# Patient Record
Sex: FEMALE | Race: WHITE | Employment: OTHER | URBAN - METROPOLITAN AREA
[De-identification: names, ages, dates, MRNs, and addresses within clinical notes are randomized per-mention and may not be internally consistent; named-entity substitution may affect disease eponyms.]

---

## 2017-12-06 PROBLEM — G47.00 INSOMNIA: Status: ACTIVE | Noted: 2017-12-06

## 2017-12-06 PROBLEM — I10 HTN (HYPERTENSION): Status: ACTIVE | Noted: 2017-12-06

## 2017-12-06 PROBLEM — I73.9 PAD (PERIPHERAL ARTERY DISEASE) (HCC): Status: ACTIVE | Noted: 2017-12-06

## 2017-12-06 PROBLEM — F41.8 DEPRESSION WITH ANXIETY: Status: ACTIVE | Noted: 2017-12-06

## 2017-12-06 PROBLEM — E03.9 HYPOTHYROIDISM (ACQUIRED): Status: ACTIVE | Noted: 2017-12-06

## 2017-12-06 PROBLEM — M19.90 OA (OSTEOARTHRITIS): Status: ACTIVE | Noted: 2017-12-06

## 2017-12-06 PROBLEM — D50.9 ANEMIA, IRON DEFICIENCY: Status: ACTIVE | Noted: 2017-12-06

## 2017-12-06 PROBLEM — E78.5 HLD (HYPERLIPIDEMIA): Status: ACTIVE | Noted: 2017-12-06

## 2017-12-06 PROBLEM — K58.9 IBS (IRRITABLE BOWEL SYNDROME): Status: ACTIVE | Noted: 2017-12-06

## 2017-12-06 PROBLEM — G25.81 RLS (RESTLESS LEGS SYNDROME): Status: ACTIVE | Noted: 2017-12-06

## 2017-12-20 PROBLEM — Z71.89 ENCOUNTER FOR COORDINATION OF COMPLEX CARE: Status: ACTIVE | Noted: 2017-12-20

## 2017-12-21 PROBLEM — B36.9 FUNGAL DERMATITIS: Status: ACTIVE | Noted: 2017-12-21

## 2017-12-21 PROBLEM — Z83.2 FAMILY HISTORY OF AUTOIMMUNE DISORDER: Status: ACTIVE | Noted: 2017-12-21

## 2018-01-15 ENCOUNTER — HOSPITAL ENCOUNTER (OUTPATIENT)
Dept: CT IMAGING | Age: 73
Discharge: HOME | End: 2018-01-15
Attending: SURGERY
Payer: MEDICARE

## 2018-01-15 VITALS — HEIGHT: 58 IN | BODY MASS INDEX: 35.26 KG/M2 | WEIGHT: 168 LBS

## 2018-01-15 DIAGNOSIS — I73.9 PAD (PERIPHERAL ARTERY DISEASE) (HCC): ICD-10-CM

## 2018-01-15 LAB — CREAT BLD-MCNC: 1.3 MG/DL (ref 0.8–1.5)

## 2018-01-15 PROCEDURE — 82565 ASSAY OF CREATININE: CPT

## 2018-01-15 RX ORDER — SODIUM CHLORIDE 0.9 % (FLUSH) 0.9 %
10 SYRINGE (ML) INJECTION
Status: ACTIVE | OUTPATIENT
Start: 2018-01-15 | End: 2018-01-15

## 2018-01-16 ENCOUNTER — HOSPITAL ENCOUNTER (OUTPATIENT)
Dept: CT IMAGING | Age: 73
Discharge: HOME | End: 2018-01-16
Attending: SURGERY
Payer: MEDICARE

## 2018-01-16 VITALS — HEIGHT: 58 IN | WEIGHT: 168 LBS | BODY MASS INDEX: 35.26 KG/M2

## 2018-01-16 LAB — CREAT BLD-MCNC: 1.2 MG/DL (ref 0.8–1.5)

## 2018-01-16 PROCEDURE — 74011000258 HC RX REV CODE- 258: Performed by: SURGERY

## 2018-01-16 PROCEDURE — 82565 ASSAY OF CREATININE: CPT

## 2018-01-16 PROCEDURE — 75635 CT ANGIO ABDOMINAL ARTERIES: CPT

## 2018-01-16 PROCEDURE — 74011636320 HC RX REV CODE- 636/320: Performed by: SURGERY

## 2018-01-16 RX ORDER — SODIUM CHLORIDE 0.9 % (FLUSH) 0.9 %
10 SYRINGE (ML) INJECTION
Status: COMPLETED | OUTPATIENT
Start: 2018-01-16 | End: 2018-01-16

## 2018-01-16 RX ADMIN — SODIUM CHLORIDE 100 ML: 900 INJECTION, SOLUTION INTRAVENOUS at 10:03

## 2018-01-16 RX ADMIN — Medication 10 ML: at 10:03

## 2018-01-16 RX ADMIN — IOPAMIDOL 125 ML: 755 INJECTION, SOLUTION INTRAVENOUS at 10:03

## 2018-02-06 PROBLEM — K90.41 GLUTEN INTOLERANCE: Status: ACTIVE | Noted: 2018-02-06

## 2018-02-06 PROBLEM — Z00.00 ANNUAL PHYSICAL EXAM: Status: ACTIVE | Noted: 2018-02-06

## 2018-07-31 PROBLEM — R23.2 ABNORMAL FLUSHING AND SWEATING: Status: ACTIVE | Noted: 2018-07-31

## 2018-07-31 PROBLEM — R61 ABNORMAL FLUSHING AND SWEATING: Status: ACTIVE | Noted: 2018-07-31

## 2018-09-27 PROBLEM — E66.01 SEVERE OBESITY (BMI 35.0-39.9): Status: ACTIVE | Noted: 2017-12-06

## 2018-10-09 PROBLEM — F33.9 DEPRESSION, RECURRENT (HCC): Status: ACTIVE | Noted: 2017-12-06

## 2018-10-09 PROBLEM — F41.0 PANIC ANXIETY SYNDROME: Status: ACTIVE | Noted: 2018-10-09

## 2018-10-09 PROBLEM — E66.9 OBESITY, UNSPECIFIED: Status: ACTIVE | Noted: 2017-12-06

## 2018-10-09 PROBLEM — Z79.890 POSTMENOPAUSAL HRT (HORMONE REPLACEMENT THERAPY): Status: ACTIVE | Noted: 2018-10-09

## 2018-10-09 PROBLEM — E66.01 SEVERE OBESITY (BMI 35.0-35.9 WITH COMORBIDITY) (HCC): Status: ACTIVE | Noted: 2018-10-09

## 2018-10-09 PROBLEM — E66.9 OBESITY, UNSPECIFIED: Status: RESOLVED | Noted: 2017-12-06 | Resolved: 2018-10-09

## 2018-10-09 PROBLEM — K21.9 GERD (GASTROESOPHAGEAL REFLUX DISEASE): Status: ACTIVE | Noted: 2018-10-09

## 2018-10-23 PROBLEM — J30.9 AR (ALLERGIC RHINITIS): Status: ACTIVE | Noted: 2018-10-23

## 2018-10-23 PROBLEM — M79.7 FIBROMYALGIA: Status: ACTIVE | Noted: 2018-10-23

## 2018-10-23 PROBLEM — Z91.038 HYMENOPTERA ALLERGY: Status: ACTIVE | Noted: 2018-10-23

## 2018-11-20 PROBLEM — N32.81 OAB (OVERACTIVE BLADDER): Status: ACTIVE | Noted: 2018-11-20

## 2018-11-20 PROBLEM — K64.9 HEMORRHOIDS: Status: ACTIVE | Noted: 2018-11-20

## 2018-11-27 ENCOUNTER — HOSPITAL ENCOUNTER (OUTPATIENT)
Dept: LAB | Age: 73
Discharge: HOME | End: 2018-11-27
Attending: FAMILY MEDICINE
Payer: MEDICARE

## 2018-11-27 LAB
T4 FREE SERPL-MCNC: 1.4 NG/DL (ref 0.78–1.46)
TSH SERPL DL<=0.005 MIU/L-ACNC: 0.15 UIU/ML

## 2018-11-27 PROCEDURE — 84439 ASSAY OF FREE THYROXINE: CPT

## 2018-11-27 PROCEDURE — 36415 COLL VENOUS BLD VENIPUNCTURE: CPT

## 2018-11-27 PROCEDURE — 84443 ASSAY THYROID STIM HORMONE: CPT

## 2019-01-29 ENCOUNTER — HOSPITAL ENCOUNTER (OUTPATIENT)
Dept: SLEEP MEDICINE | Age: 74
Discharge: HOME | End: 2019-01-29
Attending: FAMILY MEDICINE
Payer: MEDICARE

## 2019-01-29 PROCEDURE — 95810 POLYSOM 6/> YRS 4/> PARAM: CPT

## 2019-03-04 ENCOUNTER — HOSPITAL ENCOUNTER (OUTPATIENT)
Dept: LAB | Age: 74
Discharge: HOME | End: 2019-03-04
Payer: MEDICARE

## 2019-03-04 DIAGNOSIS — E03.9 PRIMARY HYPOTHYROIDISM: ICD-10-CM

## 2019-03-04 LAB — TSH W FREE THYROID I,TSHELE: 1.02 UIU/ML (ref 0.36–3.74)

## 2019-03-04 PROCEDURE — 84443 ASSAY THYROID STIM HORMONE: CPT

## 2019-03-04 PROCEDURE — 36415 COLL VENOUS BLD VENIPUNCTURE: CPT

## 2019-03-04 PROCEDURE — 86376 MICROSOMAL ANTIBODY EACH: CPT

## 2019-03-05 PROBLEM — E06.3 HASHIMOTO'S THYROIDITIS: Status: ACTIVE | Noted: 2019-03-05

## 2019-03-05 LAB — THYROPEROXIDASE AB SERPL-ACNC: 120 IU/ML (ref 0–34)

## 2019-03-14 PROBLEM — E66.2 OBESITY HYPOVENTILATION SYNDROME (HCC): Status: ACTIVE | Noted: 2019-03-14

## 2019-03-19 ENCOUNTER — HOSPITAL ENCOUNTER (OUTPATIENT)
Dept: LAB | Age: 74
Discharge: HOME | End: 2019-03-19
Payer: MEDICARE

## 2019-03-19 DIAGNOSIS — D50.8 OTHER IRON DEFICIENCY ANEMIA: ICD-10-CM

## 2019-03-19 DIAGNOSIS — I10 ESSENTIAL HYPERTENSION: ICD-10-CM

## 2019-03-19 LAB
ALBUMIN SERPL-MCNC: 3.7 G/DL (ref 3.2–4.6)
ALBUMIN/GLOB SERPL: 0.9 {RATIO} (ref 1.2–3.5)
ALP SERPL-CCNC: 86 U/L (ref 50–136)
ALT SERPL-CCNC: 17 U/L (ref 12–65)
ANION GAP SERPL CALC-SCNC: 7 MMOL/L (ref 7–16)
AST SERPL-CCNC: 18 U/L (ref 15–37)
BASOPHILS # BLD: 0.1 K/UL (ref 0–0.2)
BASOPHILS NFR BLD: 1 % (ref 0–2)
BILIRUB SERPL-MCNC: 0.3 MG/DL (ref 0.2–1.1)
BUN SERPL-MCNC: 18 MG/DL (ref 8–23)
CALCIUM SERPL-MCNC: 8.8 MG/DL (ref 8.3–10.4)
CHLORIDE SERPL-SCNC: 100 MMOL/L (ref 98–107)
CO2 SERPL-SCNC: 29 MMOL/L (ref 21–32)
CREAT SERPL-MCNC: 1.14 MG/DL (ref 0.6–1)
DIFFERENTIAL METHOD BLD: NORMAL
EOSINOPHIL # BLD: 0.3 K/UL (ref 0–0.8)
EOSINOPHIL NFR BLD: 3 % (ref 0.5–7.8)
ERYTHROCYTE [DISTWIDTH] IN BLOOD BY AUTOMATED COUNT: 13.3 % (ref 11.9–14.6)
FERRITIN SERPL-MCNC: 73 NG/ML (ref 8–388)
GLOBULIN SER CALC-MCNC: 4.1 G/DL (ref 2.3–3.5)
GLUCOSE SERPL-MCNC: 76 MG/DL (ref 65–100)
HCT VFR BLD AUTO: 41.1 % (ref 35.8–46.3)
HGB BLD-MCNC: 13.2 G/DL (ref 11.7–15.4)
IMM GRANULOCYTES # BLD AUTO: 0 K/UL (ref 0–0.5)
IMM GRANULOCYTES NFR BLD AUTO: 0 % (ref 0–5)
IRON SERPL-MCNC: 82 UG/DL (ref 35–150)
LYMPHOCYTES # BLD: 2.8 K/UL (ref 0.5–4.6)
LYMPHOCYTES NFR BLD: 30 % (ref 13–44)
MCH RBC QN AUTO: 29.3 PG (ref 26.1–32.9)
MCHC RBC AUTO-ENTMCNC: 32.1 G/DL (ref 31.4–35)
MCV RBC AUTO: 91.3 FL (ref 79.6–97.8)
MONOCYTES # BLD: 0.5 K/UL (ref 0.1–1.3)
MONOCYTES NFR BLD: 6 % (ref 4–12)
NEUTS SEG # BLD: 5.5 K/UL (ref 1.7–8.2)
NEUTS SEG NFR BLD: 60 % (ref 43–78)
NRBC # BLD: 0 K/UL (ref 0–0.2)
PLATELET # BLD AUTO: 254 K/UL (ref 150–450)
PMV BLD AUTO: 11.3 FL (ref 9.4–12.3)
POTASSIUM SERPL-SCNC: 4.1 MMOL/L (ref 3.5–5.1)
PROT SERPL-MCNC: 7.8 G/DL (ref 6.3–8.2)
RBC # BLD AUTO: 4.5 M/UL (ref 4.05–5.2)
SODIUM SERPL-SCNC: 136 MMOL/L (ref 136–145)
WBC # BLD AUTO: 9.2 K/UL (ref 4.3–11.1)

## 2019-03-19 PROCEDURE — 85025 COMPLETE CBC W/AUTO DIFF WBC: CPT

## 2019-03-19 PROCEDURE — 36415 COLL VENOUS BLD VENIPUNCTURE: CPT

## 2019-03-19 PROCEDURE — 80053 COMPREHEN METABOLIC PANEL: CPT

## 2019-03-19 PROCEDURE — 82728 ASSAY OF FERRITIN: CPT

## 2019-03-19 PROCEDURE — 83540 ASSAY OF IRON: CPT

## 2019-04-15 PROBLEM — R06.09 DOE (DYSPNEA ON EXERTION): Status: ACTIVE | Noted: 2019-04-15

## 2019-05-03 DIAGNOSIS — Z12.31 VISIT FOR SCREENING MAMMOGRAM: ICD-10-CM

## 2019-06-26 PROBLEM — R11.0 CHRONIC NAUSEA: Status: ACTIVE | Noted: 2019-06-26

## 2019-07-09 PROBLEM — R76.8 ANA POSITIVE: Status: ACTIVE | Noted: 2018-10-26

## 2019-07-23 PROBLEM — L23.9 ALLERGIC DERMATITIS: Status: ACTIVE | Noted: 2019-07-23

## 2019-09-09 ENCOUNTER — HOSPITAL ENCOUNTER (OUTPATIENT)
Dept: LAB | Age: 74
Discharge: HOME | End: 2019-09-09
Attending: PHYSICIAN ASSISTANT
Payer: MEDICARE

## 2019-09-09 DIAGNOSIS — E03.9 PRIMARY HYPOTHYROIDISM: ICD-10-CM

## 2019-09-09 LAB
T4 FREE SERPL-MCNC: 1.3 NG/DL (ref 0.78–1.46)
TSH W FREE THYROID I,TSHELE: 0.32 UIU/ML (ref 0.36–3.74)

## 2019-09-09 PROCEDURE — 84443 ASSAY THYROID STIM HORMONE: CPT

## 2019-09-09 PROCEDURE — 36415 COLL VENOUS BLD VENIPUNCTURE: CPT

## 2019-09-09 PROCEDURE — 84439 ASSAY OF FREE THYROXINE: CPT

## 2019-10-31 PROBLEM — F41.1 GAD (GENERALIZED ANXIETY DISORDER): Status: ACTIVE | Noted: 2018-10-09

## 2019-11-07 PROBLEM — N39.3 SUI (STRESS URINARY INCONTINENCE, FEMALE): Status: ACTIVE | Noted: 2019-11-07

## 2019-11-27 ENCOUNTER — HOSPITAL ENCOUNTER (OUTPATIENT)
Dept: SURGERY | Age: 74
Discharge: HOME | End: 2019-11-27
Attending: OBSTETRICS & GYNECOLOGY
Payer: MEDICARE

## 2019-11-27 VITALS
HEIGHT: 57 IN | BODY MASS INDEX: 42.07 KG/M2 | TEMPERATURE: 97.5 F | HEART RATE: 60 BPM | SYSTOLIC BLOOD PRESSURE: 145 MMHG | OXYGEN SATURATION: 99 % | DIASTOLIC BLOOD PRESSURE: 67 MMHG | RESPIRATION RATE: 18 BRPM | WEIGHT: 195 LBS

## 2019-11-27 LAB
HGB BLD-MCNC: 12.1 G/DL (ref 11.7–15.4)
POTASSIUM SERPL-SCNC: 4.3 MMOL/L (ref 3.5–5.1)

## 2019-11-27 PROCEDURE — 85018 HEMOGLOBIN: CPT

## 2019-11-27 PROCEDURE — 84132 ASSAY OF SERUM POTASSIUM: CPT

## 2019-11-27 RX ORDER — FLUTICASONE PROPIONATE 50 MCG
2 SPRAY, SUSPENSION (ML) NASAL DAILY
COMMUNITY
End: 2021-03-12 | Stop reason: SDUPTHER

## 2019-11-27 RX ORDER — IBUPROFEN 200 MG
TABLET ORAL
COMMUNITY
End: 2020-03-24

## 2019-11-27 RX ORDER — ACETAMINOPHEN 325 MG/1
TABLET ORAL
COMMUNITY
End: 2020-01-14

## 2019-11-27 RX ORDER — PANTOPRAZOLE SODIUM 40 MG/1
40 TABLET, DELAYED RELEASE ORAL DAILY
COMMUNITY
End: 2020-01-14 | Stop reason: SDUPTHER

## 2019-11-27 NOTE — PERIOP NOTES
PLEASE CONTINUE TAKING ALL PRESCRIPTION MEDICATIONS UP TO THE DAY OF SURGERY UNLESS OTHERWISE DIRECTED BELOW. DISCONTINUE all vitamin/supplements/herbals 7 days prior to surgery and NSAIDs 5 days prior to procedure. Home Medications to hold     Vitamins, Supplements, and Herbals 7 days prior to surgery. Non-Steriodal Anti-Inflammatory (NSAIDS) such as Advil, Aleve, etc. 5 days prior to surgery          Home Medications to take the day  Of surgery   Tylenol if needed, Singulair, Zofran if needed, Oxybutynin, Klonopin                   Lexapro, ProAir inhaler--bring to the hospital, Protonix, Zantac, Flonase    Zyrtec, Norco if needed     Comments                Please do not bring home medications with you on the day of surgery unless otherwise directed by your nurse. If you are instructed to bring home medications, please give them to your nurse as they will be administered by the nursing staff. If you have any questions, please call [facility] [phone] or [facility] [phone].

## 2019-11-27 NOTE — PERIOP NOTES
Patient verified name and     Order for consent NOT found in EHR, unable to confirm case posting; patient verified. Type 1B surgery, PAT walk-in assessment complete. Labs per surgeon: No orders received. Labs per anesthesia protocol: Hgb and K+; results within anesthesia limits. EKG: None per anesthesia protocol. Patient has systolic murmur grade 2/6 (per PCP note 19). Murmur auscultated during PAT appointment. Patient has SOBOE and bilateral lower extremity swelling. Patient reports she is using her PRN ProAir inhaler QHS. Patient has never had an Echo. Patient denies CP. Patient also reports that in  in [place] heart stopped at the end of my gallbladder surgery. \" Patient reports no CPR was performed and she stayed one night in the hospital. No problems with any other surgeries. Dr. Ac Tarango (anesthesia) made aware of the above; no in-person assessment needed. Per Dr. Ac Tarango Echo to be completed prior to surgery. No additional orders received. Patient is scheduled for an Echo on 19 at 1130. Charge nurse will obtain report when Echo is completed. PCP note with surgical clearance (19) and Nuclear Perfusion Study (19) placed on chart if needed for anesthesia reference. Patient instructed to go to the main entrance of the [facility] on 19 at 1030 am and signed in at the registration office. Patient and patient's  verbalize understanding. Hospital approved surgical skin cleanser and instructions given per hospital policy. Patient provided with and instructed on educational handouts including Guide to Surgery, Pain Management, Hand Hygiene, Blood Transfusion Education, and [facility] Brochure. Patient answered medical/surgical history questions at their best of ability. All prior to admission medications documented in [facility]. Original medication prescription bottle NOT visualized during patient appointment. Patient instructed to hold all vitamins/supplements/herbals 7 days prior to surgery and NSAIDS 5 days prior to surgery, patient verbalized understanding. Patient given verbal and written instructions on medications to take the DOS per anesthesia guidelines. Patient instructed to bring ProAir inhaler to the hospital on the DOS per anesthesia protocol. Patient teach back successful and patient demonstrates knowledge of instructions.

## 2019-12-02 ENCOUNTER — HOSPITAL ENCOUNTER (OUTPATIENT)
Dept: NON INVASIVE DIAGNOSTICS | Age: 74
Discharge: HOME | End: 2019-12-02
Attending: ANESTHESIOLOGY
Payer: MEDICARE

## 2019-12-02 ENCOUNTER — ANESTHESIA EVENT (OUTPATIENT)
Dept: SURGERY | Age: 74
End: 2019-12-02
Payer: MEDICARE

## 2019-12-02 DIAGNOSIS — R01.1 CARDIAC MURMUR: ICD-10-CM

## 2019-12-02 PROCEDURE — 93306 TTE W/DOPPLER COMPLETE: CPT

## 2019-12-02 RX ORDER — CLINDAMYCIN PHOSPHATE 600 MG/50ML
600 INJECTION INTRAVENOUS ONCE
Status: CANCELLED | OUTPATIENT
Start: 2019-12-02 | End: 2019-12-02

## 2019-12-02 RX ORDER — SODIUM CHLORIDE 0.9 % (FLUSH) 0.9 %
5-40 SYRINGE (ML) INJECTION AS NEEDED
Status: CANCELLED | OUTPATIENT
Start: 2019-12-02

## 2019-12-02 RX ORDER — SODIUM CHLORIDE 0.9 % (FLUSH) 0.9 %
5-40 SYRINGE (ML) INJECTION EVERY 8 HOURS
Status: CANCELLED | OUTPATIENT
Start: 2019-12-02

## 2019-12-02 NOTE — H&P
Chief Complaint:  Stress Incontinence    History of Present Illness:  Ms. Maxime Sumner is a 68 yo with stress incontinence. She has been leaking urine for 6-7 months. She leaks urine both day and night. She leaks with cough, laugh, sneeze and position changes. She was also leaking with urge but was placed on oxybutynin and this resolved that problem. She sometimes wears pads and uses a towel at night time. She leaks urine about 10 times per day. She Leaks variable amounts but not full bladder. She has significant allergies and coughs all night. This is when she leaks the most. She is on multiple medications for her allergies. Ventolin helps she uses it once per day.      She is s/pTVH due to heavy menses. She was on hormones and she was started on premarin by Dr. Zoila Soto.      She voids 6 times per day  She wakes up 2-3 times at night to void  She has IBS with diarrhea, she does not strain.      She drinks 1 cup of coffee per day and an occasional soda  She does not use sweeteners  She does not drink alcohol     She is not sexually active for other reasons.      Her last colonoscopy was 3 years ago- she had polyps and advised 10 years.      Quit smoking 12 years ago     Denies DM     She is on nystatin powder for yeast.     Allergies:  Fire Ant  Amoxicillin  Betadine [Povidone-Iodine]  Cephalexin  Demerol [Meperidine]  Morphine  Percocet [Oxycodone-Acetaminophen]  Percodan [Oxycodone Hcl-Oxycodone-Asa]      Medications:  No current facility-administered medications for this encounter. Current Outpatient Medications:     fluticasone propionate (FLONASE) 50 mcg/actuation nasal spray, 2 Sprays by Both Nostrils route daily. , Disp: , Rfl:     pantoprazole (PROTONIX) 40 mg tablet, Take 40 mg by mouth daily. , Disp: , Rfl:     acetaminophen (TYLENOL) 325 mg tablet, Take  by mouth every four (4) hours as needed for Pain., Disp: , Rfl:     ibuprofen (ADVIL) 200 mg tablet, Take  by mouth., Disp: , Rfl:     cetirizine (ZYRTEC) 10 mg tablet, Take 1 Tab by mouth daily. , Disp: 90 Tab, Rfl: 1    conjugated estrogens (PREMARIN) 0.625 mg tablet, Take 1 Tab by mouth daily. , Disp: 90 Tab, Rfl: 1    escitalopram oxalate (LEXAPRO) 20 mg tablet, Take 1 Tab by mouth daily. , Disp: 90 Tab, Rfl: 1    lisinopril-hydroCHLOROthiazide (PRINZIDE, ZESTORETIC) 10-12.5 mg per tablet, Take 1 Tab by mouth daily. , Disp: 90 Tab, Rfl: 1    montelukast (SINGULAIR) 10 mg tablet, Take 1 Tab by mouth daily. , Disp: 90 Tab, Rfl: 1    oxybutynin chloride XL (DITROPAN XL) 10 mg CR tablet, Take 1 Tab by mouth daily. , Disp: 90 Tab, Rfl: 1    pramipexole (MIRAPEX) 0.5 mg tablet, Take 1 Tab by mouth daily. (Patient taking differently: Take 0.5 mg by mouth nightly.), Disp: 90 Tab, Rfl: 1    raNITIdine (ZANTAC) 150 mg tablet, Take 1 Tab by mouth two (2) times a day., Disp: 180 Tab, Rfl: 1    diphenoxylate-atropine (LOMOTIL) 2.5-0.025 mg per tablet, Take 1 Tab by mouth four (4) times daily as needed for Diarrhea. Max Daily Amount: 4 Tabs., Disp: 120 Tab, Rfl: 5    clonazePAM (KLONOPIN) 1 mg tablet, Take 1 Tab by mouth two (2) times a day. Max Daily Amount: 2 mg., Disp: 60 Tab, Rfl: 5    levothyroxine (SYNTHROID) 112 mcg tablet, Take 1 Tab by mouth Daily (before breakfast). (Patient taking differently: Take 112 mcg by mouth nightly.), Disp: 30 Tab, Rfl: 11    HYDROcodone-acetaminophen (NORCO) 5-325 mg per tablet, TAKE 1 TABLET BY MOUTH ONCE DAILY AS NEEDED FOR PAIN, Disp: , Rfl: 0    betamethasone valerate (VALISONE) 0.1 % ointment, Apply  to affected area daily. , Disp: 15 g, Rfl: 0    ondansetron hcl (ZOFRAN) 4 mg tablet, Take 1 Tab by mouth DIALYSIS PRN for Nausea., Disp: 90 Tab, Rfl: 2    clotrimazole-betamethasone (LOTRISONE) topical cream, Apply  to affected area two (2) times a day.  Until rash resolves, Disp: 45 g, Rfl: 1    albuterol (PROVENTIL HFA, VENTOLIN HFA, PROAIR HFA) 90 mcg/actuation inhaler, Take 1-2 Puffs by inhalation every six (6) hours as needed for Wheezing., Disp: 1 Inhaler, Rfl: 1    cholecalciferol, VITAMIN D3, (VITAMIN D3) 5,000 unit tab tablet, Take  by mouth daily. , Disp: , Rfl:     VITAMIN E ACETATE PO, Take 1 Tab by mouth daily. , Disp: , Rfl:     nystatin (MYCOSTATIN) powder, Apply  to affected area three (3) times daily as needed. , Disp: 60 g, Rfl: 2      Past Medical History:  Anemia  HTN  Hyperlipidemia  Hypothyroid  IBS  Insomnia  PAD  RLS    Past Surgical History:  Appendectomy  Cholecystectomy  R and L Knee surgery  Urethroplasty- 1986  Right shoulder  B/L ulna  Right hand  T and A  RAIMUNDO  Gastric Band      Family History:  Brain Aneurysm in brother and father  Heart Disease  Skin Cancer  Hepatitis C- son  MS- daughter    Social History:    Former Smoker  Quit 2005  No alcohol  No drugs    Review of Systems:  Constitutional: Positive for fatigue. Negative for fever. Skin: Negative for rash and itching. HENT: Negative for hearing loss and vertigo. Respiratory: Negative for cough and shortness of breath. Cardiovascular:  Positive for leg swelling. Negative for chest pain and palpitations. Gastrointestinal: Negative for abdominal pain, anal bleeding, constipation, diarrhea and hemorrhoids. Genitourinary: Positive for enuresis, frequency, urgency and decreased urine volume. Negative for difficulty urinating, dyspareunia, dysuria, genital sores, hematuria, menstrual problem, pelvic pain, pelvic pressure, vaginal bleeding, vaginal discharge, vaginal pain, vaginal burning, vaginal itching, vaginal odor and vaginal lesion. Neurological: Negative for dizziness, blackouts and headaches. Psychiatric/Behavioral: Positive for nervous/anxious. Negative for behavioral problem and insomnia. Endocrine: Positive for polydipsia. Negative for polyuria. Female Endocrine: Positive for vaginal dryness and decreased libido. Negative for pain with intercourse.     Physical Exam:     Physical Exam   Constitutional: She is oriented to person, place, and time. Vital signs are normal. She appears well-developed and well-nourished. She is cooperative. No distress. HENT:   Head: Normocephalic and atraumatic. Neck: Trachea normal. No thyroid mass present. Cardiovascular: Normal rate and normal heart sounds. Pulmonary/Chest: Effort normal. No accessory muscle usage. No respiratory distress. Abdominal: Soft. Normal appearance. She exhibits no distension and no mass. There is no hepatosplenomegaly. There is no tenderness. There is no rebound and no guarding. No hernia. Genitourinary: Pelvic exam was performed with patient supine. Musculoskeletal: She exhibits no edema or tenderness. Lymphadenopathy:     She has no cervical adenopathy. She has no axillary adenopathy. Neurological: She is alert and oriented to person, place, and time. Skin: Skin is warm and dry. No lesion and no rash noted. No erythema. Psychiatric: She has a normal mood and affect.  Her speech is normal and behavior is normal. Judgment and thought content normal. Cognition and memory are normal. She does not express impulsivity or inappropriate judgment.         Female Genitourinary   Vulva:                          positive atrophy No lesions  Bartholin's Gland:        Bilateral , Normal, nontender  Skenes Gland:            Bilateral, Normal, nontender   Clitoris:                        Normal.   Introitus:                      Normal.   Urethral Meatus:         Normal appearing, normal size, no lesions, no prolapse  Urethra:           No masses, no tenderness  Vagina:            Positive atrophy, no discharge, no lesions  Cervix:             Absent  Uterus:            Absent   Adnexa:           No masses palpated, no tenderness  Bladder:           No tenderness, no masses palpated  Perineum:        Normal, no lesions    Rectal   Anorectal Exam: No hemorrhoids and no masses or lesions of the perineum           POP-Q: (Pelvic Organ Prolapse - Quantification Exam):  No POP     ICS Stage                     Anterior:  Stage 0   Posterior:  Stage 0   Apical:  Stage 0            Pelvic floor muscles: Tender Spasm     Contraction     R. Puborectalis: NO 0 /5   4 /5   L. Puborectalis: NO 0 /5   4 /5   R. Pubococcyg NO 0 /5   4 /5   L. Pubococcyg NO 0 /5   4 /5   R. Ileococcyg: NO 0 /5   4 /5   L. Ileococcyg: NO 0 /5   4 /5   R. Obturator Int: NO 0 /5   4 /5   L. Obturator Int: NO 0 /5   4 /5   R. Coccygeus: NO 0 /5   4 /5   L. Coccygeus: NO 0 /5   4 /5            Supine Stress Test of MALOU:  Positive     Neurological Exam:   Sensorineural Exam:               Bulvocavernosus reflex:  Normal               Anal [place]:  Normal          Assessment and Plan:  MALOU (stress urinary incontinence, female)  Assessment & Plan:  We discussed the differential diagnosis of urinary incontinence. We also discussed the pathogenesis and etiology of stress urinary incontinence. I explained the epidemiology of incontinence. I offered her options which include nothing, physical therapy, barrier treatment, and surgery. We discussed pessary and impressa.      She is interested in possible surgical repair.      I described the surgical technique to be employed for her upcoming surgery. We discussed the anticipated postoperative course.     TVT cure rates at 6 years is up to 85%.      We discussed the properties of polypropylene mesh. We discussed the inert nature and the potential for complication, including infections, rejection, and erosion. The risk of erosion is <10%.    We discussed the FDA warning on mesh use.      Risks, benefits, indications, and alternatives of the surgery were discussed. Risks reviewed include bleeding, infections, injury to pelvic organs (bladder, nerves, vessels, urethra, and ureters), recurrence, urinary retention, dyspareunia, anesthetic complications, and death.  We discussed that other complications could arise and that the list is too long to discuss comprehensively.     She is consented for midurethral sling and cystoscopy

## 2019-12-02 NOTE — PERIOP NOTES
Results of Echo performed earlier today within Anesthesia protocols:    SUMMARY:   -  Left ventricle: Systolic function was normal. Ejection fraction was  estimated in the range of 60 % to 65 %. There were no regional wall motion  abnormalities. Wall thickness was at the upper limits of normal.   -  Pericardium: There was no pericardial effusion. Complete report in EMR.

## 2019-12-03 ENCOUNTER — ANESTHESIA (OUTPATIENT)
Dept: SURGERY | Age: 74
End: 2019-12-03
Payer: MEDICARE

## 2019-12-03 ENCOUNTER — HOSPITAL ENCOUNTER (OUTPATIENT)
Age: 74
Setting detail: OUTPATIENT SURGERY
Discharge: HOME | End: 2019-12-03
Attending: OBSTETRICS & GYNECOLOGY | Admitting: OBSTETRICS & GYNECOLOGY
Payer: MEDICARE

## 2019-12-03 VITALS
HEIGHT: 57 IN | RESPIRATION RATE: 18 BRPM | DIASTOLIC BLOOD PRESSURE: 58 MMHG | HEART RATE: 62 BPM | OXYGEN SATURATION: 97 % | SYSTOLIC BLOOD PRESSURE: 116 MMHG | WEIGHT: 195 LBS | BODY MASS INDEX: 42.07 KG/M2 | TEMPERATURE: 99 F

## 2019-12-03 DIAGNOSIS — G89.18 POSTOPERATIVE PAIN: Primary | ICD-10-CM

## 2019-12-03 PROCEDURE — 74011000250 HC RX REV CODE- 250: Performed by: NURSE ANESTHETIST, CERTIFIED REGISTERED

## 2019-12-03 PROCEDURE — 77030039266 HC ADH SKN EXOFIN S2SG -A: Performed by: OBSTETRICS & GYNECOLOGY

## 2019-12-03 PROCEDURE — 74011250636 HC RX REV CODE- 250/636: Performed by: NURSE ANESTHETIST, CERTIFIED REGISTERED

## 2019-12-03 PROCEDURE — 76210000020 HC REC RM PH II FIRST 0.5 HR: Performed by: OBSTETRICS & GYNECOLOGY

## 2019-12-03 PROCEDURE — 77030034696 HC CATH URETH FOL 2W BARD -A: Performed by: OBSTETRICS & GYNECOLOGY

## 2019-12-03 PROCEDURE — C1771 REP DEV, URINARY, W/SLING: HCPCS | Performed by: OBSTETRICS & GYNECOLOGY

## 2019-12-03 PROCEDURE — 74011250637 HC RX REV CODE- 250/637: Performed by: ANESTHESIOLOGY

## 2019-12-03 PROCEDURE — 74011000250 HC RX REV CODE- 250: Performed by: OBSTETRICS & GYNECOLOGY

## 2019-12-03 PROCEDURE — 74011250636 HC RX REV CODE- 250/636: Performed by: ANESTHESIOLOGY

## 2019-12-03 PROCEDURE — 74011250636 HC RX REV CODE- 250/636: Performed by: OBSTETRICS & GYNECOLOGY

## 2019-12-03 PROCEDURE — 77030037088 HC TUBE ENDOTRACH ORAL NSL COVD-A: Performed by: ANESTHESIOLOGY

## 2019-12-03 PROCEDURE — 76060000033 HC ANESTHESIA 1 TO 1.5 HR: Performed by: OBSTETRICS & GYNECOLOGY

## 2019-12-03 PROCEDURE — 77030040361 HC SLV COMPR DVT MDII -B: Performed by: OBSTETRICS & GYNECOLOGY

## 2019-12-03 PROCEDURE — 77030008462 HC STPLR SKN PROX J&J -A: Performed by: OBSTETRICS & GYNECOLOGY

## 2019-12-03 PROCEDURE — 74011000258 HC RX REV CODE- 258: Performed by: OBSTETRICS & GYNECOLOGY

## 2019-12-03 PROCEDURE — 76210000006 HC OR PH I REC 0.5 TO 1 HR: Performed by: OBSTETRICS & GYNECOLOGY

## 2019-12-03 PROCEDURE — 74011000250 HC RX REV CODE- 250: Performed by: ANESTHESIOLOGY

## 2019-12-03 PROCEDURE — 77030039425 HC BLD LARYNG TRULITE DISP TELE -A: Performed by: ANESTHESIOLOGY

## 2019-12-03 PROCEDURE — 77030019927 HC TBNG IRR CYSTO BAXT -A: Performed by: OBSTETRICS & GYNECOLOGY

## 2019-12-03 PROCEDURE — 77030040922 HC BLNKT HYPOTHRM STRY -A: Performed by: ANESTHESIOLOGY

## 2019-12-03 PROCEDURE — 77030031139 HC SUT VCRL2 J&J -A: Performed by: OBSTETRICS & GYNECOLOGY

## 2019-12-03 PROCEDURE — 77030018836 HC SOL IRR NACL ICUM -A: Performed by: OBSTETRICS & GYNECOLOGY

## 2019-12-03 PROCEDURE — 76010000138 HC OR TIME 0.5 TO 1 HR: Performed by: OBSTETRICS & GYNECOLOGY

## 2019-12-03 PROCEDURE — 77030018832 HC SOL IRR H20 ICUM -A: Performed by: OBSTETRICS & GYNECOLOGY

## 2019-12-03 DEVICE — TRANSVAGINAL MID-URETHRAL SLING
Type: IMPLANTABLE DEVICE | Site: PELVIS | Status: FUNCTIONAL
Brand: ADVANTAGE™ SYSTEM

## 2019-12-03 RX ORDER — EPHEDRINE SULFATE/0.9% NACL/PF 50 MG/5 ML
SYRINGE (ML) INTRAVENOUS AS NEEDED
Status: DISCONTINUED | OUTPATIENT
Start: 2019-12-03 | End: 2019-12-03 | Stop reason: HOSPADM

## 2019-12-03 RX ORDER — PROPOFOL 10 MG/ML
INJECTION, EMULSION INTRAVENOUS AS NEEDED
Status: DISCONTINUED | OUTPATIENT
Start: 2019-12-03 | End: 2019-12-03 | Stop reason: HOSPADM

## 2019-12-03 RX ORDER — ROCURONIUM BROMIDE 10 MG/ML
INJECTION, SOLUTION INTRAVENOUS AS NEEDED
Status: DISCONTINUED | OUTPATIENT
Start: 2019-12-03 | End: 2019-12-03 | Stop reason: HOSPADM

## 2019-12-03 RX ORDER — DEXAMETHASONE SODIUM PHOSPHATE 100 MG/10ML
INJECTION INTRAMUSCULAR; INTRAVENOUS AS NEEDED
Status: DISCONTINUED | OUTPATIENT
Start: 2019-12-03 | End: 2019-12-03 | Stop reason: HOSPADM

## 2019-12-03 RX ORDER — LIDOCAINE HYDROCHLORIDE 20 MG/ML
INJECTION, SOLUTION EPIDURAL; INFILTRATION; INTRACAUDAL; PERINEURAL AS NEEDED
Status: DISCONTINUED | OUTPATIENT
Start: 2019-12-03 | End: 2019-12-03 | Stop reason: HOSPADM

## 2019-12-03 RX ORDER — BACITRACIN 50000 [IU]/1
INJECTION, POWDER, FOR SOLUTION INTRAMUSCULAR AS NEEDED
Status: DISCONTINUED | OUTPATIENT
Start: 2019-12-03 | End: 2019-12-03 | Stop reason: HOSPADM

## 2019-12-03 RX ORDER — LIDOCAINE HYDROCHLORIDE 10 MG/ML
0.1 INJECTION INFILTRATION; PERINEURAL AS NEEDED
Status: DISCONTINUED | OUTPATIENT
Start: 2019-12-03 | End: 2019-12-03 | Stop reason: HOSPADM

## 2019-12-03 RX ORDER — SODIUM CHLORIDE, SODIUM LACTATE, POTASSIUM CHLORIDE, CALCIUM CHLORIDE 600; 310; 30; 20 MG/100ML; MG/100ML; MG/100ML; MG/100ML
1000 INJECTION, SOLUTION INTRAVENOUS CONTINUOUS
Status: DISCONTINUED | OUTPATIENT
Start: 2019-12-03 | End: 2019-12-03 | Stop reason: HOSPADM

## 2019-12-03 RX ORDER — MIDAZOLAM HYDROCHLORIDE 1 MG/ML
2 INJECTION, SOLUTION INTRAMUSCULAR; INTRAVENOUS
Status: COMPLETED | OUTPATIENT
Start: 2019-12-03 | End: 2019-12-03

## 2019-12-03 RX ORDER — HYDROMORPHONE HYDROCHLORIDE 2 MG/1
2 TABLET ORAL
Qty: 10 TAB | Refills: 0 | Status: SHIPPED | OUTPATIENT
Start: 2019-12-03 | End: 2019-12-06

## 2019-12-03 RX ORDER — CLINDAMYCIN PHOSPHATE 900 MG/50ML
900 INJECTION INTRAVENOUS ONCE
Status: COMPLETED | OUTPATIENT
Start: 2019-12-03 | End: 2019-12-03

## 2019-12-03 RX ORDER — FENTANYL CITRATE 50 UG/ML
INJECTION, SOLUTION INTRAMUSCULAR; INTRAVENOUS AS NEEDED
Status: DISCONTINUED | OUTPATIENT
Start: 2019-12-03 | End: 2019-12-03 | Stop reason: HOSPADM

## 2019-12-03 RX ORDER — HYDROMORPHONE HYDROCHLORIDE 2 MG/ML
0.5 INJECTION, SOLUTION INTRAMUSCULAR; INTRAVENOUS; SUBCUTANEOUS
Status: DISCONTINUED | OUTPATIENT
Start: 2019-12-03 | End: 2019-12-04 | Stop reason: HOSPADM

## 2019-12-03 RX ORDER — SUCCINYLCHOLINE CHLORIDE 20 MG/ML
INJECTION INTRAMUSCULAR; INTRAVENOUS AS NEEDED
Status: DISCONTINUED | OUTPATIENT
Start: 2019-12-03 | End: 2019-12-03 | Stop reason: HOSPADM

## 2019-12-03 RX ORDER — ONDANSETRON 2 MG/ML
4 INJECTION INTRAMUSCULAR; INTRAVENOUS
Status: DISCONTINUED | OUTPATIENT
Start: 2019-12-03 | End: 2019-12-04 | Stop reason: HOSPADM

## 2019-12-03 RX ORDER — ACETAMINOPHEN 500 MG
1000 TABLET ORAL ONCE
Status: COMPLETED | OUTPATIENT
Start: 2019-12-03 | End: 2019-12-03

## 2019-12-03 RX ORDER — ONDANSETRON 2 MG/ML
INJECTION INTRAMUSCULAR; INTRAVENOUS AS NEEDED
Status: DISCONTINUED | OUTPATIENT
Start: 2019-12-03 | End: 2019-12-03 | Stop reason: HOSPADM

## 2019-12-03 RX ORDER — ALBUTEROL SULFATE 0.83 MG/ML
2.5 SOLUTION RESPIRATORY (INHALATION) AS NEEDED
Status: DISCONTINUED | OUTPATIENT
Start: 2019-12-03 | End: 2019-12-04 | Stop reason: HOSPADM

## 2019-12-03 RX ORDER — LIDOCAINE HYDROCHLORIDE AND EPINEPHRINE 10; 10 MG/ML; UG/ML
INJECTION, SOLUTION INFILTRATION; PERINEURAL AS NEEDED
Status: DISCONTINUED | OUTPATIENT
Start: 2019-12-03 | End: 2019-12-03 | Stop reason: HOSPADM

## 2019-12-03 RX ADMIN — ONDANSETRON 4 MG: 2 INJECTION INTRAMUSCULAR; INTRAVENOUS at 14:09

## 2019-12-03 RX ADMIN — GENTAMICIN SULFATE 320 MG: 40 INJECTION, SOLUTION INTRAMUSCULAR; INTRAVENOUS at 12:42

## 2019-12-03 RX ADMIN — ACETAMINOPHEN 1000 MG: 500 TABLET, FILM COATED ORAL at 12:38

## 2019-12-03 RX ADMIN — Medication 15 MG: at 13:56

## 2019-12-03 RX ADMIN — HYDROMORPHONE HYDROCHLORIDE 0.5 MG: 2 INJECTION INTRAMUSCULAR; INTRAVENOUS; SUBCUTANEOUS at 14:50

## 2019-12-03 RX ADMIN — DEXAMETHASONE SODIUM PHOSPHATE 5 MG: 10 INJECTION INTRAMUSCULAR; INTRAVENOUS at 13:43

## 2019-12-03 RX ADMIN — LIDOCAINE HYDROCHLORIDE 0.1 ML: 10 INJECTION, SOLUTION INFILTRATION; PERINEURAL at 12:43

## 2019-12-03 RX ADMIN — HYDROMORPHONE HYDROCHLORIDE 0.5 MG: 2 INJECTION INTRAMUSCULAR; INTRAVENOUS; SUBCUTANEOUS at 14:57

## 2019-12-03 RX ADMIN — SODIUM CHLORIDE, SODIUM LACTATE, POTASSIUM CHLORIDE, AND CALCIUM CHLORIDE 1000 ML: 600; 310; 30; 20 INJECTION, SOLUTION INTRAVENOUS at 12:44

## 2019-12-03 RX ADMIN — MIDAZOLAM 2 MG: 1 INJECTION INTRAMUSCULAR; INTRAVENOUS at 13:01

## 2019-12-03 RX ADMIN — SUCCINYLCHOLINE CHLORIDE 160 MG: 20 INJECTION, SOLUTION INTRAMUSCULAR; INTRAVENOUS at 13:28

## 2019-12-03 RX ADMIN — FENTANYL CITRATE 50 MCG: 50 INJECTION INTRAMUSCULAR; INTRAVENOUS at 13:28

## 2019-12-03 RX ADMIN — FENTANYL CITRATE 50 MCG: 50 INJECTION INTRAMUSCULAR; INTRAVENOUS at 13:25

## 2019-12-03 RX ADMIN — PROPOFOL 200 MG: 10 INJECTION, EMULSION INTRAVENOUS at 13:28

## 2019-12-03 RX ADMIN — PROPOFOL 50 MG: 10 INJECTION, EMULSION INTRAVENOUS at 13:50

## 2019-12-03 RX ADMIN — ROCURONIUM BROMIDE 5 MG: 10 INJECTION, SOLUTION INTRAVENOUS at 13:28

## 2019-12-03 RX ADMIN — LIDOCAINE HYDROCHLORIDE 60 MG: 20 INJECTION, SOLUTION EPIDURAL; INFILTRATION; INTRACAUDAL; PERINEURAL at 13:28

## 2019-12-03 RX ADMIN — CLINDAMYCIN PHOSPHATE 900 MG: 900 INJECTION, SOLUTION INTRAVENOUS at 13:23

## 2019-12-03 RX ADMIN — HYDROMORPHONE HYDROCHLORIDE 0.5 MG: 2 INJECTION INTRAMUSCULAR; INTRAVENOUS; SUBCUTANEOUS at 15:10

## 2019-12-03 NOTE — OP NOTES
PROCEDURES PERFORMED:  1. Mid urethral sling ([employer] Advantage Sling). 2. Cystourethroscopy. PREOPERATIVE DIAGNOSES:  1. Stress urinary incontinence. POSTOPERATIVE DIAGNOSES:  1. Stress urinary incontinence. EBL: 20cc    IVF: 500cc    Urine Output: 50cc    INTRAOPERATIVE FINDINGS: Intraoperative cystourethroscopy revealed normal bladder and urethral mucosa without evidence of laceration, foreign body, neoplasm, or stone. Both ureters were effluxing urine at the conclusion of the case. INDICATIONS FOR PROCEDURE: This is a 18-year-old female with a history of worsening symptomatic  stress urinary incontinence who desired definitive surgical treatment after being extensively counseled on the risks, benefits, indications and alternatives of the procedure. Risks reviewed include bleeding, infection, damage to the bladder, ureters, urethra, bowel, blood vessels, nerves, postoperative urinary retention, postoperative incontinence, pelvic pain, dyspareunia, recurrence, mesh erosion, anesthetic complications and death. The patient expressed understanding and informed consent was obtained. DESCRIPTION OF PROCEDURE: On the day of surgery, the patient was identified in the preop waiting area. Consents were again reviewed. The patient was then taken to the operating room where she was placed in dorsal lithotomy position using the Yellofin stirrups in neurologically safe position. Antithrombotic boots were activated. General anesthesia was induced without difficulty. She was prepped and draped in usual sterile fashion. Timeout was taken to review the patient's procedure and confirm she received appropriate antibiotics. Oropeza catheter was placed in the bladder to drain it. At this point, we performed the mid urethral sling as follows: The anterior vaginal mucosa overlying the mid urethral region was superficially injected with 1% lidocaine, 1:100,000 epinephrine.  A vertical midline incision was made approximately 2 cm in length with a scalpel. Metzenbaum scissors were then used to dissect the vaginal mucosa off the underlying fascia and bladder out underneath the pubic symphysis bilaterally. TVT trocar was then placed through the vaginal tunnel underneath the pubic symphysis through the retropubic space and out through the anterior abdominal wall just above the pubic symphysis. This process was then repeated on the contralateral side following the same anatomic principles. Oropeza catheter was removed. Cystourethroscopy was then performed with the above noted findings. The scope was then removed. The catheter was replaced. Trocars and TVT polypropylene tape were advanced out through the retropubic space and through the abdominal skin incisions. Placement was confirmed to be in the midurethral region and not under tension using a spacer. Plastic sheaths were then removed and the ends of tape were cut just below the abdominal incision. Placement was again confirmed to be not under tension using a spacer. Following this, the incision was then irrigated and appeared hemostatic. The vaginal mucosa was then closed with running 2-0 Vicryl suture. Excellent hemostasis was noted. Suprapubic skin incisions were closed with Dermabond. Rectal exam was performed and was normal. Sponge, lap, needle and instrument counts were correct. The patient was repositioned in supine position and anesthesia was reversed without difficulty. The patient tolerated procedure well and was taken to recovery in stable condition.

## 2019-12-03 NOTE — ANESTHESIA POSTPROCEDURE EVALUATION
Procedure(s):  SUBURETHRAL SLING/ [employer]  CYSTOSCOPY.     general    Anesthesia Post Evaluation      Multimodal analgesia: multimodal analgesia used between 6 hours prior to anesthesia start to PACU discharge  Patient location during evaluation: bedside  Patient participation: complete - patient participated  Level of consciousness: awake and responsive to light touch  Pain management: adequate  Airway patency: patent  Anesthetic complications: no  Cardiovascular status: acceptable, hemodynamically stable, blood pressure returned to baseline and stable  Respiratory status: acceptable, unassisted, spontaneous ventilation and nonlabored ventilation  Hydration status: acceptable  Post anesthesia nausea and vomiting:  controlled      Vitals Value Taken Time   /60 12/3/2019  2:22 PM   Temp 37.2 °C (99 °F) 12/3/2019  2:22 PM   Pulse 79 12/3/2019  2:22 PM   Resp 14 12/3/2019  2:22 PM   SpO2 92 % 12/3/2019  2:22 PM

## 2019-12-03 NOTE — DISCHARGE INSTRUCTIONS
Caring for yourself after Vaginal Prolapse Repair or Sling      General care: You will be sore and it will take time to heal after surgery; as a rule, you will gradually get better and need less pain medication on a daily basis. Diet: Start with easy, bland foods and resume your regular diet as tolerated. Avoid foods that are greasy or give you gas. Keep well hydrated, drinking at least 8 glasses of fluids a day. Do not drink any alcohol while taking narcotic pain medications. Activity: You should take short walks frequently after surgery, but no strenuous activity. You may climb stairs, slowly and carefully. In addition to the surgical reconstruction you just underwent, what you do or should we say dont do is as important in the success of your repair. We recommend no heavy housework for the first 6-8 weeks. No lifting greater than 5-10 pounds for at least 6 weeks, although the more heavy lifting you do in general can compromise our repair. You may drive in general in about 1-2 weeks. DO NOT DRIVE WHILE TAKING NARCOTIC MEDICAITION. Also, do not drive until you are moving (standing, sitting, walking) easily without pain or hesitation. You may use your stairs at home after discharge, increasing as the days go on. This is all designed with the theory of reducing the amount of abdominal straining which can lead to a longer lasting surgical correction. No bathing or swimming but you may shower. Listen to your body. Meron Ask if you are receiving any signals that you are doing too much (pain, pulling, bleeding), then stop doing it!! Wound care: You may shower after your surgery. Your incisions are in the vagina. You may have small incisions in the fold of your thigh or just above your pubic bone. These are closed with sterile skin glue and will feel like a scab. They will fall off in time. Do not pick at them. You may also have sutures between your rectum and vagina. These sutures will dissolve on their own.  If there is any irritation, swelling, or worsening redness of your surgical wounds, please call the office. You may use ice packs on and off (no more than 20 minutes on at a time) for the first 72 hours. You will have some vaginal bleeding tapering off to spotting for up to several weeks. If it becomes heavier than you would have expected please give us a call. Other limitations: Do not put anything in the vagina (do not have sex, douche, or use tampons) until cleared by your doctor. Do not soak in a bath, pool, hot tub, or the ocean for at least 2 weeks or until your doctor says it is okay. When to call your doctor: If you develop a fever >101, chills, nausea, vomiting, problems urinating, increasing abdominal pain, or concerns with your incisions, call your doctor. If you experience heavy vaginal bleeding or large blood clots, call your doctor; light spotting after surgery is normal and is a sign of healing, even 3-4 weeks after surgery. If you have swelling, redness or pain in your legs, call your doctor. If you have trouble breathing, chest pain, or any other emergency, you should come in to our Emergency triage area and/or call 911. If, at any time, you have questions or concerns, worsening pain or belly pain, you should feel free to call your doctor. Pain control: Continue narcotic pain medications as prescribed by your doctor as needed. Ibuprofen (Advil, Motrin) should be the main medication you use for pain control unless otherwise instructed by your doctor. Take ibuprofen with food, and do not take more than is recommended or prescribed. Please use caution if taking extra Acetaminophen (Tylenol) as most narcotic medications already contain this, and taking too much is dangerous. Please call our office or talk to your pharmacist if you have any questions. Bowel Regimen: You may not have a bowel movement for several days post operatively.  Please keep bowl movements soft by adding extra fiber to your diet and drinking plenty of fluids. Please supplement that with:    o Colace 100MG- one tablet 2 times per day (other stool softener is acceptable)  o Miralax powder (laxative) - one tablespoon dissolved in 8 ounce of water of juice every day    If you are unable to have a bowel movement by the 4th or 5th day after your operation, please try some Milk of magnesium or alternatively you may call the office. Once bowel movements are regular and easy, you may gradually wean yourself off of the laxatives and stool softeners. Follow-up appointment: Please call the office for an appointment to see your doctors approximately four weeks after your surgery. Please call the office sooner if you have any questions or concerns. Catheter care (if applicable): if you have had difficulty voiding after the surgery or if your doctor feels it is best you may be sent home with a catheter. If so, rest assured that this is a short term problem. You will be given a leg bad and a large overnight bag on discharge and the appropriate education to care for both catheters. You may shower with the catheter in place. Please call the office to schedule a time to come in within the next few days to have the catheter removed.

## 2019-12-03 NOTE — ANESTHESIA PREPROCEDURE EVALUATION
Relevant Problems   No relevant active problems       Anesthetic History   No history of anesthetic complications            Review of Systems / Medical History  Patient summary reviewed and pertinent labs reviewed    Pulmonary                   Neuro/Psych         Psychiatric history     Cardiovascular    Hypertension          Hyperlipidemia    Exercise tolerance: <4 METS     GI/Hepatic/Renal     GERD           Endo/Other      Hypothyroidism  Morbid obesity and arthritis     Other Findings              Physical Exam    Airway  Mallampati: III  TM Distance: 4 - 6 cm  Neck ROM: normal range of motion   Mouth opening: Normal     Cardiovascular    Rhythm: regular  Rate: normal      Pertinent negatives: No murmur and peripheral edema   Dental    Dentition: Full upper dentures     Pulmonary  Breath sounds clear to auscultation               Abdominal         Other Findings            Anesthetic Plan    ASA: 3  Anesthesia type: general          Induction: Intravenous  Anesthetic plan and risks discussed with: Patient      CHARLENE

## 2020-01-30 PROBLEM — N30.10 INTERSTITIAL CYSTITIS: Status: ACTIVE | Noted: 2020-01-30

## 2020-01-30 PROBLEM — N32.81 OAB (OVERACTIVE BLADDER): Status: RESOLVED | Noted: 2018-11-20 | Resolved: 2020-01-30

## 2020-01-30 PROBLEM — N95.2 VAGINAL ATROPHY: Status: ACTIVE | Noted: 2020-01-30

## 2020-02-04 RX ORDER — SODIUM CHLORIDE 0.9 % (FLUSH) 0.9 %
5-40 SYRINGE (ML) INJECTION AS NEEDED
Status: CANCELLED | OUTPATIENT
Start: 2020-02-04

## 2020-02-04 RX ORDER — SODIUM CHLORIDE 0.9 % (FLUSH) 0.9 %
5-40 SYRINGE (ML) INJECTION EVERY 8 HOURS
Status: CANCELLED | OUTPATIENT
Start: 2020-02-04

## 2020-02-07 ENCOUNTER — HOSPITAL ENCOUNTER (OUTPATIENT)
Dept: LAB | Age: 75
Discharge: HOME | End: 2020-02-07
Payer: MEDICARE

## 2020-02-07 LAB
HCT VFR BLD AUTO: 35 % (ref 35.8–46.3)
HGB BLD-MCNC: 11.4 G/DL (ref 11.7–15.4)
POTASSIUM SERPL-SCNC: 4.5 MMOL/L (ref 3.5–5.1)

## 2020-02-07 PROCEDURE — 36415 COLL VENOUS BLD VENIPUNCTURE: CPT

## 2020-02-07 PROCEDURE — 85018 HEMOGLOBIN: CPT

## 2020-02-07 PROCEDURE — 84132 ASSAY OF SERUM POTASSIUM: CPT

## 2020-02-07 NOTE — H&P
Date of Service: 2020-01-27  Work Status:  ????? Allergies:Amoxicillin(c diff); Betadine(Burn); Cephalexin(c diff); Demerol(rash); Morphine Sulfate(Morphine - unable to sleep); Percocet(Nightmares)  Medications:Cetirizine HCl;clonazePAM (1 MG);Estradiol (2 MG);HYDROcodone-Acetaminophen (5-325 MG); Levothyroxine Sodium (112 MCG); Lisinopril-hydroCHLOROthiazide (10-12.5 MG); Montelukast Sodium (10 MG)      CC: Pain of the left wrist and thumb    HPI:  The patient is a 71-year-old, right-hand-dominant, white female who works part time at [employer]. She complains of pain around the base of her left thumb and wrist region that have bothered her for the last 3 months or so. The thumb hurts with grasping, gripping, twisting and general use of the thumb. She denies numbness or tingling in the hand or fingers. She has been using a thumb splint some. She has had a right trapeziometacarpal arthro[place] done in [place] in 2015 and says that it is doing well now though it took about a year for her to recover apparently. PMH, SH & ROS:  This form has been filled out, reviewed, signed and placed in the patient's chart. I reviewed pertinent positive and negative responses and tried to associate them with the musculoskeletal problem of the patient. Other positive responses were deferred to the patient's primary care physician. PE:  The patient is an alert, oriented, generally healthy-appearing, mature, white female. She is 4 feet  9 inches tall and weighs 197 pounds. Exam of the left wrist and hand shows the patient to have dorsal subluxation of the thumb metacarpal on the trapezium with tenderness around the TM joint and a positive grind test.  She has good stability of the MP joint. She has good flexion and extension of  her IP joint without triggering. Franklin Sow test is negative. Her wrist has good flexion and extension. Her fingers all extend well and close into a clenched fist with no triggering or locking. Circulation is good with brisk capillary refill in all the fingertips. Neurologic exam is intact to light touch in the radial, ulnar and median nerve distribution. Skin and nails are normal.           RADIOGRAPHS:  An AP and lateral and Wilhelminia Raz view of the left wrist today, 01/27/2020, shows the patient to have degenerative changes of the TM joint with sclerosis and joint narrowing to bone on bone contact. There is dorsal subluxation and spurring present as well. The radiocarpal and mid carpal joints are in good condition. 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 DIAGNOSIS:  Left trapeziometacarpal arthritis. DISPOSITION:  The problem was discussed with the patient. She has had a previous trapeziometacarpal arthroplasty on the right that was done through a dorsal approach. I do not see signs of her having had an FCR tendon transfer. I have discussed treatment options for the left wrist which would include steroid injections, splinting and possibly surgery. The patient wants to go ahead with surgical treatment rather than doing injections. She has already tried the splinting. I have told her that I generally do a different procedure than what she has had elsewhere. I use a more palmar type incision, and I use an FCR tendon transfer. She is agreeable with that. I have told her that generally it takes 3 to 4 months to recover fairly well but that it will get better for several more months. She wishes to proceed with the surgery. It can be done as an outpatient under an axillary block anesthetic. The patient does go to pain management and is on hydrocodone 5 mg. She is intolerant of Demerol which caused a rash, morphine which caused her to be unable to sleep and Percocet which caused nightmares. She is going to be scheduled for a left TM arthroplasty and transfer of the FCR tendon to be done as an outpatient under an axillary block anesthetic.

## 2020-02-10 ENCOUNTER — ANESTHESIA EVENT (OUTPATIENT)
Dept: SURGERY | Age: 75
End: 2020-02-10
Payer: MEDICARE

## 2020-02-11 ENCOUNTER — HOSPITAL ENCOUNTER (OUTPATIENT)
Age: 75
Setting detail: OUTPATIENT SURGERY
Discharge: HOME | End: 2020-02-11
Attending: ORTHOPAEDIC SURGERY | Admitting: ORTHOPAEDIC SURGERY
Payer: MEDICARE

## 2020-02-11 ENCOUNTER — ANESTHESIA (OUTPATIENT)
Dept: SURGERY | Age: 75
End: 2020-02-11
Payer: MEDICARE

## 2020-02-11 VITALS
RESPIRATION RATE: 18 BRPM | SYSTOLIC BLOOD PRESSURE: 123 MMHG | OXYGEN SATURATION: 92 % | TEMPERATURE: 97.7 F | HEART RATE: 92 BPM | DIASTOLIC BLOOD PRESSURE: 58 MMHG

## 2020-02-11 DIAGNOSIS — G89.18 POST-OP PAIN: Primary | ICD-10-CM

## 2020-02-11 LAB — POTASSIUM BLD-SCNC: 4.5 MMOL/L (ref 3.5–5.1)

## 2020-02-11 PROCEDURE — 77030002966 HC SUT PDS J&J -A: Performed by: ORTHOPAEDIC SURGERY

## 2020-02-11 PROCEDURE — 84132 ASSAY OF SERUM POTASSIUM: CPT

## 2020-02-11 PROCEDURE — 74011250636 HC RX REV CODE- 250/636: Performed by: ANESTHESIOLOGY

## 2020-02-11 PROCEDURE — 76060000034 HC ANESTHESIA 1.5 TO 2 HR: Performed by: ORTHOPAEDIC SURGERY

## 2020-02-11 PROCEDURE — 76210000020 HC REC RM PH II FIRST 0.5 HR: Performed by: ORTHOPAEDIC SURGERY

## 2020-02-11 PROCEDURE — 76010000162 HC OR TIME 1.5 TO 2 HR INTENSV-TIER 1: Performed by: ORTHOPAEDIC SURGERY

## 2020-02-11 PROCEDURE — 74011000250 HC RX REV CODE- 250: Performed by: ORTHOPAEDIC SURGERY

## 2020-02-11 PROCEDURE — 77030040922 HC BLNKT HYPOTHRM STRY -A: Performed by: NURSE ANESTHETIST, CERTIFIED REGISTERED

## 2020-02-11 PROCEDURE — 77030018836 HC SOL IRR NACL ICUM -A: Performed by: ORTHOPAEDIC SURGERY

## 2020-02-11 PROCEDURE — 77030002912 HC SUT ETHBND J&J -A: Performed by: ORTHOPAEDIC SURGERY

## 2020-02-11 PROCEDURE — 77030003602 HC NDL NRV BLK BBMI -B: Performed by: NURSE ANESTHETIST, CERTIFIED REGISTERED

## 2020-02-11 PROCEDURE — 76942 ECHO GUIDE FOR BIOPSY: CPT | Performed by: ORTHOPAEDIC SURGERY

## 2020-02-11 PROCEDURE — 76210000006 HC OR PH I REC 0.5 TO 1 HR: Performed by: ORTHOPAEDIC SURGERY

## 2020-02-11 PROCEDURE — A4565 SLINGS: HCPCS | Performed by: ORTHOPAEDIC SURGERY

## 2020-02-11 PROCEDURE — 74011250636 HC RX REV CODE- 250/636: Performed by: NURSE ANESTHETIST, CERTIFIED REGISTERED

## 2020-02-11 PROCEDURE — 77030006788 HC BLD SAW OSC STRY -B: Performed by: ORTHOPAEDIC SURGERY

## 2020-02-11 PROCEDURE — 77030004434 HC BUR RND STRY -B: Performed by: ORTHOPAEDIC SURGERY

## 2020-02-11 PROCEDURE — 74011250636 HC RX REV CODE- 250/636: Performed by: ORTHOPAEDIC SURGERY

## 2020-02-11 PROCEDURE — 74011000250 HC RX REV CODE- 250: Performed by: NURSE ANESTHETIST, CERTIFIED REGISTERED

## 2020-02-11 PROCEDURE — 77030000032 HC CUF TRNQT ZIMM -B: Performed by: ORTHOPAEDIC SURGERY

## 2020-02-11 PROCEDURE — 76010010054 HC POST OP PAIN BLOCK: Performed by: ORTHOPAEDIC SURGERY

## 2020-02-11 PROCEDURE — 77030002916 HC SUT ETHLN J&J -A: Performed by: ORTHOPAEDIC SURGERY

## 2020-02-11 RX ORDER — SODIUM CHLORIDE 0.9 % (FLUSH) 0.9 %
5-40 SYRINGE (ML) INJECTION EVERY 8 HOURS
Status: DISCONTINUED | OUTPATIENT
Start: 2020-02-11 | End: 2020-02-11 | Stop reason: HOSPADM

## 2020-02-11 RX ORDER — HYDROMORPHONE HYDROCHLORIDE 2 MG/ML
0.5 INJECTION, SOLUTION INTRAMUSCULAR; INTRAVENOUS; SUBCUTANEOUS
Status: DISCONTINUED | OUTPATIENT
Start: 2020-02-11 | End: 2020-02-11 | Stop reason: HOSPADM

## 2020-02-11 RX ORDER — SODIUM CHLORIDE 0.9 % (FLUSH) 0.9 %
5-40 SYRINGE (ML) INJECTION AS NEEDED
Status: DISCONTINUED | OUTPATIENT
Start: 2020-02-11 | End: 2020-02-11 | Stop reason: HOSPADM

## 2020-02-11 RX ORDER — DEXAMETHASONE SODIUM PHOSPHATE 4 MG/ML
INJECTION, SOLUTION INTRA-ARTICULAR; INTRALESIONAL; INTRAMUSCULAR; INTRAVENOUS; SOFT TISSUE
Status: COMPLETED | OUTPATIENT
Start: 2020-02-11 | End: 2020-02-11

## 2020-02-11 RX ORDER — ALBUTEROL SULFATE 0.83 MG/ML
2.5 SOLUTION RESPIRATORY (INHALATION) AS NEEDED
Status: DISCONTINUED | OUTPATIENT
Start: 2020-02-11 | End: 2020-02-11 | Stop reason: HOSPADM

## 2020-02-11 RX ORDER — SODIUM CHLORIDE, SODIUM LACTATE, POTASSIUM CHLORIDE, CALCIUM CHLORIDE 600; 310; 30; 20 MG/100ML; MG/100ML; MG/100ML; MG/100ML
75 INJECTION, SOLUTION INTRAVENOUS CONTINUOUS
Status: DISCONTINUED | OUTPATIENT
Start: 2020-02-11 | End: 2020-02-11 | Stop reason: HOSPADM

## 2020-02-11 RX ORDER — LIDOCAINE HYDROCHLORIDE 10 MG/ML
0.1 INJECTION INFILTRATION; PERINEURAL AS NEEDED
Status: DISCONTINUED | OUTPATIENT
Start: 2020-02-11 | End: 2020-02-11 | Stop reason: HOSPADM

## 2020-02-11 RX ORDER — CLINDAMYCIN PHOSPHATE 900 MG/50ML
900 INJECTION INTRAVENOUS ONCE
Status: COMPLETED | OUTPATIENT
Start: 2020-02-11 | End: 2020-02-11

## 2020-02-11 RX ORDER — PROPOFOL 10 MG/ML
INJECTION, EMULSION INTRAVENOUS
Status: DISCONTINUED | OUTPATIENT
Start: 2020-02-11 | End: 2020-02-11 | Stop reason: HOSPADM

## 2020-02-11 RX ORDER — LIDOCAINE HYDROCHLORIDE 20 MG/ML
INJECTION, SOLUTION EPIDURAL; INFILTRATION; INTRACAUDAL; PERINEURAL AS NEEDED
Status: DISCONTINUED | OUTPATIENT
Start: 2020-02-11 | End: 2020-02-11 | Stop reason: HOSPADM

## 2020-02-11 RX ORDER — MIDAZOLAM HYDROCHLORIDE 1 MG/ML
2 INJECTION, SOLUTION INTRAMUSCULAR; INTRAVENOUS ONCE
Status: DISCONTINUED | OUTPATIENT
Start: 2020-02-11 | End: 2020-02-11 | Stop reason: HOSPADM

## 2020-02-11 RX ORDER — OXYCODONE HYDROCHLORIDE 5 MG/1
5 TABLET ORAL
Qty: 30 TAB | Refills: 0 | Status: SHIPPED | OUTPATIENT
Start: 2020-02-11 | End: 2020-02-16

## 2020-02-11 RX ORDER — CELECOXIB 200 MG/1
200 CAPSULE ORAL
Status: DISCONTINUED | OUTPATIENT
Start: 2020-02-11 | End: 2020-02-11 | Stop reason: HOSPADM

## 2020-02-11 RX ORDER — MIDAZOLAM HYDROCHLORIDE 1 MG/ML
2 INJECTION, SOLUTION INTRAMUSCULAR; INTRAVENOUS
Status: DISCONTINUED | OUTPATIENT
Start: 2020-02-11 | End: 2020-02-11 | Stop reason: HOSPADM

## 2020-02-11 RX ORDER — LIDOCAINE HYDROCHLORIDE AND EPINEPHRINE 10; 10 MG/ML; UG/ML
INJECTION, SOLUTION INFILTRATION; PERINEURAL AS NEEDED
Status: DISCONTINUED | OUTPATIENT
Start: 2020-02-11 | End: 2020-02-11 | Stop reason: HOSPADM

## 2020-02-11 RX ORDER — SODIUM CHLORIDE, SODIUM LACTATE, POTASSIUM CHLORIDE, CALCIUM CHLORIDE 600; 310; 30; 20 MG/100ML; MG/100ML; MG/100ML; MG/100ML
50 INJECTION, SOLUTION INTRAVENOUS CONTINUOUS
Status: DISCONTINUED | OUTPATIENT
Start: 2020-02-11 | End: 2020-02-11 | Stop reason: HOSPADM

## 2020-02-11 RX ORDER — FENTANYL CITRATE 50 UG/ML
100 INJECTION, SOLUTION INTRAMUSCULAR; INTRAVENOUS ONCE
Status: DISCONTINUED | OUTPATIENT
Start: 2020-02-11 | End: 2020-02-11 | Stop reason: HOSPADM

## 2020-02-11 RX ADMIN — PHENYLEPHRINE HYDROCHLORIDE 100 MCG: 10 INJECTION INTRAVENOUS at 08:20

## 2020-02-11 RX ADMIN — LIDOCAINE HYDROCHLORIDE 20 MG: 20 INJECTION, SOLUTION EPIDURAL; INFILTRATION; INTRACAUDAL; PERINEURAL at 07:48

## 2020-02-11 RX ADMIN — SODIUM CHLORIDE, SODIUM LACTATE, POTASSIUM CHLORIDE, AND CALCIUM CHLORIDE 75 ML/HR: 600; 310; 30; 20 INJECTION, SOLUTION INTRAVENOUS at 06:45

## 2020-02-11 RX ADMIN — ROPIVACAINE HYDROCHLORIDE 25 ML: 5 INJECTION, SOLUTION EPIDURAL; INFILTRATION; PERINEURAL at 07:00

## 2020-02-11 RX ADMIN — PROPOFOL 100 MCG/KG/MIN: 10 INJECTION, EMULSION INTRAVENOUS at 07:48

## 2020-02-11 RX ADMIN — MEPIVACAINE HYDROCHLORIDE 5 ML: 20 INJECTION, SOLUTION EPIDURAL; INFILTRATION at 07:00

## 2020-02-11 RX ADMIN — DEXAMETHASONE SODIUM PHOSPHATE 4 MG: 4 INJECTION, SOLUTION INTRAMUSCULAR; INTRAVENOUS at 07:00

## 2020-02-11 RX ADMIN — CLINDAMYCIN PHOSPHATE 900 MG: 900 INJECTION, SOLUTION INTRAVENOUS at 07:48

## 2020-02-11 NOTE — PERIOP NOTES
PACU DISCHARGE NOTE  Vital signs stable, pain well controlled, alert and oriented times three or at baseline, no anesthetic complications. IV removed with catheter tip intact. Written and verbal discharge instructions given, including pain control, dressing care and follow up appointment. Pateint dressed with sling over clothing before discharge. Spouse, Guevara, verbalized understanding and signed discharge instructions electronically. All questions answered prior to discharge. Dr Doherty Sales okay to discharge at this time. Pt and all belongings taken via wheelchair and safely put in vehicle.

## 2020-02-11 NOTE — DISCHARGE INSTRUCTIONS
POST OP INSTRUCTIONS      1. Patient has appointment for 2/20/20 at 2:00 PM at the [facility]. 2.  For problems call Dr Judge Light, [address] [address],  [phone]          Appointments only,  [phone]    3. Ice and elevate the surgical site. 4.  Keep splints and dressing dry and intact. 5.  If able to tolerate, take   Acetaminophen 325 mg  2 every 4 hrs   And                                               Ibuprofen 200 mg   3 every 6 hrs   OR   Aleve 220 mg 2 every 12 hrs to help with pain                                                   ( Do not take Ibuprofen or Aleve if taking any other anti inflamatory  drug, NSAID, or anti arthritis drug or if taking blood thinners.)                                                 Vitamin C   500 mg  Once a day for 2 months. TYPICAL SIDE EFFECTS OF PAIN MEDICATION:  *    Constipation: Drink lots of fluids. Over the counter stool softener if needed. *    Nausea: Take pain medication with food. Call your doctor with persistent nausea. ACTIVITY  · As tolerated and as directed by your doctor. · Bathe or shower as directed by your doctor. DIET  · Day of surgery: Clear liquids until no nausea or vomiting; small portion, light diet [place] foods (ex: baked chicken, plain rice, grits, scrambled eggs, toast). Nothing greasy, fried or spicy today. · Advance to regular diet on second day, unless your doctor orders otherwise. · If nausea and vomiting continues, call your doctor. PAIN  · Take pain medication as directed by your doctor. · DO NOT take aspirin or blood thinners unless directed by your doctor.        CALL YOUR DOCTOR IF    s Call your doctor if pain is NOT relieved by medication.   s Excessive bleeding that does not stop after holding pressure over the area  · Temperature of 101 degrees F or above  · Excessive redness, swelling or bruising, and/ or green or yellow, smelly discharge from incision    AFTER ANESTHESIA   · For the first 24 hours: DO NOT Drive, Drink alcoholic beverages, or Make important decisions. · Be aware of dizziness following anesthesia and while taking pain medication. DISCHARGE SUMMARY from Nurse    PATIENT INSTRUCTIONS:    After general anesthesia or intravenous sedation, for 24 hours or while taking prescription Narcotics:  · Limit your activities  · Do not drive and operate hazardous machinery  · Do not make important personal or business decisions  · Do  not drink alcoholic beverages  · If you have not urinated within 8 hours after discharge, please contact your surgeon on call. *  Please give a list of your current medications to your Primary Care Provider. *  Please update this list whenever your medications are discontinued, doses are      changed, or new medications (including over-the-counter products) are added. *  Please carry medication information at all times in case of emergency situations. Preventing Infection at Home  We care about preventing infection and avoiding the spread of germs - not only when you are in the hospital but also when you return home. When you return home from the hospital, its important to take the following steps to help prevent infection and avoid spreading germs that could infect you and others. Ask everyone in your home to follow these guidelines, too. Clean Your Hands  · Clean your hands whenever your hands are visibly dirty, before you eat, before or after touching your mouth, nose or eyes, and before preparing food. Clean them after contact with body fluids, using the restroom, touching animals or changing diapers. · When washing hands, wet them with warm water and work up a lather. Rub hands for at least 15 seconds, then rinse them and pat them dry with a clean towel or paper towel. · When using hand sanitizers, it should take about 15 seconds to rub your hands dry.  If not, you probably didnt apply enough . Cover Your Sneeze or Cough  Germs are released into the air whenever you sneeze or cough. To prevent the spread of infection:  · Turn away from other people before coughing or sneezing. · Cover your mouth or nose with a tissue when you cough or sneeze. Put the tissue in the trash. · If you dont have a tissue, cough or sneeze into your upper sleeve, not your hands. · Always clean your hands after coughing or sneezing. Care for Wounds  Your skin is your bodys first line of defense against germs, but an open wound leaves an easy way for germs to enter your body. To prevent infection:  · Clean your hands before and after changing wound dressings, and wear gloves to change dressings if recommended by your doctor. · Take special care with IV lines or other devices inserted into the body. If you must touch them, clean your hands first.  · Follow any specific instructions from your doctor to care for your wounds. Contact your doctor if you experience any signs of infection, such as fever or increased redness at the surgical or wound site. Keep a Clean Home  · Clean or wipe commonly touched hard surfaces like door handles, sinks, tabletops, phones and TV remotes. · Use products labeled disinfectant to kill harmful bacteria and viruses. · Use a clean cloth or paper towel to clean and dry surfaces. Wiping surfaces with a dirty dishcloth, sponge or towel will only spread germs. · Never share toothbrushes, martinez, drinking glasses, utensils, razor blades, face cloths or bath towels to avoid spreading germs. · Be sure that the linens that you sleep on are clean. · Keep pets away from wounds and wash your hands after touching pets, their toys or bedding. We care about you and your health. Remember, preventing infections is a team effort between you, your family, friends and health care providers.        These are general instructions for a healthy lifestyle:    No smoking/ No tobacco products/ Avoid exposure to second hand smoke    Surgeon General's Warning:  Quitting smoking now greatly reduces serious risk to your health. Obesity, smoking, and sedentary lifestyle greatly increases your risk for illness    A healthy diet, regular physical exercise & weight monitoring are important for maintaining a healthy lifestyle    You may be retaining fluid if you have a history of heart failure or if you experience any of the following symptoms:  Weight gain of 3 pounds or more overnight or 5 pounds in a week, increased swelling in our hands or feet or shortness of breath while lying flat in bed. Please call your doctor as soon as you notice any of these symptoms; do not wait until your next office visit. Recognize signs and symptoms of STROKE:    F-face looks uneven    A-arms unable to move or move unevenly    S-speech slurred or non-existent    T-time-call 911 as soon as signs and symptoms begin-DO NOT go       Back to bed or wait to see if you get better-TIME IS BRAIN.

## 2020-02-11 NOTE — ANESTHESIA PREPROCEDURE EVALUATION
Relevant Problems   No relevant active problems       Anesthetic History   No history of anesthetic complications            Review of Systems / Medical History  Patient summary reviewed and pertinent labs reviewed    Pulmonary  Within defined limits                 Neuro/Psych         Psychiatric history     Cardiovascular    Hypertension: well controlled          Hyperlipidemia    Exercise tolerance: <4 METS     GI/Hepatic/Renal     GERD: well controlled           Endo/Other      Hypothyroidism  Morbid obesity and arthritis     Other Findings              Physical Exam    Airway  Mallampati: III  TM Distance: 4 - 6 cm  Neck ROM: normal range of motion   Mouth opening: Normal     Cardiovascular    Rhythm: regular  Rate: normal      Pertinent negatives: No murmur and peripheral edema   Dental    Dentition: Full upper dentures     Pulmonary  Breath sounds clear to auscultation               Abdominal         Other Findings            Anesthetic Plan    ASA: 3  Anesthesia type: total IV anesthesia - supraclavicular block      Post-op pain plan if not by surgeon: peripheral nerve block single    Induction: Intravenous  Anesthetic plan and risks discussed with: Patient

## 2020-02-11 NOTE — INTERVAL H&P NOTE
H&P Update: 
Darren Garcia was seen and examined. Pt is alert and oriented. Chest: Clear w/o SOB. C/V:  RR&R History and physical has been reviewed. The patient has been examined. There have been no significant clinical changes since the completion of the originally dated History and Physical. 
Patient identified by surgeon; surgical site was confirmed by patient and surgeon. The patient is here for a left TMA with FCR tendon transfer as an out patient.

## 2020-02-11 NOTE — OP NOTES
[facility]        2/11/2020    Indications: This is a 69 yrs female with degenerative arthritis of the  left trapezio-metacarpal joint. The patient is admitted for surgery as conservative measures have failed. Pre-operative Diagnosis:  Unilateral primary osteoarthritis of first carpometacarpal joint, left hand [M18.12]    Post-operative Diagnosis: Unilateral primary osteoarthritis of first carpometacarpal joint, left hand [M18.12]    Procedure: 1. LEFT  TRAPEZIO-METACARPAL ARTHROPLASTY                       2.  TRANSFER OF THE LEFT  FLEXOR CARPI RADIALIS  TENDON    Surgeon: Lyn Garcia MD    Anesthesia: AXILLARY BLOCK      Procedure Details: The patient was given a brachial plexus block in the pre-op area and then  taken to the operating suite. The patient was given pre-op intravenous antibiotics. The left upper extremity was then prepped with Chloro-prep and draped in the usual sterile fashion. A time out was performed by the surgical team identifying the patient, surgeon, procedure and site. The extremity was exsanguinated and a tourniquet inflated to 250 mm Hg around the upper arm. A curved incision was made along the radial aspect of the thumb metacarpal and then turned ulnarward at the wrist flexion crease to the FCR and then proximal for 1 cm. Small bleeders were electrocoagulated. The thenar muscles were retracted volarly. The periosteum and TM capsule was open along the radial aspect and retracted dorsally and volarly. The capsule was released off the trapezium both dorsally and volarly as far as possible. The FCR tendon was released from the bony overhang and protected. The trapezium was cut into 4 quadrants with a small oscillating saw and then removed piece meal by releasing the deep ligament attachments as the bone was removed. The trapezium was noted to have arthritic spurring and loss of articular cartilage from the distal surface. The base of the thumb metacarpal showed loss of jt cartilage. The soft tissues were further stripped off the doral aspect of the base of the metacarpal and then a small briana used to make an oval window in the dorsal cortex near the proximal edge and out thru the intramedullary canal.  Osteophytes were removed from the base of the metacarpal. The wound was irrigated to remove bone debris. The FCR was harvested. A 2.5 cm incision was made over the musculotendonous junction and the FCR identified. A 0 PDS suture was passed through the tendon and then the two free ends of the suture passed beneath the sheath into the distal incision with a [employer] tendon passer. The tendon was split longitudinally by pulling on the ends of the PDS suture. The Ulnar half of the tendon was transected proximally and the free end of the tendon pulled into the distal incision. The tendon was split to its insertion on the base of the index metacarpal.  Using a tag stitch through the end of the tendon it was passed through the base of the metacarpal and out through the dorsal window. The assistant held the thumb abducted and distracted. The free end of the tendon was pulled proximally and sutured to itself with 3 horizontal mattress stitches of 3- 0 Ethibond. The remaining portion of the tendon was sutured into a bolus with 2-0 PDS and placed into the space where the trapezium had been. The wound was again irrigated. The joint capsule and ligaments were then closed snuggly around the base of the thumb metacarpal with inverted 3-0 Ethibond and  2-0 PDS sutures. The thenar muscles were repaired along with the FCR sheath. The skin was closed with 4-0 nylon. The areas around the surgical sites were infiltrated with 1% Lidocaine with epinephrine. . A sterile bulky dressing was applied. The tourniquet was released. The patient was then transferred to the Recovery Room in stable condition.       Findings: Marked degenerative changes of the TM joint with dorsal subluxation, spurring and loss of articular cartilage.     Tourniquet Time:   Total Tourniquet Time Documented:  Upper Arm (Left) - 42 minutes  Total: Upper Arm (Left) - 42 minutes      Signed By: Ty Jones MD

## 2020-02-11 NOTE — ANESTHESIA PROCEDURE NOTES
Peripheral Block    Start time: 2/11/2020 6:56 AM  End time: 2/11/2020 7:00 AM  Performed by: Stephanie Osorio MD  Authorized by: Stephanie Osorio MD       Pre-procedure:    Indications: at surgeon's request and post-op pain management    Preanesthetic Checklist: patient identified, risks and benefits discussed, site marked, timeout performed, anesthesia consent given and patient being monitored    Timeout Time: 06:56          Block Type:   Block Type:  Supraclavicular  Laterality:  Left  Monitoring:  Frequent vital sign checks, heart rate, oxygen, continuous pulse ox and responsive to questions  Injection Technique:  Single shot  Procedures: ultrasound guided and nerve stimulator    Patient Position: supine  Prep: chlorhexidine    Location:  Supraclavicular  Needle Type:  Stimuplex  Needle Gauge:  22 G  Needle Localization:  Nerve stimulator and ultrasound guidance  Motor Response: minimal motor response >0.4 mA      Assessment:  Number of attempts:  1  Injection Assessment:  Incremental injection every 5 mL, negative aspiration for CSF, no paresthesia, ultrasound image on chart, no intravascular symptoms, negative aspiration for blood and local visualized surrounding nerve on ultrasound  Patient tolerance:  Patient tolerated the procedure well with no immediate complications

## 2020-02-11 NOTE — ANESTHESIA POSTPROCEDURE EVALUATION
Procedure(s):  LEFT WRIST TRAPEZIOMETACARPAL ARTHROPLASTY WITH FCR TENDON TRANSFER.    total IV anesthesia    Anesthesia Post Evaluation      Multimodal analgesia: multimodal analgesia used between 6 hours prior to anesthesia start to PACU discharge  Patient location during evaluation: PACU  Patient participation: complete - patient participated  Level of consciousness: awake and alert  Pain score: 0  Pain management: adequate  Airway patency: patent  Anesthetic complications: no  Cardiovascular status: acceptable  Respiratory status: acceptable, spontaneous ventilation and nonlabored ventilation  Hydration status: acceptable  Post anesthesia nausea and vomiting:  none      Vitals Value Taken Time   /61 2/11/2020  9:43 AM   Temp 36.5 °C (97.7 °F) 2/11/2020  9:35 AM   Pulse 91 2/11/2020  9:45 AM   Resp 30 2/11/2020  9:45 AM   SpO2 93 % 2/11/2020  9:45 AM   Vitals shown include unvalidated device data.

## 2020-02-25 ENCOUNTER — HOSPITAL ENCOUNTER (OUTPATIENT)
Dept: LAB | Age: 75
Discharge: HOME | End: 2020-02-25
Payer: MEDICARE

## 2020-02-25 DIAGNOSIS — E06.3 HASHIMOTO'S THYROIDITIS: ICD-10-CM

## 2020-02-25 LAB
T4 FREE SERPL-MCNC: 1 NG/DL (ref 0.78–1.46)
TSH W FREE THYROID I,TSHELE: 7.97 UIU/ML (ref 0.36–3.74)

## 2020-02-25 PROCEDURE — 36415 COLL VENOUS BLD VENIPUNCTURE: CPT

## 2020-02-25 PROCEDURE — 84443 ASSAY THYROID STIM HORMONE: CPT

## 2020-02-25 PROCEDURE — 84439 ASSAY OF FREE THYROXINE: CPT

## 2020-02-26 PROBLEM — E66.01 OBESITY, MORBID, BMI 40.0-49.9 (HCC): Status: ACTIVE | Noted: 2020-02-26

## 2020-02-26 PROBLEM — R26.9 GAIT ABNORMALITY: Status: ACTIVE | Noted: 2020-02-26

## 2020-03-05 PROBLEM — R10.9 LEFT FLANK PAIN: Status: ACTIVE | Noted: 2020-03-05

## 2020-03-12 ENCOUNTER — HOSPITAL ENCOUNTER (OUTPATIENT)
Dept: ULTRASOUND IMAGING | Age: 75
Discharge: HOME | End: 2020-03-12
Attending: OBSTETRICS & GYNECOLOGY
Payer: MEDICARE

## 2020-03-12 DIAGNOSIS — R10.9 LEFT FLANK PAIN: ICD-10-CM

## 2020-03-12 PROCEDURE — 76770 US EXAM ABDO BACK WALL COMP: CPT

## 2020-03-12 NOTE — PROGRESS NOTES
Please let Ms. Jasmin Mendiola know that she has a right renal cyst. If she desires further workup of the cyst, we can send a referral to urology.

## 2020-03-13 PROBLEM — R41.82 ALTERED MENTAL STATUS: Status: ACTIVE | Noted: 2020-03-13

## 2020-03-16 ENCOUNTER — HOSPITAL ENCOUNTER (OUTPATIENT)
Dept: PHYSICAL THERAPY | Age: 75
Discharge: HOME | End: 2020-03-16
Payer: MEDICARE

## 2020-03-16 PROCEDURE — 97165 OT EVAL LOW COMPLEX 30 MIN: CPT

## 2020-03-16 NOTE — THERAPY EVALUATION
Janey Mauricio  : 1945  Primary: [employer] [place] Hm*  Secondary:  [address]  at [address], [address] [place] [address],[address], [address] [facility]  [phone]   [phone]          OUTPATIENT OCCUPATIONAL [place] Assessment 3/16/2020   ICD-10: Treatment Diagnosis: Stiffness of left hand, not elsewhere classified (M25.642)Stiffness of left wrist, not elsewhere classified (M25.632)Pain in left hand (M79.642)Pain in left wrist (M25.532)  Precautions/Allergies:   Fire ant; Amoxicillin; Betadine [povidone-iodine]; Cephalexin; Demerol [meperidine]; Hydromorphone; Morphine; Percocet [oxycodone-acetaminophen]; and Percodan [oxycodone hcl-oxycodone-asa]   TREATMENT PLAN:  Effective Dates: 3/16/2020 TO 2020 (90 days). 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 Parminder Cervantes presents with decreased functional use, strength and range of motion of her left hand, wrist and upper extremity that is affecting her independence with activities of daily living and ability to perform job tasks. I feel that Ms. Parminder Cervantes will benefit from skilled occupational therapy to maximize the functional use of her hand, wrist and upper extremity in daily activities and work tasks. PROBLEM LIST (Impacting functional limitations):  1. Pain in left hand and wrist.  2. Decreased motion in left hand and wrist.  3. Decreased strength in left hand. INTERVENTIONS PLANNED: (Treatment may consist of any combination of the following)  1. Modalities that may include fluidotherapy, paraffin, ultrasound, and light therapy. 2. Therapeutic exercise including a home exercise program.  3. Manual therapy. 4. Therapeutic activities. GOALS: (Goals have been discussed and agreed upon with patient.)  Short-Term Functional Goals: Time Frame: 4 weeks  1. Decrease pain to 3 to allow patient to perform self care tasks. 2. Increase motion in left hand and wrist by 10 degrees to improve functional use of upper extremity in ADL activities. 3. Increase strength in left hand by 10 pounds to allow patient to  and lift objects during self care activities. Discharge Goals: Time Frame: 12 weeks  1. Decrease pain to 2 to allow patient to perform all household and work tasks. 2. Increase motion in left hand and wrist by 20 degreees to allow patient to perform all ADL activities. 3. Increase strength in left hand by 15 pounds to allow patient to , lift, hold, and carry heavy objects. OUTCOME MEASURE:   Tool Used: Disabilities of the Arm, Shoulder and Hand (DASH) Questionnaire - Quick Version  Score:  Initial: 46/55  Most Recent: X/55 (Date: -- )   Interpretation of Score: The DASH is designed to measure the activities of daily living in person's with upper extremity dysfunction or pain. Each section is scored on a 1-5 scale, 5 representing the greatest disability. The scores of each section are added together for a total score of 55. MEDICAL NECESSITY:   · Patient is expected to demonstrate progress in strength and range of motion to increase independence with ADL and household activities. .  REASON FOR SERVICES/OTHER COMMENTS:  · The patient has limited motion, strength and function in her left U.E..  Total Duration:  OT Patient Time In/Time Out  Time In: 0345  Time Out: 0400    Rehabilitation Potential For Stated Goals: Good  Regarding Jurgen Do's therapy, I certify that the treatment plan above will be carried out by a therapist or under their direction.   Thank you for this referral,  Dotty Ortega, PHILLIP     Referring Physician Signature: Adonis Rebollar MD _______________________________ Date _____________     PAIN/SUBJECTIVE:   Initial: Pain Intensity 1: 5  Pain Location 1: Wrist, Hand  Pain Orientation 1: Left   Post Session:  4/10   OCCUPATIONAL PROFILE & HISTORY:   History of Injury/Illness (Reason for Referral): The patient has had increasing arthritic pain in her left thumb for several years. Past Medical History/Comorbidities:   Ms. Darshana Forrester  has a past medical history of Acute kidney insufficiency, Adverse effect of anesthesia, Allergic rhinitis, Anemia, Anxiety and depression, Edema, GERD (gastroesophageal reflux disease), History of chicken pox, History of Clostridioides difficile infection, History of DVT (deep vein thrombosis), HTN (hypertension), Hyperlipidemia, Hyperthyroidism, Hypothyroidism, IBS (irritable bowel syndrome), Insomnia, Morbid obesity ([address] Murmur, Overactive bladder, PAD (peripheral artery disease) ([address] RLS (restless legs syndrome), SOBOE (shortness of breath on exertion), Special examinations, MALOU (stress urinary incontinence, female), and Thromboembolus ([address] (2015). Ms. Darshana Forrester  has a past surgical history that includes hx meniscus repair (Left); hx tonsil and adenoidectomy (~1950); hx shoulder arthroscopy (Right, ~1966, ~2013); hx knee replacement (Right, 2014); hx cholecystectomy (12/2016); hx total abdominal hysterectomy (~1970); hx appendectomy (1955); ir gastric band adj w/ fluoro (~2014); hx gyn (12/03/2019); hx other surgical (Right, 2016); hx other surgical (Left, ~2014); hx other surgical (~1986); hx meniscus repair (Left, 2014); hx colonoscopy; hx orthopaedic (Left); hx other surgical (12/03/2019); and hx orthopaedic (Left, 01/2020).   Social History/Living Environment:   Home Environment: Private residence  Prior Level of Function/Work/Activity:  independent  Dominant Side:         RIGHT   Ambulatory/Rehab Services H2 Model Falls Risk Assessment   Risk Factors:       No Risk Factors Identified Ability to Rise from Chair:       (0)  Ability to rise in a single movement   Falls Prevention Plan:       No modifications necessary   Total: (5 or greater = High Risk): 0   ©2010 [facility] [employer]. All Rights Reserved. [facility] States Patent #[id number]. Federal Law prohibits the replication, distribution or use without written permission from [facility] [employer]   Current Medications:       Current Outpatient Medications:     dexAMETHasone (DECADRON) 1 mg tablet, Take one tablet at 11pm the night prior to your fasting 8 am blood draw, Disp: 1 Tab, Rfl: 0    levothyroxine (SYNTHROID) 112 mcg tablet, Take one tablet on a empty stomach daily and 1.5 tabs on sunday, Disp: 3 Tab, Rfl: 11    oxybutynin chloride XL (DITROPAN XL) 10 mg CR tablet, Take 1 Tab by mouth daily. , Disp: 30 Tab, Rfl: 12    HYDROcodone-acetaminophen (NORCO) 5-325 mg per tablet, TAKE 1 TABLET BY MOUTH ONCE DAILY AS NEEDED FOR PAIN, Disp: , Rfl:     Lactobacillus acidophilus (ACIDOPHILUS) cap, 1 capsule daily, Disp: , Rfl:     escitalopram oxalate (LEXAPRO) 20 mg tablet, Take 1.5 Tabs by mouth daily. , Disp: 135 Tab, Rfl: 0    pantoprazole (PROTONIX) 40 mg tablet, Take 1 Tab by mouth daily. , Disp: 90 Tab, Rfl: 0    pramipexole (MIRAPEX) 0.5 mg tablet, Take 1 Tab by mouth daily. (Patient taking differently: Take 0.5 mg by mouth nightly.), Disp: 90 Tab, Rfl: 0    estradiol (ESTRACE) 2 mg tablet, Take 1 Tab by mouth daily. , Disp: 90 Tab, Rfl: 1    albuterol (PROVENTIL HFA, VENTOLIN HFA, PROAIR HFA) 90 mcg/actuation inhaler, Take 1-2 Puffs by inhalation every six (6) hours as needed for Wheezing., Disp: 1 Inhaler, Rfl: 2    clotrimazole-betamethasone (LOTRISONE) topical cream, Apply  to affected area two (2) times a day. Until rash resolves, Disp: 45 g, Rfl: 2    mth-me blue-sod phos-phsal-hyo (URIBEL) 118-10-40.8-36 mg cap capsule, Take 1 Cap by mouth daily. , Disp: 30 Cap, Rfl: 12    fluticasone propionate (FLONASE) 50 mcg/actuation nasal spray, 2 Sprays by Both Nostrils route daily. , Disp: , Rfl:   ibuprofen (ADVIL) 200 mg tablet, Take  by mouth., Disp: , Rfl:     cetirizine (ZYRTEC) 10 mg tablet, Take 1 Tab by mouth daily. , Disp: 90 Tab, Rfl: 1    lisinopril-hydroCHLOROthiazide (PRINZIDE, ZESTORETIC) 10-12.5 mg per tablet, Take 1 Tab by mouth daily. , Disp: 90 Tab, Rfl: 1    montelukast (SINGULAIR) 10 mg tablet, Take 1 Tab by mouth daily. , Disp: 90 Tab, Rfl: 1    diphenoxylate-atropine (LOMOTIL) 2.5-0.025 mg per tablet, Take 1 Tab by mouth four (4) times daily as needed for Diarrhea. Max Daily Amount: 4 Tabs., Disp: 120 Tab, Rfl: 5    clonazePAM (KLONOPIN) 1 mg tablet, Take 1 Tab by mouth two (2) times a day. Max Daily Amount: 2 mg., Disp: 60 Tab, Rfl: 5    ondansetron hcl (ZOFRAN) 4 mg tablet, Take 1 Tab by mouth DIALYSIS PRN for Nausea., Disp: 90 Tab, Rfl: 2    cholecalciferol, VITAMIN D3, (VITAMIN D3) 5,000 unit tab tablet, Take  by mouth daily. , Disp: , Rfl:     VITAMIN E ACETATE PO, Take 1 Tab by mouth daily. , Disp: , Rfl:     nystatin (MYCOSTATIN) powder, Apply  to affected area three (3) times daily as needed. , Disp: 60 g, Rfl: 2   Date Last Reviewed:  3/16/2020    Complexity Level: Brief history (0):  LOW COMPLEXITY   ASSESSMENT OF OCCUPATIONAL PERFORMANCE:   RANGE OF MOTION:     · AROM: The patient has limited motion in her left hand and wrist. Measurements were not taken due to recent surgery. STRENGTH:  Not tested yet due to recent surgery. SENSATION:  Intact           Physical Skills Involved:  1. Range of Motion  2. Strength  3. Pain (acute)  4. Edema Cognitive Skills Affected (resulting in the inability to perform in a timely and safe manner):   1. none Psychosocial Skills Affected:  1. none   Number of elements that affect the Plan of Care[de-identified] 3-5:  MODERATE COMPLEXITY   CLINICAL DECISION MAKING:   Clinical Decision-Making Assessment:     Assessment process, impact of co-morbidities, assessment modification\need for assistance, and selection of interventions: Analytical Complexity:LOW COMPLEXITY

## 2020-03-21 ENCOUNTER — HOSPITAL ENCOUNTER (OUTPATIENT)
Dept: MRI IMAGING | Age: 75
Discharge: HOME | End: 2020-03-21
Attending: PSYCHIATRY & NEUROLOGY
Payer: MEDICARE

## 2020-03-21 DIAGNOSIS — R41.82 ALTERED MENTAL STATUS, UNSPECIFIED ALTERED MENTAL STATUS TYPE: ICD-10-CM

## 2020-03-21 PROCEDURE — 70551 MRI BRAIN STEM W/O DYE: CPT

## 2020-03-22 NOTE — PROGRESS NOTES
MRI brain results as expected for age + migraine etc.      Nonspecific. No treatment change to recommend.          Summer Moreau MD  Consultative Neurology, Neurodiagnostics and Neurotherapeutics  Neuroelectrophysiology, EEG, EMG  [facility] Neurology  [facility], [address] [facility]  Phone:  [phone]  Fax:   [phone]

## 2020-03-24 PROBLEM — E66.01 SEVERE OBESITY (BMI 35.0-35.9 WITH COMORBIDITY) (HCC): Status: RESOLVED | Noted: 2018-10-09 | Resolved: 2020-03-24

## 2020-03-24 PROBLEM — F39 MOOD DISORDER (HCC): Status: ACTIVE | Noted: 2017-12-06

## 2020-04-03 PROBLEM — I95.0 IDIOPATHIC HYPOTENSION: Status: ACTIVE | Noted: 2020-04-03

## 2020-04-03 PROBLEM — H53.2 DIPLOPIA: Status: ACTIVE | Noted: 2020-04-03

## 2020-05-21 ENCOUNTER — HOSPITAL ENCOUNTER (OUTPATIENT)
Dept: PHYSICAL THERAPY | Age: 75
Discharge: HOME | End: 2020-05-21
Attending: PSYCHIATRY & NEUROLOGY
Payer: MEDICARE

## 2020-05-21 DIAGNOSIS — R26.9 GAIT DISORDER: ICD-10-CM

## 2020-05-21 PROCEDURE — 97162 PT EVAL MOD COMPLEX 30 MIN: CPT

## 2020-05-22 NOTE — THERAPY EVALUATION
Flavia Fournier : 1945 Payor: [employer] [place] / Plan: [place]O / Product Type: Managed Care Medicare /  [address] [place] [address]  at [facility] Therapy 
[address], [address] [place], [address] [phone]   [phone] OUTPATIENT PHYSICAL THERAPY:Initial Assessment 2020 ICD-10: Treatment Diagnosis: difficulty walking R 26.2, pain in right limb M79.604, pain in left limb M79.605 Precautions/Allergies:  
Fire ant; Amoxicillin; Betadine [povidone-iodine]; Cephalexin; Demerol [meperidine]; Hydromorphone; Morphine; Percocet [oxycodone-acetaminophen]; and Percodan [oxycodone hcl-oxycodone-asa] TREATMENT PLAN: 
Effective Dates: 2020 TO 2020 (90 days). Frequency/Duration: 1 time a week for 90 Day(s) and upon reassessment, will adjust frequency and duration as progress indicates. MEDICAL/REFERRING DIAGNOSIS: 
Gait disorder [R26.9] DATE OF ONSET: 2 years REFERRING PHYSICIAN: Marilee Daniels MD MD Orders: Anita Hammer and treat Return MD Appointment: as needed INITIAL ASSESSMENT:  Ms. Janina Lancaster presents with increased bilateral leg and foot pain. She reports a 2 year history with this. She has increased difficulty with prolonged standing and walking. She reports she woks at [employer] and is unable to walk to back of store without having to rest due to pain and fatigue. She has several other comorbidities that also may be playing a part in her overall health and wellness. She would like to decrease her pain felt with standing and walking and be able to walk for longer periods of time. She also reports some mild numbness in the right thigh at times and has a history of lower back issues. She is seeing pain management and reported having several injections by them. She will benefit from therapy to work on addressing weakness and overall lack of endurance.    
PROBLEM LIST (Impacting functional limitations): 
 1. Decreased Strength 2. Decreased ADL/Functional Activities 3. Decreased Ambulation Ability/Technique 4. Increased Pain 5. Decreased Activity Tolerance 6. Decreased Flexibility/Joint Mobility INTERVENTIONS PLANNED: (Treatment may consist of any combination of the following) 1. Gait Training 2. Home Exercise Program (HEP) 3. Manual Therapy 4. Therapeutic Exercise/Strengthening GOALS: (Goals have been discussed and agreed upon with patient.) Short-Term Functional Goals: Time Frame: 6 weeks 1. Establish independent HEP with no cuing. 2. Pt will be able to report ability to walk to back of [employer] without resting. 3. Pt will be able to increase strength by 1/2 grade to negotiate steps and curbs with little to no difficulty. Discharge Goals: Time Frame: 12 weeks (90 days) 1. Pt will be able to improve score on LEFS by at least 10 points for advanced ADL's. 
2. Pt will be able to report ability to complete one hour of work without increased pain. 3. Pt will be able to run community errands totaling 45 minutes with little difficulty. OUTCOME MEASURE:  
Tool Used: Lower Extremity Functional Scale (LEFS) Score:  Initial: 19/80 Most Recent: X/80 (Date: -- ) Interpretation of Score: 20 questions each scored on a 5 point scale with 0 representing \"extreme difficulty or unable to perform\" and 4 representing \"no difficulty\". The lower the score, the greater the functional disability. 80/80 represents no disability. Minimal detectable change is 9 points. MEDICAL NECESSITY:  
· Patient is expected to demonstrate progress in strength to improve her overall endurance and ability to walk and stand for longer periods of time. REASON FOR SERVICES/OTHER COMMENTS: 
· Patient continues to require skilled intervention due to bilateral LE weakness, general deconditioning . Total Duration: PT Patient Time In/Time Out Time In: 46 Time Out: 0330 Rehabilitation Potential For Stated Goals: Fair Regarding Amador Do's therapy, I certify that the treatment plan above will be carried out by a therapist or under their direction. Thank you for this referral, 
Darryl Bravo, PT Referring Physician Signature: Allison Romero MD _______________________________ Date _____________ PAIN/SUBJECTIVE:  
Initial: Pain Intensity 1: 7  Post Session:  7/10 HISTORY:  
History of Injury/Illness (Reason for Referral): Ms. Toshia Kaur presents with increased bilateral leg and foot pain. She reports a 2 year history with this. She has increased difficulty with prolonged standing and walking. She reports she woks at [employer] and is unable to walk to back of store without having to rest due to pain and fatigue. She has several other comorbidities that also may be playing a part in her overall health and wellness. She would like to decrease her pain felt with standing and walking and be able to walk for longer periods of time. She also reports some mild numbness in the right thigh at times and has a history of lower back issues. She is seeing pain management and reported having several injections by them. She will benefit from therapy to work on addressing weakness and overall lack of endurance. Past Medical History/Comorbidities: Ms. Toshia Kaur  has a past medical history of Acute kidney insufficiency, Adverse effect of anesthesia, Allergic rhinitis, Anemia, Anxiety and depression, Edema, GERD (gastroesophageal reflux disease), History of chicken pox, History of Clostridioides difficile infection, History of DVT (deep vein thrombosis), HTN (hypertension), Hyperlipidemia, Hyperthyroidism, Hypothyroidism, IBS (irritable bowel syndrome), Insomnia, Morbid obesity ([address] Murmur, Overactive bladder, PAD (peripheral artery disease) ([address] RLS (restless legs syndrome), SOBOE (shortness of breath on exertion), Special examinations, MALOU (stress urinary incontinence, female), and Thromboembolus ([facility] (2015). Ms. Rivas Torres  has a past surgical history that includes hx meniscus repair (Left); hx tonsil and adenoidectomy (~1950); hx shoulder arthroscopy (Right, ~1966, ~2013); hx knee replacement (Right, 2014); hx cholecystectomy (12/2016); hx total abdominal hysterectomy (~1970); hx appendectomy (1955); ir gastric band adj w/ fluoro (~2014); hx gyn (12/03/2019); hx other surgical (Right, 2016); hx other surgical (Left, ~2014); hx other surgical (~1986); hx meniscus repair (Left, 2014); hx colonoscopy; hx orthopaedic (Left); hx other surgical (12/03/2019); and hx orthopaedic (Left, 01/2020). Hashimoto's Disease Social History/Living Environment:  
 pt lives with spouse, but he is not in good health Prior Level of Function/Work/Activity: Pt works at [employer] Ambulatory/Rehab Services H2 Model Falls Risk Assessment Risk Factors: 
     (2)  Any administered antiepileptics/anticonvulsants Ability to Rise from Chair: 
     (1)  Pushes up, successful in one attempt Falls Prevention Plan: No modifications necessary Total: (5 or greater = High Risk): 3  
©2010 [facility] [place] [address] [place] [address] Patent #[id number]. Federal Law prohibits the replication, distribution or use without written permission from [facility] [employer] Current Medications:   
  
Current Outpatient Medications:  
  pramipexole (Mirapex) 0.5 mg tablet, Take 1 Tab by mouth daily. , Disp: 90 Tab, Rfl: 0 
  ipratropium-albuteroL (Combivent Respimat)  mcg/actuation inhaler, Take 1-2 Puffs by inhalation every four (4) hours as needed for Wheezing., Disp: 1 Inhaler, Rfl: 1 
  pantoprazole (PROTONIX) 40 mg tablet, Take 1 Tab by mouth daily. , Disp: 90 Tab, Rfl: 0 
  ondansetron hcl (ZOFRAN) 4 mg tablet, Take 1 Tab by mouth DIALYSIS PRN for Nausea., Disp: 90 Tab, Rfl: 2 
  nystatin (MYCOSTATIN) powder, Apply  to affected area three (3) times daily as needed (rash). , Disp: 60 g, Rfl: 2 
  montelukast (SINGULAIR) 10 mg tablet, Take 1 Tab by mouth daily. , Disp: 90 Tab, Rfl: 0 
  lisinopril-hydroCHLOROthiazide (PRINZIDE, ZESTORETIC) 10-12.5 mg per tablet, Take 1 Tab by mouth daily. , Disp: 90 Tab, Rfl: 0 
  estradioL (ESTRACE) 2 mg tablet, Take 1 Tab by mouth daily. , Disp: 90 Tab, Rfl: 0 
  escitalopram oxalate (LEXAPRO) 20 mg tablet, Take 1.5 Tabs by mouth daily. , Disp: 135 Tab, Rfl: 0 
  diphenoxylate-atropine (LomotiL) 2.5-0.025 mg per tablet, Take 1 Tab by mouth four (4) times daily as needed for Diarrhea. Max Daily Amount: 4 Tabs., Disp: 120 Tab, Rfl: 5 
  clonazePAM (KlonoPIN) 1 mg tablet, Take 1 Tab by mouth two (2) times a day. Max Daily Amount: 2 mg., Disp: 60 Tab, Rfl: 2 
  cetirizine (ZYRTEC) 10 mg tablet, Take 1 Tab by mouth daily. , Disp: 90 Tab, Rfl: 1   cyclobenzaprine (FLEXERIL) 10 mg tablet, Take 1 Tab by mouth three (3) times daily as needed for Muscle Spasm(s) for up to 90 days. , Disp: 90 Tab, Rfl: 2 
  dexAMETHasone (DECADRON) 1 mg tablet, Take one tablet at 11pm the night prior to your fasting 8 am blood draw, Disp: 1 Tab, Rfl: 0 
  levothyroxine (SYNTHROID) 112 mcg tablet, Take one tablet on a empty stomach daily and 1.5 tabs on sunday, Disp: 3 Tab, Rfl: 11 
  oxybutynin chloride XL (DITROPAN XL) 10 mg CR tablet, Take 1 Tab by mouth daily. , Disp: 30 Tab, Rfl: 12 
  HYDROcodone-acetaminophen (NORCO) 5-325 mg per tablet, TAKE 1 TABLET BY MOUTH ONCE DAILY AS NEEDED FOR PAIN, Disp: , Rfl:  
  Lactobacillus acidophilus (ACIDOPHILUS) cap, 1 capsule daily, Disp: , Rfl:  
  albuterol (PROVENTIL HFA, VENTOLIN HFA, PROAIR HFA) 90 mcg/actuation inhaler, Take 1-2 Puffs by inhalation every six (6) hours as needed for Wheezing., Disp: 1 Inhaler, Rfl: 2   clotrimazole-betamethasone (LOTRISONE) topical cream, Apply  to affected area two (2) times a day. Until rash resolves, Disp: 45 g, Rfl: 2   [facility]-me blue-sod phos-phsal-hyo (URIBEL) 118-10-40.8-36 mg cap capsule, Take 1 Cap by mouth daily. , Disp: 30 Cap, Rfl: 12 
  fluticasone propionate (FLONASE) 50 mcg/actuation nasal spray, 2 Sprays by Both Nostrils route daily. , Disp: , Rfl:  
  cholecalciferol, VITAMIN D3, (VITAMIN D3) 5,000 unit tab tablet, Take  by mouth daily. , Disp: , Rfl:  
  VITAMIN E ACETATE PO, Take 1 Tab by mouth daily. , Disp: , Rfl:   
Date Last Reviewed:  .5/21/2020 Number of Personal Factors/Comorbidities that affect the Plan of Care: 1-2: MODERATE COMPLEXITY EXAMINATION:  
Palpation: No increased edema noted in bilateral legs- normal sensation noted bilaterally ROM:  
   
 bilateral hip and LE ROM [facility] pt does have full left knee extension at rest, but does not transfer this into her gait Strength:  
  R hip flex 4/5, quad 4/5, HS 4/5, glut med 4-/5, ankles 4+/5 L hip flex 4-/5, quad 4-/5, HS 4/5, glut med 4-/5, ankles 4+/5 Special Tests: negative SLR, positive for LLD with left leg shorter than right leg. Pt reports she is unsure how long she has presented this way. She was seeing a massage therapist at times for help with this. 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 Nerves 2. Bones 3. Joints 4. Muscles 5. Ligaments Body Functions Affected: 1. Sensory/Pain 2. Neuromusculoskeletal 
3. Movement Related Activities and Participation Affected: 1. General Tasks and Demands 2. Mobility 3. Domestic Life 4. Interpersonal Interactions and Relationships 5. Community, Social and [place] Number of elements (examined above) that affect the Plan of Care: 4+: HIGH COMPLEXITY CLINICAL PRESENTATION:  
 Presentation: Evolving clinical presentation with changing clinical characteristics: MODERATE COMPLEXITY CLINICAL DECISION MAKING:  
Use of outcome tool(s) and clinical judgement create a POC that gives a: Questionable prediction of patient's progress: MODERATE COMPLEXITY

## 2020-05-22 NOTE — PROGRESS NOTES
Flavia Fournier  : 1945  Payor: [employer] [place] / Plan: [place] / Product Type: Managed Care Medicare /  [address] [place] [id number]  at [place] [address] Therapy  [address], [address]  [phone]   [phone]      OUTPATIENT PHYSICAL THERAPY: Daily Treatment Note 2020  Visit Count:  1    ICD-10: Treatment Diagnosis: difficulty walking R 26.2, pain in right limb M79.604, pain in left limb M79.605    Pre-treatment Symptoms/Complaints:  Pt reporting bilateral leg pain with difficulty with walking. Pain: Initial: Pain Intensity 1: 7  Post Session:  7/10   Medications Last Reviewed:  2020  Updated Objective Findings:  See evaluation note from today   TREATMENT:   THERAPEUTIC EXERCISE: (5 minutes):  Exercises per grid below to improve mobility and strength. Required minimal verbal cues to promote proper body mechanics. Progressed resistance, range and repetitions as indicated. Instruction in HEP to include LTR, standing hip abduction, bridging, hip flexor stretching off bed, SLR, standing marching, standing HS curls  Manual Therapy (   na   ): na    Therapeutic Modalities (  na   ):na    Evaluation (x)    HEP: As above; handouts given to patient for all exercises. Treatment/Session Summary:    · Response to Treatment:  Pt seen for eval today. She presents with general deconditioning and inability to walk for short or long distances. She also  has increased pain with standing. She reports she can only attend therapy 2 times per month due to higher copay. She was instructed in HEP to follow and was told that is is necessary to perform these exercises since she will not be regularly attending therapy. · Communication/Consultation:  None today  · Equipment provided today:  None today  · Recommendations/Intent for next treatment session: Next visit will focus on strengthening.   Treatment Plan of Care Effective Dates:  2020 to 8/19/2020  Total Treatment Billable Duration:  eval  PT Patient Time In/Time Out  Time In: 0773  Time Out: FIDENCIO Stubbs    Future Appointments   Date Time Provider Kelsey Sonia   6/1/2020  2:20 PM Aguilar Mejia MD [facility] CAFM CAFM   6/4/2020 12:00 PM Funmilayo Underwood, FIDENCIO [facility]   6/9/2020 11:00 AM Brett Lyn MD [facility] BSNE   6/22/2020 11:30 AM Stephanie Marshall PA-C END BS ENDO   9/10/2020  2:00 PM Rajiv Ortiz DO BSUG BSUG

## 2020-06-04 ENCOUNTER — HOSPITAL ENCOUNTER (OUTPATIENT)
Dept: PHYSICAL THERAPY | Age: 75
Discharge: HOME | End: 2020-06-04
Attending: PSYCHIATRY & NEUROLOGY

## 2020-06-04 NOTE — PROGRESS NOTES
Raghav Casanova  : 1945  [place]: [employer] [place]  Secondary:  Therapy Center at [address], [address] [place] [address]  [phone]   [phone]      OUTPATIENT DAILY NOTE    NAME/AGE/GENDER: Raghav Casanova is a 69 y.o. female. DATE: 2020    Ms. Marshall  cancel due to a conflict.     Adeline Montes., PT

## 2020-06-15 ENCOUNTER — HOSPITAL ENCOUNTER (OUTPATIENT)
Dept: PHYSICAL THERAPY | Age: 75
Discharge: HOME | End: 2020-06-15
Attending: OBSTETRICS & GYNECOLOGY
Payer: MEDICARE

## 2020-06-15 NOTE — PROGRESS NOTES
Therapy Center at [place] [address]  [address] [facility], [address] [place] [address],[address] [place], [address]    Phone: [phone]   Fax: [phone]    Pt cancelled today's physical therapy appointment and rescheduled for next week.     Ysabel Thibodeaux, MPT

## 2020-06-16 PROBLEM — R53.1 WEAKNESS GENERALIZED: Status: ACTIVE | Noted: 2020-06-16
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
## 2020-06-16 PROBLEM — R29.3 POSTURAL IMBALANCE: Status: ACTIVE | Noted: 2020-06-16

## 2020-06-16 PROBLEM — R20.0 NUMBNESS: Status: ACTIVE | Noted: 2020-06-16

## 2020-06-16 PROBLEM — Z79.899 ENCOUNTER FOR MEDICATION MANAGEMENT: Status: ACTIVE | Noted: 2018-02-06

## 2020-06-24 ENCOUNTER — HOSPITAL ENCOUNTER (OUTPATIENT)
Dept: PHYSICAL THERAPY | Age: 75
Discharge: HOME | End: 2020-06-24
Attending: OBSTETRICS & GYNECOLOGY
Payer: MEDICARE

## 2020-06-24 PROCEDURE — 97161 PT EVAL LOW COMPLEX 20 MIN: CPT

## 2020-06-24 PROCEDURE — 97535 SELF CARE MNGMENT TRAINING: CPT

## 2020-06-24 PROCEDURE — 97110 THERAPEUTIC EXERCISES: CPT

## 2020-06-24 PROCEDURE — 97162 PT EVAL MOD COMPLEX 30 MIN: CPT

## 2020-06-24 NOTE — PROGRESS NOTES
Keith Sargent  : 1945  [place] [employer] [place]  Secondary:  Therapy Center at [place] [address] [facility], [address] [place] [address],[address].  [phone]   [phone]        OUTPATIENT PHYSICAL THERAPY: Daily Treatment Note  Visit Count:  1    Myalgia (M79.1), Other specified soft tissue disorders (M79.89), Other lack of coordination (R27.8), Mixed incontinence (N39.46)    MEDICAL/REFERRING DIAGNOSIS:  pelvic   REFERRING PHYSICIAN: Laura Saenz DO  Pre-treatment Symptoms/Complaints:  See initial evaluation    Pain: Initial: 0/10 Post Session:  0/10   Medications Last Reviewed:  20   Initial Subjective:  Pt reports leakage when coughing during the day, sometimes when getting up at night. Feels at night a few times she has leaked getting out of bad with no significant urge noted. Her mom and dtr have IC and said Angel feels she may have it. Reports burning when she first starts to urinate. Has stopped taking her anti-cholinergics. She states she wasn't sure she was emptying her bladder. After stopping the meds it feels like she can empty completely. After sling placed would need to apply pressure to 'open urethra' to finish emptying completely. Does not need to do this anymore. Would only need to do this at night. Feels urges are normal.    Mid urethral sling placed 19. Prior to surgery with long history of leaking prior to it. Wore a pad during the day all the time. feela a spot on left vulva uncomfortable when wipe. Has rashes sometimes. Feels a lot of it is due to chafing. Urinary: Frequency every 2-3x during the day, 1 x/night. Positive for stress urinary incontinence, urge urinary incontinence, dysuria and nocturia. Negative for urinary urgency, urinary frequency, urinary hesitancy/intermittency and hematuria. Pt does use pads for protection at night; 0 pads per day (PPD). 1pad at night.   Bowel: Frequency daily at least.  Can be up to 4x. Pt has diarrhea dominant IBS. [place] Positive for none. Negative for pain with bowel movement, pushing/straining with bowel movement, fecal incontinence, constipation and incomplete emptying  Use of stool softeners or laxatives? NO but does take a med to bulk stool and decrease diarrhea. Fluid intake: 3-4 glasses water/day; bladder irritants include: juices, gatorade, iced tea, 1 cup coffee. . Pt does not limit fluid intake due to bladder control. Sexual: Pt is not sexually active. Has had trouble with orgasms for years. Feels one side may be tighter and more tender than the other. Pelvic Organ Prolapse/Pelvic Pain: Location: denies pain  OB History: Pregnancies:  2,  Deliveries: 2 vaginal with episiotomies  Objective Findings:  2+/4/4  ttp throughout PFM and vulva, ttp adductors, psoas, abdominals  Poor coordination with paradoxical contraction;  4/5 hips   TREATMENT:   THERAPEUTIC EXERCISE: (  minutes):  Exercises per grid below to improve strength and coordination. Required minimal verbal and tactile cues to promote proper body mechanics and promote proper body breathing techniques. Progressed resistance and repetitions as indicated. Date:  06/24/20    Activity/Exercise Parameters   Diaphragmatic breathing With tactile feedback. PFM drops attampted but unable                   Pt ed       Self Care (  minutes):   The following educational items were discussed with pt  Pelvic anatomy, pelvic health,  importance of hydration, bladder irritants, muscles comprising 'core' and how strengthening can benefit her symptoms,  Pressure systems within our body, effect of diet with regards to pt's condition      MANUAL THERAPY: (  minutes):  to reduce tone and improve tissue elasticity   (Used abbreviations: MET - muscle energy technique; SCS- Strain counter strain; CTM- Connective tissue mobilizations; CR- Contract/relax; SP- Sustained pressure, TrP- Trigger point release, IASTM- Instrument assisted soft tissue mobilizations, TDN- Trigger point dry needling, DB - diaphragmatic breathing, PFM - pelvic floor muscles, OI - obturator internus, IC - iliococcygeus, PC - pubococcygeus, DTP - deep transverse perineum. Treatment/Session Summary:    · Response to Treatment:  Pt reported good understanding of plan of care as well as exercises. All questions were answered and pt was invited to call with any further questions or issues. · Communication/Consultation:  IE faxed to referring provider   · Equipment provided today:  None today  · Recommendations/Intent for next treatment session: Next visit will focus on downtraining, MT, eventually start hip strengthening as able.   Treatment Plan of Care Effective Dates:  3/24/20 to 9/22/2020   Total Treatment Billable Duration:  minutes  PT Patient Time In/Time Out  Time In: 1000  Time Out: 1100  Heber Abebe PT    Future Appointments   Date Time Provider Kelsey Scott   7/7/2020 10:00 AM [place] Backer, PT [facility] SFE   7/21/2020 10:00 AM [place] [facility]   9/10/2020  2:00 PM Kelly Ortiz, DO [facility] BSUG

## 2020-06-24 NOTE — THERAPY EVALUATION
Maria Luisa Loomis : 1945 [place] [employer] [place] Secondary:  Therapy Center at [place] [id number] [facility], [address], [place]. [phone]   [phone] OUTPATIENT PHYSICAL THERAPY:Initial Assessment 2020 ICD-10: Treatment Diagnosis: Myalgia (M79.1), Other specified soft tissue disorders (M79.89), Other lack of coordination (R27.8), Mixed incontinence (N39.46) Precautions/Allergies:  
Fire ant; Amoxicillin; Betadine [povidone-iodine]; Cephalexin; Demerol [meperidine]; Hydromorphone; Morphine; Percocet [oxycodone-acetaminophen]; and Percodan [oxycodone hcl-oxycodone-asa] TREATMENT PLAN: 
Effective Dates: 6/15/2020 TO 2020 (90 days). Frequency/Duration: 1 time a week for 90 Day(s) MEDICAL/REFERRING DIAGNOSIS: 
pelvic DATE OF ONSET: chronic REFERRING PHYSICIAN: London De Los Santos DO MD Orders: Evaluate and treat Return MD Appointment: TBD INITIAL ASSESSMENT:  Ms. Dorina Muñoz presents with increased tightness and tenderness, impaired coordination, and weakness throughout PFM. She also demonstrates allodynia throughout vulva as well as significant tenderness in bilateral adductors and throughout abdomen. Pt will benefit from physical therapy to address stated problems. Plan of care was discussed and agreed upon with patient and HEP was initiated. Thank you for the opportunity to work with this patient. PROBLEM LIST (Impacting functional limitations): 1. stress incontinence 2. urge incontinence 3. poor coordination 4. ttp and increased muscle tightness 5. poor water intake/ bladder habits 6. nocturia 7. poor posture/use of diaphragm 8. allodynia 9. overactive PFM 10. dysuria 11. decreased PFM strength and endurance 12. decreased hip/glut strength 13. decreased abdominal strength INTERVENTIONS PLANNED: (Treatment may consist of any combination of the following) 1. Neuromuscular re-education 2. Biofeedback as needed 3. HEP 4. Bladder retraining 5. Bladder education 6. Electrical Stimulation 7. Home Exercise Program (HEP) 8. Manual Therapy 9. Therapeutic Activites 10. Therapeutic Exercise/Strengthening 11. Self Care education as needed GOALS: (Goals have been discussed and agreed upon with patient.) Short-Term Functional Goals: Time Frame: 2 weeks 1. Pt will be independent with HEP to assist in achieving below goals. 2.   Pt will demonstrate understanding of and ability to teach back appropriate water intake, bladder irritants, toileting frequency, and positioning for improved self-management of symptoms. Discharge Goals: Time Frame: 9/10/2020 1. Pt will be independent in performing pelvic floor check-ins and drops 3 or more times per day for reduced tension in the pelvic floor. 2.   Pt will report improvement in leaking during the day and at night to be able to stop wearing a pdd. 3.   Pt will report improvement in pain symptoms to report resolution of dysuria symptoms 90% of the time. 4.   Pt will demonstrate improvement in pelvic floor muscle length and tenderness for improved PFM function and demonstrate good coordination for contract, relax, bulge on command MEDICAL NECESSITY:  
· Patient demonstrates good rehab potential due to higher previous functional level. REASON FOR SERVICES/OTHER COMMENTS: 
· Patient continues to require skilled intervention due to ongoing goals noted above Total Duration: PT Patient Time In/Time Out Time In: 1000 Time Out: 1100 Rehabilitation Potential For Stated Goals: Good Regarding Mari Do's therapy, I certify that the treatment plan above will be carried out by a therapist or under their direction. Thank you for this referral, 
Brijesh Ashraf, PT Referring Physician Signature: George Mejia, DO _______________________________ Date _____________ PAIN/SUBJECTIVE:  
 Initial:   0 current Post Session:  0/10 HISTORY:  
History of Injury/Illness (Reason for Referral): 
Pt reports leakage when coughing during the day, sometimes when getting up at night. Feels at night a few times she has leaked getting out of bad with no significant urge noted. Her mom and dtr have IC and said Angel feels she may have it. Reports burning when she first starts to urinate. Has stopped taking her anti-cholinergics. She states she wasn't sure she was emptying her bladder. After stopping the meds it feels like she can empty completely. After sling placed would need to apply pressure to 'open urethra' to finish emptying completely. Does not need to do this anymore. Would only need to do this at night. Feels urges are normal.   
Mid urethral sling placed 12/13/19. Prior to surgery with long history of leaking prior to it. Wore a pad during the day all the time. feela a spot on left vulva uncomfortable when wipe. Has rashes sometimes. Feels a lot of it is due to chafing. Urinary: Frequency every 2-3x during the day, 1 x/night. Positive for stress urinary incontinence, urge urinary incontinence, dysuria and nocturia. Negative for urinary urgency, urinary frequency, urinary hesitancy/intermittency and hematuria. Pt does use pads for protection at night; 0 pads per day (PPD). 1pad at night. Bowel: Frequency daily at least.  Can be up to 4x. Pt has diarrhea dominant IBS. [place] Positive for none. Negative for pain with bowel movement, pushing/straining with bowel movement, fecal incontinence, constipation and incomplete emptying  Use of stool softeners or laxatives? NO but does take a med to bulk stool and decrease diarrhea. Fluid intake: 3-4 glasses water/day; bladder irritants include: juices, gatorade, iced tea, 1 cup coffee. . Pt does not limit fluid intake due to bladder control. Sexual: Pt is not sexually active.   Has had trouble with orgasms for years. Feels one side may be tighter and more tender than the other. Pelvic Organ Prolapse/Pelvic Pain: Location: denies pain OB History: Pregnancies:  2,  Deliveries: 2 vaginal with episiotomies Past Medical History/Comorbidities: Ms. Juan Antonio Lewis  has a past medical history of Acute kidney insufficiency, Adverse effect of anesthesia, Allergic rhinitis, Anemia, Anxiety and depression, Edema, GERD (gastroesophageal reflux disease), History of chicken pox, History of Clostridioides difficile infection, History of DVT (deep vein thrombosis), HTN (hypertension), Hyperlipidemia, Hyperthyroidism, Hypothyroidism, IBS (irritable bowel syndrome), Insomnia, Morbid obesity ([address] Murmur, Overactive bladder, PAD (peripheral artery disease) ([address] RLS (restless legs syndrome), SOBOE (shortness of breath on exertion), Special examinations, MALOU (stress urinary incontinence, female), and Thromboembolus ([address] (2015). Ms. Juan Antonio Lewis  has a past surgical history that includes hx meniscus repair (Left); hx tonsil and adenoidectomy (~1950); hx shoulder arthroscopy (Right, ~1966, ~2013); hx knee replacement (Right, 2014); hx cholecystectomy (12/2016); hx total abdominal hysterectomy (~1970); hx appendectomy (1955); ir gastric band adj w/ fluoro (~2014); hx gyn (12/03/2019); hx other surgical (Right, 2016); hx other surgical (Left, ~2014); hx other surgical (~1986); hx meniscus repair (Left, 2014); hx colonoscopy; hx orthopaedic (Left); hx other surgical (12/03/2019); and hx orthopaedic (Left, 01/2020). Social History/Living Environment:  
  independent Prior Level of Function/Work/Activity: 
Works at [employer] part time. Self check out Previous Treatment Approaches:   
      Anti-cholinergics. Personal Factors:   
      Sex:  female Age:  69 y.o. Prior surgeries:  Mid urethral sling 2019. Long history of UI Outcome measure: 
PFIQ-7   Current:  24/24/24 Ambulatory/Rehab Services H2 Model Falls Risk Assessment Risk Factors: 
     No Risk Factors Identified Ability to Rise from Chair: 
     (0)  Ability to rise in a single movement Falls Prevention Plan: No modifications necessary Total: (5 or greater = High Risk): 0  
©2010 [facility] Patent #[id number]. Federal Law prohibits the replication, distribution or use without written permission from [facility] Current Medications:   
  
Current Outpatient Medications:  
  levothyroxine (SYNTHROID) 125 mcg tablet, Take one tab daily and 1.5 tabs on Sunday, TDD 134mcg, Disp: 30 Tab, Rfl: 11 
  pantoprazole (PROTONIX) 40 mg tablet, Take 1 Tab by mouth daily. , Disp: 90 Tab, Rfl: 0 
  ondansetron hcl (ZOFRAN) 4 mg tablet, Take 1 Tab by mouth every eight (8) hours as needed for Nausea., Disp: 90 Tab, Rfl: 2 
  promethazine (PHENERGAN) 25 mg tablet, Take 1 Tab by mouth every eight (8) hours as needed for Nausea for up to 90 days. , Disp: 90 Tab, Rfl: 0 
  pramipexole (Mirapex) 0.5 mg tablet, Take 1 Tab by mouth daily. , Disp: 90 Tab, Rfl: 0 
  ipratropium-albuteroL (Combivent Respimat)  mcg/actuation inhaler, Take 1-2 Puffs by inhalation every four (4) hours as needed for Wheezing., Disp: 1 Inhaler, Rfl: 1 
  nystatin (MYCOSTATIN) powder, Apply  to affected area three (3) times daily as needed (rash). , Disp: 60 g, Rfl: 2 
  montelukast (SINGULAIR) 10 mg tablet, Take 1 Tab by mouth daily. , Disp: 90 Tab, Rfl: 0 
  lisinopril-hydroCHLOROthiazide (PRINZIDE, ZESTORETIC) 10-12.5 mg per tablet, Take 1 Tab by mouth daily. , Disp: 90 Tab, Rfl: 0 
  estradioL (ESTRACE) 2 mg tablet, Take 1 Tab by mouth daily. , Disp: 90 Tab, Rfl: 0 
  escitalopram oxalate (LEXAPRO) 20 mg tablet, Take 1.5 Tabs by mouth daily. , Disp: 135 Tab, Rfl: 0 
  diphenoxylate-atropine (LomotiL) 2.5-0.025 mg per tablet, Take 1 Tab by mouth four (4) times daily as needed for Diarrhea. Max Daily Amount: 4 Tabs., Disp: 120 Tab, Rfl: 5 
  clonazePAM (KlonoPIN) 1 mg tablet, Take 1 Tab by mouth two (2) times a day. Max Daily Amount: 2 mg., Disp: 60 Tab, Rfl: 2 
  cetirizine (ZYRTEC) 10 mg tablet, Take 1 Tab by mouth daily. , Disp: 90 Tab, Rfl: 1 
  oxybutynin chloride XL (DITROPAN XL) 10 mg CR tablet, Take 1 Tab by mouth daily. , Disp: 30 Tab, Rfl: 12 
  HYDROcodone-acetaminophen (NORCO) 5-325 mg per tablet, TAKE 1 TABLET BY MOUTH ONCE DAILY AS NEEDED FOR PAIN, Disp: , Rfl:  
  Lactobacillus acidophilus (ACIDOPHILUS) cap, 1 capsule daily, Disp: , Rfl:  
  albuterol (PROVENTIL HFA, VENTOLIN HFA, PROAIR HFA) 90 mcg/actuation inhaler, Take 1-2 Puffs by inhalation every six (6) hours as needed for Wheezing., Disp: 1 Inhaler, Rfl: 2   clotrimazole-betamethasone (LOTRISONE) topical cream, Apply  to affected area two (2) times a day. Until rash resolves, Disp: 45 g, Rfl: 2 
  mth-me blue-sod phos-phsal-hyo (URIBEL) 118-10-40.8-36 mg cap capsule, Take 1 Cap by mouth daily. , Disp: 30 Cap, Rfl: 12 
  fluticasone propionate (FLONASE) 50 mcg/actuation nasal spray, 2 Sprays by Both Nostrils route daily. , Disp: , Rfl:  
  cholecalciferol, VITAMIN D3, (VITAMIN D3) 5,000 unit tab tablet, Take  by mouth daily. , Disp: , Rfl:  
  VITAMIN E ACETATE PO, Take 1 Tab by mouth daily. , Disp: , Rfl:   
Date Last Reviewed: 06/24/20 Number of Personal Factors/Comorbidities that affect the Plan of Care: 1-2: MODERATE COMPLEXITY EXAMINATION:  
RANGE OF MOTION / MOBILITY: 
Decreased 50% throughout spine Decreased 50% bilateral knee flexion Hip strength grossly 4/5 Abdominal strength 2/5 External Observations: ? Voluntary contraction: [] absent     [x] present ? Involuntary contraction: [] absent     [] present ? Voluntary relaxation: [] absent     [] present   [x] delayed ? Perineal descent with bearing down: [] absent     [] Present  [x] Unable to bear down ? Skin integrity: some breakdown noted at skin folds of abdomen and torso d/t increased tissue. Vulvar region normal 
? Postural Assessment: Forward head and shoulders. Posteriorly tucked pelvis Pelvic Floor Muscle (PFM) Assessment: ? Vaginal vault size: [] decreased     [] increased     [x] WNL ? PFM tone: [] decreased     [x] increased     [] WNL ? Voluntary contraction: [] absent     [x] weak     [] moderate      [] Strong ? Voluntary relaxation: [] absent     [x] partial     [] complete   [x] delayed ? Symmetry of contraction:  normal 
 
[place] PERF Score:  Via: vaginal canal 
      Strength: P: Power, E: Endurance, R: Repetitions, QF: Quick Flicks, TrA: Transverse Abdominus, T: Timing, DB: Diaphragmatic Breathing P  2+  
E 4 partially due to poor coordination with breath R 4  
QF Coordination: 
? Diaphragmatic Breathing:  Able to breathe some through her diaphragm but increased chest compensation. ? Contract-relax-bulge:   Poor -  Delayed relaxation and incomplete. Able to feel bearing down in abdomen but paradoxical contraction felt in PFM ? Use of TA:  nt 
 
Palpation: via vaginal canal 
Superficial Pelvic Floor Musculature (PFM): Tender? Intermediate PFM Tender? Deep PFM Tender? Superficial Transverse Perineal [x] Right  [x] Left  []N/A Deep Transverse Perineal [x] Right  [x] Left  []N/A Puborectalis [x] Right  [x] Left  []N/A Ischiocavernosus [x] Right  [x] Left  []N/A Compressor Urethra [] Right  [] Left  []N/A Pubococcygeus [x] Right  [x] Left  []N/A Bulbocavernosus [] Right  [] Left  []N/A   Ileococcygeus [x] Right  [x] Left  []N/A Obturator Internus [x] Right  [x] Left  []N/A Coccygeus [x] Right  [x] Left  []N/A  
                                                                                                                                        ** tenderness throughout L>>R External palpation: Muscle/muscle group Tender? Adductors [x] Right  [x] Left  []N/A Gluteals [x] Right  [x] Left  []N/A Piriformis [] Right  [] Left  []N/A Iliopsoas [x] Right  [x] Left  []N/A Abdominal wall [x] Right  [x] Left  []N/A Other:    Very sensitive adductors and abdominals Special Tests: ? Q-tip test: 6/10 pain 2:00, 3:00, 4:00, 4/10 pain 8:00, 9:00, 10:00 
? Supine cough test: nt 
? ASLR:  Decreased stability noted bilat ? Tissue support test with bearing down (Grade 0: absent; Grade 1: >1cm above hymen; Grade 2: 1cm above or below hymen; Grade 3: >1cm below hymen): · Anterior Wall = nt  
· Posterior Wall = nt · Apical = nt Body Structures Involved: 1. Nerves 2. Muscles 3. Ligaments Body Functions Affected: 1. Mental 
2. Sensory/Pain 3. Genitourinary 4. Neuromusculoskeletal 
5. Movement Related Activities and Participation Affected: 1. Learning and Applying Knowledge 2. General Tasks and Demands 3. Mobility 4. Self Care 5. Interpersonal Interactions and Relationships Number of elements (examined above) that affect the Plan of Care: 3: MODERATE COMPLEXITY CLINICAL PRESENTATION:  
Presentation: Stable and uncomplicated: LOW COMPLEXITY CLINICAL DECISION MAKING:  
Use of outcome tool(s) and clinical judgement create a POC that gives a: Questionable prediction of patient's progress: MODERATE COMPLEXITY

## 2020-06-29 PROBLEM — R35.0 URINARY FREQUENCY: Status: ACTIVE | Noted: 2020-06-29

## 2020-06-30 ENCOUNTER — HOSPITAL ENCOUNTER (OUTPATIENT)
Dept: LAB | Age: 75
Discharge: HOME | End: 2020-06-30
Payer: MEDICARE

## 2020-06-30 PROCEDURE — 87086 URINE CULTURE/COLONY COUNT: CPT

## 2020-07-01 NOTE — PROGRESS NOTES
Please let Ms. Mateus Gipson know that her urine cx came back negative. She was supposed to restart her IC medications. Please ask her if she is feeling better.

## 2020-07-02 LAB
BACTERIA SPEC CULT: NORMAL
BACTERIA SPEC CULT: NORMAL
SERVICE CMNT-IMP: NORMAL

## 2020-07-07 ENCOUNTER — HOSPITAL ENCOUNTER (OUTPATIENT)
Dept: PHYSICAL THERAPY | Age: 75
Discharge: HOME | End: 2020-07-07
Attending: OBSTETRICS & GYNECOLOGY

## 2020-07-07 NOTE — PROGRESS NOTES
Therapy Center at [address], [address] [place] [address],[address] [place], [address]    Phone: [phone]   Fax: [phone]    Pt cancelled today's physical therapy appointment because she forgot. Plan to follow up at next scheduled visit.     Carter Falcon, MPT

## 2020-07-21 ENCOUNTER — HOSPITAL ENCOUNTER (OUTPATIENT)
Dept: PHYSICAL THERAPY | Age: 75
Discharge: HOME | End: 2020-07-21
Attending: OBSTETRICS & GYNECOLOGY

## 2020-08-13 PROBLEM — M51.36 DDD (DEGENERATIVE DISC DISEASE), LUMBAR: Status: ACTIVE | Noted: 2020-08-13

## 2020-08-13 PROBLEM — N28.1 RENAL CYST: Status: ACTIVE | Noted: 2020-08-13

## 2020-09-08 ENCOUNTER — HOSPITAL ENCOUNTER (OUTPATIENT)
Dept: GENERAL RADIOLOGY | Age: 75
Discharge: HOME | End: 2020-09-08

## 2020-09-08 DIAGNOSIS — I10 ESSENTIAL HYPERTENSION: ICD-10-CM

## 2020-09-14 PROBLEM — G89.4 CHRONIC PAIN DISORDER: Status: ACTIVE | Noted: 2019-10-18

## 2020-09-14 PROBLEM — M54.16 LUMBAR RADICULOPATHY: Status: ACTIVE | Noted: 2019-08-15

## 2020-09-21 PROBLEM — N30.10 INTERSTITIAL CYSTITIS: Status: RESOLVED | Noted: 2020-01-30 | Resolved: 2020-09-21

## 2020-10-09 ENCOUNTER — HOSPITAL ENCOUNTER (OUTPATIENT)
Dept: PHYSICAL THERAPY | Age: 75
Discharge: HOME | End: 2020-10-09
Attending: OBSTETRICS & GYNECOLOGY
Payer: MEDICARE

## 2020-10-09 DIAGNOSIS — N32.81 OAB (OVERACTIVE BLADDER): ICD-10-CM

## 2020-10-09 PROCEDURE — 97140 MANUAL THERAPY 1/> REGIONS: CPT

## 2020-10-09 PROCEDURE — 97110 THERAPEUTIC EXERCISES: CPT

## 2020-10-09 PROCEDURE — 97530 THERAPEUTIC ACTIVITIES: CPT

## 2020-10-09 PROCEDURE — 97164 PT RE-EVAL EST PLAN CARE: CPT

## 2020-10-09 NOTE — PROGRESS NOTES
Radha Rodriguez  : 1945  [place]: [place]  Secondary:  [facility]rapy Center at [address] [place] [address],[address], [address]  [phone]   [phone]        OUTPATIENT PHYSICAL THERAPY: Daily Treatment Note  Visit Count:  2    Myalgia (M79.1), Other specified soft tissue disorders (M79.89), Other lack of coordination (R27.8), Mixed incontinence (N39.46)    MEDICAL/REFERRING DIAGNOSIS:  OAB (overactive bladder) [N32.81]   REFERRING PHYSICIAN: Cj Clark DO  Pre-treatment Symptoms/Complaints:  See initial evaluation    Pain: Initial: 0/10 Post Session:  0/10   Medications Last Reviewed:  10/09/20   Pertinent Evaluation information:    MALOU with coughing;  UI at night with no knowledge of feeling this  ULQ pain - abdominal restrictions  IBS - diarrhea dominant     TREATMENT:   THERAPEUTIC EXERCISE: ( 25 minutes):  Exercises per grid below to improve strength and coordination. Required minimal verbal and tactile cues to promote proper body mechanics and promote proper body breathing techniques. Progressed resistance and repetitions as indicated. Date:  10/09/20    Activity/Exercise Parameters   Diaphragmatic breathing With tactile feedback. Paradoxical contraction noted with end range inhale   PFM drops attampted but unable                   Pt ed Increasing water intake, elimination diet for IBS to find inflammatory foods,  Potential relationship between abdominal restrictions, adductor tightness and PFM dysfunction     THERAPEUTIC ACTIVITY (15   Minutes):     Re-assessment performed- see associated re-evaluation form    MANUAL THERAPY: ( 15 minutes):  to reduce tone and improve tissue elasticity   MFR mid abdominal restrictions  Introital lengthening at inhale  (Used abbreviations: MET - muscle energy technique; SCS- Strain counter strain; CTM- Connective tissue mobilizations; CR- Contract/relax; SP- Sustained pressure, TrP- Trigger point release, IASTM- Instrument assisted soft tissue mobilizations, TDN- Trigger point dry needling, DB - diaphragmatic breathing, PFM - pelvic floor muscles, OI - obturator internus, IC - iliococcygeus, PC - pubococcygeus, DTP - deep transverse perineum. Treatment/Session Summary:    · Response to Treatment:  Pt reported good understanding of plan of care as well as exercises. All questions were answered and pt was invited to call with any further questions or issues. · Communication/Consultation:  IE faxed to referring provider   · Equipment provided today:  None today  · Recommendations/Intent for next treatment session: Next visit will focus on downtraining, MT, eventually start hip strengthening as able.   Treatment Plan of Care Effective Dates:  3/24/20 to 1/7/2021 (pending signature)   Total Treatment Billable Duration: 55 minutes  PT Patient Time In/Time Out  Time In: 1100  Time Out: [address], PT    Future Appointments   Date Time Provider Kelsey Scott   10/12/2020 11:30 AM Ilda Bassett PA-C END BS ENDO   10/12/2020  2:30 PM Danny Angulo NP SSA IMD IMD   11/4/2020  9:15 AM Li Bello DO BSUG BSUG   2/12/2021 10:30 AM SFE US LOGIC 7 SFERUS SFE   3/12/2021 11:00 AM Fausto Rutledge NP PGUMAU PGU

## 2020-10-09 NOTE — THERAPY EVALUATION
Rufino Mateo  : 1945  [place]  Secondary:  Therapy Center at [address] [place] [address],[address], [address] [facility]  [phone]   [phone]        OUTPATIENT PHYSICAL THERAPY: Re-Assessment 10/9/2020   ICD-10: Treatment Diagnosis: Myalgia (M79.1), Other specified soft tissue disorders (M79.89), Other lack of coordination (R27.8), Mixed incontinence (N39.46)  Precautions/Allergies:   Fire ant; Amoxicillin; Betadine [povidone-iodine]; Cephalexin; Demerol [meperidine]; Hydromorphone; Morphine; Percocet [oxycodone-acetaminophen]; and Percodan [oxycodone hcl-oxycodone-asa]   TREATMENT PLAN:  Effective Dates: 6/15/2020 TO 2021  (90 additional days from re-assessment on 10/9). Frequency/Duration: 1 time a week for 90 Day(s) MEDICAL/REFERRING DIAGNOSIS:  OAB (overactive bladder) [N32.81]   DATE OF ONSET: chronic   REFERRING PHYSICIAN: Hali Orosco DO  MD Orders: Evaluate and treat  Return MD Appointment: TBD   Re-Assessment:   Pt attended initial visit 6/15 and has not returned until today. She states symptoms are very much the same as before. She continues with significant tightness and tenderness in deep PFM with inability to lengthen PFM showing paradoxical contraction with diaphragmatic breathing. She also presents with signirficant abdominal restrictions and tightness in adductors/hip flexors which may also contribute to her symptoms. Pt will benefit from physical therapy to address stated problems as long as she is consistent with home program as well as coming to PT . Plan to resume POC to work towards ongoing goals. INITIAL ASSESSMENT:  Ms. Jesus Redding presents with increased tightness and tenderness, impaired coordination, and weakness throughout PFM. She also demonstrates allodynia throughout vulva as well as significant tenderness in bilateral adductors and throughout abdomen.   Pt will benefit from physical therapy to address stated problems. Plan of care was discussed and agreed upon with patient and HEP was initiated. Thank you for the opportunity to work with this patient. PROBLEM LIST (Impacting functional limitations):  1. stress incontinence  2. urge incontinence  3. poor coordination  4. ttp and increased muscle tightness  5. poor water intake/ bladder habits  6. nocturia  7. poor posture/use of diaphragm  8. allodynia  9. overactive PFM  10. dysuria  11. decreased PFM strength and endurance  12. decreased hip/glut strength  13. decreased abdominal strength INTERVENTIONS PLANNED: (Treatment may consist of any combination of the following)  1. Neuromuscular re-education  2. Biofeedback as needed  3. HEP  4. Bladder retraining  5. Bladder education  6. Electrical Stimulation  7. Home Exercise Program (HEP)  8. Manual Therapy  9. Therapeutic Activites  10. Therapeutic Exercise/Strengthening  11. Self Care education as needed     GOALS: (Goals have been discussed and agreed upon with patient.)  Short-Term Functional Goals: Time Frame: 2 weeks  1. Pt will be independent with HEP to assist in achieving below goals. 2.   Pt will demonstrate understanding of and ability to teach back appropriate water intake, bladder irritants, toileting frequency, and positioning for improved self-management of symptoms. Discharge Goals: Time Frame: 9/10/2020         1. Pt will be independent in performing pelvic floor check-ins and drops 3 or more times per day for reduced tension in the pelvic floor. 2.   Pt will report ability to work a full day and not have to change clothes when she gets home d/t urinary leakage 75% of the time.         3.   Pt will demonstrate improvement in pelvic floor muscle length and tenderness for improved PFM function and demonstrate good coordination for contract, relax, bulge on command   MEDICAL NECESSITY:   · Patient demonstrates good rehab potential due to higher previous functional level.  REASON FOR SERVICES/OTHER COMMENTS:  · Patient continues to require skilled intervention due to ongoing goals noted above  Total Duration:  PT Patient Time In/Time Out  Time In: 1100  Time Out: 1200    Rehabilitation Potential For Stated Goals: Good  Regarding Sandy Do's therapy, I certify that the treatment plan above will be carried out by a therapist or under their direction. Thank you for this referral,  Gabriela Betancourt, PT     Referring Physician Signature: Ritchie Aparicio, DO _______________________________ Date _____________     PAIN/SUBJECTIVE:   Initial:   0 current Post Session:  0/10   HISTORY:   History of Injury/Illness (Reason for Referral):  10/9/20 - states she doesn't feel much different from when she was here in June. Getting up 2-3x at night. Pretty good in the daytime except when she coughs. Leaking:  Primarily stress only with coughing. Urge wakes her up at night  2am and then again 430 or 5am.  Reports leaking when she is sleeping  Urinary:  Frequency every 3-4 hrs during the day. 2-3x/night. Reports nighttime leaking when she is asleep. Denies urgency. Denies dysuria. Positive for incomplete emptying (she feels), weak stream  Bowel:  1-4x/day. Has IBS and LUQ pain. Feels she has adhesions from prior surgeries contributing to her pain. Has had this since she was 20ish  Fluid intake:  'not enough'. 2 12oz bottles in a day. Other drinks: Ice tea, gatorade (sugar free), coffee (1/2 cup)  Pain:  LUQ:  achey not quite every day 4-5/10;  L low back/hip pain daily. Hurts her working all day. Legs are bad. Maybe triggers back. 7/10    6/15/20:  Pt reports leakage when coughing during the day, sometimes when getting up at night. Feels at night a few times she has leaked getting out of bad with no significant urge noted. Her mom and dtr have IC and said Angel feels she may have it. Reports burning when she first starts to urinate.   Has stopped taking her anti-cholinergics. She states she wasn't sure she was emptying her bladder. After stopping the meds it feels like she can empty completely. After sling placed would need to apply pressure to 'open urethra' to finish emptying completely. Does not need to do this anymore. Would only need to do this at night. Feels urges are normal.    Mid urethral sling placed 12/13/19. Prior to surgery with long history of leaking prior to it. Wore a pad during the day all the time. feela a spot on left vulva uncomfortable when wipe. Has rashes sometimes. Feels a lot of it is due to chafing. Urinary: Frequency every 2-3x during the day, 1 x/night. Positive for stress urinary incontinence, urge urinary incontinence, dysuria and nocturia. Negative for urinary urgency, urinary frequency, urinary hesitancy/intermittency and hematuria. Pt does use pads for protection at night; 0 pads per day (PPD). 1pad at night. Bowel: Frequency daily at least.  Can be up to 4x. Pt has diarrhea dominant IBS. Dylan Jalloh Positive for none. Negative for pain with bowel movement, pushing/straining with bowel movement, fecal incontinence, constipation and incomplete emptying  Use of stool softeners or laxatives? NO but does take a med to bulk stool and decrease diarrhea. Fluid intake: 3-4 glasses water/day; bladder irritants include: juices, gatorade, iced tea, 1 cup coffee. . Pt does not limit fluid intake due to bladder control. Sexual: Pt is not sexually active. Has had trouble with orgasms for years. Feels one side may be tighter and more tender than the other.      Pelvic Organ Prolapse/Pelvic Pain: Location: denies pain   OB History: Pregnancies:  2,  Deliveries: 2 vaginal with episiotomies  Past Medical History/Comorbidities:   Ms. Bulmaro Garces  has a past medical history of Acute kidney insufficiency, Adverse effect of anesthesia, Allergic rhinitis, Anemia, Anxiety and depression, Edema, GERD (gastroesophageal reflux disease), History of chicken pox, History of Clostridioides difficile infection, History of DVT (deep vein thrombosis), HTN (hypertension), Hyperlipidemia, Hyperthyroidism, Hypothyroidism, IBS (irritable bowel syndrome), Insomnia, Morbid obesity ([address] Murmur, Overactive bladder, PAD (peripheral artery disease) ([address] RLS (restless legs syndrome), SOBOE (shortness of breath on exertion), Special examinations, MALOU (stress urinary incontinence, female), and Thromboembolus ([address] (2015). Ms. Niecy Buitrago  has a past surgical history that includes hx meniscus repair (Left); hx tonsil and adenoidectomy (~1950); hx shoulder arthroscopy (Right, ~1966, ~2013); hx knee replacement (Right, 2014); hx cholecystectomy (12/2016); hx total abdominal hysterectomy (~1970); hx appendectomy (1955); ir gastric band adj w/ fluoro (~2014); hx gyn (12/03/2019); hx other surgical (Right, 2016); hx other surgical (Left, ~2014); hx other surgical (~1986); hx meniscus repair (Left, 2014); hx colonoscopy; hx orthopaedic (Left); hx other surgical (12/03/2019); and hx orthopaedic (Left, 01/2020). Social History/Living Environment:     independent  Prior Level of Function/Work/Activity:  Works at [employer] part time. Self check out  Previous Treatment Approaches:          Anti-cholinergics. Personal Factors:          Sex:  female        Age:  77 y.o. Prior surgeries:  Mid urethral sling 2019. Long history of UI  Outcome measure:  PFIQ-7   Current:  24/24/24   Ambulatory/Rehab Services H2 Model Falls Risk Assessment   Risk Factors:       No Risk Factors Identified Ability to Rise from Chair:       (0)  Ability to rise in a single movement   Falls Prevention Plan:       No modifications necessary   Total: (5 or greater = High Risk): 0   ©2010 [facility] of [employer]. All Rights Reserved. [facility] States Patent #[id number].  Federal Law prohibits the replication, distribution or use without written permission from [facility] [employer]   Current Medications:       Current Outpatient Medications:     clonazePAM (KlonoPIN) 1 mg tablet, Take 1 Tab by mouth two (2) times a day. Max Daily Amount: 2 mg., Disp: 60 Tab, Rfl: 0    hyoscyamine sulfate (CYSTOSPAZ) 0.125 mg tablet, Take 1 Tab by mouth every four (4) hours as needed for Cramping or Nausea. Indications: irritable colon, Disp: 60 Tab, Rfl: 1    tolterodine ER (DETROL LA) 4 mg ER capsule, Take 1 Cap by mouth daily. , Disp: 30 Cap, Rfl: 5    nystatin (MYCOSTATIN) powder, Apply  to affected area three (3) times daily as needed (rash). , Disp: 60 g, Rfl: 2    montelukast (SINGULAIR) 10 mg tablet, Take 1 Tab by mouth daily. , Disp: 90 Tab, Rfl: 3    lisinopril-hydroCHLOROthiazide (PRINZIDE, ZESTORETIC) 10-12.5 mg per tablet, Take 1 Tab by mouth daily. , Disp: 90 Tab, Rfl: 0    hydrOXYzine HCL (ATARAX) 25 mg tablet, Take  by mouth three (3) times daily as needed. , Disp: , Rfl:     colestipoL (COLESTID) 1 gram tablet, Take 2 Tabs by mouth two (2) times a day., Disp: 120 Tab, Rfl: 3    clotrimazole-betamethasone (LOTRISONE) topical cream, Apply  to affected area two (2) times a day. Until rash resolves, Disp: 45 g, Rfl: 2    levothyroxine (SYNTHROID) 125 mcg tablet, Take one tab daily and 1.5 tabs on Sunday, TDD 134mcg, Disp: 30 Tab, Rfl: 11    ondansetron hcl (ZOFRAN) 4 mg tablet, Take 1 Tab by mouth every eight (8) hours as needed for Nausea., Disp: 90 Tab, Rfl: 2    pramipexole (Mirapex) 0.5 mg tablet, Take 1 Tab by mouth daily. , Disp: 90 Tab, Rfl: 0    escitalopram oxalate (LEXAPRO) 20 mg tablet, Take 1.5 Tabs by mouth daily. , Disp: 135 Tab, Rfl: 0    cetirizine (ZYRTEC) 10 mg tablet, Take 1 Tab by mouth daily. , Disp: 90 Tab, Rfl: 1    HYDROcodone-acetaminophen (NORCO) 5-325 mg per tablet, TAKE 1 TABLET BY MOUTH ONCE DAILY AS NEEDED FOR PAIN, Disp: , Rfl:     albuterol (PROVENTIL HFA, VENTOLIN HFA, PROAIR HFA) 90 mcg/actuation inhaler, Take 1-2 Puffs by inhalation every six (6) hours as needed for Wheezing., Disp: 1 Inhaler, Rfl: 2    fluticasone propionate (FLONASE) 50 mcg/actuation nasal spray, 2 Sprays by Both Nostrils route daily. , Disp: , Rfl:    Date Last Reviewed: 10/09/20    Number of Personal Factors/Comorbidities that affect the Plan of Care: 1-2: MODERATE COMPLEXITY   EXAMINATION:  Updated 10/9/20   RANGE OF MOTION / MOBILITY:  Decreased 50% throughout spine  Decreased 50% bilateral knee flexion  Hip strength grossly 4/5  Abdominal strength 2/5  External Observations:   Voluntary contraction: [] absent     [x] present   Involuntary contraction: [] absent     [] present   Voluntary relaxation: [] absent     [] present   [x] delayed   Perineal descent with bearing down: [] absent     [] Present  [x] Unable to bear down   Skin integrity: some breakdown noted at skin folds of abdomen and torso d/t increased tissue. Vulvar region normal   Postural Assessment: Forward head and shoulders. Posteriorly tucked pelvis    Pelvic Floor Muscle (PFM) Assessment:   Vaginal vault size: [] decreased     [] increased     [x] WNL   PFM tone: [] decreased     [x] increased     [] WNL   Voluntary contraction: [] absent     [x] weak     [] moderate      [] Strong   Voluntary relaxation: [] absent     [x] partial     [] complete   [x] delayed   Symmetry of contraction:  normal    [place] PERF Score:  Via: vaginal canal        Strength: P: Power, E: Endurance, R: Repetitions, QF: Quick Flicks, TrA: Transverse Abdominus, T: Timing, DB: Diaphragmatic Breathing  P  2+   E 3-4  Unable to coordinate with breath or maintain breath   R 4   QF            Coordination:   Diaphragmatic Breathing:  Able to breathe some through her diaphragm but increased chest compensation.  Contract-relax-bulge:   Poor -  Delayed relaxation and incomplete. Able to feel bearing down in abdomen but paradoxical contraction felt in PFM   Use of TA:  nt    Palpation: via vaginal canal  Superficial Pelvic Floor Musculature (PFM): Tender?  Intermediate PFM Tender? Deep PFM Tender? Superficial Transverse Perineal [x] Right  [x] Left  []N/A Deep Transverse Perineal [x] Right  [x] Left  []N/A Puborectalis [x] Right  [x] Left  []N/A   Ischiocavernosus [x] Right  [x] Left  []N/A Compressor Urethra [] Right  [] Left  []N/A Pubococcygeus [x] Right  [x] Left  []N/A   Bulbocavernosus [] Right  [] Left  []N/A   Ileococcygeus [x] Right  [x] Left  []N/A       Obturator Internus [x] Right  [x] Left  []N/A   Internal endopelvic fascia Tender R>L   Coccygeus [x] Right  [x] Left  []N/A                                                                                                                                           **6/15/20 tenderness throughout L>>R                                                                                                                                          ** 10/9/20 tenderness throughout R>L    External palpation:  Muscle/muscle group Tender? Adductors [x] Right  [x] Left  []N/A   Gluteals [x] Right  [x] Left  []N/A   Piriformis [] Right  [] Left  []N/A   Iliopsoas [x] Right  [x] Left  []N/A   Abdominal wall [x] Right  [x] Left  []N/A   Other:    Very sensitive adductors and abdominals. Significant abdominal restrictions noted throughout especially mid-upper. Special Tests:   Q-tip test: 6/15: 6/10 pain 2:00, 3:00, 4:00, 4/10 pain 8:00, 9:00, 10:00  Negative q-tip test 10/9   Supine cough test: nt   ASLR:  Decreased stability noted bilat   Tissue support test with bearing down (Grade 0: absent; Grade 1: >1cm above hymen; Grade 2: 1cm above or below hymen; Grade 3: >1cm below hymen):  · Anterior Wall = nt   · Posterior Wall = nt   · Apical = nt      Body Structures Involved:  1. Nerves  2. Muscles  3. Ligaments Body Functions Affected:  1. Mental  2. Sensory/Pain  3. Genitourinary  4. Neuromusculoskeletal  5. Movement Related Activities and Participation Affected:  1. Learning and Applying Knowledge  2.  General Tasks and Demands  3. Mobility  4. Self Care  5.  Interpersonal Interactions and Relationships   Number of elements (examined above) that affect the Plan of Care: 3: MODERATE COMPLEXITY   CLINICAL PRESENTATION:   Presentation: Stable and uncomplicated: LOW COMPLEXITY   CLINICAL DECISION MAKING:   Use of outcome tool(s) and clinical judgement create a POC that gives a: Questionable prediction of patient's progress: MODERATE COMPLEXITY

## 2020-10-19 PROBLEM — E53.8 VITAMIN B12 DEFICIENCY: Chronic | Status: ACTIVE | Noted: 2020-09-14

## 2020-10-30 ENCOUNTER — HOSPITAL ENCOUNTER (OUTPATIENT)
Dept: PHYSICAL THERAPY | Age: 75
Discharge: HOME | End: 2020-10-30
Attending: OBSTETRICS & GYNECOLOGY

## 2020-10-30 NOTE — PROGRESS NOTES
Mohamud Ivory  : 1945  [place]  Secondary:  [facility]rapy Center at [place] [address] [place] [address], [address] [facility]  [phone]   [phone]          Phone: [phone]   Fax: [phone]    Pt cancelled today's physical therapy appointment stating she would be late anyway. Plan to follow up at next scheduled visit.     Nichelle Riley, MPT

## 2020-11-13 NOTE — THERAPY DISCHARGE
Melvin Collazo : 1945 Payor: [place] Marcel[place] / Plan: [place]O / Product Type: Managed Care Medicare /  [address] [place] [address]  at [facility] 
[address], [address] [place], [address] [phone]   [phone] OUTPATIENT PHYSICAL THERAPY:Discontinuation Summary 2020 ICD-10: Treatment Diagnosis: difficulty walking R 26.2, pain in right limb M79.604, pain in left limb M79.605 Precautions/Allergies:  
Fire ant; Amoxicillin; Betadine [povidone-iodine]; Cephalexin; Demerol [meperidine]; Hydromorphone; Morphine; Percocet [oxycodone-acetaminophen]; and Percodan [oxycodone hcl-oxycodone-asa] TREATMENT PLAN: 
Effective Dates: 2020 TO 2020 (90 days). Frequency/Duration: 1 time a week for 90 Day(s) and upon reassessment, will adjust frequency and duration as progress indicates. MEDICAL/REFERRING DIAGNOSIS: 
Gait disorder [R26.9] DATE OF ONSET: 2 years REFERRING PHYSICIAN: Belen Gaytan MD MD Orders: David Owens and treat Return MD Appointment: as needed INITIAL ASSESSMENT:  Ms. Yovana Garvey presents with increased bilateral leg and foot pain. She reports a 2 year history with this. She has increased difficulty with prolonged standing and walking. 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 She has several other comorbidities that also may be playing a part in her overall health and wellness. She would like to decrease her pain felt with standing and walking and be able to walk for longer periods of time. She also reports some mild numbness in the right thigh at times and has a history of lower back issues. She is seeing pain management and reported having several injections by them. She will benefit from therapy to work on addressing weakness and overall lack of endurance. Discontinuation Assessment 11/13/2020:  Zayra Levine has been seen in physical therapy from 5/21/2020 to 5/21/2020 for 1 visits. Pt was scheduled to return, but had to cancel and did not reschedule. The below goals were met prior to discontinuation. Some goals were not met due to pt's lack of return. Thank you for this referral.    
  
PROBLEM LIST (Impacting functional limitations): 1. Decreased Strength 2. Decreased ADL/Functional Activities 3. Decreased Ambulation Ability/Technique 4. Increased Pain 5. Decreased Activity Tolerance 6. Decreased Flexibility/Joint Mobility INTERVENTIONS PLANNED: (Treatment may consist of any combination of the following) 1. Gait Training 2. Home Exercise Program (HEP) 3. Manual Therapy 4. Therapeutic Exercise/Strengthening GOALS: (Goals have been discussed and agreed upon with patient.) Short-Term Functional Goals: Time Frame: 6 weeks 1. Establish independent HEP with no cuing.-met 2. Pt will be able to report ability to walk to back of [employer] without resting.-not met 3. Pt will be able to increase strength by 1/2 grade to negotiate steps and curbs with little to no difficulty. -not met Discharge Goals: Time Frame: 12 weeks (90 days) 1. Pt will be able to improve score on LEFS by at least 10 points for advanced ADL's.-not met 2. Pt will be able to report ability to complete one hour of work without increased pain. -not met 3. Pt will be able to run community errands totaling 45 minutes with little difficulty. -not met OUTCOME MEASURE:  
Tool Used: Lower Extremity Functional Scale (LEFS) Score:  Initial: 19/80 Most Recent: X/80 (Date: -- ) Interpretation of Score: 20 questions each scored on a 5 point scale with 0 representing \"extreme difficulty or unable to perform\" and 4 representing \"no difficulty\". The lower the score, the greater the functional disability. 80/80 represents no disability. Minimal detectable change is 9 points. Regarding Laurence Do's therapy, I certify that the treatment plan above will be carried out by a therapist or under their direction.  
Thank you for this referral, 
Sandra Villar, PT

## 2020-11-20 ENCOUNTER — HOSPITAL ENCOUNTER (OUTPATIENT)
Dept: PHYSICAL THERAPY | Age: 75
Discharge: HOME | End: 2020-11-20
Attending: OBSTETRICS & GYNECOLOGY
Payer: MEDICARE

## 2020-11-20 PROCEDURE — 97110 THERAPEUTIC EXERCISES: CPT

## 2020-11-20 PROCEDURE — 97140 MANUAL THERAPY 1/> REGIONS: CPT

## 2020-11-20 NOTE — PROGRESS NOTES
Charles Frank  : 1945  Primary: [place]  Secondary:  [facility] at [address] [place] [address], [address] [place] [address],[address], [place] [address].  [phone]   [phone]        OUTPATIENT PHYSICAL THERAPY: Daily Treatment Note  Visit Count:  3    Myalgia (M79.1), Other specified soft tissue disorders (M79.89), Other lack of coordination (R27.8), Mixed incontinence (N39.46)    MEDICAL/REFERRING DIAGNOSIS:  pelvic   REFERRING PHYSICIAN: Fátima Walsh DO  Pre-treatment Symptoms/Complaints:    Started new meds with Rinko - Detrol. Was leaking a lot at night in the bed and not experiencing as much of that. Still getting up 2x/night and doesn't have much time to make it to the bathroom  Still MALOU with coughing  Continue with LUQ pain - ttp. Having EGD 10/8    Pain: Initial: 0/10 Post Session:  0/10   Medications Last Reviewed:  20   Pertinent Evaluation information:    MALOU with coughing;  UI at night with no knowledge of feeling this  ULQ pain - abdominal restrictions  IBS - diarrhea dominant    Updated objective findings:  4-/5 bilat hip abd/ ER  2/5 abdominals     TREATMENT:   THERAPEUTIC EXERCISE: ( 25 minutes):  Exercises per grid below to improve strength and coordination. Required minimal verbal and tactile cues to promote proper body mechanics and promote proper body breathing techniques. Progressed resistance and repetitions as indicated.     Date:  20    Activity/Exercise Parameters   Diaphragmatic breathing With tactile feedback   PFM drops With tactile feedback   stretches Supine butterfly, happy baby 1 min each               Pt ed HEP 5 min of breathing and drops BID, stretches BID 1 min 2 reps, 2-3 breaths often     MANUAL THERAPY: ( 30 minutes):  to reduce tone and improve tissue elasticity   MFR mid abdominal restrictions, light cupping with silicone dups  Introital lengthening at inhale- held  STM to bilateral adductors, ischiopubic rami, pudendal path, external OI  (Used abbreviations: MET - muscle energy technique; SCS- Strain counter strain; CTM- Connective tissue mobilizations; CR- Contract/relax; SP- Sustained pressure, TrP- Trigger point release, IASTM- Instrument assisted soft tissue mobilizations, TDN- Trigger point dry needling, DB - diaphragmatic breathing, PFM - pelvic floor muscles, OI - obturator internus, IC - iliococcygeus, PC - pubococcygeus, DTP - deep transverse perineum. Treatment/Session Summary:    · Response to Treatment:  able to obtain small drop with practice by end of session today. significant weakness in hips noted and abdominals today.     · Communication/Consultation:  None today  · Equipment provided today:  None today  · Recommendations/Intent for next treatment session: Next visit will focus on MT, hip strengthening  Treatment Plan of Care Effective Dates:  3/24/20 to 1/7/2021 (pending signature)   Total Treatment Billable Duration: 55 minutes  PT Patient Time In/Time Out  Time In: 1500  Time Out: Claire 99, PT    Future Appointments   Date Time Provider Kelsey Scott   12/14/2020 10:30 AM IMD NURSE ONLY SSA IMD IMD   12/16/2020  8:15 AM Yenifer Ortiz DO BSUG BSUG   1/15/2021  8:40 AM IMD LAB SSA IMD IMD   1/22/2021 11:30 AM Kade Adames NP SSA IMD IMD   2/12/2021 10:30 AM SFE US LOGIC 7 SFERUS SFE   3/12/2021 11:00 AM Rachel Gordillo NP PGUMAU PGU

## 2020-12-08 ENCOUNTER — HOSPITAL ENCOUNTER (OUTPATIENT)
Dept: LAB | Age: 75
Discharge: HOME | End: 2020-12-08

## 2020-12-08 PROCEDURE — 88312 SPECIAL STAINS GROUP 1: CPT

## 2020-12-08 PROCEDURE — 88305 TISSUE EXAM BY PATHOLOGIST: CPT

## 2020-12-11 ENCOUNTER — APPOINTMENT (OUTPATIENT)
Dept: PHYSICAL THERAPY | Age: 75
End: 2020-12-11
Attending: OBSTETRICS & GYNECOLOGY

## 2020-12-21 ENCOUNTER — HOSPITAL ENCOUNTER (OUTPATIENT)
Dept: PHYSICAL THERAPY | Age: 75
Discharge: HOME | End: 2020-12-21
Attending: OBSTETRICS & GYNECOLOGY

## 2020-12-21 NOTE — PROGRESS NOTES
Liset Milan  : 1945  [place]  Secondary:  [facility] at [place] [address] [place] [address],[address], [place] [address].  [phone]   [phone]          Phone: [phone]   Fax: [phone]    Pt cancelled today's physical therapy appointment reason unknown. Plan to follow up at next scheduled visit.     Alfonso Garrett, MPT

## 2021-01-04 ENCOUNTER — TRANSCRIBE ORDER (OUTPATIENT)
Dept: SCHEDULING | Age: 76
End: 2021-01-04

## 2021-01-04 DIAGNOSIS — R10.12 LUQ PAIN: Primary | ICD-10-CM

## 2021-01-08 ENCOUNTER — HOSPITAL ENCOUNTER (OUTPATIENT)
Dept: CT IMAGING | Age: 76
Discharge: HOME | End: 2021-01-08
Attending: INTERNAL MEDICINE
Payer: MEDICARE

## 2021-01-08 DIAGNOSIS — R10.12 LUQ PAIN: ICD-10-CM

## 2021-01-08 LAB — CREAT BLD-MCNC: 1.4 MG/DL (ref 0.8–1.5)

## 2021-01-08 PROCEDURE — 74160 CT ABDOMEN W/CONTRAST: CPT

## 2021-01-08 PROCEDURE — 82565 ASSAY OF CREATININE: CPT

## 2021-01-08 PROCEDURE — 74011000258 HC RX REV CODE- 258: Performed by: INTERNAL MEDICINE

## 2021-01-08 PROCEDURE — 74011000636 HC RX REV CODE- 636: Performed by: INTERNAL MEDICINE

## 2021-01-08 RX ORDER — SODIUM CHLORIDE 0.9 % (FLUSH) 0.9 %
10 SYRINGE (ML) INJECTION
Status: COMPLETED | OUTPATIENT
Start: 2021-01-08 | End: 2021-01-08

## 2021-01-08 RX ADMIN — DIATRIZOATE MEGLUMINE AND DIATRIZOATE SODIUM 15 ML: 660; 100 LIQUID ORAL; RECTAL at 10:50

## 2021-01-08 RX ADMIN — Medication 10 ML: at 10:50

## 2021-01-08 RX ADMIN — SODIUM CHLORIDE 100 ML: 900 INJECTION, SOLUTION INTRAVENOUS at 10:50

## 2021-01-08 RX ADMIN — IOPAMIDOL 100 ML: 755 INJECTION, SOLUTION INTRAVENOUS at 10:50

## 2021-02-12 ENCOUNTER — HOSPITAL ENCOUNTER (OUTPATIENT)
Dept: ULTRASOUND IMAGING | Age: 76
Discharge: HOME | End: 2021-02-12
Attending: UROLOGY
Payer: MEDICARE

## 2021-02-12 DIAGNOSIS — N28.1 RENAL CYST: ICD-10-CM

## 2021-02-12 PROCEDURE — 76770 US EXAM ABDO BACK WALL COMP: CPT

## 2021-03-05 ENCOUNTER — HOSPITAL ENCOUNTER (OUTPATIENT)
Dept: CT IMAGING | Age: 76
Discharge: HOME | End: 2021-03-05
Attending: NURSE PRACTITIONER
Payer: MEDICARE

## 2021-03-05 DIAGNOSIS — R09.82 POST-NASAL DRAINAGE: ICD-10-CM

## 2021-03-05 DIAGNOSIS — J32.9 CHRONIC SINUSITIS, UNSPECIFIED LOCATION: ICD-10-CM

## 2021-03-05 PROCEDURE — 70486 CT MAXILLOFACIAL W/O DYE: CPT

## 2021-03-09 PROBLEM — M17.12 ARTHRITIS OF LEFT KNEE: Status: ACTIVE | Noted: 2021-03-09

## 2021-03-09 PROBLEM — Z96.651 HX OF TOTAL KNEE REPLACEMENT, RIGHT: Status: ACTIVE | Noted: 2021-03-09

## 2021-03-09 PROBLEM — M70.50 ANSERINE BURSITIS: Status: ACTIVE | Noted: 2021-03-09

## 2021-03-12 ENCOUNTER — HOSPITAL ENCOUNTER (OUTPATIENT)
Dept: ULTRASOUND IMAGING | Age: 76
Discharge: HOME | End: 2021-03-12
Attending: PHYSICIAN ASSISTANT
Payer: MEDICARE

## 2021-03-12 DIAGNOSIS — E03.9 PRIMARY HYPOTHYROIDISM: ICD-10-CM

## 2021-03-12 PROCEDURE — 76536 US EXAM OF HEAD AND NECK: CPT

## 2021-03-15 NOTE — PROGRESS NOTES
No distinct nodules or indication that thyroid is contributory to patient's swallowing issues.  Please follow up with ENT as planned

## 2021-03-27 ENCOUNTER — APPOINTMENT (OUTPATIENT)
Dept: GENERAL RADIOLOGY | Age: 76
DRG: 392 | End: 2021-03-27
Attending: STUDENT IN AN ORGANIZED HEALTH CARE EDUCATION/TRAINING PROGRAM
Payer: MEDICARE

## 2021-03-27 ENCOUNTER — APPOINTMENT (OUTPATIENT)
Dept: CT IMAGING | Age: 76
DRG: 392 | End: 2021-03-27
Attending: STUDENT IN AN ORGANIZED HEALTH CARE EDUCATION/TRAINING PROGRAM
Payer: MEDICARE

## 2021-03-27 ENCOUNTER — HOSPITAL ENCOUNTER (INPATIENT)
Age: 76
LOS: 2 days | Discharge: HOME | DRG: 392 | End: 2021-03-29
Attending: STUDENT IN AN ORGANIZED HEALTH CARE EDUCATION/TRAINING PROGRAM | Admitting: INTERNAL MEDICINE
Payer: MEDICARE

## 2021-03-27 DIAGNOSIS — K52.9 COLITIS, ACUTE: Primary | ICD-10-CM

## 2021-03-27 PROBLEM — K92.1 HEMATOCHEZIA: Status: ACTIVE | Noted: 2021-03-27

## 2021-03-27 PROBLEM — D72.829 LEUKOCYTOSIS: Status: ACTIVE | Noted: 2021-03-27

## 2021-03-27 PROBLEM — Z86.19 HISTORY OF CLOSTRIDIOIDES DIFFICILE COLITIS: Status: ACTIVE | Noted: 2021-03-27

## 2021-03-27 LAB
ABO + RH BLD: NORMAL
ALBUMIN SERPL-MCNC: 3.3 G/DL (ref 3.2–4.6)
ALBUMIN/GLOB SERPL: 0.8 {RATIO} (ref 1.2–3.5)
ALP SERPL-CCNC: 92 U/L (ref 50–130)
ALT SERPL-CCNC: 37 U/L (ref 12–65)
ANION GAP SERPL CALC-SCNC: 2 MMOL/L (ref 7–16)
ANION GAP SERPL CALC-SCNC: 4 MMOL/L (ref 7–16)
AST SERPL-CCNC: 26 U/L (ref 15–37)
BASOPHILS # BLD: 0.1 K/UL (ref 0–0.2)
BASOPHILS NFR BLD: 0 % (ref 0–2)
BILIRUB SERPL-MCNC: 0.4 MG/DL (ref 0.2–1.1)
BLOOD GROUP ANTIBODIES SERPL: NORMAL
BUN SERPL-MCNC: 33 MG/DL (ref 8–23)
BUN SERPL-MCNC: 36 MG/DL (ref 8–23)
CALCIUM SERPL-MCNC: 8.3 MG/DL (ref 8.3–10.4)
CALCIUM SERPL-MCNC: 8.4 MG/DL (ref 8.3–10.4)
CHLORIDE SERPL-SCNC: 104 MMOL/L (ref 98–107)
CHLORIDE SERPL-SCNC: 107 MMOL/L (ref 98–107)
CO2 SERPL-SCNC: 30 MMOL/L (ref 21–32)
CO2 SERPL-SCNC: 30 MMOL/L (ref 21–32)
CREAT SERPL-MCNC: 1.49 MG/DL (ref 0.6–1)
CREAT SERPL-MCNC: 1.64 MG/DL (ref 0.6–1)
DIFFERENTIAL METHOD BLD: ABNORMAL
EOSINOPHIL # BLD: 0.3 K/UL (ref 0–0.8)
EOSINOPHIL NFR BLD: 1 % (ref 0.5–7.8)
ERYTHROCYTE [DISTWIDTH] IN BLOOD BY AUTOMATED COUNT: 18.4 % (ref 11.9–14.6)
GLOBULIN SER CALC-MCNC: 3.9 G/DL (ref 2.3–3.5)
GLUCOSE SERPL-MCNC: 114 MG/DL (ref 65–100)
GLUCOSE SERPL-MCNC: 86 MG/DL (ref 65–100)
HCT VFR BLD AUTO: 35.5 % (ref 35.8–46.3)
HGB BLD-MCNC: 11.4 G/DL (ref 11.7–15.4)
IMM GRANULOCYTES # BLD AUTO: 0.2 K/UL (ref 0–0.5)
IMM GRANULOCYTES NFR BLD AUTO: 1 % (ref 0–5)
INR PPP: 1
LACTATE SERPL-SCNC: 0.5 MMOL/L (ref 0.4–2)
LIPASE SERPL-CCNC: 84 U/L (ref 73–393)
LYMPHOCYTES # BLD: 2.8 K/UL (ref 0.5–4.6)
LYMPHOCYTES NFR BLD: 13 % (ref 13–44)
MCH RBC QN AUTO: 28.6 PG (ref 26.1–32.9)
MCHC RBC AUTO-ENTMCNC: 32.1 G/DL (ref 31.4–35)
MCV RBC AUTO: 89 FL (ref 79.6–97.8)
MONOCYTES # BLD: 0.9 K/UL (ref 0.1–1.3)
MONOCYTES NFR BLD: 4 % (ref 4–12)
NEUTS SEG # BLD: 17.6 K/UL (ref 1.7–8.2)
NEUTS SEG NFR BLD: 80 % (ref 43–78)
NRBC # BLD: 0 K/UL (ref 0–0.2)
PLATELET # BLD AUTO: 223 K/UL (ref 150–450)
PMV BLD AUTO: 11.1 FL (ref 9.4–12.3)
POTASSIUM SERPL-SCNC: 4.4 MMOL/L (ref 3.5–5.1)
POTASSIUM SERPL-SCNC: 4.6 MMOL/L (ref 3.5–5.1)
PROT SERPL-MCNC: 7.2 G/DL (ref 6.3–8.2)
PROTHROMBIN TIME: 13.8 SEC (ref 12.5–14.7)
RBC # BLD AUTO: 3.99 M/UL (ref 4.05–5.2)
SODIUM SERPL-SCNC: 138 MMOL/L (ref 136–145)
SODIUM SERPL-SCNC: 139 MMOL/L (ref 136–145)
SPECIMEN EXP DATE BLD: NORMAL
WBC # BLD AUTO: 21.8 K/UL (ref 4.3–11.1)

## 2021-03-27 PROCEDURE — 96375 TX/PRO/DX INJ NEW DRUG ADDON: CPT

## 2021-03-27 PROCEDURE — 36415 COLL VENOUS BLD VENIPUNCTURE: CPT

## 2021-03-27 PROCEDURE — 87040 BLOOD CULTURE FOR BACTERIA: CPT

## 2021-03-27 PROCEDURE — 74011250636 HC RX REV CODE- 250/636: Performed by: INTERNAL MEDICINE

## 2021-03-27 PROCEDURE — 83690 ASSAY OF LIPASE: CPT

## 2021-03-27 PROCEDURE — 74011000258 HC RX REV CODE- 258: Performed by: INTERNAL MEDICINE

## 2021-03-27 PROCEDURE — 96366 THER/PROPH/DIAG IV INF ADDON: CPT

## 2021-03-27 PROCEDURE — 74011000636 HC RX REV CODE- 636: Performed by: STUDENT IN AN ORGANIZED HEALTH CARE EDUCATION/TRAINING PROGRAM

## 2021-03-27 PROCEDURE — 80053 COMPREHEN METABOLIC PANEL: CPT

## 2021-03-27 PROCEDURE — 74177 CT ABD & PELVIS W/CONTRAST: CPT

## 2021-03-27 PROCEDURE — 83605 ASSAY OF LACTIC ACID: CPT

## 2021-03-27 PROCEDURE — 96365 THER/PROPH/DIAG IV INF INIT: CPT

## 2021-03-27 PROCEDURE — 99284 EMERGENCY DEPT VISIT MOD MDM: CPT

## 2021-03-27 PROCEDURE — 85025 COMPLETE CBC W/AUTO DIFF WBC: CPT

## 2021-03-27 PROCEDURE — 74011250637 HC RX REV CODE- 250/637: Performed by: INTERNAL MEDICINE

## 2021-03-27 PROCEDURE — 85610 PROTHROMBIN TIME: CPT

## 2021-03-27 PROCEDURE — 65270000029 HC RM PRIVATE

## 2021-03-27 PROCEDURE — 86901 BLOOD TYPING SEROLOGIC RH(D): CPT

## 2021-03-27 PROCEDURE — 74011250636 HC RX REV CODE- 250/636: Performed by: STUDENT IN AN ORGANIZED HEALTH CARE EDUCATION/TRAINING PROGRAM

## 2021-03-27 PROCEDURE — 74018 RADEX ABDOMEN 1 VIEW: CPT

## 2021-03-27 PROCEDURE — 74011000258 HC RX REV CODE- 258: Performed by: STUDENT IN AN ORGANIZED HEALTH CARE EDUCATION/TRAINING PROGRAM

## 2021-03-27 PROCEDURE — 2709999900 HC NON-CHARGEABLE SUPPLY

## 2021-03-27 RX ORDER — ACETAMINOPHEN 650 MG/1
650 SUPPOSITORY RECTAL
Status: DISCONTINUED | OUTPATIENT
Start: 2021-03-27 | End: 2021-03-29 | Stop reason: HOSPADM

## 2021-03-27 RX ORDER — ONDANSETRON 2 MG/ML
4 INJECTION INTRAMUSCULAR; INTRAVENOUS
Status: COMPLETED | OUTPATIENT
Start: 2021-03-27 | End: 2021-03-27

## 2021-03-27 RX ORDER — SODIUM CHLORIDE 9 MG/ML
100 INJECTION, SOLUTION INTRAVENOUS CONTINUOUS
Status: DISCONTINUED | OUTPATIENT
Start: 2021-03-27 | End: 2021-03-29

## 2021-03-27 RX ORDER — FLUTICASONE PROPIONATE 50 MCG
2 SPRAY, SUSPENSION (ML) NASAL
Status: DISCONTINUED | OUTPATIENT
Start: 2021-03-27 | End: 2021-03-29 | Stop reason: HOSPADM

## 2021-03-27 RX ORDER — POLYETHYLENE GLYCOL 3350 17 G/17G
17 POWDER, FOR SOLUTION ORAL DAILY PRN
Status: DISCONTINUED | OUTPATIENT
Start: 2021-03-27 | End: 2021-03-29 | Stop reason: HOSPADM

## 2021-03-27 RX ORDER — SODIUM CHLORIDE 0.9 % (FLUSH) 0.9 %
10 SYRINGE (ML) INJECTION
Status: COMPLETED | OUTPATIENT
Start: 2021-03-27 | End: 2021-03-27

## 2021-03-27 RX ORDER — MONTELUKAST SODIUM 10 MG/1
10 TABLET ORAL DAILY
Status: DISCONTINUED | OUTPATIENT
Start: 2021-03-28 | End: 2021-03-29 | Stop reason: HOSPADM

## 2021-03-27 RX ORDER — ONDANSETRON 2 MG/ML
4 INJECTION INTRAMUSCULAR; INTRAVENOUS
Status: DISCONTINUED | OUTPATIENT
Start: 2021-03-27 | End: 2021-03-29 | Stop reason: HOSPADM

## 2021-03-27 RX ORDER — METRONIDAZOLE 500 MG/100ML
500 INJECTION, SOLUTION INTRAVENOUS EVERY 8 HOURS
Status: DISCONTINUED | OUTPATIENT
Start: 2021-03-27 | End: 2021-03-29

## 2021-03-27 RX ORDER — PANTOPRAZOLE SODIUM 40 MG/1
40 TABLET, DELAYED RELEASE ORAL DAILY
Status: DISCONTINUED | OUTPATIENT
Start: 2021-03-28 | End: 2021-03-29 | Stop reason: HOSPADM

## 2021-03-27 RX ORDER — MONTELUKAST SODIUM 4 MG/1
1 TABLET, CHEWABLE ORAL 2 TIMES DAILY
Status: DISCONTINUED | OUTPATIENT
Start: 2021-03-27 | End: 2021-03-29 | Stop reason: HOSPADM

## 2021-03-27 RX ORDER — ACETAMINOPHEN 325 MG/1
650 TABLET ORAL
Status: DISCONTINUED | OUTPATIENT
Start: 2021-03-27 | End: 2021-03-29 | Stop reason: HOSPADM

## 2021-03-27 RX ORDER — FENTANYL CITRATE 50 UG/ML
50 INJECTION, SOLUTION INTRAMUSCULAR; INTRAVENOUS
Status: COMPLETED | OUTPATIENT
Start: 2021-03-27 | End: 2021-03-27

## 2021-03-27 RX ORDER — PROMETHAZINE HYDROCHLORIDE 25 MG/1
12.5 TABLET ORAL
Status: DISCONTINUED | OUTPATIENT
Start: 2021-03-27 | End: 2021-03-29 | Stop reason: HOSPADM

## 2021-03-27 RX ORDER — ESCITALOPRAM OXALATE 10 MG/1
20 TABLET ORAL DAILY
Status: DISCONTINUED | OUTPATIENT
Start: 2021-03-28 | End: 2021-03-29 | Stop reason: HOSPADM

## 2021-03-27 RX ORDER — NYSTATIN 100000 [USP'U]/G
POWDER TOPICAL
Status: DISCONTINUED | OUTPATIENT
Start: 2021-03-27 | End: 2021-03-29 | Stop reason: HOSPADM

## 2021-03-27 RX ORDER — PRAMIPEXOLE DIHYDROCHLORIDE 1 MG/1
0.5 TABLET ORAL DAILY
Status: DISCONTINUED | OUTPATIENT
Start: 2021-03-28 | End: 2021-03-28 | Stop reason: SDUPTHER

## 2021-03-27 RX ORDER — DICYCLOMINE HYDROCHLORIDE 10 MG/1
10 CAPSULE ORAL
Status: DISCONTINUED | OUTPATIENT
Start: 2021-03-27 | End: 2021-03-29 | Stop reason: HOSPADM

## 2021-03-27 RX ORDER — SODIUM CHLORIDE 0.9 % (FLUSH) 0.9 %
5-40 SYRINGE (ML) INJECTION EVERY 8 HOURS
Status: DISCONTINUED | OUTPATIENT
Start: 2021-03-27 | End: 2021-03-29 | Stop reason: HOSPADM

## 2021-03-27 RX ORDER — CLONAZEPAM 0.5 MG/1
1 TABLET ORAL 2 TIMES DAILY
Status: DISCONTINUED | OUTPATIENT
Start: 2021-03-27 | End: 2021-03-29 | Stop reason: HOSPADM

## 2021-03-27 RX ORDER — LISINOPRIL AND HYDROCHLOROTHIAZIDE 10; 12.5 MG/1; MG/1
1 TABLET ORAL DAILY
Status: DISCONTINUED | OUTPATIENT
Start: 2021-03-28 | End: 2021-03-29 | Stop reason: HOSPADM

## 2021-03-27 RX ORDER — SODIUM CHLORIDE 0.9 % (FLUSH) 0.9 %
5-40 SYRINGE (ML) INJECTION AS NEEDED
Status: DISCONTINUED | OUTPATIENT
Start: 2021-03-27 | End: 2021-03-29 | Stop reason: HOSPADM

## 2021-03-27 RX ORDER — LORATADINE 10 MG/1
10 TABLET ORAL DAILY
Status: DISCONTINUED | OUTPATIENT
Start: 2021-03-28 | End: 2021-03-29 | Stop reason: HOSPADM

## 2021-03-27 RX ADMIN — ONDANSETRON 4 MG: 2 INJECTION INTRAMUSCULAR; INTRAVENOUS at 20:26

## 2021-03-27 RX ADMIN — Medication 1 EACH: at 18:15

## 2021-03-27 RX ADMIN — CLONAZEPAM 1 MG: 0.5 TABLET ORAL at 17:53

## 2021-03-27 RX ADMIN — PIPERACILLIN SODIUM AND TAZOBACTAM SODIUM 4.5 G: 4; .5 INJECTION, POWDER, LYOPHILIZED, FOR SOLUTION INTRAVENOUS at 10:38

## 2021-03-27 RX ADMIN — SODIUM CHLORIDE 100 ML/HR: 900 INJECTION, SOLUTION INTRAVENOUS at 17:00

## 2021-03-27 RX ADMIN — CEFTRIAXONE 1 G: 1 INJECTION, POWDER, FOR SOLUTION INTRAMUSCULAR; INTRAVENOUS at 17:53

## 2021-03-27 RX ADMIN — IOPAMIDOL 100 ML: 755 INJECTION, SOLUTION INTRAVENOUS at 12:54

## 2021-03-27 RX ADMIN — Medication 10 ML: at 12:54

## 2021-03-27 RX ADMIN — Medication 10 ML: at 17:00

## 2021-03-27 RX ADMIN — SODIUM CHLORIDE 100 ML: 900 INJECTION, SOLUTION INTRAVENOUS at 12:54

## 2021-03-27 RX ADMIN — SODIUM CHLORIDE 1000 ML: 900 INJECTION, SOLUTION INTRAVENOUS at 12:10

## 2021-03-27 RX ADMIN — METRONIDAZOLE 500 MG: 500 INJECTION, SOLUTION INTRAVENOUS at 17:54

## 2021-03-27 RX ADMIN — FLUTICASONE PROPIONATE 2 SPRAY: 50 SPRAY, METERED NASAL at 20:32

## 2021-03-27 RX ADMIN — Medication 10 ML: at 20:32

## 2021-03-27 RX ADMIN — DICYCLOMINE HYDROCHLORIDE 10 MG: 10 CAPSULE ORAL at 18:15

## 2021-03-27 RX ADMIN — ONDANSETRON 4 MG: 2 INJECTION INTRAMUSCULAR; INTRAVENOUS at 14:32

## 2021-03-27 RX ADMIN — FENTANYL CITRATE 50 MCG: 50 INJECTION, SOLUTION INTRAMUSCULAR; INTRAVENOUS at 14:33

## 2021-03-27 NOTE — ED NOTES
TRANSFER - OUT REPORT:    Verbal report given to DAPHNE Rooney on Milka Turcios  being transferred to [address] [place] [address] for routine progression of care       Report consisted of patients Situation, Background, Assessment and   Recommendations(SBAR). Information from the following report(s) SBAR, ED Summary, Intake/Output, MAR and Cardiac Rhythm NSR was reviewed with the receiving nurse. Lines:   Peripheral IV 03/27/21 Left;Mid Forearm (Active)   Site Assessment Clean, dry, & intact 03/27/21 0849   Phlebitis Assessment 0 03/27/21 0849   Infiltration Assessment 0 03/27/21 0849   Dressing Status Clean, dry, & intact 03/27/21 0849   Hub Color/Line Status Yellow 03/27/21 0849   Alcohol Cap Used No 03/27/21 0849       Peripheral IV 03/27/21 Right Antecubital (Active)   Site Assessment Clean, dry, & intact 03/27/21 1031   Phlebitis Assessment 0 03/27/21 1031   Infiltration Assessment 0 03/27/21 1031   Dressing Status Clean, dry, & intact 03/27/21 1031   Hub Color/Line Status Pink 03/27/21 1031   Alcohol Cap Used No 03/27/21 1031        Opportunity for questions and clarification was provided.       Patient transported with:   Patient's medications from home  Tech

## 2021-03-27 NOTE — ED TRIAGE NOTES
Patient complains of rectal bleeding that started this past Thursday night. Pt with history of IBS. She reports feeling like she needed to have a bowel movement. She states that nothing would come out. She was eventually able to have a large bowel movement, and noticed that there was a small amount of blood in it. Yesterday morning, she had small \"rabit pellets\" come out. She states that they dissolved into blood. She has pictures to show.

## 2021-03-27 NOTE — ED PROVIDER NOTES
80-year-old female presents to the ER for rectal bleeding. Reports has been ongoing for the last few days. Patient does have history of IBS but denies history of rectal bleeding. She is not on blood thinners. Reports intermittent episodes abdominal cramping abdominal pain. States 2 days ago she had epigastric abdominal pain that resolved, currently complains of mild lower abdominal cramping bilaterally. Denies dysuria. Denies recent fever, chills, chest pain or shortness of breath. Denies vomiting, hematemesis. Reports earlier today with sensation of having a bowel movement, she went to the restroom and only produced blood as well as some clots. Family physician advised her to come to the ER to get checked out. Past Medical History:   Diagnosis Date    Acute kidney insufficiency     Followed by PCP     Adverse effect of anesthesia     2016 in [place] heart stopped at the end of my gallbladder surgery. \" Patient reports no CPR performed, stayed one night in the hospital. No problems with any other surgeries.     Allergic rhinitis     Anemia     history     Anxiety and depression     Controlled with medication     Edema     bilateral lower legs     GERD (gastroesophageal reflux disease)     Controlled with medication     History of chicken pox     History of Clostridioides difficile infection     History of DVT (deep vein thrombosis)     s/p knee replacement and developed right leg DVT     HTN (hypertension)     controlled with medication     Hyperlipidemia     controlled with medication     Hyperthyroidism     Hypothyroidism     controlled with medication     IBS (irritable bowel syndrome)     Insomnia     Morbid obesity (HCC)     BMI 42.85    Murmur     per PCP note (30/9/71) systolic murmur grade of 2/6    Overactive bladder     PAD (peripheral artery disease) ([facility] [address]     patient denies on 11/27/19    RLS (restless legs syndrome)     controlled with medication     SOBOE (shortness of breath on exertion)     PRN inhaler-patient reports she uses inhaler QHS    Special examinations     History-Neurologist - Dr. Rosi WESTFALL (stress urinary incontinence, female)     Thromboembolus [facility]) 2015    behind right knee after total knee replacement       Past Surgical History:   Procedure Laterality Date    HX APPENDECTOMY  1955    HX CHOLECYSTECTOMY  12/2016    HX COLONOSCOPY      HX GYN  12/03/2019    suburethral sling and cystoscopy    HX KNEE REPLACEMENT Right 2014    HX MENISCUS REPAIR Left     4/2016    HX MENISCUS REPAIR Left 2014    HX ORTHOPAEDIC Left     LEFT WRIST TRAPEZIOMETACARPAL ARTHROPLASTY WITH FCR TENDON TRANSFER- 2020    HX ORTHOPAEDIC Left 01/2020    left hand tendon surgery    HX OTHER SURGICAL Right 2016    Hand    HX OTHER SURGICAL Left ~2014    B/L Ulnar    HX OTHER SURGICAL  ~1986    urethraplasty    HX OTHER SURGICAL  12/03/2019    bladder surgery-put mesh in    HX SHOULDER ARTHROSCOPY Right ~1966, ~2013    x2    HX TONSIL AND ADENOIDECTOMY  ~1950    HX TOTAL ABDOMINAL HYSTERECTOMY  ~1970    IR GASTRIC BAND ADJ W/ FLUORO  ~2014    lab band removed ~ 2016         Family History:   Problem Relation Age of Onset    No Known Problems Mother     Other Brother         brain aneurysm, AVM    Heart Surgery Brother         CABGx4    Heart Disease Brother     Hypertension Brother     Other Father 54        brain aneurysm    Other Sister         fibromyalgia    Pneumonia Sister     Heart Attack Maternal Grandmother     Cancer Maternal Grandfather         skin    Heart Attack Paternal Grandfather     Congenital anomalies Sister         right arm deformity    Other Son         Hepatitis C    Drug Abuse Son     MS Daughter        Social History     Socioeconomic History    Marital status:      Spouse name: Not on file    Number of children: Not on file    Years of education: Not on file    Highest education level: Not on file Occupational History    Not on file   Social Needs    Financial resource strain: Not on file    Food insecurity     Worry: Not on file     Inability: Not on file    Transportation needs     Medical: Not on file     Non-medical: Not on file   Tobacco Use    Smoking status: Former Smoker     Types: Cigarettes     Quit date: 12/6/2005     Years since quitting: 15.3    Smokeless tobacco: Never Used   Substance and Sexual Activity    Alcohol use: Not Currently    Drug use: No    Sexual activity: Yes     Partners: Male   Lifestyle    Physical activity     Days per week: Not on file     Minutes per session: Not on file    Stress: Not on file   Relationships    Social connections     Talks on phone: Not on file     Gets together: Not on file     Attends Jewish service: Not on file     Active member of club or organization: Not on file     Attends meetings of clubs or organizations: Not on file     Relationship status: Not on file    Intimate partner violence     Fear of current or ex partner: Not on file     Emotionally abused: Not on file     Physically abused: Not on file     Forced sexual activity: Not on file   Other Topics Concern    Not on file   Social History Narrative    Not on file         ALLERGIES: Fire ant, Amoxicillin, Betadine [povidone-iodine], Cephalexin, Demerol [meperidine], Hydromorphone, Morphine, Percocet [oxycodone-acetaminophen], and Percodan [oxycodone hcl-oxycodone-asa]    Review of Systems   Constitutional: Negative for chills, fatigue and fever. HENT: Negative for facial swelling and sore throat. Eyes: Negative for visual disturbance. Respiratory: Negative for cough, chest tightness and shortness of breath. Cardiovascular: Negative for chest pain and palpitations. Gastrointestinal: Positive for abdominal pain. Negative for diarrhea, nausea and vomiting. Genitourinary: Negative for difficulty urinating, dysuria and flank pain.    Musculoskeletal: Negative for myalgias, neck pain and neck stiffness. Skin: Negative for color change. Neurological: Negative for dizziness, speech difficulty, weakness, numbness and headaches. Psychiatric/Behavioral: Negative for confusion. There were no vitals filed for this visit. Physical Exam  Vitals signs and nursing note reviewed. Exam conducted with a chaperone present. Constitutional:       Appearance: Normal appearance. She is not ill-appearing or toxic-appearing. HENT:      Head: Normocephalic and atraumatic. Nose: Nose normal.      Mouth/Throat:      Mouth: Mucous membranes are moist.   Eyes:      Extraocular Movements: Extraocular movements intact. Neck:      Musculoskeletal: Normal range of motion. No neck rigidity. Cardiovascular:      Rate and Rhythm: Normal rate and regular rhythm. Pulses: Normal pulses. Heart sounds: Normal heart sounds. Pulmonary:      Effort: Pulmonary effort is normal. No respiratory distress. Breath sounds: Normal breath sounds. Abdominal:      General: Abdomen is flat. There is no distension. Palpations: Abdomen is soft. Tenderness: There is abdominal tenderness. There is no guarding or rebound. Comments: Patient with diffuse lower abdominal tenderness to palpation, no rebound or guarding. Genitourinary:     Rectum: Guaiac result positive. Comments: Chaperoned by patient's nurse, rectal exam without significant abnormalities, patient did have blood as well as small blood clot within the rectal vault heme positive. Musculoskeletal: Normal range of motion. Skin:     General: Skin is warm and dry. Neurological:      General: No focal deficit present. Mental Status: She is alert and oriented to person, place, and time.    Psychiatric:         Mood and Affect: Mood normal.          MDM  Number of Diagnoses or Management Options  Colitis, acute  Diagnosis management comments: 67-year-old female presents the ER for rectal bleeding and lower abdominal pain. Vital signs stable. Will obtain labs and imaging. Lab results show white count 21.8, stable H&H, Normal platelets, normal electrolytes, creatinine 1.64, GFR 33 consistent with patient's history of chronic kidney disease normal liver enzymes, INR 1.0, lipase 84 lactic acid 0.5. Blood cultures were obtained. Patient given empiric Zosyn as well as limited bolus IV fluid. CT scan with IV contrast obtained after shared decision-making with patient on the potential risk of contrast-induced nephropathy. I did educate patient on the policy statement by the [employer] of radiology with [employer] and their recommendations involving patients GFR. Patient did receive IV fluid prior to receiving CT scan with IV contrast.  CT scan showed colitis involving an area from the splenic flexure as well as majority of the proximal sigmoid colon per radiology. I then spoke with the hospitalist who agreed to this patient for continued evaluation and treatment. Patient voiced understanding and agreement with this plan.          Amount and/or Complexity of Data Reviewed  Clinical lab tests: ordered and reviewed  Tests in the radiology section of CPT®: ordered and reviewed  Discussion of test results with the performing providers: yes  Discuss the patient with other providers: yes    Risk of Complications, Morbidity, and/or Mortality  Presenting problems: moderate  Diagnostic procedures: moderate  Management options: moderate           Procedures

## 2021-03-27 NOTE — H&P
[facility] H&P      Patient ID:  NAME:  Kaylynn Ortiz   Age/Sex: 77 y.o. female  :   1945   MRN:   [id number]    Admission Date: 3/27/2021  Chief Complaint: Bright red blood per rectum and cramping abdominal pain  Reason for Admission: Colitis    Assessment/Plan (MDM):     Principal Problem:    Colitis (3/27/2021)  - Cramping abdomina pain + hematochezia + WBC 21  - CT A/P with colitis from splenic flexure to distal sigmoid  - Given Zosyn in ER --> will switch to Ceftriaxone + Flagyl  - Start IVFs  - Check C. Diff  - Check stool WBC + culture  - Pain control    Active Problems:    Hematochezia (3/27/2021)  - 2 days of hematochezia  - Hgb stable  - Due to #1      Leukocytosis (3/27/2021)  - Due to #1  - Start Ceftriaxone + Flagyl  - Trend CBC      Anemia, iron deficiency (2017)  - Hgb appears stable  - May drop on IVFs  - Monitor stool output  - Trend Hgb      HTN (hypertension) (2017)   - Stable      GERD (gastroesophageal reflux disease) (10/9/2018)  - Continue PPI      Hypothyroidism (acquired) (2017)  - Continue Levothyroxine      IBS (irritable bowel syndrome) (2017)  - Never had colitis per patient      Gluten intolerance (2018)  - Gluten free diet      History of Clostridioides difficile colitis (3/27/2021)  - Check C. Diff    Disposition: Home in 2-3 days  Diet: Gluten Free  VTE ppx: SCDs  GI ppx: PPI  CODE STATUS: FULL CODE    PCP: Charles Garcia NP    Attending MD: Omar Peterson DO    Treatment Team: Attending Provider: Ele Salazar DO; Primary Nurse: Camden Miller RN    HPI:     Kaylynn Ortiz is a 77year old CF with a PMH of GERD, IBS, MARY, HTN, PAD, and fibromyalgia who presented to the ER on 3/27 with 2 days of cramping abdominal pain and bright red blood per rectum. She states that she was doing well until 3/25 when she started having left sided abdominal cramping. Shortly afterwards she had a bright red bloody BM. She then proceeded to have 4-5 bloody BMs a day for the next 2 days so she came to the ER to be evaluated. She denies abdominal pain currently. Denies F/C. Denies N/V. Denies CP/SOB. Complete ROS done and is as stated in HPI or otherwise negative    Past Medical History:   Diagnosis Date    Acute kidney insufficiency     Followed by PCP     Adverse effect of anesthesia     2016 in [place] heart stopped at the end of my gallbladder surgery. \" Patient reports no CPR performed, stayed one night in the hospital. No problems with any other surgeries.     Allergic rhinitis     Anemia     history     Anxiety and depression     Controlled with medication     Edema     bilateral lower legs     GERD (gastroesophageal reflux disease)     Controlled with medication     History of chicken pox     History of Clostridioides difficile infection     History of DVT (deep vein thrombosis)     s/p knee replacement and developed right leg DVT     HTN (hypertension)     controlled with medication     Hyperlipidemia     controlled with medication     Hyperthyroidism     Hypothyroidism     controlled with medication     IBS (irritable bowel syndrome)     Insomnia     Morbid obesity (HCC)     BMI 42.85    Murmur     per PCP note (18/1/99) systolic murmur grade of 2/6    Overactive bladder     PAD (peripheral artery disease) ([facility] [address]     patient denies on 11/27/19    RLS (restless legs syndrome)     controlled with medication     SOBOE (shortness of breath on exertion)     PRN inhaler-patient reports she uses inhaler QHS    Special examinations     History-Neurologist - Dr. Deepa WESTFALL (stress urinary incontinence, female)     Thromboembolus [facility]) 2015    behind right knee after total knee replacement        Past Surgical History:   Procedure Laterality Date    HX APPENDECTOMY  1955    HX CHOLECYSTECTOMY  12/2016    HX COLONOSCOPY      HX GYN  12/03/2019    suburethral sling and cystoscopy    HX KNEE REPLACEMENT Right 2014    HX MENISCUS REPAIR Left     4/2016    HX MENISCUS REPAIR Left 2014    HX ORTHOPAEDIC Left     LEFT WRIST TRAPEZIOMETACARPAL ARTHROPLASTY WITH FCR TENDON TRANSFER- 2020    HX ORTHOPAEDIC Left 01/2020    left hand tendon surgery    HX OTHER SURGICAL Right 2016    Hand    HX OTHER SURGICAL Left ~2014    B/L Ulnar    HX OTHER SURGICAL  ~1986    urethraplasty    HX OTHER SURGICAL  12/03/2019    bladder surgery-put mesh in    HX SHOULDER ARTHROSCOPY Right ~1966, ~2013    x2    HX TONSIL AND ADENOIDECTOMY  ~1950    HX TOTAL ABDOMINAL HYSTERECTOMY  ~1970    IR GASTRIC BAND ADJ W/ FLUORO  ~2014    lab band removed ~ 2016        Prior to Admission Medications   Prescriptions Last Dose Informant Patient Reported? Taking? HYDROcodone-acetaminophen (Norco) 7.5-325 mg per tablet   Yes No   Sig: Take 1 Tab by mouth. Synthroid 137 mcg tablet   No No   Sig: Take 137 mcg by mouth Daily (before breakfast). DAW1   albuterol (PROVENTIL HFA, VENTOLIN HFA, PROAIR HFA) 90 mcg/actuation inhaler   No No   Sig: Take 1-2 Puffs by inhalation every six (6) hours as needed for Wheezing. clonazePAM (KlonoPIN) 1 mg tablet   No No   Sig: Take 1 Tab by mouth two (2) times a day. Max Daily Amount: 2 mg.   clotrimazole-betamethasone (LOTRISONE) topical cream   No No   Sig: Apply  to affected area two (2) times a day. Until rash resolves   colestipoL (COLESTID) 1 gram tablet   No No   Sig: Take 2 Tabs by mouth two (2) times a day. cyclobenzaprine (FLEXERIL) 10 mg tablet   Yes No   Sig: cyclobenzaprine 10 mg tablet   Take 1 tablet every day by oral route as needed. ergocalciferol (ERGOCALCIFEROL) 1,250 mcg (50,000 unit) capsule   No No   Sig: One capsule by mouth weekly for 4 weeks then one monthly. Indications: vitamin D deficiency (high dose therapy)   escitalopram oxalate (LEXAPRO) 20 mg tablet   No No   Sig: Take 1.5 Tabs by mouth daily. Patient taking differently: Take 20 mg by mouth daily. fluticasone propionate (FLONASE) 50 mcg/actuation nasal spray   No No   Si Sprays by Both Nostrils route nightly. Indications: inflammation of the nose due to an allergy   hyoscyamine sulfate (CYSTOSPAZ) 0.125 mg tablet   No No   Sig: Take 1 Tab by mouth every four (4) hours as needed for Cramping or Nausea. Indications: irritable colon   lisinopril-hydroCHLOROthiazide (PRINZIDE, ZESTORETIC) 10-12.5 mg per tablet   No No   Sig: Take 1 Tab by mouth daily. loratadine (CLARITIN) 10 mg tablet   No No   Sig: Take 1 Tab by mouth daily. Indications: inflammation of the nose due to an allergy   montelukast (SINGULAIR) 10 mg tablet   No No   Sig: Take 1 Tab by mouth daily. nystatin (MYCOSTATIN) powder   No No   Sig: Apply  to affected area three (3) times daily as needed (rash). ondansetron hcl (ZOFRAN) 4 mg tablet   No No   Sig: Take 1 Tab by mouth every eight (8) hours as needed for Nausea. pantoprazole (PROTONIX) 40 mg tablet   No No   Sig: Take 1 Tab by mouth daily. pramipexole (Mirapex) 0.5 mg tablet   No No   Sig: Take 1 Tab by mouth daily. tolterodine ER (DETROL LA) 4 mg ER capsule   No No   Sig: Take 1 Cap by mouth daily. vitamin e (E GEMS) 100 unit capsule   Yes No   Sig: Take  by mouth daily.       Facility-Administered Medications: None       Allergies   Allergen Reactions    Fire Ant Anaphylaxis    Amoxicillin Other (comments)     C-Diff    Betadine [Povidone-Iodine] Other (comments)     Mcgrath    Cephalexin Other (comments)     C-Diff    Demerol [Meperidine] Rash    Hydromorphone Other (comments)     Keeps her awake    Morphine Other (comments)     Awake    Percocet [Oxycodone-Acetaminophen] Other (comments)     Jittery    Percodan [Oxycodone Hcl-Oxycodone-Asa] Other (comments)     Jittery        Social History     Tobacco Use    Smoking status: Former Smoker     Types: Cigarettes     Quit date: 2005     Years since quitting: 15.3    Smokeless tobacco: Never Used   Substance Use Topics    Alcohol use: Not Currently        Family History   Problem Relation Age of Onset    No Known Problems Mother     Other Brother         brain aneurysm, AVM    Heart Surgery Brother         CABGx4    Heart Disease Brother     Hypertension Brother     Other Father 54        brain aneurysm    Other Sister         fibromyalgia    Pneumonia Sister     Heart Attack Maternal Grandmother     Cancer Maternal Grandfather         skin    Heart Attack Paternal Grandfather     Congenital anomalies Sister         right arm deformity    Other Son         Hepatitis C    Drug Abuse Son     MS Daughter         Objective:       Visit Vitals  BP (!) 141/63 (BP 1 Location: Left arm, BP Patient Position: At rest)   Pulse 61   Temp 97.9 °F (36.6 °C)   Resp 20   Ht 4' 9\" (1.448 m)   Wt 86.2 kg (190 lb)   LMP  (LMP Unknown)   SpO2 97%   BMI 41.12 kg/m²        Temp (24hrs), Av.9 °F (36.6 °C), Min:97.9 °F (36.6 °C), Max:97.9 °F (36.6 °C)      Oxygen Therapy  O2 Sat (%): 97 % (21 1436)  Pulse via Oximetry: 61 beats per minute (21 1435)  O2 Device: Room air (21 1436)      Physical Exam:    General:    Alert, cooperative, no distress, appears stated age. Eyes:   PERRLA EOMI Anicteric  Head:   Normocephalic, without obvious abnormality, atraumatic. ENT:  Nares normal. No drainage. Lungs:   Clear to auscultation bilaterally. No Wheezing or Rhonchi. No rales. Heart:   Regular rate and rhythm,  no murmur, rub or gallop. No JVD. Abdomen:   Soft, left side mildly tender. Not distended. Bowel sounds normal.   MSK:  No edema. No clubbing or cyanosis. No deformities/lesions. Skin:     Texture, turgor normal. No rashes or lesions. No Jaundice. Neurologic: Alert and oriented x 3, no focal deficits   Psychiatry:      No depression/anxiety. Mood congruent for context. Heme/Lymph/Immune: No echymoses or overt signs of bleeding.      Lab/Data Review:  Recent Results (from the past 24 hour(s))   CBC WITH AUTOMATED DIFF    Collection Time: 03/27/21  8:58 AM   Result Value Ref Range    WBC 21.8 (H) 4.3 - 11.1 K/uL    RBC 3.99 (L) 4.05 - 5.2 M/uL    HGB 11.4 (L) 11.7 - 15.4 g/dL    HCT 35.5 (L) 35.8 - 46.3 %    MCV 89.0 79.6 - 97.8 FL    MCH 28.6 26.1 - 32.9 PG    MCHC 32.1 31.4 - 35.0 g/dL    RDW 18.4 (H) 11.9 - 14.6 %    PLATELET 703 984 - 259 K/uL    MPV 11.1 9.4 - 12.3 FL    ABSOLUTE NRBC 0.00 0.0 - 0.2 K/uL    DF AUTOMATED      NEUTROPHILS 80 (H) 43 - 78 %    LYMPHOCYTES 13 13 - 44 %    MONOCYTES 4 4.0 - 12.0 %    EOSINOPHILS 1 0.5 - 7.8 %    BASOPHILS 0 0.0 - 2.0 %    IMMATURE GRANULOCYTES 1 0.0 - 5.0 %    ABS. NEUTROPHILS 17.6 (H) 1.7 - 8.2 K/UL    ABS. LYMPHOCYTES 2.8 0.5 - 4.6 K/UL    ABS. MONOCYTES 0.9 0.1 - 1.3 K/UL    ABS. EOSINOPHILS 0.3 0.0 - 0.8 K/UL    ABS. BASOPHILS 0.1 0.0 - 0.2 K/UL    ABS. IMM. GRANS. 0.2 0.0 - 0.5 K/UL   METABOLIC PANEL, COMPREHENSIVE    Collection Time: 03/27/21  8:58 AM   Result Value Ref Range    Sodium 138 136 - 145 mmol/L    Potassium 4.4 3.5 - 5.1 mmol/L    Chloride 104 98 - 107 mmol/L    CO2 30 21 - 32 mmol/L    Anion gap 4 (L) 7 - 16 mmol/L    Glucose 86 65 - 100 mg/dL    BUN 36 (H) 8 - 23 MG/DL    Creatinine 1.64 (H) 0.6 - 1.0 MG/DL    GFR est AA 39 (L) >60 ml/min/1.73m2    GFR est non-AA 33 (L) >60 ml/min/1.73m2    Calcium 8.4 8.3 - 10.4 MG/DL    Bilirubin, total 0.4 0.2 - 1.1 MG/DL    ALT (SGPT) 37 12 - 65 U/L    AST (SGOT) 26 15 - 37 U/L    Alk.  phosphatase 92 50 - 130 U/L    Protein, total 7.2 6.3 - 8.2 g/dL    Albumin 3.3 3.2 - 4.6 g/dL    Globulin 3.9 (H) 2.3 - 3.5 g/dL    A-G Ratio 0.8 (L) 1.2 - 3.5     TYPE & SCREEN    Collection Time: 03/27/21  8:58 AM   Result Value Ref Range    Crossmatch Expiration 03/30/2021,2359     ABO/Rh(D) Johnella Bergen NEGATIVE     Antibody screen NEG    PROTHROMBIN TIME + INR    Collection Time: 03/27/21  8:58 AM   Result Value Ref Range    Prothrombin time 13.8 12.5 - 14.7 sec    INR 1.0     LIPASE    Collection Time: 03/27/21  8:58 AM Result Value Ref Range    Lipase 84 73 - 393 U/L   LACTIC ACID    Collection Time: 03/27/21 10:33 AM   Result Value Ref Range    Lactic acid 0.5 0.4 - 2.0 MMOL/L       Imaging:  Xr Abd (kub)    Result Date: 3/27/2021  Exam: XR ABD (KUB) on 3/27/2021 9:32 AM Clinical History: The Female patient is 77years old  presenting for Rectal bleeding x 3 days-Constipation-Hx IBS with diarrhea. Comparison:  Abdomen pelvis CT 1/8/2021 Findings:  Single view of the abdomen demonstrates a non-specific bowel gas pattern. Surgical clips are seen in the gallbladder fossa and left mid abdomen There is no soft tissue mass or organomegaly. No gross free intraperitoneal air is identified. No acute osseous abnormality is demonstrated. 1. Nonspecific nonobstructive bowel gas pattern. CPT code(s) 13236     Ct Maxillofacial Wo Cont    Result Date: 3/5/2021  Exam: CT MAXILLOFACIAL WO CONT on 3/5/2021 11:48 AM Clinical History: The Female patient is 77years old  presenting for chronic sinusitis. Technique: Thin section axial images were obtained through the facial bones. Coronal reformatted images were also provided for review. All CT scans at this facility are performed using dose reduction/dose modulation techniques, as appropriate the performed exam, including the following: Automated Exposure Control; Adjustment of the mA and/or kV according to patient size (this includes techniques or standardized protocols for targeted exams where dose is matched to indication/reason for exam); and Use of Iterative Reconstruction Technique. Radiation Exposure Indices: Reference Air Kerma (Ka,r) = 957 mGy-cm Comparison: None. FINDINGS: The paranasal sinuses are well pneumatized and aerated. The frontal sinuses however remain hypoplastic. No evidence of mucosal thickening or air-fluid level is seen to suggest acute or chronic inflammatory process. The nasal septum is midline.  There is no evidence of tobias bullosa however incidental paradoxical curvature of the right middle turbinate is demonstrated anteriorly. The ostiomeatal infundibula are patent bilaterally. There is no evidence of Lara cell formation. The visualized orbits and extracranial soft tissues are unremarkable. 1. No evidence of acute or chronic sinusitis. CPT code(s) [id number]     Ct Abd Pelv W Cont    Result Date: 3/27/2021  CT ABDOMEN AND PELVIS WITH INTRAVENOUS CONTRAST DATED 3/27/2021. History: Rectal bleeding for 3 days. Constipation. Comparison: CT abdomen and pelvis with contrast 1/8/2021 Technique:   Multiple contiguous helical CT images reconstructed at 5 mm intervals were obtained from above the diaphragms through the ischial tuberosities following oral and 100 cc Isovue-370 without acute complication. All CT scans performed at this facility use one or all of the following: Automated exposure control, adjustment of the mA and/or kVp according to patient's size, iterative reconstruction. Findings: CT ABDOMEN:  Limited evaluation of the lung bases and base of the mediastinum demonstrates no significant abnormalities. The Liver is homogeneous in attenuation although appears fatty infiltrated. The spleen is homogeneous in attenuation. No contour deforming or enhancing mass lesions are seen of the pancreas or adrenal glands. The gallbladder has been removed. The kidneys enhance symmetrically and no evidence of hydronephrosis is seen. A benign-appearing cyst extends off the lateral midpole cortex of the right kidney measuring 3.5 cm in diameter The visualized loops of small bowel and colon are normal in caliber. The appendix appears to have been surgically removed. Acute inflammatory changes are seen involving the descending colon from the splenic flexure extending into the most proximal sigmoid colon. The appearance suggests a limited colitis. No free air associated abscess is currently seen. No adenopathy is seen. The abdominal aorta is unremarkable in appearance.  CT PELVIS: Small simple appearing fluid is seen in the central pelvis which is not clearly worrisome given the lack of original acute process. This could be reactive to the left colon changes described above. No pelvic adenopathy is seen. The urinary bladder is unremarkable. 1.  Inflamed appearance of the left colon from the splenic flexure to the most proximal sigmoid colon. The appearance suggests a limited colitis. 2. Hypodense appearing liver suggesting fatty infiltration. This report was made using voice transcription. Despite my best efforts to avoid any, transcription errors may persist. If there is any question about the accuracy of the report or need for clarification, then please call [phone], or text me through perfectserv for clarification or correction. Us Thyroid/parathyroid/soft Tiss    Result Date: 3/12/2021  Clinical History: The Female patient is 77years old  presenting with symptoms of primary hypothyroidism. Technique: Utilizing a small parts transducer, sonographic imaging of the thyroid gland was performed. Lesion scoring is based on the 2017 ACR white paper for Thyroid Imaging Reporting and Data System (TI-RADS) providing guidance on fine-needle aspiration (FNA) and follow-up. Comparison: none. Right:   Size: 3.4 x 1.6 x 1.9 cms. Echotexture: Heterogeneous. Diffusely hypoechoic and lobular in contour Left:   Size: 3.8 x 1.2 x 1.6 cms. Echotexture: Heterogeneous. Diffusely hypoechoic and lobular in contour Isthmus:   Size: 0.8 cms. Echotexture: Heterogeneous. Diffusely hypoechoic and lobular in contour  Nodules RIGHT: None Nodules LEFT: None Nodules ISTHMUS: None Additional Comments:     1. Atrophic, and diffuse lobular thyroid gland without focal lesion identified. Recommendations: TR1: Benign, no FNA or follow-up TR2: Nonsuspicious, no FNA or follow-up TR3: Mildly suspicious, FNA if greater than or equal to 2.5 cm, follow at 1, 3, and 5 years if greater than or equal to 1.5 cm. TR4: Moderately suspicious, FNA if greater than or equal to 1.5 cm, follow-up 1, 2, 3, and 5 years if greater than or equal to 1 cm. TR5: Highly suspicious, FNA if greater than or equal to 1 cm, follow annularly for 5 years if greater than or equal to 0.5 cm. CPT Code:  63219     Xr Knees Bi Stand    Result Date: 3/9/2021  AUTOMATIC ADMINISTRATIVE RESULT The result for this exam can be found in the result note above.     : Result available in the result note found above. Cardiac Studies:  EKG Results     None          No results found for this visit on 03/27/21. Cultures:   All Micro Results     Procedure Component Value Units Date/Time    CULTURE, BLOOD [[id number]] Collected: 03/27/21 1037    Order Status: Completed Specimen: Blood Updated: 03/27/21 1041    CULTURE, BLOOD [[id number]] Collected: 03/27/21 1033    Order Status: Completed Specimen: Blood Updated: 03/27/21 1041          Active Problems:     Principal Diagnosis Colitis    Hospital Problems as of 3/27/2021 Date Reviewed: 3/12/2021          Codes Class Noted - Resolved POA    * (Principal) Colitis ICD-10-CM: K52.9  ICD-9-CM: 558.9  3/27/2021 - Present Yes        Hematochezia ICD-10-CM: K92.1  ICD-9-CM: 578.1  3/27/2021 - Present Yes        Leukocytosis ICD-10-CM: T64.542  ICD-9-CM: 288.60  3/27/2021 - Present Yes        Anemia, iron deficiency ICD-10-CM: D50.9  ICD-9-CM: 280.9  12/6/2017 - Present Yes        GERD (gastroesophageal reflux disease) ICD-10-CM: K21.9  ICD-9-CM: 530.81  10/9/2018 - Present Yes        HTN (hypertension) ICD-10-CM: I10  ICD-9-CM: 401.9  12/6/2017 - Present Yes        Urinary frequency ICD-10-CM: R35.0  ICD-9-CM: 788.41  6/29/2020 - Present Yes        Obesity hypoventilation syndrome ([facility] [address] ICD-10-CM: Q33.1  ICD-9-CM: 278.03  3/14/2019 - Present Yes    Overview Signed 3/14/2019  1:50 PM by Jose Spencer MD     Confirmed w/PFT's March 2019             Gluten intolerance ICD-10-CM: K90.41  ICD-9-CM: 579.0  2/6/2018 - Present Yes        Hypothyroidism (acquired) ICD-10-CM: E03.9  ICD-9-CM: 244.9  12/6/2017 - Present Yes        IBS (irritable bowel syndrome) ICD-10-CM: K58.9  ICD-9-CM: 564.1  12/6/2017 - Present Yes        History of Clostridioides difficile colitis ICD-10-CM: Z86.19  ICD-9-CM: V12.79  3/27/2021 - Present Yes        Obesity, morbid, BMI 40.0-49.9 ([address] ICD-10-CM: E66.01  ICD-9-CM: 278.01  2/26/2020 - Present Yes        Chronic pain disorder ICD-10-CM: G89.4  ICD-9-CM: 338.4  10/18/2019 - Present Yes        Fibromyalgia ICD-10-CM: M79.7  ICD-9-CM: 729.1  10/23/2018 - Present Yes        BONNIE (generalized anxiety disorder) ICD-10-CM: F41.1  ICD-9-CM: 300.02  10/9/2018 - Present Yes        HLD (hyperlipidemia) ICD-10-CM: E78.5  ICD-9-CM: 272.4  12/6/2017 - Present Yes              Anticipated discharge: 2-3 days    DO Allan Jaramillo Hospitalist Service  3/27/2021 4:05 PM

## 2021-03-28 LAB
ANION GAP SERPL CALC-SCNC: 3 MMOL/L (ref 7–16)
BUN SERPL-MCNC: 29 MG/DL (ref 8–23)
C DIFF GDH STL QL: NORMAL
C DIFF TOX A+B STL QL IA: NORMAL
CALCIUM SERPL-MCNC: 8 MG/DL (ref 8.3–10.4)
CHLORIDE SERPL-SCNC: 108 MMOL/L (ref 98–107)
CLINICAL CONSIDERATION: NORMAL
CO2 SERPL-SCNC: 28 MMOL/L (ref 21–32)
CREAT SERPL-MCNC: 1.34 MG/DL (ref 0.6–1)
ERYTHROCYTE [DISTWIDTH] IN BLOOD BY AUTOMATED COUNT: 18.6 % (ref 11.9–14.6)
GLUCOSE SERPL-MCNC: 91 MG/DL (ref 65–100)
HCT VFR BLD AUTO: 30.8 % (ref 35.8–46.3)
HEMOCCULT STL QL: POSITIVE
HGB BLD-MCNC: 10 G/DL (ref 11.7–15.4)
INTERPRETATION: NORMAL
MCH RBC QN AUTO: 28.9 PG (ref 26.1–32.9)
MCHC RBC AUTO-ENTMCNC: 32.5 G/DL (ref 31.4–35)
MCV RBC AUTO: 89 FL (ref 79.6–97.8)
NRBC # BLD: 0 K/UL (ref 0–0.2)
PCR REFLEX: NORMAL
PLATELET # BLD AUTO: 201 K/UL (ref 150–450)
PMV BLD AUTO: 11.4 FL (ref 9.4–12.3)
POTASSIUM SERPL-SCNC: 4.6 MMOL/L (ref 3.5–5.1)
RBC # BLD AUTO: 3.46 M/UL (ref 4.05–5.2)
SODIUM SERPL-SCNC: 139 MMOL/L (ref 136–145)
WBC # BLD AUTO: 16.5 K/UL (ref 4.3–11.1)
WBC #/AREA STL HPF: NORMAL /HPF (ref 0–4)

## 2021-03-28 PROCEDURE — 87045 FECES CULTURE AEROBIC BACT: CPT

## 2021-03-28 PROCEDURE — 36415 COLL VENOUS BLD VENIPUNCTURE: CPT

## 2021-03-28 PROCEDURE — 85027 COMPLETE CBC AUTOMATED: CPT

## 2021-03-28 PROCEDURE — 65270000029 HC RM PRIVATE

## 2021-03-28 PROCEDURE — 82272 OCCULT BLD FECES 1-3 TESTS: CPT

## 2021-03-28 PROCEDURE — 74011000258 HC RX REV CODE- 258: Performed by: INTERNAL MEDICINE

## 2021-03-28 PROCEDURE — 89055 LEUKOCYTE ASSESSMENT FECAL: CPT

## 2021-03-28 PROCEDURE — 87324 CLOSTRIDIUM AG IA: CPT

## 2021-03-28 PROCEDURE — 74011250637 HC RX REV CODE- 250/637: Performed by: INTERNAL MEDICINE

## 2021-03-28 PROCEDURE — 2709999900 HC NON-CHARGEABLE SUPPLY

## 2021-03-28 PROCEDURE — 74011250636 HC RX REV CODE- 250/636: Performed by: INTERNAL MEDICINE

## 2021-03-28 PROCEDURE — 80048 BASIC METABOLIC PNL TOTAL CA: CPT

## 2021-03-28 RX ORDER — PRAMIPEXOLE DIHYDROCHLORIDE 1 MG/1
0.5 TABLET ORAL
Status: COMPLETED | OUTPATIENT
Start: 2021-03-28 | End: 2021-03-28

## 2021-03-28 RX ORDER — HYDROCODONE BITARTRATE AND ACETAMINOPHEN 10; 325 MG/1; MG/1
1 TABLET ORAL
Status: DISCONTINUED | OUTPATIENT
Start: 2021-03-28 | End: 2021-03-29 | Stop reason: HOSPADM

## 2021-03-28 RX ADMIN — Medication 10 ML: at 05:21

## 2021-03-28 RX ADMIN — PRAMIPEXOLE DIHYDROCHLORIDE 0.5 MG: 1 TABLET ORAL at 22:00

## 2021-03-28 RX ADMIN — PANTOPRAZOLE SODIUM 40 MG: 40 TABLET, DELAYED RELEASE ORAL at 10:38

## 2021-03-28 RX ADMIN — LEVOTHYROXINE SODIUM 137 MCG: 0.11 TABLET ORAL at 10:34

## 2021-03-28 RX ADMIN — METRONIDAZOLE 500 MG: 500 INJECTION, SOLUTION INTRAVENOUS at 17:32

## 2021-03-28 RX ADMIN — ESCITALOPRAM OXALATE 20 MG: 10 TABLET ORAL at 10:38

## 2021-03-28 RX ADMIN — CLONAZEPAM 1 MG: 0.5 TABLET ORAL at 17:32

## 2021-03-28 RX ADMIN — CLONAZEPAM 1 MG: 0.5 TABLET ORAL at 10:37

## 2021-03-28 RX ADMIN — HYDROCODONE BITARTRATE AND ACETAMINOPHEN 1 TABLET: 10; 325 TABLET ORAL at 10:36

## 2021-03-28 RX ADMIN — LISINOPRIL AND HYDROCHLOROTHIAZIDE 1 TABLET: 12.5; 1 TABLET ORAL at 10:38

## 2021-03-28 RX ADMIN — DICYCLOMINE HYDROCHLORIDE 10 MG: 10 CAPSULE ORAL at 10:37

## 2021-03-28 RX ADMIN — FLUTICASONE PROPIONATE 2 SPRAY: 50 SPRAY, METERED NASAL at 22:02

## 2021-03-28 RX ADMIN — METRONIDAZOLE 500 MG: 500 INJECTION, SOLUTION INTRAVENOUS at 10:34

## 2021-03-28 RX ADMIN — MONTELUKAST SODIUM 10 MG: 10 TABLET, COATED ORAL at 10:38

## 2021-03-28 RX ADMIN — Medication 10 ML: at 22:03

## 2021-03-28 RX ADMIN — METRONIDAZOLE 500 MG: 500 INJECTION, SOLUTION INTRAVENOUS at 02:14

## 2021-03-28 RX ADMIN — LORATADINE 10 MG: 10 TABLET ORAL at 10:38

## 2021-03-28 RX ADMIN — CEFTRIAXONE 1 G: 1 INJECTION, POWDER, FOR SOLUTION INTRAMUSCULAR; INTRAVENOUS at 17:31

## 2021-03-28 RX ADMIN — SODIUM CHLORIDE 100 ML/HR: 900 INJECTION, SOLUTION INTRAVENOUS at 17:59

## 2021-03-28 NOTE — PROGRESS NOTES
SW reviewed patient's chart and conducted a baseline assessment. Discharge plan at this time is as follows:     Care Management Interventions  PCP Verified by CM: Yes  Mode of Transport at Discharge: Self  Transition of Care Consult (CM Consult): Discharge Planning  Current Support Network: Lives with Spouse, Own Home  Confirm Follow Up Transport: Family  Discharge Location  Discharge Placement: Home with family assistance      *Please note that discharge plans can change throughout an inpatient admission.  Ensure that you are referring to the most recent social work/nurse case management note for current discharge plan*     Chris Hastings, [address] [place] [address] Work   [facility]    * [email or web address]

## 2021-03-28 NOTE — ACP (ADVANCE CARE PLANNING)
Advance Care Planning     Advance Care Planning Activator (Inpatient)  Conversation Note      Date of ACP Conversation: 03/28/21     Conversation Conducted with:   Patient with Decision Making Capacity    ACP Activator: Christopher Decision Maker:    Current Designated Health Care Decision Maker:     Click here to complete [address] including selection of the Healthcare Decision Maker Relationship (ie \"Primary\")  Today we documented Decision Maker(s). The patient will provide ACP documents.         Length of ACP Conversation in minutes:  10    Conversation Outcomes:  [x] ACP discussion completed  [] Existing advance directive reviewed with patient; no changes to patient's previously recorded wishes     [] New Advance Directive completed   [] Portable Do Not Resuscitate prepared for Provider review and signature  [] POLST/POST/MOLST/MOST prepared for Provider review and signature      Follow-up plan:    [] Schedule follow-up conversation to continue planning  [x] Referred individual to Provider for additional questions/concerns   [] Advised patient/agent/surrogate to review completed ACP document and update if needed with changes in condition, patient preferences or care setting     [] This note routed to one or more involved healthcare providers

## 2021-03-28 NOTE — PROGRESS NOTES
Occult stool positive. Sree Patel MD notified. No new orders at this time. C-Diff and stool culture pending.

## 2021-03-28 NOTE — PROGRESS NOTES
[facility]ist Progress Note    Patient: Gerda Livingston MRN: [id number]  SSN: [id number]    YOB: 1945  Age: 77 y.o. Sex: female      Admit Date: 3/27/2021    LOS: 1 day     Subjective:     Chief Complaint: Bright red blood per rectum  Reason for Admission: Colitis    77year old CF with a PMH of GERD, IBS, MARY, HTN, PAD, and fibromyalgia who presented to the ER on 3/27 with 2 days of cramping abdominal pain and bright red blood per rectum. She states that she was doing well until 3/25 when she started having left sided abdominal cramping. Shortly afterwards she had a bright red bloody BM. She then proceeded to have 4-5 bloody BMs a day for the next 2 days so she came to the ER to be evaluated. 3/28 - Complains of left sided cramping pain. Stools more normal with blood streaks. Denies CP/SOB. Denies N/V. Review of systems negative except stated above. Assessment/Plan (MDM): Active Medical Complaints and Diagnoses:    Principal Problem:    Colitis (3/27/2021)  - Cramping abdominal pain + hematochezia + WBC 21  - WBC down to 16  - CT A/P with colitis from splenic flexure to distal sigmoid  - Continue Ceftriaxone + Flagyl  - Continue IVFs  - C.  Diff pending  - Stool WBC + culture pending  - Pain control     Active Problems:    Hematochezia (3/27/2021)  - 2 days of hematochezia  - Hgb stable  - Due to #1       Leukocytosis (3/27/2021)  - Due to #1  - Continue Ceftriaxone + Flagyl  - Trend CBC       Anemia, iron deficiency (12/6/2017)  - Hgb 11.4 --> 10.0  - Likely dilutional + GIB  - Monitor stool output  - Trend Hgb       HTN (hypertension) (12/6/2017)   - Stable       GERD (gastroesophageal reflux disease) (10/9/2018)  - Continue PPI       Hypothyroidism (acquired) (12/6/2017)  - Continue Levothyroxine       IBS (irritable bowel syndrome) (12/6/2017)  - Never had colitis per patient       Gluten intolerance (2/6/2018)  - Gluten free diet       History of Clostridioides difficile colitis (3/27/2021)  - C. Diff pending    Otherwise all chronic medical issues appear stable with no changes. See assessment at bottom of progress note for complete acute, chronic, and resolved hospital medical issues. Diet: DIET REGULAR  VTE ppx: SCDs    Objective:     Visit Vitals  BP (!) 141/54 (BP 1 Location: Left upper arm)   Pulse 84   Temp 97.4 °F (36.3 °C)   Resp 18   Ht 4' 9\" (1.448 m)   Wt 87.2 kg (192 lb 3.2 oz)   LMP  (LMP Unknown)   SpO2 97%   Breastfeeding No   BMI 41.59 kg/m²      Oxygen Therapy  O2 Sat (%): 97 % (03/28/21 0747)  Pulse via Oximetry: 61 beats per minute (03/27/21 1435)  O2 Device: Room air (03/27/21 2025)      Intake and Output:     Intake/Output Summary (Last 24 hours) at 3/28/2021 4861  Last data filed at 3/28/2021 6761  Gross per 24 hour   Intake 1258 ml   Output 400 ml   Net 858 ml         Physical Exam:   GENERAL: alert, cooperative, no distress, appears stated age  EYE: conjunctivae/corneas clear. PERRL. THROAT & NECK: normal and no erythema or exudates noted. LUNG: clear to auscultation bilaterally  HEART: regular rate and rhythm, S1S2, no murmur, no JVD  ABDOMEN: soft, left side tender, non-distended. Bowel sounds normal.   EXTREMITIES:  No edema, 2+ pedal/radial pulses bilaterally  SKIN: no rash or abnormalities  NEUROLOGIC: A&Ox3. Cranial nerves 2-12 grossly intact.     Lab/Data Review:  Recent Results (from the past 24 hour(s))   CULTURE, BLOOD    Collection Time: 03/27/21 10:33 AM    Specimen: Blood   Result Value Ref Range    Special Requests: RIGHT  Antecubital        Culture result: NO GROWTH AFTER 22 HOURS     LACTIC ACID    Collection Time: 03/27/21 10:33 AM   Result Value Ref Range    Lactic acid 0.5 0.4 - 2.0 MMOL/L   CULTURE, BLOOD    Collection Time: 03/27/21 10:37 AM    Specimen: Blood   Result Value Ref Range    Special Requests: RIGHT  HAND        Culture result: NO GROWTH AFTER 22 HOURS     METABOLIC PANEL, BASIC    Collection Time: 03/27/21 5:35 PM   Result Value Ref Range    Sodium 139 136 - 145 mmol/L    Potassium 4.6 3.5 - 5.1 mmol/L    Chloride 107 98 - 107 mmol/L    CO2 30 21 - 32 mmol/L    Anion gap 2 (L) 7 - 16 mmol/L    Glucose 114 (H) 65 - 100 mg/dL    BUN 33 (H) 8 - 23 MG/DL    Creatinine 1.49 (H) 0.6 - 1.0 MG/DL    GFR est AA 44 (L) >60 ml/min/1.73m2    GFR est non-AA 36 (L) >60 ml/min/1.73m2    Calcium 8.3 8.3 - 33.4 MG/DL   METABOLIC PANEL, BASIC    Collection Time: 03/28/21  5:19 AM   Result Value Ref Range    Sodium 139 136 - 145 mmol/L    Potassium 4.6 3.5 - 5.1 mmol/L    Chloride 108 (H) 98 - 107 mmol/L    CO2 28 21 - 32 mmol/L    Anion gap 3 (L) 7 - 16 mmol/L    Glucose 91 65 - 100 mg/dL    BUN 29 (H) 8 - 23 MG/DL    Creatinine 1.34 (H) 0.6 - 1.0 MG/DL    GFR est AA 50 (L) >60 ml/min/1.73m2    GFR est non-AA 41 (L) >60 ml/min/1.73m2    Calcium 8.0 (L) 8.3 - 10.4 MG/DL   CBC W/O DIFF    Collection Time: 03/28/21  5:19 AM   Result Value Ref Range    WBC 16.5 (H) 4.3 - 11.1 K/uL    RBC 3.46 (L) 4.05 - 5.2 M/uL    HGB 10.0 (L) 11.7 - 15.4 g/dL    HCT 30.8 (L) 35.8 - 46.3 %    MCV 89.0 79.6 - 97.8 FL    MCH 28.9 26.1 - 32.9 PG    MCHC 32.5 31.4 - 35.0 g/dL    RDW 18.6 (H) 11.9 - 14.6 %    PLATELET 507 646 - 555 K/uL    MPV 11.4 9.4 - 12.3 FL    ABSOLUTE NRBC 0.00 0.0 - 0.2 K/uL   OCCULT BLOOD, STOOL    Collection Time: 03/28/21  5:54 AM   Result Value Ref Range    Occult blood, stool Positive (A) NEG         SARS-CoV-2 Lab Results  \"Novel Coronavirus\" Test: No results found for: COV2NT   \"Emergent Disease\" Test: No results found for: EDPR  \"SARS-COV-2\" Test: No results found for: XGCOVT  \"Precision Labs\" Test: No results found for: RSLT  Rapid Test: No results found for: COVR        Imaging:  Xr Abd (kub)    Result Date: 3/27/2021  Exam: XR ABD (KUB) on 3/27/2021 9:32 AM Clinical History: The Female patient is 77years old  presenting for Rectal bleeding x 3 days-Constipation-Hx IBS with diarrhea.  Comparison:  Abdomen pelvis CT 1/8/2021 Findings:  Single view of the abdomen demonstrates a non-specific bowel gas pattern. Surgical clips are seen in the gallbladder fossa and left mid abdomen There is no soft tissue mass or organomegaly. No gross free intraperitoneal air is identified. No acute osseous abnormality is demonstrated. 1. Nonspecific nonobstructive bowel gas pattern. CPT code(s) 38828     Ct Maxillofacial Wo Cont    Result Date: 3/5/2021  Exam: CT MAXILLOFACIAL WO CONT on 3/5/2021 11:48 AM Clinical History: The Female patient is 77years old  presenting for chronic sinusitis. Technique: Thin section axial images were obtained through the facial bones. Coronal reformatted images were also provided for review. All CT scans at this facility are performed using dose reduction/dose modulation techniques, as appropriate the performed exam, including the following: Automated Exposure Control; Adjustment of the mA and/or kV according to patient size (this includes techniques or standardized protocols for targeted exams where dose is matched to indication/reason for exam); and Use of Iterative Reconstruction Technique. Radiation Exposure Indices: Reference Air Kerma (Ka,r) = 957 mGy-cm Comparison: None. FINDINGS: The paranasal sinuses are well pneumatized and aerated. The frontal sinuses however remain hypoplastic. No evidence of mucosal thickening or air-fluid level is seen to suggest acute or chronic inflammatory process. The nasal septum is midline. There is no evidence of tobias bullosa however incidental paradoxical curvature of the right middle turbinate is demonstrated anteriorly. The ostiomeatal infundibula are patent bilaterally. There is no evidence of Lara cell formation. The visualized orbits and extracranial soft tissues are unremarkable. 1. No evidence of acute or chronic sinusitis. CPT code(s) [id number]     Ct Abd Pelv W Cont    Result Date: 3/27/2021  CT ABDOMEN AND PELVIS WITH INTRAVENOUS CONTRAST DATED 3/27/2021.  History: Rectal bleeding for 3 days. Constipation. Comparison: CT abdomen and pelvis with contrast 1/8/2021 Technique:   Multiple contiguous helical CT images reconstructed at 5 mm intervals were obtained from above the diaphragms through the ischial tuberosities following oral and 100 cc Isovue-370 without acute complication. All CT scans performed at this facility use one or all of the following: Automated exposure control, adjustment of the mA and/or kVp according to patient's size, iterative reconstruction. Findings: CT ABDOMEN:  Limited evaluation of the lung bases and base of the mediastinum demonstrates no significant abnormalities. The Liver is homogeneous in attenuation although appears fatty infiltrated. The spleen is homogeneous in attenuation. No contour deforming or enhancing mass lesions are seen of the pancreas or adrenal glands. The gallbladder has been removed. The kidneys enhance symmetrically and no evidence of hydronephrosis is seen. A benign-appearing cyst extends off the lateral midpole cortex of the right kidney measuring 3.5 cm in diameter The visualized loops of small bowel and colon are normal in caliber. The appendix appears to have been surgically removed. Acute inflammatory changes are seen involving the descending colon from the splenic flexure extending into the most proximal sigmoid colon. The appearance suggests a limited colitis. No free air associated abscess is currently seen. No adenopathy is seen. The abdominal aorta is unremarkable in appearance. CT PELVIS: Small simple appearing fluid is seen in the central pelvis which is not clearly worrisome given the lack of original acute process. This could be reactive to the left colon changes described above. No pelvic adenopathy is seen. The urinary bladder is unremarkable. 1.  Inflamed appearance of the left colon from the splenic flexure to the most proximal sigmoid colon. The appearance suggests a limited colitis.  2. Hypodense appearing liver suggesting fatty infiltration. This report was made using voice transcription. Despite my best efforts to avoid any, transcription errors may persist. If there is any question about the accuracy of the report or need for clarification, then please call [phone], or text me through perfectserv for clarification or correction. Us Thyroid/parathyroid/soft Tiss    Result Date: 3/12/2021  Clinical History: The Female patient is 77years old  presenting with symptoms of primary hypothyroidism. Technique: Utilizing a small parts transducer, sonographic imaging of the thyroid gland was performed. Lesion scoring is based on the 2017 ACR white paper for Thyroid Imaging Reporting and Data System (TI-RADS) providing guidance on fine-needle aspiration (FNA) and follow-up. Comparison: none. Right:   Size: 3.4 x 1.6 x 1.9 cms. Echotexture: Heterogeneous. Diffusely hypoechoic and lobular in contour Left:   Size: 3.8 x 1.2 x 1.6 cms. Echotexture: Heterogeneous. Diffusely hypoechoic and lobular in contour Isthmus:   Size: 0.8 cms. Echotexture: Heterogeneous. Diffusely hypoechoic and lobular in contour  Nodules RIGHT: None Nodules LEFT: None Nodules ISTHMUS: None Additional Comments:     1. Atrophic, and diffuse lobular thyroid gland without focal lesion identified. Recommendations: TR1: Benign, no FNA or follow-up TR2: Nonsuspicious, no FNA or follow-up TR3: Mildly suspicious, FNA if greater than or equal to 2.5 cm, follow at 1, 3, and 5 years if greater than or equal to 1.5 cm. TR4: Moderately suspicious, FNA if greater than or equal to 1.5 cm, follow-up 1, 2, 3, and 5 years if greater than or equal to 1 cm. TR5: Highly suspicious, FNA if greater than or equal to 1 cm, follow annularly for 5 years if greater than or equal to 0.5 cm.  CPT Code:  30371     Xr Knees Bi Stand    Result Date: 3/9/2021  AUTOMATIC ADMINISTRATIVE RESULT The result for this exam can be found in the result note above.     : Result available in the result note found above. No results found for this visit on 03/27/21. Cultures:   All Micro Results     Procedure Component Value Units Date/Time    CULTURE, BLOOD [[id number]] Collected: 03/27/21 1033    Order Status: Completed Specimen: Blood Updated: 03/28/21 0922     Special Requests: --        RIGHT  Antecubital       Culture result: NO GROWTH AFTER 22 HOURS       CULTURE, BLOOD [[id number]] Collected: 03/27/21 1037    Order Status: Completed Specimen: Blood Updated: 03/28/21 0922     Special Requests: --        RIGHT  HAND       Culture result: NO GROWTH AFTER 22 HOURS       CULTURE, STOOL [[id number]] Collected: 03/28/21 0554    Order Status: Completed Specimen: Stool Updated: 03/28/21 0904    C. DIFFICILE AG & TOXIN A/B [[id number]] Collected: 03/28/21 0554    Order Status: Completed Specimen: Stool Updated: 03/28/21 0603          Assessment:     Primary Diagnosis: Colitis    Hospital Problems as of 3/28/2021 Date Reviewed: 3/12/2021          Codes Class Noted - Resolved POA    * (Principal) Colitis ICD-10-CM: K52.9  ICD-9-CM: 558.9  3/27/2021 - Present Yes        Hematochezia ICD-10-CM: K92.1  ICD-9-CM: 578.1  3/27/2021 - Present Yes        Leukocytosis ICD-10-CM: B87.125  ICD-9-CM: 288.60  3/27/2021 - Present Yes        Anemia, iron deficiency ICD-10-CM: D50.9  ICD-9-CM: 280.9  12/6/2017 - Present Yes        GERD (gastroesophageal reflux disease) ICD-10-CM: K21.9  ICD-9-CM: 530.81  10/9/2018 - Present Yes        HTN (hypertension) ICD-10-CM: I10  ICD-9-CM: 401.9  12/6/2017 - Present Yes        Urinary frequency ICD-10-CM: R35.0  ICD-9-CM: 788.41  6/29/2020 - Present Yes        Obesity hypoventilation syndrome ([address] ICD-10-CM: R68.9  ICD-9-CM: 278.03  3/14/2019 - Present Yes    Overview Signed 3/14/2019  1:50 PM by Barber Morgan MD     Confirmed w/PFT's March 2019             Gluten intolerance ICD-10-CM: K90.41  ICD-9-CM: 579.0  2/6/2018 - Present Yes        Hypothyroidism (acquired) ICD-10-CM: E03.9  ICD-9-CM: 244.9  12/6/2017 - Present Yes        IBS (irritable bowel syndrome) ICD-10-CM: K58.9  ICD-9-CM: 564.1  12/6/2017 - Present Yes        History of Clostridioides difficile colitis ICD-10-CM: Z86.19  ICD-9-CM: V12.79  3/27/2021 - Present Yes        Obesity, morbid, BMI 40.0-49.9 ([facility] ICD-10-CM: E66.01  ICD-9-CM: 278.01  2/26/2020 - Present Yes        Chronic pain disorder ICD-10-CM: G89.4  ICD-9-CM: 338.4  10/18/2019 - Present Yes        Fibromyalgia ICD-10-CM: M79.7  ICD-9-CM: 729.1  10/23/2018 - Present Yes        BONNIE (generalized anxiety disorder) ICD-10-CM: F41.1  ICD-9-CM: 300.02  10/9/2018 - Present Yes        HLD (hyperlipidemia) ICD-10-CM: E78.5  ICD-9-CM: 272.4  12/6/2017 - Present Yes                Discharge Plan:     Home in 1-2 days    Signed By: Carmen Dubois DO     March 28, 2021

## 2021-03-28 NOTE — PROGRESS NOTES
Problem: Falls - Risk of  Goal: *Absence of Falls  Description: Document Darrel Vann Fall Risk and appropriate interventions in the flowsheet.   Outcome: Progressing Towards Goal  Note: Fall Risk Interventions:            Medication Interventions: Evaluate medications/consider consulting pharmacy, Teach patient to arise slowly

## 2021-03-28 NOTE — PROGRESS NOTES
SW met with patient, confirmed demographic information. Patient reports being independent at home, does not use assistive devices to ambulate, and denies any falls. Patient states that she does not feel weaker or off balance when ambulating currently. Patient is also established with primary care and is seen regularly. Patient requested financial assistance information. SW provided the patient with contact info for [facility]. ACP completed. No needs identified from GARY at this time.      Jessi Khan LMSW    [facility]    * [email or web address]

## 2021-03-29 VITALS
TEMPERATURE: 98 F | SYSTOLIC BLOOD PRESSURE: 154 MMHG | RESPIRATION RATE: 18 BRPM | HEART RATE: 67 BPM | WEIGHT: 192.2 LBS | HEIGHT: 57 IN | DIASTOLIC BLOOD PRESSURE: 59 MMHG | BODY MASS INDEX: 41.46 KG/M2 | OXYGEN SATURATION: 95 %

## 2021-03-29 LAB
ANION GAP SERPL CALC-SCNC: 3 MMOL/L (ref 7–16)
BUN SERPL-MCNC: 22 MG/DL (ref 8–23)
CALCIUM SERPL-MCNC: 7.9 MG/DL (ref 8.3–10.4)
CHLORIDE SERPL-SCNC: 111 MMOL/L (ref 98–107)
CO2 SERPL-SCNC: 28 MMOL/L (ref 21–32)
CREAT SERPL-MCNC: 1.13 MG/DL (ref 0.6–1)
ERYTHROCYTE [DISTWIDTH] IN BLOOD BY AUTOMATED COUNT: 18.7 % (ref 11.9–14.6)
GLUCOSE SERPL-MCNC: 103 MG/DL (ref 65–100)
HCT VFR BLD AUTO: 30.5 % (ref 35.8–46.3)
HGB BLD-MCNC: 9.4 G/DL (ref 11.7–15.4)
MCH RBC QN AUTO: 28.1 PG (ref 26.1–32.9)
MCHC RBC AUTO-ENTMCNC: 30.8 G/DL (ref 31.4–35)
MCV RBC AUTO: 91.3 FL (ref 79.6–97.8)
NRBC # BLD: 0 K/UL (ref 0–0.2)
PLATELET # BLD AUTO: 192 K/UL (ref 150–450)
PMV BLD AUTO: 11.1 FL (ref 9.4–12.3)
POTASSIUM SERPL-SCNC: 4.6 MMOL/L (ref 3.5–5.1)
RBC # BLD AUTO: 3.34 M/UL (ref 4.05–5.2)
SODIUM SERPL-SCNC: 142 MMOL/L (ref 136–145)
WBC # BLD AUTO: 11.9 K/UL (ref 4.3–11.1)

## 2021-03-29 PROCEDURE — 2709999900 HC NON-CHARGEABLE SUPPLY

## 2021-03-29 PROCEDURE — 85027 COMPLETE CBC AUTOMATED: CPT

## 2021-03-29 PROCEDURE — 74011250636 HC RX REV CODE- 250/636: Performed by: INTERNAL MEDICINE

## 2021-03-29 PROCEDURE — 80048 BASIC METABOLIC PNL TOTAL CA: CPT

## 2021-03-29 PROCEDURE — 36415 COLL VENOUS BLD VENIPUNCTURE: CPT

## 2021-03-29 PROCEDURE — 74011250637 HC RX REV CODE- 250/637: Performed by: INTERNAL MEDICINE

## 2021-03-29 RX ORDER — CEFUROXIME AXETIL 250 MG/1
500 TABLET ORAL EVERY 12 HOURS
Status: DISCONTINUED | OUTPATIENT
Start: 2021-03-29 | End: 2021-03-29 | Stop reason: HOSPADM

## 2021-03-29 RX ORDER — METRONIDAZOLE 500 MG/1
500 TABLET ORAL 3 TIMES DAILY
Status: DISCONTINUED | OUTPATIENT
Start: 2021-03-29 | End: 2021-03-29 | Stop reason: HOSPADM

## 2021-03-29 RX ORDER — DICYCLOMINE HYDROCHLORIDE 10 MG/1
CAPSULE ORAL
Qty: 30 CAP | Refills: 0 | Status: SHIPPED | OUTPATIENT
Start: 2021-03-29 | End: 2021-04-10

## 2021-03-29 RX ORDER — METRONIDAZOLE 500 MG/1
500 TABLET ORAL 3 TIMES DAILY
Qty: 33 TAB | Refills: 0 | Status: SHIPPED | OUTPATIENT
Start: 2021-03-29 | End: 2021-04-09

## 2021-03-29 RX ORDER — CEFUROXIME AXETIL 500 MG/1
500 TABLET ORAL EVERY 12 HOURS
Qty: 22 TAB | Refills: 0 | Status: SHIPPED | OUTPATIENT
Start: 2021-03-29 | End: 2021-04-09

## 2021-03-29 RX ADMIN — DICYCLOMINE HYDROCHLORIDE 10 MG: 10 CAPSULE ORAL at 03:09

## 2021-03-29 RX ADMIN — SODIUM CHLORIDE 100 ML/HR: 900 INJECTION, SOLUTION INTRAVENOUS at 04:39

## 2021-03-29 RX ADMIN — MONTELUKAST SODIUM 10 MG: 10 TABLET, COATED ORAL at 08:13

## 2021-03-29 RX ADMIN — METRONIDAZOLE 500 MG: 500 TABLET, FILM COATED ORAL at 08:13

## 2021-03-29 RX ADMIN — CEFUROXIME AXETIL 500 MG: 250 TABLET ORAL at 10:25

## 2021-03-29 RX ADMIN — ESCITALOPRAM OXALATE 20 MG: 10 TABLET ORAL at 08:13

## 2021-03-29 RX ADMIN — LORATADINE 10 MG: 10 TABLET ORAL at 08:12

## 2021-03-29 RX ADMIN — ONDANSETRON 4 MG: 2 INJECTION INTRAMUSCULAR; INTRAVENOUS at 08:21

## 2021-03-29 RX ADMIN — CLONAZEPAM 1 MG: 0.5 TABLET ORAL at 08:13

## 2021-03-29 RX ADMIN — Medication 10 ML: at 05:31

## 2021-03-29 RX ADMIN — METRONIDAZOLE 500 MG: 500 INJECTION, SOLUTION INTRAVENOUS at 01:37

## 2021-03-29 RX ADMIN — LEVOTHYROXINE SODIUM 137 MCG: 0.11 TABLET ORAL at 08:13

## 2021-03-29 RX ADMIN — PANTOPRAZOLE SODIUM 40 MG: 40 TABLET, DELAYED RELEASE ORAL at 08:13

## 2021-03-29 RX ADMIN — LISINOPRIL AND HYDROCHLOROTHIAZIDE 1 TABLET: 12.5; 1 TABLET ORAL at 08:13

## 2021-03-29 NOTE — PROGRESS NOTES
Discharge education including medications, follow-up appointments and home care complete with patient (see AVS). Verbalized understanding. Faisal booker.

## 2021-03-29 NOTE — DISCHARGE SUMMARY
Hospitalist Discharge Summary     Patient ID:  Kamryn Olivares  [id number]  50 y.o.  1945  Admit date: 3/27/2021  Discharge date: 3/29/2021  Attending: Hector Joshi DO  PCP:  Stefano Gomez NP  Treatment Team: Attending Provider: Hector Joshi DO; : Anju Shannon; Utilization Review: Chayo Rivas RN    Principal Diagnosis Colitis    Hospital Problems as of 3/29/2021 Date Reviewed: 3/12/2021          Codes Class Noted - Resolved POA    * (Principal) Colitis ICD-10-CM: K52.9  ICD-9-CM: 558.9  3/27/2021 - Present Yes        Hematochezia ICD-10-CM: K92.1  ICD-9-CM: 578.1  3/27/2021 - Present Yes        Leukocytosis ICD-10-CM: W11.392  ICD-9-CM: 288.60  3/27/2021 - Present Yes        Anemia, iron deficiency ICD-10-CM: D50.9  ICD-9-CM: 280.9  12/6/2017 - Present Yes        GERD (gastroesophageal reflux disease) ICD-10-CM: K21.9  ICD-9-CM: 530.81  10/9/2018 - Present Yes        HTN (hypertension) ICD-10-CM: I10  ICD-9-CM: 401.9  12/6/2017 - Present Yes        Urinary frequency ICD-10-CM: R35.0  ICD-9-CM: 788.41  6/29/2020 - Present Yes        Obesity hypoventilation syndrome ([address] ICD-10-CM: N80.5  ICD-9-CM: 278.03  3/14/2019 - Present Yes    Overview Signed 3/14/2019  1:50 PM by Kadie Mukherjee MD     Confirmed w/PFT's March 2019             Gluten intolerance ICD-10-CM: K90.41  ICD-9-CM: 579.0  2/6/2018 - Present Yes        Hypothyroidism (acquired) ICD-10-CM: E03.9  ICD-9-CM: 244.9  12/6/2017 - Present Yes        IBS (irritable bowel syndrome) ICD-10-CM: K58.9  ICD-9-CM: 564.1  12/6/2017 - Present Yes        History of Clostridioides difficile colitis ICD-10-CM: Z86.19  ICD-9-CM: V12.79  3/27/2021 - Present Yes        Obesity, morbid, BMI 40.0-49.9 ([facility] ICD-10-CM: E66.01  ICD-9-CM: 278.01  2/26/2020 - Present Yes        Chronic pain disorder ICD-10-CM: G89.4  ICD-9-CM: 338.4  10/18/2019 - Present Yes        Fibromyalgia ICD-10-CM: M79.7  ICD-9-CM: 729.1 10/23/2018 - Present Yes        BONNIE (generalized anxiety disorder) ICD-10-CM: F41.1  ICD-9-CM: 300.02  10/9/2018 - Present Yes        HLD (hyperlipidemia) ICD-10-CM: E78.5  ICD-9-CM: 272.4  12/6/2017 - Present Yes              Hospital Course:  Please refer to the admission H&P for details of presentation. In summary, the patient is a 77year old CF with a PMH of GERD, IBS, MARY, HTN, PAD, and fibromyalgia who presented to the ER on 3/27 with 2 days of cramping abdominal pain and bright red blood per rectum. She states that she was doing well until 3/25 when she started having left sided abdominal cramping. Shortly afterwards she had a bright red bloody BM. She then proceeded to have 4-5 bloody BMs a day for the next 2 days so she came to the ER to be evaluated. She was admitted to the floor and started on Ceftriaxone and Flagyl. She was started on IVFs. She continued to have bloody BMs but improved. She had some cramping, but Bentyl helped. Her WBC improved. Her Hgb stabilized. She remained stable and was discharged home. Significant Diagnostic Studies:    Labs: Results:       Chemistry Recent Labs     03/29/21 0544 03/28/21 0519 03/27/21  1735 03/27/21  0858   * 91 114* 86    139 139 138   K 4.6 4.6 4.6 4.4   * 108* 107 104   CO2 28 28 30 30   BUN 22 29* 33* 36*   CREA 1.13* 1.34* 1.49* 1.64*   CA 7.9* 8.0* 8.3 8.4   AGAP 3* 3* 2* 4*   AP  --   --   --  92   TP  --   --   --  7.2   ALB  --   --   --  3.3   GLOB  --   --   --  3.9*   AGRAT  --   --   --  0.8*      CBC w/Diff Recent Labs     03/29/21 0544 03/28/21  0519 03/27/21  0858   WBC 11.9* 16.5* 21.8*   RBC 3.34* 3.46* 3.99*   HGB 9.4* 10.0* 11.4*   HCT 30.5* 30.8* 35.5*    201 223   GRANS  --   --  80*   LYMPH  --   --  13   EOS  --   --  1      Cardiac Enzymes No results for input(s): CPK, CKND1, SUELLEN in the last 72 hours.     No lab exists for component: CKRMB, TROIP   Coagulation Recent Labs     03/27/21  0858   PTP 13.8   INR 1.0       Lipid Panel Lab Results   Component Value Date/Time    Cholesterol, total 172 01/18/2021 10:01 AM    HDL Cholesterol 50 01/18/2021 10:01 AM    LDL, calculated 94 01/18/2021 10:01 AM    LDL, calculated 109 (H) 03/19/2019 11:45 AM    VLDL, calculated 28 01/18/2021 10:01 AM    VLDL, calculated 53 (H) 03/19/2019 11:45 AM    Triglyceride 161 (H) 01/18/2021 10:01 AM      BNP No results for input(s): BNPP in the last 72 hours. Liver Enzymes Recent Labs     03/27/21  0858   TP 7.2   ALB 3.3   AP 92      Thyroid Studies Lab Results   Component Value Date/Time    TSH 10.100 (H) 03/01/2021 11:40 AM            Imaging:  Xr Abd (kub)    Result Date: 3/27/2021  1. Nonspecific nonobstructive bowel gas pattern. CPT code(s) 29566     Ct Abd Pelv W Cont    Result Date: 3/27/2021  1. Inflamed appearance of the left colon from the splenic flexure to the most proximal sigmoid colon. The appearance suggests a limited colitis. 2. Hypodense appearing liver suggesting fatty infiltration. This report was made using voice transcription. Despite my best efforts to avoid any, transcription errors may persist. If there is any question about the accuracy of the report or need for clarification, then please call [phone], or text me through perfectserv for clarification or correction. Microbiology/Cultures:   All Micro Results     Procedure Component Value Units Date/Time    CULTURE, BLOOD [[id number]] Collected: 03/27/21 1033    Order Status: Completed Specimen: Blood Updated: 03/29/21 0128     Special Requests: --        RIGHT  Antecubital       Culture result: NO GROWTH 2 DAYS       CULTURE, BLOOD [[id number]] Collected: 03/27/21 1037    Order Status: Completed Specimen: Blood Updated: 03/29/21 0128     Special Requests: --        RIGHT  HAND       Culture result: NO GROWTH 2 DAYS       C. DIFFICILE AG & TOXIN A/B [[id number]] Collected: 03/28/21 0554    Order Status: Completed Specimen: Stool Updated: 03/28/21 1326     [address] ANTIGEN       C. DIFFICILE GDH ANTIGEN-NEGATIVE           C. difficile toxin       C. DIFFICILE TOXIN-NEGATIVE           PCR Reflex NOT APPLICABLE        INTERPRETATION       NEGATIVE FOR TOXIGENIC C. DIFFICILE           Clinical Consideration       NEGATIVE FOR TOXIGENIC C. DIFFICILE          CULTURE, STOOL [[id number]] Collected: 03/28/21 0554    Order Status: Completed Specimen: Stool Updated: 03/28/21 0904          Discharge Exam:  Visit Vitals  /67 (BP 1 Location: Left upper arm, BP Patient Position: At rest)   Pulse 65   Temp 98 °F (36.7 °C)   Resp 18   Ht 4' 9\" (1.448 m)   Wt 87.2 kg (192 lb 3.2 oz)   LMP  (LMP Unknown)   SpO2 94%   Breastfeeding No   BMI 41.59 kg/m²     General appearance: alert, cooperative, no distress, appears stated age  Lungs: clear to auscultation bilaterally  Heart: regular rate and rhythm, S1, S2 normal, no murmur, click, rub or gallop  Abdomen: soft, mildly tender. Bowel sounds normal. No masses,  no organomegaly  Extremities: no cyanosis or edema  Neurologic: Grossly normal    Disposition: home  Discharge Condition: stable  Patient Instructions:   Current Discharge Medication List      START taking these medications    Details   cefUROXime (CEFTIN) 500 mg tablet Take 1 Tab by mouth every twelve (12) hours for 11 days.  Qty: 22 Tab, Refills: 0      dicyclomine (BENTYL) 10 mg capsule Take 1 Cap by mouth three (3) times daily for 2 days, THEN 1 Cap three (3) times daily as needed for Abdominal Cramps, Cramping or Pain for up to 10 days.  Qty: 30 Cap, Refills: 0      metroNIDAZOLE (FLAGYL) 500 mg tablet Take 1 Tab by mouth three (3) times daily for 11 days.  Qty: 33 Tab, Refills: 0         CONTINUE these medications which have NOT CHANGED    Details   fluticasone propionate (FLONASE) 50 mcg/actuation nasal spray 2 Sprays by Both Nostrils route nightly. Indications: inflammation of the nose due to an allergy  Qty: 3 Bottle, Refills: 3    Associated Diagnoses: Post-nasal drainage;  Allergic rhinitis, unspecified seasonality, unspecified trigger      loratadine (CLARITIN) 10 mg tablet Take 1 Tab by mouth daily. Indications: inflammation of the nose due to an allergy  Qty: 90 Tab, Refills: 3    Associated Diagnoses: Post-nasal drainage; Allergic rhinitis, unspecified seasonality, unspecified trigger      Synthroid 137 mcg tablet Take 137 mcg by mouth Daily (before breakfast). DAW1  Qty: 30 Tab, Refills: 11    Associated Diagnoses: Primary hypothyroidism      cyclobenzaprine (FLEXERIL) 10 mg tablet cyclobenzaprine 10 mg tablet   Take 1 tablet every day by oral route as needed. vitamin e (E GEMS) 100 unit capsule Take  by mouth daily. pramipexole (Mirapex) 0.5 mg tablet Take 1 Tab by mouth daily. Qty: 90 Tab, Refills: 3    Associated Diagnoses: RLS (restless legs syndrome)      pantoprazole (PROTONIX) 40 mg tablet Take 1 Tab by mouth daily. Qty: 90 Tab, Refills: 3    Associated Diagnoses: Gastroesophageal reflux disease without esophagitis      lisinopril-hydroCHLOROthiazide (PRINZIDE, ZESTORETIC) 10-12.5 mg per tablet Take 1 Tab by mouth daily. Qty: 90 Tab, Refills: 3    Associated Diagnoses: Hypertension, unspecified type; On potassium wasting diuretic therapy      ergocalciferol (ERGOCALCIFEROL) 1,250 mcg (50,000 unit) capsule One capsule by mouth weekly for 4 weeks then one monthly. Indications: vitamin D deficiency (high dose therapy)  Qty: 6 Cap, Refills: 1    Associated Diagnoses: Vitamin D deficiency      clonazePAM (KlonoPIN) 1 mg tablet Take 1 Tab by mouth two (2) times a day. Max Daily Amount: 2 mg. Qty: 60 Tab, Refills: 3    Associated Diagnoses: BONNIE (generalized anxiety disorder)      escitalopram oxalate (LEXAPRO) 20 mg tablet Take 1.5 Tabs by mouth daily. Qty: 135 Tab, Refills: 3    Associated Diagnoses: BONNIE (generalized anxiety disorder);  Episode of recurrent major depressive disorder, unspecified depression episode severity (HCC)      HYDROcodone-acetaminophen (Norco) 7.5-325 mg per tablet Take 1 Tab by mouth. hyoscyamine sulfate (CYSTOSPAZ) 0.125 mg tablet Take 1 Tab by mouth every four (4) hours as needed for Cramping or Nausea. Indications: irritable colon  Qty: 60 Tab, Refills: 1    Associated Diagnoses: Left upper quadrant abdominal pain; Irritable bowel syndrome with diarrhea      tolterodine ER (DETROL LA) 4 mg ER capsule Take 1 Cap by mouth daily. Qty: 30 Cap, Refills: 5      nystatin (MYCOSTATIN) powder Apply  to affected area three (3) times daily as needed (rash). Qty: 60 g, Refills: 2    Associated Diagnoses: Fungal dermatitis      montelukast (SINGULAIR) 10 mg tablet Take 1 Tab by mouth daily. Qty: 90 Tab, Refills: 3    Associated Diagnoses: Allergic rhinitis, unspecified seasonality, unspecified trigger      colestipoL (COLESTID) 1 gram tablet Take 2 Tabs by mouth two (2) times a day. Qty: 120 Tab, Refills: 3      clotrimazole-betamethasone (LOTRISONE) topical cream Apply  to affected area two (2) times a day. Until rash resolves  Qty: 45 g, Refills: 2    Associated Diagnoses: Allergic dermatitis; Rash      ondansetron hcl (ZOFRAN) 4 mg tablet Take 1 Tab by mouth every eight (8) hours as needed for Nausea. Qty: 90 Tab, Refills: 2    Associated Diagnoses: Chronic nausea      albuterol (PROVENTIL HFA, VENTOLIN HFA, PROAIR HFA) 90 mcg/actuation inhaler Take 1-2 Puffs by inhalation every six (6) hours as needed for Wheezing.   Qty: 1 Inhaler, Refills: 2    Associated Diagnoses: Wheezing; SOB (shortness of breath)             Activity: Activity as tolerated  Diet: Regular Diet  Wound Care: None needed    Follow-up  ·   PCP in one week  · [facility] in 2-[facility] weeks  Time spent to discharge patient 35 minutes  Signed:  Lester Mai DO  3/29/2021  8:07 AM

## 2021-03-29 NOTE — PROGRESS NOTES
Problem: Falls - Risk of  Goal: *Absence of Falls  Description: Document Clydene Bone Fall Risk and appropriate interventions in the flowsheet.   Outcome: Progressing Towards Goal  Note: Fall Risk Interventions:            Medication Interventions: Patient to call before getting OOB, Teach patient to arise slowly

## 2021-03-29 NOTE — PROGRESS NOTES
Care Management Interventions  PCP Verified by CM: Yes  Mode of Transport at Discharge: Self  Transition of Care Consult (CM Consult): Discharge Planning  Current Support Network: Lives with Spouse, Own Home  Confirm Follow Up Transport: Family  Discharge Location  Discharge Placement: Home with family assistance      Per MD pt stable for d/c. Pt was seen by GARY yesterday who did not identify any d/c needs. Pt will d/c home with family support.

## 2021-03-30 LAB
BACTERIA SPEC CULT: NORMAL
SERVICE CMNT-IMP: NORMAL

## 2021-04-01 LAB
BACTERIA SPEC CULT: NORMAL
BACTERIA SPEC CULT: NORMAL
SERVICE CMNT-IMP: NORMAL
SERVICE CMNT-IMP: NORMAL

## 2021-04-26 ENCOUNTER — HOSPITAL ENCOUNTER (OUTPATIENT)
Dept: LAB | Age: 76
Discharge: HOME | End: 2021-04-26

## 2021-04-26 PROCEDURE — 88305 TISSUE EXAM BY PATHOLOGIST: CPT

## 2021-05-10 PROBLEM — D12.6 TUBULOVILLOUS ADENOMA OF COLON: Status: ACTIVE | Noted: 2021-04-26

## 2021-11-15 ENCOUNTER — HOSPITAL ENCOUNTER (OUTPATIENT)
Dept: GENERAL RADIOLOGY | Age: 76
Discharge: HOME | End: 2021-11-15

## 2021-11-15 DIAGNOSIS — M79.605 LEFT LEG PAIN: ICD-10-CM
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
## 2021-11-15 NOTE — PROGRESS NOTES
Please inform patient X ray shows a contusion not a fracture- if still painful we can go to Orthopedic

## 2022-01-04 ENCOUNTER — HOSPITAL ENCOUNTER (OUTPATIENT)
Dept: SURGERY | Age: 77
Discharge: HOME | End: 2022-01-04
Payer: MEDICARE

## 2022-01-04 ENCOUNTER — HOSPITAL ENCOUNTER (OUTPATIENT)
Dept: REHABILITATION | Age: 77
Discharge: HOME | End: 2022-01-04
Payer: MEDICARE

## 2022-01-04 VITALS
HEIGHT: 58 IN | RESPIRATION RATE: 16 BRPM | BODY MASS INDEX: 40.83 KG/M2 | DIASTOLIC BLOOD PRESSURE: 67 MMHG | TEMPERATURE: 97.2 F | WEIGHT: 194.5 LBS | OXYGEN SATURATION: 98 % | HEART RATE: 87 BPM | SYSTOLIC BLOOD PRESSURE: 142 MMHG

## 2022-01-04 DIAGNOSIS — R29.818 SUSPECTED SLEEP APNEA: Primary | ICD-10-CM

## 2022-01-04 LAB
ANION GAP SERPL CALC-SCNC: 2 MMOL/L (ref 7–16)
APTT PPP: 29.5 SEC (ref 24.1–35.1)
BACTERIA SPEC CULT: NORMAL
BASOPHILS # BLD: 0.1 K/UL (ref 0–0.2)
BASOPHILS NFR BLD: 1 % (ref 0–2)
BUN SERPL-MCNC: 19 MG/DL (ref 8–23)
CALCIUM SERPL-MCNC: 8.7 MG/DL (ref 8.3–10.4)
CHLORIDE SERPL-SCNC: 100 MMOL/L (ref 98–107)
CO2 SERPL-SCNC: 35 MMOL/L (ref 21–32)
CREAT SERPL-MCNC: 1.35 MG/DL (ref 0.6–1)
DIFFERENTIAL METHOD BLD: ABNORMAL
EOSINOPHIL # BLD: 0.5 K/UL (ref 0–0.8)
EOSINOPHIL NFR BLD: 4 % (ref 0.5–7.8)
ERYTHROCYTE [DISTWIDTH] IN BLOOD BY AUTOMATED COUNT: 14.8 % (ref 11.9–14.6)
EST. AVERAGE GLUCOSE BLD GHB EST-MCNC: 114 MG/DL
GLUCOSE SERPL-MCNC: 103 MG/DL (ref 65–100)
HBA1C MFR BLD: 5.6 % (ref 4.2–6.3)
HCT VFR BLD AUTO: 37.1 % (ref 35.8–46.3)
HGB BLD-MCNC: 11.7 G/DL (ref 11.7–15.4)
IMM GRANULOCYTES # BLD AUTO: 0.1 K/UL (ref 0–0.5)
IMM GRANULOCYTES NFR BLD AUTO: 1 % (ref 0–5)
INR PPP: 0.9
LYMPHOCYTES # BLD: 2.9 K/UL (ref 0.5–4.6)
LYMPHOCYTES NFR BLD: 24 % (ref 13–44)
MCH RBC QN AUTO: 29.1 PG (ref 26.1–32.9)
MCHC RBC AUTO-ENTMCNC: 31.5 G/DL (ref 31.4–35)
MCV RBC AUTO: 92.3 FL (ref 79.6–97.8)
MONOCYTES # BLD: 0.7 K/UL (ref 0.1–1.3)
MONOCYTES NFR BLD: 5 % (ref 4–12)
NEUTS SEG # BLD: 8.1 K/UL (ref 1.7–8.2)
NEUTS SEG NFR BLD: 66 % (ref 43–78)
NRBC # BLD: 0 K/UL (ref 0–0.2)
PLATELET # BLD AUTO: 311 K/UL (ref 150–450)
PMV BLD AUTO: 11.1 FL (ref 9.4–12.3)
POTASSIUM SERPL-SCNC: 4.5 MMOL/L (ref 3.5–5.1)
PROTHROMBIN TIME: 12.3 SEC (ref 12.6–14.5)
RBC # BLD AUTO: 4.02 M/UL (ref 4.05–5.2)
SERVICE CMNT-IMP: NORMAL
SODIUM SERPL-SCNC: 137 MMOL/L (ref 136–145)
WBC # BLD AUTO: 12.3 K/UL (ref 4.3–11.1)

## 2022-01-04 PROCEDURE — 94760 N-INVAS EAR/PLS OXIMETRY 1: CPT

## 2022-01-04 PROCEDURE — 80048 BASIC METABOLIC PNL TOTAL CA: CPT

## 2022-01-04 PROCEDURE — 93005 ELECTROCARDIOGRAM TRACING: CPT

## 2022-01-04 PROCEDURE — 97161 PT EVAL LOW COMPLEX 20 MIN: CPT

## 2022-01-04 PROCEDURE — 87641 MR-STAPH DNA AMP PROBE: CPT

## 2022-01-04 PROCEDURE — 36415 COLL VENOUS BLD VENIPUNCTURE: CPT

## 2022-01-04 PROCEDURE — 85025 COMPLETE CBC W/AUTO DIFF WBC: CPT

## 2022-01-04 PROCEDURE — 83036 HEMOGLOBIN GLYCOSYLATED A1C: CPT

## 2022-01-04 PROCEDURE — 77030027138 HC INCENT SPIROMETER -A

## 2022-01-04 PROCEDURE — 85610 PROTHROMBIN TIME: CPT

## 2022-01-04 PROCEDURE — 85730 THROMBOPLASTIN TIME PARTIAL: CPT

## 2022-01-04 NOTE — PERIOP NOTES
Patient verified name and . Order for consent  found in EHR and matches case posting; patient verified. Type 3 surgery, joint camp assessment complete. Labs per surgeon: CBC,BMP, PT/PTT, Hgb A1c ; results show WBC 12.3~Danyell Hamilton NP made aware, per protocol. . Other labs are within anesthesia guidelnies; routed via fax to pcp, MIKEL Brice and to surgeon, Dr Carlos Maciel for review. Labs per anesthesia protocol: no additional  EKG:completed today per protocol and within anesthesia guidelines. Patient COVID test date PT TO CALL AND SCHEDULE FOR 22; Patient aware. The testing center [facility], [place]  and telephone number [phone] provided to the patient if not appointment found. MRSA/MSSA swab collected; pharmacy to review and dose antibiotic as appropriate. Hospital approved surgical skin cleanser and instructions to return bottle on DOS given per hospital policy. Patient provided with handouts including Guide to Surgery, Pain Management, Hand Hygiene, Blood Transfusion Education, and [place] [facility] Brochure. Patient answered medical/surgical history questions at their best of ability. All prior to admission medications documented in [facility]. Original medication prescription bottle not visualized during patient appointment. Patient instructed to hold all vitamins 3 weeks prior to surgery and NSAIDS 5 days prior to surgery. Patient teach back successful and patient demonstrates knowledge of instruction.

## 2022-01-04 NOTE — PROGRESS NOTES
22 1200   Oxygen Therapy   O2 Sat (%) 93 %   Pulse via Oximetry 72 beats per minute   O2 Device None (Room air)   Pre-Treatment   Breath Sounds Bilateral Clear;Diminished   Pt's symptoms include:    Snoring  Tiredness- excessive daytime sleepiness  Waking up from sleep gasping or choking  HTN  GERD  Neck size    41          cm  Modified [place] stage 4  SACS Score 18  STOP BANG 6  [place] Sleepiness scale 16  Height   4   '  10  \"   Weight 194    lbs  BMI 40.65    Sleepiness Scale:     Sitting and reading 3    Watching TV 3    Sitting inactive in a public place 1    As a passenger in a car for an hour without a break 3    Lying down to rest in the afternoon when circumstances Permits 3    Sitting and talking to someone 0    Sitting quietly after lunch without alcohol 3    In a car, while stopped for a few minutes 0    Total :  16      Refer patient for sleep study based on above assessment. Initial respiratory Assessment completed with pt. Pt was interviewed and evaluated in Joint camp prior to surgery. Patient ID:  Octaviano Buchanan  [id number]  9 y.o.  1945  Surgeon: Dr. Leny Esqueda  Date of Surgery: 2022  Procedure: Total Left Knee Arthroplasty  Primary Care Physician: Merle Michael [phone]  Specialists:     Pt taught proper COUGH technique  DIAPHRAGMATIC BREATHING EXERCISE INSTRUCTIONS GIVEN    History of smokin PPD FOR 40 YEARS                 Quit date: 2005        Secondhand smoke:FATHER    Past procedures with Oxygen desaturation or delayed awakening:PT STATES \"HEART STOPPED AFTER GALL BLADDER SURGERY\"    Past Medical History:   Diagnosis Date    Acute kidney insufficiency     Followed by PCP     Adverse effect of anesthesia     2016 in [place] heart stopped at the end of my gallbladder surgery. \" Patient reports no CPR performed, stayed one night in the hospital. No problems with any other surgeries.     Allergic rhinitis     Anemia     history     Anxiety and depression     Controlled with medication     Arthritis     Edema     bilateral lower legs     GERD (gastroesophageal reflux disease)     Controlled with medication     History of chicken pox     History of Clostridioides difficile infection     History of DVT (deep vein thrombosis)     s/p knee replacement and developed right leg DVT     HTN (hypertension)     controlled with medication     Hyperlipidemia     controlled with medication     Hyperthyroidism     pt denies    Hypothyroidism     controlled with medication     IBS (irritable bowel syndrome)     Insomnia     Morbid obesity (HCC)     BMI 42.85    Murmur     per PCP note (88/1/48) systolic murmur grade of 2/6    Overactive bladder     PAD (peripheral artery disease) ([facility]     patient denies on 11/27/19    Recurrent UTI     RLS (restless legs syndrome)     controlled with medication     SOBOE (shortness of breath on exertion)     PRN inhaler-patient reports she uses inhaler QHS    Special examinations     History-Neurologist - Dr. 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                                          SLEEPS ON SIDE         PHYSICAL EXAM   Body mass index is 40.65 kg/m².    Visit Vitals  BP (!) 142/67 (BP 1 Location: Right upper arm, BP Patient Position: Sitting)   Pulse 87   Temp 97.2 °F (36.2 °C)   Resp 16   Ht 4' 10\" (1.473 m)   Wt 88.2 kg (194 lb 8 oz)   SpO2 98%   BMI 40.65 kg/m²     Neck circumference:   41   cm    Loud snoring:                                                 YES             Witnessed apnea or wakening gasping or choking:        DENIES      Awakens with headaches:                                               DENIES  Morning or daytime tiredness/ sleepiness:                            TIRED  Dry mouth or sore throat in morning:            YES                                              [place] stage:  4                                   SACS score:18  Stop Bang   STOP-BANG  Does the patient snore loudly (louder than talking or loud enough to be heard through closed doors)?: Yes  Does the patient often feel tired, fatigued, or sleepy during the daytime, even after a \"good\" night's sleep?: Yes  Has anyone ever observed the patient stop breathing during their sleep? : No  Does the patient have or are they being treated for high blood pressure?: Yes  Is the patient's BMI greater than 35?: Yes  Is your neck circumference greater than 17 inches (Male) or 16 inches (Female)?: Yes  Is the patient older than 48?: Yes  Is the patient male?: No  GARLAND Score: 6  Has the patient been referred to Sleep Medicine?: Yes  Has the patient previously been diagnosed with Obstructive Sleep Apnea?: No                            CS HS  RESPIRATORY ASSESSMENT Q SHIFT   AIRVO FOR LUNG EXPANSION HS FOR BMI 40.65- PT AGREEABLE    ALBUTEROL  NEBULIZER Q6 PRN WHEEZING                                            Referrals:  [facility]- PT ASKED TO DO BEFORE SURGERY 1/25/2022  Pt.  Phone Number:  [phone]

## 2022-01-04 NOTE — PERIOP NOTES
PLEASE CONTINUE TAKING ALL PRESCRIPTION MEDICATIONS UP TO THE DAY OF SURGERY UNLESS OTHERWISE DIRECTED BELOW. DISCONTINUE all vitamins and supplements 21 days prior to surgery. DISCONTINUE Non-Steriodal Anti-Inflammatory (NSAIDS) such as Advil, Motrin and Aleve 5 days prior to surgery. Home Medications to take  the day of surgery    Colestipol, Nitrofurantoin, Hyoscyamine, Clonazepam, Escitalopram, Synthroid   USE/bring:  Albuterol inhaler and Flonase nasal spray        Home Medications   to Hold           Comments    Covid test  CALL to SCHEDULE [phone] [place] Friday [place]    On the day before surgery please take Acetaminophen 1000mg in the morning and then again before bed. You may substitute for Tylenol 650 mg.    Bring Inhaler and  Nasal spray       Please do not bring home medications with you on the day of surgery unless otherwise directed by your nurse. If you are instructed to bring home medications, please give them to your nurse as they will be administered by the nursing staff. If you have any questions, please call [facility] [phone] or [facility] [phone]. A copy of this note was provided to the patient for reference.

## 2022-01-04 NOTE — PROGRESS NOTES
Brendon Caal  : [birth year](26 y.o.) [address] at [address], [facility] 91.  [phone]       Physical Therapy Prehab Plan of Treatment and Evaluation Summary:2022    ICD-10: Treatment Diagnosis:   · Pain in Left Knee (M25.562)  · Stiffness of Left Knee, Not elsewhere classified (M25.662)  · Difficulty in walking, Not elsewhere classified (R26.2)  · Other abnormalities of gait and mobility (R26.89)  Precautions/Allergies:   Fire ant, Adhesive, Amoxicillin, Betadine [povidone-iodine], Cephalexin, Demerol [meperidine], Hydromorphone, Morphine, Percocet [oxycodone-acetaminophen], and Percodan [oxycodone hcl-oxycodone-asa]  MEDICAL/REFERRING DIAGNOSIS:  Unilateral primary osteoarthritis, left knee [M17.12]  REFERRING PHYSICIAN: Prieto Akers MD  DATE OF SURGERY: 22    Assessment:   Comments:  Pt with pain in left knee joint, who presents with , 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 Pt had right tka ten years ago in [address] [place] [address]. Some information relayed to social work, and pt provided social work's card, as pt requesting to speak to social work. PROBLEM LIST (Impacting functional limitations):  Ms. Charmaine Marsh presents with the following left lower extremity(s) problems:  1. Transfers  2. Gait  3. Strength  4. Range of Motion  5. Pain   INTERVENTIONS PLANNED:  1. Home Exercise Program  2. Educational Discussion      TREATMENT PLAN: Effective Dates: 2022 TO 2022. Frequency/Duration: Patient to continue to perform home exercise program at least twice per day up until her surgery. GOALS: (Goals have been discussed and agreed upon with patient.)  Discharge Goals: Time Frame: 1 Day  1. Patient will demonstrate independence with a home exercise program designed to increase strength, range of motion and pain control to minimize functional deficits and optimize patient for total joint replacement.   Rehabilitation Potential For Stated Goals: Good  Regarding [facility]'s therapy, I certify that the treatment plan above will be carried out by a therapist or under their direction. Thank you for this referral,  Martin Schaefer, PT               HISTORY:   Present Symptoms:  Pain Intensity 1: 7  Pain Location 1: Knee  Pain Orientation 1: Left   History of Present Injury/Illness (Reason for Referral):  Medical/Referring Diagnosis: Unilateral primary osteoarthritis, left knee [M17.12]   Past Medical History/Comorbidities:   Ms. Camila Morgan  has a past medical history of Acute kidney insufficiency, Adverse effect of anesthesia, Allergic rhinitis, Anemia, Anxiety and depression, Arthritis, Edema, GERD (gastroesophageal reflux disease), History of chicken pox, History of Clostridioides difficile infection, History of DVT (deep vein thrombosis), HTN (hypertension), Hyperlipidemia, Hyperthyroidism, Hypothyroidism, IBS (irritable bowel syndrome), Insomnia, Morbid obesity ([address] Murmur, Overactive bladder, PAD (peripheral artery disease) ([address] Recurrent UTI, RLS (restless legs syndrome), SOBOE (shortness of breath on exertion), Special examinations, MALOU (stress urinary incontinence, female), Thromboembolus ([address] (2015), and 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 She has no past medical history of Difficult intubation, Malignant hyperthermia due to anesthesia, Nausea & vomiting, or Pseudocholinesterase deficiency.   Ms. Camila Morgan  has a past surgical history that includes hx meniscus repair (Left); hx tonsil and adenoidectomy (~1950); hx shoulder arthroscopy (Right, ~1966, ~2013); hx knee replacement (Right, 2014); hx cholecystectomy (12/2016); hx total abdominal hysterectomy (~1970); hx appendectomy (1955); ir gastric band adj w/ fluoro (~2014); hx gyn (12/03/2019); hx other surgical (Right, 2016); hx other surgical (Left, ~2014); hx other surgical (~1986); hx meniscus repair (Left, 2014); hx colonoscopy; hx orthopaedic (Left); hx other surgical (12/03/2019); and hx orthopaedic (Left, 01/2020). Social History/Living Environment:   Home Environment: Private residence  # Steps to Enter: 0  One/Two Story Residence: One story  Living Alone: No  Support Systems: Spouse/Significant Other  Patient Expects to be Discharged to[de-identified] [employer]  Current DME Used/Available at Home: Walker, rolling  Tub or Shower Type: Tub/Shower combination    Work/Activity:  Retired and employed  Dominant Side:  LEFT  Current Medications:  See Pre-assessment nursing note   Number of Personal Factors/Comorbidities that affect the Plan of Care: 0: LOW COMPLEXITY   EXAMINATION:   ADLs (Current Functional Status):   Ambulation:  [x] Independent  [] Walk Indoors Only  [] Walk Outdoors  [] Use Assistive Device  [] Use Wheelchair Only Dressing:  [x] [address] from Someone for:  [] Sock/Shoes  [] Pants  [] Everything   Bathing/Showering:   [x] Independent  [] Requires Assistance from Someone  [] [address]:  [] Routine house and yard work  [x] Light Housework Only  [] None   Observation/Orthostatic Postural Assessment:   Within defined limits   ROM/Flexibility:   Gross Assessment: Yes  AROM: Generally decreased, functional                LLE AROM  L Knee Flexion: 110  L Knee Extension: 0      RLE Assessment  RLE Assessment (WDL): Within defined limits   Strength:   Gross Assessment: Yes  Strength: Generally decreased, functional                  Functional Mobility:    Gross Assessment: Yes  Sensation: Intact    Gait Description (WDL): Within defined limits  Stand to Sit: Independent  Sit to Stand: Independent  Distance (ft): 300 Feet (ft)  Ambulation - Level of Assistance: Independent  Gait Abnormalities: Antalgic          Balance:    Sitting: Intact  Standing: Intact   Body Structures Involved:  1. Bones  2. Joints  3. Muscles  4. Ligaments Body Functions Affected:  1. Neuromusculoskeletal  2. Movement Related Activities and Participation Affected:  1.  Mobility Number of elements that affect the Plan of Care: 4+: HIGH COMPLEXITY   CLINICAL PRESENTATION:   Presentation: Stable and uncomplicated: LOW COMPLEXITY   CLINICAL DECISION MAKING:   Tool Used: Knee injury and Osteoarthritis Outcome Score for Joint Replacement (KOOS, JR)  Score:  Initial: 18 (Interval: 42.281) 1/4/2022 Most Recent: 18 (Interval: 42.281) Date: 1/4   Interpretation of Score: The KOOS, JR contains 7 items from the original KOOS survey. Items are coded from 0 to 4, none to extreme respectively. Tez Gill is scored by summing the raw response (range 0-28) and then converting it to an interval score using the table provided below. The interval score ranges from 0 to 100 where 0 represents total knee disability and 100 represents perfect knee health. Medical Necessity:   · Ms. Nedra Borrero is expected to optimize her lower extremity strength and ROM in preparation for joint replacement surgery. Reason for Services/Other Comments:  · Achieve baseline assesment of musculoskeletal system, functional mobility and home environment. , educate in PT HEP in preparation for surgery, educate in hospital plan of care. Use of outcome tool(s) and clinical judgement create a POC that gives a: Clear prediction of patient's progress: LOW COMPLEXITY   TREATMENT:   Treatment/Session Assessment:  Patient was instructed in PT- HEP to increase strength and ROM in LEs. Answered all questions. · Post session pain:  7  · Compliance with Program/Exercises: compliant most of the time.   Total Treatment Duration:  PT Patient Time In/Time Out  Time In: 1355  Time Out: [address], PT

## 2022-01-04 NOTE — PERIOP NOTES
Recent Results (from the past 8 hour(s))   CBC WITH AUTOMATED DIFF    Collection Time: 01/04/22  1:30 PM   Result Value Ref Range    WBC 12.3 (H) 4.3 - 11.1 K/uL    RBC 4.02 (L) 4.05 - 5.2 M/uL    HGB 11.7 11.7 - 15.4 g/dL    HCT 37.1 35.8 - 46.3 %    MCV 92.3 79.6 - 97.8 FL    MCH 29.1 26.1 - 32.9 PG    MCHC 31.5 31.4 - 35.0 g/dL    RDW 14.8 (H) 11.9 - 14.6 %    PLATELET 895 764 - 887 K/uL    MPV 11.1 9.4 - 12.3 FL    ABSOLUTE NRBC 0.00 0.0 - 0.2 K/uL    DF AUTOMATED      NEUTROPHILS 66 43 - 78 %    LYMPHOCYTES 24 13 - 44 %    MONOCYTES 5 4.0 - 12.0 %    EOSINOPHILS 4 0.5 - 7.8 %    BASOPHILS 1 0.0 - 2.0 %    IMMATURE GRANULOCYTES 1 0.0 - 5.0 %    ABS. NEUTROPHILS 8.1 1.7 - 8.2 K/UL    ABS. LYMPHOCYTES 2.9 0.5 - 4.6 K/UL    ABS. MONOCYTES 0.7 0.1 - 1.3 K/UL    ABS. EOSINOPHILS 0.5 0.0 - 0.8 K/UL    ABS. BASOPHILS 0.1 0.0 - 0.2 K/UL    ABS. IMM.  GRANS. 0.1 0.0 - 0.5 K/UL   HEMOGLOBIN A1C WITH EAG    Collection Time: 01/04/22  1:30 PM   Result Value Ref Range    Hemoglobin A1c 5.6 4.20 - 6.30 %    Est. average glucose 833 mg/dL   METABOLIC PANEL, BASIC    Collection Time: 01/04/22  1:30 PM   Result Value Ref Range    Sodium 137 136 - 145 mmol/L    Potassium 4.5 3.5 - 5.1 mmol/L    Chloride 100 98 - 107 mmol/L    CO2 35 (H) 21 - 32 mmol/L    Anion gap 2 (L) 7 - 16 mmol/L    Glucose 103 (H) 65 - 100 mg/dL    BUN 19 8 - 23 MG/DL    Creatinine 1.35 (H) 0.6 - 1.0 MG/DL    GFR est AA 49 (L) >60 ml/min/1.73m2    GFR est non-AA 41 (L) >60 ml/min/1.73m2    Calcium 8.7 8.3 - 10.4 MG/DL   PROTHROMBIN TIME + INR    Collection Time: 01/04/22  1:30 PM   Result Value Ref Range    Prothrombin time 12.3 (L) 12.6 - 14.5 sec    INR 0.9     PTT    Collection Time: 01/04/22  1:30 PM   Result Value Ref Range    aPTT 29.5 24.1 - 35.1 SEC   EKG, 12 LEAD, INITIAL    Collection Time: 01/04/22  1:36 PM   Result Value Ref Range    Ventricular Rate 68 BPM    Atrial Rate 68 BPM    P-R Interval 136 ms    QRS Duration 70 ms    Q-T Interval 412 ms QTC Calculation (Bezet) 438 ms    Calculated P Axis 42 degrees    Calculated R Axis 46 degrees    Calculated T Axis 40 degrees    Diagnosis       Normal sinus rhythm  Normal ECG  No previous ECGs available

## 2022-01-05 PROBLEM — R29.818 SUSPECTED SLEEP APNEA: Status: ACTIVE | Noted: 2022-01-05

## 2022-01-05 LAB
ATRIAL RATE: 68 BPM
CALCULATED P AXIS, ECG09: 42 DEGREES
CALCULATED R AXIS, ECG10: 46 DEGREES
CALCULATED T AXIS, ECG11: 40 DEGREES
DIAGNOSIS, 93000: NORMAL
P-R INTERVAL, ECG05: 136 MS
Q-T INTERVAL, ECG07: 412 MS
QRS DURATION, ECG06: 70 MS
QTC CALCULATION (BEZET), ECG08: 438 MS
VENTRICULAR RATE, ECG03: 68 BPM

## 2022-01-05 NOTE — ADVANCED PRACTICE NURSE
Total Joint Surgery Preoperative Chart Review      Patient ID:  Teofilo Landaverde  [id number]  14 y.o.  1945  Surgeon: Dr. Jennifer Linares  Date of Surgery: 1/25/2022  Procedure: Total Left Knee Arthroplasty  Primary Care Physician: Golden Alonoz [phone]  Specialty Physician(s):      Subjective:   Teofilo Landaverde is a 77 y.o. WHITE/NON- female who presents for preoperative evaluation for Total Left Knee arthroplasty. This is a preoperative chart review note based on data collected by the nurse at the surgical Pre-Assessment visit. Past Medical History:   Diagnosis Date    Acute kidney insufficiency     Followed by PCP     Adverse effect of anesthesia     2016 in [place] heart stopped at the end of my gallbladder surgery. \" Patient reports no CPR performed, stayed one night in the hospital. No problems with any other surgeries.     Allergic rhinitis     Anemia     history     Anxiety and depression     Controlled with medication     Arthritis     Edema     bilateral lower legs     GERD (gastroesophageal reflux disease)     Controlled with medication     History of chicken pox     History of Clostridioides difficile infection     History of DVT (deep vein thrombosis)     s/p knee replacement and developed right leg DVT     HTN (hypertension)     controlled with medication     Hyperlipidemia     controlled with medication     Hyperthyroidism     pt denies    Hypothyroidism     controlled with medication     IBS (irritable bowel syndrome)     Insomnia     Morbid obesity (HCC)     BMI 42.85    Murmur     per PCP note (03/6/26) systolic murmur grade of 2/6    Overactive bladder     PAD (peripheral artery disease) ([address]     patient denies on 11/27/19    Recurrent UTI     RLS (restless legs syndrome)     controlled with medication     SOBOE (shortness of breath on exertion)     PRN inhaler-patient reports she uses inhaler QHS    Special examinations     History-Neurologist - Dr. Eligio Mena MALOU (stress urinary incontinence, female)     Thromboembolus [facility]) 2015    behind right knee after total knee replacement    Tubulovillous adenoma of colon 2021      Past Surgical History:   Procedure Laterality Date    HX APPENDECTOMY  195    HX CHOLECYSTECTOMY  2016    HX COLONOSCOPY      HX GYN  2019    suburethral sling and cystoscopy    HX KNEE REPLACEMENT Right     HX MENISCUS REPAIR Left     2016    HX MENISCUS REPAIR Left     HX ORTHOPAEDIC Left     LEFT WRIST TRAPEZIOMETACARPAL ARTHROPLASTY WITH FCR TENDON TRANSFER-     HX ORTHOPAEDIC Left 2020    left hand tendon surgery    HX OTHER SURGICAL Right 2016    Hand    HX OTHER SURGICAL Left ~    B/L Ulnar    HX OTHER SURGICAL  ~    urethraplasty    HX OTHER SURGICAL  2019    bladder surgery-put mesh in    HX SHOULDER ARTHROSCOPY Right ~, ~2013    x2    HX TONSIL AND ADENOIDECTOMY  ~    HX TOTAL ABDOMINAL HYSTERECTOMY  ~    IR GASTRIC BAND ADJ W/ FLUORO  ~    lab band removed ~      Family History   Problem Relation Age of Onset    No Known Problems Mother     Other Brother         brain aneurysm, AVM    Heart Surgery Brother         CABGx4    Heart Disease Brother     Hypertension Brother     Other Father 54        brain aneurysm    Other Sister         fibromyalgia    Pneumonia Sister     Heart Attack Maternal Grandmother     Cancer Maternal Grandfather         skin    Heart Attack Paternal Grandfather     Congenital anomalies Sister         right arm deformity    Other Son         Hepatitis C    Drug Abuse Son     Mult Sclerosis Daughter       Social History     Tobacco Use    Smoking status: Former Smoker     Packs/day: 1.00     Years: 40.00     Pack years: 40.00     Types: Cigarettes     Quit date: 2005     Years since quittin.0    Smokeless tobacco: Never Used   Substance Use Topics    Alcohol use: Not Currently       Prior to Admission medications    Medication Sig Start Date End Date Taking? Authorizing Provider   clonazePAM (KlonoPIN) 1 mg tablet Take 1 Tablet by mouth two (2) times daily as needed for Anxiety. Max Daily Amount: 2 mg. Patient taking differently: Take 2 mg by mouth two (2) times a day. Take / use AM day of surgery  per anesthesia protocol 11/29/21  Yes Kiki TERAN NP   cyanocobalamin (VITAMIN B12) 1,000 mcg/mL injection 1,000 mcg by IntraMUSCular route once. Yes Provider, Historical   nitrofurantoin (MACRODANTIN) 50 mg capsule Take 1 Capsule by mouth daily. Indications: bacterial urinary tract infection  Patient taking differently: Take 50 mg by mouth daily. Take / use AM day of surgery  per anesthesia protocols. Indications: bacterial urinary tract infection, urinary tract infection prevention 11/17/21  Yes Monisha Lim NP   colestipoL (COLESTID) 1 gram tablet Take 1 Tablet by mouth two (2) times a day. Patient taking differently: Take 1 g by mouth daily. For IBS; Take / use AM day of surgery  per anesthesia protocols. 10/25/21  Yes Caroline Hugo NP   Synthroid 150 mcg tablet Take 1 Tablet by mouth Daily (before breakfast). DAW1  Patient taking differently: Take 150 mcg by mouth Daily (before breakfast). Take / use AM day of surgery  per anesthesia protocols. 10/25/21  Yes Quoc Bernstein PA-C   amitriptyline (ELAVIL) 25 mg tablet Take 25 mg by mouth nightly. For IBS/colitis 9/14/21  Yes Provider, Historical   ondansetron hcl (ZOFRAN) 4 mg tablet Take 1 Tablet by mouth every eight (8) hours as needed for Nausea. 9/2/21  Yes Kiki TERAN NP   ergocalciferol (ERGOCALCIFEROL) 1,250 mcg (50,000 unit) capsule One capsule by mouth weekly for 4 weeks then one monthly. Indications: vitamin D deficiency (high dose therapy)  Patient taking differently: Take 50,000 Units by mouth. One capsule by mouth weekly for 4 weeks then one monthly.   Indications: prevention of vitamin D deficiency, vitamin D deficiency (high dose therapy) 8/23/21  Yes Geena TERAN NP   pramipexole (Mirapex) 0.5 mg tablet Take 1 Tab by mouth daily. Patient taking differently: Take 0.5 mg by mouth nightly. Indications: restless legs syndrome, an extreme discomfort in the calf muscles when sitting or lying down 5/10/21  Yes Lisa Jimenes NP   fluticasone propionate (FLONASE) 50 mcg/actuation nasal spray 2 Sprays by Both Nostrils route nightly. Indications: inflammation of the nose due to an allergy  Patient taking differently: 2 Sprays by Both Nostrils route daily. Take / use AM day of surgery  per anesthesia protocols. Indications: inflammation of the nose due to an allergy 3/12/21  Yes Geena TERAN NP   cyclobenzaprine (FLEXERIL) 10 mg tablet Take 10 mg by mouth as needed. For back pain   Yes Provider, Historical   lisinopril-hydroCHLOROthiazide (PRINZIDE, ZESTORETIC) 10-12.5 mg per tablet Take 1 Tab by mouth daily. Patient taking differently: Take 1 Tablet by mouth daily. Indications: high blood pressure 2/1/21  Yes Geena TERAN NP   escitalopram oxalate (LEXAPRO) 20 mg tablet Take 1.5 Tabs by mouth daily. Patient taking differently: Take 20 mg by mouth daily. Take / use AM day of surgery  per anesthesia protocols. Indications: repeated episodes of anxiety 10/30/20  Yes Jocelyn Jurado MD   HYDROcodone-acetaminophen (Norco) 7.5-325 mg per tablet Take 1 Tablet by mouth every six (6) hours as needed. Indications: pain   Yes Provider, Historical   hyoscyamine sulfate (CYSTOSPAZ) 0.125 mg tablet Take 1 Tab by mouth every four (4) hours as needed for Cramping or Nausea. Indications: irritable colon  Patient taking differently: Take 0.125 mg by mouth every four (4) hours as needed for Cramping or Nausea.  Take / use AM day of surgery  per anesthesia protocols, if needed  Indications: irritable colon 9/22/20  Yes Bhanu Kulkarni NP   clotrimazole-betamethasone (LOTRISONE) topical cream Apply  to affected area two (2) times a day. Until rash resolves  Patient taking differently: Apply  to affected area as needed. Until rash resolves 7/22/20  Yes Lorenzo Bernstein PA-C   albuterol (PROVENTIL HFA, VENTOLIN HFA, PROAIR HFA) 90 mcg/actuation inhaler Take 1-2 Puffs by inhalation every six (6) hours as needed for Wheezing. Patient taking differently: Take 1-2 Puffs by inhalation every six (6) hours as needed for Wheezing. Take / use AM day of surgery  per anesthesia protocols. 1/14/20  Yes Nirmal Harvey MD     Allergies   Allergen Reactions    Fire Ant Anaphylaxis    Adhesive Rash and Itching     \"sticky ace wrap\"    Amoxicillin Other (comments)     C-Diff    Betadine [Povidone-Iodine] Other (comments)     Mcgrath    Cephalexin Other (comments)     C-Diff    Demerol [Meperidine] Rash    Hydromorphone Other (comments)     Keeps her awake    Morphine Other (comments)     Awake    Percocet [Oxycodone-Acetaminophen] Other (comments)     Jittery    Percodan [Oxycodone Hcl-Oxycodone-Asa] Other (comments)     Jittery          Objective:     Physical Exam:   No data found. ECG:    EKG Results     Procedure 720 Value Units Date/Time    EKG, 12 LEAD, INITIAL [[id number]] Collected: 01/04/22 1336    Order Status: Completed Updated: 01/05/22 1452     Ventricular Rate 68 BPM      Atrial Rate 68 BPM      P-R Interval 136 ms      QRS Duration 70 ms      Q-T Interval 412 ms      QTC Calculation (Bezet) 438 ms      Calculated P Axis 42 degrees      Calculated R Axis 46 degrees      Calculated T Axis 40 degrees      Diagnosis --     Normal sinus rhythm  Normal ECG  No previous ECGs available  Confirmed by ST LUCAS SERRANO MD (), GREGORIO JENKINS ([id number]) on 1/5/2022 2:52:17 PM            Data Review:   Labs:   No results found for this or any previous visit (from the past 24 hour(s)).       Problem List:  )  Patient Active Problem List   Diagnosis Code    HLD (hyperlipidemia) E78.5    HTN (hypertension) I10    Hypothyroidism (acquired) E03.9  Anemia, iron deficiency D50.9    IBS (irritable bowel syndrome) K58.9    Insomnia G47.00    Mood disorder ([facility]) F39    PAD (peripheral artery disease) ([facility]) I73.9    RLS (restless legs syndrome) G25.81    OA (osteoarthritis) M19.90    Encounter for coordination of complex care Z71.89    Fungal dermatitis B36.9    Family history of autoimmune disorder Z83.2    Gluten intolerance K90.41    Encounter for medication management Z79.899    Abnormal flushing and sweating R23.2, R61    BONNIE (generalized anxiety disorder) F41.1    GERD (gastroesophageal reflux disease) K21.9    Postmenopausal HRT (hormone replacement therapy) Z79.890    Hymenoptera allergy Z91.038    AR (allergic rhinitis) J30.9    Fibromyalgia M79.7    OAB (overactive bladder) N32.81    Hemorrhoids K64.9    Hashimoto's thyroiditis E06.3    Obesity hypoventilation syndrome ([facility]) E66.2    GUIDO (dyspnea on exertion) R06.00    Chronic nausea R11.0    ANDREA positive R76.8    Allergic dermatitis L23.9    MALOU (stress urinary incontinence, female) N39.3    Vaginal atrophy N95.2    Gait abnormality R26.9    Obesity, morbid, BMI 40.0-49.9 ([facility]) E66.01    Left flank pain R10.9    Altered mental status R41.82    Idiopathic hypotension I95.0    Diplopia H53.2    Weakness generalized R53.1    Numbness R20.0    Fall W19. XXXA    Postural imbalance R29.3    Urinary frequency R35.0    Renal cyst N28.1    DDD (degenerative disc disease), lumbar M51.36    Lumbar radiculopathy M54.16    Chronic pain disorder G89.4    Vitamin B12 deficiency E53.8    Arthritis of left knee M17.12    Hx of total knee replacement, right Z96.651    Anserine bursitis M70.50    Hematochezia K92.1    Colitis K52.9    Leukocytosis D72.829    History of Clostridioides difficile colitis Z86.19    Tubulovillous adenoma of colon D12.6    Suspected sleep apnea R29.818       Total Joint Surgery Pre-Assessment Recommendations:           Patient reports the symptoms of snoring, fatigue, observed apnea and /or excessive daytime sleepiness. Will refer patient for HST based on above assessment. Recommend continuous saturation monitoring hours of sleep, during hospitalization. Heated high flow lung expansion qhs and prn. Continuous saturation monitoring during hospitalization. O2 prn per respiratory protocol. HST- PT ASKED TO DO BEFORE SURGERY 1/25/2022  Pt.  Phone Number:  [phone]    Signed By: ESTEFANY Bourgeois    January 5, 2022

## 2022-01-21 NOTE — H&P
H&P    Patient ID:  Pal Bear  [id number]  56 y.o.  1945  Surgeon:  Mimi Albrecht MD  Date of Surgery: * No surgery date entered *  Procedure: Left Total Knee Arthroplasty  Primary Care Physician: Javier Perdomo NP        Subjective:  Pal Bear is a 77 y.o. WHITE/NON- female who presents with left knee pain. She has history of left knee pain for several months. Symptoms worse with walking long distances and relieved with rest. Conservative treatment consisting of  activity modification and injections have not helped. The patient lives with their family. The patients goal after surgery is improved pain and function. Past Medical History:   Diagnosis Date    Acute kidney insufficiency     Followed by PCP     Adverse effect of anesthesia     2016 in [place] heart stopped at the end of my gallbladder surgery. \" Patient reports no CPR performed, stayed one night in the hospital. No problems with any other surgeries.     Allergic rhinitis     Anemia     history     Anxiety and depression     Controlled with medication     Arthritis     Edema     bilateral lower legs     GERD (gastroesophageal reflux disease)     Controlled with medication     History of chicken pox     History of Clostridioides difficile infection     History of DVT (deep vein thrombosis)     s/p knee replacement and developed right leg DVT     HTN (hypertension)     controlled with medication     Hyperlipidemia     controlled with medication     Hyperthyroidism     pt denies    Hypothyroidism     controlled with medication     IBS (irritable bowel syndrome)     Insomnia     Morbid obesity (HCC)     BMI 42.85    Murmur     per PCP note (72/5/08) systolic murmur grade of 2/6    Overactive bladder     PAD (peripheral artery disease) ([facility] [address]     patient denies on 11/27/19    Recurrent UTI     RLS (restless legs syndrome)     controlled with medication     SOBOE (shortness of breath on exertion)     PRN inhaler-patient reports she uses inhaler QHS    Special examinations     History-Neurologist - Dr. 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      Past Surgical History:   Procedure Laterality Date    HX APPENDECTOMY  1954    HX CHOLECYSTECTOMY  12/2016    HX COLONOSCOPY      HX GYN  12/03/2019    suburethral sling and cystoscopy    HX KNEE REPLACEMENT Right 2014    HX MENISCUS REPAIR Left     4/2016    HX MENISCUS REPAIR Left 2014    HX ORTHOPAEDIC Left     LEFT WRIST TRAPEZIOMETACARPAL ARTHROPLASTY WITH FCR TENDON TRANSFER- 2020    HX ORTHOPAEDIC Left 01/2020    left hand tendon surgery    HX OTHER SURGICAL Right 2016    Hand    HX OTHER SURGICAL Left ~2014    B/L Ulnar    HX OTHER SURGICAL  ~1986    urethraplasty    HX OTHER SURGICAL  12/03/2019    bladder surgery-put mesh in    HX SHOULDER ARTHROSCOPY Right ~1966, ~2013    x2    HX TONSIL AND ADENOIDECTOMY  ~1950    HX TOTAL ABDOMINAL HYSTERECTOMY  ~1970    IR GASTRIC BAND ADJ W/ FLUORO  ~2014    lab band removed ~ 2016     Family History   Problem Relation Age of Onset    No Known Problems Mother     Other Brother         brain aneurysm, AVM    Heart Surgery Brother         CABGx4    Heart Disease Brother     Hypertension Brother     Other Father 54        brain aneurysm    Other Sister         fibromyalgia    Pneumonia Sister     Heart Attack Maternal Grandmother     Cancer Maternal Grandfather         skin    Heart Attack Paternal Grandfather     Congenital anomalies Sister         right arm deformity    Other Son         Hepatitis C    Drug Abuse Son     Mult Sclerosis Daughter       Social History     Tobacco Use    Smoking status: Former Smoker     Packs/day: 1.00     Years: 40.00     Pack years: 40.00     Types: Cigarettes     Quit date: 12/6/2005     Years since quittin.1    Smokeless tobacco: Never Used   Substance Use Topics    Alcohol use: Not Currently       Prior to Admission medications    Medication Sig Start Date End Date Taking? Authorizing Provider   clonazePAM (KlonoPIN) 1 mg tablet Take 1 Tablet by mouth two (2) times daily as needed for Anxiety. Max Daily Amount: 2 mg. 22   Oriana Bryan NP   ergocalciferol (ERGOCALCIFEROL) 1,250 mcg (50,000 unit) capsule Take 1 Capsule by mouth every seven (7) days. Indications: vitamin D deficiency (high dose therapy) 22   Oriana Bryan NP   escitalopram oxalate (LEXAPRO) 20 mg tablet Take 1.5 Tablets by mouth daily. 22   Oriana Bryan NP   naloxone [facility]) 4 mg/actuation nasal spray Use 1 spray intranasally, then discard. Repeat with new spray every 2 min as needed for opioid overdose symptoms, alternating nostrils. Indications: opioid overdose 22   Oriana Bryan NP   cyanocobalamin (VITAMIN B12) 1,000 mcg/mL injection 1,000 mcg by IntraMUSCular route once. Provider, Historical   nitrofurantoin (MACRODANTIN) 50 mg capsule Take 1 Capsule by mouth daily. Indications: bacterial urinary tract infection  Patient taking differently: Take 50 mg by mouth daily. Take / use AM day of surgery  per anesthesia protocols. Indications: bacterial urinary tract infection, urinary tract infection prevention 21   Shalom Lim, NP   colestipoL (COLESTID) 1 gram tablet Take 1 Tablet by mouth two (2) times a day. Patient taking differently: Take 1 g by mouth daily. For IBS; Take / use AM day of surgery  per anesthesia protocols. 10/25/21   Oriana Bryan NP   Synthroid 150 mcg tablet Take 1 Tablet by mouth Daily (before breakfast). DAW1  Patient taking differently: Take 150 mcg by mouth Daily (before breakfast). Take / use AM day of surgery  per anesthesia protocols. 10/25/21   Charly Bender PA-C   amitriptyline (ELAVIL) 25 mg tablet Take 25 mg by mouth nightly.  For IBS/colitis 9/14/21   Provider, Historical   ondansetron hcl (ZOFRAN) 4 mg tablet Take 1 Tablet by mouth every eight (8) hours as needed for Nausea. 9/2/21   Merle Michael NP   pramipexole (Mirapex) 0.5 mg tablet Take 1 Tab by mouth daily. Patient taking differently: Take 0.5 mg by mouth nightly. Indications: restless legs syndrome, an extreme discomfort in the calf muscles when sitting or lying down 5/10/21   Ti TERAN NP   fluticasone propionate (FLONASE) 50 mcg/actuation nasal spray 2 Sprays by Both Nostrils route nightly. Indications: inflammation of the nose due to an allergy  Patient taking differently: 2 Sprays by Both Nostrils route daily. Take / use AM day of surgery  per anesthesia protocols. Indications: inflammation of the nose due to an allergy 3/12/21   Merle Michael NP   cyclobenzaprine (FLEXERIL) 10 mg tablet Take 10 mg by mouth as needed. For back pain    Provider, Historical   lisinopril-hydroCHLOROthiazide (PRINZIDE, ZESTORETIC) 10-12.5 mg per tablet Take 1 Tab by mouth daily. Patient taking differently: Take 1 Tablet by mouth daily. Indications: high blood pressure 2/1/21   Merle Michael NP   HYDROcodone-acetaminophen (Norco) 7.5-325 mg per tablet Take 1 Tablet by mouth every six (6) hours as needed. Indications: pain    Provider, Historical   hyoscyamine sulfate (CYSTOSPAZ) 0.125 mg tablet Take 1 Tab by mouth every four (4) hours as needed for Cramping or Nausea. Indications: irritable colon  Patient taking differently: Take 0.125 mg by mouth every four (4) hours as needed for Cramping or Nausea. Take / use AM day of surgery  per anesthesia protocols, if needed  Indications: irritable colon 9/22/20   Merle Mcihael NP   clotrimazole-betamethasone (LOTRISONE) topical cream Apply  to affected area two (2) times a day. Until rash resolves  Patient taking differently: Apply  to affected area as needed.  Until rash resolves 7/22/20   Rimma ZAVALETA PA-C   albuterol (PROVENTIL HFA, VENTOLIN HFA, PROAIR HFA) 90 mcg/actuation inhaler Take 1-2 Puffs by inhalation every six (6) hours as needed for Wheezing. Patient taking differently: Take 1-2 Puffs by inhalation every six (6) hours as needed for Wheezing. Take / use AM day of surgery  per anesthesia protocols. 1/14/20   Yonny Vargas MD     Allergies   Allergen Reactions    Fire Ant Anaphylaxis    Adhesive Rash and Itching     \"sticky ace wrap\"    Amoxicillin Other (comments)     C-Diff    Betadine [Povidone-Iodine] Other (comments)     Mcgrath    Cephalexin Other (comments)     C-Diff    Demerol [Meperidine] Rash    Hydromorphone Other (comments)     Keeps her awake    Morphine Other (comments)     Awake    Percocet [Oxycodone-Acetaminophen] Other (comments)     Jittery    Percodan [Oxycodone Hcl-Oxycodone-Asa] Other (comments)     Jittery        REVIEW OF SYSTEMS:  CONSTITUTIONAL: Denies fever, decreased appetite, weight loss/gain, night sweats or fatigue. HEENT: Denies vision or hearing changes. denies glasses. denies hearing aids. CARDIAC: Denies CP, palpitations, rheumatic fever, murmur, peripheral edema, carotid artery disease or syncopal episodes. RESPIRATORY: Denies dyspnea on exertion, asthma, COPD or orthopnea. GI: Denies GERD, history of GI bleed or melena, PUD, hepatitis or cirrhosis. : Denies dysuria, hematuria. denies BPH symptoms. HEMATOLOGIC: Denies anemia or blood disorders. ENDOCRINE: Denies thyroid disease. MUSCULOSKELETAL: See HPI. NEUROLOGIC: Denies seizure, peripheral neuropathy or memory loss. PSYCH: Denies depression, anxiety or insomnia. SKIN: Denies rash or open sores. Objective:    PHYSICAL EXAM  GENERAL: No data found. EYES: PERRL. EOM intact. MOUTH:Teeth and Gums normal. NECK: Full ROM. Trachea midline. No thyromegaly or JVD. CARDIOVASCULAR: Regular rate and rhythm. No murmur or gallops. No carotid bruits. Peripheral pulses: radial 2 +, PT 2+, DP 2+ bilaterally. LUNGS: CTA bilaterally.  No wheezes, rhonchi or rales. GI: positive BS. Abdomen nontender. NEUROLOGIC: Alert and oriented x 3. Bilateral equal strong had grasp and bilateral equal strong plantar flexion and dorsiflexion. GAIT: abnormal MUSCULOSKELETAL: ROM: full with pain. Tenderness: over the medial and lateral joint lines. Crepitus: present. SKIN: No rash, bruising, swelling, redness or warmth. Labs:  No results found for this or any previous visit (from the past 24 hour(s)). Xray Left knee: joint space narrowing with advanced degenerative changes. Assessment:  Advanced Left Knee Osteoarthritis. Total Leftt Knee Arthroplasty Indicated.   Patient Active Problem List   Diagnosis Code    HLD (hyperlipidemia) E78.5    HTN (hypertension) I10    Hypothyroidism (acquired) E03.9    Anemia, iron deficiency D50.9    IBS (irritable bowel syndrome) K58.9    Insomnia G47.00    Mood disorder ([facility]) F39    PAD (peripheral artery disease) ([facility]) I73.9    RLS (restless legs syndrome) G25.81    OA (osteoarthritis) M19.90    Encounter for coordination of complex care Z71.89    Fungal dermatitis B36.9    Family history of autoimmune disorder Z83.2    Gluten intolerance K90.41    Encounter for medication management Z79.899    Abnormal flushing and sweating R23.2, R61    BONNIE (generalized anxiety disorder) F41.1    GERD (gastroesophageal reflux disease) K21.9    Postmenopausal HRT (hormone replacement therapy) Z79.890    Hymenoptera allergy Z91.038    AR (allergic rhinitis) J30.9    Fibromyalgia M79.7    OAB (overactive bladder) N32.81    Hemorrhoids K64.9    Hashimoto's thyroiditis E06.3    Obesity hypoventilation syndrome ([facility]) E66.2    GUIDO (dyspnea on exertion) R06.00    Chronic nausea R11.0    ANDREA positive R76.8    Allergic dermatitis L23.9    MALOU (stress urinary incontinence, female) N39.3    Vaginal atrophy N95.2    Gait abnormality R26.9    Obesity, morbid, BMI 40.0-49.9 ([facility]) E66.01    Left flank pain R10.9    Altered mental status R41.82    Idiopathic hypotension I95.0    Diplopia H53.2    Weakness generalized R53.1    Numbness R20.0    Fall W19. XXXA    Postural imbalance R29.3    Urinary frequency R35.0    Renal cyst N28.1    DDD (degenerative disc disease), lumbar M51.36    Lumbar radiculopathy M54.16    Chronic pain disorder G89.4    Vitamin B12 deficiency E53.8    Arthritis of left knee M17.12    Hx of total knee replacement, right Z96.651    Anserine bursitis M70.50    Hematochezia K92.1    Colitis K52.9    Leukocytosis D72.829    History of Clostridioides difficile colitis Z86.19    Tubulovillous adenoma of colon D12.6    Suspected sleep apnea R29.818       Plan:  I have advised the patient of the risks and consequences, including possible complications of performing total joint replacement, as well as not doing this operation. The patient had the opportunity to ask questions and have them answered to their satisfaction.      Signed:  MICHELLE Nolan 1/21/2022

## 2022-01-23 PROBLEM — E55.9 VITAMIN D DEFICIENCY: Status: ACTIVE | Noted: 2022-01-23

## 2022-01-24 ENCOUNTER — ANESTHESIA EVENT (OUTPATIENT)
Dept: SURGERY | Age: 77
End: 2022-01-24
Payer: MEDICARE

## 2022-01-25 ENCOUNTER — HOSPITAL ENCOUNTER (OUTPATIENT)
Age: 77
Discharge: HOME HEALTH | End: 2022-01-26
Attending: ORTHOPAEDIC SURGERY | Admitting: ORTHOPAEDIC SURGERY
Payer: MEDICARE

## 2022-01-25 ENCOUNTER — HOME HEALTH ADMISSION (OUTPATIENT)
Dept: HOME HEALTH SERVICES | Facility: HOME HEALTH | Age: 77
End: 2022-01-25
Payer: MEDICARE

## 2022-01-25 ENCOUNTER — ANESTHESIA (OUTPATIENT)
Dept: SURGERY | Age: 77
End: 2022-01-25
Payer: MEDICARE

## 2022-01-25 DIAGNOSIS — Z96.652 TOTAL KNEE REPLACEMENT STATUS, LEFT: Primary | ICD-10-CM

## 2022-01-25 PROBLEM — M17.12 DEGENERATIVE ARTHRITIS OF LEFT KNEE: Status: ACTIVE | Noted: 2022-01-25

## 2022-01-25 LAB — HGB BLD-MCNC: 11.9 G/DL (ref 11.7–15.4)

## 2022-01-25 PROCEDURE — 77030013819 HC MX SYS CEM ZIMM -B: Performed by: ORTHOPAEDIC SURGERY

## 2022-01-25 PROCEDURE — 77030038692 HC WND DEB SYS IRMX -B: Performed by: ORTHOPAEDIC SURGERY

## 2022-01-25 PROCEDURE — 77030031139 HC SUT VCRL2 J&J -A: Performed by: ORTHOPAEDIC SURGERY

## 2022-01-25 PROCEDURE — 94760 N-INVAS EAR/PLS OXIMETRY 1: CPT

## 2022-01-25 PROCEDURE — 77030012935 HC DRSG AQUACEL BMS -B: Performed by: ORTHOPAEDIC SURGERY

## 2022-01-25 PROCEDURE — 97165 OT EVAL LOW COMPLEX 30 MIN: CPT

## 2022-01-25 PROCEDURE — 74011250636 HC RX REV CODE- 250/636: Performed by: NURSE ANESTHETIST, CERTIFIED REGISTERED

## 2022-01-25 PROCEDURE — 74011250636 HC RX REV CODE- 250/636: Performed by: ANESTHESIOLOGY

## 2022-01-25 PROCEDURE — 77030013708 HC HNDPC SUC IRR PULS STRY –B: Performed by: ORTHOPAEDIC SURGERY

## 2022-01-25 PROCEDURE — 77030016544 HC BLD SAW RECIP1 STRY -B: Performed by: ORTHOPAEDIC SURGERY

## 2022-01-25 PROCEDURE — 77010033711 HC HIGH FLOW OXYGEN

## 2022-01-25 PROCEDURE — 77030040922 HC BLNKT HYPOTHRM STRY -A: Performed by: ANESTHESIOLOGY

## 2022-01-25 PROCEDURE — C1776 JOINT DEVICE (IMPLANTABLE): HCPCS | Performed by: ORTHOPAEDIC SURGERY

## 2022-01-25 PROCEDURE — 77030006835 HC BLD SAW SAG STRY -B: Performed by: ORTHOPAEDIC SURGERY

## 2022-01-25 PROCEDURE — 74011000258 HC RX REV CODE- 258: Performed by: ORTHOPAEDIC SURGERY

## 2022-01-25 PROCEDURE — 74011250636 HC RX REV CODE- 250/636: Performed by: PHYSICIAN ASSISTANT

## 2022-01-25 PROCEDURE — 77030003602 HC NDL NRV BLK BBMI -B: Performed by: ANESTHESIOLOGY

## 2022-01-25 PROCEDURE — 2709999900 HC NON-CHARGEABLE SUPPLY

## 2022-01-25 PROCEDURE — 76210000006 HC OR PH I REC 0.5 TO 1 HR: Performed by: ORTHOPAEDIC SURGERY

## 2022-01-25 PROCEDURE — 74011000250 HC RX REV CODE- 250: Performed by: PHYSICIAN ASSISTANT

## 2022-01-25 PROCEDURE — 27447 TOTAL KNEE ARTHROPLASTY: CPT | Performed by: PHYSICIAN ASSISTANT

## 2022-01-25 PROCEDURE — 76942 ECHO GUIDE FOR BIOPSY: CPT | Performed by: ORTHOPAEDIC SURGERY

## 2022-01-25 PROCEDURE — 74011000250 HC RX REV CODE- 250: Performed by: ORTHOPAEDIC SURGERY

## 2022-01-25 PROCEDURE — 97535 SELF CARE MNGMENT TRAINING: CPT

## 2022-01-25 PROCEDURE — 36415 COLL VENOUS BLD VENIPUNCTURE: CPT

## 2022-01-25 PROCEDURE — 74011250637 HC RX REV CODE- 250/637: Performed by: PHYSICIAN ASSISTANT

## 2022-01-25 PROCEDURE — 74011250637 HC RX REV CODE- 250/637: Performed by: ANESTHESIOLOGY

## 2022-01-25 PROCEDURE — 97110 THERAPEUTIC EXERCISES: CPT

## 2022-01-25 PROCEDURE — 85018 HEMOGLOBIN: CPT

## 2022-01-25 PROCEDURE — 77030020044 HC CLD THERAPY UNIT -B

## 2022-01-25 PROCEDURE — 74011000250 HC RX REV CODE- 250: Performed by: NURSE ANESTHETIST, CERTIFIED REGISTERED

## 2022-01-25 PROCEDURE — 74011000250 HC RX REV CODE- 250: Performed by: ANESTHESIOLOGY

## 2022-01-25 PROCEDURE — 76010000171 HC OR TIME 2 TO 2.5 HR INTENSV-TIER 1: Performed by: ORTHOPAEDIC SURGERY

## 2022-01-25 PROCEDURE — 2709999900 HC NON-CHARGEABLE SUPPLY: Performed by: ORTHOPAEDIC SURGERY

## 2022-01-25 PROCEDURE — 74011250636 HC RX REV CODE- 250/636: Performed by: ORTHOPAEDIC SURGERY

## 2022-01-25 PROCEDURE — 94762 N-INVAS EAR/PLS OXIMTRY CONT: CPT

## 2022-01-25 PROCEDURE — 97161 PT EVAL LOW COMPLEX 20 MIN: CPT

## 2022-01-25 PROCEDURE — C1713 ANCHOR/SCREW BN/BN,TIS/BN: HCPCS | Performed by: ORTHOPAEDIC SURGERY

## 2022-01-25 PROCEDURE — 76060000035 HC ANESTHESIA 2 TO 2.5 HR: Performed by: ORTHOPAEDIC SURGERY

## 2022-01-25 PROCEDURE — 77030003665 HC NDL SPN BBMI -A: Performed by: ANESTHESIOLOGY

## 2022-01-25 PROCEDURE — 77030007880 HC KT SPN EPDRL BBMI -B: Performed by: ANESTHESIOLOGY

## 2022-01-25 PROCEDURE — 74011250637 HC RX REV CODE- 250/637: Performed by: ORTHOPAEDIC SURGERY

## 2022-01-25 PROCEDURE — 76010010054 HC POST OP PAIN BLOCK: Performed by: ORTHOPAEDIC SURGERY

## 2022-01-25 PROCEDURE — 27447 TOTAL KNEE ARTHROPLASTY: CPT | Performed by: ORTHOPAEDIC SURGERY

## 2022-01-25 PROCEDURE — 77030033067 HC SUT PDO STRATFX SPIR J&J -B: Performed by: ORTHOPAEDIC SURGERY

## 2022-01-25 DEVICE — INSERT TIB SZ 4 THK7MM KNEE POST STBL FIX BEAR ATTUNE: Type: IMPLANTABLE DEVICE | Site: KNEE | Status: FUNCTIONAL

## 2022-01-25 DEVICE — BASEPLATE TIB SZ 4 FIX BEAR CO CHROM MOLYBDENUM TI ALLY END: Type: IMPLANTABLE DEVICE | Site: KNEE | Status: FUNCTIONAL

## 2022-01-25 DEVICE — KNEE K1 TOT HEMI STD CEM IMPL CAPPED SYNTHES: Type: IMPLANTABLE DEVICE | Status: FUNCTIONAL

## 2022-01-25 DEVICE — CEMENT BONE 40GM HI VISC PALACOS R: Type: IMPLANTABLE DEVICE | Site: KNEE | Status: FUNCTIONAL

## 2022-01-25 DEVICE — STEM FEM L50MM DIA14MM KNEE CEM REV ATTUNE: Type: IMPLANTABLE DEVICE | Site: KNEE | Status: FUNCTIONAL

## 2022-01-25 DEVICE — COMPONENT FEM SZ 4 L KNEE POST STBL CEM ATTUNE: Type: IMPLANTABLE DEVICE | Site: KNEE | Status: FUNCTIONAL

## 2022-01-25 RX ORDER — LIDOCAINE HYDROCHLORIDE 10 MG/ML
0.1 INJECTION INFILTRATION; PERINEURAL AS NEEDED
Status: DISCONTINUED | OUTPATIENT
Start: 2022-01-25 | End: 2022-01-25 | Stop reason: HOSPADM

## 2022-01-25 RX ORDER — MIDAZOLAM HYDROCHLORIDE 1 MG/ML
2 INJECTION, SOLUTION INTRAMUSCULAR; INTRAVENOUS
Status: DISCONTINUED | OUTPATIENT
Start: 2022-01-25 | End: 2022-01-25 | Stop reason: HOSPADM

## 2022-01-25 RX ORDER — LISINOPRIL AND HYDROCHLOROTHIAZIDE 10; 12.5 MG/1; MG/1
1 TABLET ORAL DAILY
Status: DISCONTINUED | OUTPATIENT
Start: 2022-01-26 | End: 2022-01-26 | Stop reason: HOSPADM

## 2022-01-25 RX ORDER — NALOXONE HYDROCHLORIDE 0.4 MG/ML
.2-.4 INJECTION, SOLUTION INTRAMUSCULAR; INTRAVENOUS; SUBCUTANEOUS
Status: DISCONTINUED | OUTPATIENT
Start: 2022-01-25 | End: 2022-01-26 | Stop reason: HOSPADM

## 2022-01-25 RX ORDER — PRAMIPEXOLE DIHYDROCHLORIDE 0.25 MG/1
0.5 TABLET ORAL
Status: DISCONTINUED | OUTPATIENT
Start: 2022-01-25 | End: 2022-01-26 | Stop reason: HOSPADM

## 2022-01-25 RX ORDER — VANCOMYCIN/0.9 % SOD CHLORIDE 1.5G/250ML
1500 PLASTIC BAG, INJECTION (ML) INTRAVENOUS ONCE
Status: COMPLETED | OUTPATIENT
Start: 2022-01-25 | End: 2022-01-25

## 2022-01-25 RX ORDER — ACETAMINOPHEN 325 MG/1
975 TABLET ORAL ONCE
Status: DISCONTINUED | OUTPATIENT
Start: 2022-01-25 | End: 2022-01-25 | Stop reason: SDUPTHER

## 2022-01-25 RX ORDER — CLINDAMYCIN PHOSPHATE 900 MG/50ML
900 INJECTION INTRAVENOUS ONCE
Status: COMPLETED | OUTPATIENT
Start: 2022-01-25 | End: 2022-01-25

## 2022-01-25 RX ORDER — ONDANSETRON 8 MG/1
8 TABLET, ORALLY DISINTEGRATING ORAL
Status: DISCONTINUED | OUTPATIENT
Start: 2022-01-25 | End: 2022-01-26 | Stop reason: HOSPADM

## 2022-01-25 RX ORDER — FENTANYL CITRATE 50 UG/ML
100 INJECTION, SOLUTION INTRAMUSCULAR; INTRAVENOUS
Status: COMPLETED | OUTPATIENT
Start: 2022-01-25 | End: 2022-01-25

## 2022-01-25 RX ORDER — LEVOTHYROXINE SODIUM 75 UG/1
150 TABLET ORAL
Status: DISCONTINUED | OUTPATIENT
Start: 2022-01-26 | End: 2022-01-25

## 2022-01-25 RX ORDER — ACETAMINOPHEN 500 MG
1000 TABLET ORAL EVERY 6 HOURS
Status: DISCONTINUED | OUTPATIENT
Start: 2022-01-25 | End: 2022-01-26 | Stop reason: HOSPADM

## 2022-01-25 RX ORDER — DEXAMETHASONE SODIUM PHOSPHATE 100 MG/10ML
10 INJECTION INTRAMUSCULAR; INTRAVENOUS ONCE
Status: DISCONTINUED | OUTPATIENT
Start: 2022-01-26 | End: 2022-01-26 | Stop reason: HOSPADM

## 2022-01-25 RX ORDER — CLOTRIMAZOLE AND BETAMETHASONE DIPROPIONATE 10; .64 MG/G; MG/G
CREAM TOPICAL AS NEEDED
Status: DISCONTINUED | OUTPATIENT
Start: 2022-01-25 | End: 2022-01-26 | Stop reason: HOSPADM

## 2022-01-25 RX ORDER — ACETAMINOPHEN 650 MG/1
650 SUPPOSITORY RECTAL ONCE
Status: DISCONTINUED | OUTPATIENT
Start: 2022-01-25 | End: 2022-01-25 | Stop reason: SDUPTHER

## 2022-01-25 RX ORDER — MIDAZOLAM HYDROCHLORIDE 1 MG/ML
2 INJECTION, SOLUTION INTRAMUSCULAR; INTRAVENOUS
Status: COMPLETED | OUTPATIENT
Start: 2022-01-25 | End: 2022-01-25

## 2022-01-25 RX ORDER — DIPHENHYDRAMINE HCL 25 MG
25 CAPSULE ORAL
Status: DISCONTINUED | OUTPATIENT
Start: 2022-01-25 | End: 2022-01-26 | Stop reason: HOSPADM

## 2022-01-25 RX ORDER — SODIUM CHLORIDE, SODIUM LACTATE, POTASSIUM CHLORIDE, CALCIUM CHLORIDE 600; 310; 30; 20 MG/100ML; MG/100ML; MG/100ML; MG/100ML
100 INJECTION, SOLUTION INTRAVENOUS CONTINUOUS
Status: DISCONTINUED | OUTPATIENT
Start: 2022-01-25 | End: 2022-01-25 | Stop reason: HOSPADM

## 2022-01-25 RX ORDER — AMOXICILLIN 250 MG
2 CAPSULE ORAL DAILY
Status: DISCONTINUED | OUTPATIENT
Start: 2022-01-26 | End: 2022-01-26 | Stop reason: HOSPADM

## 2022-01-25 RX ORDER — EPHEDRINE SULFATE/0.9% NACL/PF 50 MG/5 ML
SYRINGE (ML) INTRAVENOUS AS NEEDED
Status: DISCONTINUED | OUTPATIENT
Start: 2022-01-25 | End: 2022-01-25 | Stop reason: HOSPADM

## 2022-01-25 RX ORDER — AMITRIPTYLINE HYDROCHLORIDE 25 MG/1
25 TABLET, FILM COATED ORAL
Status: DISCONTINUED | OUTPATIENT
Start: 2022-01-25 | End: 2022-01-26 | Stop reason: HOSPADM

## 2022-01-25 RX ORDER — HYDROMORPHONE HYDROCHLORIDE 1 MG/ML
1 INJECTION, SOLUTION INTRAMUSCULAR; INTRAVENOUS; SUBCUTANEOUS
Status: DISCONTINUED | OUTPATIENT
Start: 2022-01-25 | End: 2022-01-26 | Stop reason: HOSPADM

## 2022-01-25 RX ORDER — METHOCARBAMOL 750 MG/1
750 TABLET, FILM COATED ORAL
Status: DISCONTINUED | OUTPATIENT
Start: 2022-01-25 | End: 2022-01-26 | Stop reason: HOSPADM

## 2022-01-25 RX ORDER — CLONAZEPAM 1 MG/1
1 TABLET ORAL
Status: DISCONTINUED | OUTPATIENT
Start: 2022-01-25 | End: 2022-01-26 | Stop reason: HOSPADM

## 2022-01-25 RX ORDER — SODIUM CHLORIDE 0.9 % (FLUSH) 0.9 %
5-40 SYRINGE (ML) INJECTION EVERY 8 HOURS
Status: DISCONTINUED | OUTPATIENT
Start: 2022-01-25 | End: 2022-01-26 | Stop reason: HOSPADM

## 2022-01-25 RX ORDER — LEVOTHYROXINE SODIUM 75 UG/1
150 TABLET ORAL
Status: DISCONTINUED | OUTPATIENT
Start: 2022-01-25 | End: 2022-01-26 | Stop reason: HOSPADM

## 2022-01-25 RX ORDER — VANCOMYCIN HYDROCHLORIDE 1 G/20ML
INJECTION, POWDER, LYOPHILIZED, FOR SOLUTION INTRAVENOUS ONCE
Status: DISCONTINUED | OUTPATIENT
Start: 2022-01-25 | End: 2022-01-25 | Stop reason: DRUGHIGH

## 2022-01-25 RX ORDER — HALOPERIDOL 5 MG/ML
1 INJECTION INTRAMUSCULAR
Status: DISCONTINUED | OUTPATIENT
Start: 2022-01-25 | End: 2022-01-25 | Stop reason: HOSPADM

## 2022-01-25 RX ORDER — SODIUM CHLORIDE 0.9 % (FLUSH) 0.9 %
5-40 SYRINGE (ML) INJECTION AS NEEDED
Status: DISCONTINUED | OUTPATIENT
Start: 2022-01-25 | End: 2022-01-26 | Stop reason: HOSPADM

## 2022-01-25 RX ORDER — PROPOFOL 10 MG/ML
INJECTION, EMULSION INTRAVENOUS AS NEEDED
Status: DISCONTINUED | OUTPATIENT
Start: 2022-01-25 | End: 2022-01-25 | Stop reason: HOSPADM

## 2022-01-25 RX ORDER — CLINDAMYCIN PHOSPHATE 900 MG/50ML
900 INJECTION INTRAVENOUS EVERY 8 HOURS
Status: COMPLETED | OUTPATIENT
Start: 2022-01-25 | End: 2022-01-26

## 2022-01-25 RX ORDER — FLUTICASONE PROPIONATE 50 MCG
2 SPRAY, SUSPENSION (ML) NASAL DAILY
Status: DISCONTINUED | OUTPATIENT
Start: 2022-01-26 | End: 2022-01-26 | Stop reason: HOSPADM

## 2022-01-25 RX ORDER — SODIUM CHLORIDE, SODIUM LACTATE, POTASSIUM CHLORIDE, CALCIUM CHLORIDE 600; 310; 30; 20 MG/100ML; MG/100ML; MG/100ML; MG/100ML
75 INJECTION, SOLUTION INTRAVENOUS CONTINUOUS
Status: DISCONTINUED | OUTPATIENT
Start: 2022-01-25 | End: 2022-01-25 | Stop reason: HOSPADM

## 2022-01-25 RX ORDER — ESCITALOPRAM OXALATE 10 MG/1
30 TABLET ORAL DAILY
Status: DISCONTINUED | OUTPATIENT
Start: 2022-01-26 | End: 2022-01-26 | Stop reason: HOSPADM

## 2022-01-25 RX ORDER — DIPHENHYDRAMINE HYDROCHLORIDE 50 MG/ML
12.5 INJECTION, SOLUTION INTRAMUSCULAR; INTRAVENOUS
Status: DISCONTINUED | OUTPATIENT
Start: 2022-01-25 | End: 2022-01-25 | Stop reason: HOSPADM

## 2022-01-25 RX ORDER — PROMETHAZINE HYDROCHLORIDE 25 MG/1
25 TABLET ORAL
Status: DISCONTINUED | OUTPATIENT
Start: 2022-01-25 | End: 2022-01-26 | Stop reason: HOSPADM

## 2022-01-25 RX ORDER — SODIUM CHLORIDE 9 MG/ML
100 INJECTION, SOLUTION INTRAVENOUS CONTINUOUS
Status: DISCONTINUED | OUTPATIENT
Start: 2022-01-25 | End: 2022-01-25

## 2022-01-25 RX ORDER — DEXAMETHASONE SODIUM PHOSPHATE 4 MG/ML
INJECTION, SOLUTION INTRA-ARTICULAR; INTRALESIONAL; INTRAMUSCULAR; INTRAVENOUS; SOFT TISSUE
Status: COMPLETED | OUTPATIENT
Start: 2022-01-25 | End: 2022-01-25

## 2022-01-25 RX ORDER — TRANEXAMIC ACID 100 MG/ML
INJECTION, SOLUTION INTRAVENOUS AS NEEDED
Status: DISCONTINUED | OUTPATIENT
Start: 2022-01-25 | End: 2022-01-25 | Stop reason: HOSPADM

## 2022-01-25 RX ORDER — NALOXONE HYDROCHLORIDE 0.4 MG/ML
0.1 INJECTION, SOLUTION INTRAMUSCULAR; INTRAVENOUS; SUBCUTANEOUS
Status: DISCONTINUED | OUTPATIENT
Start: 2022-01-25 | End: 2022-01-25 | Stop reason: HOSPADM

## 2022-01-25 RX ORDER — MONTELUKAST SODIUM 4 MG/1
1 TABLET, CHEWABLE ORAL DAILY
Status: DISCONTINUED | OUTPATIENT
Start: 2022-01-26 | End: 2022-01-26 | Stop reason: HOSPADM

## 2022-01-25 RX ORDER — HYDROMORPHONE HYDROCHLORIDE 2 MG/ML
0.5 INJECTION, SOLUTION INTRAMUSCULAR; INTRAVENOUS; SUBCUTANEOUS
Status: DISCONTINUED | OUTPATIENT
Start: 2022-01-25 | End: 2022-01-25 | Stop reason: HOSPADM

## 2022-01-25 RX ORDER — ASPIRIN 81 MG/1
81 TABLET ORAL EVERY 12 HOURS
Status: DISCONTINUED | OUTPATIENT
Start: 2022-01-25 | End: 2022-01-26 | Stop reason: HOSPADM

## 2022-01-25 RX ORDER — ACETAMINOPHEN 500 MG
1000 TABLET ORAL ONCE
Status: COMPLETED | OUTPATIENT
Start: 2022-01-25 | End: 2022-01-25

## 2022-01-25 RX ORDER — ALBUTEROL SULFATE 90 UG/1
1-2 AEROSOL, METERED RESPIRATORY (INHALATION)
Status: DISCONTINUED | OUTPATIENT
Start: 2022-01-25 | End: 2022-01-26 | Stop reason: HOSPADM

## 2022-01-25 RX ORDER — ROPIVACAINE HYDROCHLORIDE 2 MG/ML
INJECTION, SOLUTION EPIDURAL; INFILTRATION; PERINEURAL AS NEEDED
Status: DISCONTINUED | OUTPATIENT
Start: 2022-01-25 | End: 2022-01-25 | Stop reason: HOSPADM

## 2022-01-25 RX ORDER — OXYCODONE HYDROCHLORIDE 5 MG/1
5-10 TABLET ORAL
Status: DISCONTINUED | OUTPATIENT
Start: 2022-01-25 | End: 2022-01-26 | Stop reason: HOSPADM

## 2022-01-25 RX ORDER — NITROFURANTOIN MACROCRYSTALS 50 MG/1
50 CAPSULE ORAL DAILY
Status: DISCONTINUED | OUTPATIENT
Start: 2022-01-26 | End: 2022-01-26 | Stop reason: HOSPADM

## 2022-01-25 RX ORDER — FLUMAZENIL 0.1 MG/ML
0.2 INJECTION INTRAVENOUS
Status: DISCONTINUED | OUTPATIENT
Start: 2022-01-25 | End: 2022-01-25 | Stop reason: HOSPADM

## 2022-01-25 RX ORDER — ONDANSETRON 2 MG/ML
INJECTION INTRAMUSCULAR; INTRAVENOUS AS NEEDED
Status: DISCONTINUED | OUTPATIENT
Start: 2022-01-25 | End: 2022-01-25 | Stop reason: HOSPADM

## 2022-01-25 RX ORDER — HYOSCYAMINE SULFATE 0.12 MG/1
0.12 TABLET SUBLINGUAL
Status: DISCONTINUED | OUTPATIENT
Start: 2022-01-25 | End: 2022-01-26 | Stop reason: HOSPADM

## 2022-01-25 RX ORDER — DEXAMETHASONE SODIUM PHOSPHATE 4 MG/ML
INJECTION, SOLUTION INTRA-ARTICULAR; INTRALESIONAL; INTRAMUSCULAR; INTRAVENOUS; SOFT TISSUE AS NEEDED
Status: DISCONTINUED | OUTPATIENT
Start: 2022-01-25 | End: 2022-01-25 | Stop reason: HOSPADM

## 2022-01-25 RX ADMIN — METHOCARBAMOL TABLETS 750 MG: 750 TABLET, COATED ORAL at 21:16

## 2022-01-25 RX ADMIN — HYDROMORPHONE HYDROCHLORIDE 1 MG: 1 INJECTION, SOLUTION INTRAMUSCULAR; INTRAVENOUS; SUBCUTANEOUS at 21:15

## 2022-01-25 RX ADMIN — ROPIVACAINE HYDROCHLORIDE 20 ML: 2 INJECTION, SOLUTION EPIDURAL; INFILTRATION at 10:17

## 2022-01-25 RX ADMIN — Medication 81 MG: at 21:16

## 2022-01-25 RX ADMIN — SODIUM CHLORIDE, SODIUM LACTATE, POTASSIUM CHLORIDE, AND CALCIUM CHLORIDE 100 ML/HR: 600; 310; 30; 20 INJECTION, SOLUTION INTRAVENOUS at 08:51

## 2022-01-25 RX ADMIN — OXYCODONE HYDROCHLORIDE 10 MG: 5 TABLET ORAL at 18:03

## 2022-01-25 RX ADMIN — Medication 10 MG: at 11:55

## 2022-01-25 RX ADMIN — Medication 10 MG: at 11:22

## 2022-01-25 RX ADMIN — MEPIVACAINE HYDROCHLORIDE 56 MG: 20 INJECTION, SOLUTION EPIDURAL; INFILTRATION at 10:48

## 2022-01-25 RX ADMIN — FENTANYL CITRATE 100 MCG: 50 INJECTION, SOLUTION INTRAMUSCULAR; INTRAVENOUS at 10:14

## 2022-01-25 RX ADMIN — TRANEXAMIC ACID 1 G: 100 INJECTION, SOLUTION INTRAVENOUS at 11:00

## 2022-01-25 RX ADMIN — ONDANSETRON 4 MG: 2 INJECTION INTRAMUSCULAR; INTRAVENOUS at 12:35

## 2022-01-25 RX ADMIN — Medication 1 AMPULE: at 21:17

## 2022-01-25 RX ADMIN — PHENYLEPHRINE HYDROCHLORIDE 50 MCG: 10 INJECTION INTRAVENOUS at 11:25

## 2022-01-25 RX ADMIN — SODIUM CHLORIDE, PRESERVATIVE FREE 10 ML: 5 INJECTION INTRAVENOUS at 14:51

## 2022-01-25 RX ADMIN — ACETAMINOPHEN 1000 MG: 500 TABLET, FILM COATED ORAL at 23:15

## 2022-01-25 RX ADMIN — LEVOTHYROXINE SODIUM 150 MCG: 0.07 TABLET ORAL at 22:11

## 2022-01-25 RX ADMIN — PHENYLEPHRINE HYDROCHLORIDE 100 MCG: 10 INJECTION INTRAVENOUS at 12:08

## 2022-01-25 RX ADMIN — PROPOFOL 100 MCG/KG/MIN: 10 INJECTION, EMULSION INTRAVENOUS at 10:57

## 2022-01-25 RX ADMIN — Medication 3 AMPULE: at 08:53

## 2022-01-25 RX ADMIN — FAMOTIDINE 20 MG: 10 INJECTION, SOLUTION INTRAVENOUS at 08:50

## 2022-01-25 RX ADMIN — SODIUM CHLORIDE, SODIUM LACTATE, POTASSIUM CHLORIDE, AND CALCIUM CHLORIDE: 600; 310; 30; 20 INJECTION, SOLUTION INTRAVENOUS at 11:48

## 2022-01-25 RX ADMIN — PRAMIPEXOLE DIHYDROCHLORIDE 0.5 MG: 0.25 TABLET ORAL at 21:16

## 2022-01-25 RX ADMIN — LIDOCAINE HYDROCHLORIDE 0.1 ML: 10 INJECTION, SOLUTION INFILTRATION; PERINEURAL at 08:51

## 2022-01-25 RX ADMIN — PHENYLEPHRINE HYDROCHLORIDE 50 MCG: 10 INJECTION INTRAVENOUS at 11:54

## 2022-01-25 RX ADMIN — Medication 10 MG: at 11:17

## 2022-01-25 RX ADMIN — HYDROMORPHONE HYDROCHLORIDE 1 MG: 1 INJECTION, SOLUTION INTRAMUSCULAR; INTRAVENOUS; SUBCUTANEOUS at 14:46

## 2022-01-25 RX ADMIN — DEXAMETHASONE SODIUM PHOSPHATE 5 MG: 4 INJECTION, SOLUTION INTRA-ARTICULAR; INTRALESIONAL; INTRAMUSCULAR; INTRAVENOUS; SOFT TISSUE at 10:57

## 2022-01-25 RX ADMIN — ACETAMINOPHEN 1000 MG: 500 TABLET, FILM COATED ORAL at 08:50

## 2022-01-25 RX ADMIN — AMITRIPTYLINE HYDROCHLORIDE 25 MG: 25 TABLET, FILM COATED ORAL at 21:16

## 2022-01-25 RX ADMIN — DEXAMETHASONE SODIUM PHOSPHATE 4 MG: 4 INJECTION, SOLUTION INTRAMUSCULAR; INTRAVENOUS at 10:17

## 2022-01-25 RX ADMIN — MIDAZOLAM 2 MG: 1 INJECTION INTRAMUSCULAR; INTRAVENOUS at 10:14

## 2022-01-25 RX ADMIN — PHENYLEPHRINE HYDROCHLORIDE 100 MCG: 10 INJECTION INTRAVENOUS at 11:46

## 2022-01-25 RX ADMIN — ACETAMINOPHEN 1000 MG: 500 TABLET, FILM COATED ORAL at 18:03

## 2022-01-25 RX ADMIN — CLINDAMYCIN PHOSPHATE 900 MG: 900 INJECTION, SOLUTION INTRAVENOUS at 22:15

## 2022-01-25 RX ADMIN — CLINDAMYCIN PHOSPHATE 900 MG: 900 INJECTION, SOLUTION INTRAVENOUS at 11:04

## 2022-01-25 RX ADMIN — METHOCARBAMOL TABLETS 750 MG: 750 TABLET, COATED ORAL at 15:35

## 2022-01-25 RX ADMIN — VANCOMYCIN HYDROCHLORIDE 1500 MG: 10 INJECTION, POWDER, LYOPHILIZED, FOR SOLUTION INTRAVENOUS at 09:58

## 2022-01-25 RX ADMIN — PROPOFOL 50 MG: 10 INJECTION, EMULSION INTRAVENOUS at 10:56

## 2022-01-25 RX ADMIN — Medication 10 MG: at 11:36

## 2022-01-25 RX ADMIN — SODIUM CHLORIDE, PRESERVATIVE FREE 10 ML: 5 INJECTION INTRAVENOUS at 21:16

## 2022-01-25 RX ADMIN — PROPOFOL 50 MG: 10 INJECTION, EMULSION INTRAVENOUS at 12:05

## 2022-01-25 NOTE — PROGRESS NOTES
01/25/22 1603   Oxygen Therapy   O2 Sat (%) 93 %   Pulse via Oximetry 80 beats per minute   O2 Device None (Room air)   Incentive Spirometry Treatment   Actual Volume (ml) 750 ml   Number of Attempts 3     Joint Camp Notes Reviewed. Pt working on IS. Pt encouraged to do 10 breaths per hour while awake on IS. Good NPC. No respiratory distress noted at this time. No complications noted at this time.  Airvo ordered Qhs

## 2022-01-25 NOTE — ANESTHESIA PROCEDURE NOTES
Spinal Block    Start time: 1/25/2022 10:46 AM  End time: 1/25/2022 10:48 AM  Performed by: Cristela Saldivar MD  Authorized by: Cristela Saldivar MD     Pre-procedure:   Indications: primary anesthetic  Preanesthetic Checklist: patient identified, risks and benefits discussed, anesthesia consent, patient being monitored and timeout performed    Timeout Time: 10:45 EST          Spinal Block:   Patient Position:  Seated  Prep Region:  Lumbar  Prep: chlorhexidine and patient draped      Location:  L3-4  Technique:  Single shot    Local Dose (mL):  2    Needle:   Needle Type:  Pencan  Needle Gauge:  25 G  Attempts:  1      Events: CSF confirmed, no blood with aspiration and no paresthesia        Assessment:  Insertion:  Uncomplicated  Patient tolerance:  Patient tolerated the procedure well with no immediate complications

## 2022-01-25 NOTE — OP NOTES
[address] [place] [address]  Cemented Total Knee Arthroplasty  Patient:Katelyn Conley   : 2420  Medical Record [id number]  Pre-operative Diagnosis:  Primary osteoarthritis of left knee [M17.12]  Post-operative Diagnosis: Primary osteoarthritis of left knee [M17.12]    Surgeon: Robbin Thakur MD  Assistant: Tim Pritchett PA-C    Anesthesia: Spinal    Procedure: Total Knee Arthroplasty with use of Bone Cement  The complexity of the total joint surgery requires the use of a first assistant for positioning, retraction and assistance in closure. The patient's Body mass index is 40.65 kg/m²., BMI's greater then 40 make surgical exposure and retraction extremely difficult and increase operative time. Tourniquet Time: none  EBL: 150cc  Additional Findings: Severe DJD  Releases none    Colby Salazar was brought to the operating room and positioned on the operating table. She was anethestized  IV antibiotics were administered per protocol. Prior to the incision being made a timeout was called identifying the patient, procedure ,operative side and surgeon. . The left leg was prepped and draped in the usual sterile manner  An anterior longitudinal incision was accomplished just medial to the tibial tubercle and extending approximal 6 centimeters proximal to the superior pole of the patella. A medial parapatellar capsular incision was performed. The medial capsular flap was elevated around to the insertion of the semimembranous tendon. The patella was everted and the knee flexed and externally rotated. The medial and external menisci were excised. The lateral half of the fat pad excised and the patella femoral ligament was released. The anterior cruciate ligament was resected and the posterior cruciate ligament was substituted. Using extramedullary instrumentation, the tibial cut was accomplished with appropriate posterior slope.   Approxiamately 2 mm of bone was removed from the low side of the tibia. The distal femur was next addressed. A drill hole was made above the intracondylar notch. Using appropriate intramedullary instrumentation,a 6 degree valgus distal cut was accomplished. A femur was sized. The anterior and posterior cuts were then made about the distal femur. The osteophytes were removed from the tibial and femoral surfaces. The flexion and extension gaps were assessed with the appropriate spacer blocks. Additional surgical procedures included none. The flexion and extension gaps were deemed appropriately balanced. The appropriate cutting blocks were then utilized to perform the anterior chamfer, posterior chamfer and notch cuts, with appropriate lateral tranlation accomplished for the patellofemoral groove. The tibia was sized. The tibial base plate was pinned into place with the appropriate external rotation and stem site prepared. Given the patient's body habitus, a short tibial stem was placed to augment fixation. A preliminary range of motion was accomplished with the above size trial components. A polyethylene insert allowed the patient to obtain full extension as well as appropriate flexion. The patient's ligaments were stable in flexion and extension to medial and lateral stressing and the alignment was through the appropriate mechanical axis. The patella was then everted. Given acceptable preservation of cartilage, it was left unresurfaced following patelloplasty. All trial components were removed and the cut surfaces prepared for cementing with irrigation and debridement of the bone interstices. The implants were cemented into position and pressurized in the usual fashion. Bone and cement debris were meticulously removed. The Irrisept lavage protocol was used. [facility] knee was placed through range of motion and noted to be stable as mentioned above with the trail components. The wound was dry, therefore no drain was used. The operative knee was injected with 60cc of Naropin, 10 cc's of morphine and 1 cc of 30mg of Toradol. The capsular layer was closed using a #1 vicryl suture, while subcutaneous layers were closed using 2-0 Vicryl interrupted sutures. Finally the skin was closed using 3-0 Vicryl and a STAPLE closure, and a  sterile bandage was applied. An Iceman cryo pad was applied on the operative leg. Sponge count and needle counts were correct. Miranda Brooks left the operating room     Implants:   Implant Name Type Inv.  Item Serial No.  Lot No. LRB No. Used Action   CEMENT BONE 40GM HI VISC PALACOS R - [id number]  CEMENT BONE 40GM HI VISC PALACOS R  [facility]_WD [id number] Left 2 Implanted   STEM FEM L50MM [id number] KNEE HANG REV ATTUNE - [id number]  STEM FEM L50MM [id number] KNEE HANG REV ATTUNE  [facility] [employer]_WD [id number] Left 1 Implanted   COMPONENT FEM SZ 4 L KNEE POST STBL HANG ATTUNE - [id number]  COMPONENT FEM SZ 4 L KNEE POST STBL HANG ATTUNE  [facility] [employer]_ [id number] Left 1 Implanted   BASEPLATE TIB SZ 4 FIX BEAR CO CHROM MOLYBDENUM TI ALLY END - [id number]  BASEPLATE TIB SZ 4 FIX BEAR CO CHROM MOLYBDENUM TI ALLY END  [facility] [employer]WD [id number] Left 1 Implanted   INSERT TIB SZ 4 THK7MM KNEE POST STBL FIX BEAR ATTUNE - [id number]  INSERT TIB SZ 4 THK7MM KNEE POST STBL FIX BEAR ATTUNE  [facility] [employer]WD [id number] Left 1 Implanted     Signed By: Luis Dos Santos MD

## 2022-01-25 NOTE — PROGRESS NOTES
TRANSFER - IN REPORT:    Verbal report received from [facility] on formerly [facility]  being received from PACU for routine post - op      Report consisted of patients Situation, Background, Assessment and   Recommendations(SBAR). Information from the following report(s) SBAR, OR Summary, Procedure Summary and Intake/Output was reviewed with the receiving nurse. Opportunity for questions and clarification was provided. Assessment completed upon patients arrival to unit and care assumed.

## 2022-01-25 NOTE — ANESTHESIA PROCEDURE NOTES
Peripheral Block    Start time: 1/25/2022 10:15 AM  End time: 1/25/2022 10:17 AM  Performed by: Mirian Villagomez MD  Authorized by: Mirian Villagomez MD       Pre-procedure: Indications: at surgeon's request and post-op pain management    Preanesthetic Checklist: patient identified, risks and benefits discussed, site marked, timeout performed, anesthesia consent given and patient being monitored    Timeout Time: 10:13 EST          Block Type:   Block Type: Adductor canal  Laterality:  Left  Monitoring:  Standard ASA monitoring, continuous pulse ox, frequent vital sign checks, heart rate, responsive to questions and oxygen  Injection Technique:  Single shot  Procedures: ultrasound guided    Patient Position: supine  Prep: chlorhexidine    Location:  Mid thigh  Needle Type:  Stimuplex  Needle Gauge:  22 G  Needle Localization:  Ultrasound guidance  Medication Injected:  Ropivacaine 0.2% with epinephrine 1:200,000 injection, 20 mL (Mixture components: ropivacaine 2 mg/mL (0.2 %) Soln, 1 mL; EPINEPHrine HCl (PF) 1 mg/mL (1 mL) Soln, . 005 mL)  dexamethasone (DECADRON) 4 mg/mL injection, 4 mg  Med Admin Time: 1/25/2022 10:17 AM    Assessment:  Number of attempts:  1  Injection Assessment:  Incremental injection every 5 mL, local visualized surrounding nerve on ultrasound, negative aspiration for CSF, negative aspiration for blood, no paresthesia, no intravascular symptoms and ultrasound image on chart  Patient tolerance:  Patient tolerated the procedure well with no immediate complications

## 2022-01-25 NOTE — PROGRESS NOTES
Problem: Falls - Risk of  Goal: *Absence of Falls  Description: Document Bianka Fall Risk and appropriate interventions in the flowsheet.   Outcome: Progressing Towards Goal  Note: Fall Risk Interventions:  Mobility Interventions: OT consult for ADLs,Patient to call before getting OOB,PT Consult for mobility concerns,PT Consult for assist device competence,Strengthening exercises (ROM-active/passive),Utilize walker, cane, or other assistive device         Medication Interventions: Patient to call before getting OOB,Teach patient to arise slowly    Elimination Interventions: Call light in reach,Elevated toilet seat,Patient to call for help with toileting needs              Problem: Patient Education: Go to Patient Education Activity  Goal: Patient/Family Education  Outcome: Progressing Towards Goal     Problem: Patient Education: Go to Patient Education Activity  Goal: Patient/Family Education  Outcome: Progressing Towards Goal     Problem: Patient Education: Go to Patient Education Activity  Goal: Patient/Family Education  Outcome: Progressing Towards Goal     Problem: Knee Replacement: Day of Surgery/Unit  Goal: Off Pathway (Use only if patient is Off Pathway)  Outcome: Progressing Towards Goal  Goal: Activity/Safety  Outcome: Progressing Towards Goal  Goal: Consults, if ordered  Outcome: Progressing Towards Goal  Goal: Diagnostic Test/Procedures  Outcome: Progressing Towards Goal  Goal: Nutrition/Diet  Outcome: Progressing Towards Goal  Goal: Medications  Outcome: Progressing Towards Goal  Goal: Respiratory  Outcome: Progressing Towards Goal  Goal: Treatments/Interventions/Procedures  Outcome: Progressing Towards Goal  Goal: Psychosocial  Outcome: Progressing Towards Goal  Goal: *Initiate mobility  Outcome: Progressing Towards Goal  Goal: *Optimal pain control at patient's stated goal  Outcome: Progressing Towards Goal  Goal: *Hemodynamically stable  Outcome: Progressing Towards Goal     Problem: Knee Replacement: Post-Op Day 1  Goal: Off Pathway (Use only if patient is Off Pathway)  Outcome: Progressing Towards Goal  Goal: Activity/Safety  Outcome: Progressing Towards Goal  Goal: Diagnostic Test/Procedures  Outcome: Progressing Towards Goal  Goal: Nutrition/Diet  Outcome: Progressing Towards Goal  Goal: Medications  Outcome: Progressing Towards Goal  Goal: Respiratory  Outcome: Progressing Towards Goal  Goal: Treatments/Interventions/Procedures  Outcome: Progressing Towards Goal  Goal: Psychosocial  Outcome: Progressing Towards Goal  Goal: Discharge Planning  Outcome: Progressing Towards Goal  Goal: *Demonstrates progressive activity  Outcome: Progressing Towards Goal  Goal: *Optimal pain control at patient's stated goal  Outcome: Progressing Towards Goal  Goal: *Hemodynamically stable  Outcome: Progressing Towards Goal  Goal: *Discharge plan identified  Outcome: Progressing Towards Goal     Problem: Knee Replacement: Post-Op Day 2  Goal: Off Pathway (Use only if patient is Off Pathway)  Outcome: Progressing Towards Goal  Goal: Activity/Safety  Outcome: Progressing Towards Goal  Goal: Diagnostic Test/Procedures  Outcome: Progressing Towards Goal  Goal: Medications  Outcome: Progressing Towards Goal  Goal: Respiratory  Outcome: Progressing Towards Goal  Goal: Treatments/Interventions/Procedures  Outcome: Progressing Towards Goal  Goal: Psychosocial  Outcome: Progressing Towards Goal  Goal: *Met physical therapy criteria for discharge to the next level of care  Outcome: Progressing Towards Goal  Goal: *Optimal pain control with oral analgesia  Outcome: Progressing Towards Goal  Goal: *Hemodynamically stable  Outcome: Progressing Towards Goal  Goal: *Tolerating diet  Outcome: Progressing Towards Goal  Goal: *Patient verbalizes understanding of discharge instructions  Outcome: Progressing Towards Goal

## 2022-01-25 NOTE — PROGRESS NOTES
Problem: Self Care Deficits Care Plan (Adult)  Goal: *Acute Goals and Plan of Care (Insert Text)  Outcome: Progressing Towards Goal  Note: GOALS:   DISCHARGE GOALS (in preparation for going home/rehab):  3 days  1. Ms. Raffy Sanchez will perform one lower body dressing activity with minimal assistance required to demonstrate improved functional mobility and safety. 2.  Ms. Raffy Sanchez will perform one lower body bathing activity with minimal assistance required to demonstrate improved functional mobility and safety. 3.  Ms. Raffy Sanchez will perform toileting/toilet transfer with contact guard assistance to demonstrate improved functional mobility and safety. 4.  Ms. Raffy Sanchez will perform shower transfer with contact guard assistance to demonstrate improved functional mobility and safety. JOINT CAMP OCCUPATIONAL THERAPY TKA: Initial Assessment, Daily Note, Treatment Day: Day of Assessment, and PM 1/25/2022  OUTPATIENT: Hospital Day: 1  Payor: [address] [place] [address] / Plan: Benjamin Swanson / Product Type: Managed Care Medicare /      NAME/AGE/GENDER: Shahnaz Kitchen is a 77 y.o. female   PRIMARY DIAGNOSIS:  Primary osteoarthritis of left knee [M17.12]   Procedure(s) and Anesthesia Type:     * LEFT KNEE ARTHROPLASTY TOTAL/DEPUY - Spinal (Left)  ICD-10: Treatment Diagnosis:    Pain in Left Knee (M25.562)  Stiffness of Left Knee, Not elsewhere classified (E43.729)      ASSESSMENT:     Ms. Raffy Sanchez is s/p left TKA and presents with decreased weight bearing on left LE and decreased independence with functional mobility and activities of daily living as compared to baseline level of function and safety. Patient would benefit from skilled Occupational Therapy to maximize independence and safety with self-care task and functional mobility. Pt would also benefit from education on adaptive equipment and safety precautions in preparation for going home.       This section established at most recent assessment   PROBLEM LIST (Impairments causing functional limitations):  Decreased Strength  Decreased ADL/Functional Activities  Decreased Transfer Abilities  Increased Pain  Increased Fatigue  Decreased Flexibility/Joint Mobility  Decreased Knowledge of Precautions   INTERVENTIONS PLANNED: (Benefits and precautions of occupational therapy have been discussed with the patient.)  Activities of daily living training  Adaptive equipment training  Balance training  Clothing management  Donning&doffing training  Theraputic activity     TREATMENT PLAN: Frequency/Duration: Follow patient 1-2 times to address above goals. Rehabilitation Potential For Stated Goals: Good     RECOMMENDED REHABILITATION/EQUIPMENT: (at time of discharge pending progress): Continue Skilled Therapy. OCCUPATIONAL PROFILE AND HISTORY:   History of Present Injury/Illness (Reason for Referral): Pt presents this date s/p (left) TKA. Past Medical History/Comorbidities:   Ms. Jazmyn Yang  has a past medical history of Acute kidney insufficiency, Adverse effect of anesthesia, Allergic rhinitis, Anemia, Anxiety and depression, Arthritis, Edema, GERD (gastroesophageal reflux disease), History of chicken pox, History of Clostridioides difficile infection, History of DVT (deep vein thrombosis), HTN (hypertension), Hyperlipidemia, Hyperthyroidism, Hypothyroidism, IBS (irritable bowel syndrome), Insomnia, Morbid obesity ([address] Murmur, Overactive bladder, PAD (peripheral artery disease) ([address] Recurrent UTI, RLS (restless legs syndrome), SOBOE (shortness of breath on exertion), Special examinations, MALOU (stress urinary incontinence, female), Thromboembolus ([address] (2015), and 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 She has no past medical history of Difficult intubation, Malignant hyperthermia due to anesthesia, Nausea & vomiting, or Pseudocholinesterase deficiency.   Ms. Jazmyn Yang  has a past surgical history that includes hx meniscus repair (Left); hx tonsil and adenoidectomy (~1950); hx shoulder arthroscopy (Right, ~1966, ~2013); hx knee replacement (Right, 2014); hx cholecystectomy (12/2016); hx total abdominal hysterectomy (~1970); hx appendectomy (1955); ir gastric band adj w/ fluoro (~2014); hx gyn (12/03/2019); hx other surgical (Right, 2016); hx other surgical (Left, ~2014); hx other surgical (~1986); hx meniscus repair (Left, 2014); hx colonoscopy; hx orthopaedic (Left); hx other surgical (12/03/2019); and hx orthopaedic (Left, 01/2020). Social History/Living Environment:   Home Environment: Private residence  # Steps to Enter: 0  One/Two Story Residence: One story  Living Alone: No  Support Systems: Spouse/Significant Other  Patient Expects to be Discharged to[de-identified] Home with home health  Current DME Used/Available at Home: None  Tub or Shower Type: Tub/Shower combination    Prior Level of Function/Work/Activity:  Pt was (I) with ADL's and ambulated short distances. Number of Personal Factors/Comorbidities that affect the Plan of Care: Brief history (0):  LOW COMPLEXITY   ASSESSMENT OF OCCUPATIONAL PERFORMANCE[de-identified]   Most Recent Physical Functioning:   Balance  Sitting: Intact  Standing: Pull to stand; With support       Gross Assessment: Yes  Gross Assessment  AROM: Generally decreased, functional  Strength: Generally decreased, functional (BLE, more on LLE due to post surgical)                      Mental Status  Neurologic State: Alert; Appropriate for age  Orientation Level: Appropriate for age;Oriented X4  Cognition: Appropriate decision making; Appropriate for age attention/concentration; Appropriate safety awareness; Follows commands  Perception: Appears intact  Perseveration: No perseveration noted  Safety/Judgement: Awareness of environment                Basic ADLs (From Assessment) Complex ADLs (From Assessment)   Basic ADL  Feeding: Independent  Oral Facial Hygiene/Grooming: Supervision  Bathing:  Moderate assistance  Upper Body Dressing: Supervision  Lower Body Dressing: Moderate assistance  Toileting: Moderate assistance     Grooming/Bathing/Dressing Activities of Daily Living     Cognitive Retraining  Safety/Judgement: Awareness of environment                 Functional Transfers  Bathroom Mobility: Moderate assistance  Toilet Transfer : Moderate assistance  Shower Transfer: Moderate assistance     Bed/Mat Mobility  Supine to Sit: Stand-by assistance; Additional time  Sit to Stand: Contact guard assistance;Minimum assistance;Assist x2  Stand to Sit: Contact guard assistance;Assist x2  Bed to Chair: Minimum assistance;Assist x2         Physical Skills Involved:  Range of Motion  Balance  Pain (acute) Cognitive Skills Affected (resulting in the inability to perform in a timely and safe manner):  none Psychosocial Skills Affected:  Environmental Adaptation   Number of elements that affect the Plan of Care: 3-5:  MODERATE COMPLEXITY   CLINICAL DECISION MAKING:   [facility] AM-PAC 6 Clicks   Daily Activity Inpatient Short Form  How much help from another person does the patient currently need. .. Total A Lot A Little None   1. Putting on and taking off regular lower body clothing? [] 1   [x] 2   [] 3   [] 4   2. Bathing (including washing, rinsing, drying)? [] 1   [x] 2   [] 3   [] 4   3. Toileting, which includes using toilet, bedpan or urinal?   [] 1   [x] 2   [] 3   [] 4   4. Putting on and taking off regular upper body clothing? [] 1   [] 2   [] 3   [x] 4   5. Taking care of personal grooming such as brushing teeth? [] 1   [] 2   [] 3   [x] 4   6. Eating meals? [] 1   [] 2   [] 3   [x] 4   © 2007, Trustees of [facility], under license to [employer]. All rights reserved     Score:  Initial:18 Most Recent: X (Date: -- )    Interpretation of Tool:  Represents activities that are increasingly more difficult (i.e. Bed mobility, Transfers, Gait).      Use of outcome tool(s) and clinical judgement create a POC that gives a: LOW COMPLEXITY TREATMENT:   (In addition to Assessment/Re-Assessment sessions the following treatments were rendered)     Pre-treatment Symptoms/Complaints:  pt up in room without complaint of pain  Pain: Initial:     0 Post Session:  0     Self Care: (10 min): Procedure(s) (per grid) utilized to improve and/or restore self-care/home management as related to  functional mobility . Required moderate verbal, manual, and   cueing to facilitate activities of daily living skills and compensatory activities. Assessment    Treatment/Session Assessment:     Response to Treatment:  pt up in room tolerated well. Education:  [] Home Exercises  [x] Fall Precautions  [] Hip Precautions [] Going Home Video  [x] Knee/Hip Prosthesis Review  [x] Walker Management/Safety [x] Adaptive Equipment as Needed       Interdisciplinary Collaboration:   Physical Therapist  Occupational Therapist  Registered Nurse    After treatment position/precautions:   Up in chair  Bed/Chair-wheels locked  Call light within reach  RN notified     Compliance with Program/Exercises: Compliant all of the time, Will assess as treatment progresses. Recommendations/Intent for next treatment session:  Treatment next visit will focus on increasing Ms. Do's independence with bed mobility, transfers, self care, functional mobility, modalities for pain, and patient education.       Total Treatment Duration:  OT Patient Time In/Time Out  Time In: 1600  Time Out: PHILLIP Olmedo

## 2022-01-25 NOTE — ANESTHESIA POSTPROCEDURE EVALUATION
Procedure(s):  LEFT KNEE ARTHROPLASTY TOTAL/DEPUY.     spinal    Anesthesia Post Evaluation      Multimodal analgesia: multimodal analgesia used between 6 hours prior to anesthesia start to PACU discharge  Patient location during evaluation: bedside  Patient participation: complete - patient participated  Level of consciousness: awake  Pain management: adequate  Airway patency: patent  Anesthetic complications: no  Cardiovascular status: acceptable  Respiratory status: spontaneous ventilation and acceptable  Hydration status: acceptable  Post anesthesia nausea and vomiting:  none      INITIAL Post-op Vital signs:   Vitals Value Taken Time   /68 01/25/22 1314   Temp 36.7 °C (98 °F) 01/25/22 1249   Pulse 63 01/25/22 1314   Resp 16 01/25/22 1314   SpO2 95 % 01/25/22 1314

## 2022-01-25 NOTE — PROGRESS NOTES
Care Management Interventions  PCP Verified by CM: Yes  Transition of Care Consult (CM Consult): [address]: Yes  Physical Therapy Consult: Yes  Support Systems: Spouse/Significant Other  Confirm Follow Up Transport: Family  The Plan for Transition of Care is Related to the Following Treatment Goals : Pt is being d/c home w/ Home Health Services-[facility]  The Patient and/or Patient Representative was Provided with a Choice of Provider and Agrees with the Discharge Plan?: Yes  Name of the Patient Representative Who was Provided with a Choice of Provider and Agrees with the Discharge Plan: Pt (Mckinley Rubio)  Freedom of Choice List was Provided with Basic Dialogue that Supports the Patient's Individualized Plan of Care/Goals, Treatment Preferences and Shares the Quality Data Associated with the Providers?: Yes  Discharge Location  Patient Expects to be Discharged to[de-identified] Home with home health ([facility])    Patient is a 77y.o. year old female admitted for Left TKA . Patient plans to return home on discharge. Order received to arrange home health. Patient without preference towards agency. Referral sent to [facility]. Patient needs a Phil walker. Patient requesting we arrange a Phil  walker. Referral sent to [address] [place] [address] Str. delivered to the hospital room prior to discharge. Will follow until discharge.      DANISHA Wright

## 2022-01-25 NOTE — CONSULTS
[facility]ist Consult   Admit Date:  2022  8:01 AM   Name:  Jessika Perez   Age:  77 y.o. Sex:  female  :  1945   MRN:  [id number]   Room:  [facility]/    Presenting Complaint: No chief complaint on file. Reason(s) for Admission: Primary osteoarthritis of left knee [M17.12]  Degenerative arthritis of left knee [M17.12]     Hospitalists consulted by Familia Karimi MD for: Postop medical management    History of Presenting Illness:   Jessika Perez is a 77 y.o. female with history hypertension, hypothyroidism, depression/mood disorder admitted by Ortho service for total left knee arthroplasty for severe left knee arthritis. Hospitalist consulted for  postop medical management. Patient is status post OR today and has tolerated the procedure well. Complaining of pain at the surgery site. No other concers. Denies chest pain, palpitation, nausea, vomiting, abdominal pain or shortness of breath. Review of Systems:  10 systems reviewed and negative except as noted in HPI.   Assessment & Plan:     Left knee arthritis status post left total knee arthroplasty on :  Pain control  Discontinue IV fluids  PT/OT  DVT prophylaxis as per Ortho  Rest of the management as per primary    Hypertension:  Continue aspirin, lisinopril/hydrochlorothiazide  Hydralazine as needed    Hypothyroidism:  Continue levothyroxine    Depression/mood disorder:  Continue amitriptyline, escitalopram, pramipexole      Hospital Problems as of 2022 Date Reviewed: 2022          Codes Class Noted - Resolved POA    * (Principal) Degenerative arthritis of left knee ICD-10-CM: M17.12  ICD-9-CM: 715.96  2022 - Present Unknown        BONNIE (generalized anxiety disorder) ICD-10-CM: F41.1  ICD-9-CM: 300.02  10/9/2018 - Present Yes        HLD (hyperlipidemia) ICD-10-CM: E78.5  ICD-9-CM: 272.4  2017 - Present Yes    Overview Signed 2021  8:30 PM by Ashley Bellamy NP     Formatting of this note might be different from the original.  Last Assessment & Plan:    Problem and/or symptoms are currently not stable and/or worsening on current treatment plan. Will have patient follow up as directed and make the following changes for further evaluation and/or treatment:   Recent patient's Lipitor dose to 40 mg daily since there is very little benefit in a 80 mg dose but a much higher incidence of adverse side effects. Essential hypertension ICD-10-CM: I10  ICD-9-CM: 401.9  12/6/2017 - Present Yes    Overview Signed 8/14/2021  8:30 PM by Radha Gray NP     Formatting of this note might be different from the original.  Last Assessment & Plan:    Problem and/or Symptoms are currently stable and/or improving on current treatment plan. Will continue and have patient follow up as directed. Hypothyroidism (acquired) ICD-10-CM: E03.9  ICD-9-CM: 244.9  12/6/2017 - Present Yes    Overview Signed 8/14/2021  8:30 PM by Radha Gray NP     Formatting of this note might be different from the original.  Last Assessment & Plan:    Problem and/or symptoms are currently not stable and/or worsening on current treatment plan. Will have patient follow up as directed and make the following changes for further evaluation and/or treatment:   Patient states whenever she has been placed on generic Synthroid that her symptoms have worsened markedly of hypothyroidism. Will send a new prescription today with notes to pharmacy for them to fill brand name Synthroid only and follow-up response next scheduled office visit. IBS (irritable bowel syndrome) ICD-10-CM: K58.9  ICD-9-CM: 564.1  12/6/2017 - Present Yes    Overview Signed 8/14/2021  8:30 PM by Radha Gray NP     Formatting of this note might be different from the original.  Last Assessment & Plan:    Problem and/or Symptoms are new to provider.  Will have patient follow up as directed and make the following plan for further evaluation and/or treatlment:  During patient to local gastroenterologist at her request             Mood disorder [facility]) ICD-10-CM: L91  ICD-9-CM: 296.90  12/6/2017 - Present Yes              Past History:  Past Medical History:   Diagnosis Date    Acute kidney insufficiency     Followed by PCP     Adverse effect of anesthesia     2016 in [place] heart stopped at the end of my gallbladder surgery. \" Patient reports no CPR performed, stayed one night in the hospital. No problems with any other surgeries.     Allergic rhinitis     Anemia     history     Anxiety and depression     Controlled with medication     Arthritis     Edema     bilateral lower legs     GERD (gastroesophageal reflux disease)     Controlled with medication     History of chicken pox     History of Clostridioides difficile infection     History of DVT (deep vein thrombosis)     s/p knee replacement and developed right leg DVT     HTN (hypertension)     controlled with medication     Hyperlipidemia     controlled with medication     Hyperthyroidism     pt denies    Hypothyroidism     controlled with medication     IBS (irritable bowel syndrome)     Insomnia     Morbid obesity (HCC)     BMI 42.85    Murmur     per PCP note (85/4/04) systolic murmur grade of 2/6    Overactive bladder     PAD (peripheral artery disease) ([facility] [address]     patient denies on 11/27/19    Recurrent UTI     RLS (restless legs syndrome)     controlled with medication     SOBOE (shortness of breath on exertion)     PRN inhaler-patient reports she uses inhaler QHS    Special examinations     History-Neurologist - Dr. 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      Past Surgical History:   Procedure Laterality Date    HX APPENDECTOMY  1955    HX CHOLECYSTECTOMY  12/2016    HX COLONOSCOPY      HX GYN  12/03/2019    suburethral sling and cystoscopy    HX KNEE REPLACEMENT Right     HX MENISCUS REPAIR Left     2016    HX MENISCUS REPAIR Left     HX ORTHOPAEDIC Left     LEFT WRIST TRAPEZIOMETACARPAL ARTHROPLASTY WITH FCR TENDON TRANSFER-     HX ORTHOPAEDIC Left 2020    left hand tendon surgery    HX OTHER SURGICAL Right 2016    Hand    HX OTHER SURGICAL Left ~    B/L Ulnar    HX OTHER SURGICAL  ~    urethraplasty    HX OTHER SURGICAL  2019    bladder surgery-put mesh in    HX SHOULDER ARTHROSCOPY Right ~, ~2013    x2    HX TONSIL AND ADENOIDECTOMY  ~1950    HX TOTAL ABDOMINAL HYSTERECTOMY  ~    IR GASTRIC BAND ADJ W/ FLUORO  ~    lab band removed ~       Allergies   Allergen Reactions    Fire Ant Anaphylaxis    Adhesive Rash and Itching     \"sticky ace wrap\"    Amoxicillin Other (comments)     C-Diff    Betadine [Povidone-Iodine] Other (comments)     Mcgrath    Cephalexin Other (comments)     C-Diff    Demerol [Meperidine] Rash    Hydromorphone Other (comments)     Keeps her awake    Morphine Other (comments)     Awake    Percocet [Oxycodone-Acetaminophen] Other (comments)     Jittery    Percodan [Oxycodone Hcl-Oxycodone-Asa] Other (comments)     Jittery      Social History     Tobacco Use    Smoking status: Former Smoker     Packs/day: 1.00     Years: 40.00     Pack years: 40.00     Types: Cigarettes     Quit date: 2005     Years since quittin.1    Smokeless tobacco: Never Used   Substance Use Topics    Alcohol use: Not Currently      Family History   Problem Relation Age of Onset    No Known Problems Mother     Other Brother         brain aneurysm, AVM    Heart Surgery Brother         CABGx4    Heart Disease Brother     Hypertension Brother     Other Father 54        brain aneurysm    Other Sister         fibromyalgia    Pneumonia Sister     Heart Attack Maternal Grandmother     Cancer Maternal Grandfather         skin    Heart Attack Paternal Grandfather  Congenital anomalies Sister         right arm deformity    Other Son         Hepatitis C    Drug Abuse Son     Mult Sclerosis Daughter       Family history reviewed and negative except as otherwise noted. Immunization History   Administered Date(s) Administered    COVID-19, Pfizer Purple top, DILUTE for use, 12+ yrs, 30mcg/0.3mL dose 01/21/2021, 02/06/2021    Influenza High Dose Vaccine PF 09/11/2018, 10/18/2019, 08/25/2020    Influenza Vaccine 09/08/2020    Influenza, High-dose, Quadrivalent (>65 Yrs Fluzone High Dose Quad 67253) 08/25/2020    Pneumococcal Conjugate (PCV-13) 10/31/2019    Zoster Recombinant 09/08/2020     Current Facility-Administered Medications   Medication Dose Route Frequency    albuterol (PROVENTIL HFA, VENTOLIN HFA, PROAIR HFA) inhaler 1-2 Puff  1-2 Puff Inhalation Q6H PRN    amitriptyline (ELAVIL) tablet 25 mg  25 mg Oral QHS    clonazePAM (KlonoPIN) tablet 1 mg  1 mg Oral BID PRN    clotrimazole-betamethasone (LOTRISONE) 1-0.05 % cream   Topical PRN    . PHARMACY TO SUBSTITUTE PER PROTOCOL (Reordered from: colestipoL (COLESTID) 1 gram tablet)    Per Protocol    [START ON 1/26/2022] escitalopram oxalate (LEXAPRO) tablet 30 mg  30 mg Oral DAILY    [START ON 1/26/2022] fluticasone propionate (FLONASE) 50 mcg/actuation nasal spray 2 Spray  2 Spray Both Nostrils DAILY    [START ON 1/26/2022] lisinopril-hydroCHLOROthiazide (PRINZIDE, ZESTORETIC) 10-12.5 mg per tablet 1 Tablet  1 Tablet Oral DAILY    . PHARMACY TO SUBSTITUTE PER PROTOCOL (Reordered from: hyoscyamine sulfate (CYSTOSPAZ) 0.125 mg tablet)    Per Protocol    [START ON 1/26/2022] nitrofurantoin (MACRODANTIN) capsule 50 mg  50 mg Oral DAILY    pramipexole (MIRAPEX) tablet 0.5 mg  0.5 mg Oral QHS    [START ON 1/26/2022] levothyroxine (SYNTHROID) tablet 150 mcg  150 mcg Oral ACB    alcohol 62% (NOZIN) nasal  1 Ampule  1 Ampule Topical Q12H    0.9% sodium chloride infusion  100 mL/hr IntraVENous CONTINUOUS    sodium chloride (NS) flush 5-40 mL  5-40 mL IntraVENous Q8H    sodium chloride (NS) flush 5-40 mL  5-40 mL IntraVENous PRN    acetaminophen (TYLENOL) tablet 1,000 mg  1,000 mg Oral Q6H    oxyCODONE IR (ROXICODONE) tablet 5-10 mg  5-10 mg Oral Q4H PRN    HYDROmorphone (DILAUDID) injection 1 mg  1 mg IntraVENous Q3H PRN    naloxone (NARCAN) injection 0.2-0.4 mg  0.2-0.4 mg IntraVENous Q10MIN PRN    [START ON 1/26/2022] dexamethasone (DECADRON) 10 mg/mL injection 10 mg  10 mg IntraVENous ONCE    promethazine (PHENERGAN) tablet 25 mg  25 mg Oral Q6H PRN    diphenhydrAMINE (BENADRYL) capsule 25 mg  25 mg Oral Q4H PRN    [START ON 1/26/2022] senna-docusate (PERICOLACE) 8.6-50 mg per tablet 2 Tablet  2 Tablet Oral DAILY    aspirin delayed-release tablet 81 mg  81 mg Oral Q12H    clindamycin (CLEOCIN) 900mg D5W 50mL IVPB (premix)  900 mg IntraVENous Q8H    ondansetron (ZOFRAN ODT) tablet 8 mg  8 mg Oral Q8H PRN    methocarbamoL (ROBAXIN) tablet 750 mg  750 mg Oral QID PRN       Objective:     Patient Vitals for the past 24 hrs:   Temp Pulse Resp BP SpO2   01/25/22 1331 -- 64 16 115/70 93 %   01/25/22 1314 -- 63 16 (!) 148/68 95 %   01/25/22 1309 -- 64 16 129/79 94 %   01/25/22 1304 -- 64 16 138/64 95 %   01/25/22 1259 -- 66 16 (!) 133/93 98 %   01/25/22 1254 -- 67 16 138/88 97 %   01/25/22 1249 98 °F (36.7 °C) 68 16 (!) 167/65 97 %   01/25/22 1020 -- 61 16 (!) 171/72 99 %   01/25/22 1015 -- 62 20 (!) 189/84 100 %   01/25/22 1010 -- 65 18 (!) 176/74 100 %   01/25/22 1004 -- 63 18 (!) 193/82 100 %   01/25/22 0825 97.3 °F (36.3 °C) 69 16 (!) 160/73 97 %     Oxygen Therapy  O2 Sat (%): 93 % (01/25/22 1331)  Pulse via Oximetry: 69 beats per minute (01/25/22 0825)  O2 Device: None (Room air) (01/25/22 1314)  O2 Flow Rate (L/min): 3 l/min (01/25/22 1259)    Estimated body mass index is 40.65 kg/m² as calculated from the following:    Height as of this encounter: 4' 10\" (1.473 m).     Weight as of this encounter: 88.2 kg (194 lb 8 oz). Intake/Output Summary (Last 24 hours) at 1/25/2022 1527  Last data filed at 1/25/2022 1236  Gross per 24 hour   Intake 1000 ml   Output 200 ml   Net 800 ml         Physical Exam:    Blood pressure 115/70, pulse 64, temperature 98 °F (36.7 °C), resp. rate 16, height 4' 10\" (1.473 m), weight 88.2 kg (194 lb 8 oz), SpO2 93 %. General:    Well nourished. No overt distress  Head:  Normocephalic, atraumatic  Eyes:  Sclerae appear normal.  Pupils equally round. ENT:  Nares appear normal, no drainage. Moist oral mucosa  Neck:  No restricted ROM. Trachea midline   CV:   RRR. No m/r/g. No jugular venous distension. Lungs:   CTAB. No wheezing, rhonchi, or rales. Respirations even, unlabored  Abdomen: Bowel sounds present. Soft, nontender, nondistended. Extremities: S/p left knee arthroplasty  Skin:     No rashes and normal coloration. Warm and dry. Neuro:  CN II-XII grossly intact. Sensation intact. A&Ox3  Psych:  Normal mood and affect. I have reviewed ordered lab tests and independently visualized imaging below:    Recent Labs:  No results found for this or any previous visit (from the past 48 hour(s)). All Micro Results     None          Other Studies:  No results found. Signed:  Uma Kaplan MD    Part of this note may have been written by using a voice dictation software. The note has been proof read but may still contain some grammatical/other typographical errors.

## 2022-01-25 NOTE — ANESTHESIA PREPROCEDURE EVALUATION
Relevant Problems   RESPIRATORY SYSTEM   (+) GUIDO (dyspnea on exertion)   (+) History of Clostridioides difficile colitis      NEUROLOGY   (+) BONNIE (generalized anxiety disorder)   (+) Mood disorder (HCC)      CARDIOVASCULAR   (+) Essential hypertension   (+) PAD (peripheral artery disease) (HCC)      GASTROINTESTINAL   (+) GERD (gastroesophageal reflux disease)      RENAL FAILURE   (+) Renal cyst      ENDOCRINE   (+) Anserine bursitis   (+) Arthritis of left knee   (+) Hypothyroidism (acquired)      HEMATOLOGY   (+) Anemia, iron deficiency       Anesthetic History   No history of anesthetic complications            Review of Systems / Medical History  Patient summary reviewed and pertinent labs reviewed    Pulmonary                   Neuro/Psych         Psychiatric history     Cardiovascular    Hypertension          Hyperlipidemia    Exercise tolerance: <4 METS  Comments: Echo 2019 with normal EF and no significant valvular abnormalities   GI/Hepatic/Renal     GERD           Endo/Other      Hypothyroidism  Morbid obesity and arthritis     Other Findings              Physical Exam    Airway  Mallampati: III  TM Distance: 4 - 6 cm  Neck ROM: normal range of motion   Mouth opening: Diminished (comment)     Cardiovascular    Rhythm: regular  Rate: normal         Dental    Dentition: Full upper dentures     Pulmonary  Breath sounds clear to auscultation               Abdominal         Other Findings            Anesthetic Plan    ASA: 3  Anesthesia type: spinal      Post-op pain plan if not by surgeon: peripheral nerve block single    Induction: Intravenous  Anesthetic plan and risks discussed with: Patient

## 2022-01-25 NOTE — PROGRESS NOTES
Problem: Mobility Impaired (Adult and Pediatric)  Goal: *Acute Goals and Plan of Care (Insert Text)  Outcome: Progressing Towards Goal  Note: GOALS (1-4 days):  (1.)Ms. Raffy Sanchez will move from supine to sit and sit to supine , scoot up and down, and roll side to side in bed with INDEPENDENT. (2.)Ms. Raffy Sanchez will transfer from bed to chair and chair to bed with MODIFIED INDEPENDENCE using the least restrictive device. (3.)Ms. Raffy Sanchez will ambulate with MODIFIED INDEPENDENCE for a minimum of 150 feet with the least restrictive device. (4.)Ms. Raffy Sanchez will increase left knee ROM to 0°-90°.  ________________________________________________________________________________________________        PHYSICAL THERAPY JOINT CAMP TKA: Initial Assessment and Daily Note 1/25/2022  OUTPATIENT: Hospital Day: 1  Payor: Arsenio Ramirez / Plan: Benjamin Swanson / Product Type: [employer] /      NAME/AGE/GENDER: Shahnaz Kitchen is a 77 y.o. female   PRIMARY DIAGNOSIS:  Primary osteoarthritis of left knee [M17.12]   Procedure(s) and Anesthesia Type:     * LEFT KNEE ARTHROPLASTY TOTAL/DEPUY - Spinal (Left)  ICD-10: Treatment Diagnosis:    Pain in Left Knee (M25.562)  Stiffness of Left Knee, Not elsewhere classified (M25.662)  Difficulty in walking, Not elsewhere classified (R26.2)  Other abnormalities of gait and mobility (R26.89)      ASSESSMENT:     Ms. Raffy Sanchez presents s/p LTKA with increased weakness, stability and overall decreased ROM. Pt is independent at baseline and works as a caregiver during the week. Today she was able to come to EOB with SBA and additional time and then came to stand with CGA-min A x2 and VC for RW use and increased safety. Pt ambulated 15' around the bed but demonstrated BLE knee buckling intermittently throughout.  She is functioning below her baseline with above stated deficits and would continue to benefit from skilled PT to maximize rehab potential.     This section established at most recent assessment   PROBLEM LIST (Impairments causing functional limitations):  Decreased Strength  Decreased ADL/Functional Activities  Decreased Transfer Abilities  Decreased Ambulation Ability/Technique  Decreased Balance  Increased Pain  Decreased Activity Tolerance  Decreased Flexibility/Joint Mobility   INTERVENTIONS PLANNED: (Benefits and precautions of physical therapy have been discussed with the patient.)  Bed Mobility  Cold  Gait Training  Home Exercise Program (HEP)  Range of Motion (ROM)  Therapeutic Activites  Therapeutic Exercise/Strengthening  Transfer Training     TREATMENT PLAN: Frequency/Duration: Follow patient BID for duration of hospital stay to address above goals. Rehabilitation Potential For Stated Goals: Good     RECOMMENDED REHABILITATION/EQUIPMENT: (at time of discharge pending progress): Continue Skilled Therapy and Home Health: Physical Therapy. HISTORY:   History of Present Injury/Illness (Reason for Referral):  Pt is s/p LTKA 1/25. Past Medical History/Comorbidities:   Ms. Jeny Palma  has a past medical history of Acute kidney insufficiency, Adverse effect of anesthesia, Allergic rhinitis, Anemia, Anxiety and depression, Arthritis, Edema, GERD (gastroesophageal reflux disease), History of chicken pox, History of Clostridioides difficile infection, History of DVT (deep vein thrombosis), HTN (hypertension), Hyperlipidemia, Hyperthyroidism, Hypothyroidism, IBS (irritable bowel syndrome), Insomnia, Morbid obesity ([address] Murmur, Overactive bladder, PAD (peripheral artery disease) ([address] Recurrent UTI, RLS (restless legs syndrome), SOBOE (shortness of breath on exertion), Special examinations, MALOU (stress urinary incontinence, female), Thromboembolus ([address] (2015), and 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 She has no past medical history of Difficult intubation, Malignant hyperthermia due to anesthesia, Nausea & vomiting, or Pseudocholinesterase deficiency. Ms. China Quiñones  has a past surgical history that includes hx meniscus repair (Left); hx tonsil and adenoidectomy (~1950); hx shoulder arthroscopy (Right, ~1966, ~2013); hx knee replacement (Right, 2014); hx cholecystectomy (12/2016); hx total abdominal hysterectomy (~1970); hx appendectomy (1955); ir gastric band adj w/ fluoro (~2014); hx gyn (12/03/2019); hx other surgical (Right, 2016); hx other surgical (Left, ~2014); hx other surgical (~1986); hx meniscus repair (Left, 2014); hx colonoscopy; hx orthopaedic (Left); hx other surgical (12/03/2019); and hx orthopaedic (Left, 01/2020). Social History/Living Environment:   Home Environment: Private residence  # Steps to Enter: 0  One/Two Story Residence: One story  Living Alone: No  Support Systems: Spouse/Significant Other  Patient Expects to be Discharged to[de-identified] Home with home health  Current DME Used/Available at Home: Walker, rolling  Tub or Shower Type: Tub/Shower combination    Prior Level of Function/Work/Activity:  Pt is independent at baseline with no AD use. Number of Personal Factors/Comorbidities that affect the Plan of Care: 3+: HIGH COMPLEXITY   EXAMINATION:   Most Recent Physical Functioning:   Gross Assessment: Yes  Gross Assessment  AROM: Generally decreased, functional  Strength: Generally decreased, functional (BLE, more on LLE due to post surgical)                     Bed Mobility  Supine to Sit: Stand-by assistance; Additional time    Transfers  Sit to Stand: Contact guard assistance;Minimum assistance;Assist x2  Stand to Sit: Contact guard assistance;Assist x2  Bed to Chair: Minimum assistance;Assist x2    Balance  Sitting: Intact  Standing: Pull to stand; With support    Posture  Posture (WDL): Within defined limits         Weight Bearing Status  Left Side Weight Bearing: As tolerated  Distance (ft): 15 Feet (ft)  Ambulation - Level of Assistance: Minimal assistance;Assist x2  Assistive Device: Walker, rolling  Speed/Lavonne: Slow;Shuffled  Step Length: Right shortened;Left shortened  Stance: Left decreased;Right decreased  Gait Abnormalities: Antalgic; Shuffling gait (knee buckling)        Braces/Orthotics: none             Body Structures Involved:  Bones  Joints  Muscles  Ligaments Body Functions Affected:  Neuromusculoskeletal  Movement Related Activities and Participation Affected: Mobility   Number of elements that affect the Plan of Care: 4+: HIGH COMPLEXITY   CLINICAL PRESENTATION:   Presentation: Evolving clinical presentation with changing clinical characteristics: MODERATE COMPLEXITY   CLINICAL DECISION MAKIN [facility] Inpatient Short Form  How much difficulty does the patient currently have. .. Unable A Lot A Little None   1. Turning over in bed (including adjusting bedclothes, sheets and blankets)? [] 1   [] 2   [] 3   [x] 4   2. Sitting down on and standing up from a chair with arms ( e.g., wheelchair, bedside commode, etc.)   [] 1   [] 2   [x] 3   [] 4   3. Moving from lying on back to sitting on the side of the bed? [] 1   [] 2   [] 3   [x] 4   How much help from another person does the patient currently need. .. Total A Lot A Little None   4. Moving to and from a bed to a chair (including a wheelchair)? [] 1   [] 2   [x] 3   [] 4   5. Need to walk in hospital room? [] 1   [] 2   [x] 3   [] 4   6. Climbing 3-5 steps with a railing? [] 1   [x] 2   [] 3   [] 4   © , Trustees of [address] [place] [address], under license to [employer]. All rights reserved     Score:  Initial: 19 Most Recent: X (Date: -- )    Interpretation of Tool:  Represents activities that are increasingly more difficult (i.e. Bed mobility, Transfers, Gait). Medical Necessity:     Skilled intervention continues to be required due to decreased functional mobility below baseline.   Reason for Services/Other Comments:  Patient continues to require skilled intervention due to   Deficits in functional strength, ROM and balance  . Use of outcome tool(s) and clinical judgement create a POC that gives a: Clear prediction of patient's progress: LOW COMPLEXITY            TREATMENT:   (In addition to Assessment/Re-Assessment sessions the following treatments were rendered)     Pre-treatment Symptoms/Complaints:  Pt complains of increased pain but states that meds are helping  Pain Initial:   Pain Intensity 1: 4  Post Session:  5/10     Therapeutic Exercise: ( 23 minutes):  Exercises per grid below to improve mobility, strength, balance, and coordination. Required minimal verbal cues to promote proper body alignment and promote proper body mechanics. Date:  1/25 Date:   Date:     ACTIVITY/EXERCISE AM PM AM PM AM PM   GROUP THERAPY  []  [x]  []  []  []  []   Ankle Pumps  x10       Quad Sets  x10       Gluteal Sets  x10       Hip ABd/ADduction  x10       Straight Leg Raises  x10       Knee Slides  x10       Short Arc Quads  x10       Long Arc Quads         Chair Slides                  B = bilateral; AA = active assistive; A = active; P = passive      Treatment/Session Assessment:     Response to Treatment:  Pt tolerated treatment session well with minimal increase in pain. Anticipated good progress with acute care PT. Education:  [x] Home Exercises  [] Fall Precautions  [x] Use of Cold Therapy Unit [] D/C Instruction Review  [] Knee Prosthesis Review  [x] Walker Management/Safety [] Adaptive Equipment as Needed       Interdisciplinary Collaboration:   Physical Therapist  Occupational Therapist  Registered Nurse    After treatment position/precautions:   Up in chair  Call light within reach    Compliance with Program/Exercises: Will assess as treatment progresses. Recommendations/Intent for next treatment session:  Treatment next visit will focus on increasing Ms. Do's independence with bed mobility, transfers, gait training, strength/ROM exercises, modalities for pain, and patient education.       Total Treatment Duration:  PT Patient Time In/Time Out  Time In: 1608  Time Out: 0849 Jovan Wharton, PT

## 2022-01-26 VITALS
RESPIRATION RATE: 18 BRPM | SYSTOLIC BLOOD PRESSURE: 113 MMHG | BODY MASS INDEX: 40.83 KG/M2 | HEIGHT: 58 IN | TEMPERATURE: 98.4 F | DIASTOLIC BLOOD PRESSURE: 58 MMHG | WEIGHT: 194.5 LBS | HEART RATE: 66 BPM | OXYGEN SATURATION: 95 %

## 2022-01-26 PROCEDURE — 74011250637 HC RX REV CODE- 250/637: Performed by: PHYSICIAN ASSISTANT

## 2022-01-26 PROCEDURE — 97535 SELF CARE MNGMENT TRAINING: CPT

## 2022-01-26 PROCEDURE — 97110 THERAPEUTIC EXERCISES: CPT

## 2022-01-26 PROCEDURE — 74011250636 HC RX REV CODE- 250/636: Performed by: PHYSICIAN ASSISTANT

## 2022-01-26 PROCEDURE — 97116 GAIT TRAINING THERAPY: CPT

## 2022-01-26 PROCEDURE — 74011000250 HC RX REV CODE- 250: Performed by: PHYSICIAN ASSISTANT

## 2022-01-26 RX ORDER — ASPIRIN 81 MG/1
81 TABLET ORAL EVERY 12 HOURS
Qty: 60 TABLET | Refills: 0 | Status: SHIPPED | OUTPATIENT
Start: 2022-01-26 | End: 2022-02-25

## 2022-01-26 RX ORDER — OXYCODONE HYDROCHLORIDE 5 MG/1
5-10 TABLET ORAL
Qty: 60 TABLET | Refills: 0 | Status: SHIPPED | OUTPATIENT
Start: 2022-01-26 | End: 2022-02-02 | Stop reason: SDUPTHER

## 2022-01-26 RX ADMIN — OXYCODONE HYDROCHLORIDE 10 MG: 5 TABLET ORAL at 01:57

## 2022-01-26 RX ADMIN — LISINOPRIL AND HYDROCHLOROTHIAZIDE 1 TABLET: 12.5; 1 TABLET ORAL at 09:34

## 2022-01-26 RX ADMIN — Medication 81 MG: at 06:29

## 2022-01-26 RX ADMIN — CLINDAMYCIN PHOSPHATE 900 MG: 900 INJECTION, SOLUTION INTRAVENOUS at 06:32

## 2022-01-26 RX ADMIN — SODIUM CHLORIDE, PRESERVATIVE FREE 10 ML: 5 INJECTION INTRAVENOUS at 05:58

## 2022-01-26 RX ADMIN — ESCITALOPRAM OXALATE 30 MG: 10 TABLET ORAL at 06:29

## 2022-01-26 RX ADMIN — CLONAZEPAM 1 MG: 1 TABLET ORAL at 06:42

## 2022-01-26 RX ADMIN — OXYCODONE HYDROCHLORIDE 10 MG: 5 TABLET ORAL at 06:42

## 2022-01-26 RX ADMIN — Medication 1 AMPULE: at 09:34

## 2022-01-26 RX ADMIN — NITROFURANTOIN MACROCRYSTALS 50 MG: 50 CAPSULE ORAL at 06:29

## 2022-01-26 RX ADMIN — ACETAMINOPHEN 1000 MG: 500 TABLET, FILM COATED ORAL at 05:58

## 2022-01-26 RX ADMIN — METHOCARBAMOL TABLETS 750 MG: 750 TABLET, COATED ORAL at 10:10

## 2022-01-26 NOTE — DISCHARGE SUMMARY
[address] [place] [address]  Total Joint Discharge Summary      Patient ID:  Paige Martell  [id number]  14 y.o.  1945    Admit date: 1/25/2022  Discharge date and time: 1-26-22  Admitting Physician: Kimberlee Nickerson MD  Surgeon: Same  Admission Diagnoses: Primary osteoarthritis of left knee [M17.12]  Degenerative arthritis of left knee [M17.12]  Discharge Diagnoses: Principal Problem:    Degenerative arthritis of left knee (1/25/2022)    Active Problems:    HLD (hyperlipidemia) (12/6/2017)      Overview: Formatting of this note might be different from the original.      Last Assessment & Plan:        Problem and/or symptoms are currently not stable and/or worsening on       current treatment plan. Will have patient follow up as directed and make       the following changes for further evaluation and/or treatment:       Recent patient's Lipitor dose to 40 mg daily since there is very little       benefit in a 80 mg dose but a much higher incidence of adverse side       effects. Essential hypertension (12/6/2017)      Overview: Formatting of this note might be different from the original.      Last Assessment & Plan:        Problem and/or Symptoms are currently stable and/or improving on current       treatment plan. Will continue and have patient follow up as directed. Hypothyroidism (acquired) (12/6/2017)      Overview: Formatting of this note might be different from the original.      Last Assessment & Plan:        Problem and/or symptoms are currently not stable and/or worsening on       current treatment plan. Will have patient follow up as directed and make       the following changes for further evaluation and/or treatment:       Patient states whenever she has been placed on generic Synthroid that her       symptoms have worsened markedly of hypothyroidism.   Will send a new       prescription today with notes to pharmacy for them to fill brand name       Synthroid only and follow-up response next scheduled office visit. IBS (irritable bowel syndrome) (12/6/2017)      Overview: Formatting of this note might be different from the original.      Last Assessment & Plan:        Problem and/or Symptoms are new to provider. Will have patient follow up       as directed and make the following plan for further evaluation and/or       treatlment:      During patient to local gastroenterologist at her request      Mood disorder ([address] (12/6/2017)      BONNIE (generalized anxiety disorder) (10/9/2018)                                Perioperative Antibiotics: Ancef 1 to 3 g was given depending on patient's weight. If allergic to Ancef or due to other indications, patient was given Vancomycin/Gent per protocol      Hospital Medications given:   [unfilled]  [unfilled]  [unfilled]    Discharge Medications given:  Current Discharge Medication List      START taking these medications    Details   aspirin delayed-release 81 mg tablet Take 1 Tablet by mouth every twelve (12) hours every twelve (12) hours for 30 days. Qty: 60 Tablet, Refills: 0      oxyCODONE IR (ROXICODONE) 5 mg immediate release tablet Take 1-2 Tablets by mouth every four (4) hours as needed for Pain for up to 7 days. Max Daily Amount: 60 mg.  Qty: 60 Tablet, Refills: 0    Associated Diagnoses: Total knee replacement status, left         CONTINUE these medications which have NOT CHANGED    Details   clonazePAM (KlonoPIN) 1 mg tablet Take 1 Tablet by mouth two (2) times daily as needed for Anxiety. Max Daily Amount: 2 mg. Qty: 60 Tablet, Refills: 1    Associated Diagnoses: BONNIE (generalized anxiety disorder)      ergocalciferol (ERGOCALCIFEROL) 1,250 mcg (50,000 unit) capsule Take 1 Capsule by mouth every seven (7) days. Indications: vitamin D deficiency (high dose therapy)  Qty: 6 Capsule, Refills: 1    Associated Diagnoses: Vitamin D deficiency      escitalopram oxalate (LEXAPRO) 20 mg tablet Take 1.5 Tablets by mouth daily.   Qty: 135 Tablet, Refills: 3    Associated Diagnoses: BONNIE (generalized anxiety disorder); Episode of recurrent major depressive disorder, unspecified depression episode severity (HCC)      cyanocobalamin (VITAMIN B12) 1,000 mcg/mL injection 1,000 mcg by IntraMUSCular route once. nitrofurantoin (MACRODANTIN) 50 mg capsule Take 1 Capsule by mouth daily. Indications: bacterial urinary tract infection  Qty: 90 Capsule, Refills: 0    Associated Diagnoses: Recurrent UTI      colestipoL (COLESTID) 1 gram tablet Take 1 Tablet by mouth two (2) times a day. Qty: 360 Tablet, Refills: 1    Associated Diagnoses: Irritable bowel syndrome with diarrhea      Synthroid 150 mcg tablet Take 1 Tablet by mouth Daily (before breakfast). DAW1  Qty: 30 Tablet, Refills: 11    Associated Diagnoses: Primary hypothyroidism      pramipexole (Mirapex) 0.5 mg tablet Take 1 Tab by mouth daily. Qty: 90 Tab, Refills: 3    Associated Diagnoses: RLS (restless legs syndrome)      fluticasone propionate (FLONASE) 50 mcg/actuation nasal spray 2 Sprays by Both Nostrils route nightly. Indications: inflammation of the nose due to an allergy  Qty: 3 Bottle, Refills: 3    Associated Diagnoses: Post-nasal drainage; Allergic rhinitis, unspecified seasonality, unspecified trigger      lisinopril-hydroCHLOROthiazide (PRINZIDE, ZESTORETIC) 10-12.5 mg per tablet Take 1 Tab by mouth daily. Qty: 90 Tab, Refills: 3    Associated Diagnoses: Hypertension, unspecified type; On potassium wasting diuretic therapy      hyoscyamine sulfate (CYSTOSPAZ) 0.125 mg tablet Take 1 Tab by mouth every four (4) hours as needed for Cramping or Nausea. Indications: irritable colon  Qty: 60 Tab, Refills: 1    Associated Diagnoses: Left upper quadrant abdominal pain; Irritable bowel syndrome with diarrhea      naloxone (NARCAN) 4 mg/actuation nasal spray Use 1 spray intranasally, then discard. Repeat with new spray every 2 min as needed for opioid overdose symptoms, alternating nostrils. Indications: opioid overdose  Qty: 2 Each, Refills: 0      amitriptyline (ELAVIL) 25 mg tablet Take 25 mg by mouth nightly. For IBS/colitis      ondansetron hcl (ZOFRAN) 4 mg tablet Take 1 Tablet by mouth every eight (8) hours as needed for Nausea. Qty: 90 Tablet, Refills: 0    Associated Diagnoses: Chronic nausea      cyclobenzaprine (FLEXERIL) 10 mg tablet Take 10 mg by mouth as needed. For back pain      clotrimazole-betamethasone (LOTRISONE) topical cream Apply  to affected area two (2) times a day. Until rash resolves  Qty: 45 g, Refills: 2    Associated Diagnoses: Allergic dermatitis; Rash      albuterol (PROVENTIL HFA, VENTOLIN HFA, PROAIR HFA) 90 mcg/actuation inhaler Take 1-2 Puffs by inhalation every six (6) hours as needed for Wheezing. Qty: 1 Inhaler, Refills: 2    Associated Diagnoses: Wheezing; SOB (shortness of breath)         STOP taking these medications       HYDROcodone-acetaminophen (Norco) 7.5-325 mg per tablet Comments:   Reason for Stopping:                Additional DVT Prophylaxis:  ANA Hose,Plexi-Pulse    Postoperative transfusions:   none  Post Op complications: none    Hemoglobin at discharge:   Lab Results   Component Value Date/Time    HGB 11.9 01/25/2022 07:27 PM       Wound appears to be healing without any evidence of infection. Physical Therapy started on the day following surgery and progressed to independent ambulation with the aid of a walker. At the time of discharge, able to go up and down stairs and had understanding of precautions needed following surgery.       PT/OT:            Assistive Device: Fall prevention device                Discharged to: home    Discharge instructions:  -Rx pain medication given  - Anticoagulate with: Ecotrin 81 mg PO BID x 4 weeks  -Resume pre hospital diet             -Resume home medications per medical continuation form     -Ambulate with walker, appropriate total joint protocol  -Follow up in office as scheduled       Signed:  Luis Eduardo Waterman [place], [place]  1/26/2022  8:28 AM

## 2022-01-26 NOTE — PROGRESS NOTES
01/25/22 1955 01/25/22 2010   Oxygen Therapy   O2 Sat (%) 95 %  --    Pulse via Oximetry 75 beats per minute  --    O2 Device None (Room air) Heated; Hi flow nasal cannula   O2 Flow Rate (L/min)  --  10 l/min   O2 Temperature  --  87.8 °F (31 °C)   FIO2 (%)  --  21 %   Pt connected to c/s monitor. Set up on High Flow Nasal Cannula for lung expansion. Alarms on and functioning- set per protocol. Pt working on IS, encourage to keep practicing. No distress noted at this time.

## 2022-01-26 NOTE — DISCHARGE INSTRUCTIONS
[id number] [facility]   Patient Discharge Instructions    Leydi Lugo / [id number] : 1945    Admitted 2022 Discharged: 2022     IF YOU HAVE ANY PROBLEMS ONCE YOU ARE AT HOME CALL THE FOLLOWING NUMBERS:   Main office number: [phone]    Take Home Medications         · It is important that you take the medication exactly as they are prescribed. · Keep your medication in the bottles provided by the pharmacist and keep a list of the medication names, dosages, and times to be taken in your wallet. · Do not take other medications without consulting your doctor. What to do at [address] your prehospital diet. If you have excessive nausea or vomitting call your doctor's office     Home Physical Therapy is arranged. Use rolling walker when walking. Patients who have had a joint replacement should not drive until you are seen for your follow up appointment by Dr. Maria L Guzman. When to Call    - Call if you have a temperature greater then 101  - Unable to keep food down  - Loose control of your bladder or bowel function  - Are unable to bear any weight   - Need a pain medication refill     Patient Education        Total Knee Replacement: What to Expect at  [address] had a total knee replacement. The doctor replaced the worn ends of the bones that connect to your knee (thighbone and lower leg bone) with plastic and metal parts. When you leave the hospital, you should be able to move around with a walker or crutches. But you will need someone to help you at home until you have more energy and can move around better. You will go home with a bandage and stitches, staples, skin glue, or tape strips. Change the bandage as your doctor tells you to. If you have stitches or staples, your doctor will remove them about 2 weeks after your surgery. Glue or tape strips will fall off on their own over time.  You may still have some mild pain, and the area may be swollen for a few months after surgery. Your knee will continue to improve for up to a year. You will probably use a walker for some time after surgery. When you are ready, you can use a cane. You may be able to walk without support after a couple weeks, or when you are comfortable. You will need to do months of physical rehabilitation (rehab) after a knee replacement. Rehab will help you strengthen the muscles of the knee and help you regain movement. After you recover, your artificial knee will allow you to do normal daily activities with less pain or no pain at all. You may be able to hike, dance, or ride a bike. Talk to your doctor about whether you can do more strenuous activities. Always tell your caregivers that you have an artificial knee. How long it will take to walk on your own, return to normal activities, and go back to work depends on your health and how well your rehabilitation (rehab) program goes. The better you do with your rehab exercises, the quicker you will get your strength and movement back. This care sheet gives you a general idea about how long it will take for you to recover. But each person recovers at a different pace. Follow the steps below to get better as quickly as possible. How can you care for yourself at home? Activity    · Rest when you feel tired. You may take a nap, but don't stay in bed all day. When you sit, use a chair with arms. You can use the arms to help you stand up.     · Work with your physical therapist to find the best way to exercise. What you can do as your knee heals will depend on whether your new knee is cemented or uncemented. You may not be able to do certain things for a while if your new knee is uncemented.     · After your knee has healed enough, you can do more strenuous activities with caution. ? You can golf, but you may want to use a golf cart for some time. And don't wear shoes with spikes. ? You can bike on a flat road or on a stationary bike.  Talk to your doctor before biking uphill. ? Your doctor may suggest that you stay away from activities that put stress on your knee. These include tennis, badminton, contact sports like football, jumping (such as in basketball), jogging, and running. ? Avoid activities where you might fall.     · Do not sit for more than 1 hour at a time. Get up and walk around for a while before you sit again. If you must sit for a long time, prop up your leg with a chair or footstool. This will help you avoid swelling.     · Ask your doctor when you can drive again. It may take several weeks after knee replacement surgery before it's safe for you to drive.     · When you get into a car, sit on the edge of the seat. Then pull in your legs, and turn to face the front.     · You should be able to do many everyday activities 3 to 6 weeks after your surgery. You will probably need to take 4 to 16 weeks off from work. When you can go back to work depends on the type of work you do and how you feel.     · Ask your doctor when it is okay for you to have sex.     · For 12 weeks, do not lift anything heavier than 10 pounds and do not lift weights. Diet    · By the time you leave the hospital, you should be eating your normal diet. If your stomach is upset, try bland, low-fat foods like plain rice, broiled chicken, toast, and yogurt. Your doctor may suggest that you take iron and vitamin supplements.     · Drink plenty of fluids (unless your doctor tells you not to).   · Eat healthy foods, and watch your portion sizes. Try to stay at your ideal weight. Too much weight puts more stress on your new knee.     · You may notice that your bowel movements are not regular right after your surgery. This is common. Try to avoid constipation and straining with bowel movements. Drinking enough fluids, taking a stool softener, and eating foods that are good sources of fiber can help you avoid constipation.  If you have not had a bowel movement after a couple of days, talk to your doctor. Medicines    · Your doctor will tell you if and when you can restart your medicines. You will also get instructions about taking any new medicines.     · If you take aspirin or some other blood thinner, ask your doctor if and when to start taking it again. Make sure that you understand exactly what your doctor wants you to do.     · Your doctor may give you a blood-thinning medicine to prevent blood clots. If you take a blood thinner, be sure you get instructions about how to take your medicine safely. Blood thinners can cause serious bleeding problems. This medicine could be in pill form or as a shot (injection). If a shot is needed, your doctor will tell you how to do this.     · Be safe with medicines. Take pain medicines exactly as directed. ? If the doctor gave you a prescription medicine for pain, take it as prescribed. ? If you are not taking a prescription pain medicine, ask your doctor if you can take an over-the-counter medicine. ? Plan to take your pain medicine 30 minutes before exercises. It is easier to prevent pain before it starts than to stop it after it has started.     · If you think your pain medicine is making you sick to your stomach:  ? Take your medicine after meals (unless your doctor has told you not to). ? Ask your doctor for a different pain medicine.     · If your doctor prescribed antibiotics, take them as directed. Do not stop taking them just because you feel better. You need to take the full course of antibiotics. Incision care    · If your doctor told you how to care for your cut (incision), follow your doctor's instructions. You will have a dressing over the cut. A dressing helps the incision heal and protects it. Your doctor will tell you how to take care of this.     · If you did not get instructions, follow this general advice:  ?  If you have strips of tape on the cut the doctor made, leave the tape on for a week or until it falls off.  ? If you have stitches or staples, your doctor will tell you when to come back to have them removed. ? If you have skin glue on the cut, leave it on until it falls off. Skin glue is also called skin adhesive or liquid stitches. ? Change the bandage every day. ? Wash the area daily with warm water, and pat it dry. Don't use hydrogen peroxide or alcohol. They can slow healing. ? You may cover the area with a gauze bandage if it oozes fluid or rubs against clothing. ? You may shower 24 to 48 hours after surgery. Pat the incision dry. Don't swim or take a bath for the first 2 weeks, or until your doctor tells you it is okay. Exercise    · Your rehab program will give you a number of exercises to do to help you get back your knee's range of motion and strength. Always do them as your therapist tells you. Ice    · For pain and swelling, put ice or a cold pack on the area for 10 to 20 minutes at a time. Put a thin cloth between the ice and your skin. Other instructions    · Wear compression stockings if your doctor told you to. These may help to prevent blood clots. Your doctor will tell you how long you need to keep wearing the compression stockings.     · Carry a medical alert card that says you have an artificial joint. You have metal pieces in your knee. These may set off some airport metal detectors. Follow-up care is a key part of your treatment and safety. Be sure to make and go to all appointments, and call your doctor if you are having problems. It's also a good idea to know your test results and keep a list of the medicines you take. When should you call for help? Call 911 anytime you think you may need emergency care. For example, call if:    · You passed out (lost consciousness).     · You have severe trouble breathing.     · You have sudden chest pain and shortness of breath, or you cough up blood.    Call your doctor now or seek immediate medical care if:    · You have signs of infection, such as:  ? Increased pain, swelling, warmth, or redness. ? Red streaks leading from the incision. ? Pus draining from the incision. ? A fever.     · You have signs of a blood clot, such as:  ? Pain in your calf, back of the knee, thigh, or groin. ? Redness and swelling in your leg or groin.     · Your incision comes open and begins to bleed, or the bleeding increases.     · You have pain that does not get better after you take pain medicine. Watch closely for changes in your health, and be sure to contact your doctor if:    · You do not have a bowel movement after taking a laxative. Where can you learn more? Go to http://www.gray.com/  Enter T054 in the search box to learn more about \"Total Knee Replacement: What to Expect at Home. \"  Current as of: July 1, 2021               Content Version: 13.0  © 2006-2021 [employer]. Care instructions adapted under license by [employer] (which disclaims liability or warranty for this information). If you have questions about a medical condition or this instruction, always ask your healthcare professional. [place] [address] any warranty or liability for your use of this information. Information obtained by :  I understand that if any problems occur once I am at home I am to contact my physician. I understand and acknowledge receipt of the instructions indicated above.                                                                                                                                            Physician's or R.N.'s Signature                                                                  Date/Time                                                                                                                                              Patient or Representative Signature                                                          Date/Time

## 2022-01-26 NOTE — PROGRESS NOTES
Orthopedic Joint Progress Note    2022  Admit Date: 2022  Admit Diagnosis: Primary osteoarthritis of left knee [M17.12]; Degenerative arthritis of left knee [M17.12]    1 Day Post-Op    Subjective:     Tiffany Kessler awake and alert    Review of Systems: Pertinent items are noted in HPI. Objective:     PT/OT:     PATIENT MOBILITY    Bed Mobility  Supine to Sit: Stand-by assistance,Additional time  Transfers  Sit to Stand: Contact guard assistance,Minimum assistance,Assist x2  Stand to Sit: Contact guard assistance,Assist x2  Bed to Chair: Minimum assistance,Assist x2      Gait  Speed/Lavonne: Slow,Shuffled  Step Length: Right shortened,Left shortened  Stance: Left decreased,Right decreased  Gait Abnormalities: Antalgic,Shuffling gait (knee buckling)  Ambulation - Level of Assistance: Minimal assistance,Assist x2  Distance (ft): 15 Feet (ft)  Assistive Device: Walker, rolling   Weight Bearing Status  Left Side Weight Bearing: As tolerated        Vital Signs:    Blood pressure 139/68, pulse 70, temperature 98.1 °F (36.7 °C), resp. rate 18, height 4' 10\" (1.473 m), weight 194 lb 8 oz (88.2 kg), SpO2 92 %.   Temp (24hrs), Av.8 °F (36.6 °C), Min:97.3 °F (36.3 °C), Max:98.1 °F (36.7 °C)      Pain Control:   Pain Assessment  Pain Scale 1: Numeric (0 - 10)  Pain Intensity 1: 6  Pain Location 1: Knee  Pain Orientation 1: Left  Pain Description 1: Aching  Pain Intervention(s) 1: Medication (see MAR),Cold pack    Meds:  Current Facility-Administered Medications   Medication Dose Route Frequency    albuterol (PROVENTIL HFA, VENTOLIN HFA, PROAIR HFA) inhaler 1-2 Puff  1-2 Puff Inhalation Q6H PRN    amitriptyline (ELAVIL) tablet 25 mg  25 mg Oral QHS    clonazePAM (KlonoPIN) tablet 1 mg  1 mg Oral BID PRN    clotrimazole-betamethasone (LOTRISONE) 1-0.05 % cream   Topical PRN    colestipoL (COLESTID) tablet 1 g   (Patient Supplied)  1 g Oral DAILY    escitalopram oxalate (LEXAPRO) tablet 30 mg  30 mg Oral DAILY    fluticasone propionate (FLONASE) 50 mcg/actuation nasal spray 2 Spray  2 Spray Both Nostrils DAILY    lisinopril-hydroCHLOROthiazide (PRINZIDE, ZESTORETIC) 10-12.5 mg per tablet 1 Tablet  1 Tablet Oral DAILY    hyoscyamine SL (LEVSIN/SL) tablet 0.125 mg  0.125 mg Oral Q4H PRN    nitrofurantoin (MACRODANTIN) capsule 50 mg  50 mg Oral DAILY    pramipexole (MIRAPEX) tablet 0.5 mg  0.5 mg Oral QHS    alcohol 62% (NOZIN) nasal  1 Ampule  1 Ampule Topical Q12H    sodium chloride (NS) flush 5-40 mL  5-40 mL IntraVENous Q8H    sodium chloride (NS) flush 5-40 mL  5-40 mL IntraVENous PRN    acetaminophen (TYLENOL) tablet 1,000 mg  1,000 mg Oral Q6H    oxyCODONE IR (ROXICODONE) tablet 5-10 mg  5-10 mg Oral Q4H PRN    HYDROmorphone (DILAUDID) injection 1 mg  1 mg IntraVENous Q3H PRN    naloxone (NARCAN) injection 0.2-0.4 mg  0.2-0.4 mg IntraVENous Q10MIN PRN    dexamethasone (DECADRON) 10 mg/mL injection 10 mg  10 mg IntraVENous ONCE    promethazine (PHENERGAN) tablet 25 mg  25 mg Oral Q6H PRN    diphenhydrAMINE (BENADRYL) capsule 25 mg  25 mg Oral Q4H PRN    senna-docusate (PERICOLACE) 8.6-50 mg per tablet 2 Tablet  2 Tablet Oral DAILY    aspirin delayed-release tablet 81 mg  81 mg Oral Q12H    ondansetron (ZOFRAN ODT) tablet 8 mg  8 mg Oral Q8H PRN    methocarbamoL (ROBAXIN) tablet 750 mg  750 mg Oral QID PRN    levothyroxine (SYNTHROID) tablet 150 mcg  150 mcg Oral QHS        LAB:    Lab Results   Component Value Date/Time    INR 0.9 01/04/2022 01:30 PM    INR 1.0 03/27/2021 08:58 AM     Lab Results   Component Value Date/Time    HGB 11.9 01/25/2022 07:27 PM    HGB 11.7 01/04/2022 01:30 PM    HGB 12.5 08/16/2021 09:19 AM       Wound Vagina (Active)   Number of days: 785       Wound Perineum Left (Active)   Number of days: 785       Wound Perineum Right (Active)   Number of days: 785       Wound Wrist Left (Active)   Number of days: 715       Incision 01/25/22 Knee Left (Active) Dressing Status Clean;Dry; Intact 01/25/22 1940   Dressing/Treatment Hydrofiber Ag 01/25/22 1940   Drainage Amount None 01/25/22 1940   Wound Odor None 01/25/22 1940   Number of days: 1         Physical Exam:  Calves soft/ neuro intact      Assessment:      Principal Problem:    Degenerative arthritis of left knee (1/25/2022)    Active Problems:    HLD (hyperlipidemia) (12/6/2017)      Overview: Formatting of this note might be different from the original.      Last Assessment & Plan:        Problem and/or symptoms are currently not stable and/or worsening on       current treatment plan. Will have patient follow up as directed and make       the following changes for further evaluation and/or treatment:       Recent patient's Lipitor dose to 40 mg daily since there is very little       benefit in a 80 mg dose but a much higher incidence of adverse side       effects. Essential hypertension (12/6/2017)      Overview: Formatting of this note might be different from the original.      Last Assessment & Plan:        Problem and/or Symptoms are currently stable and/or improving on current       treatment plan. Will continue and have patient follow up as directed. Hypothyroidism (acquired) (12/6/2017)      Overview: Formatting of this note might be different from the original.      Last Assessment & Plan:        Problem and/or symptoms are currently not stable and/or worsening on       current treatment plan. Will have patient follow up as directed and make       the following changes for further evaluation and/or treatment:       Patient states whenever she has been placed on generic Synthroid that her       symptoms have worsened markedly of hypothyroidism. Will send a new       prescription today with notes to pharmacy for them to fill brand name       Synthroid only and follow-up response next scheduled office visit.       IBS (irritable bowel syndrome) (12/6/2017)      Overview: Formatting of this note might be different from the original.      Last Assessment & Plan:        Problem and/or Symptoms are new to provider.  Will have patient follow up       as directed and make the following plan for further evaluation and/or       treatlment:      During patient to local gastroenterologist at her request      Mood disorder [facility]) (12/6/2017)      BONNIE (generalized anxiety disorder) (10/9/2018)         Plan:     Continue PT/OT/Rehab  Consult: Rehab team including PT, OT, recreational therapy, and    Home soon    Patient Expects to be Discharged to[de-identified] Home with home health

## 2022-01-26 NOTE — PROGRESS NOTES
Had therapy. Prescriptions were sent to pharmacy. Home therapy arranged. Has follow up appt scheduled with Dr Helen Rivas. Instructed to call doctor if having fever, excessive drainage or other problems. I have reviewed discharge instructions with the patient. The patient verbalized understanding. Going to car via wheelchair.

## 2022-01-26 NOTE — PROGRESS NOTES
[facility] Hospitalist Consult   Admit Date:  2022  8:01 AM   Name:  Ignacio Mcfarland   Age:  77 y.o. Sex:  female  :  1945   MRN:  [id number]   Room:  [facility]    Presenting Complaint: No chief complaint on file. Reason(s) for Admission: Primary osteoarthritis of left knee [M17.12]  Degenerative arthritis of left knee [M17.12]     Hospitalists consulted by Iker Sewet MD for: Postop medical management    History of Presenting Illness:   Ignacio Mcfarland is a 77 y.o. female with history hypertension, hypothyroidism, depression/mood disorder admitted by Ortho service for total left knee arthroplasty for severe left knee arthritis. Hospitalist consulted for  postop medical management. Patient is status post OR today and has tolerated the procedure well. Complaining of pain at the surgery site. No other concers. Denies chest pain, palpitation, nausea, vomiting, abdominal pain or shortness of breath.  - Feels great. Ready to go home. Thinks knee pain is a 4/10. Denies CP/SOB. Denies F/C. Review of Systems:  10 systems reviewed and negative except as noted in HPI.   Assessment & Plan:     Left knee arthritis status post left total knee arthroplasty on :  Pain control  PT/OT  DVT prophylaxis as per Ortho  Rest of the management as per primary    Hypertension:  Continue aspirin, lisinopril/hydrochlorothiazide  Hydralazine as needed    Hypothyroidism:  Continue levothyroxine    Depression/mood disorder:  Continue amitriptyline, escitalopram, pramipexole      Hospital Problems as of 2022 Date Reviewed: 2022          Codes Class Noted - Resolved POA    Essential hypertension ICD-10-CM: I10  ICD-9-CM: 401.9  2017 - Present Yes    Overview Signed 2021  8:30 PM by Carina Patrick NP     Formatting of this note might be different from the original.  Last Assessment & Plan:    Problem and/or Symptoms are currently stable and/or improving on current treatment plan.  Will continue and have patient follow up as directed. Hypothyroidism (acquired) ICD-10-CM: E03.9  ICD-9-CM: 244.9  12/6/2017 - Present Yes    Overview Signed 8/14/2021  8:30 PM by Mak Simpson NP     Formatting of this note might be different from the original.  Last Assessment & Plan:    Problem and/or symptoms are currently not stable and/or worsening on current treatment plan. Will have patient follow up as directed and make the following changes for further evaluation and/or treatment:   Patient states whenever she has been placed on generic Synthroid that her symptoms have worsened markedly of hypothyroidism. Will send a new prescription today with notes to pharmacy for them to fill brand name Synthroid only and follow-up response next scheduled office visit. IBS (irritable bowel syndrome) ICD-10-CM: K58.9  ICD-9-CM: 564.1  12/6/2017 - Present Yes    Overview Signed 8/14/2021  8:30 PM by Mak Simpson NP     Formatting of this note might be different from the original.  Last Assessment & Plan:    Problem and/or Symptoms are new to provider. Will have patient follow up as directed and make the following plan for further evaluation and/or treatlment:  During patient to local gastroenterologist at her request             BONNIE (generalized anxiety disorder) ICD-10-CM: F41.1  ICD-9-CM: 300.02  10/9/2018 - Present Yes        HLD (hyperlipidemia) ICD-10-CM: E78.5  ICD-9-CM: 272.4  12/6/2017 - Present Yes    Overview Signed 8/14/2021  8:30 PM by Mak Simpson NP     Formatting of this note might be different from the original.  Last Assessment & Plan:    Problem and/or symptoms are currently not stable and/or worsening on current treatment plan.  Will have patient follow up as directed and make the following changes for further evaluation and/or treatment:   Recent patient's Lipitor dose to 40 mg daily since there is very little benefit in a 80 mg dose but a much higher incidence of adverse side effects. * (Principal) Degenerative arthritis of left knee ICD-10-CM: M17.12  ICD-9-CM: 715.96  1/25/2022 - Present Unknown        Mood disorder ([facility] [address] ICD-10-CM: F39  ICD-9-CM: 296.90  12/6/2017 - Present Yes              Past History:  Past Medical History:   Diagnosis Date    Acute kidney insufficiency     Followed by PCP     Adverse effect of anesthesia     2016 in [place] heart stopped at the end of my gallbladder surgery. \" Patient reports no CPR performed, stayed one night in the hospital. No problems with any other surgeries.     Allergic rhinitis     Anemia     history     Anxiety and depression     Controlled with medication     Arthritis     Edema     bilateral lower legs     GERD (gastroesophageal reflux disease)     Controlled with medication     History of chicken pox     History of Clostridioides difficile infection     History of DVT (deep vein thrombosis)     s/p knee replacement and developed right leg DVT     HTN (hypertension)     controlled with medication     Hyperlipidemia     controlled with medication     Hyperthyroidism     pt denies    Hypothyroidism     controlled with medication     IBS (irritable bowel syndrome)     Insomnia     Morbid obesity (HCC)     BMI 42.85    Murmur     per PCP note (32/6/94) systolic murmur grade of 2/6    Overactive bladder     PAD (peripheral artery disease) ([facility] [address]     patient denies on 11/27/19    Recurrent UTI     RLS (restless legs syndrome)     controlled with medication     SOBOE (shortness of breath on exertion)     PRN inhaler-patient reports she uses inhaler QHS    Special examinations     History-Neurologist - Dr. 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      Past Surgical History:   Procedure Laterality Date    HX APPENDECTOMY  1955    HX CHOLECYSTECTOMY  2016    HX COLONOSCOPY      HX GYN  2019    suburethral sling and cystoscopy    HX KNEE REPLACEMENT Right     HX MENISCUS REPAIR Left     2016    HX MENISCUS REPAIR Left     HX ORTHOPAEDIC Left     LEFT WRIST TRAPEZIOMETACARPAL ARTHROPLASTY WITH FCR TENDON TRANSFER-     HX ORTHOPAEDIC Left 2020    left hand tendon surgery    HX OTHER SURGICAL Right 2016    Hand    HX OTHER SURGICAL Left ~    B/L Ulnar    HX OTHER SURGICAL  ~    urethraplasty    HX OTHER SURGICAL  2019    bladder surgery-put mesh in    HX SHOULDER ARTHROSCOPY Right ~, ~2013    x2    HX TONSIL AND ADENOIDECTOMY  ~1950    HX TOTAL ABDOMINAL HYSTERECTOMY  ~    IR GASTRIC BAND ADJ W/ FLUORO  ~    lab band removed ~       Allergies   Allergen Reactions    Fire Ant Anaphylaxis    Adhesive Rash and Itching     \"sticky ace wrap\"    Amoxicillin Other (comments)     C-Diff    Betadine [Povidone-Iodine] Other (comments)     Mcgrath    Cephalexin Other (comments)     C-Diff    Demerol [Meperidine] Rash    Hydromorphone Other (comments)     Keeps her awake    Morphine Other (comments)     Awake    Percocet [Oxycodone-Acetaminophen] Other (comments)     Jittery    Percodan [Oxycodone Hcl-Oxycodone-Asa] Other (comments)     Jittery      Social History     Tobacco Use    Smoking status: Former Smoker     Packs/day: 1.00     Years: 40.00     Pack years: 40.00     Types: Cigarettes     Quit date: 2005     Years since quittin.1    Smokeless tobacco: Never Used   Substance Use Topics    Alcohol use: Not Currently      Family History   Problem Relation Age of Onset    No Known Problems Mother     Other Brother         brain aneurysm, AVM    Heart Surgery Brother         CABGx4    Heart Disease Brother     Hypertension Brother     Other Father 54        brain aneurysm    Other Sister         fibromyalgia    Pneumonia Sister     Heart Attack Maternal Grandmother     Cancer Maternal Grandfather         skin    Heart Attack Paternal Grandfather     Congenital anomalies Sister         right arm deformity    Other Son         Hepatitis C    Drug Abuse Son     Mult Sclerosis Daughter       Family history reviewed and negative except as otherwise noted.     Immunization History   Administered Date(s) Administered    COVID-19, Pfizer Purple top, DILUTE for use, 12+ yrs, 30mcg/0.3mL dose 01/21/2021, 02/06/2021    Influenza High Dose Vaccine PF 09/11/2018, 10/18/2019, 08/25/2020    Influenza Vaccine 09/08/2020    Influenza, High-dose, Quadrivalent (>65 Yrs Fluzone High Dose Quad 17546) 08/25/2020    Pneumococcal Conjugate (PCV-13) 10/31/2019    Zoster Recombinant 09/08/2020     Current Facility-Administered Medications   Medication Dose Route Frequency    albuterol (PROVENTIL HFA, VENTOLIN HFA, PROAIR HFA) inhaler 1-2 Puff  1-2 Puff Inhalation Q6H PRN    amitriptyline (ELAVIL) tablet 25 mg  25 mg Oral QHS    clonazePAM (KlonoPIN) tablet 1 mg  1 mg Oral BID PRN    clotrimazole-betamethasone (LOTRISONE) 1-0.05 % cream   Topical PRN    colestipoL (COLESTID) tablet 1 g   (Patient Supplied)  1 g Oral DAILY    escitalopram oxalate (LEXAPRO) tablet 30 mg  30 mg Oral DAILY    fluticasone propionate (FLONASE) 50 mcg/actuation nasal spray 2 Spray  2 Spray Both Nostrils DAILY    lisinopril-hydroCHLOROthiazide (PRINZIDE, ZESTORETIC) 10-12.5 mg per tablet 1 Tablet  1 Tablet Oral DAILY    hyoscyamine SL (LEVSIN/SL) tablet 0.125 mg  0.125 mg Oral Q4H PRN    nitrofurantoin (MACRODANTIN) capsule 50 mg  50 mg Oral DAILY    pramipexole (MIRAPEX) tablet 0.5 mg  0.5 mg Oral QHS    alcohol 62% (NOZIN) nasal  1 Ampule  1 Ampule Topical Q12H    sodium chloride (NS) flush 5-40 mL  5-40 mL IntraVENous Q8H    sodium chloride (NS) flush 5-40 mL  5-40 mL IntraVENous PRN    acetaminophen (TYLENOL) tablet 1,000 mg  1,000 mg Oral Q6H    oxyCODONE IR ([place]) tablet 5-10 mg  5-10 mg Oral Q4H PRN    HYDROmorphone (DILAUDID) injection 1 mg  1 mg IntraVENous Q3H PRN    naloxone (NARCAN) injection 0.2-0.4 mg  0.2-0.4 mg IntraVENous Q10MIN PRN    dexamethasone (DECADRON) 10 mg/mL injection 10 mg  10 mg IntraVENous ONCE    promethazine (PHENERGAN) tablet 25 mg  25 mg Oral Q6H PRN    diphenhydrAMINE (BENADRYL) capsule 25 mg  25 mg Oral Q4H PRN    senna-docusate (PERICOLACE) 8.6-50 mg per tablet 2 Tablet  2 Tablet Oral DAILY    aspirin delayed-release tablet 81 mg  81 mg Oral Q12H    ondansetron (ZOFRAN ODT) tablet 8 mg  8 mg Oral Q8H PRN    methocarbamoL (ROBAXIN) tablet 750 mg  750 mg Oral QID PRN    levothyroxine (SYNTHROID) tablet 150 mcg  150 mcg Oral QHS       Objective:     Patient Vitals for the past 24 hrs:   Temp Pulse Resp BP SpO2   01/26/22 0719 98.4 °F (36.9 °C) 66 18 (!) 113/58 95 %   01/26/22 0345 98.1 °F (36.7 °C) 70 18 139/68 92 %   01/25/22 2331 98.1 °F (36.7 °C) 66 18 (!) 129/51 92 %   01/25/22 1955 -- -- -- -- 95 %   01/25/22 1944 98 °F (36.7 °C) 75 18 (!) 165/71 98 %   01/25/22 1603 -- -- -- -- 93 %   01/25/22 1558 97.5 °F (36.4 °C) 81 16 132/78 92 %   01/25/22 1331 -- 64 16 115/70 93 %   01/25/22 1314 -- 63 16 (!) 148/68 95 %   01/25/22 1309 -- 64 16 129/79 94 %   01/25/22 1304 -- 64 16 138/64 95 %   01/25/22 1259 -- 66 16 (!) 133/93 98 %   01/25/22 1254 -- 67 16 138/88 97 %   01/25/22 1249 98 °F (36.7 °C) 68 16 (!) 167/65 97 %   01/25/22 1020 -- 61 16 (!) 171/72 99 %   01/25/22 1015 -- 62 20 (!) 189/84 100 %   01/25/22 1010 -- 65 18 (!) 176/74 100 %   01/25/22 1004 -- 63 18 (!) 193/82 100 %   01/25/22 0825 97.3 °F (36.3 °C) 69 16 (!) 160/73 97 %     Oxygen Therapy  O2 Sat (%): 95 % (01/26/22 0719)  Pulse via Oximetry: 75 beats per minute (01/25/22 1955)  O2 Device: Heated; Hi flow nasal cannula (01/25/22 2010)  O2 Flow Rate (L/min): 10 l/min (01/25/22 2010)  O2 Temperature: 87.8 °F (31 °C) (01/25/22 2010)  FIO2 (%): 21 % (01/25/22 2010)    Estimated body mass index is 40.65 kg/m² as calculated from the following:    Height as of this encounter: 4' 10\" (1.473 m). Weight as of this encounter: 88.2 kg (194 lb 8 oz). Intake/Output Summary (Last 24 hours) at 1/26/2022 0818  Last data filed at 1/25/2022 1236  Gross per 24 hour   Intake 1000 ml   Output 200 ml   Net 800 ml         Physical Exam:    Blood pressure (!) 113/58, pulse 66, temperature 98.4 °F (36.9 °C), resp. rate 18, height 4' 10\" (1.473 m), weight 88.2 kg (194 lb 8 oz), SpO2 95 %. General:    Well nourished. No overt distress  Head:  Normocephalic, atraumatic  Eyes:  Sclerae appear normal.  Pupils equally round. ENT:  Nares appear normal, no drainage. Moist oral mucosa  Neck:  No restricted ROM. Trachea midline   CV:   RRR. No m/r/g. No jugular venous distension. Lungs:   CTAB. No wheezing, rhonchi, or rales. Respirations even, unlabored  Abdomen: Bowel sounds present. Soft, nontender, nondistended. Extremities: S/p left knee arthroplasty  Skin:     No rashes and normal coloration. Warm and dry. Neuro:  CN II-XII grossly intact. Sensation intact. A&Ox3  Psych:  Normal mood and affect. I have reviewed ordered lab tests and independently visualized imaging below:    Recent Labs:  Recent Results (from the past 48 hour(s))   HEMOGLOBIN    Collection Time: 01/25/22  7:27 PM   Result Value Ref Range    HGB 11.9 11.7 - 15.4 g/dL       All Micro Results     None          Other Studies:  No results found. Signed:  Alphonso Cosby DO    Part of this note may have been written by using a voice dictation software. The note has been proof read but may still contain some grammatical/other typographical errors.

## 2022-01-26 NOTE — PROGRESS NOTES
Problem: Self Care Deficits Care Plan (Adult)  Goal: *Acute Goals and Plan of Care (Insert Text)  Outcome: Progressing Towards Goal  Note: GOALS:   DISCHARGE GOALS (in preparation for going home/rehab):  3 days all goals met 1/26/2022  1. Ms. Kofi Dang will perform one lower body dressing activity with minimal assistance required to demonstrate improved functional mobility and safety. 2.  Ms. Kofi Dang will perform one lower body bathing activity with minimal assistance required to demonstrate improved functional mobility and safety. 3.  Ms. Kofi Dang will perform toileting/toilet transfer with contact guard assistance to demonstrate improved functional mobility and safety. 4.  Ms. Kofi Dang will perform shower transfer with contact guard assistance to demonstrate improved functional mobility and safety. JOINT CAMP OCCUPATIONAL THERAPY TKA: Daily Note, Discharge and AM 1/26/2022  OUTPATIENT: Hospital Day: 2  Payor: Pedella Triana / Plan: [employer] / Product Type: [employer] /      NAME/AGE/GENDER: Geroge Cancer is a 77 y.o. female   PRIMARY DIAGNOSIS:  Primary osteoarthritis of left knee [M17.12]   Procedure(s) and Anesthesia Type:     * LEFT KNEE ARTHROPLASTY TOTAL/DEPUY - Spinal (Left)  ICD-10: Treatment Diagnosis:    · Pain in Left Knee (M25.562)  · Stiffness of Left Knee, Not elsewhere classified (Q76.400)      ASSESSMENT:     Ms. Kofi Dang is s/p L TKA and presents with decreased weight bearing on L LE and decreased independence with functional mobility and activities of daily living. Patient completed shower and dressing as charted below in ADL grid and is ambulating with rolling walker and contact guard assist.  Patient has met 4/4 goals and plans to return home with good family support. Family able to provide patient with appropriate level of assistance at this time.   OT reviewed safety precautions throughout session and therapy schedule for the remainder of today. Patient instructed to call for assistance when needing to get up from recliner and all needs in reach. Patient verbalized understanding of call light. This section established at most recent assessment   PROBLEM LIST (Impairments causing functional limitations):  1. Decreased Strength  2. Decreased ADL/Functional Activities  3. Decreased Transfer Abilities  4. Increased Pain  5. Increased Fatigue  6. Decreased Flexibility/Joint Mobility  7. Decreased Knowledge of Precautions   INTERVENTIONS PLANNED: (Benefits and precautions of occupational therapy have been discussed with the patient.)  1. Activities of daily living training  2. Adaptive equipment training  3. Balance training  4. Clothing management  5. Donning&doffing training  6. Theraputic activity     TREATMENT PLAN: Frequency/Duration: Follow patient 1-2 times to address above goals. Rehabilitation Potential For Stated Goals: Good     RECOMMENDED REHABILITATION/EQUIPMENT: (at time of discharge pending progress): Continue Skilled Therapy. OCCUPATIONAL PROFILE AND HISTORY:   History of Present Injury/Illness (Reason for Referral): Pt presents this date s/p (left) TKA.     Past Medical History/Comorbidities:   Ms. Ketty Gotti  has a past medical history of Acute kidney insufficiency, Adverse effect of anesthesia, Allergic rhinitis, Anemia, Anxiety and depression, Arthritis, Edema, GERD (gastroesophageal reflux disease), History of chicken pox, History of Clostridioides difficile infection, History of DVT (deep vein thrombosis), HTN (hypertension), Hyperlipidemia, Hyperthyroidism, Hypothyroidism, IBS (irritable bowel syndrome), Insomnia, Morbid obesity ([address] Murmur, Overactive bladder, PAD (peripheral artery disease) ([address] Recurrent UTI, RLS (restless legs syndrome), SOBOE (shortness of breath on exertion), Special examinations, MALOU (stress urinary incontinence, female), Thromboembolus ([address] (2015), and 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 She has no past medical history of Difficult intubation, Malignant hyperthermia due to anesthesia, Nausea & vomiting, or Pseudocholinesterase deficiency. Ms. Alissa Martínez  has a past surgical history that includes hx meniscus repair (Left); hx tonsil and adenoidectomy (~1950); hx shoulder arthroscopy (Right, ~1966, ~2013); hx knee replacement (Right, 2014); hx cholecystectomy (12/2016); hx total abdominal hysterectomy (~1970); hx appendectomy (1955); ir gastric band adj w/ fluoro (~2014); hx gyn (12/03/2019); hx other surgical (Right, 2016); hx other surgical (Left, ~2014); hx other surgical (~1986); hx meniscus repair (Left, 2014); hx colonoscopy; hx orthopaedic (Left); hx other surgical (12/03/2019); and hx orthopaedic (Left, 01/2020). Social History/Living Environment:   Home Environment: Private residence  # Steps to Enter: 0  One/Two Story Residence: One story  Living Alone: No  Support Systems: Spouse/Significant Other  Patient Expects to be Discharged to[de-identified] Home with home health  Current DME Used/Available at Home: None  Tub or Shower Type: Tub/Shower combination    Prior Level of Function/Work/Activity:  Pt was (I) with ADL's and ambulated short distances. Number of Personal Factors/Comorbidities that affect the Plan of Care: Brief history (0):  LOW COMPLEXITY   ASSESSMENT OF OCCUPATIONAL PERFORMANCE[de-identified]   Most Recent Physical Functioning:   Balance  Sitting: Intact  Standing: With support                  LLE AROM  L Knee Flexion: 80  L Knee Extension: 6           Mental Status  Neurologic State: Alert  Orientation Level: Oriented X4  Cognition: Follows commands  Perception: Appears intact  Perseveration: No perseveration noted  Safety/Judgement: Fall prevention                Basic ADLs (From Assessment) Complex ADLs (From Assessment)   Basic ADL  Feeding: Independent  Oral Facial Hygiene/Grooming: Supervision  Bathing:  Moderate assistance  Type of Bath: Chlorhexidine (CHG),Full,Shower  Upper Body Dressing: Supervision  Lower Body Dressing: Moderate assistance  Toileting: Moderate assistance     Grooming/Bathing/Dressing Activities of Daily Living   Grooming  Grooming Assistance: Set-up  Position Performed: Standing  Brushing Teeth: Set-up  Brushing/Combing Hair: Set-up Cognitive Retraining  Safety/Judgement: Fall prevention   Upper Body Bathing  Bathing Assistance: Set-up  Position Performed: Seated in chair;Standing  Cues: Verbal cues provided  Adaptive Equipment: Grab bar; Shower chair Feeding  Feeding Assistance: Set-up   Lower Body Bathing  Bathing Assistance: Set-up  Perineal  : Set-up  Position Performed: Standing  Cues: Verbal cues provided  Adaptive Equipment: Grab bar  Lower Body : Set-up  Position Performed: Seated in chair;Standing  Cues: Verbal cues provided  Adaptive Equipment: Grab bar; Shower chair Toileting  Toileting Assistance: Set-up  Bladder Hygiene: Set-up   Upper Body Dressing Assistance  Dressing Assistance: Set-up  Pullover Shirt: Set-up (gown) Functional Transfers  Bathroom Mobility: Contact guard assistance  Toilet Transfer : Contact guard assistance  Shower Transfer: Contact guard assistance  Cues: Verbal cues provided  Adaptive Equipment: Grab bars; Bedside commode; Shower chair with back; Walker (comment)   Lower Body Dressing Assistance  Dressing Assistance: Contact guard assistance  Underpants: Contact guard assistance  Socks: Compensatory technique training  Slip on Shoes with Back: Compensatory technique training (OT assisted)  Adaptive Equipment Used: Grab bar;Walker Bed/Mat Mobility  Supine to Sit: Stand-by assistance  Sit to Stand: Contact guard assistance  Stand to Sit: Contact guard assistance  Bed to Chair: Contact guard assistance  Scooting: Contact guard assistance  Cues: Verbal cues provided         Physical Skills Involved:  1. Range of Motion  2. Balance  3.  Pain (acute) Cognitive Skills Affected (resulting in the inability to perform in a timely and safe manner): 1. none Psychosocial Skills Affected:  1. Environmental Adaptation   Number of elements that affect the Plan of Care: 3-5:  MODERATE COMPLEXITY   CLINICAL DECISION MAKIN[address] [place] [address] AM-PAC 6 Clicks   Daily Activity Inpatient Short Form  How much help from another person does the patient currently need. .. Total A Lot A Little None   1. Putting on and taking off regular lower body clothing? [] 1   [] 2   [x] 3   [] 4   2. Bathing (including washing, rinsing, drying)? [] 1   [] 2   [] 3   [x] 4   3. Toileting, which includes using toilet, bedpan or urinal?   [] 1   [] 2   [] 3   [x] 4   4. Putting on and taking off regular upper body clothing? [] 1   [] 2   [] 3   [x] 4   5. Taking care of personal grooming such as brushing teeth? [] 1   [] 2   [] 3   [x] 4   6. Eating meals? [] 1   [] 2   [] 3   [x] 4   © , Trustees of [address] [place] [address], under license to [employer]. All rights reserved     Score:  Initial:18 Most Recent: 23 (Date: 22 )    Interpretation of Tool:  Represents activities that are increasingly more difficult (i.e. Bed mobility, Transfers, Gait). Use of outcome tool(s) and clinical judgement create a POC that gives a: LOW COMPLEXITY            TREATMENT:   (In addition to Assessment/Re-Assessment sessions the following treatments were rendered)     Pre-treatment Symptoms/Complaints: Tolerated shower  Pain: Initial:   Pain Intensity 1: 0 0 Post Session:  0     Self Care: (60 min): Procedure(s) (per grid) utilized to improve and/or restore self-care/home management as related to dressing, bathing, toileting, grooming, self feeding and functional mobility. Required moderate verbal cueing to facilitate activities of daily living skills and compensatory activities. Treatment/Session Assessment:     Response to Treatment:  pt up in room tolerated well.     Education:  [] Home Exercises  [x] Fall Precautions  [] Hip Precautions [] Going Home Video  [x] Knee/Hip Prosthesis Review  [x] Walker Management/Safety [x] Adaptive Equipment as Needed       Interdisciplinary Collaboration:   o Physical Therapy Assistant  o Occupational Therapist  o Registered Nurse    After treatment position/precautions:   o Up in chair  o Bed/Chair-wheels locked  o Call light within reach  o RN notified  o LEs reclined     Compliance with Program/Exercises: Compliant all of the time. Recommendations/Intent for next treatment session:  Pt doing well all goals met and will do well at home with support from family. Patient will be discharged home with home health PT. No further Occupational Therapy warranted, will discharge Occupational Therapy services.       Total Treatment Duration:  OT Patient Time In/Time Out  Time In: 0800  Time Out: 0900     Kiya Alves OT

## 2022-01-26 NOTE — PROGRESS NOTES
Problem: Mobility Impaired (Adult and Pediatric)  Goal: *Acute Goals and Plan of Care (Insert Text)  Outcome: Progressing Towards Goal  Note: GOALS (1-4 days):  (1.)Ms. Timmy Scruggs will move from supine to sit and sit to supine , scoot up and down, and roll side to side in bed with INDEPENDENT. (2.)Ms. Timmy Scruggs will transfer from bed to chair and chair to bed with MODIFIED INDEPENDENCE using the least restrictive device. (3.)Ms. Timmy Scruggs will ambulate with MODIFIED INDEPENDENCE for a minimum of 150 feet with the least restrictive device. (4.)Ms. Timmy Scruggs will increase left knee ROM to 0°-90°.  ________________________________________________________________________________________________        PHYSICAL THERAPY JOINT CAMP TKA: Daily Note and AM 1/26/2022  OUTPATIENT: Hospital Day: 2  Payor: [employer] / Plan: [address] / Product Type: [employer] Care Medicare /      NAME/AGE/GENDER: Sharif Davis is a 77 y.o. female   PRIMARY DIAGNOSIS:  Primary osteoarthritis of left knee [M17.12]   Procedure(s) and Anesthesia Type:     * LEFT KNEE ARTHROPLASTY TOTAL/DEPUY - Spinal (Left)  ICD-10: Treatment Diagnosis:    · Pain in Left Knee (M25.562)  · Stiffness of Left Knee, Not elsewhere classified (M25.662)  · Difficulty in walking, Not elsewhere classified (R26.2)  · Other abnormalities of gait and mobility (R26.89)      ASSESSMENT:     Ms. Timmy Scruggs presents s/p LTKA with increased weakness, stability and overall decreased ROM. Pt is independent at baseline and works as a caregiver during the week. Today she was able to come to EOB with SBA and additional time and then came to stand with CGA-min A x2 and VC for RW use and increased safety. Pt ambulated 15' around the bed but demonstrated BLE knee buckling intermittently throughout.  She is functioning below her baseline with above stated deficits and would continue to benefit from skilled PT to maximize rehab potential.   1/26 supine upon arrival. Performs L knee exercises with guidance in the bed. Bed mobility as follows:supine>eob with SBA and verbals. Ambulated 125 ft down the hubbard using RW with SBA working gait sequence. Then rested then ambulated another 125 ft back to the room using RW with SBA and verbal cues. Left with OT, getting ready to get in the shower. All question answer and ready for D/C. This section established at most recent assessment   PROBLEM LIST (Impairments causing functional limitations):  1. Decreased Strength  2. Decreased ADL/Functional Activities  3. Decreased Transfer Abilities  4. Decreased Ambulation Ability/Technique  5. Decreased Balance  6. Increased Pain  7. Decreased Activity Tolerance  8. Decreased Flexibility/Joint Mobility   INTERVENTIONS PLANNED: (Benefits and precautions of physical therapy have been discussed with the patient.)  1. Bed Mobility  2. Cold  3. Gait Training  4. Home Exercise Program (HEP)  5. Range of Motion (ROM)  6. Therapeutic Activites  7. Therapeutic Exercise/Strengthening  8. Transfer Training     TREATMENT PLAN: Frequency/Duration: Follow patient BID for duration of hospital stay to address above goals. Rehabilitation Potential For Stated Goals: Good     RECOMMENDED REHABILITATION/EQUIPMENT: (at time of discharge pending progress): Continue Skilled Therapy and Home Health: Physical Therapy. HISTORY:   History of Present Injury/Illness (Reason for Referral):  Pt is s/p LTKA 1/25.   Past Medical History/Comorbidities:   Ms. Raffy Sanchez  has a past medical history of Acute kidney insufficiency, Adverse effect of anesthesia, Allergic rhinitis, Anemia, Anxiety and depression, Arthritis, Edema, GERD (gastroesophageal reflux disease), History of chicken pox, History of Clostridioides difficile infection, History of DVT (deep vein thrombosis), HTN (hypertension), Hyperlipidemia, Hyperthyroidism, Hypothyroidism, IBS (irritable bowel syndrome), Insomnia, Morbid obesity ([facility] [address] Murmur, Overactive bladder, PAD (peripheral artery disease) ([facility] [address] Recurrent UTI, RLS (restless legs syndrome), SOBOE (shortness of breath on exertion), Special examinations, MALOU (stress urinary incontinence, female), Thromboembolus ([facility] [address] (2015), and 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 She has no past medical history of Difficult intubation, Malignant hyperthermia due to anesthesia, Nausea & vomiting, or Pseudocholinesterase deficiency. Ms. Kristine Mcgee  has a past surgical history that includes hx meniscus repair (Left); hx tonsil and adenoidectomy (~1950); hx shoulder arthroscopy (Right, ~1966, ~2013); hx knee replacement (Right, 2014); hx cholecystectomy (12/2016); hx total abdominal hysterectomy (~1970); hx appendectomy (1955); ir gastric band adj w/ fluoro (~2014); hx gyn (12/03/2019); hx other surgical (Right, 2016); hx other surgical (Left, ~2014); hx other surgical (~1986); hx meniscus repair (Left, 2014); hx colonoscopy; hx orthopaedic (Left); hx other surgical (12/03/2019); and hx orthopaedic (Left, 01/2020). Social History/Living Environment:   Home Environment: Private residence  # Steps to Enter: 0  One/Two Story Residence: One story  Living Alone: No  Support Systems: Spouse/Significant Other  Patient Expects to be Discharged to[de-identified] Home with home health  Current DME Used/Available at Home: None  Tub or Shower Type: Tub/Shower combination    Prior Level of Function/Work/Activity:  Pt is independent at baseline with no AD use. Number of Personal Factors/Comorbidities that affect the Plan of Care: 3+: HIGH COMPLEXITY   EXAMINATION:   Most Recent Physical Functioning:                 LLE AROM  L Knee Flexion: 80  L Knee Extension: 6          Bed Mobility  Supine to Sit: Stand-by assistance    Transfers  Sit to Stand: Stand-by assistance  Stand to Sit: Stand-by assistance  Bed to Chair: Stand-by assistance    Balance  Sitting: Intact  Standing: Pull to stand; With support              Weight Bearing Status  Left Side Weight Bearing: As tolerated  Distance (ft): 250 Feet (ft)  Ambulation - Level of Assistance: Stand-by assistance  Assistive Device: Walker, rolling  Speed/Lavonne: Shuffled; Slow  Step Length: Left shortened;Right shortened  Stance: Left decreased;Right decreased  Gait Abnormalities: Antalgic; Shuffling gait        Braces/Orthotics: none      Left Knee Cold  Type: Cryocuff      Body Structures Involved:  1. Bones  2. Joints  3. Muscles  4. Ligaments Body Functions Affected:  1. Neuromusculoskeletal  2. Movement Related Activities and Participation Affected:  1. Mobility   Number of elements that affect the Plan of Care: 4+: HIGH COMPLEXITY   CLINICAL PRESENTATION:   Presentation: Evolving clinical presentation with changing clinical characteristics: MODERATE COMPLEXITY   CLINICAL DECISION MAKIN [facility] Inpatient Short Form  How much difficulty does the patient currently have. .. Unable A Lot A Little None   1. Turning over in bed (including adjusting bedclothes, sheets and blankets)? [] 1   [] 2   [] 3   [x] 4   2. Sitting down on and standing up from a chair with arms ( e.g., wheelchair, bedside commode, etc.)   [] 1   [] 2   [x] 3   [] 4   3. Moving from lying on back to sitting on the side of the bed? [] 1   [] 2   [] 3   [x] 4   How much help from another person does the patient currently need. .. Total A Lot A Little None   4. Moving to and from a bed to a chair (including a wheelchair)? [] 1   [] 2   [x] 3   [] 4   5. Need to walk in hospital room? [] 1   [] 2   [x] 3   [] 4   6. Climbing 3-5 steps with a railing? [] 1   [x] 2   [] 3   [] 4   © , Trustees of [address] [place] [address] [address] [address], under license to [employer]. All rights reserved     Score:  Initial: 19 Most Recent: X (Date: -- )    Interpretation of Tool:  Represents activities that are increasingly more difficult (i.e. Bed mobility, Transfers, Gait).     Medical Necessity: · Skilled intervention continues to be required due to decreased functional mobility below baseline. Reason for Services/Other Comments:  · Patient continues to require skilled intervention due to   · Deficits in functional strength, ROM and balance  · . Use of outcome tool(s) and clinical judgement create a POC that gives a: Clear prediction of patient's progress: LOW COMPLEXITY            TREATMENT:   (In addition to Assessment/Re-Assessment sessions the following treatments were rendered)     Pre-treatment Symptoms/Complaints:  agreeable  Pain Initial:   Pain Intensity 1: 3 (same after therapy)  Post Session:      Therapeutic Exercise: (15 Minutes):  Exercises per grid below to improve mobility, strength, balance, and coordination. Required minimal verbal cues to promote proper body alignment and promote proper body mechanics. Gait Training (23 Minutes):  Gait training to improve and/or restore physical functioning as related to mobility. Ambulated 250 Feet (ft) with Stand-by assistance using a Walker, rolling and minimal   related to their knee position and motion to promote proper body alignment.       Date:  1/25 Date:  1/26   Date:     ACTIVITY/EXERCISE AM PM AM PM AM PM   GROUP THERAPY  []  [x]  []  []  []  []   Ankle Pumps  x10 15      Quad Sets  x10 15      Gluteal Sets  x10 15      Hip ABd/ADduction  x10 15      Straight Leg Raises  x10 15      Knee Slides  x10 15      Short Arc Quads  x10 15      Long Arc Quads         Chair Slides   15               B = bilateral; AA = active assistive; A = active; P = passive      Treatment/Session Assessment:  Participated well and ready for D/C       Education:  [x] Home Exercises  [] Fall Precautions  [x] Use of Cold Therapy Unit [] D/C Instruction Review  [] Knee Prosthesis Review  [x] Walker Management/Safety [] Adaptive Equipment as Needed       Interdisciplinary Collaboration:   o Physical Therapy Assistant  o Registered Nurse    After treatment position/precautions:   o Up in chair  o Call light within reach    Compliance with Program/Exercises: Will assess as treatment progresses. Recommendations/Intent for next treatment session:  Treatment next visit will focus on increasing Ms. Do's independence with bed mobility, transfers, gait training, strength/ROM exercises, modalities for pain, and patient education.       Total Treatment Duration:  PT Patient Time In/Time Out  Time In: 0700  Time Out: 2001 Andi Dominguez, PTA

## 2022-01-27 ENCOUNTER — HOME CARE VISIT (OUTPATIENT)
Dept: SCHEDULING | Facility: HOME HEALTH | Age: 77
End: 2022-01-27
Payer: MEDICARE

## 2022-01-27 VITALS
HEART RATE: 78 BPM | SYSTOLIC BLOOD PRESSURE: 140 MMHG | TEMPERATURE: 97.8 F | RESPIRATION RATE: 18 BRPM | DIASTOLIC BLOOD PRESSURE: 82 MMHG | OXYGEN SATURATION: 98 %

## 2022-01-27 PROCEDURE — G0151 HHCP-SERV OF PT,EA 15 MIN: HCPCS

## 2022-01-27 PROCEDURE — 400013 HH SOC

## 2022-01-31 ENCOUNTER — HOME CARE VISIT (OUTPATIENT)
Dept: SCHEDULING | Facility: HOME HEALTH | Age: 77
End: 2022-01-31
Payer: MEDICARE

## 2022-01-31 VITALS
HEART RATE: 84 BPM | RESPIRATION RATE: 17 BRPM | DIASTOLIC BLOOD PRESSURE: 84 MMHG | OXYGEN SATURATION: 98 % | SYSTOLIC BLOOD PRESSURE: 140 MMHG | TEMPERATURE: 98 F

## 2022-01-31 PROCEDURE — G0157 HHC PT ASSISTANT EA 15: HCPCS

## 2022-02-02 ENCOUNTER — HOME CARE VISIT (OUTPATIENT)
Dept: SCHEDULING | Facility: HOME HEALTH | Age: 77
End: 2022-02-02
Payer: MEDICARE

## 2022-02-02 ENCOUNTER — HOSPITAL ENCOUNTER (OUTPATIENT)
Dept: ULTRASOUND IMAGING | Age: 77
Discharge: HOME | End: 2022-02-02
Attending: ORTHOPAEDIC SURGERY
Payer: MEDICARE

## 2022-02-02 VITALS
DIASTOLIC BLOOD PRESSURE: 68 MMHG | RESPIRATION RATE: 17 BRPM | OXYGEN SATURATION: 98 % | SYSTOLIC BLOOD PRESSURE: 120 MMHG | HEART RATE: 84 BPM | TEMPERATURE: 97.9 F

## 2022-02-02 DIAGNOSIS — M79.662 PAIN OF LEFT CALF: ICD-10-CM

## 2022-02-02 PROCEDURE — 93971 EXTREMITY STUDY: CPT

## 2022-02-02 PROCEDURE — G0157 HHC PT ASSISTANT EA 15: HCPCS

## 2022-02-04 ENCOUNTER — HOME CARE VISIT (OUTPATIENT)
Dept: SCHEDULING | Facility: HOME HEALTH | Age: 77
End: 2022-02-04
Payer: MEDICARE

## 2022-02-04 VITALS
DIASTOLIC BLOOD PRESSURE: 68 MMHG | HEART RATE: 80 BPM | OXYGEN SATURATION: 98 % | RESPIRATION RATE: 17 BRPM | TEMPERATURE: 97.9 F | SYSTOLIC BLOOD PRESSURE: 138 MMHG

## 2022-02-04 PROCEDURE — G0157 HHC PT ASSISTANT EA 15: HCPCS

## 2022-02-07 ENCOUNTER — HOME CARE VISIT (OUTPATIENT)
Dept: SCHEDULING | Facility: HOME HEALTH | Age: 77
End: 2022-02-07
Payer: MEDICARE

## 2022-02-07 VITALS
HEART RATE: 68 BPM | RESPIRATION RATE: 17 BRPM | SYSTOLIC BLOOD PRESSURE: 128 MMHG | TEMPERATURE: 97.9 F | DIASTOLIC BLOOD PRESSURE: 70 MMHG | OXYGEN SATURATION: 91 %

## 2022-02-07 PROCEDURE — G0157 HHC PT ASSISTANT EA 15: HCPCS

## 2022-02-08 ENCOUNTER — HOME CARE VISIT (OUTPATIENT)
Dept: SCHEDULING | Facility: HOME HEALTH | Age: 77
End: 2022-02-08
Payer: MEDICARE

## 2022-02-08 VITALS
HEART RATE: 60 BPM | TEMPERATURE: 97.4 F | DIASTOLIC BLOOD PRESSURE: 72 MMHG | SYSTOLIC BLOOD PRESSURE: 140 MMHG | OXYGEN SATURATION: 97 % | RESPIRATION RATE: 17 BRPM

## 2022-02-08 PROCEDURE — G0157 HHC PT ASSISTANT EA 15: HCPCS

## 2022-02-09 PROBLEM — R41.82 ALTERED MENTAL STATUS: Status: RESOLVED | Noted: 2020-03-13 | Resolved: 2022-02-09

## 2022-02-10 ENCOUNTER — HOME CARE VISIT (OUTPATIENT)
Dept: HOME HEALTH SERVICES | Facility: HOME HEALTH | Age: 77
End: 2022-02-10
Payer: MEDICARE

## 2022-02-10 ENCOUNTER — HOME CARE VISIT (OUTPATIENT)
Dept: SCHEDULING | Facility: HOME HEALTH | Age: 77
End: 2022-02-10
Payer: MEDICARE

## 2022-02-10 VITALS
HEART RATE: 76 BPM | TEMPERATURE: 97.6 F | SYSTOLIC BLOOD PRESSURE: 132 MMHG | RESPIRATION RATE: 17 BRPM | DIASTOLIC BLOOD PRESSURE: 80 MMHG | OXYGEN SATURATION: 95 %

## 2022-02-10 PROCEDURE — G0157 HHC PT ASSISTANT EA 15: HCPCS

## 2022-02-16 ENCOUNTER — HOME CARE VISIT (OUTPATIENT)
Dept: SCHEDULING | Facility: HOME HEALTH | Age: 77
End: 2022-02-16
Payer: MEDICARE

## 2022-02-16 ENCOUNTER — HOME CARE VISIT (OUTPATIENT)
Dept: HOME HEALTH SERVICES | Facility: HOME HEALTH | Age: 77
End: 2022-02-16
Payer: MEDICARE

## 2022-02-16 VITALS
TEMPERATURE: 98.1 F | OXYGEN SATURATION: 96 % | RESPIRATION RATE: 17 BRPM | DIASTOLIC BLOOD PRESSURE: 78 MMHG | HEART RATE: 72 BPM | SYSTOLIC BLOOD PRESSURE: 138 MMHG

## 2022-02-16 PROCEDURE — G0157 HHC PT ASSISTANT EA 15: HCPCS

## 2022-02-18 ENCOUNTER — HOME CARE VISIT (OUTPATIENT)
Dept: SCHEDULING | Facility: HOME HEALTH | Age: 77
End: 2022-02-18
Payer: MEDICARE

## 2022-02-18 ENCOUNTER — HOME CARE VISIT (OUTPATIENT)
Dept: HOME HEALTH SERVICES | Facility: HOME HEALTH | Age: 77
End: 2022-02-18
Payer: MEDICARE

## 2022-02-18 VITALS
TEMPERATURE: 98.3 F | OXYGEN SATURATION: 97 % | RESPIRATION RATE: 16 BRPM | DIASTOLIC BLOOD PRESSURE: 68 MMHG | HEART RATE: 76 BPM | SYSTOLIC BLOOD PRESSURE: 138 MMHG

## 2022-02-18 PROCEDURE — G0157 HHC PT ASSISTANT EA 15: HCPCS

## 2022-02-21 ENCOUNTER — HOME CARE VISIT (OUTPATIENT)
Dept: SCHEDULING | Facility: HOME HEALTH | Age: 77
End: 2022-02-21
Payer: MEDICARE

## 2022-02-21 VITALS
DIASTOLIC BLOOD PRESSURE: 64 MMHG | RESPIRATION RATE: 18 BRPM | TEMPERATURE: 97.8 F | SYSTOLIC BLOOD PRESSURE: 120 MMHG | HEART RATE: 78 BPM | OXYGEN SATURATION: 98 %

## 2022-02-21 PROCEDURE — G0151 HHCP-SERV OF PT,EA 15 MIN: HCPCS

## 2022-02-24 ENCOUNTER — HOSPITAL ENCOUNTER (OUTPATIENT)
Dept: PHYSICAL THERAPY | Age: 77
Discharge: HOME | End: 2022-02-24
Attending: ORTHOPAEDIC SURGERY

## 2022-02-24 NOTE — PROGRESS NOTES
Juliana Weir  : 1945  Primary: Amanda Conleysunday Medicare Complete  Secondary:  [address] at [facility]  [address], [address], [address]  [phone]   [phone]        OUTPATIENT DAILY NOTE    NAME/AGE/GENDER: Juliana Weir is a 77 y.o. female. DATE: 2022    Ms. Pavel Valencia for today's appointment due to conflict. Shirley Carrion, PT      Future Appointments   Date Time Provider Kelsey Scott   2022  7:00 PM Joi Joyner, [facility]   2022 11:15 AM Joi Joyner, [facility]   3/1/2022  4:00 PM Jc Mayo NP [id number] PGU   3/2/2022  2:00 PM Carmen Hdez MD SSA POAI POA   3/4/2022  9:00 AM Hira Shannon MD [facility] BSNE   2022  9:40 AM [facility]   2022  3:30 PM Bhanu Kulkarni NP [facility]   2022 11:30 AM Shayy Bernstein PA-C END BS ENDO

## 2022-03-04 PROBLEM — G62.9 POLYNEUROPATHY: Status: ACTIVE | Noted: 2022-03-04

## 2022-03-04 PROBLEM — I49.9 CARDIAC ARRHYTHMIA: Status: ACTIVE | Noted: 2022-03-04

## 2022-03-04 PROBLEM — R42 DIZZINESSES: Status: ACTIVE | Noted: 2022-03-04

## 2022-03-10 ENCOUNTER — HOSPITAL ENCOUNTER (OUTPATIENT)
Dept: ULTRASOUND IMAGING | Age: 77
Discharge: HOME | End: 2022-03-10
Attending: NURSE PRACTITIONER
Payer: MEDICARE

## 2022-03-10 DIAGNOSIS — M79.662 PAIN OF LEFT CALF: ICD-10-CM

## 2022-03-10 DIAGNOSIS — R60.0 EDEMA OF LEFT LOWER EXTREMITY: ICD-10-CM

## 2022-03-10 PROCEDURE — 93971 EXTREMITY STUDY: CPT

## 2022-03-18 PROBLEM — K21.9 GERD (GASTROESOPHAGEAL REFLUX DISEASE): Status: ACTIVE | Noted: 2018-10-09

## 2022-03-18 PROBLEM — R10.9 LEFT FLANK PAIN: Status: ACTIVE | Noted: 2020-03-05

## 2022-03-18 PROBLEM — R42 DIZZINESSES: Status: ACTIVE | Noted: 2022-03-04

## 2022-03-18 PROBLEM — M19.90 OA (OSTEOARTHRITIS): Status: ACTIVE | Noted: 2017-12-06

## 2022-03-18 PROBLEM — D12.6 TUBULOVILLOUS ADENOMA OF COLON: Status: ACTIVE | Noted: 2021-04-26

## 2022-03-18 PROBLEM — G25.81 RLS (RESTLESS LEGS SYNDROME): Status: ACTIVE | Noted: 2017-12-06

## 2022-03-18 PROBLEM — Z79.890 POSTMENOPAUSAL HRT (HORMONE REPLACEMENT THERAPY): Status: ACTIVE | Noted: 2018-10-09

## 2022-03-18 PROBLEM — B36.9 FUNGAL DERMATITIS: Status: ACTIVE | Noted: 2017-12-21

## 2022-03-18 PROBLEM — Z91.038 HYMENOPTERA ALLERGY: Status: ACTIVE | Noted: 2018-10-23

## 2022-03-18 PROBLEM — K58.9 IBS (IRRITABLE BOWEL SYNDROME): Status: ACTIVE | Noted: 2017-12-06

## 2022-03-18 PROBLEM — E53.8 VITAMIN B12 DEFICIENCY: Status: ACTIVE | Noted: 2020-09-14

## 2022-03-18 PROBLEM — K64.9 HEMORRHOIDS: Status: ACTIVE | Noted: 2018-11-20

## 2022-03-18 PROBLEM — M70.50 ANSERINE BURSITIS: Status: ACTIVE | Noted: 2021-03-09

## 2022-03-18 PROBLEM — G62.9 POLYNEUROPATHY: Status: ACTIVE | Noted: 2022-03-04

## 2022-03-18 PROBLEM — G47.00 INSOMNIA: Status: ACTIVE | Noted: 2017-12-06

## 2022-03-18 PROBLEM — R06.09 DOE (DYSPNEA ON EXERTION): Status: ACTIVE | Noted: 2019-04-15

## 2022-03-19 PROBLEM — K52.9 COLITIS: Status: ACTIVE | Noted: 2021-03-27

## 2022-03-19 PROBLEM — R20.0 NUMBNESS: Status: ACTIVE | Noted: 2020-06-16

## 2022-03-19 PROBLEM — F39 MOOD DISORDER (HCC): Status: ACTIVE | Noted: 2017-12-06

## 2022-03-19 PROBLEM — F41.1 GAD (GENERALIZED ANXIETY DISORDER): Status: ACTIVE | Noted: 2018-10-09

## 2022-03-19 PROBLEM — L23.9 ALLERGIC DERMATITIS: Status: ACTIVE | Noted: 2019-07-23

## 2022-03-19 PROBLEM — R35.0 URINARY FREQUENCY: Status: ACTIVE | Noted: 2020-06-29

## 2022-03-19 PROBLEM — Z71.89 ENCOUNTER FOR COORDINATION OF COMPLEX CARE: Status: ACTIVE | Noted: 2017-12-20

## 2022-03-19 PROBLEM — K92.1 HEMATOCHEZIA: Status: ACTIVE | Noted: 2021-03-27

## 2022-03-19 PROBLEM — R29.818 SUSPECTED SLEEP APNEA: Status: ACTIVE | Noted: 2022-01-05

## 2022-03-19 PROBLEM — E66.2 OBESITY HYPOVENTILATION SYNDROME (HCC): Status: ACTIVE | Noted: 2019-03-14

## 2022-03-19 PROBLEM — G89.4 CHRONIC PAIN DISORDER: Status: ACTIVE | Noted: 2019-10-18

## 2022-03-19 PROBLEM — M54.16 LUMBAR RADICULOPATHY: Status: ACTIVE | Noted: 2019-08-15

## 2022-03-19 PROBLEM — D72.829 LEUKOCYTOSIS: Status: ACTIVE | Noted: 2021-03-27

## 2022-03-19 PROBLEM — N39.3 SUI (STRESS URINARY INCONTINENCE, FEMALE): Status: ACTIVE | Noted: 2019-11-07

## 2022-03-19 PROBLEM — Z96.651 HX OF TOTAL KNEE REPLACEMENT, RIGHT: Status: ACTIVE | Noted: 2021-03-09

## 2022-03-19 PROBLEM — R53.1 WEAKNESS GENERALIZED: Status: ACTIVE | Noted: 2020-06-16
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
## 2022-03-19 PROBLEM — E06.3 HASHIMOTO'S THYROIDITIS: Status: ACTIVE | Noted: 2019-03-05

## 2022-03-19 PROBLEM — Z79.899 ENCOUNTER FOR MEDICATION MANAGEMENT: Status: ACTIVE | Noted: 2018-02-06

## 2022-03-19 PROBLEM — D50.9 ANEMIA, IRON DEFICIENCY: Status: ACTIVE | Noted: 2017-12-06

## 2022-03-19 PROBLEM — R11.0 CHRONIC NAUSEA: Status: ACTIVE | Noted: 2019-06-26

## 2022-03-19 PROBLEM — M79.7 FIBROMYALGIA: Status: ACTIVE | Noted: 2018-10-23

## 2022-03-19 PROBLEM — H53.2 DIPLOPIA: Status: ACTIVE | Noted: 2020-04-03

## 2022-03-19 PROBLEM — R26.9 GAIT ABNORMALITY: Status: ACTIVE | Noted: 2020-02-26

## 2022-03-19 PROBLEM — N95.2 VAGINAL ATROPHY: Status: ACTIVE | Noted: 2020-01-30

## 2022-03-19 PROBLEM — E03.9 HYPOTHYROIDISM (ACQUIRED): Status: ACTIVE | Noted: 2017-12-06

## 2022-03-19 PROBLEM — Z86.19 HISTORY OF CLOSTRIDIOIDES DIFFICILE COLITIS: Status: ACTIVE | Noted: 2021-03-27

## 2022-03-19 PROBLEM — M17.12 DEGENERATIVE ARTHRITIS OF LEFT KNEE: Status: ACTIVE | Noted: 2022-01-25

## 2022-03-19 PROBLEM — K90.41 GLUTEN INTOLERANCE: Status: ACTIVE | Noted: 2018-02-06

## 2022-03-19 PROBLEM — M17.12 ARTHRITIS OF LEFT KNEE: Status: ACTIVE | Noted: 2021-03-09

## 2022-03-19 PROBLEM — N28.1 RENAL CYST: Status: ACTIVE | Noted: 2020-08-13

## 2022-03-19 PROBLEM — E78.5 HLD (HYPERLIPIDEMIA): Status: ACTIVE | Noted: 2017-12-06

## 2022-03-19 PROBLEM — Z83.2 FAMILY HISTORY OF AUTOIMMUNE DISORDER: Status: ACTIVE | Noted: 2017-12-21

## 2022-03-19 PROBLEM — I49.9 CARDIAC ARRHYTHMIA: Status: ACTIVE | Noted: 2022-03-04

## 2022-03-19 PROBLEM — J30.9 AR (ALLERGIC RHINITIS): Status: ACTIVE | Noted: 2018-10-23

## 2022-03-19 PROBLEM — E66.01 OBESITY, MORBID, BMI 40.0-49.9 (HCC): Status: ACTIVE | Noted: 2020-02-26

## 2022-03-19 PROBLEM — I10 ESSENTIAL HYPERTENSION: Status: ACTIVE | Noted: 2017-12-06

## 2022-03-19 PROBLEM — R76.8 ANA POSITIVE: Status: ACTIVE | Noted: 2018-10-26

## 2022-03-20 PROBLEM — R23.2 ABNORMAL FLUSHING AND SWEATING: Status: ACTIVE | Noted: 2018-07-31

## 2022-03-20 PROBLEM — E55.9 VITAMIN D DEFICIENCY: Status: ACTIVE | Noted: 2022-01-23

## 2022-03-20 PROBLEM — N32.81 OAB (OVERACTIVE BLADDER): Status: ACTIVE | Noted: 2018-11-20

## 2022-03-20 PROBLEM — R29.3 POSTURAL IMBALANCE: Status: ACTIVE | Noted: 2020-06-16

## 2022-03-20 PROBLEM — R61 ABNORMAL FLUSHING AND SWEATING: Status: ACTIVE | Noted: 2018-07-31

## 2022-03-20 PROBLEM — M51.36 DDD (DEGENERATIVE DISC DISEASE), LUMBAR: Status: ACTIVE | Noted: 2020-08-13

## 2022-03-20 PROBLEM — M51.369 DDD (DEGENERATIVE DISC DISEASE), LUMBAR: Status: ACTIVE | Noted: 2020-08-13

## 2022-03-20 PROBLEM — I95.0 IDIOPATHIC HYPOTENSION: Status: ACTIVE | Noted: 2020-04-03

## 2022-03-20 PROBLEM — I73.9 PAD (PERIPHERAL ARTERY DISEASE) (HCC): Status: ACTIVE | Noted: 2017-12-06

## 2022-03-24 ENCOUNTER — APPOINTMENT (OUTPATIENT)
Dept: CT IMAGING | Age: 77
End: 2022-03-24
Attending: EMERGENCY MEDICINE
Payer: MEDICARE

## 2022-03-24 ENCOUNTER — HOSPITAL ENCOUNTER (OUTPATIENT)
Dept: PHYSICAL THERAPY | Age: 77
Discharge: HOME | End: 2022-03-24
Attending: ORTHOPAEDIC SURGERY
Payer: MEDICARE

## 2022-03-24 ENCOUNTER — HOSPITAL ENCOUNTER (OUTPATIENT)
Age: 77
Setting detail: OBSERVATION
Discharge: HOME HEALTH | End: 2022-03-25
Attending: EMERGENCY MEDICINE | Admitting: FAMILY MEDICINE
Payer: MEDICARE

## 2022-03-24 DIAGNOSIS — R26.81 UNSTEADY GAIT: ICD-10-CM

## 2022-03-24 DIAGNOSIS — R42 DIZZINESS: Primary | ICD-10-CM

## 2022-03-24 PROBLEM — R06.09 DOE (DYSPNEA ON EXERTION): Status: RESOLVED | Noted: 2019-04-15 | Resolved: 2022-03-24

## 2022-03-24 PROBLEM — I63.9 CVA (CEREBRAL VASCULAR ACCIDENT) (HCC): Status: ACTIVE | Noted: 2022-03-24

## 2022-03-24 LAB
ALBUMIN SERPL-MCNC: 3.7 G/DL (ref 3.2–4.6)
ALBUMIN/GLOB SERPL: 0.8 {RATIO} (ref 1.2–3.5)
ALP SERPL-CCNC: 89 U/L (ref 50–136)
ALT SERPL-CCNC: 19 U/L (ref 12–65)
ANION GAP SERPL CALC-SCNC: 3 MMOL/L (ref 7–16)
AST SERPL-CCNC: 13 U/L (ref 15–37)
BASOPHILS # BLD: 0.1 K/UL (ref 0–0.2)
BASOPHILS NFR BLD: 1 % (ref 0–2)
BILIRUB SERPL-MCNC: 0.3 MG/DL (ref 0.2–1.1)
BUN SERPL-MCNC: 29 MG/DL (ref 8–23)
CALCIUM SERPL-MCNC: 9 MG/DL (ref 8.3–10.4)
CHLORIDE SERPL-SCNC: 101 MMOL/L (ref 98–107)
CO2 SERPL-SCNC: 31 MMOL/L (ref 21–32)
CREAT SERPL-MCNC: 1.6 MG/DL (ref 0.6–1)
DIFFERENTIAL METHOD BLD: ABNORMAL
EOSINOPHIL # BLD: 0.4 K/UL (ref 0–0.8)
EOSINOPHIL NFR BLD: 4 % (ref 0.5–7.8)
ERYTHROCYTE [DISTWIDTH] IN BLOOD BY AUTOMATED COUNT: 15 % (ref 11.9–14.6)
GLOBULIN SER CALC-MCNC: 4.6 G/DL (ref 2.3–3.5)
GLUCOSE BLD STRIP.AUTO-MCNC: 80 MG/DL (ref 65–100)
GLUCOSE SERPL-MCNC: 89 MG/DL (ref 65–100)
HCT VFR BLD AUTO: 39.7 % (ref 35.8–46.3)
HGB BLD-MCNC: 12.4 G/DL (ref 11.7–15.4)
IMM GRANULOCYTES # BLD AUTO: 0 K/UL (ref 0–0.5)
IMM GRANULOCYTES NFR BLD AUTO: 0 % (ref 0–5)
INR BLD: 1.1 (ref 0.9–1.2)
LYMPHOCYTES # BLD: 2.8 K/UL (ref 0.5–4.6)
LYMPHOCYTES NFR BLD: 25 % (ref 13–44)
MCH RBC QN AUTO: 28.2 PG (ref 26.1–32.9)
MCHC RBC AUTO-ENTMCNC: 31.2 G/DL (ref 31.4–35)
MCV RBC AUTO: 90.2 FL (ref 79.6–97.8)
MONOCYTES # BLD: 0.7 K/UL (ref 0.1–1.3)
MONOCYTES NFR BLD: 6 % (ref 4–12)
NEUTS SEG # BLD: 7.3 K/UL (ref 1.7–8.2)
NEUTS SEG NFR BLD: 64 % (ref 43–78)
NRBC # BLD: 0 K/UL (ref 0–0.2)
PLATELET # BLD AUTO: 298 K/UL (ref 150–450)
PMV BLD AUTO: 10.5 FL (ref 9.4–12.3)
POTASSIUM SERPL-SCNC: 4.7 MMOL/L (ref 3.5–5.1)
PROT SERPL-MCNC: 8.3 G/DL (ref 6.3–8.2)
PT BLD: 13.2 SECS (ref 9.6–11.6)
RBC # BLD AUTO: 4.4 M/UL (ref 4.05–5.2)
SERVICE CMNT-IMP: NORMAL
SODIUM SERPL-SCNC: 135 MMOL/L (ref 136–145)
TROPONIN-HIGH SENSITIVITY: 12.2 PG/ML (ref 0–14)
WBC # BLD AUTO: 11.3 K/UL (ref 4.3–11.1)

## 2022-03-24 PROCEDURE — 70450 CT HEAD/BRAIN W/O DYE: CPT

## 2022-03-24 PROCEDURE — 80053 COMPREHEN METABOLIC PANEL: CPT

## 2022-03-24 PROCEDURE — 84484 ASSAY OF TROPONIN QUANT: CPT

## 2022-03-24 PROCEDURE — 74011000258 HC RX REV CODE- 258: Performed by: EMERGENCY MEDICINE

## 2022-03-24 PROCEDURE — 0042T CT PERF W CBF: CPT

## 2022-03-24 PROCEDURE — 93005 ELECTROCARDIOGRAM TRACING: CPT | Performed by: EMERGENCY MEDICINE

## 2022-03-24 PROCEDURE — G0378 HOSPITAL OBSERVATION PER HR: HCPCS

## 2022-03-24 PROCEDURE — 99285 EMERGENCY DEPT VISIT HI MDM: CPT

## 2022-03-24 PROCEDURE — 96374 THER/PROPH/DIAG INJ IV PUSH: CPT

## 2022-03-24 PROCEDURE — 96372 THER/PROPH/DIAG INJ SC/IM: CPT

## 2022-03-24 PROCEDURE — 82962 GLUCOSE BLOOD TEST: CPT

## 2022-03-24 PROCEDURE — 74011250637 HC RX REV CODE- 250/637: Performed by: FAMILY MEDICINE

## 2022-03-24 PROCEDURE — 70496 CT ANGIOGRAPHY HEAD: CPT

## 2022-03-24 PROCEDURE — 85610 PROTHROMBIN TIME: CPT

## 2022-03-24 PROCEDURE — 74011000250 HC RX REV CODE- 250: Performed by: EMERGENCY MEDICINE

## 2022-03-24 PROCEDURE — 74011000250 HC RX REV CODE- 250: Performed by: FAMILY MEDICINE

## 2022-03-24 PROCEDURE — 85025 COMPLETE CBC W/AUTO DIFF WBC: CPT

## 2022-03-24 PROCEDURE — 74011000636 HC RX REV CODE- 636: Performed by: EMERGENCY MEDICINE

## 2022-03-24 PROCEDURE — 74011250636 HC RX REV CODE- 250/636: Performed by: FAMILY MEDICINE

## 2022-03-24 PROCEDURE — 94762 N-INVAS EAR/PLS OXIMTRY CONT: CPT

## 2022-03-24 RX ORDER — ESCITALOPRAM OXALATE 10 MG/1
30 TABLET ORAL DAILY
Status: DISCONTINUED | OUTPATIENT
Start: 2022-03-25 | End: 2022-03-25 | Stop reason: HOSPADM

## 2022-03-24 RX ORDER — CLOPIDOGREL BISULFATE 75 MG/1
75 TABLET ORAL DAILY
Status: DISCONTINUED | OUTPATIENT
Start: 2022-03-25 | End: 2022-03-25 | Stop reason: HOSPADM

## 2022-03-24 RX ORDER — SODIUM CHLORIDE 0.9 % (FLUSH) 0.9 %
5-10 SYRINGE (ML) INJECTION AS NEEDED
Status: DISCONTINUED | OUTPATIENT
Start: 2022-03-24 | End: 2022-03-25 | Stop reason: HOSPADM

## 2022-03-24 RX ORDER — HEPARIN SODIUM 5000 [USP'U]/ML
5000 INJECTION, SOLUTION INTRAVENOUS; SUBCUTANEOUS EVERY 8 HOURS
Status: DISCONTINUED | OUTPATIENT
Start: 2022-03-24 | End: 2022-03-25 | Stop reason: HOSPADM

## 2022-03-24 RX ORDER — MONTELUKAST SODIUM 4 MG/1
1 TABLET, CHEWABLE ORAL 2 TIMES DAILY
Status: DISCONTINUED | OUTPATIENT
Start: 2022-03-25 | End: 2022-03-25 | Stop reason: HOSPADM

## 2022-03-24 RX ORDER — LISINOPRIL 5 MG/1
10 TABLET ORAL DAILY
Status: DISCONTINUED | OUTPATIENT
Start: 2022-03-25 | End: 2022-03-25 | Stop reason: HOSPADM

## 2022-03-24 RX ORDER — HYDRALAZINE HYDROCHLORIDE 20 MG/ML
20 INJECTION INTRAMUSCULAR; INTRAVENOUS ONCE
Status: COMPLETED | OUTPATIENT
Start: 2022-03-24 | End: 2022-03-24

## 2022-03-24 RX ORDER — CLOTRIMAZOLE AND BETAMETHASONE DIPROPIONATE 10; .64 MG/G; MG/G
CREAM TOPICAL
Status: DISCONTINUED | OUTPATIENT
Start: 2022-03-25 | End: 2022-03-25 | Stop reason: HOSPADM

## 2022-03-24 RX ORDER — ATORVASTATIN CALCIUM 40 MG/1
40 TABLET, FILM COATED ORAL
Status: DISCONTINUED | OUTPATIENT
Start: 2022-03-24 | End: 2022-03-25 | Stop reason: HOSPADM

## 2022-03-24 RX ORDER — LEVOTHYROXINE SODIUM 75 UG/1
150 TABLET ORAL
Status: DISCONTINUED | OUTPATIENT
Start: 2022-03-25 | End: 2022-03-25 | Stop reason: HOSPADM

## 2022-03-24 RX ORDER — SODIUM CHLORIDE 0.9 % (FLUSH) 0.9 %
5-10 SYRINGE (ML) INJECTION EVERY 8 HOURS
Status: DISCONTINUED | OUTPATIENT
Start: 2022-03-24 | End: 2022-03-25 | Stop reason: HOSPADM

## 2022-03-24 RX ORDER — NITROFURANTOIN MACROCRYSTALS 50 MG/1
50 CAPSULE ORAL DAILY
Status: DISCONTINUED | OUTPATIENT
Start: 2022-03-25 | End: 2022-03-25 | Stop reason: HOSPADM

## 2022-03-24 RX ORDER — IPRATROPIUM BROMIDE AND ALBUTEROL SULFATE 2.5; .5 MG/3ML; MG/3ML
3 SOLUTION RESPIRATORY (INHALATION)
Status: DISCONTINUED | OUTPATIENT
Start: 2022-03-25 | End: 2022-03-25

## 2022-03-24 RX ORDER — PANTOPRAZOLE SODIUM 40 MG/1
40 TABLET, DELAYED RELEASE ORAL
Status: DISCONTINUED | OUTPATIENT
Start: 2022-03-25 | End: 2022-03-25 | Stop reason: HOSPADM

## 2022-03-24 RX ORDER — ASPIRIN 325 MG
325 TABLET ORAL DAILY
Status: DISCONTINUED | OUTPATIENT
Start: 2022-03-25 | End: 2022-03-25 | Stop reason: HOSPADM

## 2022-03-24 RX ORDER — HYDROCHLOROTHIAZIDE 12.5 MG/1
12.5 CAPSULE ORAL DAILY
Status: DISCONTINUED | OUTPATIENT
Start: 2022-03-25 | End: 2022-03-25 | Stop reason: HOSPADM

## 2022-03-24 RX ORDER — SODIUM CHLORIDE 0.9 % (FLUSH) 0.9 %
5-40 SYRINGE (ML) INJECTION AS NEEDED
Status: DISCONTINUED | OUTPATIENT
Start: 2022-03-24 | End: 2022-03-25 | Stop reason: HOSPADM

## 2022-03-24 RX ORDER — FLUTICASONE PROPIONATE 50 MCG
2 SPRAY, SUSPENSION (ML) NASAL DAILY
Status: DISCONTINUED | OUTPATIENT
Start: 2022-03-25 | End: 2022-03-25 | Stop reason: HOSPADM

## 2022-03-24 RX ORDER — SODIUM CHLORIDE 0.9 % (FLUSH) 0.9 %
10 SYRINGE (ML) INJECTION
Status: COMPLETED | OUTPATIENT
Start: 2022-03-24 | End: 2022-03-24

## 2022-03-24 RX ORDER — AMITRIPTYLINE HYDROCHLORIDE 50 MG/1
25 TABLET, FILM COATED ORAL
Status: DISCONTINUED | OUTPATIENT
Start: 2022-03-24 | End: 2022-03-25 | Stop reason: HOSPADM

## 2022-03-24 RX ORDER — LISINOPRIL AND HYDROCHLOROTHIAZIDE 10; 12.5 MG/1; MG/1
1 TABLET ORAL DAILY
Status: DISCONTINUED | OUTPATIENT
Start: 2022-03-25 | End: 2022-03-24 | Stop reason: CLARIF

## 2022-03-24 RX ORDER — CLONAZEPAM 1 MG/1
1 TABLET ORAL
Status: DISCONTINUED | OUTPATIENT
Start: 2022-03-24 | End: 2022-03-25 | Stop reason: HOSPADM

## 2022-03-24 RX ORDER — SODIUM CHLORIDE 9 MG/ML
75 INJECTION, SOLUTION INTRAVENOUS CONTINUOUS
Status: DISCONTINUED | OUTPATIENT
Start: 2022-03-24 | End: 2022-03-25 | Stop reason: HOSPADM

## 2022-03-24 RX ORDER — SODIUM CHLORIDE 0.9 % (FLUSH) 0.9 %
5-40 SYRINGE (ML) INJECTION EVERY 8 HOURS
Status: DISCONTINUED | OUTPATIENT
Start: 2022-03-24 | End: 2022-03-25 | Stop reason: HOSPADM

## 2022-03-24 RX ORDER — PRAMIPEXOLE DIHYDROCHLORIDE 0.25 MG/1
0.5 TABLET ORAL DAILY
Status: DISCONTINUED | OUTPATIENT
Start: 2022-03-25 | End: 2022-03-25 | Stop reason: HOSPADM

## 2022-03-24 RX ADMIN — SODIUM CHLORIDE 75 ML/HR: 900 INJECTION, SOLUTION INTRAVENOUS at 22:35

## 2022-03-24 RX ADMIN — AMITRIPTYLINE HYDROCHLORIDE 25 MG: 50 TABLET, FILM COATED ORAL at 22:33

## 2022-03-24 RX ADMIN — SODIUM CHLORIDE, PRESERVATIVE FREE 10 ML: 5 INJECTION INTRAVENOUS at 22:34

## 2022-03-24 RX ADMIN — IOPAMIDOL 100 ML: 755 INJECTION, SOLUTION INTRAVENOUS at 16:58

## 2022-03-24 RX ADMIN — SODIUM CHLORIDE 100 ML: 9 INJECTION, SOLUTION INTRAVENOUS at 16:58

## 2022-03-24 RX ADMIN — Medication 10 ML: at 16:58

## 2022-03-24 RX ADMIN — HEPARIN SODIUM 5000 UNITS: 5000 INJECTION, SOLUTION INTRAVENOUS; SUBCUTANEOUS at 22:33

## 2022-03-24 RX ADMIN — SODIUM CHLORIDE, PRESERVATIVE FREE 10 ML: 5 INJECTION INTRAVENOUS at 22:43

## 2022-03-24 RX ADMIN — ATORVASTATIN CALCIUM 40 MG: 40 TABLET, FILM COATED ORAL at 22:32

## 2022-03-24 RX ADMIN — SODIUM CHLORIDE, PRESERVATIVE FREE 10 ML: 5 INJECTION INTRAVENOUS at 23:00

## 2022-03-24 RX ADMIN — HYDRALAZINE HYDROCHLORIDE 20 MG: 20 INJECTION INTRAMUSCULAR; INTRAVENOUS at 20:20
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
## 2022-03-24 NOTE — Clinical Note
Isaías Payne  : 1945  Payor: Tess Butts / Plan: Vianca Henry MEDICARE COMPLETE / Product Type: Managed Care Medicare /    [address] [place] [address]  at [facility]  [address], [address], [address]  [phone]   [phone]         OUTPATIENT PHYSICAL THERAPY:{Note [facility]:[id number]} 3/24/2022    ICD-10: Treatment Diagnosis: ***  Precautions/Allergies:   Fire ant, Adhesive, Amoxicillin, Betadine [povidone-iodine], Cephalexin, Demerol [meperidine], Hydromorphone, Morphine, Percocet [oxycodone-acetaminophen], and Percodan [oxycodone hcl-oxycodone-asa] ***  TREATMENT PLAN:  Effective Dates: 3/24/2022 TO {POC END IH}. Frequency/Duration: { :53880} for {DAYS:66046::\"90\"} Day(s) MEDICAL/REFERRING DIAGNOSIS:  Presence of left artificial knee joint [Z96.652] ***  DATE OF ONSET: ***  REFERRING PHYSICIAN: Sheeba Lazo MD MD Orders: ***  Return MD Appointment: ***     INITIAL ASSESSMENT:  Ms. Willem Vega presents ***    PROBLEM LIST (Impacting functional limitations):  1. {PT Problem List:56151891} INTERVENTIONS PLANNED:  1. {PT Interventions:26051163}   GOALS: (Goals have been discussed and agreed upon with patient.)  Short-Term Functional Goals: Time Frame: ***  1. ***  Discharge Goals: Time Frame: ***  1. ***    Outcome Measure:   {PT/OT OUTCOME MEASURES:35083430}  Medical Necessity:   · {MEDICAL NECESSITY:59106303}  Reason for Services/Other Comments:  · {CONTINUATION:68165650}    Total Duration: ***       Rehabilitation Potential For Stated Goals: { :36855}  Regarding Bertrand Do's therapy, I certify that the treatment plan above will be carried out by a therapist or under their direction. Thank you for this referral,  Gilberto Murrell, PT     Referring Physician Signature: Sheeba Lazo MD              Date                    The information in this section was collected on *** (except where otherwise noted).   HISTORY:   History of Present Injury/Illness (Reason for Referral):  Patient walked in with blurred vision and stating that something doesn't feel right, states that she feels like all of a sudden she got hit by a ton of bricks and that she had increased difficulty driving here stating that her      210/85 taken manually  172/92 taken   200/90 retaken manually   Past Medical History/Comorbidities:   Ms. Dexter Velez  has a past medical history of Acute kidney insufficiency, Adverse effect of anesthesia, Allergic rhinitis, Anemia, Anxiety and depression, Arthritis, Edema, GERD (gastroesophageal reflux disease), History of chicken pox, History of Clostridioides difficile infection, History of DVT (deep vein thrombosis), HTN (hypertension), Hyperlipidemia, Hyperthyroidism, Hypothyroidism, IBS (irritable bowel syndrome), Insomnia, Morbid obesity ([address] Murmur, Overactive bladder, PAD (peripheral artery disease) ([address] Recurrent UTI, RLS (restless legs syndrome), SOBOE (shortness of breath on exertion), Special examinations, MALOU (stress urinary incontinence, female), Thromboembolus ([address] (2015), and 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 She has no past medical history of Difficult intubation, Malignant hyperthermia due to anesthesia, Nausea & vomiting, or Pseudocholinesterase deficiency. Ms. Dexter Velez  has a past surgical history that includes hx meniscus repair (Left); hx tonsil and adenoidectomy (~1950); hx shoulder arthroscopy (Right, ~1966, ~2013); hx knee replacement (Right, 2014); hx cholecystectomy (12/2016); hx total abdominal hysterectomy (~1970); hx appendectomy (1955); ir gastric band adj w/ fluoro (~2014); hx gyn (12/03/2019); hx other surgical (Right, 2016); hx other surgical (Left, ~2014); hx other surgical (~1986); hx meniscus repair (Left, 2014); hx colonoscopy; hx orthopaedic (Left); hx other surgical (12/03/2019); and hx orthopaedic (Left, 01/2020). ***  Social History/Living Environment:    ***  Prior Level of Function/Work/Activity:  ***  {PT Additional History Questions:42678033:p}      Ambulatory/Rehab Services H2 Model Falls Risk Assessment    Risk Factors:  {Choose as many as apply :78140} Ability to Rise from Chair:  {Choose One :86059}    Falls Prevention Plan:  {Choose as many as apply :41558}   Total: (5 or greater = High Risk): ***    ©2010 [facility]. All Rights Reserved. [facility] Patent #[id number]. Federal Law prohibits the replication, distribution or use without written permission from [facility] [employer]     Current Medications:      Current Outpatient Medications:     pramipexole (Mirapex) 0.5 mg tablet, Take 1 Tablet by mouth daily. , Disp: 90 Tablet, Rfl: 3    omeprazole (PRILOSEC) 40 mg capsule, Take 1 Capsule by mouth daily. , Disp: 90 Capsule, Rfl: 3    diclofenac EC (VOLTAREN) 75 mg EC tablet, Take 1 tablet by mouth twice a day as needed, Disp: 60 Tablet, Rfl: 0    methocarbamoL (ROBAXIN) 750 mg tablet, Take 1 Tablet by mouth every six (6) hours as needed for Muscle Spasm(s). Take 1 tablet by mouth every 6 hours as needed, Disp: 40 Tablet, Rfl: 0    clonazePAM (KlonoPIN) 1 mg tablet, Take 1 Tablet by mouth two (2) times daily as needed for Anxiety. Max Daily Amount: 2 mg., Disp: 60 Tablet, Rfl: 0    lisinopril-hydroCHLOROthiazide (PRINZIDE, ZESTORETIC) 10-12.5 mg per tablet, Take 1 Tablet by mouth daily. , Disp: 90 Tablet, Rfl: 3    acetaminophen (TYLENOL) 325 mg tablet, Take 325 mg by mouth every four (4) hours as needed (breakthrough pain). , Disp: , Rfl:     ergocalciferol (ERGOCALCIFEROL) 1,250 mcg (50,000 unit) capsule, Take 1 Capsule by mouth every seven (7) days. Indications: vitamin D deficiency (high dose therapy), Disp: 6 Capsule, Rfl: 1    escitalopram oxalate (LEXAPRO) 20 mg tablet, Take 1.5 Tablets by mouth daily. (Patient taking differently: Take  by mouth.  1 tablet daily), Disp: 135 Tablet, Rfl: 3    naloxone (NARCAN) 4 mg/actuation nasal spray, Use 1 spray intranasally, then discard. Repeat with new spray every 2 min as needed for opioid overdose symptoms, alternating nostrils. Indications: opioid overdose, Disp: 2 Each, Rfl: 0    cyanocobalamin (VITAMIN B12) 1,000 mcg/mL injection, 1,000 mcg by IntraMUSCular route every month., Disp: , Rfl:     nitrofurantoin (MACRODANTIN) 50 mg capsule, Take 1 Capsule by mouth daily. Indications: bacterial urinary tract infection (Patient taking differently: Take 50 mg by mouth daily. Indications: bacterial urinary tract infection, urinary tract infection prevention), Disp: 90 Capsule, Rfl: 0    colestipoL (COLESTID) 1 gram tablet, Take 1 Tablet by mouth two (2) times a day. (Patient taking differently: Take 1 g by mouth daily. For IBS), Disp: 360 Tablet, Rfl: 1    Synthroid 150 mcg tablet, Take 1 Tablet by mouth Daily (before breakfast). DAW1 (Patient taking differently: Take 150 mcg by mouth Daily (before breakfast). ), Disp: 30 Tablet, Rfl: 11    amitriptyline (ELAVIL) 25 mg tablet, Take 25 mg by mouth nightly. For IBS/colitis, Disp: , Rfl:     ondansetron hcl (ZOFRAN) 4 mg tablet, Take 1 Tablet by mouth every eight (8) hours as needed for Nausea., Disp: 90 Tablet, Rfl: 0    fluticasone propionate (FLONASE) 50 mcg/actuation nasal spray, 2 Sprays by Both Nostrils route nightly. Indications: inflammation of the nose due to an allergy (Patient taking differently: 2 Sprays by Both Nostrils route daily. Indications: inflammation of the nose due to an allergy), Disp: 3 Bottle, Rfl: 3    hyoscyamine sulfate (CYSTOSPAZ) 0.125 mg tablet, Take 1 Tab by mouth every four (4) hours as needed for Cramping or Nausea. Indications: irritable colon, Disp: 60 Tab, Rfl: 1    clotrimazole-betamethasone (LOTRISONE) topical cream, Apply  to affected area two (2) times a day. Until rash resolves (Patient taking differently: Apply  to affected area as needed.  Until rash resolves), Disp: 45 g, Rfl: 2    albuterol (PROVENTIL HFA, VENTOLIN HFA, PROAIR HFA) 90 mcg/actuation inhaler, Take 1-2 Puffs by inhalation every six (6) hours as needed for Wheezing., Disp: 1 Inhaler, Rfl: 2   Date Last Reviewed:  ***   Number of Personal Factors/Comorbidities that affect the Plan of Care: {Personal Factors/Comorbidities:93132794}   EXAMINATION:   {PT Examination:78724541}    Joint/Muscle ROM Strength Updates      ***                                                  Body Structures Involved:  1. {PT body Structures:98013857} Body Functions Affected:  1.  {PT Body Functions:42402896} Activities and Participation Affected:  1. {PT Act/Part:30804863}   Number of elements (examined above) that affect the Plan of Care: {Examination:63792426}   CLINICAL PRESENTATION:   Presentation: {Presentation:22384189}   CLINICAL DECISION MAKING:      Use of outcome tool(s) and clinical judgement create a POC that gives a: {Clinical Decision-Makin}              Pain: Initial:     *** Post Session:  ***     [email or web address] Portal    Variance from POC: ***    Rosevelt Mortimer, PT

## 2022-03-24 NOTE — ED TRIAGE NOTES
Patient presents with headache, balance problem and vision changes. Hypertensive in triage. Patient reports symptoms started at 1200.

## 2022-03-24 NOTE — PROGRESS NOTES
Patient came to therapy today, complaining of blurred vision and a  headache that's been persistent for the past 3 hours. Reports having difficulty driving and stated that she kept veering off to the left. States that she feels like she's going to fall while she's walking and BP taken manually was 210/90. It was retaken two times with an automatic BP cuff and it was 172/119 and 184/98. Pt denies any numbness/tingling, weakness of limbs, palpitations, slurred speech, or drooping of face. Requested patient to go to the ER and patient agreed, one of her friends agreed to pick her up and take her to [facility] ER. Patient stated she didn't want to take the ambulance and would rather have the friend pick her up.

## 2022-03-24 NOTE — PROGRESS NOTES
RAPID RESPONSE ES CRITICAL CARE OUTREACH NURSE NOTE      Pt was seen and examined following CODE STROKE  Worked up for code stroke, BS 80, IV's placed NIH done and off to Cat Scan    LATEST VITAL SIGNS :     Vitals:    03/24/22 1558 03/24/22 1559 03/24/22 1622 03/24/22 1637   BP: (!) 206/105  (!) 217/87 (!) 174/73   Pulse: (!) 59  66 64   Resp: 19  20 23   Temp: 98.2 °F (36.8 °C)      SpO2: 98%  98%    Weight:  87.5 kg (193 lb)     Height:  4' 10\" (1.473 m)            blood sent to lab in green bag and teleneuro set up in room

## 2022-03-25 ENCOUNTER — APPOINTMENT (OUTPATIENT)
Dept: MRI IMAGING | Age: 77
End: 2022-03-25
Attending: FAMILY MEDICINE
Payer: MEDICARE

## 2022-03-25 ENCOUNTER — APPOINTMENT (OUTPATIENT)
Dept: NON INVASIVE DIAGNOSTICS | Age: 77
End: 2022-03-25
Attending: FAMILY MEDICINE
Payer: MEDICARE

## 2022-03-25 VITALS
HEART RATE: 98 BPM | SYSTOLIC BLOOD PRESSURE: 138 MMHG | RESPIRATION RATE: 16 BRPM | BODY MASS INDEX: 40.51 KG/M2 | OXYGEN SATURATION: 94 % | DIASTOLIC BLOOD PRESSURE: 61 MMHG | TEMPERATURE: 98.3 F | WEIGHT: 193 LBS | HEIGHT: 58 IN

## 2022-03-25 PROBLEM — R47.01 EXPRESSIVE APHASIA: Status: ACTIVE | Noted: 2022-03-24

## 2022-03-25 PROBLEM — I63.9 CVA (CEREBRAL VASCULAR ACCIDENT) (HCC): Status: ACTIVE | Noted: 2022-03-24

## 2022-03-25 PROBLEM — E66.2 CLASS 3 OBESITY WITH ALVEOLAR HYPOVENTILATION, SERIOUS COMORBIDITY, AND BODY MASS INDEX (BMI) OF 40.0 TO 44.9 IN ADULT (HCC): Status: ACTIVE | Noted: 2020-02-26

## 2022-03-25 PROBLEM — E66.813 CLASS 3 OBESITY WITH ALVEOLAR HYPOVENTILATION, SERIOUS COMORBIDITY, AND BODY MASS INDEX (BMI) OF 40.0 TO 44.9 IN ADULT: Status: ACTIVE | Noted: 2020-02-26

## 2022-03-25 PROBLEM — R06.09 DOE (DYSPNEA ON EXERTION): Status: RESOLVED | Noted: 2019-04-15 | Resolved: 2022-03-24

## 2022-03-25 LAB
ANION GAP SERPL CALC-SCNC: 1 MMOL/L (ref 7–16)
ATRIAL RATE: 62 BPM
BUN SERPL-MCNC: 32 MG/DL (ref 8–23)
CALCIUM SERPL-MCNC: 8.6 MG/DL (ref 8.3–10.4)
CALCULATED P AXIS, ECG09: 81 DEGREES
CALCULATED R AXIS, ECG10: -18 DEGREES
CALCULATED T AXIS, ECG11: 38 DEGREES
CHLORIDE SERPL-SCNC: 103 MMOL/L (ref 98–107)
CHOLEST SERPL-MCNC: 194 MG/DL
CO2 SERPL-SCNC: 32 MMOL/L (ref 21–32)
CREAT SERPL-MCNC: 1.67 MG/DL (ref 0.6–1)
DIAGNOSIS, 93000: NORMAL
ECHO AO ASC DIAM: 2.8 CM
ECHO AO ASCENDING AORTA INDEX: 1.56 CM/M2
ECHO AO ROOT DIAM: 2.5 CM
ECHO AO ROOT INDEX: 1.4 CM/M2
ECHO AV AREA PEAK VELOCITY: 1.6 CM2
ECHO AV AREA VTI: 1.5 CM2
ECHO AV AREA/BSA PEAK VELOCITY: 0.9 CM2/M2
ECHO AV AREA/BSA VTI: 0.8 CM2/M2
ECHO AV MEAN GRADIENT: 9 MMHG
ECHO AV MEAN VELOCITY: 1.4 M/S
ECHO AV PEAK GRADIENT: 16 MMHG
ECHO AV PEAK VELOCITY: 2 M/S
ECHO AV VELOCITY RATIO: 0.8
ECHO AV VTI: 38.8 CM
ECHO IVC EXP: 1.7 CM
ECHO LA AREA 2C: 16 CM2
ECHO LA AREA 4C: 16.7 CM2
ECHO LA DIAMETER INDEX: 1.79 CM/M2
ECHO LA DIAMETER: 3.2 CM
ECHO LA MAJOR AXIS: 5.4 CM
ECHO LA MINOR AXIS: 5.3 CM
ECHO LA TO AORTIC ROOT RATIO: 1.28
ECHO LA VOL BP: 40 ML (ref 22–52)
ECHO LA VOL/BSA BIPLANE: 22 ML/M2 (ref 16–34)
ECHO LV E' LATERAL VELOCITY: 8 CM/S
ECHO LV E' SEPTAL VELOCITY: 7 CM/S
ECHO LV FRACTIONAL SHORTENING: 42 % (ref 28–44)
ECHO LV INTERNAL DIMENSION DIASTOLE INDEX: 1.84 CM/M2
ECHO LV INTERNAL DIMENSION DIASTOLIC: 3.3 CM (ref 3.9–5.3)
ECHO LV INTERNAL DIMENSION SYSTOLIC INDEX: 1.06 CM/M2
ECHO LV INTERNAL DIMENSION SYSTOLIC: 1.9 CM
ECHO LV IVSD: 1.1 CM (ref 0.6–0.9)
ECHO LV MASS 2D: 109.1 G (ref 67–162)
ECHO LV MASS INDEX 2D: 61 G/M2 (ref 43–95)
ECHO LV POSTERIOR WALL DIASTOLIC: 1.1 CM (ref 0.6–0.9)
ECHO LV RELATIVE WALL THICKNESS RATIO: 0.67
ECHO LVOT AREA: 2 CM2
ECHO LVOT AV VTI INDEX: 0.73
ECHO LVOT DIAM: 1.6 CM
ECHO LVOT MEAN GRADIENT: 6 MMHG
ECHO LVOT PEAK GRADIENT: 10 MMHG
ECHO LVOT PEAK VELOCITY: 1.6 M/S
ECHO LVOT STROKE VOLUME INDEX: 31.7 ML/M2
ECHO LVOT SV: 56.7 ML
ECHO LVOT VTI: 28.2 CM
ECHO MV A VELOCITY: 1.34 M/S
ECHO MV E DECELERATION TIME (DT): 215 MS
ECHO MV E VELOCITY: 0.89 M/S
ECHO MV E/A RATIO: 0.66
ECHO MV E/E' LATERAL: 11.13
ECHO MV E/E' RATIO (AVERAGED): 11.92
ECHO MV E/E' SEPTAL: 12.71
ECHO RV BASAL DIMENSION: 3.3 CM
ECHO RV TAPSE: 2 CM (ref 1.5–2)
ERYTHROCYTE [DISTWIDTH] IN BLOOD BY AUTOMATED COUNT: 15.2 % (ref 11.9–14.6)
EST. AVERAGE GLUCOSE BLD GHB EST-MCNC: 123 MG/DL
GLUCOSE SERPL-MCNC: 104 MG/DL (ref 65–100)
HBA1C MFR BLD: 5.9 % (ref 4.2–6.3)
HCT VFR BLD AUTO: 35.6 % (ref 35.8–46.3)
HDLC SERPL-MCNC: 48 MG/DL (ref 40–60)
HDLC SERPL: 4 {RATIO}
HGB BLD-MCNC: 11.4 G/DL (ref 11.7–15.4)
LDLC SERPL CALC-MCNC: 108.6 MG/DL
MCH RBC QN AUTO: 28.9 PG (ref 26.1–32.9)
MCHC RBC AUTO-ENTMCNC: 32 G/DL (ref 31.4–35)
MCV RBC AUTO: 90.1 FL (ref 79.6–97.8)
NRBC # BLD: 0 K/UL (ref 0–0.2)
P-R INTERVAL, ECG05: 160 MS
PLATELET # BLD AUTO: 279 K/UL (ref 150–450)
PMV BLD AUTO: 10.6 FL (ref 9.4–12.3)
POTASSIUM SERPL-SCNC: 4.5 MMOL/L (ref 3.5–5.1)
Q-T INTERVAL, ECG07: 414 MS
QRS DURATION, ECG06: 64 MS
QTC CALCULATION (BEZET), ECG08: 420 MS
RBC # BLD AUTO: 3.95 M/UL (ref 4.05–5.2)
SODIUM SERPL-SCNC: 136 MMOL/L (ref 136–145)
TRIGL SERPL-MCNC: 187 MG/DL (ref 35–150)
VENTRICULAR RATE, ECG03: 62 BPM
VLDLC SERPL CALC-MCNC: 37.4 MG/DL (ref 6–23)
WBC # BLD AUTO: 10.4 K/UL (ref 4.3–11.1)

## 2022-03-25 PROCEDURE — 70551 MRI BRAIN STEM W/O DYE: CPT

## 2022-03-25 PROCEDURE — 74011000250 HC RX REV CODE- 250: Performed by: EMERGENCY MEDICINE

## 2022-03-25 PROCEDURE — 36415 COLL VENOUS BLD VENIPUNCTURE: CPT

## 2022-03-25 PROCEDURE — 80048 BASIC METABOLIC PNL TOTAL CA: CPT

## 2022-03-25 PROCEDURE — 80061 LIPID PANEL: CPT

## 2022-03-25 PROCEDURE — 94640 AIRWAY INHALATION TREATMENT: CPT

## 2022-03-25 PROCEDURE — 96372 THER/PROPH/DIAG INJ SC/IM: CPT

## 2022-03-25 PROCEDURE — 97165 OT EVAL LOW COMPLEX 30 MIN: CPT

## 2022-03-25 PROCEDURE — 97535 SELF CARE MNGMENT TRAINING: CPT

## 2022-03-25 PROCEDURE — 99213 OFFICE O/P EST LOW 20 MIN: CPT | Performed by: PSYCHIATRY & NEUROLOGY

## 2022-03-25 PROCEDURE — G0378 HOSPITAL OBSERVATION PER HR: HCPCS

## 2022-03-25 PROCEDURE — 74011250636 HC RX REV CODE- 250/636: Performed by: FAMILY MEDICINE

## 2022-03-25 PROCEDURE — 94664 DEMO&/EVAL PT USE INHALER: CPT

## 2022-03-25 PROCEDURE — 92610 EVALUATE SWALLOWING FUNCTION: CPT

## 2022-03-25 PROCEDURE — 97530 THERAPEUTIC ACTIVITIES: CPT

## 2022-03-25 PROCEDURE — 74011000250 HC RX REV CODE- 250: Performed by: FAMILY MEDICINE

## 2022-03-25 PROCEDURE — 94760 N-INVAS EAR/PLS OXIMETRY 1: CPT

## 2022-03-25 PROCEDURE — 85027 COMPLETE CBC AUTOMATED: CPT

## 2022-03-25 PROCEDURE — 83036 HEMOGLOBIN GLYCOSYLATED A1C: CPT

## 2022-03-25 PROCEDURE — 74011250637 HC RX REV CODE- 250/637: Performed by: FAMILY MEDICINE

## 2022-03-25 PROCEDURE — 97161 PT EVAL LOW COMPLEX 20 MIN: CPT

## 2022-03-25 PROCEDURE — 93306 TTE W/DOPPLER COMPLETE: CPT

## 2022-03-25 RX ORDER — ONDANSETRON 2 MG/ML
4 INJECTION INTRAMUSCULAR; INTRAVENOUS
Status: DISCONTINUED | OUTPATIENT
Start: 2022-03-25 | End: 2022-03-25 | Stop reason: HOSPADM

## 2022-03-25 RX ORDER — IPRATROPIUM BROMIDE AND ALBUTEROL SULFATE 2.5; .5 MG/3ML; MG/3ML
3 SOLUTION RESPIRATORY (INHALATION)
Status: DISCONTINUED | OUTPATIENT
Start: 2022-03-25 | End: 2022-03-25 | Stop reason: HOSPADM

## 2022-03-25 RX ORDER — ACETAMINOPHEN 325 MG/1
650 TABLET ORAL
Status: DISCONTINUED | OUTPATIENT
Start: 2022-03-25 | End: 2022-03-25 | Stop reason: HOSPADM

## 2022-03-25 RX ADMIN — SODIUM CHLORIDE 75 ML/HR: 900 INJECTION, SOLUTION INTRAVENOUS at 10:35

## 2022-03-25 RX ADMIN — HYDROCHLOROTHIAZIDE 12.5 MG: 12.5 CAPSULE ORAL at 08:52

## 2022-03-25 RX ADMIN — FLUTICASONE PROPIONATE 2 SPRAY: 50 SPRAY, METERED NASAL at 08:13

## 2022-03-25 RX ADMIN — NITROFURANTOIN MACROCRYSTALS 50 MG: 50 CAPSULE ORAL at 08:12

## 2022-03-25 RX ADMIN — PRAMIPEXOLE DIHYDROCHLORIDE 0.5 MG: 0.25 TABLET ORAL at 08:12

## 2022-03-25 RX ADMIN — SODIUM CHLORIDE, PRESERVATIVE FREE 10 ML: 5 INJECTION INTRAVENOUS at 06:46

## 2022-03-25 RX ADMIN — LEVOTHYROXINE SODIUM 150 MCG: 0.07 TABLET ORAL at 06:46

## 2022-03-25 RX ADMIN — LISINOPRIL 10 MG: 5 TABLET ORAL at 08:12

## 2022-03-25 RX ADMIN — HEPARIN SODIUM 5000 UNITS: 5000 INJECTION, SOLUTION INTRAVENOUS; SUBCUTANEOUS at 06:46

## 2022-03-25 RX ADMIN — IPRATROPIUM BROMIDE AND ALBUTEROL SULFATE 3 ML: .5; 3 SOLUTION RESPIRATORY (INHALATION) at 08:05

## 2022-03-25 RX ADMIN — ACETAMINOPHEN 650 MG: 325 TABLET, FILM COATED ORAL at 07:22

## 2022-03-25 RX ADMIN — CLOPIDOGREL BISULFATE 75 MG: 75 TABLET ORAL at 08:11

## 2022-03-25 RX ADMIN — ASPIRIN 325 MG: 325 TABLET, FILM COATED ORAL at 08:12

## 2022-03-25 RX ADMIN — PANTOPRAZOLE SODIUM 40 MG: 40 TABLET, DELAYED RELEASE ORAL at 06:46

## 2022-03-25 RX ADMIN — ESCITALOPRAM OXALATE 30 MG: 10 TABLET ORAL at 08:12

## 2022-03-25 RX ADMIN — IPRATROPIUM BROMIDE AND ALBUTEROL SULFATE 3 ML: .5; 3 SOLUTION RESPIRATORY (INHALATION) at 04:05

## 2022-03-25 NOTE — H&P
[facility] Initial History and Physical Note    Patient: Teofilo Landaverde Date: 3/25/2022  female, 77 y.o. Admit Date: 3/24/2022  Attending: Cleve Valdivia MD     ASSESSMENT AND PLAN:     Principal Problem:    CVA (cerebral vascular accident) ([facility] [address] (3/24/2022)    Concerning findings of dizziness, and visual disturbances. CT, CTP head unremarkable. Tele neuro assessed, does not recommend tPA. , Lipitor 40. MRI brain, TTE ordered. PT/OT/ST/neuro/rehab consults. Active Problems:    Anemia, iron deficiency (12/6/2017)    Stable. Obesity hypoventilation syndrome ([facility] [address] (3/14/2019)    Stable. Essential hypertension (12/6/2017)    Stable. Continue home meds. PAD (peripheral artery disease) ([facility] [address] (12/6/2017)    Stable. Continue home meds. HLD (hyperlipidemia) (12/6/2017)    Stable. Continue home meds. Hypothyroidism (acquired) (12/6/2017)    Stable. Continue home meds. BONNIE (generalized anxiety disorder) (10/9/2018)    Stable. Continue home meds. Hashimoto's thyroiditis (3/5/2019)    Stable. Continue home meds. DVT Prophylaxis: Heparin      Code Status: FULL CODE      Disposition: Observe on remote tele for evaluation and treatment as per above.       Anticipated discharge: < 2midnights     CHIEF COMPLAINT:  Dizziness    HISTORY OF PRESENT ILLNESS:      Patient Active Problem List   Diagnosis Code    HLD (hyperlipidemia) E78.5    Essential hypertension I10    Hypothyroidism (acquired) E03.9    Anemia, iron deficiency D50.9    IBS (irritable bowel syndrome) K58.9    Insomnia G47.00    Mood disorder (HCC) F39    PAD (peripheral artery disease) (HCC) I73.9    RLS (restless legs syndrome) G25.81    OA (osteoarthritis) M19.90    Encounter for coordination of complex care Z71.89    Fungal dermatitis B36.9    Family history of autoimmune disorder Z83.2    Gluten intolerance K90.41    Encounter for medication management Z79.899    Abnormal flushing and sweating R23.2, R61    BONNIE (generalized anxiety disorder) F41.1    GERD (gastroesophageal reflux disease) K21.9    Postmenopausal HRT (hormone replacement therapy) Z79.890    Hymenoptera allergy Z91.038    AR (allergic rhinitis) J30.9    Fibromyalgia M79.7    OAB (overactive bladder) N32.81    Hemorrhoids K64.9    Hashimoto's thyroiditis E06.3    Obesity hypoventilation syndrome (HCC) E66.2    Chronic nausea R11.0    ANDREA positive R76.8    Allergic dermatitis L23.9    MALOU (stress urinary incontinence, female) N39.3    Vaginal atrophy N95.2    Gait abnormality R26.9    Obesity, morbid, BMI 40.0-49.9 (HCC) E66.01    Left flank pain R10.9    Idiopathic hypotension I95.0    Diplopia H53.2    Weakness generalized R53.1    Numbness R20.0    Fall W19. 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 female, with a history of  has a past medical history of Acute kidney insufficiency, Adverse effect of anesthesia, Allergic rhinitis, Anemia, Anxiety and depression, Arthritis, Edema, GERD (gastroesophageal reflux disease), History of chicken pox, History of Clostridioides difficile infection, History of DVT (deep vein thrombosis), HTN (hypertension), Hyperlipidemia, Hyperthyroidism, Hypothyroidism, IBS (irritable bowel syndrome), Insomnia, Morbid obesity ([facility] [address] Murmur, Overactive bladder, PAD (peripheral artery disease) ([facility] [address] Recurrent UTI, RLS (restless legs syndrome), SOBOE (shortness of breath on exertion), Special examinations, MALOU (stress urinary incontinence, female), Thromboembolus ([facility] [address] (2015), and 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 She has no past medical history of Difficult intubation, Malignant hyperthermia due to anesthesia, Nausea & vomiting, or Pseudocholinesterase deficiency. ,  has a past surgical history that includes hx meniscus repair (Left); hx tonsil and adenoidectomy (~1950); hx shoulder arthroscopy (Right, ~1966, ~2013); hx knee replacement (Right, 2014); hx cholecystectomy (12/2016); hx total abdominal hysterectomy (~1970); hx appendectomy (1955); ir gastric band adj w/ fluoro (~2014); hx gyn (12/03/2019); hx other surgical (Right, 2016); hx other surgical (Left, ~2014); hx other surgical (~1986); hx meniscus repair (Left, 2014); hx colonoscopy; hx orthopaedic (Left); hx other surgical (12/03/2019); and hx orthopaedic (Left, 01/2020). who presents to the ER with report of dizziness and blurry vision starting around noon today. Reports feeling like she's leaning to the left. Was found to have /90 while at PT appointment this afternoon. Also admits to headache. Don any difficulty with speech or swallowing.  Denies any fevrs, chills, nausea, vomiting    Allergy  Allergies   Allergen Reactions    Fire Ant Anaphylaxis    Adhesive Rash and Itching     \"sticky ace wrap\"    Amoxicillin Other (comments)     C-Diff    Betadine [Povidone-Iodine] Other (comments)     Mcgrath    Cephalexin Other (comments)     C-Diff    Demerol [Meperidine] Rash    Hydromorphone Other (comments)     Keeps her awake    Morphine Other (comments)     Awake    Percocet [Oxycodone-Acetaminophen] Other (comments)     Jittery    Percodan [Oxycodone Hcl-Oxycodone-Asa] Other (comments)     Irma       Medication list  Prior to Admission Medications   Prescriptions Last Dose Informant Patient Reported? Taking? Synthroid 150 mcg tablet   No No   Sig: Take 1 Tablet by mouth Daily (before breakfast). DAW1   Patient taking differently: Take 150 mcg by mouth Daily (before breakfast). acetaminophen (TYLENOL) 325 mg tablet   Yes No   Sig: Take 325 mg by mouth every four (4) hours as needed (breakthrough pain). albuterol (PROVENTIL HFA, VENTOLIN HFA, PROAIR HFA) 90 mcg/actuation inhaler   No No   Sig: Take 1-2 Puffs by inhalation every six (6) hours as needed for Wheezing. amitriptyline (ELAVIL) 25 mg tablet   Yes No   Sig: Take 25 mg by mouth nightly. For IBS/colitis   clonazePAM (KlonoPIN) 1 mg tablet   No No   Sig: Take 1 Tablet by mouth two (2) times daily as needed for Anxiety. Max Daily Amount: 2 mg.   clotrimazole-betamethasone (LOTRISONE) topical cream   No No   Sig: Apply  to affected area two (2) times a day. Until rash resolves   Patient taking differently: Apply  to affected area as needed. Until rash resolves   colestipoL (COLESTID) 1 gram tablet   No No   Sig: Take 1 Tablet by mouth two (2) times a day. Patient taking differently: Take 1 g by mouth daily. For IBS   cyanocobalamin (VITAMIN B12) 1,000 mcg/mL injection   Yes No   Si,000 mcg by IntraMUSCular route every month. diclofenac EC (VOLTAREN) 75 mg EC tablet   No No   Sig: Take 1 tablet by mouth twice a day as needed   ergocalciferol (ERGOCALCIFEROL) 1,250 mcg (50,000 unit) capsule   No No   Sig: Take 1 Capsule by mouth every seven (7) days. Indications: vitamin D deficiency (high dose therapy)   escitalopram oxalate (LEXAPRO) 20 mg tablet   No No   Sig: Take 1.5 Tablets by mouth daily. Patient taking differently: Take  by mouth. 1 tablet daily   fluticasone propionate (FLONASE) 50 mcg/actuation nasal spray   No No   Si Sprays by Both Nostrils route nightly.  Indications: inflammation of the nose due to an allergy   Patient taking differently: 2 Sprays by Both Nostrils route daily. Indications: inflammation of the nose due to an allergy   hyoscyamine sulfate (CYSTOSPAZ) 0.125 mg tablet   No No   Sig: Take 1 Tab by mouth every four (4) hours as needed for Cramping or Nausea. Indications: irritable colon   lisinopril-hydroCHLOROthiazide (PRINZIDE, ZESTORETIC) 10-12.5 mg per tablet   No No   Sig: Take 1 Tablet by mouth daily. methocarbamoL (ROBAXIN) 750 mg tablet   No No   Sig: Take 1 Tablet by mouth every six (6) hours as needed for Muscle Spasm(s). Take 1 tablet by mouth every 6 hours as needed   naloxone (NARCAN) 4 mg/actuation nasal spray   No No   Sig: Use 1 spray intranasally, then discard. Repeat with new spray every 2 min as needed for opioid overdose symptoms, alternating nostrils. Indications: opioid overdose   nitrofurantoin (MACRODANTIN) 50 mg capsule   No No   Sig: Take 1 Capsule by mouth daily. Indications: bacterial urinary tract infection   Patient taking differently: Take 50 mg by mouth daily. Indications: bacterial urinary tract infection, urinary tract infection prevention   omeprazole (PRILOSEC) 40 mg capsule   No No   Sig: Take 1 Capsule by mouth daily. ondansetron hcl (ZOFRAN) 4 mg tablet   No No   Sig: Take 1 Tablet by mouth every eight (8) hours as needed for Nausea. pramipexole (Mirapex) 0.5 mg tablet   No No   Sig: Take 1 Tablet by mouth daily. Facility-Administered Medications: None       Past Medical History   Past Medical History:   Diagnosis Date    Acute kidney insufficiency     Followed by PCP     Adverse effect of anesthesia     2016 in [place] heart stopped at the end of my gallbladder surgery. \" Patient reports no CPR performed, stayed one night in the hospital. No problems with any other surgeries.     Allergic rhinitis     Anemia     history     Anxiety and depression     Controlled with medication     Arthritis     Edema     bilateral lower legs     GERD (gastroesophageal reflux disease)     Controlled with medication     History of chicken pox     History of Clostridioides difficile infection     History of DVT (deep vein thrombosis)     s/p knee replacement and developed right leg DVT     HTN (hypertension)     controlled with medication     Hyperlipidemia     controlled with medication     Hyperthyroidism     pt denies    Hypothyroidism     controlled with medication     IBS (irritable bowel syndrome)     Insomnia     Morbid obesity (HCC)     BMI 42.85    Murmur     per PCP note (61/7/85) systolic murmur grade of 2/6    Overactive bladder     PAD (peripheral artery disease) ([facility] [address]     patient denies on 11/27/19    Recurrent UTI     RLS (restless legs syndrome)     controlled with medication     SOBOE (shortness of breath on exertion)     PRN inhaler-patient reports she uses inhaler QHS    Special examinations     History-Neurologist - Dr. Checo WESTFALL (stress urinary incontinence, female)     Thromboembolus [facility]) 2015    behind right knee after total knee replacement    Tubulovillous adenoma of colon 04/26/2021       Past Surgical History:   Procedure Laterality Date    HX APPENDECTOMY  1955    HX CHOLECYSTECTOMY  12/2016    HX COLONOSCOPY      HX GYN  12/03/2019    suburethral sling and cystoscopy    HX KNEE REPLACEMENT Right 2014    HX MENISCUS REPAIR Left     4/2016    HX MENISCUS REPAIR Left 2014    HX ORTHOPAEDIC Left     LEFT WRIST TRAPEZIOMETACARPAL ARTHROPLASTY WITH FCR TENDON TRANSFER- 2020    HX ORTHOPAEDIC Left 01/2020    left hand tendon surgery    HX OTHER SURGICAL Right 2016    Hand    HX OTHER SURGICAL Left ~2014    B/L Ulnar    HX OTHER SURGICAL  ~1986    urethraplasty    HX OTHER SURGICAL  12/03/2019    bladder surgery-put mesh in    HX SHOULDER ARTHROSCOPY Right ~1966, ~2013    x2    HX TONSIL AND ADENOIDECTOMY  ~1950    HX TOTAL ABDOMINAL HYSTERECTOMY  ~1970    IR GASTRIC BAND ADJ W/ FLUORO  ~2014    lab band removed ~ 2016       Social History   Social History     Socioeconomic History    Marital status:    Tobacco Use    Smoking status: Former Smoker     Packs/day: 1.00     Years: 40.00     Pack years: 40.00     Types: Cigarettes     Quit date: 2005     Years since quittin.3    Smokeless tobacco: Never Used   Vaping Use    Vaping Use: Never used   Substance and Sexual Activity    Alcohol use: Not Currently    Drug use: No    Sexual activity: Yes     Partners: Male   Other Topics Concern    Blood Transfusions No    Occupational Exposure No    Sleep Concern No    Weight Concern No    Back Care No    Exercise No    Seat Belt Yes    Self-Exams Yes     Social Determinants of Health     Food Insecurity: Food Insecurity Present    Worried About Running Out of Food in the Last Year: Sometimes true    Ran Out of Food in the Last Year: Sometimes true   Transportation Needs: No Transportation Needs    Lack of Transportation (Medical): No    Lack of Transportation (Non-Medical): No   Physical Activity: Inactive    Days of Exercise per Week: 0 days    Minutes of Exercise per Session: 0 min   Stress: Stress Concern Present    Feeling of Stress : To some extent   Social Connections:  Moderately Integrated    Frequency of Communication with Friends and Family: Twice a week    Frequency of Social Gatherings with Friends and Family: Twice a week    Attends Yazidi Services: More than 4 times per year    Active Member of [address] [place] [address] [employer] or Organizations: No    Attends Club or Organization Meetings: Never    Marital Status:        Family History:   Family History   Problem Relation Age of Onset    No Known Problems Mother     Other Brother         brain aneurysm, AVM    Heart Surgery Brother         CABGx4    Heart Disease Brother     Hypertension Brother     Other Father 54        brain aneurysm    Other Sister         fibromyalgia    Pneumonia Sister     Heart Attack Maternal Grandmother     Cancer Maternal Grandfather         skin    Heart Attack Paternal Grandfather     Congenital anomalies Sister         right arm deformity    Other Son         Hepatitis C    Drug Abuse Son     Mult Sclerosis Daughter        REVIEW OF SYSTEMS:   A 14 point review of systems was taken and pertinent positive as per HPI. PHYSICAL EXAMINATION:  Vital 24 Hour Range Most Recent Value  Temperature Temp  Min: 97.7 °F (36.5 °C)  Max: 98.2 °F (36.8 °C) 97.7 °F (36.5 °C)    Pulse Pulse  Min: 43  Max: 73 72  Respiratory Resp  Min: 12  Max: 25 16  Blood Pressure BP  Min: 67/56  Max: 217/87 130/74  Pulse Oximetry SpO2  Min: 94 %  Max: 98 % 98 %  O2 No data recorded      Vital Most Recent Value First Value  Weight 87.5 kg (193 lb) Weight: 87.5 kg (193 lb)  Height 4' 10\" (147.3 cm) Height: 4' 10\" (147.3 cm)  BMI   N/A    Physical Exam:   General:     No acute distress, speaking in full sentences. Eyes:   No palpebral pallor or scleral icterus. ENT:   External auricular and nasal exam within normal limits. Cardiovascular: No cyanosis or edema of extremities. Respiratory:    No respiratory distress or accessory muscle use. Gastrointestinal:   Not actively vomiting, abdomen non-distended   Skin:      Normal color. No rashes, lesions, or jaundice. Neurologic:  CN II-XII grossly intact and symmetrical.      Moving all four extremities well with no focal deficits. Psychiatric:  Appropriate affect.  Alert       Intake/Output last 3 shifts:      Labs:  magnesium:7,phos:7)CMP:   Lab Results   Component Value Date/Time     (L) 03/24/2022 04:10 PM    K 4.7 03/24/2022 04:10 PM     03/24/2022 04:10 PM    CO2 31 03/24/2022 04:10 PM    AGAP 3 (L) 03/24/2022 04:10 PM    GLU 89 03/24/2022 04:10 PM    BUN 29 (H) 03/24/2022 04:10 PM    CREA 1.60 (H) 03/24/2022 04:10 PM    GFRAA 40 (L) 03/24/2022 04:10 PM    GFRNA 33 (L) 03/24/2022 04:10 PM    CA 9.0 03/24/2022 04:10 PM    ALB 3.7 03/24/2022 04:10 PM TBILI 0.3 03/24/2022 04:10 PM    TP 8.3 (H) 03/24/2022 04:10 PM    GLOB 4.6 (H) 03/24/2022 04:10 PM    AGRAT 0.8 (L) 03/24/2022 04:10 PM    ALT 19 03/24/2022 04:10 PM         CBC:    Lab Results   Component Value Date/Time    WBC 11.3 (H) 03/24/2022 04:10 PM    HGB 12.4 03/24/2022 04:10 PM    HCT 39.7 03/24/2022 04:10 PM     03/24/2022 04:10 PM       Lab Results   Component Value Date/Time    INR 0.9 01/04/2022 01:30 PM    INR 1.0 03/27/2021 08:58 AM    INR (POC) 1.1 03/24/2022 04:09 PM    Prothrombin time 12.3 (L) 01/04/2022 01:30 PM    Prothrombin time 13.8 03/27/2021 08:58 AM       ABG:  No results found for: PH, PHI, PCO2, PCO2I, PO2, PO2I, HCO3, HCO3I, FIO2, FIO2I        No results found for: CPK, RCK1, RCK2, RCK3, RCK4, CKMB, CKNDX, CKND1, TROPT, TROIQ, BNPP, BNP    Imagining & Other Studies    XR Results (maximum last 3): Results from Appointment encounter on 03/02/22    XR KNEE LT 3 V    Narrative  AUTOMATIC ADMINISTRATIVE RESULT    The result for this exam can be found in the Progress note in the chart. Impression  :  See Progress note in the chart. Results from [place] encounter on 11/15/21    XR TIB/FIB LT    Narrative  Left tibia/fibula radiographs 11/15/2021. CLINICAL HISTORY: Pain and bruising in distal anterior tib-fib. Recent injury. FINDINGS:    AP, and lateral views of the left tibia, and fibula show an intact appearance of  the tibia, and fibula. No obvious abnormality is seen of the left knee, or ankle  although additional dedicated imaging of these structures should be performed of  injury is suspected. Soft tissue edema is suggested in the mid and lower leg. This asymmetrically involves the anterior mid and lower leg which could  represent contusion. Impression  1. Soft tissue changes as described above without acute osseous abnormality.       Results from Appointment encounter on 11/10/21    XR KNEES BI MIN 4 V    Narrative  AUTOMATIC ADMINISTRATIVE RESULT    The result for this exam can be found in the Progress note in the chart. Impression  :  See Progress note in the chart. CT Results (maximum last 3): [facility]    CT PERF W CBF    Narrative  CT Perfusion Imaging    INDICATION:  Acute stroke evaluation with visual changes, imbalance, headache    COMPARISON: Noncontrast CT earlier today    CT perfusion imaging of the brain was performed after the administration of 100  mL Isovue-370 intravenous contrast.  Perfusion maps and perfusion analysis  output were generated using the RAPID perfusion processing software algorithm. Radiation dose reduction techniques were used for this study: All CT scans  performed at this facility use one or all of the following: Automated exposure  control, adjustment of the mA and/or kVp according to patient's size, iterative  reconstruction. FINDINGS: Small areas of hypoperfusion involving the right parietal lobe, left  temporal lobe are noted and of unclear but doubtful significance and could be  artifactual.    RAPID Output Values:    CBF < 30% volume:  0 ml  (core infarction volume greater than 50 cc associated  with poor outcomes)  Tmax > 6 seconds: 0 ml  Tmax/CBF Mismatch Volume: 0 ml  Tmax/CBF Mismatch Ratio: Not available  Hypoperfusion Intensity Ratio: 0   (values greater than 0.5 associated with poor  outcome)  Tmax > 10 seconds Volume: 0 ml   (volume greater than 100 mL is associated with  poor outcome)    Impression  No CT perfusion evidence of acute intracranial abnormality. Please note that the determination of patient treatment is not based solely upon  imaging factors or calculation values. Management of ischemia is at the  discretion of the primary physician and is based upon a combination of clinical  and imaging data, along other factors. CTA CODE NEURO HEAD AND NECK W CONT    Narrative  Title:  CT arteriogram of the neck and head.     Indication: Headache. Visual changes. Technique: Axial images of the neck and head were obtained after the uneventful  administration of intravenous iodinated contrast media. Contrast was used to  best identify the arterial structures. Images were reviewed on a separate, free  standing, three-dimensional workstation as per the referring physicians request.      All stenosis percentages derived by comparing the narrowest segment with the  distal Internal Carotid Artery luminal diameter, as described in the [place] Symptomatic Carotid Endarterectomy Trial (NASCET) criteria. All CT scans at this facility are performed using dose reduction/dose modulation  techniques, as appropriate the performed exam, including the following:  Automated Exposure Control; Adjustment of the mA and/or kV according to patient  size (this includes techniques or standardized protocols for targeted exams  where dose is matched to indication/reason for exam); and Use of Iterative  Reconstruction Technique. The study was analyzed by the [address] ai algorithm. Comparison: None. Findings:    Lungs:  No focal consolidation or pleural effusions. No suspicious pulmonary  nodules. .  Bones:  No osseous destruction. Moderate multilevel degenerative changes in the  cervical spine. This is most pronounced at C5-C6 where there is osseous fusion  of the vertebral bodies. Paranasal sinuses:  Paranasal sinuses are clear. .  Brain:  No midline shift. No enhancing mass lesion or hydrocephalus. .  Soft tissues:  Within normal limits. .    Dural venous sinuses:  Patent. Aortic arch:  Non-aneurysmal. Arch vessels are patent. Moderate atherosclerotic  changes. ANTERIOR CIRCULATION    Right common carotid artery:  No significant stenosis or occlusion. Right internal carotid artery:  No significant stenosis or occlusion. Vascular  calcifications along the carotid siphon.     Right middle cerebral artery:  No significant stenosis, occlusion, or aneurysm. Right anterior cerebral artery:  No significant stenosis, occlusion, or  aneurysm. Left common carotid artery: No significant stenosis or occlusion. Left internal carotid artery:  No significant stenosis or occlusion in the left  ICA. Moderate atherosclerotic changes of the left carotid bulb with mild,  approximately 30% narrowing in the carotid bulb. High-grade stenosis at the  origin of the left external carotid artery. Vascular calcifications along the  carotid siphon. Left middle cerebral artery:  No significant stenosis, occlusion, or aneurysm. Left anterior cerebral artery:  No significant stenosis, occlusion, or aneurysm. Anterior communicating artery: No significant stenosis, occlusion, or aneurysm. POSTERIOR CIRCULATION    Right vertebral artery:  No significant stenosis or occlusion. Right vertebral  artery is dominant. Left vertebral artery:  Diminutive left vertebral artery. Basilar artery:  No significant stenosis, occlusion, or aneurysm. Right posterior communicating artery: No significant stenosis, occlusion, or  aneurysm. Left posterior communicating artery:  No significant stenosis, occlusion, or  aneurysm. Right posterior cerebral artery:  No significant stenosis, occlusion, or  aneurysm. Left posterior cerebral artery:  No significant stenosis, occlusion, or  aneurysm. Impression  No acute large vessel occlusion or high-grade stenosis. CT CODE NEURO HEAD WO CONTRAST    Narrative  Exam: CT CODE NEURO HEAD WO CONTRAST on 3/24/2022 4:20 PM    Clinical History: The Female patient is 77years old  presenting for r/o stroke,  balance problem vision changes. Technique: Thin slice axial CT images through the brain were obtained. All CT scans at this facility are performed using dose reduction/dose modulation  techniques, as appropriate the performed exam, including the following:  Automated Exposure Control;  Adjustment of the mA and/or kV according to patient  size (this includes techniques or standardized protocols for targeted exams  where dose is matched to indication/reason for exam); and Use of Iterative  Reconstruction Technique. Radiation Exposure Indices:  Reference Air Kerma (Ka,r) = 1091 mGy-cm    Comparison:  None. Findings:    Cerebrum: Age-related senescent changes are seen with sulcal and ventricular  prominence. . There is normal gray-white matter differentiation. No evidence of  intracranial hemorrhage, mass, or other space-occupying lesion is seen. There  are no abnormal extra-axial fluid collections. Cerebellum: Involutional changes are demonstrated. CSF spaces: The ventricular system is within normal limits. The basilar cisterns  are unremarkable. Brainstem: No evidence of ischemia, hemorrhage, or mass. Extracranial tissues: Visualized orbits and extracranial soft tissues are  unremarkable. Paranasal sinuses/Mastoids: Well-pneumatized and aerated. .    Calvarium: No acute osseous abnormality. Impression  1. No acute intracranial abnormality. CPT code 25019      MRI Results (maximum last 3): Results from [place] encounter on 03/21/20    MRI BRAIN WO CONT    Narrative  MRI BRAIN WITHOUT CONTRAST 3/21/2020    HISTORY: 69year-old with double vision, loss of balance, dizziness and memory  loss intermittent x4 months    TECHNIQUE: Sagittal and axial T1-weighted, axial T2-weighted, axial and coronal  FLAIR, axial T2-weighted gradient-echo, axial diffusion weighted images with ADC  maps of the brain. COMPARISON: None    FINDINGS: There is no acute infarction, acute intracranial hemorrhage,  intra-axial mass, hydrocephalus, or extra-axial hematoma. On the T2-weighted and FLAIR sequences, there are a few scattered punctate white  matter hyperintensities.  This is not an uncommon finding which may be present  with asymptomatic patients, with migraine headaches or with mild chronic small  vessel ischemic disease. The mastoid air cells and paranasal sinuses are clear where imaged. Impression  IMPRESSION: Scattered occasional punctate white matter hyperintensities present  as described. This pattern may be present with mild chronic small vessel  ischemic disease or with migraine headaches. Nuclear Medicine Results (maximum last 3): No results found for this or any previous visit. US Results (maximum last 3): Results from [place] encounter on 03/12/21    US THYROID/PARATHYROID/SOFT TISS    Narrative  Clinical History: The Female patient is 77years old  presenting with symptoms  of primary hypothyroidism. Technique: Utilizing a small parts transducer, sonographic imaging of the  thyroid gland was performed. Lesion scoring is based on the 2017 ACR white paper  for Thyroid Imaging Reporting and Data System (TI-RADS) providing guidance on  fine-needle aspiration (FNA) and follow-up. Comparison: none. Right:  Size: 3.4 x 1.6 x 1.9 cms. Echotexture: Heterogeneous. Diffusely hypoechoic and lobular in contour    Left:  Size: 3.8 x 1.2 x 1.6 cms. Echotexture: Heterogeneous. Diffusely hypoechoic and lobular in contour    Isthmus:  Size: 0.8 cms. Echotexture: Heterogeneous. Diffusely hypoechoic and lobular in contour    Nodules RIGHT:  None    Nodules LEFT:  None    Nodules ISTHMUS:  None    Additional Comments:    Impression  1. Atrophic, and diffuse lobular thyroid gland without focal lesion identified. Recommendations:  TR1: Benign, no FNA or follow-up  TR2: Nonsuspicious, no FNA or follow-up  TR3: Mildly suspicious, FNA if greater than or equal to 2.5 cm, follow at 1, 3,  and 5 years if greater than or equal to 1.5 cm.  TR4: Moderately suspicious, FNA if greater than or equal to 1.5 cm, follow-up 1,  2, 3, and 5 years if greater than or equal to 1 cm.   TR5: Highly suspicious, FNA if greater than or equal to 1 cm, follow annularly  for 5 years if greater than or equal to 0.5 cm.    CPT Code:  99839      Results from Hospital Encounter encounter on 02/12/21    US RETROPERITONEUM COMP    Narrative  Exam: US RETROPERITONEUM COMP on 2/12/2021 11:33 AM    Clinical History: The Female patient is 77years old presenting for renal cyst  follow-up. Comparison:  Abdomen pelvis CT 1/8/2021    Findings: The right kidney measures 8.6 cm and is normal in size and contour. A large  simple cyst projects from the lower pole measuring 3.7 x 3.9 x 3.8 cm. No  evidence of hydronephrosis is seen. There are no shadowing stones. The left kidney measures 9.3 cm and is normal in size and contour. There is no  evidence of solid or cystic lesion. No evidence of hydronephrosis is seen. There are no shadowing stones. The urinary bladder appears unremarkable. Impression  1. Stable simple cyst projecting from lower pole of right kidney    CPT code(s) 00009      Results from Hospital Encounter encounter on 03/12/20    US RETROPERITONEUM COMP    Narrative  Renal ultrasound    HISTORY: Moderate left flank pain. Symptoms have been present for 2 to 3 weeks. Real-time sonography of the kidneys was performed. Comparison: None. Correlation is made to the CTA abdomen and pelvis 01/16/2018. FINDINGS: The kidneys are normal in size and sonographic appearance. The right  kidney measures approximately 9.0cm. The left kidney measures approximately  9.1cm. There is no hydronephrosis or solid mass. At the lower pole right kidney  is a 3.8 cm cyst. Renal echogenicity is within normal limits. The urinary  bladder is anechoic. The visualized portions of the abdominal aorta and inferior  vena cava are unremarkable. Impression  IMPRESSION: Right renal cyst.      DEXA Results (maximum last 3): No results found for this or any previous visit. MADDI Results (maximum last 3): Results from Orders Only encounter on 05/03/19    MADDI 3D ZACHARY W MAMMO BI SCREENING INCL CAD      IR Results (maximum last 3):   No results found for this or any previous visit. VAS/US Results (maximum last 3): Results from [place] encounter on 03/10/22    DUPLEX LOWER EXT VENOUS LEFT    Narrative  Left lower extremity venous ultrasound    INDICATION:  Pain and swelling,    Doppler ultrasound of the left lower extremity was performed. FINDINGS:  There is normal flow in the greater saphenous, common femoral,  superficial femoral, and popliteal veins. Normal compression and augmentation is  demonstrated. The proximal calf veins are also patent. Impression  No evidence of deep venous thrombosis in the left lower extremity      Results from Hospital Encounter encounter on 02/02/22    DUPLEX LOWER EXT VENOUS LEFT    Narrative  Left lower extremity venous ultrasound    INDICATION: Left lower extremity swelling. History of DVT. Doppler ultrasound of the left lower extremity was performed. FINDINGS:  There is normal flow in the greater saphenous, common femoral,  superficial femoral, and popliteal veins. Normal compression and augmentation is  demonstrated. The proximal calf veins are also patent. Impression  No evidence of deep venous thrombosis in the left lower extremity. Pulmonary report called to Jocelyne in the referring clinic at the time of the  exam on the ultrasound technologist 3:52 PM on 02/02/2022. Results from Office Visit encounter on 01/08/18    DUPLEX LOW EXT ARTERIES WITH CHANTEL    Narrative  Final Vascular Lab ultrasound report scanned under the imaging tab. Click RESULTS link to view. PET Results (maximum last 3): No results found for this or any previous visit.         EKG Results     Procedure 720 Value Units Date/Time    EKG 12 LEAD INITIAL [[id number]] Collected: 03/24/22 1609    Order Status: Completed Updated: 03/24/22 2214     Ventricular Rate 62 BPM      Atrial Rate 62 BPM      P-R Interval 160 ms      QRS Duration 64 ms      Q-T Interval 414 ms      QTC Calculation (Bezet) 420 ms Calculated P Axis 81 degrees      Calculated R Axis -18 degrees      Calculated T Axis 38 degrees      Diagnosis --     !! AGE AND GENDER SPECIFIC ECG ANALYSIS !!   Sinus rhythm with frequent Premature ventricular complexes  Cannot rule out Anterior infarct , age undetermined  Abnormal ECG  When compared with ECG of 04-JAN-2022 13:36,  Premature ventricular complexes are now Present      Initial ECG [[id number]]     Order Status: Completed           Vladimir Vines MD  3/25/2022 12:37 AM

## 2022-03-25 NOTE — PROGRESS NOTES
TRANSFER - IN REPORT:    Verbal report received from Daniel RN(name) on Teofilo Landaverde  being received from ED(unit) for routine progression of care      Report consisted of patients Situation, Background, Assessment and   Recommendations(SBAR). Information from the following report(s) SBAR and ED Summary was reviewed with the receiving nurse. Opportunity for questions and clarification was provided. Assessment completed upon patients arrival to unit and care assumed.

## 2022-03-25 NOTE — PROGRESS NOTES
03/24/22 2130   Dual Skin Pressure Injury Assessment   Dual Skin Pressure Injury Assessment WDL   Second Care Provider (Based on Facility Policy) Jay Williamson RN   Skin Integumentary   Skin Integumentary (WDL) X    Pressure  Injury Documentation No Pressure Injury Noted-Pressure Ulcer Prevention Initiated   Skin Color Appropriate for ethnicity   Skin Condition/Temp Dry;Fragile; Warm   Skin Integrity Scars (comment); Other (comment)  (Left Knee scar, scattered scabbing BUE)   Turgor Epidermis thin w/ loss of subcut tissue   Nails X   Exceptions to WDL Discolored   Wound Prevention and Protection Methods   Orientation of Wound Prevention Posterior   Location of Wound Prevention Sacrum/Coccyx   Dressing Present  No   Wound Offloading (Prevention Methods) Bed, pressure reduction mattress

## 2022-03-25 NOTE — CONSULTS
Consult    Patient: Tiffany Kessler MRN: [id number]     YOB: 1945  Age: 77 y.o. Sex: female      Subjective:      Tiffany Kessler is a 77 y.o. female who is being seen for dizziness. The patient is followed by neurology as an outpatient with Dr. Willie Maier. The patient has a history of chronic dizziness. She presents with worsening dizziness and some difficulty with word finding. CT, CTA, and CT perfusion without significant abnormalities. The patient still feels dizzy and occasionally tremulous. Past Medical History:   Diagnosis Date    Acute kidney insufficiency     Followed by PCP     Adverse effect of anesthesia     2016 in [place] heart stopped at the end of my gallbladder surgery. \" Patient reports no CPR performed, stayed one night in the hospital. No problems with any other surgeries.     Allergic rhinitis     Anemia     history     Anxiety and depression     Controlled with medication     Arthritis     Edema     bilateral lower legs     GERD (gastroesophageal reflux disease)     Controlled with medication     History of chicken pox     History of Clostridioides difficile infection     History of DVT (deep vein thrombosis)     s/p knee replacement and developed right leg DVT     HTN (hypertension)     controlled with medication     Hyperlipidemia     controlled with medication     Hyperthyroidism     pt denies    Hypothyroidism     controlled with medication     IBS (irritable bowel syndrome)     Insomnia     Morbid obesity (HCC)     BMI 42.85    Murmur     per PCP note (20/5/45) systolic murmur grade of 2/6    Overactive bladder     PAD (peripheral artery disease) ([facility] [address]     patient denies on 11/27/19    Recurrent UTI     RLS (restless legs syndrome)     controlled with medication     SOBOE (shortness of breath on exertion)     PRN inhaler-patient reports she uses inhaler QHS    Special examinations     History-Neurologist - Dr. China De Dios MALOU (stress urinary incontinence, female)     Thromboembolus [facility]) 2015    behind right knee after total knee replacement    Tubulovillous adenoma of colon 2021     Past Surgical History:   Procedure Laterality Date    HX APPENDECTOMY  195    HX CHOLECYSTECTOMY  2016    HX COLONOSCOPY      HX GYN  2019    suburethral sling and cystoscopy    HX KNEE REPLACEMENT Right     HX MENISCUS REPAIR Left     2016    HX MENISCUS REPAIR Left     HX ORTHOPAEDIC Left     LEFT WRIST TRAPEZIOMETACARPAL ARTHROPLASTY WITH FCR TENDON TRANSFER-     HX ORTHOPAEDIC Left 2020    left hand tendon surgery    HX OTHER SURGICAL Right 2016    Hand    HX OTHER SURGICAL Left ~    B/L Ulnar    HX OTHER SURGICAL  ~    urethraplasty    HX OTHER SURGICAL  2019    bladder surgery-put mesh in    HX SHOULDER ARTHROSCOPY Right ~1966, ~2013    x2    HX TONSIL AND ADENOIDECTOMY  ~1950    HX TOTAL ABDOMINAL HYSTERECTOMY  ~    IR GASTRIC BAND ADJ W/ FLUORO  ~    lab band removed ~       Family History   Problem Relation Age of Onset    No Known Problems Mother     Other Brother         brain aneurysm, AVM    Heart Surgery Brother         CABGx4    Heart Disease Brother     Hypertension Brother     Other Father 54        brain aneurysm    Other Sister         fibromyalgia    Pneumonia Sister     Heart Attack Maternal Grandmother     Cancer Maternal Grandfather         skin    Heart Attack Paternal Grandfather     Congenital anomalies Sister         right arm deformity    Other Son         Hepatitis C    Drug Abuse Son     Mult Sclerosis Daughter      Social History     Tobacco Use    Smoking status: Former Smoker     Packs/day: 1.00     Years: 40.00     Pack years: 40.00     Types: Cigarettes     Quit date: 2005     Years since quittin.3    Smokeless tobacco: Never Used   Substance Use Topics    Alcohol use: Not Currently      Current Facility-Administered Medications   Medication Dose Route Frequency Provider Last Rate Last Admin    acetaminophen (TYLENOL) tablet 650 mg  650 mg Oral Q6H PRN Blessing DONNELLY MD   650 mg at 03/25/22 0722    albuterol-ipratropium (DUO-NEB) 2.5 MG-0.5 MG/3 ML  3 mL Nebulization Q6H PRN Karri Avila MD        sodium chloride (NS) flush 5-10 mL  5-10 mL IntraVENous Q8H Suraj Atkinson MD   10 mL at 03/25/22 0646    sodium chloride (NS) flush 5-10 mL  5-10 mL IntraVENous PRN Suraj Atkinson MD        amitriptyline (ELAVIL) tablet 25 mg  25 mg Oral QHS Blessing DONNELLY MD   25 mg at 03/24/22 2233    clonazePAM (KlonoPIN) tablet 1 mg  1 mg Oral BID PRN Mary Calvert MD        clotrimazole-betamethasone (LOTRISONE) 1-0.05 % cream   Topical BID PRN Mary Calvert MD        colestipoL (COLESTID) tablet 1 g- Pt supplied (Patient Supplied)  1 g Oral BID Mary Calvert MD        escitalopram oxalate (LEXAPRO) tablet 30 mg  30 mg Oral DAILY Blessing DONNELLY MD   30 mg at 03/25/22 9449    fluticasone propionate (FLONASE) 50 mcg/actuation nasal spray 2 Spray  2 Spray Both Nostrils DAILY Mary Calvert MD   2 [place] at 03/25/22 0813    nitrofurantoin (MACRODANTIN) capsule 50 mg  50 mg Oral DAILY Blessing DONNELLY MD   50 mg at 03/25/22 5972    pantoprazole (PROTONIX) tablet 40 mg  40 mg Oral ACB Blessing DONNELLY MD   40 mg at 03/25/22 7973    pramipexole (MIRAPEX) tablet 0.5 mg  0.5 mg Oral DAILY Blessing DONNELLY MD   0.5 mg at 03/25/22 1372    levothyroxine (SYNTHROID) tablet 150 mcg  150 mcg Oral ACB Blessing DONNELLY MD   150 mcg at 03/25/22 0646    sodium chloride (NS) flush 5-40 mL  5-40 mL IntraVENous Q8H Blessing DONNELLY MD   10 mL at 03/25/22 0646    sodium chloride (NS) flush 5-40 mL  5-40 mL IntraVENous PRN Mary Calvert MD        0.9% sodium chloride infusion  75 mL/hr IntraVENous CONTINUOUS Blessing DONNELLY MD 75 mL/hr at 03/25/22 1035 75 mL/hr at 03/25/22 1035    aspirin tablet 325 mg 325 mg Oral DAILY Honey Torres MD   325 mg at 03/25/22 8768    clopidogreL (PLAVIX) tablet 75 mg  75 mg Oral DAILY Honey Torres MD   75 mg at 03/25/22 3370    atorvastatin (LIPITOR) tablet 40 mg  40 mg Oral QHS Misha DONNELLY MD   40 mg at 03/24/22 2232    heparin (porcine) injection 5,000 Units  5,000 Units SubCUTAneous Q8H Misha DONNELLY MD   5,000 Units at 03/25/22 0864    lisinopriL (PRINIVIL, ZESTRIL) tablet 10 mg  10 mg Oral DAILY Honey Torres MD   10 mg at 03/25/22 5316    And    hydroCHLOROthiazide (MICROZIDE) capsule 12.5 mg  12.5 mg Oral DAILY Honey Torres MD   12.5 mg at 03/25/22 3215        Allergies   Allergen Reactions    Fire Ant Anaphylaxis    Adhesive Rash and Itching     \"sticky ace wrap\"    Amoxicillin Other (comments)     C-Diff    Betadine [Povidone-Iodine] Other (comments)     Mcgrath    Cephalexin Other (comments)     C-Diff    Demerol [Meperidine] Rash    Hydromorphone Other (comments)     Keeps her awake    Morphine Other (comments)     Awake    Percocet [Oxycodone-Acetaminophen] Other (comments)     Jittery    Percodan [Oxycodone Hcl-Oxycodone-Asa] Other (comments)     Jittery       Review of Systems:  CONSTITUTIONAL: No weight loss, fever, chills, but positive generalized weakness and fatigue  HEENT: Eyes: No visual loss, blurred vision, double vision or yellow sclerae. Ears, Nose, Throat: No hearing loss, sneezing, congestion, runny nose or sore throat. SKIN: No rash or itching. CARDIOVASCULAR: No chest pain, chest pressure or chest discomfort. No palpitations or edema. RESPIRATORY: No shortness of breath, cough or sputum. GASTROINTESTINAL: No anorexia, nausea, vomiting or diarrhea. No abdominal pain or blood. GENITOURINARY: no burning with urination. NEUROLOGICAL: Positive headaches and dizziness. MUSCULOSKELETAL: No muscle, back pain, joint pain or stiffness. HEMATOLOGIC: No anemia, bleeding or bruising. LYMPHATICS: No enlarged nodes.  No history of splenectomy. ENDOCRINOLOGIC: No reports of sweating, cold or heat intolerance. No polyuria or polydipsia. ALLERGIES: No history of asthma, hives, eczema or rhinitis. Objective:     Vitals:    03/25/22 0655 03/25/22 0800 03/25/22 0806 03/25/22 1050   BP: (!) 140/58   138/61   Pulse: 75 (!) 102  98   Resp: 16   16   Temp: 98 °F (36.7 °C)   98.3 °F (36.8 °C)   SpO2: 96%  94% 94%   Weight:       Height:            Physical Exam:  General - Well developed, well nourished, in no apparent distress. Pleasant and conversant. HEENT - Normocephalic, atraumatic. Neck -No masses   Lungs - Normal work of breathing   Abdomen - No masses   Extremities - No edema and no rashes. Psychiatric - Mood and affect are normal    Neurological examination - Comprehension, attention , memory and reasoning are intact. Language and speech are normal. On cranial nerve examination pupils are equal round and reactive to light (cranial nerve II and III). Visual acuity is adequate (cranial nerve II). Visual fields are full to finger confrontation (CN II). Extraocular motility is normal (CN III, IV, VI). Face is symmetric (CN VII). Hearing is grossly intact to verbal communication (CN VIII). Motor examination - There is normal bulk. Power is full throughout (with spontaneous movement against environmental objects). Cerebellar examination is normal with finger-no-nose testing.  Gait and stance are normal.     NIHSS   NIHSS Score: 0  1a-Level of Consciousness 0  1b-What is Month/Age 0  1c-Open/Close Eyes&Hand 0  2 -Best Gaze 0  3 -Visual Fields 0  4 -Facial Palsy 0  5a-Motor-Left Arm 0  5b-Motor-Right Arm 0  6a-Motor-Left Leg 0  6b-Motor-Right Leg 0  7 -Limb Ataxia 0  8 -Sensory 0  9 -Best Language 0  10-Dysarthria 0  11-Extinction/Inattention 0      Lab Results   Component Value Date/Time    Cholesterol, total 194 03/25/2022 06:13 AM    HDL Cholesterol 48 03/25/2022 06:13 AM    LDL, calculated 108.6 (H) 03/25/2022 06:13 AM VLDL, calculated 37.4 (H) 03/25/2022 06:13 AM    Triglyceride 187 (H) 03/25/2022 06:13 AM    CHOL/HDL Ratio 4.0 03/25/2022 06:13 AM        Lab Results   Component Value Date/Time    Hemoglobin A1c 5.9 03/25/2022 06:13 AM        CT Results (most recent): Personally Reviewed   Results from Hospital Encounter encounter on 03/24/22    CT PERF W CBF    Narrative  CT Perfusion Imaging    INDICATION:  Acute stroke evaluation with visual changes, imbalance, headache    COMPARISON: Noncontrast CT earlier today    CT perfusion imaging of the brain was performed after the administration of 100  mL Isovue-370 intravenous contrast.  Perfusion maps and perfusion analysis  output were generated using the RAPID perfusion processing software algorithm. Radiation dose reduction techniques were used for this study: All CT scans  performed at this facility use one or all of the following: Automated exposure  control, adjustment of the mA and/or kVp according to patient's size, iterative  reconstruction. FINDINGS: Small areas of hypoperfusion involving the right parietal lobe, left  temporal lobe are noted and of unclear but doubtful significance and could be  artifactual.    RAPID Output Values:    CBF < 30% volume:  0 ml  (core infarction volume greater than 50 cc associated  with poor outcomes)  Tmax > 6 seconds: 0 ml  Tmax/CBF Mismatch Volume: 0 ml  Tmax/CBF Mismatch Ratio: Not available  Hypoperfusion Intensity Ratio: 0   (values greater than 0.5 associated with poor  outcome)  Tmax > 10 seconds Volume: 0 ml   (volume greater than 100 mL is associated with  poor outcome)    Impression  No CT perfusion evidence of acute intracranial abnormality. Please note that the determination of patient treatment is not based solely upon  imaging factors or calculation values.  Management of ischemia is at the  discretion of the primary physician and is based upon a combination of clinical  and imaging data, along other factors. Results for orders placed or performed during the hospital encounter of 03/24/22   EKG, 12 LEAD, INITIAL   Result Value Ref Range    Ventricular Rate 62 BPM    Atrial Rate 62 BPM    P-R Interval 160 ms    QRS Duration 64 ms    Q-T Interval 414 ms    QTC Calculation (Bezet) 420 ms    Calculated P Axis 81 degrees    Calculated R Axis -18 degrees    Calculated T Axis 38 degrees    Diagnosis       Sinus rhythm with frequent Premature ventricular complexes  Cannot rule out Anterior infarct , age undetermined  When compared with ECG of 04-JAN-2022 13:36,  Premature ventricular complexes are now Present  Confirmed by Saida Min MD, Jeramy Yoder ([id number]) on 3/25/2022 5:54:22 AM     Results for orders placed or performed in visit on 09/14/20   AMB POC EKG ROUTINE W/ 12 LEADS, INTER & REP    Narrative    Atrial  Rhythm, possible wandering atrial pacemaker  P:QRS - 1:1, Abnormal P axis, H Rate 72  Low voltage in limb leads.    -  Nonspecific T-abnormality. MRI Results (most recent): Personally Reviewed  Results from Hospital Encounter encounter on 03/21/20    MRI BRAIN WO CONT    Narrative  MRI BRAIN WITHOUT CONTRAST 3/21/2020    HISTORY: 69year-old with double vision, loss of balance, dizziness and memory  loss intermittent x4 months    TECHNIQUE: Sagittal and axial T1-weighted, axial T2-weighted, axial and coronal  FLAIR, axial T2-weighted gradient-echo, axial diffusion weighted images with ADC  maps of the brain. COMPARISON: None    FINDINGS: There is no acute infarction, acute intracranial hemorrhage,  intra-axial mass, hydrocephalus, or extra-axial hematoma. On the T2-weighted and FLAIR sequences, there are a few scattered punctate white  matter hyperintensities. This is not an uncommon finding which may be present  with asymptomatic patients, with migraine headaches or with mild chronic small  vessel ischemic disease.   The mastoid air cells and paranasal sinuses are clear where imaged. Impression  IMPRESSION: Scattered occasional punctate white matter hyperintensities present  as described. This pattern may be present with mild chronic small vessel  ischemic disease or with migraine headaches. Most recent CTA Personally Reviewed      I reviewed the patient's MRI from today. No acute ischemia seen      Assessment:     20-year-old woman presenting with dizziness and word finding difficulties. She is followed with neurology as an outpatient for chronic dizziness, among other issues. Neuroimaging including CT, CTA, CTP, and MRI of the brain are all reassuring. I do not think that this was a vascular event, including TIA or stroke    Plan:     I do not see evidence of ischemia on MRI. I do not think that this was a TIA or stroke. Radiology report pending. Continue primary stroke prevention.   No further recommendations    Signed By: Marilee Wright DO     March 25, 2022

## 2022-03-25 NOTE — DISCHARGE SUMMARY
Hospitalist Discharge Summary   Admit Date:  3/24/2022  4:04 PM   DC Note date: 3/25/2022  Name:  Teofilo Landaverde   Age:  77 y.o. Sex:  female  :  1945   MRN:  [id number]   Room:  [facility]  PCP:  Golden Alonzo NP    Presenting Complaint: Visual Problems and Headache    Initial Admission Diagnosis: CVA (cerebral vascular accident) [facility]) [I63.9]     Problem List for this Hospitalization:  Hospital Problems as of 3/25/2022 Date Reviewed: 3/25/2022          Codes Class Noted - Resolved POA    * (Principal) Expressive aphasia ICD-10-CM: R47.01  ICD-9-CM: 784.3  3/24/2022 - Present Yes        Class 3 obesity with alveolar hypoventilation, serious comorbidity, and body mass index (BMI) of 40.0 to 44.9 in adult [facility]) ICD-10-CM: E66.2, Z68.41  ICD-9-CM: 278.03, V85.41  2020 - Present Yes        Obesity hypoventilation syndrome ([address] ICD-10-CM: R88.2  ICD-9-CM: 278.03  3/14/2019 - Present Yes    Overview Signed 3/14/2019  1:50 PM by Gael Guidry MD     Confirmed w/PFT's 2019             Hashimoto's thyroiditis ICD-10-CM: E06.3  ICD-9-CM: 245.2  3/5/2019 - Present Yes        BONNIE (generalized anxiety disorder) ICD-10-CM: F41.1  ICD-9-CM: 300.02  10/9/2018 - Present Yes        HLD (hyperlipidemia) ICD-10-CM: E78.5  ICD-9-CM: 272.4  2017 - Present Yes    Overview Signed 2021  8:30 PM by Golden Alonzo NP     Formatting of this note might be different from the original.  Last Assessment & Plan:    Problem and/or symptoms are currently not stable and/or worsening on current treatment plan. Will have patient follow up as directed and make the following changes for further evaluation and/or treatment:   Recent patient's Lipitor dose to 40 mg daily since there is very little benefit in a 80 mg dose but a much higher incidence of adverse side effects.              Essential hypertension ICD-10-CM: I10  ICD-9-CM: 401.9  2017 - Present Yes    Overview Signed 2021  8:30 PM by Tia Morris, Toyin Starks NP     Formatting of this note might be different from the original.  Last Assessment & Plan:    Problem and/or Symptoms are currently stable and/or improving on current treatment plan. Will continue and have patient follow up as directed. Hypothyroidism (acquired) ICD-10-CM: E03.9  ICD-9-CM: 244.9  12/6/2017 - Present Yes    Overview Signed 8/14/2021  8:30 PM by Rikki Poon NP     Formatting of this note might be different from the original.  Last Assessment & Plan:    Problem and/or symptoms are currently not stable and/or worsening on current treatment plan. Will have patient follow up as directed and make the following changes for further evaluation and/or treatment:   Patient states whenever she has been placed on generic Synthroid that her symptoms have worsened markedly of hypothyroidism. Will send a new prescription today with notes to pharmacy for them to fill brand name Synthroid only and follow-up response next scheduled office visit. Anemia, iron deficiency ICD-10-CM: D50.9  ICD-9-CM: 280.9  12/6/2017 - Present Yes        PAD (peripheral artery disease) (HCC) ICD-10-CM: I73.9  ICD-9-CM: 443.9  12/6/2017 - Present Yes    Overview Signed 8/14/2021  8:30 PM by Rikki Poon NP     Formatting of this note might be different from the original.  Last Assessment & Plan:   Problem and/or Symptoms are new to provider. Will have patient follow up as directed and make the following plan for further evaluation and/or treatlment:  Referred patient to local vascular specialist at her request.                 Did Patient have Sepsis (YES OR NO): No    Hospital Course:  76F PMHx hyperlipidemia, obesity hypoventilation syndrome, polyneuropathy who presented to the ER with report of dizziness and blurry vision starting around noon. Reported feeling like she's leaning to the left. Was found to have /90 while at PT appointment on day of admission.  Also admited to headache. Denied difficulty with speech or swallowing. Denied any fevrs, chills, nausea, vomiting. She has some history of visual changes and dizziness is seen by outpatient neurology. She had expressive aphasia. Code stroke was called resulting in negative CT, negative MRI negative echocardiogram and recommendation by neurology for outpatient follow-up. She was determined to be safe for discharge on 3/25. She should follow-up with her neurologist as scheduled. She should follow-up with primary care within 7 days. Primary care may consider the following:  -Resolution of expressive aphasia  -Adherence to outpatient physical therapy, Occupational Therapy and speech therapy  -Plan for follow-up with neurology    Disposition: Home Health Care Sv  Diet: ADULT DIET Regular  Code Status: Full Code    Follow Up Orders: Follow-up Appointments   Procedures    FOLLOW UP VISIT Appointment in: One Week Follow-up with primary care provider within 1 week. Follow-up with neurology as scheduled. Follow-up with primary care provider within 1 week. Follow-up with neurology as scheduled. Standing Status:   Standing     Number of Occurrences:   1     Order Specific Question:   Appointment in     Answer: One Week       Follow-up Information     Follow up With Specialties Details Why Contact Info    Domonique Stuart NP Internal Medicine   [facility]  [address] [place], [place] [phone]          Time spent in patient discharge and coordination 34 minutes. Plan was discussed with patient, nurse, , neurology. All questions answered. Patient was stable at time of discharge. Instructions given to call a physician or return if any concerns. Discharge Info:   Current Discharge Medication List      CONTINUE these medications which have NOT CHANGED    Details   pramipexole (Mirapex) 0.5 mg tablet Take 1 Tablet by mouth daily.   Qty: 90 Tablet, Refills: 3    Associated Diagnoses: RLS (restless legs syndrome)      omeprazole (PRILOSEC) 40 mg capsule Take 1 Capsule by mouth daily. Qty: 90 Capsule, Refills: 3    Associated Diagnoses: Gastroesophageal reflux disease, unspecified whether esophagitis present      diclofenac EC (VOLTAREN) 75 mg EC tablet Take 1 tablet by mouth twice a day as needed  Qty: 60 Tablet, Refills: 0    Associated Diagnoses: Total knee replacement status, left      methocarbamoL (ROBAXIN) 750 mg tablet Take 1 Tablet by mouth every six (6) hours as needed for Muscle Spasm(s). Take 1 tablet by mouth every 6 hours as needed  Qty: 40 Tablet, Refills: 0    Associated Diagnoses: Total knee replacement status, left      clonazePAM (KlonoPIN) 1 mg tablet Take 1 Tablet by mouth two (2) times daily as needed for Anxiety. Max Daily Amount: 2 mg. Qty: 60 Tablet, Refills: 0    Associated Diagnoses: BONNIE (generalized anxiety disorder)      lisinopril-hydroCHLOROthiazide (PRINZIDE, ZESTORETIC) 10-12.5 mg per tablet Take 1 Tablet by mouth daily. Qty: 90 Tablet, Refills: 3    Associated Diagnoses: Hypertension, unspecified type; On potassium wasting diuretic therapy      acetaminophen (TYLENOL) 325 mg tablet Take 325 mg by mouth every four (4) hours as needed (breakthrough pain). ergocalciferol (ERGOCALCIFEROL) 1,250 mcg (50,000 unit) capsule Take 1 Capsule by mouth every seven (7) days. Indications: vitamin D deficiency (high dose therapy)  Qty: 6 Capsule, Refills: 1    Associated Diagnoses: Vitamin D deficiency      escitalopram oxalate (LEXAPRO) 20 mg tablet Take 1.5 Tablets by mouth daily. Qty: 135 Tablet, Refills: 3    Associated Diagnoses: BONNIE (generalized anxiety disorder); Episode of recurrent major depressive disorder, unspecified depression episode severity (HCC)      naloxone (NARCAN) 4 mg/actuation nasal spray Use 1 spray intranasally, then discard. Repeat with new spray every 2 min as needed for opioid overdose symptoms, alternating nostrils.   Indications: opioid overdose  Qty: 2 Each, Refills: 0      cyanocobalamin (VITAMIN B12) 1,000 mcg/mL injection 1,000 mcg by IntraMUSCular route every month. nitrofurantoin (MACRODANTIN) 50 mg capsule Take 1 Capsule by mouth daily. Indications: bacterial urinary tract infection  Qty: 90 Capsule, Refills: 0    Associated Diagnoses: Recurrent UTI      colestipoL (COLESTID) 1 gram tablet Take 1 Tablet by mouth two (2) times a day. Qty: 360 Tablet, Refills: 1    Associated Diagnoses: Irritable bowel syndrome with diarrhea      Synthroid 150 mcg tablet Take 1 Tablet by mouth Daily (before breakfast). DAW1  Qty: 30 Tablet, Refills: 11    Associated Diagnoses: Primary hypothyroidism      amitriptyline (ELAVIL) 25 mg tablet Take 25 mg by mouth nightly. For IBS/colitis      ondansetron hcl (ZOFRAN) 4 mg tablet Take 1 Tablet by mouth every eight (8) hours as needed for Nausea. Qty: 90 Tablet, Refills: 0    Associated Diagnoses: Chronic nausea      fluticasone propionate (FLONASE) 50 mcg/actuation nasal spray 2 Sprays by Both Nostrils route nightly. Indications: inflammation of the nose due to an allergy  Qty: 3 Bottle, Refills: 3    Associated Diagnoses: Post-nasal drainage; Allergic rhinitis, unspecified seasonality, unspecified trigger      hyoscyamine sulfate (CYSTOSPAZ) 0.125 mg tablet Take 1 Tab by mouth every four (4) hours as needed for Cramping or Nausea. Indications: irritable colon  Qty: 60 Tab, Refills: 1    Associated Diagnoses: Left upper quadrant abdominal pain; Irritable bowel syndrome with diarrhea      clotrimazole-betamethasone (LOTRISONE) topical cream Apply  to affected area two (2) times a day. Until rash resolves  Qty: 45 g, Refills: 2    Associated Diagnoses: Allergic dermatitis; Rash      albuterol (PROVENTIL HFA, VENTOLIN HFA, PROAIR HFA) 90 mcg/actuation inhaler Take 1-2 Puffs by inhalation every six (6) hours as needed for Wheezing.   Qty: 1 Inhaler, Refills: 2    Associated Diagnoses: Wheezing; SOB (shortness of breath)             Procedures done this admission:  * No surgery found *    Consults this admission:  IP CONSULT TO PHYSIATRIST(REHAB MEDICINE)  IP CONSULT TO NEUROLOGY    Echocardiogram/EKG results:  No results found for this or any previous visit. EKG Results     Procedure 720 Value Units Date/Time    EKG 12 LEAD INITIAL [[id number]] Collected: 03/24/22 1609    Order Status: Completed Updated: 03/25/22 0554     Ventricular Rate 62 BPM      Atrial Rate 62 BPM      P-R Interval 160 ms      QRS Duration 64 ms      Q-T Interval 414 ms      QTC Calculation (Bezet) 420 ms      Calculated P Axis 81 degrees      Calculated R Axis -18 degrees      Calculated T Axis 38 degrees      Diagnosis --     Sinus rhythm with frequent Premature ventricular complexes  Cannot rule out Anterior infarct , age undetermined  When compared with ECG of 04-JAN-2022 13:36,  Premature ventricular complexes are now Present  Confirmed by Taisha Browning MD, [facility] ([id number]) on 3/25/2022 5:54:22 AM      Initial ECG [[id number]]     Order Status: Completed           Diagnostic Imaging/Tests:   MRI BRAIN WO CONT    Result Date: 3/25/2022  EXAMINATION: BRAIN MRI 3/25/2022 12:40 PM ACCESSION NUMBER: [id number] INDICATION: 71-year-old female with visual changes, imbalance, headache. Concern for stroke. COMPARISON: CT head, CTA head and CT perfusion study 3/24/2022. MRI brain 3/21/2020 TECHNIQUE: Multiplanar multisequence MRI of the brain without the administration of intravenous contrast. FINDINGS: Mild diffuse parenchymal volume loss is unchanged. Again seen is a minimal degree of scattered foci of T2/FLAIR hyperintensity within the subcortical and periventricular white matter, nonspecific but most commonly seen with chronic small vessel ischemic changes and within normal limits for patient age. No midline shift or mass lesion. There is no evidence of intracranial hemorrhage or acute infarct.  The ventricles are within normal limits in size and configuration. There are no extra-axial fluid collections present. No diffusion weighted signal abnormality is identified. Unremarkable MRI of the brain for age. CT PERF W CBF    Result Date: 3/24/2022  CT Perfusion Imaging INDICATION:  Acute stroke evaluation with visual changes, imbalance, headache COMPARISON: Noncontrast CT earlier today CT perfusion imaging of the brain was performed after the administration of 100 mL Isovue-370 intravenous contrast.  Perfusion maps and perfusion analysis output were generated using the RAPID perfusion processing software algorithm. Radiation dose reduction techniques were used for this study: All CT scans performed at this facility use one or all of the following: Automated exposure control, adjustment of the mA and/or kVp according to patient's size, iterative reconstruction. FINDINGS: Small areas of hypoperfusion involving the right parietal lobe, left temporal lobe are noted and of unclear but doubtful significance and could be artifactual. RAPID Output Values: CBF < 30% volume:  0 ml  (core infarction volume greater than 50 cc associated with poor outcomes) Tmax > 6 seconds: 0 ml Tmax/CBF Mismatch Volume: 0 ml Tmax/CBF Mismatch Ratio: Not available Hypoperfusion Intensity Ratio: 0   (values greater than 0.5 associated with poor outcome) Tmax > 10 seconds Volume: 0 ml   (volume greater than 100 mL is associated with poor outcome)     No CT perfusion evidence of acute intracranial abnormality. Please note that the determination of patient treatment is not based solely upon imaging factors or calculation values. Management of ischemia is at the discretion of the primary physician and is based upon a combination of clinical and imaging data, along other factors. CTA CODE NEURO HEAD AND NECK W CONT    Result Date: 3/24/2022  Title:  CT arteriogram of the neck and head. Indication: Headache. Visual changes.  Technique: Axial images of the neck and head were obtained after the uneventful administration of intravenous iodinated contrast media. Contrast was used to best identify the arterial structures. Images were reviewed on a separate, free standing, three-dimensional workstation as per the referring physicians request.  All stenosis percentages derived by comparing the narrowest segment with the distal Internal Carotid Artery luminal diameter, as described in the [place] [employer] Symptomatic Carotid Endarterectomy Trial (NASCET) criteria. All CT scans at this facility are performed using dose reduction/dose modulation techniques, as appropriate the performed exam, including the following: Automated Exposure Control; Adjustment of the mA and/or kV according to patient size (this includes techniques or standardized protocols for targeted exams where dose is matched to indication/reason for exam); and Use of Iterative Reconstruction Technique. The study was analyzed by the [address] ai algorithm. Comparison: None. Findings: Lungs:  No focal consolidation or pleural effusions. No suspicious pulmonary nodules. .  Bones:  No osseous destruction. Moderate multilevel degenerative changes in the cervical spine. This is most pronounced at C5-C6 where there is osseous fusion of the vertebral bodies. Paranasal sinuses:  Paranasal sinuses are clear. .  Brain:  No midline shift. No enhancing mass lesion or hydrocephalus. .  Soft tissues:  Within normal limits. .  Dural venous sinuses:  Patent. Aortic arch:  Non-aneurysmal. Arch vessels are patent. Moderate atherosclerotic changes. ANTERIOR CIRCULATION Right common carotid artery:  No significant stenosis or occlusion. Right internal carotid artery:  No significant stenosis or occlusion. Vascular calcifications along the carotid siphon. Right middle cerebral artery:  No significant stenosis, occlusion, or aneurysm. Right anterior cerebral artery:  No significant stenosis, occlusion, or aneurysm.  Left common carotid artery: No significant stenosis or occlusion. Left internal carotid artery:  No significant stenosis or occlusion in the left ICA. Moderate atherosclerotic changes of the left carotid bulb with mild, approximately 30% narrowing in the carotid bulb. High-grade stenosis at the origin of the left external carotid artery. Vascular calcifications along the carotid siphon. Left middle cerebral artery:  No significant stenosis, occlusion, or aneurysm. Left anterior cerebral artery:  No significant stenosis, occlusion, or aneurysm. Anterior communicating artery: No significant stenosis, occlusion, or aneurysm. POSTERIOR CIRCULATION Right vertebral artery:  No significant stenosis or occlusion. Right vertebral artery is dominant. Left vertebral artery:  Diminutive left vertebral artery. Basilar artery:  No significant stenosis, occlusion, or aneurysm. Right posterior communicating artery: No significant stenosis, occlusion, or aneurysm. Left posterior communicating artery:  No significant stenosis, occlusion, or aneurysm. Right posterior cerebral artery:  No significant stenosis, occlusion, or aneurysm. Left posterior cerebral artery:  No significant stenosis, occlusion, or aneurysm. No acute large vessel occlusion or high-grade stenosis. CT CODE NEURO HEAD WO CONTRAST    Result Date: 3/24/2022  Exam: CT CODE NEURO HEAD WO CONTRAST on 3/24/2022 4:20 PM Clinical History: The Female patient is 77years old  presenting for r/o stroke, balance problem vision changes. Technique: Thin slice axial CT images through the brain were obtained. All CT scans at this facility are performed using dose reduction/dose modulation techniques, as appropriate the performed exam, including the following: Automated Exposure Control; Adjustment of the mA and/or kV according to patient size (this includes techniques or standardized protocols for targeted exams where dose is matched to indication/reason for exam); and Use of Iterative Reconstruction Technique. Radiation Exposure Indices: Reference Air Kerma (Ka,r) = 1091 mGy-cm Comparison:  None. Findings:  Cerebrum: Age-related senescent changes are seen with sulcal and ventricular prominence. . There is normal gray-white matter differentiation. No evidence of intracranial hemorrhage, mass, or other space-occupying lesion is seen. There are no abnormal extra-axial fluid collections. Cerebellum: Involutional changes are demonstrated. CSF spaces: The ventricular system is within normal limits. The basilar cisterns are unremarkable. Brainstem: No evidence of ischemia, hemorrhage, or mass. Extracranial tissues: Visualized orbits and extracranial soft tissues are unremarkable. Paranasal sinuses/Mastoids: Well-pneumatized and aerated. . Calvarium: No acute osseous abnormality. 1.  No acute intracranial abnormality.  CPT code 82530       All Micro Results     None          Labs: Results:       BMP, Mg, Phos Recent Labs     03/25/22  0613 03/24/22  1610    135*   K 4.5 4.7    101   CO2 32 31   AGAP 1* 3*   BUN 32* 29*   CREA 1.67* 1.60*   CA 8.6 9.0   * 89      CBC Recent Labs     03/25/22  0613 03/24/22  1610   WBC 10.4 11.3*   RBC 3.95* 4.40   HGB 11.4* 12.4   HCT 35.6* 39.7    298   GRANS  --  64   LYMPH  --  25   EOS  --  4   MONOS  --  6   BASOS  --  1   IG  --  0   ANEU  --  7.3   ABL  --  2.8   WILLIAM  --  0.4   ABM  --  0.7   ABB  --  0.1   AIG  --  0.0      LFT Recent Labs     03/24/22  1610   ALT 19   AP 89   TP 8.3*   ALB 3.7   GLOB 4.6*   AGRAT 0.8*      Cardiac Testing No results found for: BNPP, BNP, CPK, RCK1, RCK2, RCK3, RCK4, CKMB, CKNDX, CKND1, TROPT, TROIQ   Coagulation Tests Lab Results   Component Value Date/Time    Prothrombin time 12.3 (L) 01/04/2022 01:30 PM    Prothrombin time 13.8 03/27/2021 08:58 AM    INR 0.9 01/04/2022 01:30 PM    INR 1.0 03/27/2021 08:58 AM    INR (POC) 1.1 03/24/2022 04:09 PM    aPTT 29.5 01/04/2022 01:30 PM      A1c Lab Results   Component Value Date/Time Hemoglobin A1c 5.9 03/25/2022 06:13 AM    Hemoglobin A1c 5.6 01/04/2022 01:30 PM      Lipid Panel Lab Results   Component Value Date/Time    Cholesterol, total 194 03/25/2022 06:13 AM    HDL Cholesterol 48 03/25/2022 06:13 AM    LDL, calculated 108.6 (H) 03/25/2022 06:13 AM    VLDL, calculated 37.4 (H) 03/25/2022 06:13 AM    Triglyceride 187 (H) 03/25/2022 06:13 AM    CHOL/HDL Ratio 4.0 03/25/2022 06:13 AM      Thyroid Panel Lab Results   Component Value Date/Time    TSH 1.040 10/14/2021 03:26 PM    TSH 1.590 08/16/2021 09:19 AM    T4, Free 1.31 10/14/2021 03:26 PM    T4, Free 1.71 08/16/2021 08:32 AM        Most Recent UA Lab Results   Component Value Date/Time    Color Orange 09/21/2021 03:59 PM    Appearance Clear 09/21/2021 03:59 PM    pH (UA) 5.0 09/21/2021 03:59 PM    Ketone Negative 09/21/2021 03:59 PM    Bilirubin Negative 09/21/2021 03:59 PM    Blood Negative 09/21/2021 03:59 PM    Nitrites Positive (A) 09/21/2021 03:59 PM    Leukocyte Esterase 2+ (A) 09/21/2021 03:59 PM    WBC 11-30 (A) 09/21/2021 03:59 PM    RBC 3-10 (A) 09/21/2021 03:59 PM    Epithelial cells None seen 09/21/2021 03:59 PM    Bacteria None seen 09/21/2021 03:59 PM    Mucus Present 09/08/2020 08:43 AM          All Labs from Last 24 Hrs:  Recent Results (from the past 24 hour(s))   GLUCOSE, POC    Collection Time: 03/24/22  3:58 PM   Result Value Ref Range    Glucose (POC) 80 65 - 100 mg/dL    Performed by Mauricio    POC PT/INR    Collection Time: 03/24/22  4:09 PM   Result Value Ref Range    Prothrombin time (POC) 13.2 (H) 9.6 - 11.6 SECS    INR (POC) 1.1 0.9 - 1.2     EKG, 12 LEAD, INITIAL    Collection Time: 03/24/22  4:09 PM   Result Value Ref Range    Ventricular Rate 62 BPM    Atrial Rate 62 BPM    P-R Interval 160 ms    QRS Duration 64 ms    Q-T Interval 414 ms    QTC Calculation (Bezet) 420 ms    Calculated P Axis 81 degrees    Calculated R Axis -18 degrees    Calculated T Axis 38 degrees    Diagnosis       Sinus rhythm with frequent Premature ventricular complexes  Cannot rule out Anterior infarct , age undetermined  When compared with ECG of 04-JAN-2022 13:36,  Premature ventricular complexes are now Present  Confirmed by Adolph Landa MD, [facility] ([id number]) on 3/25/2022 5:54:22 AM     CBC WITH AUTOMATED DIFF    Collection Time: 03/24/22  4:10 PM   Result Value Ref Range    WBC 11.3 (H) 4.3 - 11.1 K/uL    RBC 4.40 4.05 - 5.2 M/uL    HGB 12.4 11.7 - 15.4 g/dL    HCT 39.7 35.8 - 46.3 %    MCV 90.2 79.6 - 97.8 FL    MCH 28.2 26.1 - 32.9 PG    MCHC 31.2 (L) 31.4 - 35.0 g/dL    RDW 15.0 (H) 11.9 - 14.6 %    PLATELET 755 106 - 985 K/uL    MPV 10.5 9.4 - 12.3 FL    ABSOLUTE NRBC 0.00 0.0 - 0.2 K/uL    DF AUTOMATED      NEUTROPHILS 64 43 - 78 %    LYMPHOCYTES 25 13 - 44 %    MONOCYTES 6 4.0 - 12.0 %    EOSINOPHILS 4 0.5 - 7.8 %    BASOPHILS 1 0.0 - 2.0 %    IMMATURE GRANULOCYTES 0 0.0 - 5.0 %    ABS. NEUTROPHILS 7.3 1.7 - 8.2 K/UL    ABS. LYMPHOCYTES 2.8 0.5 - 4.6 K/UL    ABS. MONOCYTES 0.7 0.1 - 1.3 K/UL    ABS. EOSINOPHILS 0.4 0.0 - 0.8 K/UL    ABS. BASOPHILS 0.1 0.0 - 0.2 K/UL    ABS. IMM. GRANS. 0.0 0.0 - 0.5 K/UL   TROPONIN-HIGH SENSITIVITY    Collection Time: 03/24/22  4:10 PM   Result Value Ref Range    Troponin-High Sensitivity 12.2 0 - 14 pg/mL   METABOLIC PANEL, COMPREHENSIVE    Collection Time: 03/24/22  4:10 PM   Result Value Ref Range    Sodium 135 (L) 136 - 145 mmol/L    Potassium 4.7 3.5 - 5.1 mmol/L    Chloride 101 98 - 107 mmol/L    CO2 31 21 - 32 mmol/L    Anion gap 3 (L) 7 - 16 mmol/L    Glucose 89 65 - 100 mg/dL    BUN 29 (H) 8 - 23 MG/DL    Creatinine 1.60 (H) 0.6 - 1.0 MG/DL    GFR est AA 40 (L) >60 ml/min/1.73m2    GFR est non-AA 33 (L) >60 ml/min/1.73m2    Calcium 9.0 8.3 - 10.4 MG/DL    Bilirubin, total 0.3 0.2 - 1.1 MG/DL    ALT (SGPT) 19 12 - 65 U/L    AST (SGOT) 13 (L) 15 - 37 U/L    Alk.  phosphatase 89 50 - 136 U/L    Protein, total 8.3 (H) 6.3 - 8.2 g/dL    Albumin 3.7 3.2 - 4.6 g/dL    Globulin 4.6 (H) 2.3 - 3.5 g/dL    A-G Ratio 0.8 (L) 1.2 - 3.5     CBC W/O DIFF    Collection Time: 03/25/22  6:13 AM   Result Value Ref Range    WBC 10.4 4.3 - 11.1 K/uL    RBC 3.95 (L) 4.05 - 5.2 M/uL    HGB 11.4 (L) 11.7 - 15.4 g/dL    HCT 35.6 (L) 35.8 - 46.3 %    MCV 90.1 79.6 - 97.8 FL    MCH 28.9 26.1 - 32.9 PG    MCHC 32.0 31.4 - 35.0 g/dL    RDW 15.2 (H) 11.9 - 14.6 %    PLATELET 603 108 - 155 K/uL    MPV 10.6 9.4 - 12.3 FL    ABSOLUTE NRBC 0.00 0.0 - 0.2 K/uL   LIPID PANEL    Collection Time: 03/25/22  6:13 AM   Result Value Ref Range    Cholesterol, total 194 MG/DL    Triglyceride 187 (H) 35 - 150 MG/DL    HDL Cholesterol 48 40 - 60 MG/DL    LDL, calculated 108.6 (H) <100 MG/DL    VLDL, calculated 37.4 (H) 6.0 - 23.0 MG/DL    CHOL/HDL Ratio 4.0 <200     HEMOGLOBIN A1C WITH EAG    Collection Time: 03/25/22  6:13 AM   Result Value Ref Range    Hemoglobin A1c 5.9 4.20 - 6.30 %    Est. average glucose 063 mg/dL   METABOLIC PANEL, BASIC    Collection Time: 03/25/22  6:13 AM   Result Value Ref Range    Sodium 136 136 - 145 mmol/L    Potassium 4.5 3.5 - 5.1 mmol/L    Chloride 103 98 - 107 mmol/L    CO2 32 21 - 32 mmol/L    Anion gap 1 (L) 7 - 16 mmol/L    Glucose 104 (H) 65 - 100 mg/dL    BUN 32 (H) 8 - 23 MG/DL    Creatinine 1.67 (H) 0.6 - 1.0 MG/DL    GFR est AA 38 (L) >60 ml/min/1.73m2    GFR est non-AA 32 (L) >60 ml/min/1.73m2    Calcium 8.6 8.3 - 10.4 MG/DL   ECHO ADULT COMPLETE    Collection Time: 03/25/22 10:18 AM   Result Value Ref Range    LA Minor Axis 5.3 cm    LA Major [place] 5.4 cm    LA Area 2C 16.0 cm2    LA Area 4C 16.7 cm2    LA Volume BP 40 22 - 52 mL    LA Diameter 3.2 cm    AV Mean Gradient 9 mmHg    AV VTI 38.8 cm    AV Mean Velocity 1.4 m/s    AV Peak Velocity 2.0 m/s    AV Peak Gradient 16 mmHg    AV Area by VTI 1.5 cm2    AV Area by Peak Velocity 1.6 cm2    Aortic Root 2.5 cm    Ascending Aorta 2.8 cm    IVC Expiration 1.7 cm    IVSd 1.1 (A) 0.6 - 0.9 cm    LVIDd 3.3 (A) 3.9 - 5.3 cm    LVIDs 1.9 cm    LVOT Diameter 1.6 cm    LVOT Mean Gradient 6 mmHg    LVOT VTI 28.2 cm    LVOT Peak Velocity 1.6 m/s    LVOT Peak Gradient 10 mmHg    LVPWd 1.1 (A) 0.6 - 0.9 cm    LV E' Lateral Velocity 8 cm/s    LV E' Septal Velocity 7 cm/s    LVOT Area 2.0 cm2    LVOT SV 56.7 ml    MV E Wave Deceleration Time 215.0 ms    MV A Velocity 1.34 m/s    MV E Velocity 0.89 m/s    RV Basal Dimension 3.3 cm    TAPSE 2.0 1.5 - 2.0 cm    Fractional Shortening 2D 42 28 - 44 %    LVIDd Index 1.84 cm/m2    LVIDs Index 1.06 cm/m2    LV RWT Ratio 0.67     LV Mass 2D 109.1 67 - 162 g    LV Mass 2D Index 61.0 43 - 95 g/m2    MV E/A 0.66     E/E' Ratio (Averaged) 11.92     E/E' Lateral 11.13     E/E' Septal 12.71     LA Volume Index BP 22 16 - 34 ml/m2    LVOT Stroke Volume Index 31.7 mL/m2    LA Size Index 1.79 cm/m2    LA/AO Root Ratio 1.28     Ao Root Index 1.40 cm/m2    Ascending Aorta Index 1.56 cm/m2    AV Velocity Ratio 0.80     LVOT:AV VTI Index 0.73     REGIS/BSA VTI 0.8 cm2/m2    REGIS/BSA Peak Velocity 0.9 cm2/m2       Current Med List in Hospital:   Current Facility-Administered Medications   Medication Dose Route Frequency    acetaminophen (TYLENOL) tablet 650 mg  650 mg Oral Q6H PRN    albuterol-ipratropium (DUO-NEB) 2.5 MG-0.5 MG/3 ML  3 mL Nebulization Q6H PRN    ondansetron (ZOFRAN) injection 4 mg  4 mg IntraVENous Q4H PRN    sodium chloride (NS) flush 5-10 mL  5-10 mL IntraVENous Q8H    sodium chloride (NS) flush 5-10 mL  5-10 mL IntraVENous PRN    amitriptyline (ELAVIL) tablet 25 mg  25 mg Oral QHS    clonazePAM (KlonoPIN) tablet 1 mg  1 mg Oral BID PRN    clotrimazole-betamethasone (LOTRISONE) 1-0.05 % cream   Topical BID PRN    colestipoL (COLESTID) tablet 1 g- Pt supplied (Patient Supplied)  1 g Oral BID    escitalopram oxalate (LEXAPRO) tablet 30 mg  30 mg Oral DAILY    fluticasone propionate (FLONASE) 50 mcg/actuation nasal spray 2 Spray  2 Spray Both Nostrils DAILY    nitrofurantoin (MACRODANTIN) capsule 50 mg  50 mg Oral DAILY    pantoprazole (PROTONIX) tablet 40 mg  40 mg Oral ACB    pramipexole (MIRAPEX) tablet 0.5 mg  0.5 mg Oral DAILY    levothyroxine (SYNTHROID) tablet 150 mcg  150 mcg Oral ACB    sodium chloride (NS) flush 5-40 mL  5-40 mL IntraVENous Q8H    sodium chloride (NS) flush 5-40 mL  5-40 mL IntraVENous PRN    0.9% sodium chloride infusion  75 mL/hr IntraVENous CONTINUOUS    aspirin tablet 325 mg  325 mg Oral DAILY    clopidogreL (PLAVIX) tablet 75 mg  75 mg Oral DAILY    atorvastatin (LIPITOR) tablet 40 mg  40 mg Oral QHS    heparin (porcine) injection 5,000 Units  5,000 Units SubCUTAneous Q8H    lisinopriL (PRINIVIL, ZESTRIL) tablet 10 mg  10 mg Oral DAILY    And    hydroCHLOROthiazide (MICROZIDE) capsule 12.5 mg  12.5 mg Oral DAILY       Allergies   Allergen Reactions    Fire Ant Anaphylaxis    Adhesive Rash and Itching     \"sticky ace wrap\"    Amoxicillin Other (comments)     C-Diff    Betadine [Povidone-Iodine] Other (comments)     Mcgrath    Cephalexin Other (comments)     C-Diff    Demerol [Meperidine] Rash    Hydromorphone Other (comments)     Keeps her awake    Morphine Other (comments)     Awake    Percocet [Oxycodone-Acetaminophen] Other (comments)     Jittery    Percodan [Oxycodone Hcl-Oxycodone-Asa] Other (comments)     Jittery     Immunization History   Administered Date(s) Administered    COVID-19, Pfizer Purple top, DILUTE for use, 12+ yrs, 30mcg/0.3mL dose 01/21/2021, 02/06/2021    Influenza High Dose Vaccine PF 09/11/2018, 10/18/2019, 08/25/2020    Influenza Vaccine 09/08/2020    Influenza, High-dose, Quadrivalent (>65 Yrs Fluzone High Dose Quad 00126) 08/25/2020    Pneumococcal Conjugate (PCV-13) 10/31/2019    Zoster Recombinant 09/08/2020       Recent Vital Data:  Patient Vitals for the past 24 hrs:   Temp Pulse Resp BP SpO2   03/25/22 1050 98.3 °F (36.8 °C) 98 16 138/61 94 %   03/25/22 0806     94 %   03/25/22 0800  (!) 102      03/25/22 0655 98 °F (36.7 °C) 75 16 (!) 140/58 96 %   03/25/22 0409     94 %   03/25/22 0312 97.6 °F (36.4 °C) 65 14 (!) 125/38 97 %   03/24/22 2355 97.7 °F (36.5 °C) 72 16 130/74 98 %   03/24/22 2200  73 18 (!) 135/108 95 %   03/24/22 2122  (!) 43 12 (!) 67/56 94 %   03/24/22 2030    (!) 149/67    03/24/22 2020  62  (!) 203/77    03/24/22 1929  62 22 (!) 203/77 96 %   03/24/22 1829  62 25 (!) 197/79 95 %   03/24/22 1637  64 23 (!) 174/73    03/24/22 1622  66 20 (!) 217/87 98 %   03/24/22 1558 98.2 °F (36.8 °C) (!) 59 19 (!) 206/105 98 %     Oxygen Therapy  O2 Sat (%): 94 % (03/25/22 1050)  Pulse via Oximetry: 74 beats per minute (03/25/22 0806)  O2 Device: None (Room air) (03/25/22 0806)    Estimated body mass index is 40.34 kg/m² as calculated from the following:    Height as of this encounter: 4' 10\" (1.473 m). Weight as of this encounter: 87.5 kg (193 lb). No intake or output data in the 24 hours ending 03/25/22 1434      Physical Exam  Vitals and nursing note reviewed. Constitutional:       General: She is not in acute distress. Appearance: Normal appearance. She is obese. She is not ill-appearing. HENT:      Head: Normocephalic. Eyes:      Extraocular Movements: Extraocular movements intact. Cardiovascular:      Rate and Rhythm: Normal rate. Pulses:           Radial pulses are 2+ on the left side. Pulmonary:      Effort: Pulmonary effort is normal. No respiratory distress. Musculoskeletal:         General: No deformity. Cervical back: No rigidity. Skin:     General: Skin is warm and dry. Neurological:      General: No focal deficit present. Mental Status: She is alert and oriented to person, place, and time.       Comments: Intermittent expressive aphasia   Psychiatric:         Mood and Affect: Mood normal.         Behavior: Behavior normal.         Cognition and Memory: Cognition and memory normal.         Judgment: Judgment normal.         Signed:  Juliann Mckeon MD

## 2022-03-25 NOTE — PROGRESS NOTES
Problem: Self Care Deficits Care Plan (Adult)  Goal: *Acute Goals and Plan of Care (Insert Text)  Outcome: Progressing Towards Goal  Note: 1. Patient will perform grooming with supervision. 2. Patient will perform Upper body dressing with supervision  3. Patient will perform lower body dressing with SBA. 4. Patient will perform upper and lower body bathing with SBA. 5. Patient will perform toilet transfers with SBA. 6. Patient will perform shower transfer with SBA. Tru [place] 7. Patient will participate in 30 + minutes of ADL/ therapeutic exercise/therapeutic activity with min rest breaks to increase activity tolerance for self care. 8. Patient will perform ADL functional mobility in room with SBA. Goals to be achieved in 7 days. OCCUPATIONAL THERAPY: Initial Assessment and AM 3/25/2022  OBSERVATION: OT Visit Days: 1  Payor: Moris Saenz / Plan: [address] / Product Type: Managed Care Medicare /      NAME/AGE/GENDER: Beryl Beckford is a 77 y.o. female   PRIMARY DIAGNOSIS:  CVA (cerebral vascular accident) ([facility] [address] [I63.9] CVA (cerebral vascular accident) ([facility] [address] CVA (cerebral vascular accident) ([facility] [address]        ICD-10: Treatment Diagnosis:    Generalized Muscle Weakness (M62.81)  Other lack of cordination (R27.8)  Difficulty in walking, Not elsewhere classified (R26.2)  Other abnormalities of gait and mobility (R26.89)   Precautions/Allergies:     Fire ant, Adhesive, Amoxicillin, Betadine [povidone-iodine], Cephalexin, Demerol [meperidine], Hydromorphone, Morphine, Percocet [oxycodone-acetaminophen], and Percodan [oxycodone hcl-oxycodone-asa]      ASSESSMENT:     Ms. Lamonte Templeton presents supine in bed. She read the clock and board on the wall without difficulty using B eyes and single eye vision. She reported she has word finding deficits that is not her baseline. She was SBA supine to sit. She needed some assistance with sock and shoe donning. Her  assists her at home.  She had a TKA 4 weeks ago and was going to out patient PT. Her  does not drive. Patient presents with symmetrical strength in B UE. She was able to tie lace and do snaps. She reported she feels shaky and this is not her baseline. She has had vision deficits for the past two years and some numbness in B hands and feet. She reported she is seeing a neurologist and suppose to have a nerve conduction test soon. She ambulated in the hallway with RW and min assist. She demonstrated increased confidence with mobility with distance. She returned to room and then supine in\ bed. She has testing today. She would benefit from skilled OT and  OT will follow. Nurse present in room. Will follow. This section established at most recent assessment   PROBLEM LIST (Impairments causing functional limitations):  Decreased Strength  Decreased ADL/Functional Activities  Decreased Transfer Abilities  Decreased Ambulation Ability/Technique  Decreased Balance   INTERVENTIONS PLANNED: (Benefits and precautions of occupational therapy have been discussed with the patient.)  Activities of daily living training  Adaptive equipment training  Balance training  Clothing management  Donning&doffing training  Hygiene training  Neuromuscular re-eduation  Therapeutic activity  Therapeutic exercise     TREATMENT PLAN: Frequency/Duration: Follow patient 3 times to address above goals. Rehabilitation Potential For Stated Goals: Good     REHAB RECOMMENDATIONS (at time of discharge pending progress):    Placement: It is my opinion, based on this patient's performance to date, that Ms. Kassandra Plata may benefit from [address] after discharge due to the functional deficits listed above that are likely to improve with skilled rehabilitation because he/she has multiple medical issues that affect his/her functional mobility in the community.   Equipment:   None at this time              OCCUPATIONAL PROFILE AND HISTORY:   History of Present Injury/Illness (Reason for Referral):  See H and p  Past Medical History/Comorbidities:   Ms. Uma Vasquez  has a past medical history of Acute kidney insufficiency, Adverse effect of anesthesia, Allergic rhinitis, Anemia, Anxiety and depression, Arthritis, Edema, GERD (gastroesophageal reflux disease), History of chicken pox, History of Clostridioides difficile infection, History of DVT (deep vein thrombosis), HTN (hypertension), Hyperlipidemia, Hyperthyroidism, Hypothyroidism, IBS (irritable bowel syndrome), Insomnia, Morbid obesity ([address] Murmur, Overactive bladder, PAD (peripheral artery disease) ([address] Recurrent UTI, RLS (restless legs syndrome), SOBOE (shortness of breath on exertion), Special examinations, MALOU (stress urinary incontinence, female), Thromboembolus ([address] (2015), and 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 She has no past medical history of Difficult intubation, Malignant hyperthermia due to anesthesia, Nausea & vomiting, or Pseudocholinesterase deficiency. Ms. Uma Vasquez  has a past surgical history that includes hx meniscus repair (Left); hx tonsil and adenoidectomy (~1950); hx shoulder arthroscopy (Right, ~1966, ~2013); hx knee replacement (Right, 2014); hx cholecystectomy (12/2016); hx total abdominal hysterectomy (~1970); hx appendectomy (1955); ir gastric band adj w/ fluoro (~2014); hx gyn (12/03/2019); hx other surgical (Right, 2016); hx other surgical (Left, ~2014); hx other surgical (~1986); hx meniscus repair (Left, 2014); hx colonoscopy; hx orthopaedic (Left); hx other surgical (12/03/2019); and hx orthopaedic (Left, 01/2020).   Social History/Living Environment:   Home Environment: Apartment  # Steps to Enter: 0  One/Two Story Residence: One story  Living Alone: No  Support Systems: Spouse/Significant Other  Patient Expects to be Discharged to[de-identified] Home with home health  Current DME Used/Available at Home: Cane, straight,Walker, rolling  Tub or Shower Type: Tub/Shower combination  Prior Level of Function/Work/Activity:  Mod I prior no device, recent tka 4 weeks ago     Number of Personal Factors/Comorbidities that affect the Plan of Care: Brief history (0):  LOW COMPLEXITY   ASSESSMENT OF OCCUPATIONAL PERFORMANCE[de-identified]   Activities of Daily Living:   Basic ADLs (From Assessment) Complex ADLs (From Assessment)   Feeding: Setup  Oral Facial Hygiene/Grooming: Supervision  Bathing: Minimum assistance  Upper Body Dressing: Supervision  Lower Body Dressing: Minimum assistance (socks and shoes today h/o B TKAmost recent 4 wks ago)  Toileting: Contact guard assistance     Grooming/Bathing/Dressing Activities of Daily Living     Cognitive Retraining  Safety/Judgement: Awareness of environment; Insight into deficits                       Bed/Mat Mobility  Supine to Sit: Stand-by assistance  Sit to Supine: Stand-by assistance  Sit to Stand: Contact guard assistance  Stand to Sit: Contact guard assistance  Bed to Chair: Minimum assistance  Scooting: Stand-by assistance     Most Recent Physical Functioning:   Gross Assessment:                  Posture:     Balance:  Sitting: Intact  Standing: With support Bed Mobility:  Supine to Sit: Stand-by assistance  Sit to Supine: Stand-by assistance  Scooting: Stand-by assistance  Wheelchair Mobility:     Transfers:  Sit to Stand: Contact guard assistance  Stand to Sit: Contact guard assistance  Bed to Chair: Minimum assistance            Patient Vitals for the past 6 hrs:   BP BP Patient Position SpO2 Pulse   03/25/22 0655 (!) 140/58 Supine 96 % 75   03/25/22 0800 -- -- -- Molly Prom 102   03/25/22 0806 -- -- 94 % --       Mental Status  Neurologic State: Alert,Appropriate for age  Orientation Level: Appropriate for age  Cognition: Follows commands  Perception: Appears intact  Perseveration: No perseveration noted  Safety/Judgement: Awareness of environment,Insight into deficits                          Physical Skills Involved:  Balance  Strength  Activity Tolerance Cognitive Skills Affected (resulting in the inability to perform in a timely and safe manner):  Expression Psychosocial Skills Affected:  Habits/Routines   Number of elements that affect the Plan of Care: 3-5:  MODERATE COMPLEXITY   CLINICAL DECISION MAKING:   [employer] AM-PAC 6 Clicks   Daily Activity Inpatient Short Form  How much help from another person does the patient currently need. .. Total A Lot A Little None   1. Putting on and taking off regular lower body clothing? [] 1   [] 2   [x] 3   [] 4   2. Bathing (including washing, rinsing, drying)? [] 1   [] 2   [x] 3   [] 4   3. Toileting, which includes using toilet, bedpan or urinal?   [] 1   [] 2   [x] 3   [] 4   4. Putting on and taking off regular upper body clothing? [] 1   [] 2   [] 3   [x] 4   5. Taking care of personal grooming such as brushing teeth? [] 1   [] 2   [] 3   [x] 4   6. Eating meals? [] 1   [] 2   [] 3   [x] 4   © 2007, Trustees of [facility] [employer], under license to [employer]. All rights reserved      Score:  Initial: 21 Most Recent: X (Date: -- )    Interpretation of Tool:  Represents activities that are increasingly more difficult (i.e. Bed mobility, Transfers, Gait). Medical Necessity:     · Patient is expected to demonstrate progress in   · Self care skills and functional mobility  ·     Reason for Services/Other Comments:  · Patient continues to require skilled intervention due to   · Above listed deficits     Use of outcome tool(s) and clinical judgement create a POC that gives a: LOW COMPLEXITY         TREATMENT:   (In addition to Assessment/Re-Assessment sessions the following treatments were rendered)     Pre-treatment Symptoms/Complaints:    Pain: Initial:   Pain Intensity 1: 0  Post Session:   0/10     Self Care: (10): Procedure(s) (per grid) utilized to improve and/or restore self-care/home management as related to dressing and functional transfers .  Required minimal visual, verbal, manual, and tactile cueing to facilitate activities of daily living skills and compensatory activities. Assessment complete    Braces/Orthotics/Lines/Etc:   O2 Device: None (Room air)  Treatment/Session Assessment:    Response to Treatment:  tolerated well, but not at baseline  Interdisciplinary Collaboration:   Physical Therapist  Occupational Therapist  Registered Nurse    After treatment position/precautions:   Supine in bed  Bed in low position  Call light within reach  Nurse at bedside  Side rails x 3   Compliance with Program/Exercises: Will assess as treatment progresses. Recommendations/Intent for next treatment session: \"Next visit will focus on advancements to more challenging activities and reduction in assistance provided\".   Total Treatment Duration:10  OT Patient Time In/Time Out  Time In: 8730  Time Out: 0900     Vero Hua OT

## 2022-03-25 NOTE — ED NOTES
TRANSFER - OUT REPORT:    Verbal report given to DAPHNE Borden on Christine De Luna  being transferred to room [facility] for routine progression of care       Report consisted of patients Situation, Background, Assessment and   Recommendations(SBAR). Information from the following report(s) SBAR was reviewed with the receiving nurse. Lines:   Peripheral IV 03/24/22 Right Antecubital (Active)   Site Assessment Clean, dry, & intact 03/24/22 1605   Phlebitis Assessment 0 03/24/22 1605   Infiltration Assessment 0 03/24/22 1605   Dressing Status Clean, dry, & intact 03/24/22 1605   Dressing Type Gauze;Tape;Transparent 03/24/22 1605   Hub Color/Line Status Pink;Flushed 03/24/22 1605   Action Taken Blood drawn 03/24/22 1605        Opportunity for questions and clarification was provided.       Patient transported with:   Registered Nurse
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
## 2022-03-25 NOTE — PROGRESS NOTES
Problem: Patient Education: Go to Patient Education Activity  Goal: Patient/Family Education  Outcome: Resolved/Met     Problem: TIA/CVA Stroke: 0-24 hours  Goal: Off Pathway (Use only if patient is Off Pathway)  Outcome: Resolved/Met  Goal: Activity/Safety  Outcome: Resolved/Met  Goal: Consults, if ordered  Outcome: Resolved/Met  Goal: Diagnostic Test/Procedures  Outcome: Resolved/Met  Goal: Nutrition/Diet  Outcome: Resolved/Met  Goal: Discharge Planning  Outcome: Resolved/Met  Goal: Medications  Outcome: Resolved/Met  Goal: Respiratory  Outcome: Resolved/Met  Goal: Treatments/Interventions/Procedures  Outcome: Resolved/Met  Goal: Minimize risk of bleeding post-thrombolytic infusion  Outcome: Resolved/Met  Goal: Monitor for complications post-thrombolytic infusion  Outcome: Resolved/Met  Goal: Psychosocial  Outcome: Resolved/Met  Goal: *Hemodynamically stable  Outcome: Resolved/Met  Goal: *Neurologically stable  Description: Absence of additional neurological deficits    Outcome: Resolved/Met  Goal: *Verbalizes anxiety and depression are reduced or absent  Outcome: Resolved/Met  Goal: *Absence of Signs of Aspiration on Current Diet  Outcome: Resolved/Met  Goal: *Absence of deep venous thrombosis signs and symptoms(Stroke Metric)  Outcome: Resolved/Met  Goal: *Ability to perform ADLs and demonstrates progressive mobility and function  Outcome: Resolved/Met  Goal: *Stroke education started(Stroke Metric)  Outcome: Resolved/Met  Goal: *Dysphagia screen performed(Stroke Metric)  Outcome: Resolved/Met  Goal: *Rehab consulted(Stroke Metric)  Outcome: Resolved/Met     Problem: TIA/CVA Stroke: Day 2 Until Discharge  Goal: Off Pathway (Use only if patient is Off Pathway)  Outcome: Resolved/Met  Goal: Activity/Safety  Outcome: Resolved/Met  Goal: Diagnostic Test/Procedures  Outcome: Resolved/Met  Goal: Nutrition/Diet  Outcome: Resolved/Met  Goal: Discharge Planning  Outcome: Resolved/Met  Goal: Medications  Outcome: Resolved/Met  Goal: Respiratory  Outcome: Resolved/Met  Goal: Treatments/Interventions/Procedures  Outcome: Resolved/Met  Goal: Psychosocial  Outcome: Resolved/Met  Goal: *Verbalizes anxiety and depression are reduced or absent  Outcome: Resolved/Met  Goal: *Absence of aspiration  Outcome: Resolved/Met  Goal: *Absence of deep venous thrombosis signs and symptoms(Stroke Metric)  Outcome: Resolved/Met  Goal: *Optimal pain control at patient's stated goal  Outcome: Resolved/Met  Goal: *Tolerating diet  Outcome: Resolved/Met  Goal: *Ability to perform ADLs and demonstrates progressive mobility and function  Outcome: Resolved/Met  Goal: *Stroke education continued(Stroke Metric)  Outcome: Resolved/Met     Problem: Ischemic Stroke: Discharge Outcomes  Goal: *Verbalizes anxiety and depression are reduced or absent  Outcome: Resolved/Met  Goal: *Verbalize understanding of risk factor modification(Stroke Metric)  Outcome: Resolved/Met  Goal: *Hemodynamically stable  Outcome: Resolved/Met  Goal: *Absence of aspiration pneumonia  Outcome: Resolved/Met  Goal: *Aware of needed dietary changes  Outcome: Resolved/Met  Goal: *Verbalize understanding of prescribed medications including anti-coagulants, anti-lipid, and/or anti-platelets(Stroke Metric)  Outcome: Resolved/Met  Goal: *Tolerating diet  Outcome: Resolved/Met  Goal: *Aware of follow-up diagnostics related to anticoagulants  Outcome: Resolved/Met  Goal: *Ability to perform ADLs and demonstrates progressive mobility and function  Outcome: Resolved/Met  Goal: *Absence of DVT(Stroke Metric)  Outcome: Resolved/Met  Goal: *Absence of aspiration  Outcome: Resolved/Met  Goal: *Optimal pain control at patient's stated goal  Outcome: Resolved/Met  Goal: *Home safety concerns addressed  Outcome: Resolved/Met  Goal: *Describes available resources and support systems  Outcome: Resolved/Met  Goal: *Verbalizes understanding of activation of EMS(911) for stroke symptoms(Stroke Metric)  Outcome: Resolved/Met  Goal: *Understands and describes signs and symptoms to report to providers(Stroke Metric)  Outcome: Resolved/Met  Goal: *Neurolgocially stable (absence of additional neurological deficits)  Outcome: Resolved/Met  Goal: *Verbalizes importance of follow-up with primary care physician(Stroke Metric)  Outcome: Resolved/Met  Goal: *Smoking cessation discussed,if applicable(Stroke Metric)  Outcome: Resolved/Met  Goal: *Depression screening completed(Stroke Metric)  Outcome: Resolved/Met     Problem: Falls - Risk of  Goal: *Absence of Falls  Description: Document Murali Fall Risk and appropriate interventions in the flowsheet.   Outcome: Resolved/Met  Note: Fall Risk Interventions:  Mobility Interventions: Patient to call before getting OOB         Medication Interventions: Patient to call before getting OOB    Elimination Interventions: Call light in reach,Bed/chair exit alarm    History of Falls Interventions: Bed/chair exit alarm         Problem: Patient Education: Go to Patient Education Activity  Goal: Patient/Family Education  Outcome: Resolved/Met     Problem: Patient Education: Go to Patient Education Activity  Goal: Patient/Family Education  Outcome: Resolved/Met     Problem: Patient Education: Go to Patient Education Activity  Goal: Patient/Family Education  Outcome: Resolved/Met

## 2022-03-25 NOTE — PROGRESS NOTES
RN CM attempted to meet with the patient. The primary team is at the bedside providing patient care. RN CM will attempt to follow up with the patient at a later time. Per chart review, the patient is being followed by outpatient therapy.

## 2022-03-25 NOTE — PROGRESS NOTES
Pt blood pressure this morning was 125/38 MAP 62 after three attempts, including switching arms. Pt was supine, with normal saline infusing at 75 ml/hr. O2 saturation was 97%, HR 65. MD was notified, no new orders placed at this time. Pt Alert and oriented x4, with no complaints.

## 2022-03-25 NOTE — PROGRESS NOTES
SPEECH PATHOLOGY NOTE:    Speech therapy consult received and appreciated. Attempted to see patient for bedside swallow evaluation. Patient is currently off the floor for MRI. Will re-attempt at later time/date.         Jocelyn Jolley MS, CCC-SLP

## 2022-03-25 NOTE — PROGRESS NOTES
Respiratory Care Services Policy Number: [id number]    Title: Aerosolized Medication Protocol    Effective Date: 10/1998    Revised Date: 06/13, 03/16, 11/17, 07/19     Reviewed Date: 05/14/ 03/15 , 06/17, 5/18, 11/2020   I. Policy: The Aerosolized Medication Protocol shall by implemented by Respiratory Care Practitioners (RCP) for patients with orders to receive aerosol therapy with medication. II. Purpose: To open and maintain obstructed airways, the RCP, will utilize the following   protocol to select the indicated aerosolized medication(s) and determine the most effective method of delivery to the patient. III. Patient Type: All patients who are determined to meet aerosolized medication criteria as          outlined in this protocol. IV. Responsibility: Director, [address], registered Respiratory Care Practitioners (RCP's) with documented competency in the performance of                                     respiratory therapeutic techniques. V. Equipment needed:  A. Stethoscope  B. Pulse oximeter  C. AeroEclipse nebulizer  D. Meter Dose Inhaler (MDI)    VI. Protocol:   A. The following conditions are accepted indications for aerosolized medication therapy. 1. Bronchospasm/wheezing  2. Impaired mucociliary clearance  3. Tracheobronchial mucosal congestion/and laryngeal stridor  4. Diseases which commonly require aerosolized medication therapy include, but are not limited to:  a. Asthma/reactive airway disease  b. Bronchitis/emphysema (COPD)  c. Cystic fibrosis  d. Severe laryngitis/tracheitis  e. Bronchiectasis  f. Smoke inhalation or chemical trauma to the lung or upper airway  g. Physical trauma to the upper airway  h. Laryngotracheobronchitis  i. Bronchiolitis  j. Non-specific wheezing              B. Indications for bronchodilator medications will include:  a. Bronchospasm/ wheezing  b. Asthma/reactive airway disease  c.  Chronic obstructive pulmonary disease  d. Obstructive defect on pulmonary function testing  C. Administration of medications  1. If a bronchodilator or any other type of respiratory medication is needed, a physician order must be indicated in the medication section in the patient's EMR. 2. When the physician specifies the medication and dosage at the time of request, the ordered medication will be used as part of the care plan. D. The following guidelines will be utilized in the evaluation and selection of the appropriate delivery device for indicated medication(s):  1. MDI is the preferred method of medication delivery via common canister. a. Patient should be alert/cooperative  b. Able to perform 3 second breath hold. c. Patient may be changed to self-administer as appropriate. d. Patients in isolation will be provided an individual inhaler. 2. Unassisted aerosol (UA) is indicated if  a. Ventilation is inadequate  b. Patient is unable to perform MDI appropriately     VII. Guidelines:   Monitor patient's vital signs and evaluate patient's clinical status. The need to change medication and/or modality may be indicated by:  1. A pulse greater than 120 bpm, or if a pulse increase of 20 bpm occurs with bronchodilator medications. 2. Significant worsening of dyspnea or wheezing occurring during or within 30 minutes of discontinuing therapy. 3. Worsening of patient's sensorium (e.g. patient becomes confused or obtunded, and unable to follow directions). 4. Worsening of patient's chest x-ray. 5. Change in sputum (e.g. increased pulmonary infiltrate, which might indicate need for volume expansion therapy). 6. Patient has difficulty coughing up secretions, which might indicate need for acetylcysteine and/or bronchial hygiene therapy. 7. Call physician immediately if dyspnea worsens and is not responsive to modifications allowed by protocol. VIII. Clinical Responsibility:  A.  The therapy assessment guidelines will be used to evaluate all patients receiving aerosolized medications with the exception of critical care areas. 1. RCP's will perform changes in therapy per protocol. 1. It will be the responsibility of RCP to provide instruction regarding respiratory medications, possible side effects, aerosol therapy and proper MDI technique, as well as, spacer usage to patients ordered MDI therapy. 1. Current therapy that is part of a patient's home regimen will not be discontinued. a. Provide spacer and educate patient on proper inhaler technique if needed. IX. Documentation  A. Document assessment findings in the respiratory assessment section of the patient's EMR. B. Document changes in therapy per protocol in the respiratory orders section and in the care plan section of the patient's EMR. C. Document patient education in the patient education section of the patient's EMR. X. Outcome Criteria:  A. Relief of wheezes and obstruction  B. Improved cough and sputum color and consistency  C. Improved chest x-ray  D. Improved arterial oxygen tension and or SaO2  E. Improved Peak Flow on asthmatic patients        XI. Related Policy/Protocols:  A. Respiratory Patient Care Protocols  B. Bronchial Hygiene Therapy  C. Oxygen Protocol  D. [facility] Administration of Metered Dose Inhalers via a Common Canister  E. [address] Generating Procedures  Reference:  L - Respiratory Care Department Policy, Procedure and Protocol Guideline Manual, 1995, JOHN Solano. L -  Therapist Driven Respiratory Care Protocols  A Practitioner's Guide for Criteria-Based Respiratory Care by Diana Padron M.D., and JOHN Hugo, RRT. L - The rationale for therapist-driven protocols: an update. Respiratory Care 1998; [id number].  -[facility] Clinical Practice Guidelines.

## 2022-03-25 NOTE — DISCHARGE INSTRUCTIONS
Stroke: After Your Visit     Your Care Instructions     Risk factors for stroke include being overweight, smoking, and sedentary lifestyle. This means that the blood flow to a part of your brain was blocked for some time, which damages the nerve cells in that part of the brain. The part of your body controlled by that part of your brain may not function properly now. The brain is an amazing organ that can heal itself to some degree. The stroke you had damaged part of your brain, but other parts of your brain may take over in some way for the damaged areas. You have already started this process. Going home may be hard for you and your family. The more you can try to do for yourself, the better. Remember to take each day one at a time. Follow-up care is a key part of your treatment and safety. Be sure to make and go to all appointments, and call your doctor if you are having problems. Its also a good idea to know your test results and keep a list of the medicines you take. How can you care for yourself at home? Enter a stroke rehabilitation (rehab) program, if your doctor recommends it. Physical, speech, and occupational therapies can help you manage bathing, dressing, eating, and other basics of daily living. Eat a heart-healthy diet that is low in cholesterol, saturated fat, and salt. Eat lots of fresh fruits and vegetables and foods high in fiber. Increase your activities slowly. Take short rest breaks when you get tired. Gradually increase the amount you walk. Start out by walking a little more than you did the day before. Do not drive until your doctor says it is okay. It is normal to feel sad or depressed after a stroke. If the blues last, talk to your doctor. If you are having problems with urine leakage, go to the bathroom at regular times, including when you first wake up and at bedtime. Also, limit fluids after dinner.   If you are constipated, drink plenty of fluids, enough so that your urine is light yellow or clear like water. If you have kidney, heart, or liver disease and have to limit fluids, talk with your doctor before you increase the amount of fluids you drink. Set up a regular time for using the toilet. If you continue to have constipation, your doctor may suggest using a bulking agent, such as Metamucil, or a stool softener, laxative, or enema. Medicines  Take your medicines exactly as prescribed. Call your doctor if you think you are having a problem with your medicine. You may be taking several medicines. ACE (angiotensin-converting enzyme) inhibitors, angiotensin II receptor blockers (ARBs), beta-blockers, diuretics (water pills), and calcium channel blockers control your blood pressure. Statins help lower cholesterol. Your doctor may also prescribe medicines for depression, pain, sleep problems, anxiety, or agitation. If your doctor has given you medicine that prevents blood clots, such as warfarin (Coumadin), aspirin combined with extended-release dipyridamole (Aggrenox), clopidogrel (Plavix), or aspirin to prevent another stroke, you should:  Tell your dentist, pharmacist, and other health professionals that you take these medicines. Watch for unusual bruising or bleeding, such as blood in your urine, red or black stools, or bleeding from your nose or gums. Get regular blood tests to check your clotting time if you are taking Coumadin. Wear medical alert jewelry that says you take blood thinners. You can buy this at most drugstores. Do not take any over-the-counter medicines or herbal products without talking to your doctor first.  If you take birth control pills or hormone replacement therapy, talk to your doctor about whether they are right for you. For family members and caregivers  Make the home safe. Set up a room so that your loved one does not have to climb stairs. Be sure the bathroom is on the same floor.  Move throw rugs and furniture that could cause falls, and make sure that the lighting is good. Put grab bars and seats in tubs and showers. Find out what your loved one can do and what he or she needs help with. Try not to do things for your loved one that your loved one can do on his or her own. Help him or her learn and practice new skills. Visit and talk with your loved one often. Try doing activities together that you both enjoy, such as playing cards or board games. Keep in touch with your loved one's friends as much as you can, and encourage them to visit. Take care of yourself. Do not try to do everything yourself. Ask other family members to help. Eat well, get enough rest, and take time to do things that you enjoy. Keep up with your own doctor visits, and make sure to take your medicines regularly. Get out of the house as much as you can. Join a local support group. Find out if you qualify for home health care visits to help with rehab or for adult day care. When should you call for help? Call 911 anytime you think you may need emergency care. For example, call if:  You have signs of another stroke. These may include:  Sudden numbness, paralysis, or weakness in your face, arm, or leg, especially on only one side of your body. New problems with walking or balance. Sudden vision changes. Drooling or slurred speech. New problems speaking or understanding simple statements, or you feel confused. A sudden, severe headache that is different from past headaches. Call 911 even if these symptoms go away in a few minutes. You cough up blood. You vomit blood or what looks like coffee grounds. You pass maroon or very bloody stools. Call your doctor now or seek immediate medical care if:  You have new bruises or blood spots under your skin. You have a nosebleed. Your gums bleed when you brush your teeth. You have blood in your urine. Your stools are black and tarlike or have streaks of blood.   You have vaginal bleeding when you are not having your period, or heavy period bleeding. You have new symptoms that may be related to your stroke, such as falls or trouble swallowing. Watch closely for changes in your health, and be sure to contact your doctor if you have any problems. Where can you learn more? Go to [employer]    Enter C294  in the search box to learn more about \"Stroke: After Your Visit\". © 7910-3065 Healthwise, Incorporated. Care instructions adapted under license by [facility] (which disclaims liability or warranty for this information). This care instruction is for use with your licensed healthcare professional. If you have questions about a medical condition or this instruction, always ask your healthcare professional. Darylene Brisk any warranty or liability for your use of this information.

## 2022-03-25 NOTE — CONSULTS
Jose Carson MD  Medical Director  [address] [facility], [address] [facility]  Tel: [phone]       Physical Medicine & Rehab Consult     Admit Date: 3/24/2022  Referring Provider: Hospitalist service    Medical Decision Making / Plan / Recommend: Medical chart reviewed. Shahnaz Kitchen is a 77 y.o. WF whom we have been requested to evaluate for consideration for inpatient rehabilitation as part of stroke protocol. Consult acknowledged, EMR reviewed in conjunction with Physiatrist and admissions coordinators. Briefly, patient with PMH including HTN, HL, chronic dizziness, hypothyroidism, GERD, RLS, IBS, anxiety and morbid obesity. She presented to the ED 3/24/2022 with vision changes and unsteadiness and was admitted for CVA work-up. Head CT, CTA and CTp were without significant abnormalities. Brain MRI was unremarkable, no acute stroke. SLP cleared her for regular consistency diet, thin liquids. PT determined patient requires standby assistance with bed mobility, contact-guard assistance for transfers, ambulating 150' (CGA with RW, min A without). OT found patient requires set-up assistance, CGA or min A for ADLs. Both services [facility] at discharge. At this high level of function and activity, patient does not require 3 hours of daily intense therapy offered at [facility]. Thank you for inviting us to participate in this patient's care. Please notify us of any changes; otherwise, we will sign off. Rigoberto Nolasco PA-C  Physician Assistant with [facility]  Latasha Singh MD, Medical Director        Time spent reviewing EMR and medical decision-making was 15 minutes.

## 2022-03-25 NOTE — PROGRESS NOTES
Care Management Interventions  PCP Verified by CM: Yes  Palliative Care Criteria Met (RRAT>21 & CHF Dx)?: No  Transition of Care Consult (CM Consult): [address]: Yes  Discharge Durable Medical Equipment: No  Physical Therapy Consult: Yes  Occupational Therapy Consult: Yes  Speech Therapy Consult: Yes  Support Systems: Spouse/Significant Other  Confirm Follow Up Transport: Other (see comment) (unknown at this time. She reports her spouse doesn't drive.)  The Patient and/or Patient Representative was Provided with a Choice of Provider and Agrees with the Discharge Plan?: Yes  Freedom of Choice List was Provided with Basic Dialogue that Supports the Patient's Individualized Plan of Care/Goals, Treatment Preferences and Shares the Quality Data Associated with the Providers?: Yes  Discharge Location  Patient Expects to be Discharged to[de-identified] Home with home health    RN CM met with the patient and discussed discharge planning. She reports she owns a cane and a rolling walker. Home health PT/OT has been recommended. RN CM provided her with a list of home health agencies and their quality metrics. She selected [facility]. RN CM sent them a referral She reports she prefers home health services over outpatient therapy. Her spouse does not drive. RN CM encouraged her to ask questions. Case Management will continue to follow her for discharge planning needs. RN CM dicussed discharge planning with the attending MD. He is in agreement with [facility] PT/OT. The order was sent to the home health agency.

## 2022-03-25 NOTE — PROGRESS NOTES
SPEECH LANGUAGE PATHOLOGY: DYSPHAGIA  Initial Assessment and Discharge    NAME/AGE/GENDER: Paige Martell is a 77 y.o. female  DATE: 3/25/2022  PRIMARY DIAGNOSIS: [facility]A (cerebral vascular accident) ([facility] [I63.9]      ICD-10: Treatment Diagnosis: R13.12 Dysphagia, Oropharyngeal Phase    RECOMMENDATIONS   DIET:    Regular Consistency   Thin Liquids    MEDICATIONS: as tolerated     ASPIRATION PRECAUTIONS  · Slow rate of intake  · Small bites/sips  · Upright at 90 degrees during meal     COMPENSATORY STRATEGIES/MODIFICATIONS  · None     EDUCATION:  · Recommendations discussed with Patient     CONTINUATION OF SKILLED SERVICES/MEDICAL NECESSITY:   No further dysphagia treatment indicated. RECOMMENDATIONS for CONTINUED SPEECH THERAPY: YES: Anticipate need for ongoing speech therapy during this hospitalization and at next level of care. - if word finding difficulty persists, recommend further assessment of cognitive linguistic abilities later this hospitalization or at next level of care. ASSESSMENT   Patient presents with oropharyngeal swallowing abilities that are WNL. No overt s/sx airway compromise with any po trials. Recommend continue regular diet/thin liquids. meds as tolerated. Per chart review, imaging results negative for acute infarction. Patient noted to have intermittent difficulty with word finding and initial phoneme repeition, which she reported started yesterday. Recommend further cognitive linguistic assessment later this hospitalization or a next level of care if deficits persist.     REHABILITATION POTENTIAL FOR STATED GOALS: Excellent    PLAN    FREQUENCY/DURATION: Possible cognitive linguistic assessment if word finding deficit persists      SUBJECTIVE   Patient alert upright in bed for session. Pleasant and friendly. Agreeable to bedside swallow evaluation.      History of Present Injury/Illness: Ms. Kaylin Gaspar  has a past medical history of Acute kidney insufficiency, Adverse effect of anesthesia, Allergic rhinitis, Anemia, Anxiety and depression, Arthritis, Edema, GERD (gastroesophageal reflux disease), History of chicken pox, History of Clostridioides difficile infection, History of DVT (deep vein thrombosis), HTN (hypertension), Hyperlipidemia, Hyperthyroidism, Hypothyroidism, IBS (irritable bowel syndrome), Insomnia, Morbid obesity ([facility] Murmur, Overactive bladder, PAD (peripheral artery disease) ([address] Recurrent UTI, RLS (restless legs syndrome), SOBOE (shortness of breath on exertion), Special examinations, MALOU (stress urinary incontinence, female), Thromboembolus ([facility] (2015), and 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 She has no past medical history of Difficult intubation, Malignant hyperthermia due to anesthesia, Nausea & vomiting, or Pseudocholinesterase deficiency. . She also  has a past surgical history that includes hx meniscus repair (Left); hx tonsil and adenoidectomy (~1950); hx shoulder arthroscopy (Right, ~1966, ~2013); hx knee replacement (Right, 2014); hx cholecystectomy (12/2016); hx total abdominal hysterectomy (~1970); hx appendectomy (1955); ir gastric band adj w/ fluoro (~2014); hx gyn (12/03/2019); hx other surgical (Right, 2016); hx other surgical (Left, ~2014); hx other surgical (~1986); hx meniscus repair (Left, 2014); hx colonoscopy; hx orthopaedic (Left); hx other surgical (12/03/2019); and hx orthopaedic (Left, 01/2020). Problem List:  (Impairments causing functional limitations):  1. Oropharyngeal dysphagia- No symptoms identified    Previous Dysphagia: YES Patient reports history of esophageal dysphagia. Stated she had barium swallow that was normal, but recent EGD with dilation ~ 6 weeks ago. Reports reduced sensation of esophageal stasis since dilation. Diet Prior to Evaluation: Regular/thin    Orientation:   Person  Place  Time  Situation     Cognitive linguistic screening:  Patient's speech is clear and intelligible.  Follows 1 step commands and answers open ended questions appropriately. Recalls medical history and details of hospitalization. Initially exhibited difficulty with word finding and intermittent initial phoneme repetition, which was less significant in conversation later in assessment.  -Patient works part time as a caregiver. Recommend further assessment of cognitive linguistic abilities if difficulty with word finding persists either later this hospitalization or at next level of care. Pain: Pain Scale 1: Numeric (0 - 10)  Pain Intensity 1: 0    OBJECTIVE   Oral Motor:   · Labial: No impairment  · Dentition: Intact and Natural  · Oral Hygiene: Adequate  · Lingual: No impairment    Swallow evaluation:   Patient presented with thin liquid via cup and straw and solid consistencies. Politely declined fruit. Appropriate oral prep with all textures. Timely swallow initiation, and single swallows upon palpation. Adequate oral clearing. No overt signs or symptoms of airway compromise observed with liquid or solid textures. Denies pharyngeal or esophageal stasis.     INTERDISCIPLINARY COLLABORATION: Registered Nurse  PRECAUTIONS/ALLERGIES: Fire ant, Adhesive, Amoxicillin, Betadine [povidone-iodine], Cephalexin, Demerol [meperidine], Hydromorphone, Morphine, Percocet [oxycodone-acetaminophen], and Percodan [oxycodone hcl-oxycodone-asa]     Tool Used: Dysphagia Outcome and Severity Scale (GISELLE)    Score Comments   Normal Diet  [x] 7 With no strategies or extra time needed   Functional Swallow  [] 6 May have mild oral or pharyngeal delay   Mild Dysphagia  [] 5 Which may require one diet consistency restricted    Mild-Moderate Dysphagia  [] 4 With 1-2 diet consistencies restricted   Moderate Dysphagia  [] 3 With 2 or more diet consistencies restricted   Moderate-Severe Dysphagia  [] 2 With partial PO strategies (trials with ST only)   Severe Dysphagia  [] 1 With inability to tolerate any PO safely      Score:  Initial: 7 Most Recent: x (Date 03/25/22 ) Interpretation of Tool: The Dysphagia Outcome and Severity Scale (GISELLE) is a simple, easy-to-use, 7-point scale developed to systematically rate the functional severity of dysphagia based on objective assessment and make recommendations for diet level, independence level, and type of nutrition. Current Medications:   No current facility-administered medications on file prior to encounter. Current Outpatient Medications on File Prior to Encounter   Medication Sig Dispense Refill    pramipexole (Mirapex) 0.5 mg tablet Take 1 Tablet by mouth daily. 90 Tablet 3    omeprazole (PRILOSEC) 40 mg capsule Take 1 Capsule by mouth daily. 90 Capsule 3    diclofenac EC (VOLTAREN) 75 mg EC tablet Take 1 tablet by mouth twice a day as needed 60 Tablet 0    methocarbamoL (ROBAXIN) 750 mg tablet Take 1 Tablet by mouth every six (6) hours as needed for Muscle Spasm(s). Take 1 tablet by mouth every 6 hours as needed 40 Tablet 0    clonazePAM (KlonoPIN) 1 mg tablet Take 1 Tablet by mouth two (2) times daily as needed for Anxiety. Max Daily Amount: 2 mg. 60 Tablet 0    lisinopril-hydroCHLOROthiazide (PRINZIDE, ZESTORETIC) 10-12.5 mg per tablet Take 1 Tablet by mouth daily. 90 Tablet 3    acetaminophen (TYLENOL) 325 mg tablet Take 325 mg by mouth every four (4) hours as needed (breakthrough pain).  ergocalciferol (ERGOCALCIFEROL) 1,250 mcg (50,000 unit) capsule Take 1 Capsule by mouth every seven (7) days. Indications: vitamin D deficiency (high dose therapy) 6 Capsule 1    escitalopram oxalate (LEXAPRO) 20 mg tablet Take 1.5 Tablets by mouth daily. (Patient taking differently: Take  by mouth. 1 tablet daily) 135 Tablet 3    naloxone (NARCAN) 4 mg/actuation nasal spray Use 1 spray intranasally, then discard. Repeat with new spray every 2 min as needed for opioid overdose symptoms, alternating nostrils.   Indications: opioid overdose 2 Each 0    cyanocobalamin (VITAMIN B12) 1,000 mcg/mL injection 1,000 mcg by IntraMUSCular route every month.  nitrofurantoin (MACRODANTIN) 50 mg capsule Take 1 Capsule by mouth daily. Indications: bacterial urinary tract infection (Patient taking differently: Take 50 mg by mouth daily. Indications: bacterial urinary tract infection, urinary tract infection prevention) 90 Capsule 0    colestipoL (COLESTID) 1 gram tablet Take 1 Tablet by mouth two (2) times a day. (Patient taking differently: Take 1 g by mouth daily. For IBS) 360 Tablet 1    Synthroid 150 mcg tablet Take 1 Tablet by mouth Daily (before breakfast). DAW1 (Patient taking differently: Take 150 mcg by mouth Daily (before breakfast). ) 30 Tablet 11    amitriptyline (ELAVIL) 25 mg tablet Take 25 mg by mouth nightly. For IBS/colitis      ondansetron hcl (ZOFRAN) 4 mg tablet Take 1 Tablet by mouth every eight (8) hours as needed for Nausea. 90 Tablet 0    fluticasone propionate (FLONASE) 50 mcg/actuation nasal spray 2 Sprays by Both Nostrils route nightly. Indications: inflammation of the nose due to an allergy (Patient taking differently: 2 Sprays by Both Nostrils route daily. Indications: inflammation of the nose due to an allergy) 3 Bottle 3    hyoscyamine sulfate (CYSTOSPAZ) 0.125 mg tablet Take 1 Tab by mouth every four (4) hours as needed for Cramping or Nausea. Indications: irritable colon 60 Tab 1    clotrimazole-betamethasone (LOTRISONE) topical cream Apply  to affected area two (2) times a day. Until rash resolves (Patient taking differently: Apply  to affected area as needed. Until rash resolves) 45 g 2    albuterol (PROVENTIL HFA, VENTOLIN HFA, PROAIR HFA) 90 mcg/actuation inhaler Take 1-2 Puffs by inhalation every six (6) hours as needed for Wheezing.  1 Inhaler 2       SAFETY:  After treatment position/precautions:  · Upright in bed  · Call light within reach    Total Treatment Duration:   Time In: 1335  Time Out: [address], [address] [facility]

## 2022-03-25 NOTE — PROGRESS NOTES
Care Management Interventions  PCP Verified by CM: Yes  Palliative Care Criteria Met (RRAT>21 & CHF Dx)?: No  Transition of Care Consult (CM Consult): [address]: Yes  Discharge Durable Medical Equipment: No  Physical Therapy Consult: Yes  Occupational Therapy Consult: Yes  Speech Therapy Consult: No  Support Systems: Spouse/Significant Other  Confirm Follow Up Transport: Cab  The Patient and/or Patient Representative was Provided with a Choice of Provider and Agrees with the Discharge Plan?: Yes  Freedom of Choice List was Provided with Basic Dialogue that Supports the Patient's Individualized Plan of Care/Goals, Treatment Preferences and Shares the Quality Data Associated with the Providers?: Yes  Discharge Location  Patient Expects to be Discharged to[de-identified] Home with home health    The patient is discharging home with home health services. Orders were sent to the home health. The patient needs transportation home. Her spouse does not drive. She reports she owns a rolling walker and a cane at home but has not been using it. RN CM encouraged her to have her spouse meet her at the car with her rolling walker. She stated she will contact him once she is on the way home. Roundtrip has been arranged for pickup.

## 2022-03-25 NOTE — ASSESSMENT & PLAN NOTE
Admission CT negative; expressive aphasia dominant symptom  -consult neurology  -Neurochecks   -echocardiogram  -ASA, consider clopidogrel   -permissive HTN to 220/120  -labetalol prn  -PT, OT, SLP, PPD

## 2022-03-26 ENCOUNTER — HOME HEALTH ADMISSION (OUTPATIENT)
Dept: HOME HEALTH SERVICES | Facility: HOME HEALTH | Age: 77
End: 2022-03-26

## 2022-03-28 ENCOUNTER — NURSE TRIAGE (OUTPATIENT)
Dept: OTHER | Facility: CLINIC | Age: 77
End: 2022-03-28

## 2022-03-28 NOTE — TELEPHONE ENCOUNTER
Calling patient back at [facility] with [employer] Complaint. Subjective: Caller states Clover Travis was in ED Thursday and Friday, and was told to make a follow up with /mani. BP was 200 on top, they kept her and did tests, told her she needs to follow up, didn't change any medication. \" Has not checked BP since she left ED    Current Symptoms: headache yesterday, speech is \"a little off for her\", thinks her fingernail beds still look blue, energy level decreased    Onset: 5 days ago; sudden    Associated Symptoms: reduced appetite    Pain Severity: 0/10; n/a; n/a    Temperature: denies fever n/a    What has been tried: aleve, ibuprofen    LMP: NA Pregnant: NA    Recommended disposition: See PCP within 3 Days, was told [facility]/ED if no appointments    Care advice provided, patient verbalizes understanding; denies any other questions or concerns; instructed to call back for any new or worsening symptoms. Patient/Caller agrees with recommended disposition; writer provided warm transfer to chat at [facility] for appointment scheduling    Attention Provider: Thank you for allowing me to participate in the care of your patient. The patient was connected to triage in response to information provided to the [facility]. Please do not respond through this encounter as the response is not directed to a shared pool.     Reason for Disposition   Systolic BP >= 382 OR Diastolic >= 175    Protocols used: BLOOD PRESSURE - HIGH-ADULT-OH

## 2022-04-07 ENCOUNTER — HOSPITAL ENCOUNTER (OUTPATIENT)
Dept: SLEEP MEDICINE | Age: 77
Discharge: HOME | End: 2022-04-07
Payer: MEDICARE

## 2022-04-07 ENCOUNTER — HOSPITAL ENCOUNTER (OUTPATIENT)
Dept: LAB | Age: 77
Discharge: HOME | End: 2022-04-07
Payer: MEDICARE

## 2022-04-07 DIAGNOSIS — I10 ESSENTIAL HYPERTENSION: Chronic | ICD-10-CM

## 2022-04-07 DIAGNOSIS — E53.8 VITAMIN B12 DEFICIENCY: ICD-10-CM

## 2022-04-07 DIAGNOSIS — E55.9 VITAMIN D DEFICIENCY: ICD-10-CM

## 2022-04-07 LAB
25(OH)D3 SERPL-MCNC: 33.8 NG/ML (ref 30–100)
ALBUMIN SERPL-MCNC: 3.6 G/DL (ref 3.2–4.6)
ALBUMIN/GLOB SERPL: 1.1 {RATIO} (ref 1.2–3.5)
ALP SERPL-CCNC: 85 U/L (ref 50–136)
ALT SERPL-CCNC: 21 U/L (ref 12–65)
ANION GAP SERPL CALC-SCNC: 2 MMOL/L (ref 7–16)
AST SERPL-CCNC: 13 U/L (ref 15–37)
BASOPHILS # BLD: 0.1 K/UL (ref 0–0.2)
BASOPHILS NFR BLD: 1 % (ref 0–2)
BILIRUB SERPL-MCNC: 0.2 MG/DL (ref 0.2–1.1)
BUN SERPL-MCNC: 29 MG/DL (ref 8–23)
CALCIUM SERPL-MCNC: 9 MG/DL (ref 8.3–10.4)
CHLORIDE SERPL-SCNC: 106 MMOL/L (ref 98–107)
CO2 SERPL-SCNC: 30 MMOL/L (ref 21–32)
CREAT SERPL-MCNC: 1.4 MG/DL (ref 0.6–1)
DIFFERENTIAL METHOD BLD: ABNORMAL
EOSINOPHIL # BLD: 0.4 K/UL (ref 0–0.8)
EOSINOPHIL NFR BLD: 4 % (ref 0.5–7.8)
ERYTHROCYTE [DISTWIDTH] IN BLOOD BY AUTOMATED COUNT: 14.9 % (ref 11.9–14.6)
GLOBULIN SER CALC-MCNC: 3.4 G/DL (ref 2.3–3.5)
GLUCOSE SERPL-MCNC: 90 MG/DL (ref 65–100)
HCT VFR BLD AUTO: 35.6 % (ref 35.8–46.3)
HGB BLD-MCNC: 11 G/DL (ref 11.7–15.4)
IMM GRANULOCYTES # BLD AUTO: 0 K/UL (ref 0–0.5)
IMM GRANULOCYTES NFR BLD AUTO: 0 % (ref 0–5)
LYMPHOCYTES # BLD: 2.2 K/UL (ref 0.5–4.6)
LYMPHOCYTES NFR BLD: 23 % (ref 13–44)
MCH RBC QN AUTO: 28.1 PG (ref 26.1–32.9)
MCHC RBC AUTO-ENTMCNC: 30.9 G/DL (ref 31.4–35)
MCV RBC AUTO: 90.8 FL (ref 79.6–97.8)
MONOCYTES # BLD: 0.6 K/UL (ref 0.1–1.3)
MONOCYTES NFR BLD: 6 % (ref 4–12)
NEUTS SEG # BLD: 6.4 K/UL (ref 1.7–8.2)
NEUTS SEG NFR BLD: 66 % (ref 43–78)
NRBC # BLD: 0 K/UL (ref 0–0.2)
PLATELET # BLD AUTO: 273 K/UL (ref 150–450)
PMV BLD AUTO: 10.9 FL (ref 9.4–12.3)
POTASSIUM SERPL-SCNC: 4.9 MMOL/L (ref 3.5–5.1)
PROT SERPL-MCNC: 7 G/DL (ref 6.3–8.2)
RBC # BLD AUTO: 3.92 M/UL (ref 4.05–5.2)
SODIUM SERPL-SCNC: 138 MMOL/L (ref 136–145)
TSH SERPL DL<=0.005 MIU/L-ACNC: 1.71 UIU/ML (ref 0.36–3.74)
URATE SERPL-MCNC: 7.6 MG/DL (ref 2.6–6)
WBC # BLD AUTO: 9.7 K/UL (ref 4.3–11.1)

## 2022-04-07 PROCEDURE — 84443 ASSAY THYROID STIM HORMONE: CPT

## 2022-04-07 PROCEDURE — 80053 COMPREHEN METABOLIC PANEL: CPT

## 2022-04-07 PROCEDURE — 85025 COMPLETE CBC W/AUTO DIFF WBC: CPT

## 2022-04-07 PROCEDURE — 36415 COLL VENOUS BLD VENIPUNCTURE: CPT

## 2022-04-07 PROCEDURE — 84550 ASSAY OF BLOOD/URIC ACID: CPT

## 2022-04-07 PROCEDURE — 95806 SLEEP STUDY UNATT&RESP EFFT: CPT

## 2022-04-07 PROCEDURE — 82306 VITAMIN D 25 HYDROXY: CPT

## 2022-04-17 ENCOUNTER — APPOINTMENT (OUTPATIENT)
Dept: GENERAL RADIOLOGY | Age: 77
End: 2022-04-17
Attending: PHYSICIAN ASSISTANT
Payer: MEDICARE

## 2022-04-17 ENCOUNTER — HOSPITAL ENCOUNTER (EMERGENCY)
Age: 77
Discharge: HOME | End: 2022-04-17
Attending: EMERGENCY MEDICINE
Payer: MEDICARE

## 2022-04-17 VITALS
BODY MASS INDEX: 38.83 KG/M2 | OXYGEN SATURATION: 98 % | HEIGHT: 58 IN | SYSTOLIC BLOOD PRESSURE: 142 MMHG | TEMPERATURE: 98.2 F | RESPIRATION RATE: 18 BRPM | DIASTOLIC BLOOD PRESSURE: 75 MMHG | WEIGHT: 185 LBS | HEART RATE: 78 BPM

## 2022-04-17 DIAGNOSIS — J06.9 VIRAL UPPER RESPIRATORY INFECTION: Primary | ICD-10-CM

## 2022-04-17 LAB
FLUAV AG NPH QL IA: NEGATIVE
FLUBV AG NPH QL IA: NEGATIVE
SPECIMEN SOURCE: NORMAL

## 2022-04-17 PROCEDURE — 87804 INFLUENZA ASSAY W/OPTIC: CPT

## 2022-04-17 PROCEDURE — 99283 EMERGENCY DEPT VISIT LOW MDM: CPT

## 2022-04-17 PROCEDURE — 71045 X-RAY EXAM CHEST 1 VIEW: CPT

## 2022-04-17 NOTE — ED NOTES
I have reviewed discharge instructions with the patient. The patient verbalized understanding. Patient left ED via Discharge Method: ambulatory to Home with self. Opportunity for questions and clarification provided. Patient given 0 scripts. To continue your aftercare when you leave the hospital, you may receive an automated call from our care team to check in on how you are doing. This is a free service and part of our promise to provide the best care and service to meet your aftercare needs.  If you have questions, or wish to unsubscribe from this service please call [phone]. Thank you for Choosing our [facility] Emergency Department.

## 2022-04-17 NOTE — DISCHARGE INSTRUCTIONS
Continue your regularly scheduled medications. For your symptoms, I recommend DayQuil and NyQuil. This is a virus and must take its course. Follow-up with your primary care physician.

## 2022-04-17 NOTE — ED PROVIDER NOTES
76-year-old female presents to the emergency room with her spouse who is also a patient, they are both complaining of some chest congestion. They have had nonproductive cough for several days. She reports a history of bronchitis. Not associate with any fever or chills, shortness of breath or chest pain           Past Medical History:   Diagnosis Date    Acute kidney insufficiency     Followed by PCP     Adverse effect of anesthesia     2016 in [place] heart stopped at the end of my gallbladder surgery. \" Patient reports no CPR performed, stayed one night in the hospital. No problems with any other surgeries.     Allergic rhinitis     Anemia     history     Anxiety and depression     Controlled with medication     Arthritis     Edema     bilateral lower legs     GERD (gastroesophageal reflux disease)     Controlled with medication     History of chicken pox     History of Clostridioides difficile infection     History of DVT (deep vein thrombosis)     s/p knee replacement and developed right leg DVT     HTN (hypertension)     controlled with medication     Hyperlipidemia     controlled with medication     Hyperthyroidism     pt denies    Hypothyroidism     controlled with medication     IBS (irritable bowel syndrome)     Insomnia     Morbid obesity (HCC)     BMI 42.85    Murmur     per PCP note (64/3/27) systolic murmur grade of 2/6    Overactive bladder     PAD (peripheral artery disease) ([address]     patient denies on 11/27/19    Recurrent UTI     RLS (restless legs syndrome)     controlled with medication     SOBOE (shortness of breath on exertion)     PRN inhaler-patient reports she uses inhaler QHS    Special examinations     History-Neurologist - Dr. 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       Past Surgical History:   Procedure Laterality Date  HX APPENDECTOMY  195    HX CHOLECYSTECTOMY  2016    HX COLONOSCOPY      HX GYN  2019    suburethral sling and cystoscopy    HX KNEE REPLACEMENT Right     HX MENISCUS REPAIR Left     2016    HX MENISCUS REPAIR Left     HX ORTHOPAEDIC Left     LEFT WRIST TRAPEZIOMETACARPAL ARTHROPLASTY WITH FCR TENDON TRANSFER-     HX ORTHOPAEDIC Left 2020    left hand tendon surgery    HX OTHER SURGICAL Right 2016    Hand    HX OTHER SURGICAL Left ~    B/L Ulnar    HX OTHER SURGICAL  ~    urethraplasty    HX OTHER SURGICAL  2019    bladder surgery-put mesh in    HX SHOULDER ARTHROSCOPY Right ~, ~2013    x2    HX TONSIL AND ADENOIDECTOMY  ~    HX TOTAL ABDOMINAL HYSTERECTOMY  ~    IR GASTRIC BAND ADJ W/ FLUORO  ~    lab band removed ~          Family History:   Problem Relation Age of Onset    No Known Problems Mother     Other Brother         brain aneurysm, AVM    Heart Surgery Brother         CABGx4    Heart Disease Brother     Hypertension Brother     Other Father 54        brain aneurysm    Other Sister         fibromyalgia    Pneumonia Sister     Heart Attack Maternal Grandmother     Cancer Maternal Grandfather         skin    Heart Attack Paternal Grandfather     Congenital anomalies Sister         right arm deformity    Other Son         Hepatitis C    Drug Abuse Son     Mult Sclerosis Daughter        Social History     Socioeconomic History    Marital status:      Spouse name: Not on file    Number of children: Not on file    Years of education: Not on file    Highest education level: Not on file   Occupational History    Not on file   Tobacco Use    Smoking status: Former Smoker     Packs/day: 1.00     Years: 40.00     Pack years: 40.00     Types: Cigarettes     Quit date: 2005     Years since quittin.3    Smokeless tobacco: Never Used   Vaping Use    Vaping Use: Never used   Substance and Sexual Activity  Alcohol use: Not Currently    Drug use: No    Sexual activity: Yes     Partners: Male   Other Topics Concern     Service Not Asked    Blood Transfusions No    Caffeine Concern Not Asked    Occupational Exposure No    Hobby Hazards Not Asked    Sleep Concern No    Stress Concern Not Asked    Weight Concern No    Special Diet Not Asked    Back Care No    Exercise No    Bike Helmet Not Asked    Seat Belt Yes    Self-Exams Yes   Social History Narrative    Not on file     Social Determinants of Health     Financial Resource Strain:     Difficulty of Paying Living Expenses: Not on file   Food Insecurity: Food Insecurity Present    Worried About Running Out of Food in the Last Year: Sometimes true    Chelita of Food in the Last Year: Sometimes true   Transportation Needs:     Lack of Transportation (Medical): Not on file    Lack of Transportation (Non-Medical):  Not on file   Physical Activity:     Days of Exercise per Week: Not on file    Minutes of Exercise per Session: Not on file   Stress:     Feeling of Stress : Not on file   Social Connections:     Frequency of Communication with Friends and Family: Not on file    Frequency of Social Gatherings with Friends and Family: Not on file    Attends Alevism Services: Not on file    Active Member of [address] [place] [address] [employer] or Organizations: Not on file    Attends Club or Organization Meetings: Not on file    Marital Status: Not on file   Intimate Partner Violence:     Fear of Current or Ex-Partner: Not on file    Emotionally Abused: Not on file    Physically Abused: Not on file    Sexually Abused: Not on file   Housing Stability:     Unable to Pay for Housing in the Last Year: Not on file    Number of [place] in the Last Year: Not on file    Unstable Housing in the Last Year: Not on file         ALLERGIES: Fire ant, Adhesive, Amoxicillin, Betadine [povidone-iodine], Cephalexin, Demerol [meperidine], Hydromorphone, Morphine, Percocet [oxycodone-acetaminophen], and Percodan [oxycodone hcl-oxycodone-asa]    Review of Systems   Constitutional: Negative for chills and fever. HENT: Negative for sore throat. Respiratory: Positive for cough. Cardiovascular: Negative for chest pain. Gastrointestinal: Negative for nausea. Genitourinary: Negative for dysuria. Musculoskeletal: Negative for back pain. Neurological: Negative for headaches. All other systems reviewed and are negative. Vitals:    04/17/22 1229   BP: (!) 153/76   Pulse: 82   Resp: 20   Temp: 98.5 °F (36.9 °C)   SpO2: 97%   Weight: 83.9 kg (185 lb)   Height: 4' 10\" (1.473 m)            Physical Exam  Vitals and nursing note reviewed. Constitutional:       General: She is not in acute distress. Appearance: Normal appearance. She is obese. She is not ill-appearing, toxic-appearing or diaphoretic. HENT:      Nose: Congestion present. Mouth/Throat:      Mouth: Mucous membranes are moist.   Eyes:      Pupils: Pupils are equal, round, and reactive to light. Cardiovascular:      Rate and Rhythm: Normal rate. Pulmonary:      Effort: Pulmonary effort is normal. No respiratory distress. Breath sounds: No stridor. No wheezing or rhonchi. Abdominal:      General: Abdomen is flat. Skin:     General: Skin is warm. Neurological:      General: No focal deficit present. Mental Status: She is alert. MDM  Number of Diagnoses or Management Options  Viral upper respiratory infection  Diagnosis management comments: Normal vital signs in triage. Afebrile. Advise follow-up with PCP. Voice dictation software was used during the making of this note. This software is not perfect and grammatical and other typographical errors may be present. This note has been proofread, but may still contain errors.    Meghan Coats PA-C     Risk of Complications, Morbidity, and/or Mortality  Presenting problems: low  Diagnostic procedures: low  Management options: low    Patient Progress  Patient progress: stable         Procedures

## 2022-04-18 ENCOUNTER — HOSPITAL ENCOUNTER (EMERGENCY)
Age: 77
Discharge: HOME | End: 2022-04-18
Attending: EMERGENCY MEDICINE
Payer: MEDICARE

## 2022-04-18 VITALS
WEIGHT: 185 LBS | DIASTOLIC BLOOD PRESSURE: 61 MMHG | BODY MASS INDEX: 38.83 KG/M2 | TEMPERATURE: 97.7 F | HEIGHT: 58 IN | OXYGEN SATURATION: 99 % | RESPIRATION RATE: 24 BRPM | SYSTOLIC BLOOD PRESSURE: 138 MMHG | HEART RATE: 87 BPM

## 2022-04-18 DIAGNOSIS — R05.9 COUGH: Primary | ICD-10-CM

## 2022-04-18 PROBLEM — I95.0 IDIOPATHIC HYPOTENSION: Status: RESOLVED | Noted: 2020-04-03 | Resolved: 2022-04-18

## 2022-04-18 PROCEDURE — 99283 EMERGENCY DEPT VISIT LOW MDM: CPT

## 2022-04-18 PROCEDURE — 74011250636 HC RX REV CODE- 250/636: Performed by: EMERGENCY MEDICINE

## 2022-04-18 RX ORDER — DEXAMETHASONE 4 MG/1
6 TABLET ORAL ONCE
Status: COMPLETED | OUTPATIENT
Start: 2022-04-18 | End: 2022-04-18

## 2022-04-18 RX ORDER — DEXAMETHASONE 4 MG/1
6 TABLET ORAL EVERY 12 HOURS
Status: DISCONTINUED | OUTPATIENT
Start: 2022-04-18 | End: 2022-04-18 | Stop reason: SDUPTHER

## 2022-04-18 RX ORDER — BENZONATATE 100 MG/1
100 CAPSULE ORAL
Qty: 21 CAPSULE | Refills: 0 | Status: SHIPPED | OUTPATIENT
Start: 2022-04-18 | End: 2022-04-21 | Stop reason: ALTCHOICE

## 2022-04-18 RX ADMIN — DEXAMETHASONE 6 MG: 4 TABLET ORAL at 10:57

## 2022-04-18 NOTE — ED PROVIDER NOTES
Pleasant 69-year-old female, returns today, was seen here yesterday by me personally. Returns today with same complaint of cough and congestion. States cough has become worse. Yesterday she had negative flu and a negative chest x-ray. She is using Mucinex with no improvement. States she had a rough night staying up coughing. No new medical complaints. Past Medical History:   Diagnosis Date    Acute kidney insufficiency     Followed by PCP     Adverse effect of anesthesia     2016 in [place] heart stopped at the end of my gallbladder surgery. \" Patient reports no CPR performed, stayed one night in the hospital. No problems with any other surgeries.     Allergic rhinitis     Anemia     history     Anxiety and depression     Controlled with medication     Arthritis     Edema     bilateral lower legs     GERD (gastroesophageal reflux disease)     Controlled with medication     History of chicken pox     History of Clostridioides difficile infection     History of DVT (deep vein thrombosis)     s/p knee replacement and developed right leg DVT     HTN (hypertension)     controlled with medication     Hyperlipidemia     controlled with medication     Hyperthyroidism     pt denies    Hypothyroidism     controlled with medication     IBS (irritable bowel syndrome)     Insomnia     Morbid obesity (HCC)     BMI 42.85    Murmur     per PCP note (88/7/10) systolic murmur grade of 2/6    Overactive bladder     PAD (peripheral artery disease) ([facility]     patient denies on 11/27/19    Recurrent UTI     RLS (restless legs syndrome)     controlled with medication     SOBOE (shortness of breath on exertion)     PRN inhaler-patient reports she uses inhaler QHS    Special examinations     History-Neurologist - Dr. 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 Past Surgical History:   Procedure Laterality Date    HX APPENDECTOMY  195    HX CHOLECYSTECTOMY  2016    HX COLONOSCOPY      HX GYN  2019    suburethral sling and cystoscopy    HX KNEE REPLACEMENT Right     HX MENISCUS REPAIR Left     2016    HX MENISCUS REPAIR Left     HX ORTHOPAEDIC Left     LEFT WRIST TRAPEZIOMETACARPAL ARTHROPLASTY WITH FCR TENDON TRANSFER-     HX ORTHOPAEDIC Left 2020    left hand tendon surgery    HX OTHER SURGICAL Right 2016    Hand    HX OTHER SURGICAL Left ~    B/L Ulnar    HX OTHER SURGICAL  ~    urethraplasty    HX OTHER SURGICAL  2019    bladder surgery-put mesh in    HX SHOULDER ARTHROSCOPY Right ~1966, ~2013    x2    HX TONSIL AND ADENOIDECTOMY  ~    HX TOTAL ABDOMINAL HYSTERECTOMY  ~    IR GASTRIC BAND ADJ W/ FLUORO  ~    lab band removed ~          Family History:   Problem Relation Age of Onset    No Known Problems Mother     Other Brother         brain aneurysm, AVM    Heart Surgery Brother         CABGx4    Heart Disease Brother     Hypertension Brother     Other Father 54        brain aneurysm    Other Sister         fibromyalgia    Pneumonia Sister     Heart Attack Maternal Grandmother     Cancer Maternal Grandfather         skin    Heart Attack Paternal Grandfather     Congenital anomalies Sister         right arm deformity    Other Son         Hepatitis C    Drug Abuse Son     Mult Sclerosis Daughter        Social History     Socioeconomic History    Marital status:      Spouse name: Not on file    Number of children: Not on file    Years of education: Not on file    Highest education level: Not on file   Occupational History    Not on file   Tobacco Use    Smoking status: Former Smoker     Packs/day: 1.00     Years: 40.00     Pack years: 40.00     Types: Cigarettes     Quit date: 2005     Years since quittin.3    Smokeless tobacco: Never Used   Vaping Use  Vaping Use: Never used   Substance and Sexual Activity    Alcohol use: Not Currently    Drug use: No    Sexual activity: Yes     Partners: Male   Other Topics Concern     Service Not Asked    Blood Transfusions No    Caffeine Concern Not Asked    Occupational Exposure No    Hobby Hazards Not Asked    Sleep Concern No    Stress Concern Not Asked    Weight Concern No    Special Diet Not Asked    Back Care No    Exercise No    Bike Helmet Not Asked    Seat Belt Yes    Self-Exams Yes   Social History Narrative    Not on file     Social Determinants of Health     Financial Resource Strain:     Difficulty of Paying Living Expenses: Not on file   Food Insecurity: Food Insecurity Present    Worried About Running Out of Food in the Last Year: Sometimes true    Chelita of Food in the Last Year: Sometimes true   Transportation Needs:     Lack of Transportation (Medical): Not on file    Lack of Transportation (Non-Medical):  Not on file   Physical Activity:     Days of Exercise per Week: Not on file    Minutes of Exercise per Session: Not on file   Stress:     Feeling of Stress : Not on file   Social Connections:     Frequency of Communication with Friends and Family: Not on file    Frequency of Social Gatherings with Friends and Family: Not on file    Attends Hindu Services: Not on file    Active Member of [address] [place] [address] or Organizations: Not on file    Attends Club or Organization Meetings: Not on file    Marital Status: Not on file   Intimate Partner Violence:     Fear of Current or Ex-Partner: Not on file    Emotionally Abused: Not on file    Physically Abused: Not on file    Sexually Abused: Not on file   Housing Stability:     Unable to Pay for Housing in the Last Year: Not on file    Number of [place] in the Last Year: Not on file    Unstable Housing in the Last Year: Not on file         ALLERGIES: Fire ant, Adhesive, Amoxicillin, Betadine [povidone-iodine], Cephalexin, Demerol [meperidine], Hydromorphone, Morphine, Percocet [oxycodone-acetaminophen], and Percodan [oxycodone hcl-oxycodone-asa]    Review of Systems   Constitutional: Negative for chills and fever. HENT: Positive for congestion. Respiratory: Positive for cough. Negative for shortness of breath. Cardiovascular: Negative for chest pain. Gastrointestinal: Negative for nausea and vomiting. All other systems reviewed and are negative. Vitals:    04/18/22 1036   Pulse: 87   Resp: 24   Temp: 97.7 °F (36.5 °C)   SpO2: 99%   Weight: 83.9 kg (185 lb)   Height: 4' 10\" (1.473 m)            Physical Exam  Vitals and nursing note reviewed. Constitutional:       General: She is not in acute distress. Appearance: Normal appearance. She is obese. She is not ill-appearing, toxic-appearing or diaphoretic. HENT:      Head: Normocephalic and atraumatic. Nose: Congestion present. Mouth/Throat:      Mouth: Mucous membranes are moist.   Eyes:      Pupils: Pupils are equal, round, and reactive to light. Cardiovascular:      Rate and Rhythm: Normal rate. Pulses: Normal pulses. Pulmonary:      Effort: No respiratory distress. Breath sounds: No stridor. No wheezing, rhonchi or rales. Chest:      Chest wall: No tenderness. Abdominal:      General: Abdomen is flat. Musculoskeletal:         General: Normal range of motion. Neurological:      General: No focal deficit present. Mental Status: She is alert. Psychiatric:         Mood and Affect: Mood normal.          MDM  Number of Diagnoses or Management Options  Cough  Diagnosis management comments: Again normotensive with 99% oxygen and afebrile here in triage. Lungs CTAB. No indication for another work-up today. Does not have signs of COVID so do not see reason to test for that. Will give Decadron by mouth here in the department and send home on [address]. Voice dictation software was used during the making of this note. This software is not perfect and grammatical and other typographical errors may be present. This note has been proofread, but may still contain errors.    Mary Lou Acevedo PA-C        Amount and/or Complexity of Data Reviewed  Review and summarize past medical records: yes    Risk of Complications, Morbidity, and/or Mortality  Presenting problems: minimal  Diagnostic procedures: minimal  Management options: minimal    Patient Progress  Patient progress: stable         Procedures

## 2022-04-18 NOTE — ED TRIAGE NOTES
Pt with cough for a few days. Pt was seen here yesterday for the same but states that she was up all night coughing. The meds she was given is not working.

## 2022-04-18 NOTE — ED NOTES
I have reviewed discharge instructions with the patient. The patient verbalized understanding. Patient left ED via Discharge Method: ambulatory to Home with family. Opportunity for questions and clarification provided. Patient given 1 scripts. To continue your aftercare when you leave the hospital, you may receive an automated call from our care team to check in on how you are doing. This is a free service and part of our promise to provide the best care and service to meet your aftercare needs.  If you have questions, or wish to unsubscribe from this service please call [phone]. Thank you for Choosing our [address] [place] [address] Emergency Department.

## 2022-05-07 DIAGNOSIS — E03.9 PRIMARY HYPOTHYROIDISM: Primary | ICD-10-CM

## 2022-05-09 ENCOUNTER — APPOINTMENT (OUTPATIENT)
Dept: CT IMAGING | Age: 77
DRG: 871 | End: 2022-05-09
Attending: EMERGENCY MEDICINE
Payer: MEDICARE

## 2022-05-09 ENCOUNTER — HOSPITAL ENCOUNTER (INPATIENT)
Age: 77
LOS: 6 days | Discharge: HOME HEALTH | DRG: 871 | End: 2022-05-15
Attending: EMERGENCY MEDICINE | Admitting: FAMILY MEDICINE
Payer: MEDICARE

## 2022-05-09 DIAGNOSIS — K52.9 ACUTE COLITIS: Primary | ICD-10-CM

## 2022-05-09 DIAGNOSIS — N17.9 ACUTE KIDNEY INJURY (HCC): ICD-10-CM

## 2022-05-09 DIAGNOSIS — E86.9 VOLUME DEPLETION: ICD-10-CM

## 2022-05-09 PROBLEM — D50.0 IRON DEFICIENCY ANEMIA DUE TO CHRONIC BLOOD LOSS: Status: ACTIVE | Noted: 2017-12-06

## 2022-05-09 PROBLEM — R65.20 SEPSIS WITH ACUTE RENAL FAILURE AND TUBULAR NECROSIS WITHOUT SEPTIC SHOCK (HCC): Status: ACTIVE | Noted: 2022-05-09

## 2022-05-09 PROBLEM — R10.9 LEFT FLANK PAIN: Status: RESOLVED | Noted: 2020-03-05 | Resolved: 2022-05-09

## 2022-05-09 PROBLEM — I10 PRIMARY HYPERTENSION: Chronic | Status: ACTIVE | Noted: 2017-12-06

## 2022-05-09 PROBLEM — N17.0 SEPSIS WITH ACUTE RENAL FAILURE AND TUBULAR NECROSIS WITHOUT SEPTIC SHOCK (HCC): Status: ACTIVE | Noted: 2022-05-09

## 2022-05-09 PROBLEM — A03.9 SHIGELLA GASTROENTERITIS: Status: ACTIVE | Noted: 2022-05-09

## 2022-05-09 PROBLEM — N18.32 STAGE 3B CHRONIC KIDNEY DISEASE (HCC): Chronic | Status: ACTIVE | Noted: 2022-05-09

## 2022-05-09 PROBLEM — N18.32 STAGE 3B CHRONIC KIDNEY DISEASE (HCC): Status: ACTIVE | Noted: 2022-05-09

## 2022-05-09 PROBLEM — D50.0 IRON DEFICIENCY ANEMIA DUE TO CHRONIC BLOOD LOSS: Chronic | Status: ACTIVE | Noted: 2017-12-06

## 2022-05-09 PROBLEM — A41.9 SEPSIS WITH ACUTE RENAL FAILURE AND TUBULAR NECROSIS WITHOUT SEPTIC SHOCK (HCC): Status: ACTIVE | Noted: 2022-05-09

## 2022-05-09 PROBLEM — I10 PRIMARY HYPERTENSION: Status: ACTIVE | Noted: 2017-12-06

## 2022-05-09 LAB
ABO + RH BLD: NORMAL
ALBUMIN SERPL-MCNC: 3.2 G/DL (ref 3.2–4.6)
ALBUMIN/GLOB SERPL: 0.8 {RATIO} (ref 1.2–3.5)
ALP SERPL-CCNC: 96 U/L (ref 50–130)
ALT SERPL-CCNC: 21 U/L (ref 12–65)
ANION GAP SERPL CALC-SCNC: 7 MMOL/L (ref 7–16)
AST SERPL-CCNC: 16 U/L (ref 15–37)
BASOPHILS # BLD: 0.1 K/UL (ref 0–0.2)
BASOPHILS NFR BLD: 0 % (ref 0–2)
BILIRUB SERPL-MCNC: 0.4 MG/DL (ref 0.2–1.1)
BLOOD GROUP ANTIBODIES SERPL: NORMAL
BUN SERPL-MCNC: 37 MG/DL (ref 8–23)
CALCIUM SERPL-MCNC: 9.1 MG/DL (ref 8.3–10.4)
CHLORIDE SERPL-SCNC: 104 MMOL/L (ref 98–107)
CO2 SERPL-SCNC: 26 MMOL/L (ref 21–32)
CREAT SERPL-MCNC: 2.34 MG/DL (ref 0.6–1)
DIFFERENTIAL METHOD BLD: ABNORMAL
EOSINOPHIL # BLD: 0.1 K/UL (ref 0–0.8)
EOSINOPHIL NFR BLD: 1 % (ref 0.5–7.8)
ERYTHROCYTE [DISTWIDTH] IN BLOOD BY AUTOMATED COUNT: 16.1 % (ref 11.9–14.6)
GLOBULIN SER CALC-MCNC: 3.9 G/DL (ref 2.3–3.5)
GLUCOSE SERPL-MCNC: 136 MG/DL (ref 65–100)
HCT VFR BLD AUTO: 35 % (ref 35.8–46.3)
HGB BLD-MCNC: 11.2 G/DL (ref 11.7–15.4)
IMM GRANULOCYTES # BLD AUTO: 0.1 K/UL (ref 0–0.5)
IMM GRANULOCYTES NFR BLD AUTO: 1 % (ref 0–5)
INR PPP: 1.1
LIPASE SERPL-CCNC: 116 U/L (ref 73–393)
LYMPHOCYTES # BLD: 1.8 K/UL (ref 0.5–4.6)
LYMPHOCYTES NFR BLD: 10 % (ref 13–44)
MCH RBC QN AUTO: 28.2 PG (ref 26.1–32.9)
MCHC RBC AUTO-ENTMCNC: 32 G/DL (ref 31.4–35)
MCV RBC AUTO: 88.2 FL (ref 79.6–97.8)
MONOCYTES # BLD: 0.7 K/UL (ref 0.1–1.3)
MONOCYTES NFR BLD: 4 % (ref 4–12)
NEUTS SEG # BLD: 15.1 K/UL (ref 1.7–8.2)
NEUTS SEG NFR BLD: 85 % (ref 43–78)
NRBC # BLD: 0 K/UL (ref 0–0.2)
PLATELET # BLD AUTO: 331 K/UL (ref 150–450)
PMV BLD AUTO: 10.5 FL (ref 9.4–12.3)
POTASSIUM SERPL-SCNC: 5 MMOL/L (ref 3.5–5.1)
PROT SERPL-MCNC: 7.1 G/DL (ref 6.3–8.2)
PROTHROMBIN TIME: 14.2 SEC (ref 12.6–14.5)
RBC # BLD AUTO: 3.97 M/UL (ref 4.05–5.2)
SODIUM SERPL-SCNC: 137 MMOL/L (ref 136–145)
SPECIMEN EXP DATE BLD: NORMAL
WBC # BLD AUTO: 17.8 K/UL (ref 4.3–11.1)

## 2022-05-09 PROCEDURE — 74011000250 HC RX REV CODE- 250: Performed by: FAMILY MEDICINE

## 2022-05-09 PROCEDURE — 96375 TX/PRO/DX INJ NEW DRUG ADDON: CPT

## 2022-05-09 PROCEDURE — 96374 THER/PROPH/DIAG INJ IV PUSH: CPT

## 2022-05-09 PROCEDURE — 99285 EMERGENCY DEPT VISIT HI MDM: CPT

## 2022-05-09 PROCEDURE — 74011250637 HC RX REV CODE- 250/637: Performed by: FAMILY MEDICINE

## 2022-05-09 PROCEDURE — 77010033678 HC OXYGEN DAILY

## 2022-05-09 PROCEDURE — 74011250636 HC RX REV CODE- 250/636: Performed by: FAMILY MEDICINE

## 2022-05-09 PROCEDURE — 86900 BLOOD TYPING SEROLOGIC ABO: CPT

## 2022-05-09 PROCEDURE — 74011250636 HC RX REV CODE- 250/636: Performed by: EMERGENCY MEDICINE

## 2022-05-09 PROCEDURE — 85025 COMPLETE CBC W/AUTO DIFF WBC: CPT

## 2022-05-09 PROCEDURE — 94762 N-INVAS EAR/PLS OXIMTRY CONT: CPT

## 2022-05-09 PROCEDURE — 85610 PROTHROMBIN TIME: CPT

## 2022-05-09 PROCEDURE — 74176 CT ABD & PELVIS W/O CONTRAST: CPT

## 2022-05-09 PROCEDURE — 94760 N-INVAS EAR/PLS OXIMETRY 1: CPT

## 2022-05-09 PROCEDURE — 65610000001 HC ROOM ICU GENERAL

## 2022-05-09 PROCEDURE — 74011000636 HC RX REV CODE- 636: Performed by: EMERGENCY MEDICINE

## 2022-05-09 PROCEDURE — 74011000250 HC RX REV CODE- 250: Performed by: EMERGENCY MEDICINE

## 2022-05-09 PROCEDURE — 80053 COMPREHEN METABOLIC PANEL: CPT

## 2022-05-09 PROCEDURE — 96361 HYDRATE IV INFUSION ADD-ON: CPT

## 2022-05-09 PROCEDURE — 96376 TX/PRO/DX INJ SAME DRUG ADON: CPT

## 2022-05-09 PROCEDURE — 83690 ASSAY OF LIPASE: CPT

## 2022-05-09 PROCEDURE — 65270000046 HC RM TELEMETRY

## 2022-05-09 PROCEDURE — 87040 BLOOD CULTURE FOR BACTERIA: CPT

## 2022-05-09 PROCEDURE — 74011250637 HC RX REV CODE- 250/637: Performed by: PHYSICIAN ASSISTANT

## 2022-05-09 RX ORDER — FAMOTIDINE 10 MG/ML
20 INJECTION INTRAVENOUS
Status: COMPLETED | OUTPATIENT
Start: 2022-05-09 | End: 2022-05-09

## 2022-05-09 RX ORDER — CLONAZEPAM 1 MG/1
0.5 TABLET ORAL
Status: DISCONTINUED | OUTPATIENT
Start: 2022-05-09 | End: 2022-05-15 | Stop reason: HOSPADM

## 2022-05-09 RX ORDER — ONDANSETRON 2 MG/ML
4 INJECTION INTRAMUSCULAR; INTRAVENOUS
Status: COMPLETED | OUTPATIENT
Start: 2022-05-09 | End: 2022-05-09

## 2022-05-09 RX ORDER — ACETAMINOPHEN 325 MG/1
650 TABLET ORAL
Status: DISCONTINUED | OUTPATIENT
Start: 2022-05-09 | End: 2022-05-15 | Stop reason: HOSPADM

## 2022-05-09 RX ORDER — SODIUM CHLORIDE 0.9 % (FLUSH) 0.9 %
5-10 SYRINGE (ML) INJECTION EVERY 8 HOURS
Status: DISCONTINUED | OUTPATIENT
Start: 2022-05-09 | End: 2022-05-09 | Stop reason: SDUPTHER

## 2022-05-09 RX ORDER — ONDANSETRON 2 MG/ML
4 INJECTION INTRAMUSCULAR; INTRAVENOUS
Status: DISCONTINUED | OUTPATIENT
Start: 2022-05-09 | End: 2022-05-14

## 2022-05-09 RX ORDER — METRONIDAZOLE 500 MG/100ML
500 INJECTION, SOLUTION INTRAVENOUS
Status: DISCONTINUED | OUTPATIENT
Start: 2022-05-09 | End: 2022-05-09

## 2022-05-09 RX ORDER — LISINOPRIL 20 MG/1
20 TABLET ORAL DAILY
Status: DISCONTINUED | OUTPATIENT
Start: 2022-05-10 | End: 2022-05-15 | Stop reason: HOSPADM

## 2022-05-09 RX ORDER — POLYETHYLENE GLYCOL 3350 17 G/17G
17 POWDER, FOR SOLUTION ORAL DAILY PRN
Status: DISCONTINUED | OUTPATIENT
Start: 2022-05-09 | End: 2022-05-15 | Stop reason: HOSPADM

## 2022-05-09 RX ORDER — MORPHINE SULFATE 4 MG/ML
4 INJECTION INTRAVENOUS
Status: COMPLETED | OUTPATIENT
Start: 2022-05-09 | End: 2022-05-09

## 2022-05-09 RX ORDER — SODIUM CHLORIDE, SODIUM LACTATE, POTASSIUM CHLORIDE, CALCIUM CHLORIDE 600; 310; 30; 20 MG/100ML; MG/100ML; MG/100ML; MG/100ML
150 INJECTION, SOLUTION INTRAVENOUS CONTINUOUS
Status: DISCONTINUED | OUTPATIENT
Start: 2022-05-09 | End: 2022-05-14

## 2022-05-09 RX ORDER — AMITRIPTYLINE HYDROCHLORIDE 25 MG/1
25 TABLET, FILM COATED ORAL
Status: DISCONTINUED | OUTPATIENT
Start: 2022-05-09 | End: 2022-05-15 | Stop reason: HOSPADM

## 2022-05-09 RX ORDER — LEVOFLOXACIN 5 MG/ML
750 INJECTION, SOLUTION INTRAVENOUS
Status: DISCONTINUED | OUTPATIENT
Start: 2022-05-09 | End: 2022-05-15 | Stop reason: HOSPADM

## 2022-05-09 RX ORDER — HYDROCHLOROTHIAZIDE 25 MG/1
25 TABLET ORAL DAILY
Status: DISCONTINUED | OUTPATIENT
Start: 2022-05-10 | End: 2022-05-15 | Stop reason: HOSPADM

## 2022-05-09 RX ORDER — ONDANSETRON 4 MG/1
4 TABLET, ORALLY DISINTEGRATING ORAL
Status: DISCONTINUED | OUTPATIENT
Start: 2022-05-09 | End: 2022-05-14

## 2022-05-09 RX ORDER — SODIUM CHLORIDE 0.9 % (FLUSH) 0.9 %
5-40 SYRINGE (ML) INJECTION EVERY 8 HOURS
Status: DISCONTINUED | OUTPATIENT
Start: 2022-05-09 | End: 2022-05-15 | Stop reason: HOSPADM

## 2022-05-09 RX ORDER — SODIUM CHLORIDE 0.9 % (FLUSH) 0.9 %
5-10 SYRINGE (ML) INJECTION AS NEEDED
Status: DISCONTINUED | OUTPATIENT
Start: 2022-05-09 | End: 2022-05-15 | Stop reason: HOSPADM

## 2022-05-09 RX ORDER — SAME BUTANEDISULFONATE/BETAINE 400-600 MG
250 POWDER IN PACKET (EA) ORAL 2 TIMES DAILY
Status: DISCONTINUED | OUTPATIENT
Start: 2022-05-09 | End: 2022-05-15 | Stop reason: HOSPADM

## 2022-05-09 RX ORDER — ESCITALOPRAM OXALATE 10 MG/1
30 TABLET ORAL EVERY EVENING
Status: DISCONTINUED | OUTPATIENT
Start: 2022-05-09 | End: 2022-05-15 | Stop reason: HOSPADM

## 2022-05-09 RX ORDER — LEVOTHYROXINE SODIUM 75 UG/1
150 TABLET ORAL
Status: DISCONTINUED | OUTPATIENT
Start: 2022-05-09 | End: 2022-05-15 | Stop reason: HOSPADM

## 2022-05-09 RX ORDER — CIPROFLOXACIN 2 MG/ML
400 INJECTION, SOLUTION INTRAVENOUS
Status: DISCONTINUED | OUTPATIENT
Start: 2022-05-09 | End: 2022-05-09

## 2022-05-09 RX ORDER — SODIUM CHLORIDE 0.9 % (FLUSH) 0.9 %
5-40 SYRINGE (ML) INJECTION AS NEEDED
Status: DISCONTINUED | OUTPATIENT
Start: 2022-05-09 | End: 2022-05-15 | Stop reason: HOSPADM

## 2022-05-09 RX ADMIN — CIPROFLOXACIN 400 MG: 2 INJECTION, SOLUTION INTRAVENOUS at 13:27

## 2022-05-09 RX ADMIN — LEVOFLOXACIN 750 MG: 5 INJECTION, SOLUTION INTRAVENOUS at 16:42

## 2022-05-09 RX ADMIN — ONDANSETRON 4 MG: 2 INJECTION INTRAMUSCULAR; INTRAVENOUS at 08:42

## 2022-05-09 RX ADMIN — FAMOTIDINE 20 MG: 10 INJECTION, SOLUTION INTRAVENOUS at 08:43

## 2022-05-09 RX ADMIN — SODIUM CHLORIDE, PRESERVATIVE FREE 10 ML: 5 INJECTION INTRAVENOUS at 14:00

## 2022-05-09 RX ADMIN — MORPHINE SULFATE 4 MG: 4 INJECTION INTRAVENOUS at 11:58

## 2022-05-09 RX ADMIN — ESCITALOPRAM OXALATE 30 MG: 10 TABLET ORAL at 18:06

## 2022-05-09 RX ADMIN — DIATRIZOATE MEGLUMINE AND DIATRIZOATE SODIUM 15 ML: 660; 100 LIQUID ORAL; RECTAL at 09:06

## 2022-05-09 RX ADMIN — ONDANSETRON 4 MG: 2 INJECTION INTRAMUSCULAR; INTRAVENOUS at 11:57

## 2022-05-09 RX ADMIN — SODIUM CHLORIDE 1000 ML: 900 INJECTION, SOLUTION INTRAVENOUS at 08:28

## 2022-05-09 RX ADMIN — AMITRIPTYLINE HYDROCHLORIDE 25 MG: 50 TABLET, FILM COATED ORAL at 21:45

## 2022-05-09 RX ADMIN — RDII 250 MG CAPSULE 250 MG: at 18:06

## 2022-05-09 RX ADMIN — SODIUM CHLORIDE, SODIUM LACTATE, POTASSIUM CHLORIDE, AND CALCIUM CHLORIDE 150 ML/HR: 600; 310; 30; 20 INJECTION, SOLUTION INTRAVENOUS at 19:58

## 2022-05-09 RX ADMIN — LEVOTHYROXINE SODIUM 150 MCG: 0.07 TABLET ORAL at 21:45

## 2022-05-09 RX ADMIN — SODIUM CHLORIDE, PRESERVATIVE FREE 10 ML: 5 INJECTION INTRAVENOUS at 21:45

## 2022-05-09 RX ADMIN — SODIUM CHLORIDE, SODIUM LACTATE, POTASSIUM CHLORIDE, AND CALCIUM CHLORIDE 1000 ML: 600; 310; 30; 20 INJECTION, SOLUTION INTRAVENOUS at 23:32

## 2022-05-09 RX ADMIN — ACETAMINOPHEN 325MG 650 MG: 325 TABLET ORAL at 19:58

## 2022-05-09 RX ADMIN — SODIUM CHLORIDE, PRESERVATIVE FREE 5 ML: 5 INJECTION INTRAVENOUS at 07:53

## 2022-05-09 NOTE — PROGRESS NOTES
Patient noted to be hypotensive (91/47) upon assessment. Doctor Lexi Stover notified. Orders placed. Will continue to monitor.

## 2022-05-09 NOTE — ED PROVIDER NOTES
67-year-old female patient presents to the ER with complaints of nausea vomiting diarrhea with associated generalized abdominal pain  The symptoms yesterday not long after eating dinner out in the early afternoon  No fever  History of IBS  Has had prior colonoscopy. History of \"colitis. \"  Told on her prior colonoscopy she has some polyps but no other acute problems          The history is provided by the patient and the spouse. Rectal Bleeding   This is a new problem. The current episode started 3 to 5 hours ago. The stool is described as loose. Associated symptoms include abdominal pain, nausea, vomiting and diarrhea. Pertinent negatives include no dysuria, no abdominal distention, no chills, no fever and no constipation. Risk factors include obesity. She has tried nothing for the symptoms. Her past medical history is significant for irritable bowel syndrome. Abdominal Pain   Associated symptoms include diarrhea, nausea and vomiting. Pertinent negatives include no fever, no constipation, no dysuria, no headaches, no arthralgias, no myalgias and no chest pain. Her past medical history is significant for irritable bowel syndrome. Past Medical History:   Diagnosis Date    Acute kidney insufficiency     Followed by PCP     Adverse effect of anesthesia     2016 in [place] heart stopped at the end of my gallbladder surgery. \" Patient reports no CPR performed, stayed one night in the hospital. No problems with any other surgeries.     Allergic rhinitis     Anemia     history     Anxiety and depression     Controlled with medication     Arthritis     Edema     bilateral lower legs     GERD (gastroesophageal reflux disease)     Controlled with medication     History of chicken pox     History of Clostridioides difficile infection     History of DVT (deep vein thrombosis)     s/p knee replacement and developed right leg DVT     HTN (hypertension)     controlled with medication     Hyperlipidemia controlled with medication     Hyperthyroidism     pt denies    Hypothyroidism     controlled with medication     IBS (irritable bowel syndrome)     Insomnia     Morbid obesity (HCC)     BMI 42.85    Murmur     per PCP note (64/5/47) systolic murmur grade of 2/6    Overactive bladder     PAD (peripheral artery disease) ([facility] [address]     patient denies on 11/27/19    Recurrent UTI     RLS (restless legs syndrome)     controlled with medication     SOBOE (shortness of breath on exertion)     PRN inhaler-patient reports she uses inhaler QHS    Special examinations     History-Neurologist - Dr. 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       Past Surgical History:   Procedure Laterality Date    HX APPENDECTOMY  1955    HX CHOLECYSTECTOMY  12/2016    HX COLONOSCOPY      HX GYN  12/03/2019    suburethral sling and cystoscopy    HX KNEE REPLACEMENT Right 2014    HX MENISCUS REPAIR Left     4/2016    HX MENISCUS REPAIR Left 2014    HX ORTHOPAEDIC Left     LEFT WRIST TRAPEZIOMETACARPAL ARTHROPLASTY WITH FCR TENDON TRANSFER- 2020    HX ORTHOPAEDIC Left 01/2020    left hand tendon surgery    HX OTHER SURGICAL Right 2016    Hand    HX OTHER SURGICAL Left ~2014    B/L Ulnar    HX OTHER SURGICAL  ~1986    urethraplasty    HX OTHER SURGICAL  12/03/2019    bladder surgery-put mesh in    HX SHOULDER ARTHROSCOPY Right ~1966, ~2013    x2    HX TONSIL AND ADENOIDECTOMY  ~1950    HX TOTAL ABDOMINAL HYSTERECTOMY  ~1970    IR GASTRIC BAND ADJ W/ FLUORO  ~2014    lab band removed ~ 2016         Family History:   Problem Relation Age of Onset    No Known Problems Mother     Other Brother         brain aneurysm, AVM    Heart Surgery Brother         CABGx4    Heart Disease Brother     Hypertension Brother     Other Father 54        brain aneurysm    Other Sister fibromyalgia    Pneumonia Sister     Heart Attack Maternal Grandmother     Cancer Maternal Grandfather         skin    Heart Attack Paternal Grandfather     Congenital anomalies Sister         right arm deformity    Other Son         Hepatitis C    Drug Abuse Son     Mult Sclerosis Daughter        Social History     Socioeconomic History    Marital status:      Spouse name: Not on file    Number of children: Not on file    Years of education: Not on file    Highest education level: Not on file   Occupational History    Not on file   Tobacco Use    Smoking status: Former Smoker     Packs/day: 1.00     Years: 40.00     Pack years: 40.00     Types: Cigarettes     Quit date: 2005     Years since quittin.4    Smokeless tobacco: Never Used   Vaping Use    Vaping Use: Never used   Substance and Sexual Activity    Alcohol use: Not Currently    Drug use: No    Sexual activity: Yes     Partners: Male   Other Topics Concern     Service Not Asked    Blood Transfusions No    Caffeine Concern Not Asked    Occupational Exposure No    Hobby Hazards Not Asked    Sleep Concern No    Stress Concern Not Asked    Weight Concern No    Special Diet Not Asked    Back Care No    Exercise No    Bike Helmet Not Asked    Seat Belt Yes    Self-Exams Yes   Social History Narrative    Not on file     Social Determinants of Health     Financial Resource Strain:     Difficulty of Paying Living Expenses: Not on file   Food Insecurity: Food Insecurity Present    Worried About Running Out of Food in the Last Year: Sometimes true    [place] of Food in the Last Year: Sometimes true   Transportation Needs:     Lack of Transportation (Medical): Not on file    Lack of Transportation (Non-Medical):  Not on file   Physical Activity:     Days of Exercise per Week: Not on file    Minutes of Exercise per Session: Not on file   Stress:     Feeling of Stress : Not on file   Social Connections:     Frequency of Communication with Friends and Family: Not on file    Frequency of Social Gatherings with Friends and Family: Not on file    Attends Mu-ism Services: Not on file    Active Member of Clubs or Organizations: Not on file    Attends Club or Organization Meetings: Not on file    Marital Status: Not on file   Intimate Partner Violence:     Fear of Current or Ex-Partner: Not on file    Emotionally Abused: Not on file    Physically Abused: Not on file    Sexually Abused: Not on file   Housing Stability:     Unable to Pay for Housing in the Last Year: Not on file    Number of [place] in the Last Year: Not on file    Unstable Housing in the Last Year: Not on file         ALLERGIES: Fire ant, Adhesive, Amoxicillin, Betadine [povidone-iodine], Cephalexin, Demerol [meperidine], Hydromorphone, Morphine, Percocet [oxycodone-acetaminophen], and Percodan [oxycodone hcl-oxycodone-asa]    Review of Systems   Constitutional: Negative for chills and fever. HENT: Negative for congestion, ear pain and rhinorrhea. Eyes: Negative for photophobia and discharge. Respiratory: Negative for cough and shortness of breath. Cardiovascular: Negative for chest pain and palpitations. Gastrointestinal: Positive for abdominal pain, anal bleeding, diarrhea, nausea and vomiting. Negative for abdominal distention and constipation. Endocrine: Negative for cold intolerance and heat intolerance. Genitourinary: Negative for dysuria and flank pain. Musculoskeletal: Negative for arthralgias, myalgias and neck pain. Skin: Negative for rash and wound. Allergic/Immunologic: Negative for environmental allergies and food allergies. Neurological: Negative for syncope and headaches. Hematological: Negative for adenopathy. Does not bruise/bleed easily. Psychiatric/Behavioral: Negative for dysphoric mood. The patient is not nervous/anxious. All other systems reviewed and are negative.       Vitals: 05/09/22 0726   BP: (!) 154/70   Pulse: 86   Resp: 20   Temp: 97.5 °F (36.4 °C)   SpO2: 98%   Weight: 86.2 kg (190 lb)   Height: 4' 10\" (1.473 m)            Physical Exam  Vitals and nursing note reviewed. Exam conducted with a chaperone present (Raquel Benson). Constitutional:       General: She is in acute distress. Appearance: Normal appearance. She is well-developed. She is obese. HENT:      Head: Normocephalic and atraumatic. Right Ear: External ear normal.      Left Ear: External ear normal.      Mouth/Throat:      Pharynx: No oropharyngeal exudate. Eyes:      Extraocular Movements: Extraocular movements intact. Conjunctiva/sclera: Conjunctivae normal.      Pupils: Pupils are equal, round, and reactive to light. Neck:      Vascular: No JVD. Cardiovascular:      Rate and Rhythm: Normal rate and regular rhythm. Heart sounds: Normal heart sounds. No murmur heard. No friction rub. No gallop. Pulmonary:      Effort: Pulmonary effort is normal.      Breath sounds: Normal breath sounds. Abdominal:      General: Bowel sounds are normal. There is no distension. Palpations: Abdomen is soft. There is no mass. Tenderness: There is generalized abdominal tenderness. Genitourinary:     Rectum: Guaiac result positive. No tenderness, anal fissure or external hemorrhoid. Comments: Gross blood  Heme positive  QC positive  Musculoskeletal:         General: No deformity. Normal range of motion. Cervical back: Normal range of motion and neck supple. Skin:     General: Skin is warm and dry. Capillary Refill: Capillary refill takes less than 2 seconds. Findings: No rash. Neurological:      General: No focal deficit present. Mental Status: She is alert and oriented to person, place, and time. Cranial Nerves: No cranial nerve deficit. Sensory: No sensory deficit.       Gait: Gait normal.   Psychiatric:         Mood and Affect: Mood normal. Speech: Speech normal.         Behavior: Behavior normal.         Thought Content: Thought content normal.         Judgment: Judgment normal.          MDM  Number of Diagnoses or Management Options  Acute colitis: new and requires workup  Acute kidney injury ([address]): new and requires workup  Volume depletion: new and requires workup  Diagnosis management comments: 78-year-old female patient with a history of IBS presents with a 1 day history of nausea vomiting diarrhea  3 to 4 hours ago she awoke with more diarrhea and states she had 2 episodes of bright red blood. States she had a similar episode when she was diagnosed with \"colitis\" about a year ago    Bite labs fluids antiemetics  Check CT to rule out abscess versus diverticulitis versus obstruction versus perforation    12:14 PM  CT abdomen pelvis reveals inflammatory changes in the left colon consistent with an acute colitis    Will initiate blood cultures and IV antibiotics  Hospitalist consulted for admission given acute kidney injury with leukocytosis and acute infectious process       Amount and/or Complexity of Data Reviewed  Clinical lab tests: ordered and reviewed  Tests in the radiology section of CPT®: ordered and reviewed  Tests in the medicine section of CPT®: ordered and reviewed  Decide to obtain previous medical records or to obtain history from someone other than the patient: yes  Obtain history from someone other than the patient: yes  Review and summarize past medical records: yes  Discuss the patient with other providers: yes  Independent visualization of images, tracings, or specimens: yes    Risk of Complications, Morbidity, and/or Mortality  Presenting problems: high  Diagnostic procedures: high  Management options: high  General comments: Elements of this note have been dictated via voice recognition software. Text and phrases may be limited by the accuracy of the software.   The chart has been reviewed, but errors may still be present.       Patient Progress  Patient progress: improved         Procedures

## 2022-05-09 NOTE — PROGRESS NOTES
Primary Nurse Shannan Miguel and Daniel Ordaz RN performed a dual skin assessment on this patient No impairment noted

## 2022-05-09 NOTE — ED TRIAGE NOTES
Pt reports nausea and emesis started yesterday 1400, started having worsened abd pain until 2100 last night and then diarrhea 0400 with bright red blood in stool (2 episodes). (-)dyspnea, cp, fevers, blood in emesis. Hx GI bleed, colitis. Pt is not on blood thinners.   A&Ox4

## 2022-05-09 NOTE — PROGRESS NOTES
Care Management Interventions  PCP Verified by CM: Yes  Mode of Transport at Discharge: Self  Transition of Care Consult (CM Consult): Discharge Planning  Support Systems: Spouse/Significant Other (Spouse/Guevara #[phone])  Confirm Follow Up Transport: Family  The Patient and/or Patient Representative was Provided with a Choice of Provider and Agrees with the Discharge Plan?: Yes  Freedom of Choice List was Provided with Basic Dialogue that Supports the Patient's Individualized Plan of Care/Goals, Treatment Preferences and Shares the Quality Data Associated with the Providers?: Yes  Discharge Location  Patient Expects to be Discharged to[de-identified] Home   Visited with pt to establish prior to admission baseline and discuss their anticipated goals at time of d/c. Pt lives at home with spouse/Guevara (who does not drive). She is very inde and they have a 1st floor apartment. Pt is current with PCP and Rx are filled at [facility] on [address] [place] [address]. She had a concern for how [place] will get home if none of there friends are available. They do not have any family in state. Roundtrip has been offered if need, nursing staff on unit is aware as well. No further needs at this time, CM to follow.

## 2022-05-09 NOTE — H&P
Hospitalist History and Physical   Admit Date:  2022  7:24 AM   Name:  Malorie Mesa   Age:  77 y.o. Sex:  female  :  1945   MRN:  [id number]   Room:  [place] [address]    Presenting Complaint: Rectal Bleeding and Abdominal Pain    Reason(s) for Admission: Sepsis with acute renal failure and tubular necrosis without septic shock (HCC) [A41.9, R65.20, N17.0]     History of Present Illness:   Malorie Mesa is a 77 y.o. female with medical history of diverticulosis, HTN, hypothyroidism, OHS and IBS who presented to [facility] ER on 22 with c/o abd pain since last night. Pt notes that last night she ate at [facility] for dinner. On the drive home, she started feeling unwell. She describes that her abdomen had pain, as if she was severely constipated. Upon arrival home, she was unable to defecate, but had 5-6 episodes of non-bloody emesis throughout the evening. She notes she fell asleep around 12am and then awoke at 4am this morning with lower abdominal pain. She initially had a \"normal\" BM, but as the morning progressed, she developed diarrhea, and then bloody diarrhea. Pt continues to have LLQ abdominal pain at this time. She denies fever, but admits to chills. She denies sick contacts. Pt did have salad at restaurant last night. She denies SOB, denies CP. Last colonoscopy was 2021, with diverticulosis and internal hemorrhoids noted. ER workup revealed PARUL with BUN/Cr of 37/2.34 and elevated WBC count of 17.8k. CT A/P revealed acute colitis of the transverse colon. Hospitalist admitted for further care. Review of Systems:  10 systems reviewed and negative except as noted in HPI. Assessment & Plan:     Principal Problem:    Sepsis with acute renal failure and tubular necrosis without septic shock due to suspected shigella gastroenteritis -   - cont aggressive IVF resuscitation.  Check blood cultures  - consider stool sample for culture  - starting Levaquin  - add probiotics given hx of Cdiff  - repeat CBC in AM to monitor WBC count  - NPO with clear liquids sips    Active Problems:    Primary hypertension -  - hold lisinopril and hctz due to volume depletion and pre-renal PARUL  - pt did take her antihypertensives this morning  - monitor      Iron deficiency anemia due to chronic blood loss -  - not on oral therapy  - repeat CBC in AM      IBS (irritable bowel syndrome) -  - noted      Mood disorder -   - cont Lexapro and Elavil as tolerated      Hemorrhoids -  - internal hemorrhoids noted on colonoscopy last year, suspect cause of hematochezia      Hashimoto's thyroiditis  -  - cont Synthroid      Obesity hypoventilation syndrome -  - noted. Does not appear to be on chronic therapy      Gait abnormality -  - see by neurology - has some level of autonomic dysfunction/neuropathy. Class 3 obesity with alveolar hypoventilation, serious comorbidity, and body mass index (BMI) of 40.0 to 44.9 in adult [facility]) (2/26/2020)  - noted      Chronic pain disorder (10/18/2019)  - cont Elavil      Hematochezia -  - noted. Cont to monitor BMs for blood. Suspect due to internal hemorrhoids  Transfuse for Hgb < 7. Colitis -  - as per above, cont supportive care      Leukocytosis -  - suspect from gastroenteritis, volume contraction. Repeat CBC in AM      History of Clostridioides difficile colitis -  - place on probiotic      Stage 3b chronic kidney disease  With acute kidney injury - cont IVF, monitor BMP        Dispo/Discharge Planning:    - suspect patient will need 1-2 days in hospital given severity of volume depletion and colitis.      Diet: NPO  VTE ppx: SCDs  Code status: Full    Hospital Problems as of 5/9/2022 Date Reviewed: 5/9/2022          Codes Class Noted - Resolved POA    Shigella gastroenteritis ICD-10-CM: A03.9  ICD-9-CM: 004.9  5/9/2022 - Present Yes        * (Principal) Sepsis with acute renal failure and tubular necrosis without septic shock (HCC) ICD-10-CM: A41.9, R65.20, N17.0  ICD-9-CM: 038.9, 995.91, 584.5  5/9/2022 - Present Yes        Stage 3b chronic kidney disease ([facility] (Chronic) ICD-10-CM: N18.32  ICD-9-CM: 585.3  5/9/2022 - Present Yes        Hematochezia ICD-10-CM: K92.1  ICD-9-CM: 578.1  3/27/2021 - Present Yes        Colitis ICD-10-CM: K52.9  ICD-9-CM: 558.9  3/27/2021 - Present Yes        Leukocytosis ICD-10-CM: K12.267  ICD-9-CM: 288.60  3/27/2021 - Present Yes        History of Clostridioides difficile colitis ICD-10-CM: Z86.19  ICD-9-CM: V12.79  3/27/2021 - Present Yes        Gait abnormality ICD-10-CM: R26.9  ICD-9-CM: 781.2  2/26/2020 - Present Yes        Class 3 obesity with alveolar hypoventilation, serious comorbidity, and body mass index (BMI) of 40.0 to 44.9 in adult [facility]) ICD-10-CM: E66.2, Z68.41  ICD-9-CM: 278.03, V85.41  2/26/2020 - Present Yes        Chronic pain disorder ICD-10-CM: G89.4  ICD-9-CM: 338.4  10/18/2019 - Present Yes        Chronic nausea ICD-10-CM: R11.0  ICD-9-CM: 787.02  6/26/2019 - Present Yes        Obesity hypoventilation syndrome ([facility] ICD-10-CM: F70.7  ICD-9-CM: 278.03  3/14/2019 - Present Yes    Overview Signed 3/14/2019  1:50 PM by Jame Hopkins MD     Confirmed w/PFT's March 2019             Hashimoto's thyroiditis ICD-10-CM: E06.3  ICD-9-CM: 245.2  3/5/2019 - Present Yes        Hemorrhoids ICD-10-CM: K64.9  ICD-9-CM: 455.6  11/20/2018 - Present Yes        Primary hypertension (Chronic) ICD-10-CM: I10  ICD-9-CM: 401.9  12/6/2017 - Present Yes        Iron deficiency anemia due to chronic blood loss (Chronic) ICD-10-CM: D50.0  ICD-9-CM: 280.0  12/6/2017 - Present Yes        IBS (irritable bowel syndrome) ICD-10-CM: K58.9  ICD-9-CM: 564.1  12/6/2017 - Present Yes        Mood disorder ([address] ICD-10-CM: F39  ICD-9-CM: 296.90  12/6/2017 - Present Yes              Past History:  Past Medical History:   Diagnosis Date    Acute kidney insufficiency     Followed by PCP     Adverse effect of anesthesia     2016 in [facility] at the end of my gallbladder surgery. \" Patient reports no CPR performed, stayed one night in the hospital. No problems with any other surgeries.     Allergic rhinitis     Anemia     history     Anxiety and depression     Controlled with medication     Arthritis     Edema     bilateral lower legs     GERD (gastroesophageal reflux disease)     Controlled with medication     History of chicken pox     History of Clostridioides difficile infection     History of DVT (deep vein thrombosis)     s/p knee replacement and developed right leg DVT     HTN (hypertension)     controlled with medication     Hyperlipidemia     controlled with medication     Hyperthyroidism     pt denies    Hypothyroidism     controlled with medication     IBS (irritable bowel syndrome)     Insomnia     Left flank pain 3/5/2020    Morbid obesity (HCC)     BMI 42.85    Murmur     per PCP note (35/7/54) systolic murmur grade of 2/6    Overactive bladder     PAD (peripheral artery disease) ([facility] [address]     patient denies on 11/27/19    Recurrent UTI     RLS (restless legs syndrome)     controlled with medication     SOBOE (shortness of breath on exertion)     PRN inhaler-patient reports she uses inhaler QHS    Special examinations     History-Neurologist - Dr. 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     Past Surgical History:   Procedure Laterality Date    HX APPENDECTOMY  1955    HX CHOLECYSTECTOMY  12/2016    HX COLONOSCOPY      HX GYN  12/03/2019    suburethral sling and cystoscopy    HX KNEE REPLACEMENT Right 2014    HX MENISCUS REPAIR Left     4/2016    HX MENISCUS REPAIR Left 2014    HX ORTHOPAEDIC Left     LEFT WRIST TRAPEZIOMETACARPAL ARTHROPLASTY WITH FCR TENDON TRANSFER- 2020    HX ORTHOPAEDIC Left 01/2020    left hand tendon surgery    HX OTHER SURGICAL Right 2016    Hand  HX OTHER SURGICAL Left ~    B/L Ulnar    HX OTHER SURGICAL  ~    urethraplasty    HX OTHER SURGICAL  2019    bladder surgery-put mesh in    HX SHOULDER ARTHROSCOPY Right ~, ~2013    x2    HX TONSIL AND ADENOIDECTOMY  ~    HX TOTAL ABDOMINAL HYSTERECTOMY  ~    IR GASTRIC BAND ADJ W/ FLUORO  ~    lab band removed ~       Allergies   Allergen Reactions    Fire Ant Anaphylaxis    Adhesive Rash and Itching     \"sticky ace wrap\"    Amoxicillin Other (comments)     C-Diff    Betadine [Povidone-Iodine] Other (comments)     Mcgrath    Cephalexin Other (comments)     C-Diff    Demerol [Meperidine] Rash    Hydromorphone Other (comments)     Keeps her awake    Morphine Other (comments)     Awake    Percocet [Oxycodone-Acetaminophen] Other (comments)     Jittery    Percodan [Oxycodone Hcl-Oxycodone-Asa] Other (comments)     Jittery      Social History     Tobacco Use    Smoking status: Former Smoker     Packs/day: 1.00     Years: 40.00     Pack years: 40.00     Types: Cigarettes     Quit date: 2005     Years since quittin.4    Smokeless tobacco: Never Used   Substance Use Topics    Alcohol use: Not Currently      Family History   Problem Relation Age of Onset    No Known Problems Mother     Other Brother         brain aneurysm, AVM    Heart Surgery Brother         CABGx4    Heart Disease Brother     Hypertension Brother     Other Father 54        brain aneurysm    Other Sister         fibromyalgia    Pneumonia Sister     Heart Attack Maternal Grandmother     Cancer Maternal Grandfather         skin    Heart Attack Paternal Grandfather     Congenital anomalies Sister         right arm deformity    Other Son         Hepatitis C    Drug Abuse Son     Mult Sclerosis Daughter       Family history reviewed and negative except as noted above.     Immunization History   Administered Date(s) Administered    COVID-19, Pfizer Purple top, DILUTE for use, 12+ yrs, 30mcg/0.3mL dose 01/21/2021, 02/06/2021, 10/10/2021    Influenza High Dose Vaccine PF 09/11/2018, 10/18/2019, 08/25/2020    Influenza Vaccine 09/08/2020    Influenza, High-dose, Quadrivalent (>65 Yrs Fluzone High Dose Quad 92432) 08/25/2020    Pneumococcal Conjugate (PCV-13) 10/31/2019    Zoster Recombinant 09/08/2020     Prior to Admit Medications:  Current Outpatient Medications   Medication Instructions    acetaminophen (TYLENOL) 325 mg, Oral, EVERY 4 HOURS AS NEEDED    albuterol (PROVENTIL HFA, VENTOLIN HFA, PROAIR HFA) 90 mcg/actuation inhaler 1-2 Puffs, Inhalation, EVERY 6 HOURS AS NEEDED    amitriptyline (ELAVIL) 25 mg, Oral, EVERY BEDTIME, For IBS/colitis    clonazePAM (KLONOPIN) 0.5 mg, Oral, 2 TIMES DAILY AS NEEDED    clotrimazole-betamethasone (LOTRISONE) topical cream Topical, 2 TIMES DAILY, Until rash resolves    colestipoL (COLESTID) 1 g, Oral, 2 TIMES DAILY    cyanocobalamin (VITAMIN B12) 1,000 mcg, IntraMUSCular, EVERY MONTH    ergocalciferol (ERGOCALCIFEROL) 50,000 Units, Oral, EVERY 7 DAYS    escitalopram oxalate (LEXAPRO) 30 mg, Oral, DAILY    fluticasone propionate (FLONASE) 50 mcg/actuation nasal spray 2 Sprays, Both Nostrils, EVERY BEDTIME    hydroCHLOROthiazide (HYDRODIURIL) 25 mg, Oral, DAILY    hyoscyamine sulfate (CYSTOSPAZ) 0.125 mg, Oral, EVERY 4 HOURS AS NEEDED    inhalational spacing device Use with albuterol inhaler.     lisinopriL (PRINIVIL, ZESTRIL) 20 mg, Oral, DAILY    ondansetron hcl (ZOFRAN) 4 mg, Oral, EVERY 8 HOURS AS NEEDED    pramipexole (MIRAPEX) 0.5 mg, Oral, DAILY    Synthroid 150 mcg, Oral, DAILY BEFORE BREAKFAST, DAW1       Objective:     Patient Vitals for the past 24 hrs:   Temp Pulse Resp BP SpO2   05/09/22 1136 -- 69 20 (!) 147/66 98 %   05/09/22 0913 -- 71 30 132/64 99 %   05/09/22 0726 97.5 °F (36.4 °C) 86 20 (!) 154/70 98 %     Oxygen Therapy  O2 Sat (%): 98 % (05/09/22 1136)  Pulse via Oximetry: 70 beats per minute (05/09/22 1136)  O2 Device: None (Room air) (05/09/22 0350)    Estimated body mass index is 39.71 kg/m² as calculated from the following:    Height as of this encounter: 4' 10\" (1.473 m). Weight as of this encounter: 86.2 kg (190 lb). Intake/Output Summary (Last 24 hours) at 5/9/2022 1321  Last data filed at 5/9/2022 1136  Gross per 24 hour   Intake 1000 ml   Output --   Net 1000 ml         Physical Exam:  Blood pressure (!) 147/66, pulse 69, temperature 97.5 °F (36.4 °C), resp. rate 20, height 4' 10\" (1.473 m), weight 86.2 kg (190 lb), SpO2 98 %. General:    Well nourished. Appears fatigued  Head:  Normocephalic, atraumatic  Eyes:  Sclerae appear normal.  Pupils equally round. ENT:  Nares appear normal, no drainage. Moist oral mucosa  Neck:  No restricted ROM. Trachea midline   CV:   RRR. No m/r/g. No jugular venous distension. Lungs:   CTAB. No wheezing, rhonchi, or rales. Respirations even, unlabored  Abdomen: Bowel sounds are hypoactive, mildly distended, + LLQ TTP. Extremities: No cyanosis or clubbing.  +1 Non-pitting edema b/l LEs  Skin:     No rashes and normal coloration. Warm and dry. Neuro:  CN II-XII grossly intact. Sensation intact. A&Ox3  Psych:  Normal mood and affect.       I have reviewed ordered lab tests and independently visualized imaging below:    Last 24hr Labs:  Recent Results (from the past 24 hour(s))   TYPE & SCREEN    Collection Time: 05/09/22  7:49 AM   Result Value Ref Range    Crossmatch Expiration 05/12/2022,2359     [facility]     Antibody screen NEG    CBC WITH AUTOMATED DIFF    Collection Time: 05/09/22  7:49 AM   Result Value Ref Range    WBC 17.8 (H) 4.3 - 11.1 K/uL    RBC 3.97 (L) 4.05 - 5.2 M/uL    HGB 11.2 (L) 11.7 - 15.4 g/dL    HCT 35.0 (L) 35.8 - 46.3 %    MCV 88.2 79.6 - 97.8 FL    MCH 28.2 26.1 - 32.9 PG    MCHC 32.0 31.4 - 35.0 g/dL    RDW 16.1 (H) 11.9 - 14.6 %    PLATELET 291 186 - 550 K/uL    MPV 10.5 9.4 - 12.3 FL    ABSOLUTE NRBC 0.00 0.0 - 0.2 K/uL DF AUTOMATED      NEUTROPHILS 85 (H) 43 - 78 %    LYMPHOCYTES 10 (L) 13 - 44 %    MONOCYTES 4 4.0 - 12.0 %    EOSINOPHILS 1 0.5 - 7.8 %    BASOPHILS 0 0.0 - 2.0 %    IMMATURE GRANULOCYTES 1 0.0 - 5.0 %    ABS. NEUTROPHILS 15.1 (H) 1.7 - 8.2 K/UL    ABS. LYMPHOCYTES 1.8 0.5 - 4.6 K/UL    ABS. MONOCYTES 0.7 0.1 - 1.3 K/UL    ABS. EOSINOPHILS 0.1 0.0 - 0.8 K/UL    ABS. BASOPHILS 0.1 0.0 - 0.2 K/UL    ABS. IMM. GRANS. 0.1 0.0 - 0.5 K/UL   METABOLIC PANEL, COMPREHENSIVE    Collection Time: 05/09/22  7:49 AM   Result Value Ref Range    Sodium 137 136 - 145 mmol/L    Potassium 5.0 3.5 - 5.1 mmol/L    Chloride 104 98 - 107 mmol/L    CO2 26 21 - 32 mmol/L    Anion gap 7 7 - 16 mmol/L    Glucose 136 (H) 65 - 100 mg/dL    BUN 37 (H) 8 - 23 MG/DL    Creatinine 2.34 (H) 0.6 - 1.0 MG/DL    GFR est AA 26 (L) >60 ml/min/1.73m2    GFR est non-AA 22 (L) >60 ml/min/1.73m2    Calcium 9.1 8.3 - 10.4 MG/DL    Bilirubin, total 0.4 0.2 - 1.1 MG/DL    ALT (SGPT) 21 12 - 65 U/L    AST (SGOT) 16 15 - 37 U/L    Alk.  phosphatase 96 50 - 130 U/L    Protein, total 7.1 6.3 - 8.2 g/dL    Albumin 3.2 3.2 - 4.6 g/dL    Globulin 3.9 (H) 2.3 - 3.5 g/dL    A-G Ratio 0.8 (L) 1.2 - 3.5     PROTHROMBIN TIME + INR    Collection Time: 05/09/22  7:49 AM   Result Value Ref Range    Prothrombin time 14.2 12.6 - 14.5 sec    INR 1.1     LIPASE    Collection Time: 05/09/22  7:49 AM   Result Value Ref Range    Lipase 116 73 - 393 U/L       All Micro Results     Procedure Component Value Units Date/Time    CULTURE, BLOOD [[id number]]     Order Status: Sent Specimen: Blood     CULTURE, BLOOD [[id number]]     Order Status: Sent Specimen: Blood           Other Studies:  CT ABD PELV WO CONT    Result Date: 5/9/2022  CT of the abdomen and pelvis without contrast. CLINICAL INDICATION: Abdominal pain and rectal bleeding PROCEDURE: Serial thin-section axial images obtained from the upper abdomen through the proximal femurs without the administration of intravenous contrast. Oral contrast was given. Radiation dose reduction techniques were used for this study. Our CT scanners use one or all of the following: Automated exposure control, adjusted of the mA and/or kV according to patient size, iterative reconstruction COMPARISON: CT the abdomen and pelvis dated 3/27/2021 FINDINGS:  Evaluation of the hollow and solid viscera is limited by the lack of intravenous contrast. CT ABDOMEN: There is diffuse fatty infiltration of the liver. The spleen is normal. Cholecystectomy clips are present. The adrenal glands are normal. There is no hydronephrosis. There is a 3.5 cm simple right renal cyst. No renal or ureteral stones evident. Atherosclerotic calcifications are noted in the aorta. There is circumferential wall thickening along a long segment of the transverse colon most in keeping with colitis. No bowel perforation evident. No obvious diverticula. No adjacent lymphadenopathy. CT PELVIS: The bladder is decompressed. The rectum and sigmoid colon are unremarkable. No inguinal hernia or adenopathy. No free fluid accumulating dependently in the pelvis. No aggressive osseous is identified. 1. Acute colitis of the transverse colon. Infectious or inflammatory etiology should be considered. 2. 3.5 cm simple right renal cyst. 3. Diffuse fatty infiltration of the liver.       Medications Administered     diatrizoate yuly-diatrizoat sod (MD-GASTROVIEW,GASTROGRAFIN) 66-10 % contrast solution 15 mL     Admin Date  05/09/2022 Action  Given Dose  15 mL Route  Oral Administered By  Annette Watkins RN          famotidine (PF) (PEPCID) injection 20 mg     Admin Date  05/09/2022 Action  Given Dose  20 mg Route  IntraVENous Administered By  Annette Watkins RN          morphine injection 4 mg     Admin Date  05/09/2022 Action  Given Dose  4 mg Route  IntraVENous Administered By  Annette Watkins RN          ondansetron [facility]) injection 4 mg     Admin Date  05/09/2022 Action  Given Dose  4 mg Route  IntraVENous Administered By  Phoebe Alicea, RN           Admin Date  05/09/2022 Action  Given Dose  4 mg Route  IntraVENous Administered By  Phoebe Alicea, RN          sodium chloride (NS) flush 5-10 mL     Admin Date  05/09/2022 Action  Given Dose  5 mL Route  IntraVENous Administered By  Phoebe Alicea, RN          sodium chloride 0.9 % bolus infusion 1,000 mL     Admin Date  05/09/2022 Action  New Bag Dose  1,000 mL Rate  1,000 mL/hr Route  IntraVENous Administered By  Phoebe Alicea, RN                Signed:  Austin Rodriguez PA-C  Pt care and plan d/w Dr. Shelby Cockayne

## 2022-05-09 NOTE — PROGRESS NOTES
Problem: Diarrhea (Adult and Pediatrics)  Goal: *Absence of diarrhea  Outcome: Progressing Towards Goal     Problem: Acute Renal Failure: Day 1  Goal: Activity/Safety  Outcome: Progressing Towards Goal  Goal: Diagnostic Test/Procedures  Outcome: Progressing Towards Goal     Problem: Sepsis: Day of Diagnosis  Goal: *Paired blood cultures prior to first dose of antibiotic  Outcome: Progressing Towards Goal  Goal: *First dose of  appropriate antibiotic within 3 hours of arrival to ED, within 1 hour of arrival to ICU  Outcome: Progressing Towards Goal  Goal: Activity/Safety  Outcome: Progressing Towards Goal

## 2022-05-09 NOTE — ED NOTES
TRANSFER - OUT REPORT:    Verbal report given to [place], RN on Stacey Plan  being transferred to [facility] for routine progression of care       Report consisted of patients Situation, Background, Assessment and   Recommendations(SBAR). Information from the following report(s) ED Summary was reviewed with the receiving nurse. Lines:   Peripheral IV 05/09/22 Right Antecubital (Active)   Site Assessment Clean, dry, & intact 05/09/22 0748   Phlebitis Assessment 0 05/09/22 0748   Infiltration Assessment 0 05/09/22 0748   Dressing Status Clean, dry, & intact 05/09/22 0748       Peripheral IV 05/09/22 Left Antecubital (Active)   Site Assessment Clean, dry, & intact 05/09/22 1314   Phlebitis Assessment 0 05/09/22 1314   Infiltration Assessment 0 05/09/22 1314   Dressing Status Clean, dry, & intact 05/09/22 1314        Opportunity for questions and clarification was provided.       Patient transported with:   Registered Nurse

## 2022-05-10 LAB
ALBUMIN SERPL-MCNC: 2.2 G/DL (ref 3.2–4.6)
ALBUMIN/GLOB SERPL: 0.6 {RATIO} (ref 1.2–3.5)
ALP SERPL-CCNC: 77 U/L (ref 50–130)
ALT SERPL-CCNC: 36 U/L (ref 12–65)
ANION GAP SERPL CALC-SCNC: 6 MMOL/L (ref 7–16)
AST SERPL-CCNC: 30 U/L (ref 15–37)
BILIRUB SERPL-MCNC: 0.4 MG/DL (ref 0.2–1.1)
BUN SERPL-MCNC: 28 MG/DL (ref 8–23)
CALCIUM SERPL-MCNC: 8.5 MG/DL (ref 8.3–10.4)
CHLORIDE SERPL-SCNC: 105 MMOL/L (ref 98–107)
CO2 SERPL-SCNC: 26 MMOL/L (ref 21–32)
CREAT SERPL-MCNC: 2.01 MG/DL (ref 0.6–1)
ERYTHROCYTE [DISTWIDTH] IN BLOOD BY AUTOMATED COUNT: 16.2 % (ref 11.9–14.6)
GLOBULIN SER CALC-MCNC: 3.6 G/DL (ref 2.3–3.5)
GLUCOSE SERPL-MCNC: 90 MG/DL (ref 65–100)
HCT VFR BLD AUTO: 30.2 % (ref 35.8–46.3)
HGB BLD-MCNC: 9.5 G/DL (ref 11.7–15.4)
MAGNESIUM SERPL-MCNC: 1.6 MG/DL (ref 1.8–2.4)
MCH RBC QN AUTO: 28.1 PG (ref 26.1–32.9)
MCHC RBC AUTO-ENTMCNC: 31.5 G/DL (ref 31.4–35)
MCV RBC AUTO: 89.3 FL (ref 79.6–97.8)
NRBC # BLD: 0 K/UL (ref 0–0.2)
PLATELET # BLD AUTO: 267 K/UL (ref 150–450)
PMV BLD AUTO: 10.6 FL (ref 9.4–12.3)
POTASSIUM SERPL-SCNC: 5.1 MMOL/L (ref 3.5–5.1)
PROT SERPL-MCNC: 5.8 G/DL (ref 6.3–8.2)
RBC # BLD AUTO: 3.38 M/UL (ref 4.05–5.2)
SODIUM SERPL-SCNC: 137 MMOL/L (ref 136–145)
WBC # BLD AUTO: 13.9 K/UL (ref 4.3–11.1)

## 2022-05-10 PROCEDURE — 80053 COMPREHEN METABOLIC PANEL: CPT

## 2022-05-10 PROCEDURE — 74011250636 HC RX REV CODE- 250/636: Performed by: FAMILY MEDICINE

## 2022-05-10 PROCEDURE — 74011250637 HC RX REV CODE- 250/637: Performed by: FAMILY MEDICINE

## 2022-05-10 PROCEDURE — 94760 N-INVAS EAR/PLS OXIMETRY 1: CPT

## 2022-05-10 PROCEDURE — 36415 COLL VENOUS BLD VENIPUNCTURE: CPT

## 2022-05-10 PROCEDURE — 74011250637 HC RX REV CODE- 250/637: Performed by: PHYSICIAN ASSISTANT

## 2022-05-10 PROCEDURE — 85027 COMPLETE CBC AUTOMATED: CPT

## 2022-05-10 PROCEDURE — 65270000029 HC RM PRIVATE

## 2022-05-10 PROCEDURE — 77010033678 HC OXYGEN DAILY

## 2022-05-10 PROCEDURE — 83735 ASSAY OF MAGNESIUM: CPT

## 2022-05-10 PROCEDURE — 74011000250 HC RX REV CODE- 250: Performed by: FAMILY MEDICINE

## 2022-05-10 RX ORDER — MAGNESIUM SULFATE HEPTAHYDRATE 40 MG/ML
2 INJECTION, SOLUTION INTRAVENOUS ONCE
Status: COMPLETED | OUTPATIENT
Start: 2022-05-10 | End: 2022-05-10

## 2022-05-10 RX ORDER — TRAMADOL HYDROCHLORIDE 50 MG/1
50 TABLET ORAL
Status: COMPLETED | OUTPATIENT
Start: 2022-05-10 | End: 2022-05-10

## 2022-05-10 RX ADMIN — SODIUM CHLORIDE, SODIUM LACTATE, POTASSIUM CHLORIDE, AND CALCIUM CHLORIDE 150 ML/HR: 600; 310; 30; 20 INJECTION, SOLUTION INTRAVENOUS at 09:04

## 2022-05-10 RX ADMIN — RDII 250 MG CAPSULE 250 MG: at 17:04

## 2022-05-10 RX ADMIN — SODIUM CHLORIDE, PRESERVATIVE FREE 10 ML: 5 INJECTION INTRAVENOUS at 22:12

## 2022-05-10 RX ADMIN — ESCITALOPRAM OXALATE 30 MG: 10 TABLET ORAL at 17:04

## 2022-05-10 RX ADMIN — TRAMADOL HYDROCHLORIDE 50 MG: 50 TABLET, COATED ORAL at 22:10

## 2022-05-10 RX ADMIN — SODIUM CHLORIDE, SODIUM LACTATE, POTASSIUM CHLORIDE, AND CALCIUM CHLORIDE 150 ML/HR: 600; 310; 30; 20 INJECTION, SOLUTION INTRAVENOUS at 02:57

## 2022-05-10 RX ADMIN — SODIUM CHLORIDE, PRESERVATIVE FREE 10 ML: 5 INJECTION INTRAVENOUS at 05:13

## 2022-05-10 RX ADMIN — MAGNESIUM SULFATE HEPTAHYDRATE 2 G: 40 INJECTION, SOLUTION INTRAVENOUS at 05:12

## 2022-05-10 RX ADMIN — SODIUM CHLORIDE, PRESERVATIVE FREE 10 ML: 5 INJECTION INTRAVENOUS at 13:53

## 2022-05-10 RX ADMIN — AMITRIPTYLINE HYDROCHLORIDE 25 MG: 50 TABLET, FILM COATED ORAL at 22:10

## 2022-05-10 RX ADMIN — LEVOTHYROXINE SODIUM 150 MCG: 0.07 TABLET ORAL at 22:10

## 2022-05-10 RX ADMIN — RDII 250 MG CAPSULE 250 MG: at 09:02

## 2022-05-10 NOTE — PROGRESS NOTES
Problem: Diarrhea (Adult and Pediatrics)  Goal: *Absence of diarrhea  Outcome: Progressing Towards Goal     Problem: Acute Renal Failure: Day 1  Goal: Activity/Safety  Outcome: Progressing Towards Goal  Goal: Diagnostic Test/Procedures  Outcome: Progressing Towards Goal     Problem: Sepsis: Day of Diagnosis  Goal: *Paired blood cultures prior to first dose of antibiotic  Outcome: Progressing Towards Goal  Goal: *First dose of  appropriate antibiotic within 3 hours of arrival to ED, within 1 hour of arrival to ICU  Outcome: Progressing Towards Goal  Goal: Activity/Safety  Outcome: Progressing Towards Goal     Problem: Falls - Risk of  Goal: *Absence of Falls  Description: Document Morene Hole Fall Risk and appropriate interventions in the flowsheet.   Outcome: Progressing Towards Goal  Note: Fall Risk Interventions:            Medication Interventions: Bed/chair exit alarm,Patient to call before getting OOB,Teach patient to arise slowly    Elimination Interventions: Bed/chair exit alarm,Call light in reach,Patient to call for help with toileting needs              Problem: Patient Education: Go to Patient Education Activity  Goal: Patient/Family Education  Outcome: Progressing Towards Goal

## 2022-05-10 NOTE — PROGRESS NOTES
RN CM met with the patient at the bedside and discussed discharge planning. She plans on driving herself home at discharge. Case management will continue to follow her for discharge planning needs.

## 2022-05-10 NOTE — PROGRESS NOTES
TRANSFER - IN REPORT:    Verbal report received from Jenni(name) on Marcos Zuluaga  being received from ICU(unit) for routine progression of care      Report consisted of patients Situation, Background, Assessment and   Recommendations(SBAR). Information from the following report(s) SBAR, Kardex, Intake/Output, MAR, Accordion and Recent Results was reviewed with the receiving nurse. Opportunity for questions and clarification was provided. Assessment completed upon patients arrival to unit and care assumed.

## 2022-05-10 NOTE — PROGRESS NOTES
Hospitalist Progress Note   Admit Date:  2022  7:24 AM   Name:  Marquis Bonilla   Age:  77 y.o. Sex:  female  :  1945   MRN:  [id number]   Room:  [facility]    Presenting Complaint: Rectal Bleeding and Abdominal Pain    Reason(s) for Admission: Sepsis with acute renal failure and tubular necrosis without septic shock ([facility] [address] [A41.9, R65.20, N17.0]     Hospital Course & Interval History:   77 y.o. female with medical history of diverticulosis, HTN, hypothyroidism, OHS and IBS who presented to [facility] ER on 22 with c/o abd pain since last night. Pt notes that last night she ate at [facility] for dinner. On the drive home, she started feeling unwell. She describes that her abdomen had pain, as if she was severely constipated. Upon arrival home, she was unable to defecate, but had 5-6 episodes of non-bloody emesis throughout the evening. She notes she fell asleep around 12am and then awoke at 4am this morning with lower abdominal pain. She initially had a \"normal\" BM, but as the morning progressed, she developed diarrhea, and then bloody diarrhea. Pt continues to have LLQ abdominal pain at this time. She denies fever, but admits to chills. She denies sick contacts. Pt did have salad at restaurant last night. She denies SOB, denies CP. Last colonoscopy was 2021, with diverticulosis and internal hemorrhoids noted.      ER workup revealed PARUL with BUN/Cr of 37/2.34 and elevated WBC count of 17.8k. CT A/P revealed acute colitis of the transverse colon. Hospitalist admitted for further care.        Subjective/24hr Events (05/10/22): Feeling better today but having some borderline blood pressure. Tolerating antibiotics, but still with loose stools, adding bowel regimen. White blood count remains elevated. Creatinine still has not returned to baseline. Some electrolyte abnormalities today, nonsevere. ROS:  10 systems reviewed and negative except as noted above.      Assessment & Plan:   Sepsis with acute renal failure and tubular necrosis without septic shock due to suspected shigella gastroenteritis -   - cont aggressive IVF resuscitation. Check blood cultures  - consider stool sample for culture  - starting Levaquin  - add probiotics given hx of Cdiff  - repeat CBC in AM to monitor WBC count    5/10: Advance diet, continued antibiotics, add bowel protection       Primary hypertension -  - hold lisinopril and hctz due to volume depletion and pre-renal PARUL  - pt did take her antihypertensives this morning  - monitor       Iron deficiency anemia due to chronic blood loss -  - not on oral therapy  - repeat CBC in AM    5/10: Drop in hemoglobin, not to sufficient level to require treatment       IBS (irritable bowel syndrome) -  - noted       Mood disorder -   - cont Lexapro and Elavil as tolerated       Hemorrhoids -  - internal hemorrhoids noted on colonoscopy last year, suspect cause of hematochezia       Hashimoto's thyroiditis  -  - cont Synthroid       Obesity hypoventilation syndrome -  - noted. Does not appear to be on chronic therapy       Gait abnormality -  - see by neurology - has some level of autonomic dysfunction/neuropathy.        Class 3 obesity with alveolar hypoventilation, serious comorbidity, and body mass index (BMI) of 40.0 to 44.9 in adult [facility]) (2/26/2020)  - Increased risk of all cause mortality, complicating care  - healthy lifestyle at discharge       Chronic pain disorder (10/18/2019)  - cont Elavil       Hematochezia -  - noted. Cont to monitor BMs for blood. Suspect due to internal hemorrhoids  Transfuse for Hgb < 7.       Colitis -  - as per above, cont supportive care       Leukocytosis -  - suspect from gastroenteritis, volume contraction.  Repeat CBC in AM       History of Clostridioides difficile colitis -  - probiotic       Stage 3b chronic kidney disease  With acute kidney injury - cont IVF, monitor BMP      Discharge Planning:    Home in 1 to 3 days    Diet: ADULT DIET Full Liquid  DVT PPx: SCDs  Code status: Full Code    Hospital Problems as of 5/10/2022 Date Reviewed: 5/9/2022          Codes Class Noted - Resolved POA    * (Principal) Sepsis with acute renal failure and tubular necrosis without septic shock (HCC) ICD-10-CM: A41.9, R65.20, N17.0  ICD-9-CM: 038.9, 995.91, 584.5  5/9/2022 - Present Yes        Shigella gastroenteritis ICD-10-CM: A03.9  ICD-9-CM: 004.9  5/9/2022 - Present Yes        Iron deficiency anemia due to chronic blood loss (Chronic) ICD-10-CM: D50.0  ICD-9-CM: 280.0  12/6/2017 - Present Yes        Primary hypertension (Chronic) ICD-10-CM: I10  ICD-9-CM: 401.9  12/6/2017 - Present Yes        Class 3 obesity with alveolar hypoventilation, serious comorbidity, and body mass index (BMI) of 40.0 to 44.9 in adult [facility]) ICD-10-CM: E66.2, Z68.41  ICD-9-CM: 278.03, V85.41  2/26/2020 - Present Yes        Stage 3b chronic kidney disease ([facility] [address] (Chronic) ICD-10-CM: A43.39  ICD-9-CM: 585.3  5/9/2022 - Present Yes        Hematochezia ICD-10-CM: K92.1  ICD-9-CM: 578.1  3/27/2021 - Present Yes        Colitis ICD-10-CM: K52.9  ICD-9-CM: 558.9  3/27/2021 - Present Yes        Leukocytosis ICD-10-CM: M74.972  ICD-9-CM: 288.60  3/27/2021 - Present Yes        History of Clostridioides difficile colitis ICD-10-CM: Z86.19  ICD-9-CM: V12.79  3/27/2021 - Present Yes        Gait abnormality ICD-10-CM: R26.9  ICD-9-CM: 781.2  2/26/2020 - Present Yes        Chronic pain disorder ICD-10-CM: G89.4  ICD-9-CM: 338.4  10/18/2019 - Present Yes        Chronic nausea ICD-10-CM: R11.0  ICD-9-CM: 787.02  6/26/2019 - Present Yes        Obesity hypoventilation syndrome ([address] ICD-10-CM: I44.2  ICD-9-CM: 278.03  3/14/2019 - Present Yes    Overview Signed 3/14/2019  1:50 PM by Daniel Hua MD     Confirmed w/PFT's March 2019             Hashimoto's thyroiditis ICD-10-CM: E06.3  ICD-9-CM: 245.2  3/5/2019 - Present Yes        Hemorrhoids ICD-10-CM: K64.9  ICD-9-CM: 455.6  11/20/2018 - Present Yes        IBS (irritable bowel syndrome) ICD-10-CM: K58.9  ICD-9-CM: 564.1  12/6/2017 - Present Yes        Mood disorder ([address] ICD-10-CM: F39  ICD-9-CM: 296.90  12/6/2017 - Present Yes              Objective:     Patient Vitals for the past 24 hrs:   Temp Pulse Resp BP SpO2   05/10/22 0822 -- -- -- -- 98 %   05/10/22 0815 -- 69 18 -- 100 %   05/10/22 0800 -- 65 20 (!) 134/54 99 %   05/10/22 0745 -- 61 16 -- 99 %   05/10/22 0730 -- 67 18 (!) 119/52 99 %   05/10/22 0715 98 °F (36.7 °C) 68 17 (!) 120/58 99 %   05/10/22 0700 -- 67 20 (!) 120/58 97 %   05/10/22 0600 -- 66 17 (!) 99/48 98 %   05/10/22 0530 -- 66 19 (!) 120/51 97 %   05/10/22 0521 -- 69 18 (!) 131/51 96 %   05/10/22 0431 -- 67 17 (!) 158/51 99 %   05/10/22 0400 -- 65 -- -- --   05/10/22 0330 -- 68 20 (!) 126/50 99 %   05/10/22 0300 -- 68 18 (!) 119/42 99 %   05/10/22 0230 -- 68 18 (!) 124/43 99 %   05/10/22 0200 -- 68 18 (!) 120/51 97 %   05/10/22 0133 -- 70 22 (!) 124/59 98 %   05/10/22 0100 -- 69 21 (!) 111/38 99 %   05/10/22 0030 -- 70 21 (!) 109/45 98 %   05/10/22 0000 97.3 °F (36.3 °C) 75 20 (!) 92/49 98 %   05/09/22 2330 -- 76 18 (!) 85/52 96 %   05/09/22 2300 -- 77 18 (!) 88/44 99 %   05/09/22 2230 -- 78 19 (!) 108/90 98 %   05/09/22 2200 -- 78 21 (!) 98/44 97 %   05/09/22 2130 -- 73 24 (!) 83/54 98 %   05/09/22 2100 -- 81 19 (!) 98/44 98 %   05/09/22 2030 -- 84 21 (!) 101/43 97 %   05/09/22 2000 -- 81 24 (!) 87/44 96 %   05/09/22 1931 99.3 °F (37.4 °C) 84 20 (!) 91/47 94 %   05/09/22 1705 -- 90 25 128/62 93 %   05/09/22 1600 -- 68 -- -- --   05/09/22 1418 -- 68 26 -- 94 %   05/09/22 1400 -- 69 22 120/64 95 %   05/09/22 1326 97.9 °F (36.6 °C) 70 18 (!) 157/69 96 %   05/09/22 1136 -- 69 20 (!) 147/66 98 %     Oxygen Therapy  O2 Sat (%): 98 % (05/10/22 0822)  Pulse via Oximetry: 68 beats per minute (05/10/22 0822)  O2 Device: Nasal cannula (05/10/22 1256)  Skin Assessment: Clean, dry, & intact (05/09/22 1958)  O2 Flow Rate (L/min): 1 l/min (weaned from 2L) (05/10/22 7299)    Estimated body mass index is 39.71 kg/m² as calculated from the following:    Height as of this encounter: 4' 10\" (1.473 m). Weight as of this encounter: 86.2 kg (190 lb). Intake/Output Summary (Last 24 hours) at 5/10/2022 0925  Last data filed at 5/10/2022 0612  Gross per 24 hour   Intake 3458.28 ml   Output 1 ml   Net 3457.28 ml         Blood pressure (!) 134/54, pulse 69, temperature 98 °F (36.7 °C), resp. rate 18, height 4' 10\" (1.473 m), weight 86.2 kg (190 lb), SpO2 98 %. Physical Exam  Vitals and nursing note reviewed. Constitutional:       General: She is not in acute distress. Appearance: Normal appearance. She is obese. She is ill-appearing. HENT:      Head: Normocephalic. Eyes:      Extraocular Movements: Extraocular movements intact. Cardiovascular:      Rate and Rhythm: Normal rate. Pulses:           Radial pulses are 2+ on the left side. Pulmonary:      Effort: Pulmonary effort is normal. No respiratory distress. Breath sounds: No wheezing, rhonchi or rales. Abdominal:      General: Bowel sounds are normal.      Palpations: Abdomen is soft. Tenderness: There is generalized abdominal tenderness. There is no guarding or rebound. Musculoskeletal:         General: No deformity. Cervical back: No rigidity. Skin:     General: Skin is warm and dry. Neurological:      General: No focal deficit present. Mental Status: She is alert and oriented to person, place, and time.    Psychiatric:         Mood and Affect: Mood normal.         Behavior: Behavior normal.         I have reviewed ordered lab tests and independently visualized imaging below:    Recent Labs:  Recent Results (from the past 48 hour(s))   TYPE & SCREEN    Collection Time: 05/09/22  7:49 AM   Result Value Ref Range    Crossmatch Expiration 05/12/2022,2359     ABO/Rh(D) Juliana [place] NEGATIVE     Antibody screen NEG    CBC WITH AUTOMATED DIFF    Collection Time: 05/09/22 7:49 AM   Result Value Ref Range    WBC 17.8 (H) 4.3 - 11.1 K/uL    RBC 3.97 (L) 4.05 - 5.2 M/uL    HGB 11.2 (L) 11.7 - 15.4 g/dL    HCT 35.0 (L) 35.8 - 46.3 %    MCV 88.2 79.6 - 97.8 FL    MCH 28.2 26.1 - 32.9 PG    MCHC 32.0 31.4 - 35.0 g/dL    RDW 16.1 (H) 11.9 - 14.6 %    PLATELET 859 525 - 147 K/uL    MPV 10.5 9.4 - 12.3 FL    ABSOLUTE NRBC 0.00 0.0 - 0.2 K/uL    DF AUTOMATED      NEUTROPHILS 85 (H) 43 - 78 %    LYMPHOCYTES 10 (L) 13 - 44 %    MONOCYTES 4 4.0 - 12.0 %    EOSINOPHILS 1 0.5 - 7.8 %    BASOPHILS 0 0.0 - 2.0 %    IMMATURE GRANULOCYTES 1 0.0 - 5.0 %    ABS. NEUTROPHILS 15.1 (H) 1.7 - 8.2 K/UL    ABS. LYMPHOCYTES 1.8 0.5 - 4.6 K/UL    ABS. MONOCYTES 0.7 0.1 - 1.3 K/UL    ABS. EOSINOPHILS 0.1 0.0 - 0.8 K/UL    ABS. BASOPHILS 0.1 0.0 - 0.2 K/UL    ABS. IMM. GRANS. 0.1 0.0 - 0.5 K/UL   METABOLIC PANEL, COMPREHENSIVE    Collection Time: 05/09/22  7:49 AM   Result Value Ref Range    Sodium 137 136 - 145 mmol/L    Potassium 5.0 3.5 - 5.1 mmol/L    Chloride 104 98 - 107 mmol/L    CO2 26 21 - 32 mmol/L    Anion gap 7 7 - 16 mmol/L    Glucose 136 (H) 65 - 100 mg/dL    BUN 37 (H) 8 - 23 MG/DL    Creatinine 2.34 (H) 0.6 - 1.0 MG/DL    GFR est AA 26 (L) >60 ml/min/1.73m2    GFR est non-AA 22 (L) >60 ml/min/1.73m2    Calcium 9.1 8.3 - 10.4 MG/DL    Bilirubin, total 0.4 0.2 - 1.1 MG/DL    ALT (SGPT) 21 12 - 65 U/L    AST (SGOT) 16 15 - 37 U/L    Alk.  phosphatase 96 50 - 130 U/L    Protein, total 7.1 6.3 - 8.2 g/dL    Albumin 3.2 3.2 - 4.6 g/dL    Globulin 3.9 (H) 2.3 - 3.5 g/dL    A-G Ratio 0.8 (L) 1.2 - 3.5     PROTHROMBIN TIME + INR    Collection Time: 05/09/22  7:49 AM   Result Value Ref Range    Prothrombin time 14.2 12.6 - 14.5 sec    INR 1.1     LIPASE    Collection Time: 05/09/22  7:49 AM   Result Value Ref Range    Lipase 116 73 - 393 U/L   CULTURE, BLOOD    Collection Time: 05/09/22  1:15 PM    Specimen: Blood   Result Value Ref Range    Special Requests: NO SPECIAL REQUESTS  RIGHT  Antecubital Culture result: NO GROWTH AFTER 13 HOURS     CULTURE, BLOOD    Collection Time: 05/09/22  1:15 PM    Specimen: Blood   Result Value Ref Range    Special Requests: NO SPECIAL REQUESTS  LEFT  Antecubital        Culture result: NO GROWTH AFTER 13 HOURS     CBC W/O DIFF    Collection Time: 05/10/22  3:22 AM   Result Value Ref Range    WBC 13.9 (H) 4.3 - 11.1 K/uL    RBC 3.38 (L) 4.05 - 5.2 M/uL    HGB 9.5 (L) 11.7 - 15.4 g/dL    HCT 30.2 (L) 35.8 - 46.3 %    MCV 89.3 79.6 - 97.8 FL    MCH 28.1 26.1 - 32.9 PG    MCHC 31.5 31.4 - 35.0 g/dL    RDW 16.2 (H) 11.9 - 14.6 %    PLATELET 577 554 - 577 K/uL    MPV 10.6 9.4 - 12.3 FL    ABSOLUTE NRBC 0.00 0.0 - 0.2 K/uL   MAGNESIUM    Collection Time: 05/10/22  3:22 AM   Result Value Ref Range    Magnesium 1.6 (L) 1.8 - 2.4 mg/dL   METABOLIC PANEL, COMPREHENSIVE    Collection Time: 05/10/22  3:22 AM   Result Value Ref Range    Sodium 137 136 - 145 mmol/L    Potassium 5.1 3.5 - 5.1 mmol/L    Chloride 105 98 - 107 mmol/L    CO2 26 21 - 32 mmol/L    Anion gap 6 (L) 7 - 16 mmol/L    Glucose 90 65 - 100 mg/dL    BUN 28 (H) 8 - 23 MG/DL    Creatinine 2.01 (H) 0.6 - 1.0 MG/DL    GFR est AA 31 (L) >60 ml/min/1.73m2    GFR est non-AA 26 (L) >60 ml/min/1.73m2    Calcium 8.5 8.3 - 10.4 MG/DL    Bilirubin, total 0.4 0.2 - 1.1 MG/DL    ALT (SGPT) 36 12 - 65 U/L    AST (SGOT) 30 15 - 37 U/L    Alk.  phosphatase 77 50 - 130 U/L    Protein, total 5.8 (L) 6.3 - 8.2 g/dL    Albumin 2.2 (L) 3.2 - 4.6 g/dL    Globulin 3.6 (H) 2.3 - 3.5 g/dL    A-G Ratio 0.6 (L) 1.2 - 3.5         All Micro Results     Procedure Component Value Units Date/Time    CULTURE, BLOOD [[id number]] Collected: 05/09/22 1315    Order Status: Completed Specimen: Blood Updated: 05/10/22 0326     Special Requests: --        NO SPECIAL REQUESTS  RIGHT  Antecubital       Culture result: NO GROWTH AFTER 13 HOURS       CULTURE, BLOOD [[id number]] Collected: 05/09/22 1315    Order Status: Completed Specimen: Blood Updated: 05/10/22 0326 Special Requests: --        NO SPECIAL REQUESTS  LEFT  Antecubital       Culture result: NO GROWTH AFTER 13 HOURS             Other Studies:  CT ABD PELV WO CONT    Result Date: 5/9/2022  CT of the abdomen and pelvis without contrast. CLINICAL INDICATION: Abdominal pain and rectal bleeding PROCEDURE: Serial thin-section axial images obtained from the upper abdomen through the proximal femurs without the administration of intravenous contrast. Oral contrast was given. Radiation dose reduction techniques were used for this study. Our CT scanners use one or all of the following: Automated exposure control, adjusted of the mA and/or kV according to patient size, iterative reconstruction COMPARISON: CT the abdomen and pelvis dated 3/27/2021 FINDINGS:  Evaluation of the hollow and solid viscera is limited by the lack of intravenous contrast. CT ABDOMEN: There is diffuse fatty infiltration of the liver. The spleen is normal. Cholecystectomy clips are present. The adrenal glands are normal. There is no hydronephrosis. There is a 3.5 cm simple right renal cyst. No renal or ureteral stones evident. Atherosclerotic calcifications are noted in the aorta. There is circumferential wall thickening along a long segment of the transverse colon most in keeping with colitis. No bowel perforation evident. No obvious diverticula. No adjacent lymphadenopathy. CT PELVIS: The bladder is decompressed. The rectum and sigmoid colon are unremarkable. No inguinal hernia or adenopathy. No free fluid accumulating dependently in the pelvis. No aggressive osseous is identified. 1. Acute colitis of the transverse colon. Infectious or inflammatory etiology should be considered. 2. 3.5 cm simple right renal cyst. 3. Diffuse fatty infiltration of the liver.       Current Meds:  Current Facility-Administered Medications   Medication Dose Route Frequency    sodium chloride (NS) flush 5-10 mL  5-10 mL IntraVENous PRN    clonazePAM (KlonoPIN) tablet 0.5 mg  0.5 mg Oral BID PRN    amitriptyline (ELAVIL) tablet 25 mg  25 mg Oral QHS    escitalopram oxalate (LEXAPRO) tablet 30 mg  30 mg Oral QPM    [Held by provider] lisinopriL (PRINIVIL, ZESTRIL) tablet 20 mg  20 mg Oral DAILY    [Held by provider] hydroCHLOROthiazide (HYDRODIURIL) tablet 25 mg  25 mg Oral DAILY    levothyroxine (SYNTHROID) tablet 150 mcg  150 mcg Oral QHS    sodium chloride (NS) flush 5-40 mL  5-40 mL IntraVENous Q8H    sodium chloride (NS) flush 5-40 mL  5-40 mL IntraVENous PRN    polyethylene glycol (MIRALAX) packet 17 g  17 g Oral DAILY PRN    ondansetron (ZOFRAN ODT) tablet 4 mg  4 mg Oral Q8H PRN    Or    ondansetron (ZOFRAN) injection 4 mg  4 mg IntraVENous Q6H PRN    levoFLOXacin (LEVAQUIN) 750 mg in D5W IVPB  750 mg IntraVENous Q48H    Saccharomyces boulardii (FLORASTOR) capsule 250 mg  250 mg Oral BID    lactated Ringers infusion  150 mL/hr IntraVENous CONTINUOUS    acetaminophen (TYLENOL) tablet 650 mg  650 mg Oral Q4H PRN       Signed:  Marc Sheikh MD

## 2022-05-10 NOTE — PROGRESS NOTES
Problem: Diarrhea (Adult and Pediatrics)  Goal: *Absence of diarrhea  Outcome: Progressing Towards Goal     Problem: Acute Renal Failure: Day 1  Goal: Activity/Safety  Outcome: Progressing Towards Goal  Goal: Diagnostic Test/Procedures  Outcome: Progressing Towards Goal     Problem: Sepsis: Day of Diagnosis  Goal: *Paired blood cultures prior to first dose of antibiotic  Outcome: Progressing Towards Goal  Goal: *First dose of  appropriate antibiotic within 3 hours of arrival to ED, within 1 hour of arrival to ICU  Outcome: Progressing Towards Goal  Goal: Activity/Safety  Outcome: Progressing Towards Goal     Problem: Falls - Risk of  Goal: *Absence of Falls  Description: Document Cindia Neigh Fall Risk and appropriate interventions in the flowsheet.   Outcome: Progressing Towards Goal  Note: Fall Risk Interventions:            Medication Interventions: Bed/chair exit alarm,Patient to call before getting OOB,Teach patient to arise slowly    Elimination Interventions: Bed/chair exit alarm,Call light in reach,Patient to call for help with toileting needs              Problem: Patient Education: Go to Patient Education Activity  Goal: Patient/Family Education  Outcome: Progressing Towards Goal

## 2022-05-11 PROBLEM — E77.8 HYPOPROTEINEMIA (HCC): Status: ACTIVE | Noted: 2022-05-11

## 2022-05-11 LAB
ALBUMIN SERPL-MCNC: 1.7 G/DL (ref 3.2–4.6)
ALBUMIN/GLOB SERPL: 0.4 {RATIO} (ref 1.2–3.5)
ALP SERPL-CCNC: 73 U/L (ref 50–130)
ALT SERPL-CCNC: 23 U/L (ref 12–65)
ANION GAP SERPL CALC-SCNC: 6 MMOL/L (ref 7–16)
AST SERPL-CCNC: 17 U/L (ref 15–37)
BILIRUB SERPL-MCNC: 0.1 MG/DL (ref 0.2–1.1)
BUN SERPL-MCNC: 18 MG/DL (ref 8–23)
CALCIUM SERPL-MCNC: 8.4 MG/DL (ref 8.3–10.4)
CHLORIDE SERPL-SCNC: 110 MMOL/L (ref 98–107)
CO2 SERPL-SCNC: 20 MMOL/L (ref 21–32)
CREAT SERPL-MCNC: 1.5 MG/DL (ref 0.6–1)
ERYTHROCYTE [DISTWIDTH] IN BLOOD BY AUTOMATED COUNT: 15.9 % (ref 11.9–14.6)
GLOBULIN SER CALC-MCNC: 3.8 G/DL (ref 2.3–3.5)
GLUCOSE SERPL-MCNC: 61 MG/DL (ref 65–100)
HCT VFR BLD AUTO: 29.1 % (ref 35.8–46.3)
HGB BLD-MCNC: 9 G/DL (ref 11.7–15.4)
MAGNESIUM SERPL-MCNC: 1.6 MG/DL (ref 1.8–2.4)
MCH RBC QN AUTO: 28 PG (ref 26.1–32.9)
MCHC RBC AUTO-ENTMCNC: 30.9 G/DL (ref 31.4–35)
MCV RBC AUTO: 90.4 FL (ref 79.6–97.8)
NRBC # BLD: 0 K/UL (ref 0–0.2)
PLATELET # BLD AUTO: 267 K/UL (ref 150–450)
PMV BLD AUTO: 10.4 FL (ref 9.4–12.3)
POTASSIUM SERPL-SCNC: 5 MMOL/L (ref 3.5–5.1)
PROT SERPL-MCNC: 5.5 G/DL (ref 6.3–8.2)
RBC # BLD AUTO: 3.22 M/UL (ref 4.05–5.2)
SODIUM SERPL-SCNC: 136 MMOL/L (ref 136–145)
WBC # BLD AUTO: 10 K/UL (ref 4.3–11.1)

## 2022-05-11 PROCEDURE — 74011000250 HC RX REV CODE- 250: Performed by: FAMILY MEDICINE

## 2022-05-11 PROCEDURE — 65270000029 HC RM PRIVATE

## 2022-05-11 PROCEDURE — 80053 COMPREHEN METABOLIC PANEL: CPT

## 2022-05-11 PROCEDURE — 83735 ASSAY OF MAGNESIUM: CPT

## 2022-05-11 PROCEDURE — 74011250637 HC RX REV CODE- 250/637: Performed by: PHYSICIAN ASSISTANT

## 2022-05-11 PROCEDURE — 85027 COMPLETE CBC AUTOMATED: CPT

## 2022-05-11 PROCEDURE — 2709999900 HC NON-CHARGEABLE SUPPLY

## 2022-05-11 PROCEDURE — 74011250636 HC RX REV CODE- 250/636: Performed by: FAMILY MEDICINE

## 2022-05-11 PROCEDURE — 36415 COLL VENOUS BLD VENIPUNCTURE: CPT

## 2022-05-11 PROCEDURE — 74011250637 HC RX REV CODE- 250/637: Performed by: FAMILY MEDICINE

## 2022-05-11 RX ORDER — MAGNESIUM SULFATE HEPTAHYDRATE 40 MG/ML
2 INJECTION, SOLUTION INTRAVENOUS ONCE
Status: COMPLETED | OUTPATIENT
Start: 2022-05-11 | End: 2022-05-11

## 2022-05-11 RX ADMIN — RDII 250 MG CAPSULE 250 MG: at 08:26

## 2022-05-11 RX ADMIN — AMITRIPTYLINE HYDROCHLORIDE 25 MG: 50 TABLET, FILM COATED ORAL at 22:02

## 2022-05-11 RX ADMIN — SODIUM CHLORIDE, PRESERVATIVE FREE 10 ML: 5 INJECTION INTRAVENOUS at 22:04

## 2022-05-11 RX ADMIN — SODIUM CHLORIDE, SODIUM LACTATE, POTASSIUM CHLORIDE, AND CALCIUM CHLORIDE 150 ML/HR: 600; 310; 30; 20 INJECTION, SOLUTION INTRAVENOUS at 02:25

## 2022-05-11 RX ADMIN — RDII 250 MG CAPSULE 250 MG: at 17:13

## 2022-05-11 RX ADMIN — LEVOTHYROXINE SODIUM 150 MCG: 0.07 TABLET ORAL at 22:02

## 2022-05-11 RX ADMIN — SODIUM CHLORIDE, SODIUM LACTATE, POTASSIUM CHLORIDE, AND CALCIUM CHLORIDE 150 ML/HR: 600; 310; 30; 20 INJECTION, SOLUTION INTRAVENOUS at 17:09

## 2022-05-11 RX ADMIN — LEVOFLOXACIN 750 MG: 5 INJECTION, SOLUTION INTRAVENOUS at 17:10

## 2022-05-11 RX ADMIN — ESCITALOPRAM OXALATE 30 MG: 10 TABLET ORAL at 17:13

## 2022-05-11 RX ADMIN — SODIUM CHLORIDE, PRESERVATIVE FREE 10 ML: 5 INJECTION INTRAVENOUS at 06:27

## 2022-05-11 RX ADMIN — MAGNESIUM SULFATE HEPTAHYDRATE 2 G: 40 INJECTION, SOLUTION INTRAVENOUS at 10:45

## 2022-05-11 RX ADMIN — SODIUM CHLORIDE, PRESERVATIVE FREE 10 ML: 5 INJECTION INTRAVENOUS at 15:17

## 2022-05-11 NOTE — PROGRESS NOTES
Problem: Diarrhea (Adult and Pediatrics)  Goal: *Absence of diarrhea  Outcome: Progressing Towards Goal     Problem: Acute Renal Failure: Day 1  Goal: Activity/Safety  Outcome: Progressing Towards Goal  Goal: Diagnostic Test/Procedures  Outcome: Progressing Towards Goal     Problem: Sepsis: Day of Diagnosis  Goal: *Paired blood cultures prior to first dose of antibiotic  Outcome: Progressing Towards Goal  Goal: *First dose of  appropriate antibiotic within 3 hours of arrival to ED, within 1 hour of arrival to ICU  Outcome: Progressing Towards Goal  Goal: Activity/Safety  Outcome: Progressing Towards Goal     Problem: Falls - Risk of  Goal: *Absence of Falls  Description: Document Brandon Sanchez Fall Risk and appropriate interventions in the flowsheet.   Outcome: Progressing Towards Goal  Note: Fall Risk Interventions:            Medication Interventions: Bed/chair exit alarm,Patient to call before getting OOB,Teach patient to arise slowly    Elimination Interventions: Bed/chair exit alarm,Call light in reach,Patient to call for help with toileting needs              Problem: Patient Education: Go to Patient Education Activity  Goal: Patient/Family Education  Outcome: Progressing Towards Goal

## 2022-05-11 NOTE — PROGRESS NOTES
Hospitalist Progress Note   Admit Date:  2022  7:24 AM   Name:  Manny Barker   Age:  77 y.o. Sex:  female  :  1945   MRN:  [id number]   Room:  [facility]/01    Presenting Complaint: Rectal Bleeding and Abdominal Pain    Reason(s) for Admission: Sepsis with acute renal failure and tubular necrosis without septic shock ([facility] [address] [A41.9, R65.20, N17.0]     Hospital Course & Interval History:   77 y.o. female with medical history of diverticulosis, HTN, hypothyroidism, OHS and IBS who presented to [facility] ER on 22 with c/o abd pain since last night. Pt notes that last night she ate at [facility] for dinner. On the drive home, she started feeling unwell. She describes that her abdomen had pain, as if she was severely constipated. Upon arrival home, she was unable to defecate, but had 5-6 episodes of non-bloody emesis throughout the evening. She notes she fell asleep around 12am and then awoke at 4am this morning with lower abdominal pain. She initially had a \"normal\" BM, but as the morning progressed, she developed diarrhea, and then bloody diarrhea. Pt continues to have LLQ abdominal pain at this time. She denies fever, but admits to chills. She denies sick contacts. Pt did have salad at restaurant last night. She denies SOB, denies CP. Last colonoscopy was 2021, with diverticulosis and internal hemorrhoids noted.      ER workup revealed PARUL with BUN/Cr of 37/2.34 and elevated WBC count of 17.8k. CT A/P revealed acute colitis of the transverse colon. Hospitalist admitted for further care.        Subjective/24hr Events (22): Blood pressure improved. Patient ambulating today. Still having diarrhea but character not consistent with prior C. difficile. Of note  hospitalized for seizure disorder this morning. Also note hypoproteinemia today. ROS:  10 systems reviewed and negative except as noted above.      Assessment & Plan:   Sepsis with acute renal failure and tubular necrosis without septic shock due to suspected shigella gastroenteritis -   - cont aggressive IVF resuscitation. Check blood cultures  - consider stool sample for culture  - starting Levaquin  - add probiotics given hx of Cdiff  - repeat CBC in AM to monitor WBC count    5/11: Continue diet, continued antibiotics, renal function improved and nearing baseline       Hypoproteinemia  - Nutrition consult      Primary hypertension -  - hold lisinopril and hctz due to volume depletion and pre-renal PARUL  - pt did take her antihypertensives this morning  - monitor       Iron deficiency anemia due to chronic blood loss -  - not on oral therapy  - repeat CBC in AM    5/11: Drop in hemoglobin, not to sufficient level to require treatment       IBS (irritable bowel syndrome) -  - noted       Mood disorder -   - cont Lexapro and Elavil as tolerated       Hemorrhoids -  - internal hemorrhoids noted on colonoscopy last year, suspect cause of hematochezia       Hashimoto's thyroiditis  -  - cont Synthroid       Obesity hypoventilation syndrome -  - noted. Does not appear to be on chronic therapy       Gait abnormality -  - see by neurology - has some level of autonomic dysfunction/neuropathy.        Class 3 obesity with alveolar hypoventilation, serious comorbidity, and body mass index (BMI) of 40.0 to 44.9 in adult [facility]) (2/26/2020)  - Increased risk of all cause mortality, complicating care  - healthy lifestyle at discharge       Chronic pain disorder (10/18/2019)  - cont Elavil       Hematochezia -  - noted. Cont to monitor BMs for blood. Suspect due to internal hemorrhoids  Transfuse for Hgb < 7.       Colitis -  - as per above, cont supportive care       Leukocytosis -  - suspect from gastroenteritis, volume contraction.  Repeat CBC in AM       History of Clostridioides difficile colitis -  - probiotic       Stage 3b chronic kidney disease  With acute kidney injury - cont IVF, monitor BMP      Discharge Planning:    Home in 1 to 3 days    Diet:  ADULT DIET Full Liquid  DVT PPx: SCDs  Code status: Full Code    Hospital Problems as of 5/11/2022 Date Reviewed: 5/9/2022          Codes Class Noted - Resolved POA    * (Principal) Sepsis with acute renal failure and tubular necrosis without septic shock (HCC) ICD-10-CM: A41.9, R65.20, N17.0  ICD-9-CM: 038.9, 995.91, 584.5  5/9/2022 - Present Yes        Shigella gastroenteritis ICD-10-CM: A03.9  ICD-9-CM: 004.9  5/9/2022 - Present Yes        Iron deficiency anemia due to chronic blood loss (Chronic) ICD-10-CM: D50.0  ICD-9-CM: 280.0  12/6/2017 - Present Yes        Primary hypertension (Chronic) ICD-10-CM: I10  ICD-9-CM: 401.9  12/6/2017 - Present Yes        Class 3 obesity with alveolar hypoventilation, serious comorbidity, and body mass index (BMI) of 40.0 to 44.9 in adult [facility]) ICD-10-CM: E66.2, Z68.41  ICD-9-CM: 278.03, V85.41  2/26/2020 - Present Yes        Hypoproteinemia ([address] ICD-10-CM: E77.8  ICD-9-CM: 273.8  5/11/2022 - Present No        Stage 3b chronic kidney disease (HCC) (Chronic) ICD-10-CM: W69.46  ICD-9-CM: 585.3  5/9/2022 - Present Yes        Hematochezia ICD-10-CM: K92.1  ICD-9-CM: 578.1  3/27/2021 - Present Yes        Colitis ICD-10-CM: K52.9  ICD-9-CM: 558.9  3/27/2021 - Present Yes        Leukocytosis ICD-10-CM: H76.278  ICD-9-CM: 288.60  3/27/2021 - Present Yes        History of Clostridioides difficile colitis ICD-10-CM: Z86.19  ICD-9-CM: V12.79  3/27/2021 - Present Yes        Gait abnormality ICD-10-CM: R26.9  ICD-9-CM: 781.2  2/26/2020 - Present Yes        Chronic pain disorder ICD-10-CM: G89.4  ICD-9-CM: 338.4  10/18/2019 - Present Yes        Chronic nausea ICD-10-CM: R11.0  ICD-9-CM: 787.02  6/26/2019 - Present Yes        Obesity hypoventilation syndrome ([facility] ICD-10-CM: U38.6  ICD-9-CM: 278.03  3/14/2019 - Present Yes    Overview Signed 3/14/2019  1:50 PM by Antonietta Matamoros MD     Confirmed w/PFT's March 2019             Hashimoto's thyroiditis ICD-10-CM: E06.3  ICD-9-CM: 245.2  3/5/2019 - Present Yes        Hemorrhoids ICD-10-CM: K64.9  ICD-9-CM: 455.6  11/20/2018 - Present Yes        IBS (irritable bowel syndrome) ICD-10-CM: K58.9  ICD-9-CM: 564.1  12/6/2017 - Present Yes        Mood disorder ([facility] ICD-10-CM: F39  ICD-9-CM: 296.90  12/6/2017 - Present Yes              Objective:     Patient Vitals for the past 24 hrs:   Temp Pulse Resp BP SpO2   05/11/22 1614 97.9 °F (36.6 °C) 73 16 (!) 145/58 95 %   05/11/22 1600 -- 75 -- -- --   05/11/22 1045 -- 100 -- 119/60 --   05/11/22 0747 97.6 °F (36.4 °C) 66 16 (!) 140/54 100 %   05/11/22 0258 97.7 °F (36.5 °C) 72 18 (!) 140/58 99 %   05/10/22 2344 97.8 °F (36.6 °C) 67 18 (!) 146/58 98 %   05/10/22 1953 98.3 °F (36.8 °C) 67 18 (!) 148/49 99 %   05/10/22 1856 98.2 °F (36.8 °C) 70 16 (!) 139/57 99 %     Oxygen Therapy  O2 Sat (%): 95 % (05/11/22 1614)  Pulse via Oximetry: 72 beats per minute (05/10/22 1815)  O2 Device: None (Room air) (05/11/22 1614)  Skin Assessment: Clean, dry, & intact (05/11/22 0730)  Skin Protection for O2 Device: No (05/11/22 0730)  O2 Flow Rate (L/min): 1 l/min (weaned to maintain O2 96%) (05/11/22 0730)    Estimated body mass index is 39.71 kg/m² as calculated from the following:    Height as of this encounter: 4' 10\" (1.473 m). Weight as of this encounter: 86.2 kg (190 lb). No intake or output data in the 24 hours ending 05/11/22 1837      Blood pressure (!) 145/58, pulse 73, temperature 97.9 °F (36.6 °C), resp. rate 16, height 4' 10\" (1.473 m), weight 86.2 kg (190 lb), SpO2 95 %. Physical Exam  Vitals and nursing note reviewed. Constitutional:       General: She is not in acute distress. Appearance: Normal appearance. She is obese. She is not ill-appearing. HENT:      Head: Normocephalic. Eyes:      Extraocular Movements: Extraocular movements intact. Cardiovascular:      Rate and Rhythm: Normal rate. Pulses:           Radial pulses are 2+ on the left side.    Pulmonary: Effort: Pulmonary effort is normal. No respiratory distress. Breath sounds: No wheezing, rhonchi or rales. Abdominal:      General: Bowel sounds are normal.      Palpations: Abdomen is soft. Tenderness: There is generalized abdominal tenderness. There is no guarding or rebound. Musculoskeletal:         General: No deformity. Cervical back: No rigidity. Skin:     General: Skin is warm and dry. Neurological:      General: No focal deficit present. Mental Status: She is alert and oriented to person, place, and time. Psychiatric:         Mood and Affect: Mood normal.         Behavior: Behavior normal.         I have reviewed ordered lab tests and independently visualized imaging below:    Recent Labs:  Recent Results (from the past 48 hour(s))   CBC W/O DIFF    Collection Time: 05/10/22  3:22 AM   Result Value Ref Range    WBC 13.9 (H) 4.3 - 11.1 K/uL    RBC 3.38 (L) 4.05 - 5.2 M/uL    HGB 9.5 (L) 11.7 - 15.4 g/dL    HCT 30.2 (L) 35.8 - 46.3 %    MCV 89.3 79.6 - 97.8 FL    MCH 28.1 26.1 - 32.9 PG    MCHC 31.5 31.4 - 35.0 g/dL    RDW 16.2 (H) 11.9 - 14.6 %    PLATELET 976 007 - 586 K/uL    MPV 10.6 9.4 - 12.3 FL    ABSOLUTE NRBC 0.00 0.0 - 0.2 K/uL   MAGNESIUM    Collection Time: 05/10/22  3:22 AM   Result Value Ref Range    Magnesium 1.6 (L) 1.8 - 2.4 mg/dL   METABOLIC PANEL, COMPREHENSIVE    Collection Time: 05/10/22  3:22 AM   Result Value Ref Range    Sodium 137 136 - 145 mmol/L    Potassium 5.1 3.5 - 5.1 mmol/L    Chloride 105 98 - 107 mmol/L    CO2 26 21 - 32 mmol/L    Anion gap 6 (L) 7 - 16 mmol/L    Glucose 90 65 - 100 mg/dL    BUN 28 (H) 8 - 23 MG/DL    Creatinine 2.01 (H) 0.6 - 1.0 MG/DL    GFR est AA 31 (L) >60 ml/min/1.73m2    GFR est non-AA 26 (L) >60 ml/min/1.73m2    Calcium 8.5 8.3 - 10.4 MG/DL    Bilirubin, total 0.4 0.2 - 1.1 MG/DL    ALT (SGPT) 36 12 - 65 U/L    AST (SGOT) 30 15 - 37 U/L    Alk.  phosphatase 77 50 - 130 U/L    Protein, total 5.8 (L) 6.3 - 8.2 g/dL    Albumin 2.2 (L) 3.2 - 4.6 g/dL    Globulin 3.6 (H) 2.3 - 3.5 g/dL    A-G Ratio 0.6 (L) 1.2 - 3.5     MAGNESIUM    Collection Time: 05/11/22  5:55 AM   Result Value Ref Range    Magnesium 1.6 (L) 1.8 - 2.4 mg/dL   METABOLIC PANEL, COMPREHENSIVE    Collection Time: 05/11/22  5:55 AM   Result Value Ref Range    Sodium 136 136 - 145 mmol/L    Potassium 5.0 3.5 - 5.1 mmol/L    Chloride 110 (H) 98 - 107 mmol/L    CO2 20 (L) 21 - 32 mmol/L    Anion gap 6 (L) 7 - 16 mmol/L    Glucose 61 (L) 65 - 100 mg/dL    BUN 18 8 - 23 MG/DL    Creatinine 1.50 (H) 0.6 - 1.0 MG/DL    GFR est AA 44 (L) >60 ml/min/1.73m2    GFR est non-AA 36 (L) >60 ml/min/1.73m2    Calcium 8.4 8.3 - 10.4 MG/DL    Bilirubin, total 0.1 (L) 0.2 - 1.1 MG/DL    ALT (SGPT) 23 12 - 65 U/L    AST (SGOT) 17 15 - 37 U/L    Alk.  phosphatase 73 50 - 130 U/L    Protein, total 5.5 (L) 6.3 - 8.2 g/dL    Albumin 1.7 (L) 3.2 - 4.6 g/dL    Globulin 3.8 (H) 2.3 - 3.5 g/dL    A-G Ratio 0.4 (L) 1.2 - 3.5     CBC W/O DIFF    Collection Time: 05/11/22  9:52 AM   Result Value Ref Range    WBC 10.0 4.3 - 11.1 K/uL    RBC 3.22 (L) 4.05 - 5.2 M/uL    HGB 9.0 (L) 11.7 - 15.4 g/dL    HCT 29.1 (L) 35.8 - 46.3 %    MCV 90.4 79.6 - 97.8 FL    MCH 28.0 26.1 - 32.9 PG    MCHC 30.9 (L) 31.4 - 35.0 g/dL    RDW 15.9 (H) 11.9 - 14.6 %    PLATELET 740 671 - 511 K/uL    MPV 10.4 9.4 - 12.3 FL    ABSOLUTE NRBC 0.00 0.0 - 0.2 K/uL       All Micro Results     Procedure Component Value Units Date/Time    CULTURE, BLOOD [[id number]] Collected: 05/09/22 1315    Order Status: Completed Specimen: Blood Updated: 05/11/22 0426     Special Requests: --        NO SPECIAL REQUESTS  RIGHT  Antecubital       Culture result: NO GROWTH 2 DAYS       CULTURE, BLOOD [[id number]] Collected: 05/09/22 1315    Order Status: Completed Specimen: Blood Updated: 05/11/22 0426     Special Requests: --        NO SPECIAL REQUESTS  LEFT  Antecubital       Culture result: NO GROWTH 2 DAYS             Other Studies:  No results found.    Current Meds:  Current Facility-Administered Medications   Medication Dose Route Frequency    sodium chloride (NS) flush 5-10 mL  5-10 mL IntraVENous PRN    clonazePAM (KlonoPIN) tablet 0.5 mg  0.5 mg Oral BID PRN    amitriptyline (ELAVIL) tablet 25 mg  25 mg Oral QHS    escitalopram oxalate (LEXAPRO) tablet 30 mg  30 mg Oral QPM    [Held by provider] lisinopriL (PRINIVIL, ZESTRIL) tablet 20 mg  20 mg Oral DAILY    [Held by provider] hydroCHLOROthiazide (HYDRODIURIL) tablet 25 mg  25 mg Oral DAILY    levothyroxine (SYNTHROID) tablet 150 mcg  150 mcg Oral QHS    sodium chloride (NS) flush 5-40 mL  5-40 mL IntraVENous Q8H    sodium chloride (NS) flush 5-40 mL  5-40 mL IntraVENous PRN    polyethylene glycol (MIRALAX) packet 17 g  17 g Oral DAILY PRN    ondansetron (ZOFRAN ODT) tablet 4 mg  4 mg Oral Q8H PRN    Or    ondansetron (ZOFRAN) injection 4 mg  4 mg IntraVENous Q6H PRN    levoFLOXacin (LEVAQUIN) 750 mg in D5W IVPB  750 mg IntraVENous Q48H    Saccharomyces boulardii (FLORASTOR) capsule 250 mg  250 mg Oral BID    lactated Ringers infusion  150 mL/hr IntraVENous CONTINUOUS    acetaminophen (TYLENOL) tablet 650 mg  650 mg Oral Q4H PRN       Signed:  Keesha Bella MD

## 2022-05-11 NOTE — PROGRESS NOTES
Problem: Diarrhea (Adult and Pediatrics)  Goal: *Absence of diarrhea  Outcome: Progressing Towards Goal     Problem: Acute Renal Failure: Day 1  Goal: Activity/Safety  Outcome: Progressing Towards Goal  Goal: Diagnostic Test/Procedures  Outcome: Progressing Towards Goal     Problem: Sepsis: Day of Diagnosis  Goal: Activity/Safety  Outcome: Progressing Towards Goal     Problem: Falls - Risk of  Goal: *Absence of Falls  Description: Document Murali Fall Risk and appropriate interventions in the flowsheet.   Outcome: Progressing Towards Goal  Note: Fall Risk Interventions:            Medication Interventions: Bed/chair exit alarm,Patient to call before getting OOB,Teach patient to arise slowly    Elimination Interventions: Bed/chair exit alarm,Call light in reach,Patient to call for help with toileting needs              Problem: Patient Education: Go to Patient Education Activity  Goal: Patient/Family Education  Outcome: Progressing Towards Goal

## 2022-05-12 LAB
ALBUMIN SERPL-MCNC: 2.4 G/DL (ref 3.2–4.6)
ALBUMIN/GLOB SERPL: 0.7 {RATIO} (ref 1.2–3.5)
ALP SERPL-CCNC: 67 U/L (ref 50–136)
ALT SERPL-CCNC: 23 U/L (ref 12–65)
ANION GAP SERPL CALC-SCNC: 5 MMOL/L (ref 7–16)
AST SERPL-CCNC: 14 U/L (ref 15–37)
BILIRUB SERPL-MCNC: 0.2 MG/DL (ref 0.2–1.1)
BUN SERPL-MCNC: 11 MG/DL (ref 8–23)
CALCIUM SERPL-MCNC: 8.4 MG/DL (ref 8.3–10.4)
CHLORIDE SERPL-SCNC: 106 MMOL/L (ref 98–107)
CO2 SERPL-SCNC: 28 MMOL/L (ref 21–32)
CREAT SERPL-MCNC: 1.37 MG/DL (ref 0.6–1)
ERYTHROCYTE [DISTWIDTH] IN BLOOD BY AUTOMATED COUNT: 15.6 % (ref 11.9–14.6)
GLOBULIN SER CALC-MCNC: 3.6 G/DL (ref 2.3–3.5)
GLUCOSE SERPL-MCNC: 81 MG/DL (ref 65–100)
HCT VFR BLD AUTO: 29.3 % (ref 35.8–46.3)
HGB BLD-MCNC: 9.1 G/DL (ref 11.7–15.4)
MAGNESIUM SERPL-MCNC: 1.8 MG/DL (ref 1.8–2.4)
MCH RBC QN AUTO: 27.9 PG (ref 26.1–32.9)
MCHC RBC AUTO-ENTMCNC: 31.1 G/DL (ref 31.4–35)
MCV RBC AUTO: 89.9 FL (ref 79.6–97.8)
NRBC # BLD: 0 K/UL (ref 0–0.2)
PLATELET # BLD AUTO: 277 K/UL (ref 150–450)
PMV BLD AUTO: 11.1 FL (ref 9.4–12.3)
POTASSIUM SERPL-SCNC: 4.6 MMOL/L (ref 3.5–5.1)
PROT SERPL-MCNC: 6 G/DL (ref 6.3–8.2)
RBC # BLD AUTO: 3.26 M/UL (ref 4.05–5.2)
SODIUM SERPL-SCNC: 139 MMOL/L (ref 136–145)
WBC # BLD AUTO: 8.1 K/UL (ref 4.3–11.1)

## 2022-05-12 PROCEDURE — 74011000250 HC RX REV CODE- 250: Performed by: FAMILY MEDICINE

## 2022-05-12 PROCEDURE — 74011250637 HC RX REV CODE- 250/637: Performed by: FAMILY MEDICINE

## 2022-05-12 PROCEDURE — 65270000029 HC RM PRIVATE

## 2022-05-12 PROCEDURE — 94760 N-INVAS EAR/PLS OXIMETRY 1: CPT

## 2022-05-12 PROCEDURE — 36415 COLL VENOUS BLD VENIPUNCTURE: CPT

## 2022-05-12 PROCEDURE — 74011250637 HC RX REV CODE- 250/637: Performed by: PHYSICIAN ASSISTANT

## 2022-05-12 PROCEDURE — 83735 ASSAY OF MAGNESIUM: CPT

## 2022-05-12 PROCEDURE — 2709999900 HC NON-CHARGEABLE SUPPLY

## 2022-05-12 PROCEDURE — 74011250636 HC RX REV CODE- 250/636: Performed by: FAMILY MEDICINE

## 2022-05-12 PROCEDURE — 85027 COMPLETE CBC AUTOMATED: CPT

## 2022-05-12 PROCEDURE — 74011250637 HC RX REV CODE- 250/637: Performed by: STUDENT IN AN ORGANIZED HEALTH CARE EDUCATION/TRAINING PROGRAM

## 2022-05-12 PROCEDURE — 80053 COMPREHEN METABOLIC PANEL: CPT

## 2022-05-12 PROCEDURE — 74011250636 HC RX REV CODE- 250/636: Performed by: STUDENT IN AN ORGANIZED HEALTH CARE EDUCATION/TRAINING PROGRAM

## 2022-05-12 RX ORDER — HYDRALAZINE HYDROCHLORIDE 20 MG/ML
20 INJECTION INTRAMUSCULAR; INTRAVENOUS
Status: DISCONTINUED | OUTPATIENT
Start: 2022-05-12 | End: 2022-05-15 | Stop reason: HOSPADM

## 2022-05-12 RX ADMIN — HYDRALAZINE HYDROCHLORIDE 20 MG: 20 INJECTION INTRAMUSCULAR; INTRAVENOUS at 17:06

## 2022-05-12 RX ADMIN — SODIUM CHLORIDE, PRESERVATIVE FREE 10 ML: 5 INJECTION INTRAVENOUS at 17:52

## 2022-05-12 RX ADMIN — ACETAMINOPHEN 325MG 650 MG: 325 TABLET ORAL at 21:53

## 2022-05-12 RX ADMIN — AMITRIPTYLINE HYDROCHLORIDE 25 MG: 50 TABLET, FILM COATED ORAL at 21:55

## 2022-05-12 RX ADMIN — SODIUM CHLORIDE, PRESERVATIVE FREE 10 ML: 5 INJECTION INTRAVENOUS at 22:06

## 2022-05-12 RX ADMIN — ESCITALOPRAM OXALATE 30 MG: 10 TABLET ORAL at 17:05

## 2022-05-12 RX ADMIN — ONDANSETRON 4 MG: 2 INJECTION INTRAMUSCULAR; INTRAVENOUS at 21:54

## 2022-05-12 RX ADMIN — RDII 250 MG CAPSULE 250 MG: at 17:05

## 2022-05-12 RX ADMIN — RDII 250 MG CAPSULE 250 MG: at 09:08

## 2022-05-12 RX ADMIN — CLONAZEPAM 0.5 MG: 1 TABLET ORAL at 21:53

## 2022-05-12 RX ADMIN — ONDANSETRON 4 MG: 4 TABLET, ORALLY DISINTEGRATING ORAL at 17:50

## 2022-05-12 RX ADMIN — LEVOTHYROXINE SODIUM 150 MCG: 0.07 TABLET ORAL at 21:55

## 2022-05-12 RX ADMIN — SODIUM CHLORIDE, PRESERVATIVE FREE 10 ML: 5 INJECTION INTRAVENOUS at 05:18

## 2022-05-12 RX ADMIN — SODIUM CHLORIDE, SODIUM LACTATE, POTASSIUM CHLORIDE, AND CALCIUM CHLORIDE 150 ML/HR: 600; 310; 30; 20 INJECTION, SOLUTION INTRAVENOUS at 21:55

## 2022-05-12 NOTE — PROGRESS NOTES
Care Management Interventions  PCP Verified by CM: Yes  Mode of Transport at Discharge: Self  Transition of Care Consult (CM Consult): Discharge Planning  Support Systems: Spouse/Significant Other (Spouse/Guevara #[phone])  Confirm Follow Up Transport: Family  The Patient and/or Patient Representative was Provided with a Choice of Provider and Agrees with the Discharge Plan?: Yes  Freedom of Choice List was Provided with Basic Dialogue that Supports the Patient's Individualized Plan of Care/Goals, Treatment Preferences and Shares the Quality Data Associated with the Providers?: Yes  Discharge Location  Patient Expects to be Discharged to[de-identified] Home     Pt care discussed in interdisciplinarily rounds with the following disciplines: physician, case management, nursing, dietary, and therapy. The plan of care was discussed along with goals for discharge date/ location. Possible discharge today, however pt is concerned that she may have C dif. MD to complete testing to rule out. If the workup is negative, pt will likely discharge today. No discharge planning needs noted. Pt reported that her  has a history of seizures and was transported to [facility] yesterday when he was found down at home. Pt has been calling [facility] for an update, but has been unable to speak with a member of his care team. CM provided emotional support and offered assistance if needed.

## 2022-05-12 NOTE — PROGRESS NOTES
Hospitalist Progress Note   Admit Date:  2022  7:24 AM   Name:  Heather Craven   Age:  77 y.o. Sex:  female  :  1945   MRN:  [id number]   Room:  [facility]/    Presenting Complaint: Rectal Bleeding and Abdominal Pain    Reason(s) for Admission: Sepsis with acute renal failure and tubular necrosis without septic shock ([facility] [A41.9, R65.20, N17.0]     Hospital Course & Interval History:   72-year-old female past medical history of hypertension, hypothyroidism, reticulosis, OHS and IBS who presented with chief complaint of abdominal pain. Patient ate at [employer] for dinner and on the way home she started feeling unwell. Patient has abdominal pain and severely constipated. When patient arrived home she had 5-6 episodes of nonbloody emesis throughout the evening. Also fell asleep around 12 AM and woke up at 4 AM in the morning with lower abdominal pain. She initially had a normal bowel movement but as the morning progressed she developed diarrhea and then bloody diarrhea. Patient continues to have left lower quadrant abdominal pain at this time. She denied fevers but did admit to chills. Denies any sick contacts. Last colonoscopy was 2021 with diverticulosis and internal hemorrhoids noted. ER work-up revealed PARUL with BUN/creatinine of 37/2.34 and elevated WBC of 17.8. CT abdomen pelvis revealed acute colitis of the transverse colon. Subjective/24hr Events (22): Patient was seen and examined at the bedside. Patient still complaining of some soreness to her abdomen and frequent episodes of diarrhea. Patient requesting C. difficile be sent since she thinks she has C. difficile infection secondary to being on antibiotics. Patient denied any cardiac chest pain, shortness of breath, fever/chills. ROS:  10 systems reviewed and negative except as noted above.      Assessment & Plan:   Sepsis with acute renal failure and tubular necrosis without septic shock due to suspected Shigella gastroenteritis  - Continue IV fluid resuscitation  - Blood cultures nothing grown x3 days  - Stool cultures pending  - C. difficile panel pending  - Continue Levaquin  - Continue probiotics  - Continue to monitor CBC and WBC count daily  - 5/12 patient continues to have episodes of diarrhea, follow-up studies    Hypoproteinemia  - Patient states that she gets diarrhea quickly after eating  - Nutrition consulted    Primary hypertension  - Lisinopril and hydrochlorothiazide due to volume depletion on hold and prerenal PARUL  - Continue to monitor and titrate accordingly    Iron deficiency anemia due to chronic blood loss  - Not on oral therapy  - Continue to monitor daily CBC    Irritable bowel syndrome  - Noted    Mood disorder  - Continue Lexapro and Elavil as tolerated    Hemorrhoids  - Internal hemorrhoids noted on colonoscopy last year; suspect cause of hematochezia    Hashimoto's thyroiditis  - Continue Synthroid    Obesity hypoventilation syndrome  - Not on chronic therapy    Gait abnormality  - Seen by neurology  - Has some level of autonomic dysfunction/neuropathy    Chronic pain disorder  - Continue Elavil    Hematochezia  - Continue to monitor bowel movements for blood  - Likely secondary to internal hemorrhoids  - Transfuse hemoglobin less than 7    Colitis  - As above  - Continue supportive care    Leukocytosis  - Suspect from gastroenteritis, volume contraction  - Repeat CBC    History of C. difficile colitis  - Continue probiotic  - Sees dentist study pending since patient believes highly that she has C. difficile    Stage IIIb chronic kidney disease with acute kidney injury  - Continue to encourage fluids  - Continue to monitor daily BMP      Discharge Planning:    Dispo pending clinical course.   Stool studies-C. difficile pending    Diet:  ADULT DIET Full Liquid  ADULT ORAL NUTRITION SUPPLEMENT Breakfast; Clear Liquid  ADULT ORAL NUTRITION SUPPLEMENT Lunch; Frozen Supplement  ADULT ORAL NUTRITION SUPPLEMENT Dinner; Standard High Calorie/High Protein  DVT PPx: SCDs  Code status: Full Code    Hospital Problems as of 5/12/2022 Date Reviewed: 5/9/2022          Codes Class Noted - Resolved POA    Shigella gastroenteritis ICD-10-CM: A03.9  ICD-9-CM: 004.9  5/9/2022 - Present Yes        Hypoproteinemia ([facility] ICD-10-CM: E77.8  ICD-9-CM: 273.8  5/11/2022 - Present No        * (Principal) Sepsis with acute renal failure and tubular necrosis without septic shock (HCC) ICD-10-CM: A41.9, R65.20, N17.0  ICD-9-CM: 038.9, 995.91, 584.5  5/9/2022 - Present Yes        Stage 3b chronic kidney disease ([facility] (Chronic) ICD-10-CM: N18.32  ICD-9-CM: 585.3  5/9/2022 - Present Yes        Hematochezia ICD-10-CM: K92.1  ICD-9-CM: 578.1  3/27/2021 - Present Yes        Colitis ICD-10-CM: K52.9  ICD-9-CM: 558.9  3/27/2021 - Present Yes        Leukocytosis ICD-10-CM: J85.807  ICD-9-CM: 288.60  3/27/2021 - Present Yes        History of Clostridioides difficile colitis ICD-10-CM: Z86.19  ICD-9-CM: V12.79  3/27/2021 - Present Yes        Gait abnormality ICD-10-CM: R26.9  ICD-9-CM: 781.2  2/26/2020 - Present Yes        Class 3 obesity with alveolar hypoventilation, serious comorbidity, and body mass index (BMI) of 40.0 to 44.9 in adult [facility]) ICD-10-CM: E66.2, Z68.41  ICD-9-CM: 278.03, V85.41  2/26/2020 - Present Yes        Chronic pain disorder ICD-10-CM: G89.4  ICD-9-CM: 338.4  10/18/2019 - Present Yes        Chronic nausea ICD-10-CM: R11.0  ICD-9-CM: 787.02  6/26/2019 - Present Yes        Obesity hypoventilation syndrome ([facility] ICD-10-CM: W84.4  ICD-9-CM: 278.03  3/14/2019 - Present Yes    Overview Signed 3/14/2019  1:50 PM by Antonietta Matamoros MD     Confirmed w/PFT's March 2019             Hashimoto's thyroiditis ICD-10-CM: E06.3  ICD-9-CM: 245.2  3/5/2019 - Present Yes        Hemorrhoids ICD-10-CM: K64.9  ICD-9-CM: 455.6  11/20/2018 - Present Yes        Primary hypertension (Chronic) ICD-10-CM: I10  ICD-9-CM: 401.9 12/6/2017 - Present Yes        Iron deficiency anemia due to chronic blood loss (Chronic) ICD-10-CM: D50.0  ICD-9-CM: 280.0  12/6/2017 - Present Yes        IBS (irritable bowel syndrome) ICD-10-CM: K58.9  ICD-9-CM: 564.1  12/6/2017 - Present Yes        Mood disorder ([facility] ICD-10-CM: F39  ICD-9-CM: 296.90  12/6/2017 - Present Yes              Objective:     Patient Vitals for the past 24 hrs:   Temp Pulse Resp BP SpO2   05/12/22 1526 -- 71 18 -- 96 %   05/12/22 1146 98 °F (36.7 °C) 65 18 (!) 174/69 97 %   05/12/22 0731 97.7 °F (36.5 °C) 66 18 (!) 174/68 95 %   05/12/22 0310 97.7 °F (36.5 °C) 76 14 (!) 157/85 93 %   05/11/22 2330 98.3 °F (36.8 °C) 73 16 (!) 168/53 92 %   05/11/22 2003 98.3 °F (36.8 °C) 71 16 (!) 153/64 94 %   05/11/22 1614 97.9 °F (36.6 °C) 73 16 (!) 145/58 95 %   05/11/22 1600 -- 75 -- -- --     Oxygen Therapy  O2 Sat (%): 96 % (05/12/22 1526)  Pulse via Oximetry: 72 beats per minute (05/10/22 1815)  O2 Device: None (Room air) (05/12/22 1146)  Skin Assessment: Clean, dry, & intact (05/11/22 0730)  Skin Protection for O2 Device: No (05/11/22 0730)  O2 Flow Rate (L/min): 1 l/min (weaned to maintain O2 96%) (05/11/22 0730)    Estimated body mass index is 39.71 kg/m² as calculated from the following:    Height as of this encounter: 4' 10\" (1.473 m). Weight as of this encounter: 86.2 kg (190 lb). Intake/Output Summary (Last 24 hours) at 5/12/2022 1541  Last data filed at 5/12/2022 1200  Gross per 24 hour   Intake 960 ml   Output --   Net 960 ml         Physical Exam:   Blood pressure (!) 174/69, pulse 71, temperature 98 °F (36.7 °C), resp. rate 18, height 4' 10\" (1.473 m), weight 86.2 kg (190 lb), SpO2 96 %. General:    Well nourished. No overt distress  Head:  Normocephalic, atraumatic  Eyes:  Sclerae appear normal.  Pupils equally round. ENT:  Nares appear normal, no drainage. Moist oral mucosa  Neck:  No restricted ROM. Trachea midline   CV:   RRR. No m/r/g.   No jugular venous distension. Lungs:   CTAB. No wheezing, rhonchi, or rales. Respirations even, unlabored  Abdomen: Bowel sounds present. Soft, generalized abdominal tenderness, nondistended. Extremities: No cyanosis or clubbing. No edema  Skin:     No rashes and normal coloration. Warm and dry. Neuro:  CN II-XII grossly intact. Sensation intact. A&Ox3  Psych:  Normal mood and affect. I have reviewed ordered lab tests and independently visualized imaging below:    Recent Labs:  Recent Results (from the past 48 hour(s))   MAGNESIUM    Collection Time: 05/11/22  5:55 AM   Result Value Ref Range    Magnesium 1.6 (L) 1.8 - 2.4 mg/dL   METABOLIC PANEL, COMPREHENSIVE    Collection Time: 05/11/22  5:55 AM   Result Value Ref Range    Sodium 136 136 - 145 mmol/L    Potassium 5.0 3.5 - 5.1 mmol/L    Chloride 110 (H) 98 - 107 mmol/L    CO2 20 (L) 21 - 32 mmol/L    Anion gap 6 (L) 7 - 16 mmol/L    Glucose 61 (L) 65 - 100 mg/dL    BUN 18 8 - 23 MG/DL    Creatinine 1.50 (H) 0.6 - 1.0 MG/DL    GFR est AA 44 (L) >60 ml/min/1.73m2    GFR est non-AA 36 (L) >60 ml/min/1.73m2    Calcium 8.4 8.3 - 10.4 MG/DL    Bilirubin, total 0.1 (L) 0.2 - 1.1 MG/DL    ALT (SGPT) 23 12 - 65 U/L    AST (SGOT) 17 15 - 37 U/L    Alk.  phosphatase 73 50 - 130 U/L    Protein, total 5.5 (L) 6.3 - 8.2 g/dL    Albumin 1.7 (L) 3.2 - 4.6 g/dL    Globulin 3.8 (H) 2.3 - 3.5 g/dL    A-G Ratio 0.4 (L) 1.2 - 3.5     CBC W/O DIFF    Collection Time: 05/11/22  9:52 AM   Result Value Ref Range    WBC 10.0 4.3 - 11.1 K/uL    RBC 3.22 (L) 4.05 - 5.2 M/uL    HGB 9.0 (L) 11.7 - 15.4 g/dL    HCT 29.1 (L) 35.8 - 46.3 %    MCV 90.4 79.6 - 97.8 FL    MCH 28.0 26.1 - 32.9 PG    MCHC 30.9 (L) 31.4 - 35.0 g/dL    RDW 15.9 (H) 11.9 - 14.6 %    PLATELET 964 326 - 619 K/uL    MPV 10.4 9.4 - 12.3 FL    ABSOLUTE NRBC 0.00 0.0 - 0.2 K/uL   MAGNESIUM    Collection Time: 05/12/22  4:27 AM   Result Value Ref Range    Magnesium 1.8 1.8 - 2.4 mg/dL   CBC W/O DIFF    Collection Time: 05/12/22  4:27 AM   Result Value Ref Range    WBC 8.1 4.3 - 11.1 K/uL    RBC 3.26 (L) 4.05 - 5.2 M/uL    HGB 9.1 (L) 11.7 - 15.4 g/dL    HCT 29.3 (L) 35.8 - 46.3 %    MCV 89.9 79.6 - 97.8 FL    MCH 27.9 26.1 - 32.9 PG    MCHC 31.1 (L) 31.4 - 35.0 g/dL    RDW 15.6 (H) 11.9 - 14.6 %    PLATELET 819 242 - 491 K/uL    MPV 11.1 9.4 - 12.3 FL    ABSOLUTE NRBC 0.00 0.0 - 0.2 K/uL   METABOLIC PANEL, COMPREHENSIVE    Collection Time: 05/12/22  4:27 AM   Result Value Ref Range    Sodium 139 136 - 145 mmol/L    Potassium 4.6 3.5 - 5.1 mmol/L    Chloride 106 98 - 107 mmol/L    CO2 28 21 - 32 mmol/L    Anion gap 5 (L) 7 - 16 mmol/L    Glucose 81 65 - 100 mg/dL    BUN 11 8 - 23 MG/DL    Creatinine 1.37 (H) 0.6 - 1.0 MG/DL    GFR est AA 48 (L) >60 ml/min/1.73m2    GFR est non-AA 40 (L) >60 ml/min/1.73m2    Calcium 8.4 8.3 - 10.4 MG/DL    Bilirubin, total 0.2 0.2 - 1.1 MG/DL    ALT (SGPT) 23 12 - 65 U/L    AST (SGOT) 14 (L) 15 - 37 U/L    Alk.  phosphatase 67 50 - 136 U/L    Protein, total 6.0 (L) 6.3 - 8.2 g/dL    Albumin 2.4 (L) 3.2 - 4.6 g/dL    Globulin 3.6 (H) 2.3 - 3.5 g/dL    A-G Ratio 0.7 (L) 1.2 - 3.5         All Micro Results     Procedure Component Value Units Date/Time    CULTURE, STOOL [[id number]]     Order Status: Sent Specimen: Stool     CAMPLYLOBACTER CULTURE [[id number]]     Order Status: Sent Specimen: Stool     STOOL CULTURE FOR Clcindy Bombard [[id number]]     Order Status: Sent Specimen: Stool     OVA & Blue Bassem [[id number]]     Order Status: Sent Specimen: Feces from Stool     CLOSTRIDIUM DIFF TOXIN A & B [[id number]]     Order Status: Sent Specimen: Stool     CULTURE, BLOOD [[id number]] Collected: 05/09/22 1315    Order Status: Completed Specimen: Blood Updated: 05/12/22 0247     Special Requests: --        NO SPECIAL REQUESTS  RIGHT  Antecubital       Culture result: NO GROWTH 3 DAYS       CULTURE, BLOOD [[id number]] Collected: 05/09/22 1315    Order Status: Completed Specimen: Blood Updated: 05/12/22 0247 Special Requests: --        NO SPECIAL REQUESTS  LEFT  Antecubital       Culture result: NO GROWTH 3 DAYS             Other Studies:  No results found. Current Meds:  Current Facility-Administered Medications   Medication Dose Route Frequency    hydrALAZINE (APRESOLINE) 20 mg/mL injection 20 mg  20 mg IntraVENous Q4H PRN    sodium chloride (NS) flush 5-10 mL  5-10 mL IntraVENous PRN    clonazePAM (KlonoPIN) tablet 0.5 mg  0.5 mg Oral BID PRN    amitriptyline (ELAVIL) tablet 25 mg  25 mg Oral QHS    escitalopram oxalate (LEXAPRO) tablet 30 mg  30 mg Oral QPM    [Held by provider] lisinopriL (PRINIVIL, ZESTRIL) tablet 20 mg  20 mg Oral DAILY    [Held by provider] hydroCHLOROthiazide (HYDRODIURIL) tablet 25 mg  25 mg Oral DAILY    levothyroxine (SYNTHROID) tablet 150 mcg  150 mcg Oral QHS    sodium chloride (NS) flush 5-40 mL  5-40 mL IntraVENous Q8H    sodium chloride (NS) flush 5-40 mL  5-40 mL IntraVENous PRN    polyethylene glycol (MIRALAX) packet 17 g  17 g Oral DAILY PRN    ondansetron (ZOFRAN ODT) tablet 4 mg  4 mg Oral Q8H PRN    Or    ondansetron (ZOFRAN) injection 4 mg  4 mg IntraVENous Q6H PRN    levoFLOXacin (LEVAQUIN) 750 mg in D5W IVPB  750 mg IntraVENous Q48H    Saccharomyces boulardii (FLORASTOR) capsule 250 mg  250 mg Oral BID    lactated Ringers infusion  150 mL/hr IntraVENous CONTINUOUS    acetaminophen (TYLENOL) tablet 650 mg  650 mg Oral Q4H PRN       Signed:  Nneka Romero MD    Part of this note may have been written by using a voice dictation software. The note has been proof read but may still contain some grammatical/other typographical errors.

## 2022-05-12 NOTE — PROGRESS NOTES
Problem: Diarrhea (Adult and Pediatrics)  Goal: *Absence of diarrhea  Outcome: Progressing Towards Goal     Problem: Acute Renal Failure: Day 1  Goal: Activity/Safety  Outcome: Progressing Towards Goal  Goal: Diagnostic Test/Procedures  Outcome: Progressing Towards Goal     Problem: Sepsis: Day of Diagnosis  Goal: *Paired blood cultures prior to first dose of antibiotic  Outcome: Progressing Towards Goal  Goal: *First dose of  appropriate antibiotic within 3 hours of arrival to ED, within 1 hour of arrival to ICU  Outcome: Progressing Towards Goal  Goal: Activity/Safety  Outcome: Progressing Towards Goal     Problem: Falls - Risk of  Goal: *Absence of Falls  Description: Document Bella Estevez Fall Risk and appropriate interventions in the flowsheet.   Outcome: Progressing Towards Goal  Note: Fall Risk Interventions:            Medication Interventions: Patient to call before getting OOB    Elimination Interventions: Call light in reach              Problem: Patient Education: Go to Patient Education Activity  Goal: Patient/Family Education  Outcome: Progressing Towards Goal

## 2022-05-12 NOTE — CONSULTS
Comprehensive Nutrition Assessment    Type and Reason for Visit: Initial,Consult  Oral nutrition supplements (Hospitalist)    Nutrition Recommendations/Interventions:   Food and/or Nutrient Delivery: Continue current diet,Start oral nutrition supplement  Initiate Ensure Enlive once per day (this provides 350 kcal and 20 grams protein per bottle), Ensure Clear once per day (this provides 240 kcal and 8 grams protein per bottle) and Magic Cup once per day (this provides 290 kcal and 9 grams protein per serving)      Coordination of Nutrition Care: Continue to monitor while inpatient,Interdisciplinary rounds     Order placed to obtain daily weights. Discharge Planning: Too soon to determine     Malnutrition Assessment:  Malnutrition Status: At risk for malnutrition (specify) (chronic low po intake, decreased to <25% since admission 5/9)  Nutrition Assessment:   Nutrition History:      5/12: Pt has noticed a decline in appetite many months ago. She used to eat 3 tacos for dinner and now will eat 1 or 2, breakfast remains toast and cereal. She did go out to eat the day before admission and ate half of a rib eye sandwich and half of a salad. She reports her IBS is controlled and she avoids tomatoes and spicy foods d/t intolerance. She vlast weigh last week at the neurologist office and was 180# which she reports was down 10# but she needed to lose the weight because she had gained 35# d/t her thyroid. Nutrition Background:  H/O: IBS, GERD, HTN, MO, C diff, CKD, hypothyroidism  Nutrition Interval:  Pt seen sitting in chair. She reports decreased appetite since admission as everything she consumes goes straight through. She has nausea as well today. She has been consuming <50% and often <25% of all FLD meals. She has no interest in trying solid food d/t nausea. She is receptive to [employer]. She thinks she has C diff as she has had it multiple times after antibiotics which she has received since admission.   Pt has been receiving florastor bid for protective effect. Albumin and Prealbumin levels are indicators of inflammatory metabolism and severity of illness and are not directly linked to inadequate nutritional intake. Therefore these should not be used as indicators of nutritional status or recovery. Nutrition Related Findings:   No visible fat or muscle wasting      Current Nutrition Therapies:  ADULT DIET Full Liquid    Current Intake:   Average Meal Intake: 1-25% (for 1 recorded meal)        Anthropometric Measures:  Anthropometrics:  Height: 4' 10\" (147.3 cm), Weight: 86.2 kg (190 lb)  Current Body Wt: 86.2 kg (190 lb) (5/9), Weight source: Stated  BMI: 39.7, Obese class 2 (BMI 35.0-39. 9)  Admission Body Weight: 190 lb (stated)  Ideal Body Weight (lbs) (Calculated): 90 lbs (41 kg),    Usual Body Wt:  (183-198# per EMR for past year), Percent weight change:  none reflected in above, no current actual wt          Edema: LLE: Trace (5/12/2022  7:42 AM)  RLE: Trace (5/12/2022  7:42 AM)     Estimated Daily Nutrient Needs:(using IBW 40. 9KG)  EER: 8389-8654 kcal/day (25-30 kcal/kg)(using IBW (Ideal body weight) 40.9 kg)  EPR: 41 g/day (1G/KG)  Fluid : 1 ml/kcal/day    Nutrition Diagnosis:   · Inadequate oral intake related to altered GI function as evidenced by nausea,diarrhea (intake meeting <25% of estimated needs)    Goals:      Active Goal: PO intake 50% or greater,by next RD assessment       Nutrition Monitoring and Evaluation:      Food/Nutrient Intake Outcomes: Diet advancement/tolerance,Food and nutrient intake,Supplement intake  Physical Signs/Symptoms Outcomes: Diarrhea,Nausea/vomiting,Weight      Dinesh Zuniga, [address] [place] [address], [address], [address] [place] [address]

## 2022-05-13 LAB
ALBUMIN SERPL-MCNC: 2.2 G/DL (ref 3.2–4.6)
ALBUMIN/GLOB SERPL: 0.7 {RATIO} (ref 1.2–3.5)
ALP SERPL-CCNC: 62 U/L (ref 50–130)
ALT SERPL-CCNC: 18 U/L (ref 12–65)
ANION GAP SERPL CALC-SCNC: 5 MMOL/L (ref 7–16)
AST SERPL-CCNC: 17 U/L (ref 15–37)
BILIRUB SERPL-MCNC: 0.2 MG/DL (ref 0.2–1.1)
BUN SERPL-MCNC: 7 MG/DL (ref 8–23)
CALCIUM SERPL-MCNC: 8.4 MG/DL (ref 8.3–10.4)
CHLORIDE SERPL-SCNC: 107 MMOL/L (ref 98–107)
CO2 SERPL-SCNC: 29 MMOL/L (ref 21–32)
CREAT SERPL-MCNC: 1.37 MG/DL (ref 0.6–1)
ERYTHROCYTE [DISTWIDTH] IN BLOOD BY AUTOMATED COUNT: 15.6 % (ref 11.9–14.6)
GLOBULIN SER CALC-MCNC: 3.3 G/DL (ref 2.3–3.5)
GLUCOSE SERPL-MCNC: 82 MG/DL (ref 65–100)
HCT VFR BLD AUTO: 26.7 % (ref 35.8–46.3)
HEMOCCULT STL QL: NEGATIVE
HGB BLD-MCNC: 8.5 G/DL (ref 11.7–15.4)
MAGNESIUM SERPL-MCNC: 1.6 MG/DL (ref 1.8–2.4)
MCH RBC QN AUTO: 28.1 PG (ref 26.1–32.9)
MCHC RBC AUTO-ENTMCNC: 31.8 G/DL (ref 31.4–35)
MCV RBC AUTO: 88.4 FL (ref 79.6–97.8)
NRBC # BLD: 0 K/UL (ref 0–0.2)
PLATELET # BLD AUTO: 317 K/UL (ref 150–450)
PMV BLD AUTO: 10.2 FL (ref 9.4–12.3)
POTASSIUM SERPL-SCNC: 4.3 MMOL/L (ref 3.5–5.1)
PROT SERPL-MCNC: 5.5 G/DL (ref 6.3–8.2)
RBC # BLD AUTO: 3.02 M/UL (ref 4.05–5.2)
SODIUM SERPL-SCNC: 141 MMOL/L (ref 136–145)
WBC # BLD AUTO: 7.2 K/UL (ref 4.3–11.1)

## 2022-05-13 PROCEDURE — 74011250636 HC RX REV CODE- 250/636: Performed by: STUDENT IN AN ORGANIZED HEALTH CARE EDUCATION/TRAINING PROGRAM

## 2022-05-13 PROCEDURE — 74011250637 HC RX REV CODE- 250/637: Performed by: PHYSICIAN ASSISTANT

## 2022-05-13 PROCEDURE — 74011250637 HC RX REV CODE- 250/637: Performed by: FAMILY MEDICINE

## 2022-05-13 PROCEDURE — 87177 OVA AND PARASITES SMEARS: CPT

## 2022-05-13 PROCEDURE — 74011250637 HC RX REV CODE- 250/637: Performed by: STUDENT IN AN ORGANIZED HEALTH CARE EDUCATION/TRAINING PROGRAM

## 2022-05-13 PROCEDURE — 65270000029 HC RM PRIVATE

## 2022-05-13 PROCEDURE — 74011250636 HC RX REV CODE- 250/636: Performed by: FAMILY MEDICINE

## 2022-05-13 PROCEDURE — 83735 ASSAY OF MAGNESIUM: CPT

## 2022-05-13 PROCEDURE — 87045 FECES CULTURE AEROBIC BACT: CPT

## 2022-05-13 PROCEDURE — 85027 COMPLETE CBC AUTOMATED: CPT

## 2022-05-13 PROCEDURE — 74011000250 HC RX REV CODE- 250: Performed by: FAMILY MEDICINE

## 2022-05-13 PROCEDURE — 36415 COLL VENOUS BLD VENIPUNCTURE: CPT

## 2022-05-13 PROCEDURE — 87046 STOOL CULTR AEROBIC BACT EA: CPT

## 2022-05-13 PROCEDURE — 80053 COMPREHEN METABOLIC PANEL: CPT

## 2022-05-13 PROCEDURE — 82272 OCCULT BLD FECES 1-3 TESTS: CPT

## 2022-05-13 RX ADMIN — ESCITALOPRAM OXALATE 30 MG: 10 TABLET ORAL at 17:33

## 2022-05-13 RX ADMIN — ONDANSETRON 4 MG: 2 INJECTION INTRAMUSCULAR; INTRAVENOUS at 21:26

## 2022-05-13 RX ADMIN — CLONAZEPAM 0.5 MG: 1 TABLET ORAL at 21:26

## 2022-05-13 RX ADMIN — SODIUM CHLORIDE, PRESERVATIVE FREE 10 ML: 5 INJECTION INTRAVENOUS at 17:48

## 2022-05-13 RX ADMIN — RDII 250 MG CAPSULE 250 MG: at 17:33

## 2022-05-13 RX ADMIN — RDII 250 MG CAPSULE 250 MG: at 08:08

## 2022-05-13 RX ADMIN — LEVOTHYROXINE SODIUM 150 MCG: 0.07 TABLET ORAL at 21:25

## 2022-05-13 RX ADMIN — SODIUM CHLORIDE, PRESERVATIVE FREE 10 ML: 5 INJECTION INTRAVENOUS at 05:11

## 2022-05-13 RX ADMIN — LEVOFLOXACIN 750 MG: 5 INJECTION, SOLUTION INTRAVENOUS at 17:32

## 2022-05-13 RX ADMIN — ACETAMINOPHEN 325MG 650 MG: 325 TABLET ORAL at 21:37

## 2022-05-13 RX ADMIN — AMITRIPTYLINE HYDROCHLORIDE 25 MG: 50 TABLET, FILM COATED ORAL at 21:25

## 2022-05-13 RX ADMIN — SODIUM CHLORIDE, SODIUM LACTATE, POTASSIUM CHLORIDE, AND CALCIUM CHLORIDE 150 ML/HR: 600; 310; 30; 20 INJECTION, SOLUTION INTRAVENOUS at 11:32

## 2022-05-13 RX ADMIN — SODIUM CHLORIDE, SODIUM LACTATE, POTASSIUM CHLORIDE, AND CALCIUM CHLORIDE 150 ML/HR: 600; 310; 30; 20 INJECTION, SOLUTION INTRAVENOUS at 17:47

## 2022-05-13 RX ADMIN — HYDRALAZINE HYDROCHLORIDE 20 MG: 20 INJECTION INTRAMUSCULAR; INTRAVENOUS at 21:26

## 2022-05-13 RX ADMIN — SODIUM CHLORIDE, PRESERVATIVE FREE 10 ML: 5 INJECTION INTRAVENOUS at 21:26

## 2022-05-13 RX ADMIN — SODIUM CHLORIDE, SODIUM LACTATE, POTASSIUM CHLORIDE, AND CALCIUM CHLORIDE 150 ML/HR: 600; 310; 30; 20 INJECTION, SOLUTION INTRAVENOUS at 05:13

## 2022-05-13 NOTE — PROGRESS NOTES
Hospitalist Progress Note   Admit Date:  2022  7:24 AM   Name:  Malorie Mesa   Age:  77 y.o. Sex:  female  :  1945   MRN:  [id number]   Room:  [facility]/01    Presenting Complaint: Rectal Bleeding and Abdominal Pain    Reason(s) for Admission: Sepsis with acute renal failure and tubular necrosis without septic shock ([facility] [A41.9, R65.20, N17.0]     Hospital Course & Interval History:   41-year-old female past medical history of hypertension, hypothyroidism, reticulosis, OHS and IBS who presented with chief complaint of abdominal pain. Patient ate at [employer] for dinner and on the way home she started feeling unwell. Patient has abdominal pain and severely constipated. When patient arrived home she had 5-6 episodes of nonbloody emesis throughout the evening. Also fell asleep around 12 AM and woke up at 4 AM in the morning with lower abdominal pain. She initially had a normal bowel movement but as the morning progressed she developed diarrhea and then bloody diarrhea. Patient continues to have left lower quadrant abdominal pain at this time. She denied fevers but did admit to chills. Denies any sick contacts. Last colonoscopy was 2021 with diverticulosis and internal hemorrhoids noted. ER work-up revealed PARUL with BUN/creatinine of 37/2.34 and elevated WBC of 17.8. CT abdomen pelvis revealed acute colitis of the transverse colon. Subjective/24hr Events (22): Patient was seen and examined at the bedside. Patient still complaining of episodes of diarrhea. Stool sample collected yesterday unfortunately was mismanaged and left in sink and not sent down to be analyzed. Patient denied any cardiac chest pain, shortness of breath, fever/chills. ROS:  10 systems reviewed and negative except as noted above.      Assessment & Plan:   Sepsis with acute renal failure and tubular necrosis without septic shock due to suspected Shigella gastroenteritis  - Continue IV fluid resuscitation  - Blood cultures nothing grown x3 days  - Stool cultures pending  - C. difficile panel pending  - Continue Levaquin  - Continue probiotics  - Continue to monitor CBC and WBC count daily  - 5/13 patient continues to have episodes of diarrhea, follow-up studies    Hypoproteinemia  - Patient states that she gets diarrhea quickly after eating  - Nutrition consulted    Primary hypertension  - Lisinopril and hydrochlorothiazide due to volume depletion on hold and prerenal PARUL  - Continue to monitor and titrate accordingly    Iron deficiency anemia due to chronic blood loss  - Not on oral therapy  - Continue to monitor daily CBC    Irritable bowel syndrome  - Noted    Mood disorder  - Continue Lexapro and Elavil as tolerated    Hemorrhoids  - Internal hemorrhoids noted on colonoscopy last year; suspect cause of hematochezia    Hashimoto's thyroiditis  - Continue Synthroid    Obesity hypoventilation syndrome  - Not on chronic therapy    Gait abnormality  - Seen by neurology  - Has some level of autonomic dysfunction/neuropathy    Chronic pain disorder  - Continue Elavil    Hematochezia  - Continue to monitor bowel movements for blood  - Likely secondary to internal hemorrhoids  - Transfuse hemoglobin less than 7    Colitis  - As above  - Continue supportive care    Leukocytosis  - Suspect from gastroenteritis, volume contraction  - Repeat CBC    History of C. difficile colitis  - Continue probiotic  - Sees dentist study pending since patient believes highly that she has C. difficile    Stage IIIb chronic kidney disease with acute kidney injury  - Continue to encourage fluids  - Continue to monitor daily BMP      Discharge Planning:    Dispo pending clinical course.   Stool studies-C. difficile pending    Diet:  ADULT DIET Full Liquid  ADULT ORAL NUTRITION SUPPLEMENT Breakfast; Clear Liquid  ADULT ORAL NUTRITION SUPPLEMENT Lunch; Frozen Supplement  ADULT ORAL NUTRITION SUPPLEMENT Dinner; Standard High Calorie/High Protein  DVT PPx: SCDs  Code status: Full Code    Hospital Problems as of 5/13/2022 Date Reviewed: 5/9/2022          Codes Class Noted - Resolved POA    Shigella gastroenteritis ICD-10-CM: A03.9  ICD-9-CM: 004.9  5/9/2022 - Present Yes        Hypoproteinemia ([facility] ICD-10-CM: E77.8  ICD-9-CM: 273.8  5/11/2022 - Present No        * (Principal) Sepsis with acute renal failure and tubular necrosis without septic shock (HCC) ICD-10-CM: A41.9, R65.20, N17.0  ICD-9-CM: 038.9, 995.91, 584.5  5/9/2022 - Present Yes        Stage 3b chronic kidney disease ([facility] (Chronic) ICD-10-CM: N18.32  ICD-9-CM: 585.3  5/9/2022 - Present Yes        Hematochezia ICD-10-CM: K92.1  ICD-9-CM: 578.1  3/27/2021 - Present Yes        Colitis ICD-10-CM: K52.9  ICD-9-CM: 558.9  3/27/2021 - Present Yes        Leukocytosis ICD-10-CM: P71.432  ICD-9-CM: 288.60  3/27/2021 - Present Yes        History of Clostridioides difficile colitis ICD-10-CM: Z86.19  ICD-9-CM: V12.79  3/27/2021 - Present Yes        Gait abnormality ICD-10-CM: R26.9  ICD-9-CM: 781.2  2/26/2020 - Present Yes        Class 3 obesity with alveolar hypoventilation, serious comorbidity, and body mass index (BMI) of 40.0 to 44.9 in adult [facility]) ICD-10-CM: E66.2, Z68.41  ICD-9-CM: 278.03, V85.41  2/26/2020 - Present Yes        Chronic pain disorder ICD-10-CM: G89.4  ICD-9-CM: 338.4  10/18/2019 - Present Yes        Chronic nausea ICD-10-CM: R11.0  ICD-9-CM: 787.02  6/26/2019 - Present Yes        Obesity hypoventilation syndrome ([address] ICD-10-CM: H89.0  ICD-9-CM: 278.03  3/14/2019 - Present Yes    Overview Signed 3/14/2019  1:50 PM by Thai Busby MD     Confirmed w/PFT's March 2019             Hashimoto's thyroiditis ICD-10-CM: E06.3  ICD-9-CM: 245.2  3/5/2019 - Present Yes        Hemorrhoids ICD-10-CM: K64.9  ICD-9-CM: 455.6  11/20/2018 - Present Yes        Primary hypertension (Chronic) ICD-10-CM: I10  ICD-9-CM: 401.9  12/6/2017 - Present Yes        Iron deficiency anemia due to chronic blood loss (Chronic) ICD-10-CM: D50.0  ICD-9-CM: 280.0  12/6/2017 - Present Yes        IBS (irritable bowel syndrome) ICD-10-CM: K58.9  ICD-9-CM: 564.1  12/6/2017 - Present Yes        Mood disorder ([facility] ICD-10-CM: F39  ICD-9-CM: 296.90  12/6/2017 - Present Yes              Objective:     Patient Vitals for the past 24 hrs:   Temp Pulse Resp BP SpO2   05/13/22 1536 98 °F (36.7 °C) 64 18 (!) 143/70 100 %   05/13/22 1110 98.2 °F (36.8 °C) 65 18 (!) 151/66 93 %   05/13/22 0726 97.9 °F (36.6 °C) 66 18 (!) 144/80 94 %   05/13/22 0024 98.4 °F (36.9 °C) 73 16 (!) 129/40 (!) 89 %   05/12/22 2101 99 °F (37.2 °C) 80 16 (!) 160/50 95 %     Oxygen Therapy  O2 Sat (%): 100 % (05/13/22 1536)  Pulse via Oximetry: 72 beats per minute (05/10/22 1815)  O2 Device: None (Room air) (05/13/22 8265)  Skin Assessment: Clean, dry, & intact (05/11/22 0730)  Skin Protection for O2 Device: No (05/11/22 0730)  O2 Flow Rate (L/min): 1 l/min (weaned to maintain O2 96%) (05/11/22 0730)    Estimated body mass index is 43.92 kg/m² as calculated from the following:    Height as of this encounter: 4' 10\" (1.473 m). Weight as of this encounter: 95.3 kg (210 lb 2.7 oz). No intake or output data in the 24 hours ending 05/13/22 1744      Physical Exam:   Blood pressure (!) 143/70, pulse 64, temperature 98 °F (36.7 °C), resp. rate 18, height 4' 10\" (1.473 m), weight 95.3 kg (210 lb 2.7 oz), SpO2 100 %. General:    Well nourished. No overt distress  Head:  Normocephalic, atraumatic  Eyes:  Sclerae appear normal.  Pupils equally round. ENT:  Nares appear normal, no drainage. Moist oral mucosa  Neck:  No restricted ROM. Trachea midline   CV:   RRR. No m/r/g. No jugular venous distension. Lungs:   CTAB. No wheezing, rhonchi, or rales. Respirations even, unlabored  Abdomen: Bowel sounds present. Soft, generalized abdominal tenderness, nondistended. Extremities: No cyanosis or clubbing.   No edema  Skin:     No rashes and normal coloration. Warm and dry. Neuro:  CN II-XII grossly intact. Sensation intact. A&Ox3  Psych:  Normal mood and affect. I have reviewed ordered lab tests and independently visualized imaging below:    Recent Labs:  Recent Results (from the past 48 hour(s))   MAGNESIUM    Collection Time: 05/12/22  4:27 AM   Result Value Ref Range    Magnesium 1.8 1.8 - 2.4 mg/dL   CBC W/O DIFF    Collection Time: 05/12/22  4:27 AM   Result Value Ref Range    WBC 8.1 4.3 - 11.1 K/uL    RBC 3.26 (L) 4.05 - 5.2 M/uL    HGB 9.1 (L) 11.7 - 15.4 g/dL    HCT 29.3 (L) 35.8 - 46.3 %    MCV 89.9 79.6 - 97.8 FL    MCH 27.9 26.1 - 32.9 PG    MCHC 31.1 (L) 31.4 - 35.0 g/dL    RDW 15.6 (H) 11.9 - 14.6 %    PLATELET 077 195 - 980 K/uL    MPV 11.1 9.4 - 12.3 FL    ABSOLUTE NRBC 0.00 0.0 - 0.2 K/uL   METABOLIC PANEL, COMPREHENSIVE    Collection Time: 05/12/22  4:27 AM   Result Value Ref Range    Sodium 139 136 - 145 mmol/L    Potassium 4.6 3.5 - 5.1 mmol/L    Chloride 106 98 - 107 mmol/L    CO2 28 21 - 32 mmol/L    Anion gap 5 (L) 7 - 16 mmol/L    Glucose 81 65 - 100 mg/dL    BUN 11 8 - 23 MG/DL    Creatinine 1.37 (H) 0.6 - 1.0 MG/DL    GFR est AA 48 (L) >60 ml/min/1.73m2    GFR est non-AA 40 (L) >60 ml/min/1.73m2    Calcium 8.4 8.3 - 10.4 MG/DL    Bilirubin, total 0.2 0.2 - 1.1 MG/DL    ALT (SGPT) 23 12 - 65 U/L    AST (SGOT) 14 (L) 15 - 37 U/L    Alk.  phosphatase 67 50 - 136 U/L    Protein, total 6.0 (L) 6.3 - 8.2 g/dL    Albumin 2.4 (L) 3.2 - 4.6 g/dL    Globulin 3.6 (H) 2.3 - 3.5 g/dL    A-G Ratio 0.7 (L) 1.2 - 3.5     CBC W/O DIFF    Collection Time: 05/13/22  5:49 AM   Result Value Ref Range    WBC 7.2 4.3 - 11.1 K/uL    RBC 3.02 (L) 4.05 - 5.2 M/uL    HGB 8.5 (L) 11.7 - 15.4 g/dL    HCT 26.7 (L) 35.8 - 46.3 %    MCV 88.4 79.6 - 97.8 FL    MCH 28.1 26.1 - 32.9 PG    MCHC 31.8 31.4 - 35.0 g/dL    RDW 15.6 (H) 11.9 - 14.6 %    PLATELET 754 787 - 711 K/uL    MPV 10.2 9.4 - 12.3 FL    ABSOLUTE NRBC 0.00 0.0 - 0.2 K/uL   METABOLIC PANEL, COMPREHENSIVE    Collection Time: 05/13/22  5:49 AM   Result Value Ref Range    Sodium 141 136 - 145 mmol/L    Potassium 4.3 3.5 - 5.1 mmol/L    Chloride 107 98 - 107 mmol/L    CO2 29 21 - 32 mmol/L    Anion gap 5 (L) 7 - 16 mmol/L    Glucose 82 65 - 100 mg/dL    BUN 7 (L) 8 - 23 MG/DL    Creatinine 1.37 (H) 0.6 - 1.0 MG/DL    GFR est AA 48 (L) >60 ml/min/1.73m2    GFR est non-AA 40 (L) >60 ml/min/1.73m2    Calcium 8.4 8.3 - 10.4 MG/DL    Bilirubin, total 0.2 0.2 - 1.1 MG/DL    ALT (SGPT) 18 12 - 65 U/L    AST (SGOT) 17 15 - 37 U/L    Alk.  phosphatase 62 50 - 130 U/L    Protein, total 5.5 (L) 6.3 - 8.2 g/dL    Albumin 2.2 (L) 3.2 - 4.6 g/dL    Globulin 3.3 2.3 - 3.5 g/dL    A-G Ratio 0.7 (L) 1.2 - 3.5     MAGNESIUM    Collection Time: 05/13/22  5:49 AM   Result Value Ref Range    Magnesium 1.6 (L) 1.8 - 2.4 mg/dL   OCCULT BLOOD, STOOL    Collection Time: 05/13/22 11:33 AM   Result Value Ref Range    Occult blood, stool Negative NEG         All Micro Results     Procedure Component Value Units Date/Time    CULTURE, STOOL [[id number]] Collected: 05/13/22 1133    Order Status: Completed Specimen: Stool Updated: 05/13/22 1145    STOOL CULTURE FOR Primitivo Strauss [[id number]] Collected: 05/13/22 1133    Order Status: Completed Specimen: Stool Updated: 05/13/22 1143    CAMPLYLOBACTER CULTURE [[id number]] Collected: 05/13/22 1133    Order Status: Completed Specimen: Stool Updated: 05/13/22 1143    OVA & PARASITES, STOOL [[id number]] Collected: 05/13/22 1133    Order Status: Completed Specimen: Feces from Stool Updated: 05/13/22 1141    CULTURE, BLOOD [[id number]] Collected: 05/09/22 1315    Order Status: Completed Specimen: Blood Updated: 05/13/22 0832     Special Requests: --        NO SPECIAL REQUESTS  LEFT  Antecubital       Culture result: NO GROWTH 4 DAYS       CULTURE, BLOOD [[id number]] Collected: 05/09/22 1315    Order Status: Completed Specimen: Blood Updated: 05/13/22 0832     Special Requests: --        NO SPECIAL REQUESTS  RIGHT  Antecubital       Culture result: NO GROWTH 4 DAYS       CLOSTRIDIUM DIFF TOXIN A & B [[id number]]     Order Status: Sent Specimen: Stool           Other Studies:  No results found. Current Meds:  Current Facility-Administered Medications   Medication Dose Route Frequency    hydrALAZINE (APRESOLINE) 20 mg/mL injection 20 mg  20 mg IntraVENous Q4H PRN    sodium chloride (NS) flush 5-10 mL  5-10 mL IntraVENous PRN    clonazePAM (KlonoPIN) tablet 0.5 mg  0.5 mg Oral BID PRN    amitriptyline (ELAVIL) tablet 25 mg  25 mg Oral QHS    escitalopram oxalate (LEXAPRO) tablet 30 mg  30 mg Oral QPM    [Held by provider] lisinopriL (PRINIVIL, ZESTRIL) tablet 20 mg  20 mg Oral DAILY    [Held by provider] hydroCHLOROthiazide (HYDRODIURIL) tablet 25 mg  25 mg Oral DAILY    levothyroxine (SYNTHROID) tablet 150 mcg  150 mcg Oral QHS    sodium chloride (NS) flush 5-40 mL  5-40 mL IntraVENous Q8H    sodium chloride (NS) flush 5-40 mL  5-40 mL IntraVENous PRN    polyethylene glycol (MIRALAX) packet 17 g  17 g Oral DAILY PRN    ondansetron (ZOFRAN ODT) tablet 4 mg  4 mg Oral Q8H PRN    Or    ondansetron (ZOFRAN) injection 4 mg  4 mg IntraVENous Q6H PRN    levoFLOXacin (LEVAQUIN) 750 mg in D5W IVPB  750 mg IntraVENous Q48H    Saccharomyces boulardii (FLORASTOR) capsule 250 mg  250 mg Oral BID    lactated Ringers infusion  150 mL/hr IntraVENous CONTINUOUS    acetaminophen (TYLENOL) tablet 650 mg  650 mg Oral Q4H PRN       Signed:  Felipe Pickett MD    Part of this note may have been written by using a voice dictation software. The note has been proof read but may still contain some grammatical/other typographical errors.

## 2022-05-13 NOTE — PROGRESS NOTES
Problem: Diarrhea (Adult and Pediatrics)  Goal: *Absence of diarrhea  Outcome: Progressing Towards Goal     Problem: Falls - Risk of  Goal: *Absence of Falls  Description: Document Doris Locket Fall Risk and appropriate interventions in the flowsheet.   Outcome: Progressing Towards Goal  Note: Fall Risk Interventions:            Medication Interventions: Patient to call before getting OOB    Elimination Interventions: Call light in reach

## 2022-05-13 NOTE — PROGRESS NOTES
SW met with patient, confirmed discharge plan. [facility]      Talisha Armendariz LMSW    [facility]    * [email or web address]

## 2022-05-13 NOTE — ACP (ADVANCE CARE PLANNING)
Advance Care Planning     Advance Care Planning Activator (Inpatient)  Conversation Note      Date of ACP Conversation: 05/13/22     Conversation Conducted with:   Patient with Decision Making Capacity    ACP Activator: Christopher Decision Maker:    Current Designated Health Care Decision Maker:     Primary Decision Maker: Tc Rebollar - [phone]  Click here to complete [address] including selection of the Healthcare Decision Maker Relationship (ie \"Primary\")  Today we documented desired Decision Maker(s), who is (are) NOT Legal Next of [facility] Re[facility]. ACP documents are required for decision maker authority. Care Preferences    Ventilation: \"If you were in your present state of health and suddenly became very ill and were unable to breathe on your own, what would your preference be about the use of a ventilator (breathing machine) if it were available to you? \"      If patient would desire the use of a ventilator (breathing machine), answer YES    \"If your health worsens and it becomes clear that your chance of recovery is unlikely, what would your preference be about the use of a ventilator (breathing machine) if it were available to you? \"     Would the patient desire the use of a ventilator (breathing machine)? NO      Resuscitation  \"CPR works best to restart the heart when there is a sudden event, like a heart attack, in someone who is otherwise healthy. Unfortunately, CPR does not typically restart the heart for people who have serious health conditions or who are very sick. \"    \"In the event your heart stopped as a result of an underlying serious health condition, would you want attempts to be made to restart your heart YES      [x] Yes  [] No   Educated Patient / Decision Maker regarding differences between Advance Directives and portable DNR orders.     Length of ACP Conversation in minutes: 15    Conversation Outcomes:  [x] ACP discussion completed  [] Existing advance directive reviewed with patient; no changes to patient's previously recorded wishes     [] New Advance Directive completed   [] Portable Do Not Resuscitate prepared for Provider review and signature  [] POLST/POST/MOLST/MOST prepared for Provider review and signature      Follow-up plan:    [] Schedule follow-up conversation to continue planning  [x] Referred individual to Provider for additional questions/concerns   [] Advised patient/agent/surrogate to review completed ACP document and update if needed with changes in condition, patient preferences or care setting     [] This note routed to one or more involved healthcare providers              PATIENT CONFIRMED THAT SHE HAS [address]. CONFIRMED CONTACT INFORMATION FOR CECILY.

## 2022-05-14 ENCOUNTER — APPOINTMENT (OUTPATIENT)
Dept: GENERAL RADIOLOGY | Age: 77
DRG: 871 | End: 2022-05-14
Attending: STUDENT IN AN ORGANIZED HEALTH CARE EDUCATION/TRAINING PROGRAM
Payer: MEDICARE

## 2022-05-14 LAB
ALBUMIN SERPL-MCNC: 2.2 G/DL (ref 3.2–4.6)
ALBUMIN/GLOB SERPL: 0.6 {RATIO} (ref 1.2–3.5)
ALP SERPL-CCNC: 61 U/L (ref 50–130)
ALT SERPL-CCNC: 18 U/L (ref 12–65)
ANION GAP SERPL CALC-SCNC: 4 MMOL/L (ref 7–16)
AST SERPL-CCNC: 14 U/L (ref 15–37)
BACTERIA SPEC CULT: NORMAL
BACTERIA SPEC CULT: NORMAL
BILIRUB SERPL-MCNC: 0.2 MG/DL (ref 0.2–1.1)
BUN SERPL-MCNC: 6 MG/DL (ref 8–23)
CALCIUM SERPL-MCNC: 8.4 MG/DL (ref 8.3–10.4)
CHLORIDE SERPL-SCNC: 106 MMOL/L (ref 98–107)
CO2 SERPL-SCNC: 31 MMOL/L (ref 21–32)
CREAT SERPL-MCNC: 1.36 MG/DL (ref 0.6–1)
ERYTHROCYTE [DISTWIDTH] IN BLOOD BY AUTOMATED COUNT: 15.5 % (ref 11.9–14.6)
GLOBULIN SER CALC-MCNC: 3.4 G/DL (ref 2.3–3.5)
GLUCOSE SERPL-MCNC: 86 MG/DL (ref 65–100)
HCT VFR BLD AUTO: 28.2 % (ref 35.8–46.3)
HGB BLD-MCNC: 9.1 G/DL (ref 11.7–15.4)
MAGNESIUM SERPL-MCNC: 1.4 MG/DL (ref 1.8–2.4)
MCH RBC QN AUTO: 28.4 PG (ref 26.1–32.9)
MCHC RBC AUTO-ENTMCNC: 32.3 G/DL (ref 31.4–35)
MCV RBC AUTO: 88.1 FL (ref 79.6–97.8)
NRBC # BLD: 0 K/UL (ref 0–0.2)
PLATELET # BLD AUTO: 319 K/UL (ref 150–450)
PMV BLD AUTO: 9.9 FL (ref 9.4–12.3)
POTASSIUM SERPL-SCNC: 3.9 MMOL/L (ref 3.5–5.1)
PROT SERPL-MCNC: 5.6 G/DL (ref 6.3–8.2)
RBC # BLD AUTO: 3.2 M/UL (ref 4.05–5.2)
SERVICE CMNT-IMP: NORMAL
SERVICE CMNT-IMP: NORMAL
SODIUM SERPL-SCNC: 141 MMOL/L (ref 136–145)
WBC # BLD AUTO: 8.7 K/UL (ref 4.3–11.1)

## 2022-05-14 PROCEDURE — 74011250636 HC RX REV CODE- 250/636: Performed by: STUDENT IN AN ORGANIZED HEALTH CARE EDUCATION/TRAINING PROGRAM

## 2022-05-14 PROCEDURE — 71045 X-RAY EXAM CHEST 1 VIEW: CPT

## 2022-05-14 PROCEDURE — 74011250637 HC RX REV CODE- 250/637: Performed by: FAMILY MEDICINE

## 2022-05-14 PROCEDURE — 97165 OT EVAL LOW COMPLEX 30 MIN: CPT

## 2022-05-14 PROCEDURE — 74011250637 HC RX REV CODE- 250/637: Performed by: INTERNAL MEDICINE

## 2022-05-14 PROCEDURE — 83735 ASSAY OF MAGNESIUM: CPT

## 2022-05-14 PROCEDURE — 36415 COLL VENOUS BLD VENIPUNCTURE: CPT

## 2022-05-14 PROCEDURE — 97161 PT EVAL LOW COMPLEX 20 MIN: CPT

## 2022-05-14 PROCEDURE — 85027 COMPLETE CBC AUTOMATED: CPT

## 2022-05-14 PROCEDURE — 2709999900 HC NON-CHARGEABLE SUPPLY

## 2022-05-14 PROCEDURE — 97535 SELF CARE MNGMENT TRAINING: CPT

## 2022-05-14 PROCEDURE — 97530 THERAPEUTIC ACTIVITIES: CPT

## 2022-05-14 PROCEDURE — 74011250637 HC RX REV CODE- 250/637: Performed by: STUDENT IN AN ORGANIZED HEALTH CARE EDUCATION/TRAINING PROGRAM

## 2022-05-14 PROCEDURE — 80053 COMPREHEN METABOLIC PANEL: CPT

## 2022-05-14 PROCEDURE — 74011250637 HC RX REV CODE- 250/637: Performed by: PHYSICIAN ASSISTANT

## 2022-05-14 PROCEDURE — 65270000029 HC RM PRIVATE

## 2022-05-14 PROCEDURE — 87324 CLOSTRIDIUM AG IA: CPT

## 2022-05-14 PROCEDURE — 74011000250 HC RX REV CODE- 250: Performed by: FAMILY MEDICINE

## 2022-05-14 RX ORDER — ONDANSETRON 2 MG/ML
4 INJECTION INTRAMUSCULAR; INTRAVENOUS
Status: DISCONTINUED | OUTPATIENT
Start: 2022-05-14 | End: 2022-05-15 | Stop reason: HOSPADM

## 2022-05-14 RX ORDER — ONDANSETRON 4 MG/1
4 TABLET, ORALLY DISINTEGRATING ORAL
Status: DISCONTINUED | OUTPATIENT
Start: 2022-05-14 | End: 2022-05-15 | Stop reason: HOSPADM

## 2022-05-14 RX ORDER — MONTELUKAST SODIUM 4 MG/1
1 TABLET, CHEWABLE ORAL DAILY
Status: DISCONTINUED | OUTPATIENT
Start: 2022-05-15 | End: 2022-05-15 | Stop reason: HOSPADM

## 2022-05-14 RX ORDER — LOPERAMIDE HYDROCHLORIDE 2 MG/1
2 CAPSULE ORAL
Status: DISCONTINUED | OUTPATIENT
Start: 2022-05-14 | End: 2022-05-15

## 2022-05-14 RX ORDER — FUROSEMIDE 10 MG/ML
40 INJECTION INTRAMUSCULAR; INTRAVENOUS ONCE
Status: DISCONTINUED | OUTPATIENT
Start: 2022-05-14 | End: 2022-05-14

## 2022-05-14 RX ORDER — FUROSEMIDE 40 MG/1
80 TABLET ORAL ONCE
Status: COMPLETED | OUTPATIENT
Start: 2022-05-14 | End: 2022-05-14

## 2022-05-14 RX ORDER — HYOSCYAMINE SULFATE 0.12 MG/1
0.12 TABLET SUBLINGUAL
Status: DISCONTINUED | OUTPATIENT
Start: 2022-05-14 | End: 2022-05-15 | Stop reason: HOSPADM

## 2022-05-14 RX ADMIN — ACETAMINOPHEN 325MG 650 MG: 325 TABLET ORAL at 21:27

## 2022-05-14 RX ADMIN — HYDRALAZINE HYDROCHLORIDE 20 MG: 20 INJECTION INTRAMUSCULAR; INTRAVENOUS at 05:55

## 2022-05-14 RX ADMIN — SODIUM CHLORIDE, PRESERVATIVE FREE 10 ML: 5 INJECTION INTRAVENOUS at 05:26

## 2022-05-14 RX ADMIN — RDII 250 MG CAPSULE 250 MG: at 18:06

## 2022-05-14 RX ADMIN — AMITRIPTYLINE HYDROCHLORIDE 25 MG: 50 TABLET, FILM COATED ORAL at 21:28

## 2022-05-14 RX ADMIN — LEVOTHYROXINE SODIUM 150 MCG: 0.07 TABLET ORAL at 21:28

## 2022-05-14 RX ADMIN — CLONAZEPAM 0.5 MG: 1 TABLET ORAL at 12:15

## 2022-05-14 RX ADMIN — FUROSEMIDE 80 MG: 40 TABLET ORAL at 15:46

## 2022-05-14 RX ADMIN — ONDANSETRON 4 MG: 4 TABLET, ORALLY DISINTEGRATING ORAL at 18:28

## 2022-05-14 RX ADMIN — CLONAZEPAM 0.5 MG: 1 TABLET ORAL at 21:31

## 2022-05-14 RX ADMIN — RDII 250 MG CAPSULE 250 MG: at 08:59

## 2022-05-14 RX ADMIN — ESCITALOPRAM OXALATE 30 MG: 10 TABLET ORAL at 18:06

## 2022-05-14 RX ADMIN — ONDANSETRON 4 MG: 4 TABLET, ORALLY DISINTEGRATING ORAL at 12:15

## 2022-05-14 NOTE — PROGRESS NOTES
Hospitalist Progress Note   Admit Date:  2022  7:24 AM   Name:  Marline Del Cid   Age:  77 y.o. Sex:  female  :  1945   MRN:  [id number]   Room:  353/01    Presenting Complaint: Rectal Bleeding and Abdominal Pain    Reason(s) for Admission: Sepsis with acute renal failure and tubular necrosis without septic shock ([facility].) [A41.9, R65.20, N17.0]     Hospital Course & Interval History:   43-year-old female past medical history of hypertension, hypothyroidism, reticulosis, OHS and IBS who presented with chief complaint of abdominal pain. Patient ate at [employer] for dinner and on the way home she started feeling unwell. Patient has abdominal pain and severely constipated. When patient arrived home she had 5-6 episodes of nonbloody emesis throughout the evening. Also fell asleep around 12 AM and woke up at 4 AM in the morning with lower abdominal pain. She initially had a normal bowel movement but as the morning progressed she developed diarrhea and then bloody diarrhea. Patient continues to have left lower quadrant abdominal pain at this time. She denied fevers but did admit to chills. Denies any sick contacts. Last colonoscopy was 2021 with diverticulosis and internal hemorrhoids noted. ER work-up revealed PARUL with BUN/creatinine of 37/2.34 and elevated WBC of 17.8. CT abdomen pelvis revealed acute colitis of the transverse colon. Subjective/24hr Events (22): Patient was seen and examined at the bedside. Patient lying in bed with complaint of feeling anxious and swollen. C. difficile currently still pending. Patient denied any cardiac chest pain, shortness of breath, fever/chills. ROS:  10 systems reviewed and negative except as noted above.      Assessment & Plan:   Sepsis with acute renal failure and tubular necrosis without septic shock due to suspected Shigella gastroenteritis  - Continue IV fluid resuscitation  - Blood cultures nothing grown x3 days  - Stool cultures pending  - C. difficile panel pending  - Continue Levaquin  - Continue probiotics  - Continue to monitor CBC and WBC count daily  - 5/13 patient continues to have episodes of diarrhea, follow-up studies  -5/14 stool starting to become more formed, unlikely C. difficile at this point    Hypoproteinemia  - Patient states that she gets diarrhea quickly after eating  - Nutrition consulted    Volume overload  - Patient lost IV access  - Chest x-ray with infiltrate versus atelectasis  - Patient started on p.o. Lasix since lost IV access 80 mg daily    Primary hypertension  - Lisinopril and hydrochlorothiazide due to volume depletion on hold and prerenal PARUL  - Continue to monitor and titrate accordingly    Iron deficiency anemia due to chronic blood loss  - Not on oral therapy  - Continue to monitor daily CBC    Irritable bowel syndrome  - Noted    Mood disorder  - Continue Lexapro and Elavil as tolerated    Hemorrhoids  - Internal hemorrhoids noted on colonoscopy last year; suspect cause of hematochezia    Hashimoto's thyroiditis  - Continue Synthroid    Obesity hypoventilation syndrome  - Not on chronic therapy    Gait abnormality  - Seen by neurology  - Has some level of autonomic dysfunction/neuropathy    Chronic pain disorder  - Continue Elavil    Hematochezia  - Continue to monitor bowel movements for blood  - Likely secondary to internal hemorrhoids  - Transfuse hemoglobin less than 7    Colitis  - As above  - Continue supportive care    Leukocytosis  - Suspect from gastroenteritis, volume contraction  - Repeat CBC    History of C. difficile colitis  - Continue probiotic  - Sees dentist study pending since patient believes highly that she has C. difficile    Stage IIIb chronic kidney disease with acute kidney injury  - Continue to encourage fluids  - Continue to monitor daily BMP      Discharge Planning:    Dispo pending clinical course.   Stool studies-C. difficile pending    Diet:  ADULT ORAL NUTRITION SUPPLEMENT Breakfast; Clear Liquid  ADULT ORAL NUTRITION SUPPLEMENT Lunch; Frozen Supplement  ADULT ORAL NUTRITION SUPPLEMENT Dinner; Standard High Calorie/High Protein  ADULT DIET Regular  DVT PPx: SCDs  Code status: Full Code    Hospital Problems as of 5/14/2022 Date Reviewed: 5/9/2022          Codes Class Noted - Resolved POA    Shigella gastroenteritis ICD-10-CM: A03.9  ICD-9-CM: 004.9  5/9/2022 - Present Yes        Hypoproteinemia ([facility] ICD-10-CM: E77.8  ICD-9-CM: 273.8  5/11/2022 - Present No        * (Principal) Sepsis with acute renal failure and tubular necrosis without septic shock (HCC) ICD-10-CM: A41.9, R65.20, N17.0  ICD-9-CM: 038.9, 995.91, 584.5  5/9/2022 - Present Yes        Stage 3b chronic kidney disease ([facility] (Chronic) ICD-10-CM: N18.32  ICD-9-CM: 585.3  5/9/2022 - Present Yes        Hematochezia ICD-10-CM: K92.1  ICD-9-CM: 578.1  3/27/2021 - Present Yes        Colitis ICD-10-CM: K52.9  ICD-9-CM: 558.9  3/27/2021 - Present Yes        Leukocytosis ICD-10-CM: X71.338  ICD-9-CM: 288.60  3/27/2021 - Present Yes        History of Clostridioides difficile colitis ICD-10-CM: Z86.19  ICD-9-CM: V12.79  3/27/2021 - Present Yes        Gait abnormality ICD-10-CM: R26.9  ICD-9-CM: 781.2  2/26/2020 - Present Yes        Class 3 obesity with alveolar hypoventilation, serious comorbidity, and body mass index (BMI) of 40.0 to 44.9 in [facility]) ICD-10-CM: E66.2, Z68.41  ICD-9-CM: 278.03, V85.41  2/26/2020 - Present Yes        Chronic pain disorder ICD-10-CM: G89.4  ICD-9-CM: 338.4  10/18/2019 - Present Yes        Chronic nausea ICD-10-CM: R11.0  ICD-9-CM: 787.02  6/26/2019 - Present Yes        Obesity hypoventilation syndrome ([facility] ICD-10-CM: I22.4  ICD-9-CM: 278.03  3/14/2019 - Present Yes    Overview Signed 3/14/2019  1:50 PM by Jens Garcia MD     Confirmed w/PFT's March 2019             Hashimoto's thyroiditis ICD-10-CM: E06.3  ICD-9-CM: 245.2  3/5/2019 - Present Yes        Hemorrhoids ICD-10-CM: K64.9  ICD-9-CM: 455.6  11/20/2018 - Present Yes        Primary hypertension (Chronic) ICD-10-CM: I10  ICD-9-CM: 401.9  12/6/2017 - Present Yes        Iron deficiency anemia due to chronic blood loss (Chronic) ICD-10-CM: D50.0  ICD-9-CM: 280.0  12/6/2017 - Present Yes        IBS (irritable bowel syndrome) ICD-10-CM: K58.9  ICD-9-CM: 564.1  12/6/2017 - Present Yes        Mood disorder ([facility] ICD-10-CM: F39  ICD-9-CM: 296.90  12/6/2017 - Present Yes              Objective:     Patient Vitals for the past 24 hrs:   Temp Pulse Resp BP SpO2   05/14/22 0552 97.9 °F (36.6 °C) 74 18 (!) 147/55 93 %   05/14/22 0300 97.8 °F (36.6 °C) 80 18 (!) 145/58 96 %   05/13/22 2342 97.6 °F (36.4 °C) 85 18 (!) 145/70 93 %   05/13/22 1931 98.5 °F (36.9 °C) 71 16 (!) 179/59 98 %     Oxygen Therapy  O2 Sat (%): 93 % (05/14/22 0552)  Pulse via Oximetry: 72 beats per minute (05/10/22 1815)  O2 Device: None (Room air) (05/14/22 0910)  Skin Assessment: Clean, dry, & intact (05/11/22 0730)  Skin Protection for O2 Device: No (05/11/22 0730)  O2 Flow Rate (L/min): 1 l/min (weaned to maintain O2 96%) (05/11/22 0730)    Estimated body mass index is 44.09 kg/m² as calculated from the following:    Height as of this encounter: 4' 10\" (1.473 m). Weight as of this encounter: 95.7 kg (210 lb 15.7 oz). Intake/Output Summary (Last 24 hours) at 5/14/2022 1654  Last data filed at 5/14/2022 0602  Gross per 24 hour   Intake 1230 ml   Output --   Net 1230 ml         Physical Exam:   Blood pressure (!) 147/55, pulse 74, temperature 97.9 °F (36.6 °C), resp. rate 18, height 4' 10\" (1.473 m), weight 95.7 kg (210 lb 15.7 oz), SpO2 93 %. General:    Well nourished. No overt distress  Head:  Normocephalic, atraumatic  Eyes:  Sclerae appear normal.  Pupils equally round. ENT:  Nares appear normal, no drainage. Moist oral mucosa  Neck:  No restricted ROM. Trachea midline   CV:   RRR. No m/r/g. No jugular venous distension. Lungs:   CTAB.   No wheezing, rhonchi, or rales. Respirations even, unlabored  Abdomen: Bowel sounds present. Soft, generalized abdominal tenderness, nondistended. Extremities: No cyanosis or clubbing. Bilateral lower extremity edema  Skin:     No rashes and normal coloration. Warm and dry. Neuro:  CN II-XII grossly intact. Sensation intact. A&Ox3  Psych:  Normal mood and affect. I have reviewed ordered lab tests and independently visualized imaging below:    Recent Labs:  Recent Results (from the past 48 hour(s))   CBC W/O DIFF    Collection Time: 05/13/22  5:49 AM   Result Value Ref Range    WBC 7.2 4.3 - 11.1 K/uL    RBC 3.02 (L) 4.05 - 5.2 M/uL    HGB 8.5 (L) 11.7 - 15.4 g/dL    HCT 26.7 (L) 35.8 - 46.3 %    MCV 88.4 79.6 - 97.8 FL    MCH 28.1 26.1 - 32.9 PG    MCHC 31.8 31.4 - 35.0 g/dL    RDW 15.6 (H) 11.9 - 14.6 %    PLATELET 320 223 - 450 K/uL    MPV 10.2 9.4 - 12.3 FL    ABSOLUTE NRBC 0.00 0.0 - 0.2 K/uL   METABOLIC PANEL, COMPREHENSIVE    Collection Time: 05/13/22  5:49 AM   Result Value Ref Range    Sodium 141 136 - 145 mmol/L    Potassium 4.3 3.5 - 5.1 mmol/L    Chloride 107 98 - 107 mmol/L    CO2 29 21 - 32 mmol/L    Anion gap 5 (L) 7 - 16 mmol/L    Glucose 82 65 - 100 mg/dL    BUN 7 (L) 8 - 23 MG/DL    Creatinine 1.37 (H) 0.6 - 1.0 MG/DL    GFR est AA 48 (L) >60 ml/min/1.73m2    GFR est non-AA 40 (L) >60 ml/min/1.73m2    Calcium 8.4 8.3 - 10.4 MG/DL    Bilirubin, total 0.2 0.2 - 1.1 MG/DL    ALT (SGPT) 18 12 - 65 U/L    AST (SGOT) 17 15 - 37 U/L    Alk.  phosphatase 62 50 - 130 U/L    Protein, total 5.5 (L) 6.3 - 8.2 g/dL    Albumin 2.2 (L) 3.2 - 4.6 g/dL    Globulin 3.3 2.3 - 3.5 g/dL    A-G Ratio 0.7 (L) 1.2 - 3.5     MAGNESIUM    Collection Time: 05/13/22  5:49 AM   Result Value Ref Range    Magnesium 1.6 (L) 1.8 - 2.4 mg/dL   OCCULT BLOOD, STOOL    Collection Time: 05/13/22 11:33 AM   Result Value Ref Range    Occult blood, stool Negative NEG     CULTURE, STOOL    Collection Time: 05/13/22 11:33 AM Specimen: Stool   Result Value Ref Range    Special Requests: NO SPECIAL REQUESTS      Culture result:        NO GROWTH AFTER SHORT PERIOD OF INCUBATION. FURTHER RESULTS TO FOLLOW AFTER OVERNIGHT INCUBATION. CBC W/O DIFF    Collection Time: 05/14/22  5:32 AM   Result Value Ref Range    WBC 8.7 4.3 - 11.1 K/uL    RBC 3.20 (L) 4.05 - 5.2 M/uL    HGB 9.1 (L) 11.7 - 15.4 g/dL    HCT 28.2 (L) 35.8 - 46.3 %    MCV 88.1 79.6 - 97.8 FL    MCH 28.4 26.1 - 32.9 PG    MCHC 32.3 31.4 - 35.0 g/dL    RDW 15.5 (H) 11.9 - 14.6 %    PLATELET 196 786 - 865 K/uL    MPV 9.9 9.4 - 12.3 FL    ABSOLUTE NRBC 0.00 0.0 - 0.2 K/uL   METABOLIC PANEL, COMPREHENSIVE    Collection Time: 05/14/22  5:32 AM   Result Value Ref Range    Sodium 141 136 - 145 mmol/L    Potassium 3.9 3.5 - 5.1 mmol/L    Chloride 106 98 - 107 mmol/L    CO2 31 21 - 32 mmol/L    Anion gap 4 (L) 7 - 16 mmol/L    Glucose 86 65 - 100 mg/dL    BUN 6 (L) 8 - 23 MG/DL    Creatinine 1.36 (H) 0.6 - 1.0 MG/DL    GFR est AA 49 (L) >60 ml/min/1.73m2    GFR est non-AA 40 (L) >60 ml/min/1.73m2    Calcium 8.4 8.3 - 10.4 MG/DL    Bilirubin, total 0.2 0.2 - 1.1 MG/DL    ALT (SGPT) 18 12 - 65 U/L    AST (SGOT) 14 (L) 15 - 37 U/L    Alk. phosphatase 61 50 - 130 U/L    Protein, total 5.6 (L) 6.3 - 8.2 g/dL    Albumin 2.2 (L) 3.2 - 4.6 g/dL    Globulin 3.4 2.3 - 3.5 g/dL    A-G Ratio 0.6 (L) 1.2 - 3.5     MAGNESIUM    Collection Time: 05/14/22  5:32 AM   Result Value Ref Range    Magnesium 1.4 (L) 1.8 - 2.4 mg/dL       All Micro Results     Procedure Component Value Units Date/Time    CULTURE, STOOL [[id number]] Collected: 05/13/22 1133    Order Status: Completed Specimen: Stool Updated: 05/14/22 1050     Special Requests: NO SPECIAL REQUESTS        Culture result:       NO GROWTH AFTER SHORT PERIOD OF INCUBATION. FURTHER RESULTS TO FOLLOW AFTER OVERNIGHT INCUBATION.           CULTURE, BLOOD [[id number]] Collected: 05/09/22 1315    Order Status: Completed Specimen: Blood Updated: 05/14/22 6731 Special Requests: --        NO SPECIAL REQUESTS  RIGHT  Antecubital       Culture result: NO GROWTH 5 DAYS       CULTURE, BLOOD [[id number]] Collected: 05/09/22 1315    Order Status: Completed Specimen: Blood Updated: 05/14/22 0335     Special Requests: --        NO SPECIAL REQUESTS  LEFT  Antecubital       Culture result: NO GROWTH 5 DAYS       STOOL CULTURE FOR YERSINIA [[id number]] Collected: 05/13/22 1133    Order Status: Completed Specimen: Stool Updated: 05/13/22 1143    CAMPLYLOBACTER CULTURE [[id number]] Collected: 05/13/22 1133    Order Status: Completed Specimen: Stool Updated: 05/13/22 1143    OVA & Donalda Coleman [[id number]] Collected: 05/13/22 1133    Order Status: Completed Specimen: Feces from Stool Updated: 05/13/22 1141    CLOSTRIDIUM DIFF TOXIN A & B [[id number]]     Order Status: Sent Specimen: Stool           Other Studies:  XR CHEST SNGL V    Result Date: 5/14/2022  CHEST X-RAY, 1 VIEW INDICATION: Wheezing COMPARISON: 4/17/2022 TECHNIQUE: Single AP view of the chest was obtained. FINDINGS: Lungs: Mild airspace opacities in the left lung base. Cardiomediastinal silhouette: Normal. Pleura: No pneumothorax or pleural effusion. Osseous structures: Degenerative changes of the spine. An airspace opacities in the left lung base reflecting atelectasis or infiltrates.        Current Meds:  Current Facility-Administered Medications   Medication Dose Route Frequency    hydrALAZINE (APRESOLINE) 20 mg/mL injection 20 mg  20 mg IntraVENous Q4H PRN    sodium chloride (NS) flush 5-10 mL  5-10 mL IntraVENous PRN    clonazePAM (KlonoPIN) tablet 0.5 mg  0.5 mg Oral BID PRN    amitriptyline (ELAVIL) tablet 25 mg  25 mg Oral QHS    escitalopram oxalate (LEXAPRO) tablet 30 mg  30 mg Oral QPM    [Held by provider] lisinopriL (PRINIVIL, ZESTRIL) tablet 20 mg  20 mg Oral DAILY    [Held by provider] hydroCHLOROthiazide (HYDRODIURIL) tablet 25 mg  25 mg Oral DAILY    levothyroxine (SYNTHROID) tablet 150 mcg 150 mcg Oral QHS    sodium chloride (NS) flush 5-40 mL  5-40 mL IntraVENous Q8H    sodium chloride (NS) flush 5-40 mL  5-40 mL IntraVENous PRN    polyethylene glycol (MIRALAX) packet 17 g  17 g Oral DAILY PRN    ondansetron (ZOFRAN ODT) tablet 4 mg  4 mg Oral Q8H PRN    Or    ondansetron (ZOFRAN) injection 4 mg  4 mg IntraVENous Q6H PRN    levoFLOXacin (LEVAQUIN) 750 mg in D5W IVPB  750 mg IntraVENous Q48H    Saccharomyces boulardii (FLORASTOR) capsule 250 mg  250 mg Oral BID    acetaminophen (TYLENOL) tablet 650 mg  650 mg Oral Q4H PRN       Signed:  Mackenzie Peng MD    Part of this note may have been written by using a voice dictation software. The note has been proof read but may still contain some grammatical/other typographical errors.

## 2022-05-14 NOTE — PROGRESS NOTES
Problem: Self Care Deficits Care Plan (Adult)  Goal: *Acute Goals and Plan of Care (Insert Text)  Outcome: Resolved/Met  Note:   Patient will complete toileting with supervision to increase self care independence. Patient will complete lower body dressing with stand by assist to increase self care independence. Patient will improve static standing and dynamic standing at edge of bed for 5 minutes to improve independence with transfers and self cares. Patient will complete all functional transfers with stand by assist using adaptive equipment as needed. Timeframe: 7 visits       OCCUPATIONAL THERAPY: Initial Assessment, Daily Note, Discharge, and PM 5/14/2022  INPATIENT: OT Visit Days: 1  Payor: Caridad Diaz / Plan: Maxwell Morejon / Product Type: Managed Care Medicare /      NAME/AGE/GENDER: Arpit Dover is a 77 y.o. female   PRIMARY DIAGNOSIS:  Sepsis with acute renal failure and tubular necrosis without septic shock ([facility] [A41.9, R65.20, N17.0] Sepsis with acute renal failure and tubular necrosis without septic shock (HCC) Sepsis with acute renal failure and tubular necrosis without septic shock (HCC)        ICD-10: Treatment Diagnosis:    Generalized Muscle Weakness (M62.81)  Other lack of cordination (R27.8)  Difficulty in walking, Not elsewhere classified (R26.2)   Precautions/Allergies:   Fire ant, Adhesive, Amoxicillin, Betadine [povidone-iodine], Cephalexin, Demerol [meperidine], Hydromorphone, Morphine, Percocet [oxycodone-acetaminophen], and Percodan [oxycodone hcl-oxycodone-asa]      ASSESSMENT:     Ms. Jan Hebert presents with the above diagnosis and overall deficits in strength, functional mobility and ADL performance. At baseline, she lives with spouse in 1 level apartment and reports independence with ADLs and functional mobility. She works part-time. Today, she is limited by swelling in B UEs & LEs.  She is able to perform bed mobility with SPV, functional household mobility with RW and SBA and toileting at standard toilet with SPV. She reports being up ad violeta to bathroom. She reports several recent hospitalizations (one of which was for a LTKA with [facility]). She is functioning close to her baseline--no further skilled OT indicated at this time. This section established at most recent assessment   PROBLEM LIST (Impairments causing functional limitations):  None   INTERVENTIONS PLANNED: (Benefits and precautions of occupational therapy have been discussed with the patient.)  None     TREATMENT PLAN: Frequency/Duration: No further skilled OT indicated at this time. REHAB RECOMMENDATIONS (at time of discharge pending progress):    Placement: It is my opinion, based on this patient's performance to date, that Ms. Kera Monsivais may benefit from [address] after discharge due to the functional deficits listed above that are likely to improve with skilled rehabilitation because he/she has multiple medical issues that affect his/her functional mobility in the community.   Equipment:   None at this time              OCCUPATIONAL PROFILE AND HISTORY:   History of Present Injury/Illness (Reason for Referral):  See H&P  Past Medical History/Comorbidities:   Ms. Kera Monsivais  has a past medical history of Acute kidney insufficiency, Adverse effect of anesthesia, Allergic rhinitis, Anemia, Anxiety and depression, Arthritis, Edema, GERD (gastroesophageal reflux disease), History of chicken pox, History of Clostridioides difficile infection, History of DVT (deep vein thrombosis), HTN (hypertension), Hyperlipidemia, Hyperthyroidism, Hypothyroidism, IBS (irritable bowel syndrome), Insomnia, Left flank pain (3/5/2020), Morbid obesity ([address] Murmur, Overactive bladder, PAD (peripheral artery disease) ([address] Recurrent UTI, RLS (restless legs syndrome), SOBOE (shortness of breath on exertion), Special examinations, MALOU (stress urinary incontinence, female), Thromboembolus ([address] (2015), and 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 She has no past medical history of Difficult intubation, Malignant hyperthermia due to anesthesia, Nausea & vomiting, or Pseudocholinesterase deficiency. Ms. Janet Cruz  has a past surgical history that includes hx meniscus repair (Left); hx tonsil and adenoidectomy (~1950); hx shoulder arthroscopy (Right, ~1966, ~2013); hx knee replacement (Right, 2014); hx cholecystectomy (12/2016); hx total abdominal hysterectomy (~1970); hx appendectomy (1955); ir gastric band adj w/ fluoro (~2014); hx gyn (12/03/2019); hx other surgical (Right, 2016); hx other surgical (Left, ~2014); hx other surgical (~1986); hx meniscus repair (Left, 2014); hx colonoscopy; hx orthopaedic (Left); hx other surgical (12/03/2019); and hx orthopaedic (Left, 01/2020).   Social History/Living Environment:   Home Environment: Apartment  One/Two Story Residence: One story  Living Alone: No  Support Systems: Spouse/Significant Other  Patient Expects to be Discharged to[de-identified] Home with home health  Current DME Used/Available at Home: Cane, straight,Walker, rolling  Tub or Shower Type: Tub/Shower combination  Prior Level of Function/Work/Activity:  Independent, lives with spouse     Number of Personal Factors/Comorbidities that affect the Plan of Care: Brief history (0):  LOW COMPLEXITY   ASSESSMENT OF OCCUPATIONAL PERFORMANCE[de-identified]   Activities of Daily Living:   Basic ADLs (From Assessment) Complex ADLs (From Assessment)   Feeding: Independent  Oral Facial Hygiene/Grooming: Supervision  Bathing: Supervision  Upper Body Dressing: Independent  Lower Body Dressing: Stand-by assistance  Toileting: Supervision     Grooming/Bathing/Dressing Activities of Daily Living     Cognitive Retraining  Safety/Judgement: Awareness of environment                 Functional Transfers  Bathroom Mobility: Supervision/set up  Toilet Transfer : Supervision  Shower Transfer: Stand-by assistance     Bed/Mat Mobility  Supine to Sit: Stand-by assistance  Sit to Stand: Contact guard assistance;Stand-by assistance  Stand to Sit: Contact guard assistance;Stand-by assistance  Bed to Chair: Stand-by assistance;Contact guard assistance     Most Recent Physical Functioning:   Gross Assessment:                  Posture:     Balance:  Sitting: Intact  Standing: With support Bed Mobility:  Supine to Sit: Stand-by assistance  Wheelchair Mobility:     Transfers:  Sit to Stand: Contact guard assistance;Stand-by assistance  Stand to Sit: Contact guard assistance;Stand-by assistance  Bed to Chair: Stand-by assistance;Contact guard assistance  Duration: 10 Minutes            No data found. Mental Status  Neurologic State: Alert  Orientation Level: Oriented X4  Cognition: Appropriate decision making  Perception: Appears intact  Perseveration: No perseveration noted  Safety/Judgement: Awareness of environment                          Physical Skills Involved:  None Cognitive Skills Affected (resulting in the inability to perform in a timely and safe manner):  [facility] Psychosocial Skills Affected:  Environmental Adaptation   Number of elements that affect the Plan of Care: 1-3:  LOW COMPLEXITY   CLINICAL DECISION MAKIN[address] [place] [id number] AM-PAC 6 Clicks   Daily Activity Inpatient Short Form  How much help from another person does the patient currently need. .. Total A Lot A Little None   1. Putting on and taking off regular lower body clothing? [] 1   [] 2   [] 3   [x] 4   2. Bathing (including washing, rinsing, drying)? [] 1   [] 2   [] 3   [x] 4   3. Toileting, which includes using toilet, bedpan or urinal?   [] 1   [] 2   [] 3   [x] 4   4. Putting on and taking off regular upper body clothing? [] 1   [] 2   [] 3   [x] 4   5. Taking care of personal grooming such as brushing teeth? [] 1   [] 2   [] 3   [x] 4   6. Eating meals? [] 1   [] 2   [] 3   [x] 4   © 2007, Trustees of [address] [place] [address] [address] [id number], under license to [employer].  All rights reserved Score:  Initial: 24 Most Recent: X (Date: -- )    Interpretation of Tool:  Represents activities that are increasingly more difficult (i.e. Bed mobility, Transfers, Gait). Medical Necessity:     No further skilled OT indicated at this time. Reason for Services/Other Comments:  See for initial evaluation   Use of outcome tool(s) and clinical judgement create a POC that gives a: LOW COMPLEXITY         TREATMENT:   (In addition to Assessment/Re-Assessment sessions the following treatments were rendered)     Pre-treatment Symptoms/Complaints:    Pain: Initial:   Pain Intensity 1: 0  Post Session:  0     Co-treatment was necessary to improve patient's ability to increase activity demands and ability to return to normal functional activity. Self Care: (20): Procedure(s) (per grid) utilized to improve and/or restore self-care/home management as related to toileting and functional household mobility and transfers . Required minimal verbal cueing to facilitate activities of daily living skills and compensatory activities. Evaluation complete    Braces/Orthotics/Lines/Etc:   O2 Device: None (Room air)  Treatment/Session Assessment:    Response to Treatment:  Good, up to bathroom, RN aware  Interdisciplinary Collaboration:   Physical Therapist  Occupational Therapist  Registered Nurse  After treatment position/precautions:   Bed/Chair-wheels locked  Call light within reach  RN notified  Sitting on commode, RN aware    Compliance with Program/Exercises: Compliant all of the time. Recommendations/Intent for next treatment session:  No further skilled OT indicated at this time.    Total Treatment Duration:  OT Patient Time In/Time Out  Time In: 1320  Time Out: [address] [place], [place]

## 2022-05-14 NOTE — PROGRESS NOTES
Problem: Mobility Impaired (Adult and Pediatric)  Goal: *Acute Goals and Plan of Care (Insert Text)  Outcome: Progressing Towards Goal  Note: STG:    LTG:  (1.)Ms. Mary Vazquez will move from supine to sit and sit to supine  in bed with STAND BY ASSIST within 7 treatment day(s). (2.)Ms. Mary Vazquez will transfer from bed to chair and chair to bed with STAND BY ASSIST using the least restrictive device within 7 treatment day(s). (3.)Ms. Mary Vazquez will ambulate with STAND BY ASSIST for 500 feet with the least restrictive device within 7 treatment day(s). (4)Ms. Mary Vazquez will perform HEP with cues and assistance to increase safety on her feet in 7 days. ________________________________________________________________________________________________       PHYSICAL THERAPY: Initial Assessment and PM 5/14/2022  INPATIENT: PT Visit Days : 1  Payor: Stephen Alford / Plan: [address] / Product Type: [employer] Medicare /       NAME/AGE/GENDER: Manny Barker is a 77 y.o. female   PRIMARY DIAGNOSIS: Sepsis with acute renal failure and tubular necrosis without septic shock [facility] [A41.9, R65.20, N17.0] Sepsis with acute renal failure and tubular necrosis without septic shock ([facility] Sepsis with acute renal failure and tubular necrosis without septic shock ([facility])        ICD-10: Treatment Diagnosis:    Generalized Muscle Weakness (M62.81)  Other abnormalities of gait and mobility (R26.89)   Precaution/Allergies:  Fire ant, Adhesive, Amoxicillin, Betadine [povidone-iodine], Cephalexin, Demerol [meperidine], Hydromorphone, Morphine, Percocet [oxycodone-acetaminophen], and Percodan [oxycodone hcl-oxycodone-asa]      ASSESSMENT:     Ms. Mary Vazquez presents with decreased functional mobility admitted with above diagnosis. She is normally independent without assistive device and lives with  in first floor apartment. She reports her  is also in the hospital from seizures. Pt. Agreeable to get up.   She complains of swelling all over. She got up CGA/SBA and began walking but was quite unsteady and needed hand held assist.  She used a RW and was much more steady on her feet and was able to walk in the hubbard SBA. Will follow as patient functioning below her baseline. HHPT may be helpful. This section established at most recent assessment   PROBLEM LIST (Impairments causing functional limitations):  Decreased Strength  Decreased ADL/Functional Activities  Decreased Transfer Abilities  Decreased Ambulation Ability/Technique  Decreased Balance  Decreased Activity Tolerance  Increased Fatigue  Decreased Flexibility/Joint Mobility  Edema/Girth  Decreased Elliott with Home Exercise Program   INTERVENTIONS PLANNED: (Benefits and precautions of physical therapy have been discussed with the patient.)  Balance Exercise  Bed Mobility  Family Education  Gait Training  Home Exercise Program (HEP)  Range of Motion (ROM)  Therapeutic Activites  Therapeutic Exercise/Strengthening  Transfer Training     TREATMENT PLAN: Frequency/Duration: daily for duration of hospital stay  Rehabilitation Potential For Stated Goals: Good     REHAB RECOMMENDATIONS (at time of discharge pending progress):    Placement:  HHPT vs. none  Equipment:   Has a RW and cane at home  None at this time              HISTORY:   History of Present Injury/Illness (Reason for Referral):  Presenting Complaint: Rectal Bleeding and Abdominal Pain     Reason(s) for Admission: Sepsis with acute renal failure and tubular necrosis without septic shock ([facility] [A41.9, R65.20, N17.0]      Hospital Course & Interval History:   67-year-old female past medical history of hypertension, hypothyroidism, reticulosis, OHS and IBS who presented with chief complaint of abdominal pain. Patient ate at [email or web address] for dinner and on the way home she started feeling unwell. Patient has abdominal pain and severely constipated.   When patient arrived home she had 5-6 episodes of nonbloody emesis throughout the evening. Also fell asleep around 12 AM and woke up at 4 AM in the morning with lower abdominal pain. She initially had a normal bowel movement but as the morning progressed she developed diarrhea and then bloody diarrhea. Patient continues to have left lower quadrant abdominal pain at this time. She denied fevers but did admit to chills. Denies any sick contacts. Last colonoscopy was 4/2021 with diverticulosis and internal hemorrhoids noted. ER work-up revealed PARUL with BUN/creatinine of 37/2.34 and elevated WBC of 17.8. CT abdomen pelvis revealed acute colitis of the transverse colon. Subjective/24hr Events (05/13/22): Patient was seen and examined at the bedside. Patient still complaining of episodes of diarrhea. Stool sample collected yesterday unfortunately was mismanaged and left in sink and not sent down to be analyzed. Patient denied any cardiac chest pain, shortness of breath, fever/chills. Past Medical History/Comorbidities:   Ms. Good Kincaid  has a past medical history of Acute kidney insufficiency, Adverse effect of anesthesia, Allergic rhinitis, Anemia, Anxiety and depression, Arthritis, Edema, GERD (gastroesophageal reflux disease), History of chicken pox, History of Clostridioides difficile infection, History of DVT (deep vein thrombosis), HTN (hypertension), Hyperlipidemia, Hyperthyroidism, Hypothyroidism, IBS (irritable bowel syndrome), Insomnia, Left flank pain (3/5/2020), Morbid obesity ([address] Murmur, Overactive bladder, PAD (peripheral artery disease) ([address] Recurrent UTI, RLS (restless legs syndrome), SOBOE (shortness of breath on exertion), Special examinations, MALOU (stress urinary incontinence, female), Thromboembolus ([address] (2015), and 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 She has no past medical history of Difficult intubation, Malignant hyperthermia due to anesthesia, Nausea & vomiting, or Pseudocholinesterase deficiency.   Ms. Good Kincaid  has a past surgical history that includes hx meniscus repair (Left); hx tonsil and adenoidectomy (~1950); hx shoulder arthroscopy (Right, ~1966, ~2013); hx knee replacement (Right, 2014); hx cholecystectomy (12/2016); hx total abdominal hysterectomy (~1970); hx appendectomy (1955); ir gastric band adj w/ fluoro (~2014); hx gyn (12/03/2019); hx other surgical (Right, 2016); hx other surgical (Left, ~2014); hx other surgical (~1986); hx meniscus repair (Left, 2014); hx colonoscopy; hx orthopaedic (Left); hx other surgical (12/03/2019); and hx orthopaedic (Left, 01/2020).   Social History/Living Environment:   Home Environment: Private residence  One/Two Story Residence: One story  Living Alone: No  Support Systems: Spouse/Significant Other (Spouse/Guevara #[phone])  Patient Expects to be Discharged to[de-identified] Home  Current DME Used/Available at Home: None  Prior Level of Function/Work/Activity:  independent     Number of Personal Factors/Comorbidities that affect the Plan of Care: 1-2: MODERATE COMPLEXITY   EXAMINATION:   Most Recent Physical Functioning:   Gross Assessment:  AROM: Generally decreased, functional (LE's)  Strength: Generally decreased, functional (LE's)  Sensation:  (swelling in LE's)               Posture:     Balance:  Sitting: Intact  Standing: With support Bed Mobility:  Supine to Sit: Stand-by assistance  Wheelchair Mobility:     Transfers:  Sit to Stand: Contact guard assistance;Stand-by assistance  Stand to Sit: Contact guard assistance;Stand-by assistance  Bed to Chair: Stand-by assistance;Contact guard assistance  Duration: 10 Minutes  Gait:     Speed/Lavonne: Delayed  Step Length: Left shortened;Right shortened  Gait Abnormalities: Decreased step clearance;Trunk sway increased  Distance (ft): 150 Feet (ft)  Assistive Device: Walker, rolling  Ambulation - Level of Assistance: Stand-by assistance  Interventions: Safety awareness training;Verbal cues         Body Structures Involved:  Bones  Joints  Muscles  Ligaments Body Functions Affected: Movement Related Activities and Participation Affected: Mobility   Number of elements that affect the Plan of Care: 3: MODERATE COMPLEXITY   CLINICAL PRESENTATION:   Presentation: Stable and uncomplicated: LOW COMPLEXITY   CLINICAL DECISION MAKIN [facility] [address] [address] AM-PAC 6 Clicks   Basic Mobility Inpatient Short Form  How much difficulty does the patient currently have. .. Unable A Lot A Little None   1. Turning over in bed (including adjusting bedclothes, sheets and blankets)? [] 1   [] 2   [x] 3   [] 4   2. Sitting down on and standing up from a chair with arms ( e.g., wheelchair, bedside commode, etc.)   [] 1   [] 2   [x] 3   [] 4   3. Moving from lying on back to sitting on the side of the bed? [] 1   [] 2   [x] 3   [] 4   How much help from another person does the patient currently need. .. Total A Lot A Little None   4. Moving to and from a bed to a chair (including a wheelchair)? [] 1   [] 2   [] 3   [x] 4   5. Need to walk in hospital room? [] 1   [] 2   [] 3   [x] 4   6. Climbing 3-5 steps with a railing? [] 1   [] 2   [x] 3   [] 4   © , Trustees of [address] [place] [address] [address] [address], under license to [employer]. All rights reserved      Score:  Initial: 20 Most Recent: X (Date: -- )    Interpretation of Tool:  Represents activities that are increasingly more difficult (i.e. Bed mobility, Transfers, Gait). Medical Necessity:     Patient is expected to demonstrate progress in   strength, range of motion, and balance   to   decrease assistance required with exercises and functional mobility  . Reason for Services/Other Comments:  Patient   continues to require present interventions due to patient's inability to perform exercises and functional mobility independently  .    Use of outcome tool(s) and clinical judgement create a POC that gives a: Clear prediction of patient's progress: LOW COMPLEXITY            TREATMENT:   (In addition to Assessment/Re-Assessment sessions the following treatments were rendered)   Pre-treatment Symptoms/Complaints:  swelling, fatigue, legs ache  Pain: Initial:      Post Session:  did not rate   assessment  Therapeutic Activity: (  10 Minutes ):  Therapeutic activities including Bed transfers, Chair transfers, Ambulation on level ground, and standing with RW to improve mobility, strength, and balance. Required minimal Safety awareness training;Verbal cues to promote static and dynamic balance in standing. Braces/Orthotics/Lines/Etc:   O2 Device: None (Room air)  Treatment/Session Assessment:    Response to Treatment:  did fine  Interdisciplinary Collaboration:   Occupational Therapist  Registered Nurse  After treatment position/precautions:   Bed/Chair-wheels locked  Bed in low position  Call light within reach  Left in bathroom to with call cord to call RN when done    Compliance with Program/Exercises: Compliant most of the time, Will assess as treatment progresses  Recommendations/Intent for next treatment session: \"Next visit will focus on advancements to more challenging activities and reduction in assistance provided\".   Total Treatment Duration:  PT Patient Time In/Time Out  Time In: 1320  Time Out: [address], PT

## 2022-05-14 NOTE — PROGRESS NOTES
Problem: Diarrhea (Adult and Pediatrics)  Goal: *Absence of diarrhea  Outcome: Progressing Towards Goal     Problem: Falls - Risk of  Goal: *Absence of Falls  Description: Document [place] Fall Risk and appropriate interventions in the flowsheet.   Outcome: Progressing Towards Goal  Note: Fall Risk Interventions:            Medication Interventions: Patient to call before getting OOB    Elimination Interventions: Call light in reach

## 2022-05-14 NOTE — PROGRESS NOTES
Pt reports that she saw double for about 5 minutes. She says that this has been happening for about a year and has seen a neurologist.  She also states that this current episode is resolving.

## 2022-05-15 VITALS
OXYGEN SATURATION: 90 % | DIASTOLIC BLOOD PRESSURE: 56 MMHG | HEART RATE: 76 BPM | SYSTOLIC BLOOD PRESSURE: 148 MMHG | RESPIRATION RATE: 16 BRPM | TEMPERATURE: 98.4 F | WEIGHT: 205.9 LBS | BODY MASS INDEX: 43.22 KG/M2 | HEIGHT: 58 IN

## 2022-05-15 LAB
ALBUMIN SERPL-MCNC: 2.3 G/DL (ref 3.2–4.6)
ALBUMIN/GLOB SERPL: 0.7 {RATIO} (ref 1.2–3.5)
ALP SERPL-CCNC: 61 U/L (ref 50–130)
ALT SERPL-CCNC: 17 U/L (ref 12–65)
ANION GAP SERPL CALC-SCNC: 6 MMOL/L (ref 7–16)
AST SERPL-CCNC: 15 U/L (ref 15–37)
BILIRUB SERPL-MCNC: 0.2 MG/DL (ref 0.2–1.1)
BUN SERPL-MCNC: 7 MG/DL (ref 8–23)
C DIFF GDH STL QL: NORMAL
C DIFF TOX A+B STL QL IA: NORMAL
CALCIUM SERPL-MCNC: 8.2 MG/DL (ref 8.3–10.4)
CHLORIDE SERPL-SCNC: 105 MMOL/L (ref 98–107)
CLINICAL CONSIDERATION: NORMAL
CO2 SERPL-SCNC: 31 MMOL/L (ref 21–32)
CREAT SERPL-MCNC: 1.61 MG/DL (ref 0.6–1)
ERYTHROCYTE [DISTWIDTH] IN BLOOD BY AUTOMATED COUNT: 16 % (ref 11.9–14.6)
GLOBULIN SER CALC-MCNC: 3.4 G/DL (ref 2.3–3.5)
GLUCOSE SERPL-MCNC: 91 MG/DL (ref 65–100)
HCT VFR BLD AUTO: 28.1 % (ref 35.8–46.3)
HGB BLD-MCNC: 9 G/DL (ref 11.7–15.4)
INTERPRETATION: NORMAL
MAGNESIUM SERPL-MCNC: 1.3 MG/DL (ref 1.8–2.4)
MCH RBC QN AUTO: 27.8 PG (ref 26.1–32.9)
MCHC RBC AUTO-ENTMCNC: 32 G/DL (ref 31.4–35)
MCV RBC AUTO: 86.7 FL (ref 79.6–97.8)
NRBC # BLD: 0.02 K/UL (ref 0–0.2)
PCR REFLEX: NORMAL
PLATELET # BLD AUTO: 309 K/UL (ref 150–450)
PMV BLD AUTO: 9.7 FL (ref 9.4–12.3)
POTASSIUM SERPL-SCNC: 3.6 MMOL/L (ref 3.5–5.1)
PROCALCITONIN SERPL-MCNC: 0.06 NG/ML (ref 0–0.49)
PROT SERPL-MCNC: 5.7 G/DL (ref 6.3–8.2)
RBC # BLD AUTO: 3.24 M/UL (ref 4.05–5.2)
SODIUM SERPL-SCNC: 142 MMOL/L (ref 136–145)
WBC # BLD AUTO: 8.3 K/UL (ref 4.3–11.1)

## 2022-05-15 PROCEDURE — 74011250637 HC RX REV CODE- 250/637: Performed by: PHYSICIAN ASSISTANT

## 2022-05-15 PROCEDURE — 83735 ASSAY OF MAGNESIUM: CPT

## 2022-05-15 PROCEDURE — 74011250637 HC RX REV CODE- 250/637: Performed by: FAMILY MEDICINE

## 2022-05-15 PROCEDURE — 84145 PROCALCITONIN (PCT): CPT

## 2022-05-15 PROCEDURE — 97530 THERAPEUTIC ACTIVITIES: CPT

## 2022-05-15 PROCEDURE — 80053 COMPREHEN METABOLIC PANEL: CPT

## 2022-05-15 PROCEDURE — 36415 COLL VENOUS BLD VENIPUNCTURE: CPT

## 2022-05-15 PROCEDURE — 85027 COMPLETE CBC AUTOMATED: CPT

## 2022-05-15 RX ORDER — FUROSEMIDE 40 MG/1
40 TABLET ORAL DAILY
Status: DISCONTINUED | OUTPATIENT
Start: 2022-05-15 | End: 2022-05-15 | Stop reason: HOSPADM

## 2022-05-15 RX ORDER — SAME BUTANEDISULFONATE/BETAINE 400-600 MG
250 POWDER IN PACKET (EA) ORAL 2 TIMES DAILY
Qty: 14 CAPSULE | Refills: 0 | Status: SHIPPED | OUTPATIENT
Start: 2022-05-15 | End: 2022-05-22

## 2022-05-15 RX ORDER — LEVOFLOXACIN 500 MG/1
500 TABLET, FILM COATED ORAL DAILY
Qty: 2 TABLET | Refills: 0 | Status: SHIPPED | OUTPATIENT
Start: 2022-05-16 | End: 2022-05-18

## 2022-05-15 RX ADMIN — RDII 250 MG CAPSULE 250 MG: at 08:17

## 2022-05-15 RX ADMIN — MONTELUKAST SODIUM 1 G: 4 TABLET, CHEWABLE ORAL at 09:00

## 2022-05-15 RX ADMIN — CLONAZEPAM 0.5 MG: 1 TABLET ORAL at 08:17

## 2022-05-15 RX ADMIN — LISINOPRIL 20 MG: 20 TABLET ORAL at 08:17

## 2022-05-15 NOTE — PROGRESS NOTES
Patient is anticipated to discharge today. Plan for discharge is as follows:    Care Management Interventions  PCP Verified by CM: Yes  Mode of Transport at Discharge: Self  Transition of Care Consult (CM Consult): Discharge Planning  Support Systems: Spouse/Significant Other  Confirm Follow Up Transport: Family  The Patient and/or Patient Representative was Provided with a Choice of Provider and Agrees with the Discharge Plan?: Yes  Freedom of Choice List was Provided with Basic Dialogue that Supports the Patient's Individualized Plan of Care/Goals, Treatment Preferences and Shares the Quality Data Associated with the Providers?: Yes  Discharge Location  Patient Expects to be Discharged to[de-identified] Home with home health    Milestones and interventions have been entered.      Gabrielle Mathew LMSW    [facility]    * [email or web address]

## 2022-05-15 NOTE — PROGRESS NOTES
Problem: Mobility Impaired (Adult and Pediatric)  Goal: *Acute Goals and Plan of Care (Insert Text)  Outcome: Progressing Towards Goal  Note: STG:    LTG:  (1.)Ms. Marisol Cunha will move from supine to sit and sit to supine  in bed with STAND BY ASSIST within 7 treatment day(s). (2.)Ms. Marisol Cunha will transfer from bed to chair and chair to bed with STAND BY ASSIST using the least restrictive device within 7 treatment day(s). (3.)Ms. Marisol Cunha will ambulate with STAND BY ASSIST for 500 feet with the least restrictive device within 7 treatment day(s). (4)Ms. Marisol Cunha will perform HEP with cues and assistance to increase safety on her feet in 7 days. ________________________________________________________________________________________________       PHYSICAL THERAPY: Daily Note and AM 5/15/2022  INPATIENT: PT Visit Days : 2  Payor: Taye Simpson / Plan: [employer] / Product Type: [employer] /       NAME/AGE/GENDER: Jessica Tijerina is a 77 y.o. female   PRIMARY DIAGNOSIS: Sepsis with acute renal failure and tubular necrosis without septic shock ([facility] [A41.9, R65.20, N17.0] Sepsis with acute renal failure and tubular necrosis without septic shock ([facility] Sepsis with acute renal failure and tubular necrosis without septic shock (HCC)       ICD-10: Treatment Diagnosis:    Generalized Muscle Weakness (M62.81)  Other abnormalities of gait and mobility (R26.89)   Precaution/Allergies:  Fire ant, Adhesive, Amoxicillin, Betadine [povidone-iodine], Cephalexin, Demerol [meperidine], Hydromorphone, Morphine, Percocet [oxycodone-acetaminophen], and Percodan [oxycodone hcl-oxycodone-asa]      ASSESSMENT:     Ms. Marisol Cunha presents with decreased functional mobility admitted with above diagnosis. She is normally independent without assistive device and lives with  in first floor apartment. She reports her  is also in the hospital from seizures.   Pt. Agreeable to get up and reports feeling better this am.   She reports she has been up in the room. She ambulated in the hubbard this am and did not use a RW(she did not want use one). She did fine but exhibited a cautious gait pattern. Discussed using a cane or walker until she felt back to normal.  She did some standing balance exercises. No questions. She reports feeling comfortable going home concerning her mobility. This section established at most recent assessment   PROBLEM LIST (Impairments causing functional limitations):  Decreased Strength  Decreased ADL/Functional Activities  Decreased Transfer Abilities  Decreased Ambulation Ability/Technique  Decreased Balance  Decreased Activity Tolerance  Increased Fatigue  Decreased Flexibility/Joint Mobility  Edema/Girth  Decreased Jennings with Home Exercise Program   INTERVENTIONS PLANNED: (Benefits and precautions of physical therapy have been discussed with the patient.)  Balance Exercise  Bed Mobility  Family Education  Gait Training  Home Exercise Program (HEP)  Range of Motion (ROM)  Therapeutic Activites  Therapeutic Exercise/Strengthening  Transfer Training     TREATMENT PLAN: Frequency/Duration: daily for duration of hospital stay  Rehabilitation Potential For Stated Goals: Good     REHAB RECOMMENDATIONS (at time of discharge pending progress):    Placement:  HHPT vs. none  Equipment:   Has a RW and cane at home  None at this time              HISTORY:   History of Present Injury/Illness (Reason for Referral):  Presenting Complaint: Rectal Bleeding and Abdominal Pain     Reason(s) for Admission: Sepsis with acute renal failure and tubular necrosis without septic shock ([facility] [A41.9, R65.20, N17.0]      Hospital Course & Interval History:   52-year-old female past medical history of hypertension, hypothyroidism, reticulosis, OHS and IBS who presented with chief complaint of abdominal pain.   Patient ate at [employer] for dinner and on the way home she started feeling unwell. Patient has abdominal pain and severely constipated. When patient arrived home she had 5-6 episodes of nonbloody emesis throughout the evening. Also fell asleep around 12 AM and woke up at 4 AM in the morning with lower abdominal pain. She initially had a normal bowel movement but as the morning progressed she developed diarrhea and then bloody diarrhea. Patient continues to have left lower quadrant abdominal pain at this time. She denied fevers but did admit to chills. Denies any sick contacts. Last colonoscopy was 4/2021 with diverticulosis and internal hemorrhoids noted. ER work-up revealed PARUL with BUN/creatinine of 37/2.34 and elevated WBC of 17.8. CT abdomen pelvis revealed acute colitis of the transverse colon. Subjective/24hr Events (05/13/22): Patient was seen and examined at the bedside. Patient still complaining of episodes of diarrhea. Stool sample collected yesterday unfortunately was mismanaged and left in sink and not sent down to be analyzed. Patient denied any cardiac chest pain, shortness of breath, fever/chills. Past Medical History/Comorbidities:   Ms. Lew Wallace  has a past medical history of Acute kidney insufficiency, Adverse effect of anesthesia, Allergic rhinitis, Anemia, Anxiety and depression, Arthritis, Edema, GERD (gastroesophageal reflux disease), History of chicken pox, History of Clostridioides difficile infection, History of DVT (deep vein thrombosis), HTN (hypertension), Hyperlipidemia, Hyperthyroidism, Hypothyroidism, IBS (irritable bowel syndrome), Insomnia, Left flank pain (3/5/2020), Morbid obesity ([address] Murmur, Overactive bladder, PAD (peripheral artery disease) ([address] Recurrent UTI, RLS (restless legs syndrome), SOBOE (shortness of breath on exertion), Special examinations, MALOU (stress urinary incontinence, female), Thromboembolus ([address] (2015), and 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     She has no past medical history of Difficult intubation, Malignant hyperthermia due to anesthesia, Nausea & vomiting, or Pseudocholinesterase deficiency. Ms. Charmayne Bolls  has a past surgical history that includes hx meniscus repair (Left); hx tonsil and adenoidectomy (~1950); hx shoulder arthroscopy (Right, ~1966, ~2013); hx knee replacement (Right, 2014); hx cholecystectomy (12/2016); hx total abdominal hysterectomy (~1970); hx appendectomy (1955); ir gastric band adj w/ fluoro (~2014); hx gyn (12/03/2019); hx other surgical (Right, 2016); hx other surgical (Left, ~2014); hx other surgical (~1986); hx meniscus repair (Left, 2014); hx colonoscopy; hx orthopaedic (Left); hx other surgical (12/03/2019); and hx orthopaedic (Left, 01/2020).   Social History/Living Environment:   Home Environment: Apartment  One/Two Story Residence: One story  Living Alone: No  Support Systems: Spouse/Significant Other  Patient Expects to be Discharged to[de-identified] Home with home health  Current DME Used/Available at Home: Cane, straight,Walker, rolling  Tub or Shower Type: Tub/Shower combination  Prior Level of Function/Work/Activity:  independent     Number of Personal Factors/Comorbidities that affect the Plan of Care: 1-2: MODERATE COMPLEXITY   EXAMINATION:   Most Recent Physical Functioning:   Gross Assessment:  AROM: Generally decreased, functional (LE's)  Strength: Generally decreased, functional (LE's)  Sensation:  (swelling in LE's)               Posture:     Balance:  Sitting: Intact  Standing: With support Bed Mobility:  Supine to Sit: Stand-by assistance  Sit to Supine: Stand-by assistance  Wheelchair Mobility:     Transfers:  Sit to Stand: Stand-by assistance  Stand to Sit: Stand-by assistance  Bed to Chair: Stand-by assistance  Duration: 25 Minutes  Gait:     Speed/Lavonne: Delayed  Step Length: Left shortened;Right shortened  Gait Abnormalities: Decreased step clearance;Trunk sway increased  Distance (ft): 200 Feet (ft)  Assistive Device:  (none)  Ambulation - Level of Assistance: Stand-by assistance  Interventions: Safety awareness training;Verbal cues         Body Structures Involved:  Bones  Joints  Muscles  Ligaments Body Functions Affected: Movement Related Activities and Participation Affected: Mobility   Number of elements that affect the Plan of Care: 3: MODERATE COMPLEXITY   CLINICAL PRESENTATION:   Presentation: Stable and uncomplicated: LOW COMPLEXITY   CLINICAL DECISION MAKING:   [facility] AM-PAC 6 Clicks   Basic Mobility Inpatient Short Form  How much difficulty does the patient currently have. .. Unable A Lot A Little None   1. Turning over in bed (including adjusting bedclothes, sheets and blankets)? [] 1   [] 2   [x] 3   [] 4   2. Sitting down on and standing up from a chair with arms ( e.g., wheelchair, bedside commode, etc.)   [] 1   [] 2   [x] 3   [] 4   3. Moving from lying on back to sitting on the side of the bed? [] 1   [] 2   [x] 3   [] 4   How much help from another person does the patient currently need. .. Total A Lot A Little None   4. Moving to and from a bed to a chair (including a wheelchair)? [] 1   [] 2   [] 3   [x] 4   5. Need to walk in hospital room? [] 1   [] 2   [] 3   [x] 4   6. Climbing 3-5 steps with a railing? [] 1   [] 2   [x] 3   [] 4   © 2007, Trustees of [facility], under license to [employer]. All rights reserved      Score:  Initial: 20 Most Recent: X (Date: -- )    Interpretation of Tool:  Represents activities that are increasingly more difficult (i.e. Bed mobility, Transfers, Gait). Medical Necessity:     Patient is expected to demonstrate progress in   strength, range of motion, and balance   to   decrease assistance required with exercises and functional mobility  . Reason for Services/Other Comments:  Patient   continues to require present interventions due to patient's inability to perform exercises and functional mobility independently  .    Use of outcome tool(s) and clinical judgement create a POC that gives a: Clear prediction of patient's progress: LOW COMPLEXITY            TREATMENT:   (In addition to Assessment/Re-Assessment sessions the following treatments were rendered)   Pre-treatment Symptoms/Complaints:  Swelling better, reports aching all over  Pain: Initial:      Post Session:  did not rate     Therapeutic Activity: (  25 Minutes ):  Therapeutic activities including Bed transfers, Chair transfers, Ambulation on level ground, and standing, and balance exercises for heel raises, static standing eyes open/closed, standing hip abduction, side to side gait, marching in place to improve mobility, strength, and balance. Required minimal Safety awareness training;Verbal cues to promote static and dynamic balance in standing. Braces/Orthotics/Lines/Etc:   O2 Device: None (Room air)  Treatment/Session Assessment:    Response to Treatment:  Moving better  Interdisciplinary Collaboration:   Registered Nurse  After treatment position/precautions:   Supine in bed  Bed/Chair-wheels locked  Bed in low position  Call light within reach   Compliance with Program/Exercises: Compliant most of the time, Will assess as treatment progresses  Recommendations/Intent for next treatment session: \"Next visit will focus on advancements to more challenging activities and reduction in assistance provided\".   Total Treatment Duration:  PT Patient Time In/Time Out  Time In: 0945  Time Out: [address], PT

## 2022-05-15 NOTE — PROGRESS NOTES
Iv site to left wrist leaking. Atttempt made to restart IV times one without success. Charge RN in to attempt and was unable to find a vein to stick. Dr Irene Kay notified of this and lasix changed to by mouth.

## 2022-05-15 NOTE — DISCHARGE SUMMARY
Hospitalist Discharge Summary   Admit Date:  2022  7:24 AM   DC Note date: 5/15/2022  Name:  Mercedes Arambula   Age:  77 y.o.   Sex:  female  :  1945   MRN:  [id number]   Room:  [facility]  PCP:  Tae Mejía NP    Presenting Complaint: Rectal Bleeding and Abdominal Pain    Initial Admission Diagnosis: Sepsis with acute renal failure and tubular necrosis without septic shock ([facility] [A41.9, R65.20, N17.0]     Problem List for this Hospitalization:  Hospital Problems as of 5/15/2022 Date Reviewed: 2022          Codes Class Noted - Resolved POA    Shigella gastroenteritis ICD-10-CM: A03.9  ICD-9-CM: 004.9  2022 - Present Yes        Hypoproteinemia ([facility] ICD-10-CM: E77.8  ICD-9-CM: 273.8  2022 - Present No        * (Principal) Sepsis with acute renal failure and tubular necrosis without septic shock ([facility] ICD-10-CM: A41.9, R65.20, N17.0  ICD-9-CM: 038.9, 995.91, 584.5  2022 - Present Yes        Stage 3b chronic kidney disease ([facility] (Chronic) ICD-10-CM: B15.37  ICD-9-CM: 585.3  2022 - Present Yes        Hematochezia ICD-10-CM: K92.1  ICD-9-CM: 578.1  3/27/2021 - Present Yes        Colitis ICD-10-CM: K52.9  ICD-9-CM: 558.9  3/27/2021 - Present Yes        Leukocytosis ICD-10-CM: X88.388  ICD-9-CM: 288.60  3/27/2021 - Present Yes        History of Clostridioides difficile colitis ICD-10-CM: Z86.19  ICD-9-CM: V12.79  3/27/2021 - Present Yes        Gait abnormality ICD-10-CM: R26.9  ICD-9-CM: 781.2  2020 - Present Yes        Class 3 obesity with alveolar hypoventilation, serious comorbidity, and body mass index (BMI) of 40.0 to 44.9 in adult [facility]) ICD-10-CM: E66.2, Z68.41  ICD-9-CM: 278.03, V85.41  2020 - Present Yes        Chronic pain disorder ICD-10-CM: G89.4  ICD-9-CM: 338.4  10/18/2019 - Present Yes        Chronic nausea ICD-10-CM: R11.0  ICD-9-CM: 787.02  2019 - Present Yes        Obesity hypoventilation syndrome ([facility] ICD-10-CM: A78.2  ICD-9-CM: 278.03  3/14/2019 - Present Yes    Overview Signed 3/14/2019  1:50 PM by Lauren Aguilera MD     Confirmed w/PFT's March 2019             Hashimoto's thyroiditis ICD-10-CM: E06.3  ICD-9-CM: 245.2  3/5/2019 - Present Yes        Hemorrhoids ICD-10-CM: K64.9  ICD-9-CM: 455.6  11/20/2018 - Present Yes        Primary hypertension (Chronic) ICD-10-CM: I10  ICD-9-CM: 401.9  12/6/2017 - Present Yes        Iron deficiency anemia due to chronic blood loss (Chronic) ICD-10-CM: D50.0  ICD-9-CM: 280.0  12/6/2017 - Present Yes        IBS (irritable bowel syndrome) ICD-10-CM: K58.9  ICD-9-CM: 564.1  12/6/2017 - Present Yes        Mood disorder ([address] ICD-10-CM: F39  ICD-9-CM: 296.90  12/6/2017 - Present Yes            Did Patient have Sepsis (YES OR NO): Yes    Hospital Course:  44-year-old female past medical history of hypertension, hypothyroidism, reticulosis, OHS and IBS who presented with chief complaint of abdominal pain. Patient ate at [employer] for dinner and on the way home she started feeling unwell. Patient has abdominal pain and severely constipated. When patient arrived home she had 5-6 episodes of nonbloody emesis throughout the evening. Also fell asleep around 12 AM and woke up at 4 AM in the morning with lower abdominal pain. She initially had a normal bowel movement but as the morning progressed she developed diarrhea and then bloody diarrhea. Patient continues to have left lower quadrant abdominal pain at this time. She denied fevers but did admit to chills. Denies any sick contacts. Last colonoscopy was 4/2021 with diverticulosis and internal hemorrhoids noted. ER work-up revealed PARUL with BUN/creatinine of 37/2.34 and elevated WBC of 17.8. CT abdomen pelvis revealed acute colitis of the transverse colon. Presented with sepsis with acute renal failure without septic shock due to suspected gastroenteritis.   Patient was continued on IV fluid resuscitation until a day before discharge and fluids were stopped since patient started to become fluid overloaded. Blood cultures nothing grown to date. Stool cultures negative. C. difficile was sent but patient unlikely to have C. difficile infection since patient did not have multiple episodes of diarrhea and stool started to become less watery throughout hospitalization. Patient was continued on Levaquin and probiotics. Daily monitoring of CBC and BMP daily. Patient did develop slight volume overload secondary to IV fluids. Patient was given Lasix orally since she had lost IV access. Improvement in edema over 24 hours. For patient's primary hypertension lisinopril hydrochlorothiazide had been on hold secondary to PARUL. Patient was advised that she would need to follow-up with her primary care provider within the next 3 to 5 days to discuss recent hospitalization any changes made to her medications. Patient was also advised that she needs to have lab work done by her PCP to evaluate and follow kidney function. Patient was also advised that she needs to follow-up with a gastroenterologist within 1 to 2 weeks due to her constant flare of colitis. Patient would benefit from colonoscopy. On day of discharge patient was feeling \"100 times better\". Patient was anxious to go home 5/15. Patient advised for close follow-up with outpatient providers. Patient agreed to plan moving forward. Patient denied any cardiac chest pain, shortness of breath, abdominal pain, fever/chills on day of discharge. [place] [facility] [place]  Diet: ADULT ORAL NUTRITION SUPPLEMENT Breakfast; Clear Liquid  ADULT ORAL NUTRITION SUPPLEMENT Lunch; Frozen Supplement  ADULT ORAL NUTRITION SUPPLEMENT Dinner; Standard High Calorie/High Protein  ADULT DIET Regular  Code Status: Full Code    Follow Up Orders:   Follow-up Appointments   Procedures    FOLLOW UP VISIT Appointment in: 3 - 5 Days Please follow-up with your primary care provider within the next 4 to 5 days to discuss recent hospitalization any changes made to your medication. Please have your primary care provider obtain a BMP to . .. Please follow-up with your primary care provider within the next 4 to 5 days to discuss recent hospitalization any changes made to your medication. Please have your primary care provider obtain a BMP to follow kidney function. Please follow-up with your gastroenterologist within 1 to 2 weeks for further evaluation and treatment of your colitis. Standing Status:   Standing     Number of Occurrences:   1     Order Specific Question:   Appointment in     Answer:   3 - 5 Days       Follow-up Information     Follow up With Specialties Details Why Contact Info    Santhosh Judd NP Nurse Practitioner   [address] [place]   [address] [address],[address]  [phone]            Follow up labs/diagnostics (ultimately defer to outpatient provider): Follow-up PCP next 3 to 5 days. Patient should have BMP/CMP obtained to follow kidney function  Follow-up with gastroenterology within 1 to 2 weeks. Time spent in patient discharge and coordination 45 minutes. Plan was discussed with patient. All questions answered. Patient was stable at time of discharge. Instructions given to call a physician or return if any concerns. Discharge Info:   Current Discharge Medication List      START taking these medications    Details   Saccharomyces boulardii (FLORASTOR) 250 mg capsule Take 1 Capsule by mouth two (2) times a day for 7 days. Qty: 14 Capsule, Refills: 0  Start date: 5/15/2022, End date: 5/22/2022      levoFLOXacin (LEVAQUIN) 500 mg tablet Take 1 Tablet by mouth daily for 2 days. Qty: 2 Tablet, Refills: 0  Start date: 5/16/2022, End date: 5/18/2022         CONTINUE these medications which have NOT CHANGED    Details   clonazePAM (KlonoPIN) 1 mg tablet Take 0.5 Tablets by mouth two (2) times daily as needed for Anxiety. Max Daily Amount: 1 mg.   Qty: 30 Tablet, Refills: 2    Associated Diagnoses: BONNIE (generalized anxiety disorder)      lisinopriL (PRINIVIL, ZESTRIL) 20 mg tablet Take 1 Tablet by mouth daily. Indications: high blood pressure  Qty: 30 Tablet, Refills: 2    Associated Diagnoses: On potassium wasting diuretic therapy; Essential hypertension      pramipexole (Mirapex) 0.5 mg tablet Take 1 Tablet by mouth daily. Qty: 90 Tablet, Refills: 3    Associated Diagnoses: RLS (restless legs syndrome)      ergocalciferol (ERGOCALCIFEROL) 1,250 mcg (50,000 unit) capsule Take 1 Capsule by mouth every seven (7) days. Indications: vitamin D deficiency (high dose therapy)  Qty: 6 Capsule, Refills: 1    Associated Diagnoses: Vitamin D deficiency      escitalopram oxalate (LEXAPRO) 20 mg tablet Take 1.5 Tablets by mouth daily. Qty: 135 Tablet, Refills: 3    Associated Diagnoses: BONNIE (generalized anxiety disorder); Episode of recurrent major depressive disorder, unspecified depression episode severity (HCC)      cyanocobalamin (VITAMIN B12) 1,000 mcg/mL injection 1,000 mcg by IntraMUSCular route every month. colestipoL (COLESTID) 1 gram tablet Take 1 Tablet by mouth two (2) times a day. Qty: 360 Tablet, Refills: 1    Associated Diagnoses: Irritable bowel syndrome with diarrhea      Synthroid 150 mcg tablet Take 1 Tablet by mouth Daily (before breakfast). DAW1  Qty: 30 Tablet, Refills: 11    Associated Diagnoses: Primary hypothyroidism      amitriptyline (ELAVIL) 25 mg tablet Take 25 mg by mouth nightly. For IBS/colitis      ondansetron hcl (ZOFRAN) 4 mg tablet Take 1 Tablet by mouth every eight (8) hours as needed for Nausea. Qty: 90 Tablet, Refills: 0    Associated Diagnoses: Chronic nausea      fluticasone propionate (FLONASE) 50 mcg/actuation nasal spray 2 Sprays by Both Nostrils route nightly. Indications: inflammation of the nose due to an allergy  Qty: 3 Bottle, Refills: 3    Associated Diagnoses: Post-nasal drainage;  Allergic rhinitis, unspecified seasonality, unspecified trigger      hyoscyamine sulfate (CYSTOSPAZ) 0.125 mg tablet Take 1 Tab by mouth every four (4) hours as needed for Cramping or Nausea. Indications: irritable colon  Qty: 60 Tab, Refills: 1    Associated Diagnoses: Left upper quadrant abdominal pain; Irritable bowel syndrome with diarrhea      albuterol (PROVENTIL HFA, VENTOLIN HFA, PROAIR HFA) 90 mcg/actuation inhaler Take 1-2 Puffs by inhalation every six (6) hours as needed for Wheezing. Qty: 1 Each, Refills: 1    Associated Diagnoses: Wheezing; SOB (shortness of breath)      inhalational spacing device Use with albuterol inhaler. Qty: 1 Each, Refills: 0    Associated Diagnoses: Wheezing; Bronchitis      acetaminophen (TYLENOL) 325 mg tablet Take 325 mg by mouth every four (4) hours as needed (breakthrough pain). clotrimazole-betamethasone (LOTRISONE) topical cream Apply  to affected area two (2) times a day. Until rash resolves  Qty: 45 g, Refills: 2    Associated Diagnoses: Allergic dermatitis; Rash         STOP taking these medications       hydroCHLOROthiazide (HYDRODIURIL) 25 mg tablet Comments:   Reason for Stopping:               Procedures done this admission:  * No surgery found *    Consults this admission:  None    Echocardiogram/EKG results:  Results from Hospital Encounter encounter on 03/24/22    ECHO ADULT COMPLETE    Interpretation Summary    Left Ventricle: Left ventricle size is normal. Normal wall thickness. Normal wall motion. Hyperdynamic left ventricular systolic function with a visually estimated EF of 65 - 70%. Normal diastolic function.   Tricuspid Valve: Mild transvalvular regurgitation.   Interatrial Septum: No interatrial shunt visualized on color Doppler. Agitated saline study was negative with and without provocation.        EKG Results     None          Diagnostic Imaging/Tests:   CT ABD PELV WO CONT    Result Date: 5/9/2022  CT of the abdomen and pelvis without contrast. CLINICAL INDICATION: Abdominal pain and rectal bleeding PROCEDURE: Serial thin-section axial images obtained from the upper abdomen through the proximal femurs without the administration of intravenous contrast. Oral contrast was given. Radiation dose reduction techniques were used for this study. Our CT scanners use one or all of the following: Automated exposure control, adjusted of the mA and/or kV according to patient size, iterative reconstruction COMPARISON: CT the abdomen and pelvis dated 3/27/2021 FINDINGS:  Evaluation of the hollow and solid viscera is limited by the lack of intravenous contrast. CT ABDOMEN: There is diffuse fatty infiltration of the liver. The spleen is normal. Cholecystectomy clips are present. The adrenal glands are normal. There is no hydronephrosis. There is a 3.5 cm simple right renal cyst. No renal or ureteral stones evident. Atherosclerotic calcifications are noted in the aorta. There is circumferential wall thickening along a long segment of the transverse colon most in keeping with colitis. No bowel perforation evident. No obvious diverticula. No adjacent lymphadenopathy. CT PELVIS: The bladder is decompressed. The rectum and sigmoid colon are unremarkable. No inguinal hernia or adenopathy. No free fluid accumulating dependently in the pelvis. No aggressive osseous is identified. 1. Acute colitis of the transverse colon. Infectious or inflammatory etiology should be considered. 2. 3.5 cm simple right renal cyst. 3. Diffuse fatty infiltration of the liver.       All Micro Results     Procedure Component Value Units Date/Time    CULTURE, STOOL [[id number]] Collected: 05/13/22 1133    Order Status: Completed Specimen: Stool Updated: 05/15/22 1010     Special Requests: NO SPECIAL REQUESTS        Culture result:       NO GROWTH OF SALMONELLA OR SHIGELLA IS EVIDENT ON FIRST READING, FINAL REPORT TO FOLLOW AFTER FURTHER INCUBATION OF CULTURE          C. DIFFICILE AG & TOXIN A/B [[id number]] Collected: 05/14/22 1923    Order Status: Completed Updated: 05/15/22 0037    CULTURE, BLOOD [[id number]] Collected: 05/09/22 1315    Order Status: Completed Specimen: Blood Updated: 05/14/22 0335     Special Requests: --        NO SPECIAL REQUESTS  RIGHT  Antecubital       Culture result: NO GROWTH 5 DAYS       CULTURE, BLOOD [[id number]] Collected: 05/09/22 1315    Order Status: Completed Specimen: Blood Updated: 05/14/22 0335     Special Requests: --        NO SPECIAL REQUESTS  LEFT  Antecubital       Culture result: NO GROWTH 5 DAYS       STOOL CULTURE FOR YERSINIA [[id number]] Collected: 05/13/22 1133    Order Status: Completed Specimen: Stool Updated: 05/13/22 [address] [[id number]] Collected: 05/13/22 1133    Order Status: Completed Specimen: Stool Updated: 05/13/22 1143    OVA & PARASITES, STOOL [[id number]] Collected: 05/13/22 1133    Order Status: Completed Specimen: Feces from Stool Updated: 05/13/22 1141    CLOSTRIDIUM DIFF TOXIN A & B [[id number]]     Order Status: Sent Specimen: Stool           Labs: Results:       BMP, Mg, Phos Recent Labs     05/15/22  0441 05/14/22  0532 05/13/22  0549    141 141   K 3.6 3.9 4.3    106 107   CO2 31 31 29   AGAP 6* 4* 5*   BUN 7* 6* 7*   CREA 1.61* 1.36* 1.37*   CA 8.2* 8.4 8.4   GLU 91 86 82   MG 1.3* 1.4* 1.6*      CBC Recent Labs     05/15/22  0441 05/14/22  0532 05/13/22  0549   WBC 8.3 8.7 7.2   RBC 3.24* 3.20* 3.02*   HGB 9.0* 9.1* 8.5*   HCT 28.1* 28.2* 26.7*    319 317      LFT Recent Labs     05/15/22  0441 05/14/22  0532 05/13/22  0549   ALT 17 18 18   AP 61 61 62   TP 5.7* 5.6* 5.5*   ALB 2.3* 2.2* 2.2*   GLOB 3.4 3.4 3.3   AGRAT 0.7* 0.6* 0.7*      Cardiac Testing No results found for: BNPP, BNP, CPK, RCK1, RCK2, RCK3, RCK4, CKMB, CKNDX, CKND1, TROPT, TROIQ   Coagulation Tests Lab Results   Component Value Date/Time    Prothrombin time 14.2 05/09/2022 07:49 AM    Prothrombin time 12.3 (L) 01/04/2022 01:30 PM    Prothrombin time 13.8 03/27/2021 08:58 AM    INR 1.1 05/09/2022 07:49 AM    INR 0.9 01/04/2022 01:30 PM    INR 1.0 03/27/2021 08:58 AM    INR (POC) 1.1 03/24/2022 04:09 PM    aPTT 29.5 01/04/2022 01:30 PM      A1c Lab Results   Component Value Date/Time    Hemoglobin A1c 5.9 03/25/2022 06:13 AM    Hemoglobin A1c 5.6 01/04/2022 01:30 PM      Lipid Panel Lab Results   Component Value Date/Time    Cholesterol, total 194 03/25/2022 06:13 AM    HDL Cholesterol 48 03/25/2022 06:13 AM    LDL, calculated 108.6 (H) 03/25/2022 06:13 AM    VLDL, calculated 37.4 (H) 03/25/2022 06:13 AM    Triglyceride 187 (H) 03/25/2022 06:13 AM    CHOL/HDL Ratio 4.0 03/25/2022 06:13 AM      Thyroid Panel Lab Results   Component Value Date/Time    TSH 1.710 04/07/2022 08:42 AM    TSH 1.040 10/14/2021 03:26 PM    T4, Free 1.31 10/14/2021 03:26 PM    T4, Free 1.71 08/16/2021 08:32 AM        Most Recent UA Lab Results   Component Value Date/Time    [facility] 09/21/2021 03:59 PM    Appearance Clear 09/21/2021 03:59 PM    pH (UA) 5.0 09/21/2021 03:59 PM    Ketone Negative 09/21/2021 03:59 PM    Bilirubin Negative 09/21/2021 03:59 PM    Blood Negative 09/21/2021 03:59 PM    Nitrites Positive (A) 09/21/2021 03:59 PM    Leukocyte Esterase 2+ (A) 09/21/2021 03:59 PM    WBC 11-30 (A) 09/21/2021 03:59 PM    RBC 3-10 (A) 09/21/2021 03:59 PM    Epithelial cells None seen 09/21/2021 03:59 PM    Bacteria None seen 09/21/2021 03:59 PM    Mucus Present 09/08/2020 08:43 AM          All Labs from Last 24 Hrs:  Recent Results (from the past 24 hour(s))   CBC W/O DIFF    Collection Time: 05/15/22  4:41 AM   Result Value Ref Range    WBC 8.3 4.3 - 11.1 K/uL    RBC 3.24 (L) 4.05 - 5.2 M/uL    HGB 9.0 (L) 11.7 - 15.4 g/dL    HCT 28.1 (L) 35.8 - 46.3 %    MCV 86.7 79.6 - 97.8 FL    MCH 27.8 26.1 - 32.9 PG    MCHC 32.0 31.4 - 35.0 g/dL    RDW 16.0 (H) 11.9 - 14.6 %    PLATELET 050 577 - 766 K/uL    MPV 9.7 9.4 - 12.3 FL    ABSOLUTE NRBC 0.02 0.0 - 0.2 K/uL   METABOLIC PANEL, COMPREHENSIVE    Collection Time: 05/15/22  4:41 AM   Result Value Ref Range    Sodium 142 136 - 145 mmol/L    Potassium 3.6 3.5 - 5.1 mmol/L    Chloride 105 98 - 107 mmol/L    CO2 31 21 - 32 mmol/L    Anion gap 6 (L) 7 - 16 mmol/L    Glucose 91 65 - 100 mg/dL    BUN 7 (L) 8 - 23 MG/DL    Creatinine 1.61 (H) 0.6 - 1.0 MG/DL    GFR est AA 40 (L) >60 ml/min/1.73m2    GFR est non-AA 33 (L) >60 ml/min/1.73m2    Calcium 8.2 (L) 8.3 - 10.4 MG/DL    Bilirubin, total 0.2 0.2 - 1.1 MG/DL    ALT (SGPT) 17 12 - 65 U/L    AST (SGOT) 15 15 - 37 U/L    Alk.  phosphatase 61 50 - 130 U/L    Protein, total 5.7 (L) 6.3 - 8.2 g/dL    Albumin 2.3 (L) 3.2 - 4.6 g/dL    Globulin 3.4 2.3 - 3.5 g/dL    A-G Ratio 0.7 (L) 1.2 - 3.5     MAGNESIUM    Collection Time: 05/15/22  4:41 AM   Result Value Ref Range    Magnesium 1.3 (L) 1.8 - 2.4 mg/dL   PROCALCITONIN    Collection Time: 05/15/22  4:41 AM   Result Value Ref Range    Procalcitonin 0.06 0.00 - 0.49 ng/mL       Current Med List in Hospital:   Current Facility-Administered Medications   Medication Dose Route Frequency    furosemide (LASIX) tablet 40 mg  40 mg Oral DAILY    colestipoL (COLESTID) tablet 1 g   (Patient Supplied)  1 g Oral DAILY    hyoscyamine SL (LEVSIN/SL) tablet 0.125 mg  0.125 mg Oral Q4H PRN    ondansetron (ZOFRAN ODT) tablet 4 mg  4 mg Oral Q6H PRN    Or    ondansetron (ZOFRAN) injection 4 mg  4 mg IntraVENous Q6H PRN    hydrALAZINE (APRESOLINE) 20 mg/mL injection 20 mg  20 mg IntraVENous Q4H PRN    sodium chloride (NS) flush 5-10 mL  5-10 mL IntraVENous PRN    clonazePAM (KlonoPIN) tablet 0.5 mg  0.5 mg Oral BID PRN    amitriptyline (ELAVIL) tablet 25 mg  25 mg Oral QHS    escitalopram oxalate (LEXAPRO) tablet 30 mg  30 mg Oral QPM    lisinopriL (PRINIVIL, ZESTRIL) tablet 20 mg  20 mg Oral DAILY    [Held by provider] hydroCHLOROthiazide (HYDRODIURIL) tablet 25 mg  25 mg Oral DAILY    levothyroxine (SYNTHROID) tablet 150 mcg  150 mcg Oral QHS    sodium chloride (NS) flush 5-40 mL  5-40 mL IntraVENous Q8H    sodium chloride (NS) flush 5-40 mL  5-40 mL IntraVENous PRN    polyethylene glycol (MIRALAX) packet 17 g  17 g Oral DAILY PRN    levoFLOXacin (LEVAQUIN) 750 mg in D5W IVPB  750 mg IntraVENous Q48H    Saccharomyces boulardii (FLORASTOR) capsule 250 mg  250 mg Oral BID    acetaminophen (TYLENOL) tablet 650 mg  650 mg Oral Q4H PRN       Allergies   Allergen Reactions    Fire Ant Anaphylaxis    Adhesive Rash and Itching     \"sticky ace wrap\"    Amoxicillin Other (comments)     C-Diff    Betadine [Povidone-Iodine] Other (comments)     Mcgrath    Cephalexin Other (comments)     C-Diff    Demerol [Meperidine] Rash    Hydromorphone Other (comments)     Keeps her awake    Morphine Other (comments)     Awake    Percocet [Oxycodone-Acetaminophen] Other (comments)     Jittery    Percodan [Oxycodone Hcl-Oxycodone-Asa] Other (comments)     Jittery     Immunization History   Administered Date(s) Administered    COVID-19, Pfizer Purple top, DILUTE for use, 12+ yrs, 30mcg/0.3mL dose 01/21/2021, 02/06/2021, 10/10/2021    Influenza High Dose Vaccine PF 09/11/2018, 10/18/2019, 08/25/2020    Influenza Vaccine 09/08/2020    Influenza, High-dose, Quadrivalent (>65 Yrs Fluzone High Dose Quad 82503) 08/25/2020    Pneumococcal Conjugate (PCV-13) 10/31/2019    Zoster Recombinant 09/08/2020       Recent Vital Data:  Patient Vitals for the past 24 hrs:   Temp Pulse Resp BP SpO2   05/15/22 1136 98.4 °F (36.9 °C) 76 16 (!) 148/56 90 %   05/15/22 0739 97.8 °F (36.6 °C) 79 18 (!) 147/94 92 %   05/15/22 0403 98.4 °F (36.9 °C) 73 18 (!) 160/56 92 %   05/14/22 2340 98.5 °F (36.9 °C) 80 18 (!) 148/60 96 %   05/14/22 1944 98.1 °F (36.7 °C) 76 16 (!) 163/62 93 %   05/14/22 1810 98.6 °F (37 °C) 81 -- (!) 141/57 95 %     Oxygen Therapy  O2 Sat (%): 90 % (05/15/22 1136)  Pulse via Oximetry: 72 beats per minute (05/10/22 1815)  O2 Device: None (Room air) (05/15/22 0740)  Skin Assessment: Clean, dry, & intact (05/11/22 0730)  Skin Protection for O2 Device: No (05/11/22 0730)  O2 Flow Rate (L/min): 1 l/min (weaned to maintain O2 96%) (05/11/22 0730)    Estimated body mass index is 43.03 kg/m² as calculated from the following:    Height as of this encounter: 4' 10\" (1.473 m). Weight as of this encounter: 93.4 kg (205 lb 14.4 oz). No intake or output data in the 24 hours ending 05/15/22 1318      Physical Exam:  General:    Well nourished. No overt distress  Head:  Normocephalic, atraumatic  Eyes:  Sclerae appear normal.  Pupils equally round. HENT:  Nares appear normal, no drainage. Moist mucous membranes  Neck:  No restricted ROM. Trachea midline  CV:   RRR. No m/r/g. No JVD  Lungs:   CTAB. No wheezing, rhonchi, or rales. Even, unlabored  Abdomen:   Soft, nontender, nondistended. Extremities: Warm and dry. No cyanosis or clubbing. Trace lower extremity edema  Skin:     No rashes. Normal coloration  Neuro:  CN II-XII grossly intact. Psych:  Normal mood and affect. Signed:  Jed Espinosa MD    Part of this note may have been written by using a voice dictation software. The note has been proof read but may still contain some grammatical/other typographical errors.

## 2022-05-16 LAB
BACTERIA SPEC CULT: NORMAL
BACTERIA SPEC CULT: NORMAL
SERVICE CMNT-IMP: NORMAL

## 2022-05-17 LAB
BACTERIA SPEC CULT: NORMAL
CAMPYLOBACTER STL CULT: NORMAL
RESULT 1, 080259: NORMAL
SPECIMEN SOURCE: NORMAL
SPECIMEN SOURCE: NORMAL
YERSINIA SPEC CULT: NORMAL

## 2022-05-18 LAB
O+P SPEC MICRO: NORMAL
O+P STL CONC: NORMAL
SPECIMEN SOURCE: NORMAL

## 2022-05-19 ENCOUNTER — HOSPITAL ENCOUNTER (OUTPATIENT)
Dept: LAB | Age: 77
Discharge: HOME | End: 2022-05-19
Payer: MEDICARE

## 2022-05-19 DIAGNOSIS — Z79.899 ON POTASSIUM WASTING DIURETIC THERAPY: ICD-10-CM

## 2022-05-19 DIAGNOSIS — N18.32 STAGE 3B CHRONIC KIDNEY DISEASE (HCC): ICD-10-CM

## 2022-05-19 DIAGNOSIS — I10 ESSENTIAL HYPERTENSION: ICD-10-CM

## 2022-05-19 LAB
ANION GAP SERPL CALC-SCNC: 3 MMOL/L (ref 7–16)
BASOPHILS # BLD: 0.1 K/UL (ref 0–0.2)
BASOPHILS NFR BLD: 1 % (ref 0–2)
BUN SERPL-MCNC: 19 MG/DL (ref 8–23)
CALCIUM SERPL-MCNC: 8.3 MG/DL (ref 8.3–10.4)
CHLORIDE SERPL-SCNC: 104 MMOL/L (ref 98–107)
CO2 SERPL-SCNC: 32 MMOL/L (ref 21–32)
CREAT SERPL-MCNC: 1.4 MG/DL (ref 0.6–1)
DIFFERENTIAL METHOD BLD: ABNORMAL
EOSINOPHIL # BLD: 0.3 K/UL (ref 0–0.8)
EOSINOPHIL NFR BLD: 3 % (ref 0.5–7.8)
ERYTHROCYTE [DISTWIDTH] IN BLOOD BY AUTOMATED COUNT: 16.3 % (ref 11.9–14.6)
GLUCOSE SERPL-MCNC: 77 MG/DL (ref 65–100)
HCT VFR BLD AUTO: 30.3 % (ref 35.8–46.3)
HGB BLD-MCNC: 9.3 G/DL (ref 11.7–15.4)
IMM GRANULOCYTES # BLD AUTO: 0.2 K/UL (ref 0–0.5)
IMM GRANULOCYTES NFR BLD AUTO: 2 % (ref 0–5)
LYMPHOCYTES # BLD: 2.3 K/UL (ref 0.5–4.6)
LYMPHOCYTES NFR BLD: 21 % (ref 13–44)
MAGNESIUM SERPL-MCNC: 2 MG/DL (ref 1.8–2.4)
MCH RBC QN AUTO: 27.6 PG (ref 26.1–32.9)
MCHC RBC AUTO-ENTMCNC: 30.7 G/DL (ref 31.4–35)
MCV RBC AUTO: 89.9 FL (ref 79.6–97.8)
MONOCYTES # BLD: 0.7 K/UL (ref 0.1–1.3)
MONOCYTES NFR BLD: 6 % (ref 4–12)
NEUTS SEG # BLD: 7.5 K/UL (ref 1.7–8.2)
NEUTS SEG NFR BLD: 67 % (ref 43–78)
NRBC # BLD: 0.02 K/UL (ref 0–0.2)
PLATELET # BLD AUTO: 374 K/UL (ref 150–450)
PMV BLD AUTO: 10.3 FL (ref 9.4–12.3)
POTASSIUM SERPL-SCNC: 4.6 MMOL/L (ref 3.5–5.1)
RBC # BLD AUTO: 3.37 M/UL (ref 4.05–5.2)
SODIUM SERPL-SCNC: 139 MMOL/L (ref 136–145)
URATE SERPL-MCNC: 8.6 MG/DL (ref 2.6–6)
WBC # BLD AUTO: 11.1 K/UL (ref 4.3–11.1)

## 2022-05-19 PROCEDURE — 83735 ASSAY OF MAGNESIUM: CPT

## 2022-05-19 PROCEDURE — 84550 ASSAY OF BLOOD/URIC ACID: CPT

## 2022-05-19 PROCEDURE — 85025 COMPLETE CBC W/AUTO DIFF WBC: CPT

## 2022-05-19 PROCEDURE — 36415 COLL VENOUS BLD VENIPUNCTURE: CPT

## 2022-05-19 PROCEDURE — 80048 BASIC METABOLIC PNL TOTAL CA: CPT

## 2022-05-20 ENCOUNTER — HOSPITAL ENCOUNTER (OUTPATIENT)
Dept: ULTRASOUND IMAGING | Age: 77
Discharge: HOME | End: 2022-05-20
Attending: NURSE PRACTITIONER
Payer: MEDICARE

## 2022-05-20 DIAGNOSIS — M79.662 PAIN OF LEFT CALF: ICD-10-CM

## 2022-05-20 DIAGNOSIS — R60.0 EDEMA OF LEFT LOWER EXTREMITY: ICD-10-CM

## 2022-05-20 PROCEDURE — 93971 EXTREMITY STUDY: CPT

## 2022-05-20 NOTE — PROGRESS NOTES
The ultrasound of your leg is negative. The kidney function is still improving but add another glass of water each day.

## 2022-05-26 DIAGNOSIS — F41.1 GAD (GENERALIZED ANXIETY DISORDER): Primary | ICD-10-CM

## 2022-05-26 NOTE — TELEPHONE ENCOUNTER
Requesting  Refill on Clonazepam 1 mg sent to [facility] on [address]. Almost out of the medication.

## 2022-05-27 RX ORDER — CLONAZEPAM 1 MG/1
1 TABLET ORAL 2 TIMES DAILY PRN
Qty: 60 TABLET | Refills: 0 | Status: SHIPPED | OUTPATIENT
Start: 2022-05-27 | End: 2022-09-27 | Stop reason: ALTCHOICE

## 2022-05-31 ENCOUNTER — TELEPHONE (OUTPATIENT)
Dept: INTERNAL MEDICINE CLINIC | Facility: CLINIC | Age: 77
End: 2022-05-31

## 2022-06-03 DIAGNOSIS — E03.9 HYPOTHYROIDISM (ACQUIRED): ICD-10-CM

## 2022-06-03 DIAGNOSIS — I73.9 PAD (PERIPHERAL ARTERY DISEASE) (HCC): Primary | ICD-10-CM

## 2022-06-06 ENCOUNTER — OFFICE VISIT (OUTPATIENT)
Dept: INTERNAL MEDICINE CLINIC | Facility: CLINIC | Age: 77
End: 2022-06-06
Payer: MEDICARE

## 2022-06-06 VITALS
OXYGEN SATURATION: 97 % | HEIGHT: 58 IN | BODY MASS INDEX: 39.67 KG/M2 | DIASTOLIC BLOOD PRESSURE: 68 MMHG | TEMPERATURE: 97 F | SYSTOLIC BLOOD PRESSURE: 132 MMHG | WEIGHT: 189 LBS | HEART RATE: 85 BPM

## 2022-06-06 DIAGNOSIS — I10 PRIMARY HYPERTENSION: Primary | ICD-10-CM

## 2022-06-06 DIAGNOSIS — E53.8 VITAMIN B12 DEFICIENCY: ICD-10-CM

## 2022-06-06 PROCEDURE — G8427 DOCREV CUR MEDS BY ELIG CLIN: HCPCS | Performed by: NURSE PRACTITIONER

## 2022-06-06 PROCEDURE — G8417 CALC BMI ABV UP PARAM F/U: HCPCS | Performed by: NURSE PRACTITIONER

## 2022-06-06 PROCEDURE — 96372 THER/PROPH/DIAG INJ SC/IM: CPT | Performed by: NURSE PRACTITIONER

## 2022-06-06 PROCEDURE — 1036F TOBACCO NON-USER: CPT | Performed by: NURSE PRACTITIONER

## 2022-06-06 PROCEDURE — 1090F PRES/ABSN URINE INCON ASSESS: CPT | Performed by: NURSE PRACTITIONER

## 2022-06-06 PROCEDURE — 1123F ACP DISCUSS/DSCN MKR DOCD: CPT | Performed by: NURSE PRACTITIONER

## 2022-06-06 PROCEDURE — 3017F COLORECTAL CA SCREEN DOC REV: CPT | Performed by: NURSE PRACTITIONER

## 2022-06-06 PROCEDURE — G8400 PT W/DXA NO RESULTS DOC: HCPCS | Performed by: NURSE PRACTITIONER

## 2022-06-06 PROCEDURE — 99213 OFFICE O/P EST LOW 20 MIN: CPT | Performed by: NURSE PRACTITIONER

## 2022-06-06 RX ORDER — CYANOCOBALAMIN 1000 UG/ML
1000 INJECTION INTRAMUSCULAR; SUBCUTANEOUS ONCE
Status: COMPLETED | OUTPATIENT
Start: 2022-06-06 | End: 2022-06-06

## 2022-06-06 RX ADMIN — CYANOCOBALAMIN 1000 MCG: 1000 INJECTION INTRAMUSCULAR; SUBCUTANEOUS at 16:50

## 2022-06-06 ASSESSMENT — PATIENT HEALTH QUESTIONNAIRE - PHQ9
7. TROUBLE CONCENTRATING ON THINGS, SUCH AS READING THE NEWSPAPER OR WATCHING TELEVISION: 0
10. IF YOU CHECKED OFF ANY PROBLEMS, HOW DIFFICULT HAVE THESE PROBLEMS MADE IT FOR YOU TO DO YOUR WORK, TAKE CARE OF THINGS AT HOME, OR GET ALONG WITH OTHER PEOPLE: 0
SUM OF ALL RESPONSES TO PHQ QUESTIONS 1-9: 3
6. FEELING BAD ABOUT YOURSELF - OR THAT YOU ARE A FAILURE OR HAVE LET YOURSELF OR YOUR FAMILY DOWN: 0
4. FEELING TIRED OR HAVING LITTLE ENERGY: 1
SUM OF ALL RESPONSES TO PHQ QUESTIONS 1-9: 4
9. THOUGHTS THAT YOU WOULD BE BETTER OFF DEAD, OR OF HURTING YOURSELF: 0
3. TROUBLE FALLING OR STAYING ASLEEP: 1
SUM OF ALL RESPONSES TO PHQ QUESTIONS 1-9: 4
SUM OF ALL RESPONSES TO PHQ QUESTIONS 1-9: 4
6. FEELING BAD ABOUT YOURSELF - OR THAT YOU ARE A FAILURE OR HAVE LET YOURSELF OR YOUR FAMILY DOWN: 0
SUM OF ALL RESPONSES TO PHQ9 QUESTIONS 1 & 2: 2
SUM OF ALL RESPONSES TO PHQ QUESTIONS 1-9: 4
5. POOR APPETITE OR OVEREATING: 0
10. IF YOU CHECKED OFF ANY PROBLEMS, HOW DIFFICULT HAVE THESE PROBLEMS MADE IT FOR YOU TO DO YOUR WORK, TAKE CARE OF THINGS AT HOME, OR GET ALONG WITH OTHER PEOPLE: 0
9. THOUGHTS THAT YOU WOULD BE BETTER OFF DEAD, OR OF HURTING YOURSELF: 0
SUM OF ALL RESPONSES TO PHQ QUESTIONS 1-9: 3
8. MOVING OR SPEAKING SO SLOWLY THAT OTHER PEOPLE COULD HAVE NOTICED. OR THE OPPOSITE, BEING SO FIGETY OR RESTLESS THAT YOU HAVE BEEN MOVING AROUND A LOT MORE THAN USUAL: 0
8. MOVING OR SPEAKING SO SLOWLY THAT OTHER PEOPLE COULD HAVE NOTICED. OR THE OPPOSITE, BEING SO FIGETY OR RESTLESS THAT YOU HAVE BEEN MOVING AROUND A LOT MORE THAN USUAL: 0
4. FEELING TIRED OR HAVING LITTLE ENERGY: 1
SUM OF ALL RESPONSES TO PHQ QUESTIONS 1-9: 3
2. FEELING DOWN, DEPRESSED OR HOPELESS: 1
5. POOR APPETITE OR OVEREATING: 0
1. LITTLE INTEREST OR PLEASURE IN DOING THINGS: 1
SUM OF ALL RESPONSES TO PHQ QUESTIONS 1-9: 3
3. TROUBLE FALLING OR STAYING ASLEEP: 1
7. TROUBLE CONCENTRATING ON THINGS, SUCH AS READING THE NEWSPAPER OR WATCHING TELEVISION: 0
2. FEELING DOWN, DEPRESSED OR HOPELESS: 1

## 2022-06-06 NOTE — PATIENT INSTRUCTIONS
Patient Education        Iron Deficiency Anemia: Care Instructions  Your Care Instructions     Anemia means that you don't have enough red blood cells. Red blood cells carry oxygen around your body. When you have anemia, it can make you pale, weak, andtired. Many things can cause anemia. The most common cause is loss of blood. This can happen if you have heavy menstrual periods. It can also happen if you havebleeding in your stomach or bowel. You can also get anemia if you don't have enough iron in your diet or if it's hard for your body to absorb iron. In some cases, pregnancy causes anemia. That's because a pregnant woman needs more iron. Your doctor may do more tests to find the cause of your anemia. If a disease orother health problem is causing it, your doctor will treat that problem. It's important to follow up with your doctor to make sure that your iron levelreturns to normal.  Follow-up care is a key part of your treatment and safety. Be sure to make and go to all appointments, and call your doctor if you are having problems. It's also a good idea to know your test results and keep alist of the medicines you take. How can you care for yourself at home?  If your doctor recommended iron pills, take them as directed. ? Try to take the pills on an empty stomach. You can do this about 1 hour before or 2 hours after meals. But you may need to take iron with food to avoid an upset stomach. ? Do not take antacids or drink milk or anything with caffeine within 2 hours of when you take your iron. They can keep your body from absorbing the iron well. ? Vitamin C helps your body absorb iron. You may want to take iron pills with a glass of orange juice or some other food high in vitamin C.  ? Iron pills may cause stomach problems. These include heartburn, nausea, diarrhea, constipation, and cramps. It can help to drink plenty of fluids and include fruits, vegetables, and fiber in your diet.   ? It's normal for iron pills to make your stool a greenish or grayish black. But internal bleeding can also cause dark stool. So it's important to tell your doctor about any color changes. ? Call your doctor if you think you are having a problem with your iron pills. Even after you start to feel better, it will take several months for your body to build up its supply of iron. ? If you miss a pill, don't take a double dose. ? Keep iron pills out of the reach of small children. Too much iron can be very dangerous.  Eat foods with a lot of iron. These include red meat, shellfish, poultry, and eggs. They also include beans, raisins, whole-grain bread, and leafy green vegetables.  Steam your vegetables. This is the best way to prepare them if you want to get as much iron as possible.  Be safe with medicines. Do not take nonsteroidal anti-inflammatory pain relievers unless your doctor tells you to. These include aspirin, naproxen (Aleve), and ibuprofen (Advil, Motrin).  Liquid iron can stain your teeth. But you can mix it with water or juice and drink it with a straw. Then it won't get on your teeth. When should you call for help? Call 911 anytime you think you may need emergency care. For example, call if:     You passed out (lost consciousness). Call your doctor now or seek immediate medical care if:     You are short of breath.      You are dizzy or light-headed, or you feel like you may faint.      You have new or worse bleeding. Watch closely for changes in your health, and be sure to contact your doctor if:     You feel weaker or more tired than usual.      You do not get better as expected. Where can you learn more? Go to https://Nethra [email or web address].Boedo. org and sign in to your [employer] account. Enter V151 in the [facility] box to learn more about \"Iron Deficiency Anemia: Care Instructions. \"     If you do not have an account, please click on the \"Sign Up Now\" link.   Current as of: November 29, 2021               Content Version: 13.2  © 2006-2022 Healthwise, [employer]. Care instructions adapted under license by [facility] ([facility]). If you have questions about a medical condition or this instruction, always ask your healthcare professional. [address] any warranty or liability for your use of this information. Patient Education        Learning About High-Iron Foods  What foods are high in iron? The foods you eat contain nutrients, such as vitamins and minerals. Iron is a nutrient. Your body needs the right amount to stay healthy and work as it should. You can use the list below to help you make choices about which foodsto eat. Here are some foods that contain iron. They have 1 to 2 milligrams of iron perserving. Fruits   Figs (dried), 5 figs  Vegetables   Asparagus (canned), 6 woodruff   Anaya, beet, Swiss chard, or turnip greens, 1 cup   Dried peas, cooked, ½ cup   Seaweed, spirulina (dried), ¼ cup   Spinach, (cooked) ½ cup or (raw) 1 cup  Grains   Cereals, fortified with iron, 1 cup   Grits (instant, cooked), fortified with iron, ½ cup  Meats and other protein foods   Beans (kidney, lima, navy, white), canned or cooked, ½ cup   Beef or lamb, 3 oz   Chicken giblets, 3 oz   Chickpeas (garbanzo beans), ½ cup   Liver of beef, lamb, or pork, 3 oz   Oysters (cooked), 3 oz   Sardines (canned), 3 oz   Soybeans (boiled), ½ cup   Tofu (firm), ½ cup  Work with your doctor to find out how much of this nutrient you need. Dependingon your health, you may need more or less of it in your diet. Where can you learn more? Go to https://SeeJaypepiceweb.[email or web address] org and sign in to your [employer] account. Enter R005 in the [employer] box to learn more about \"Learning About High-Iron Foods. \"     If you do not have an account, please click on the \"Sign Up Now\" link.   Current as of: September 8, 2021               Content Version: 13.2  © 2006-2022 Healthwise, Incorporated. Care instructions adapted under license by [facility] ([facility] If you have questions about a medical condition or this instruction, always ask your healthcare professional. [address] any warranty or liability for your use of this information. Patient Education        Low Sodium Diet (2,000 Milligram): Care Instructions  Overview     Limiting sodium can be an important part of managing some health problems. The most common source of sodium is salt. People get most of the salt in their diet from canned, prepared, and packaged foods. Fast food and restaurant meals also are very high in sodium. Your doctor will probably limit your sodium to less than 2,000 milligrams (mg) a day. This limit counts all the sodium inprepared and packaged foods and any salt you add to your food. Follow-up care is a key part of your treatment and safety. Be sure to make and go to all appointments, and call your doctor if you are having problems. It's also a good idea to know your test results and keep alist of the medicines you take. How can you care for yourself at home? Read food labels   Read labels on cans and food packages. The labels tell you how much sodium is in each serving. Make sure that you look at the serving size. If you eat more than the serving size, you have eaten more sodium.  Food labels also tell you the Percent Daily Value for sodium. Choose products with low Percent Daily Values for sodium.  Be aware that sodium can come in forms other than salt, including monosodium glutamate (MSG), sodium citrate, and sodium bicarbonate (baking soda). MSG is often added to Asian food. When you eat out, you can sometimes ask for food without MSG or added salt. Buy low-sodium foods   Buy foods that are labeled \"unsalted\" (no salt added), \"sodium-free\" (less than 5 mg of sodium per serving), or \"low-sodium\" (140 mg or less of sodium per serving).  Foods labeled \"reduced-sodium\" and \"light sodium\" may still have too much sodium. Be sure to read the label to see how much sodium you are getting.  Buy fresh vegetables, or frozen vegetables without added sauces. Buy low-sodium versions of canned vegetables, soups, and other canned goods. Prepare low-sodium meals   Cut back on the amount of salt you use in cooking. This will help you adjust to the taste. Do not add salt after cooking. One teaspoon of salt has about 2,300 mg of sodium.  Take the salt shaker off the table.  Flavor your food with garlic, lemon juice, onion, vinegar, herbs, and spices. Do not use soy sauce, lite soy sauce, steak sauce, onion salt, garlic salt, celery salt, or ketchup on your food.  Use low-sodium salad dressings, sauces, and ketchup. Or make your own salad dressings and sauces without adding salt.  Use less salt (or none) when recipes call for it. You can often use half the salt a recipe calls for without losing flavor. Other foods such as rice, pasta, and grains do not need added salt.  Rinse canned vegetables, and cook them in fresh water. This removes some--but not all--of the salt.  Avoid water that is naturally high in sodium or that has been treated with water softeners, which add sodium. If you buy bottled water, read the label and choose a sodium-free brand. Avoid high-sodium foods   Avoid eating:  ? Smoked, cured, salted, and canned meat, fish, and poultry. ? Ham, león, hot dogs, and luncheon meats. ? Regular, hard, and processed cheese and regular peanut butter. ? Crackers with salted tops, and other salted snack foods such as pretzels, chips, and salted popcorn. ? Frozen prepared meals, unless labeled low-sodium. ? Canned and dried soups, broths, and bouillon, unless labeled sodium-free or low-sodium. ? Canned vegetables, unless labeled sodium-free or low-sodium. ? Western Stephanie fries, pizza, tacos, and other fast foods.   ? Pickles, olives, ketchup, and other condiments, especially soy sauce, unless labeled sodium-free or low-sodium. Where can you learn more? Go to https://[email or web address].[email or web address] org and sign in to your [employer] account. Enter U615 in the [facility] box to learn more about \"Low Sodium Diet (2,000 Milligram): Care Instructions. \"     If you do not have an account, please click on the \"Sign Up Now\" link. Current as of: September 8, 2021               Content Version: 13.2  © 4717-9371 Healthwise, Incorporated. Care instructions adapted under license by [facility] ([facility]). If you have questions about a medical condition or this instruction, always ask your healthcare professional. [address] any warranty or liability for your use of this information.

## 2022-06-06 NOTE — PROGRESS NOTES
PROGRESS NOTE    SUBJECTIVE:   Maribel Patterson is a 77 y.o. female seen for a follow up visit for   Chief Complaint   Patient presents with    Follow-up     2 WEEKS      HPI  Hypertension. When last here BP elevated to 178/80. She was having pain, leg swelling and calf tenderness. Reviewed and updated this visit by provider:  Tobacco  Allergies  Meds  Problems  Med Hx  Surg Hx  Fam Hx         Review of Systems   Constitutional: Negative for fatigue. Respiratory: Negative for cough, chest tightness, shortness of breath and wheezing. Cardiovascular: Positive for leg swelling. Negative for chest pain and palpitations. OBJECTIVE:    /68   Pulse 85   Temp 97 °F (36.1 °C) (Temporal)   Ht 4' 10\" (1.473 m)   Wt 189 lb (85.7 kg)   SpO2 97%   BMI 39.50 kg/m²      Physical Exam  Vitals and nursing note reviewed. Constitutional:       Appearance: Normal appearance. HENT:      Head: Normocephalic. Mouth/Throat:      Lips: Pink. Mouth: Mucous membranes are moist.   Eyes:      General: Lids are normal.   Neck:      Vascular: No carotid bruit. Cardiovascular:      Rate and Rhythm: Normal rate and regular rhythm. Pulmonary:      Effort: Pulmonary effort is normal.      Breath sounds: Normal breath sounds. Musculoskeletal:      Cervical back: Neck supple. Right lower leg: Edema present. Left lower leg: Edema present. Skin:     General: Skin is warm and dry. Neurological:      Mental Status: She is alert and oriented to person, place, and time. Mental status is at baseline. Gait: Gait normal.   Psychiatric:         Mood and Affect: Mood normal.         Behavior: Behavior normal.          ASSESSMENT and PLAN        1. Primary hypertension  2.  Vitamin B12 deficiency  -     cyanocobalamin injection 1,000 mcg; 1,000 mcg, IntraMUSCular, ONCE, 1 dose, On Mon 6/6/22 at [id number]      Return for Follow-Up, regularly scheduled appointment or sooner prn.  current treatment plan is effective, no change in therapy  reviewed diet, exercise and weight control  reviewed medications and side effects in detail  Discussed eating habits including dining out which includes increased salt. Recommend limit sodium. Written information provided. Hoda Bonilla NP  Dictated using voice recognition software.  Proofread, but unrecognized voice recognition errors may exist.

## 2022-06-09 DIAGNOSIS — I10 PRIMARY HYPERTENSION: Primary | ICD-10-CM

## 2022-06-09 DIAGNOSIS — D50.0 IRON DEFICIENCY ANEMIA DUE TO CHRONIC BLOOD LOSS: ICD-10-CM

## 2022-06-09 DIAGNOSIS — Z79.899 ENCOUNTER FOR LONG-TERM (CURRENT) USE OF MEDICATIONS: ICD-10-CM

## 2022-06-09 DIAGNOSIS — E78.2 MIXED HYPERLIPIDEMIA: ICD-10-CM

## 2022-06-09 DIAGNOSIS — M79.7 FIBROMYALGIA: ICD-10-CM

## 2022-06-09 DIAGNOSIS — E03.9 HYPOTHYROIDISM (ACQUIRED): ICD-10-CM

## 2022-06-21 NOTE — PROGRESS NOTES
[facility] [place]  [address] [place] [address] Court, [address] [facility]  [phone]    Patient Name:  Bam Ramirez  YOB: 1945      Office Visit 6/22/2022    CHIEF COMPLAINT:    Chief Complaint   Patient presents with    New Patient    Sleep Apnea    Sleep Study       HISTORY OF PRESENT ILLNESS:      The patient present in outpatient consultation at the request of Dr. Adarsh Israel for management of obstructive sleep apnea. The patient underwent a recent diagnostic polysomnography because of symptoms including snoring, witnessed apneas, reflux symptoms, waking up with a dry mouth. Patient also wakes up earlier than she would like. Has issues with memory and concentration. Does remember vivid dreaming. Does get increased limb movements at nighttime. She also reports that she has been having chronic fatigue at 1 point he thought it was due to vitamin B12 with some improvement, but still has issues. She usually goes to bed about 11-12 and unfortunately wakes up at 4:00 every morning. She only feels refreshed 2 out of 7 days out of the week. Rarely if ever naps. She also reports a 30 pound weight gain over the past year. She has had multiple surgeries that unfortunately caused some swelling in her lower legs and as a result she is not able to be very ambulatory. [place] score is 8/24    Patient had a polysomnogram done showed her AHI was 15.1 with lowest sat being 80%. Saturation was less than 89% for 8.6 minutes.     Sleepiness Scale:     Sitting and reading 0    Watching TV 3    Sitting inactive in a public place 2    As a passenger in a car for an hour without a break 0    Lying down to rest in the afternoon when circumstances Permits 3    Sitting and talking to someone 0    Sitting quietly after lunch without alcohol 0    In a car, while stopped for a few minutes 0    Total :  8/24    Past Medical History:   Diagnosis Date    Acute kidney insufficiency     Followed by PCP  Adverse effect of anesthesia     2016 in [place] heart stopped at the end of my gallbladder surgery. \" Patient reports no CPR performed, stayed one night in the hospital. No problems with any other surgeries.     Allergic rhinitis     Anemia     history     Anxiety and depression     Controlled with medication     Arthritis     Edema     bilateral lower legs     GERD (gastroesophageal reflux disease)     Controlled with medication     History of chicken pox     History of Clostridioides difficile infection     History of DVT (deep vein thrombosis)     s/p knee replacement and developed right leg DVT     HTN (hypertension)     controlled with medication     Hyperlipidemia     controlled with medication     Hyperthyroidism     pt denies    Hypothyroidism     controlled with medication     IBS (irritable bowel syndrome)     Insomnia     Left flank pain 3/5/2020    Morbid obesity (HCC)     BMI 42.85    Murmur     per PCP note (20/7/42) systolic murmur grade of 2/6    Overactive bladder     PAD (peripheral artery disease) ([address]     patient denies on 11/27/19    Recurrent UTI     RLS (restless legs syndrome)     controlled with medication     SOBOE (shortness of breath on exertion)     PRN inhaler-patient reports she uses inhaler QHS    Special examinations     History-Neurologist - Dr. Belinda OCONNOR (stress urinary incontinence, female)     Thromboembolus ([address] 2015    behind right knee after total knee replacement    Tubulovillous adenoma of colon 04/26/2021       Patient Active Problem List   Diagnosis    Tubulovillous adenoma of colon    Postmenopausal HRT (hormone replacement therapy)    Hymenoptera allergy    Polyneuropathy    Vitamin B12 deficiency    Dizzinesses    OA (osteoarthritis)    IBS (irritable bowel syndrome)    GERD (gastroesophageal reflux disease)    Insomnia    RLS (restless legs syndrome)    Fungal dermatitis    Hemorrhoids    Anserine bursitis    Lumbar radiculopathy    Hashimoto's thyroiditis    Family history of autoimmune disorder    Allergic dermatitis    Obesity hypoventilation syndrome (HCC)    ANASTASIA (stress urinary incontinence, female)    AR (allergic rhinitis)    FRANKY positive    Vaginal atrophy    Numbness    Urinary frequency    Gait abnormality    Cardiac arrhythmia    Weakness generalized    Suspected sleep apnea    Diplopia    URABNO (generalized anxiety disorder)    Gluten intolerance    Fibromyalgia    Degenerative arthritis of left knee    Hematochezia    Colitis    Arthritis of left knee    Fall    Leukocytosis    History of Clostridioides difficile colitis    Hx of total knee replacement, right    Mood disorder (HCC)    Chronic pain disorder    HLD (hyperlipidemia)    Hypothyroidism (acquired)    Chronic nausea    Renal cyst    PAD (peripheral artery disease) (HCC)    Abnormal flushing and sweating    Postural imbalance    Vitamin D deficiency    DDD (degenerative disc disease), lumbar    OAB (overactive bladder)    Class 3 obesity with alveolar hypoventilation, serious comorbidity, and body mass index (BMI) of 40.0 to 44.9 in [facility])    Expressive aphasia    Sepsis with acute renal failure and tubular necrosis without septic shock (HCC)    Primary hypertension    Stage 3b chronic kidney disease ([facility]    Shigella gastroenteritis    Iron deficiency anemia due to chronic blood loss    Hypoproteinemia (HCC)    LORA (obstructive sleep apnea)    Hypoxemia    Obesity (BMI 30-39. 9)    Non-restorative sleep       Past Surgical History:   Procedure Laterality Date    APPENDECTOMY  1955    CHOLECYSTECTOMY  12/2016    COLONOSCOPY      GYN  12/03/2019    suburethral sling and cystoscopy    HYSTERECTOMY, TOTAL ABDOMINAL (CERVIX REMOVED)      1970    IR GENERIC PROCEDURE      2014    KNEE SURGERY Left     4/2016    KNEE SURGERY Left 2014    ORTHOPEDIC SURGERY Left 01/2020    left hand tendon surgery    ORTHOPEDIC SURGERY Left     LEFT WRIST TRAPEZIOMETACARPAL ARTHROPLASTY WITH FCR TENDON TRANSFER-     OTHER SURGICAL HISTORY Right 2016    Hand    OTHER SURGICAL HISTORY      2014    OTHER SURGICAL HISTORY          OTHER SURGICAL HISTORY  2019    bladder surgery-put mesh in    SHOULDER ARTHROSCOPY      1966,    [facility]    TOTAL KNEE ARTHROPLASTY Right 2014           Social History     Socioeconomic History    Marital status:      Spouse name: Geoffrey Canela Number of children: 2    Years of education: Not on file    Highest education level: Not on file   Occupational History    Occupation: Previously worked as a [employer]    Tobacco Use    Smoking status: Former Smoker     Packs/day: 1.00     Years: 46.00     Pack years: 46.00     Quit date: 2005     Years since quittin.5    Smokeless tobacco: Never Used   Vaping Use    Vaping Use: Unknown   Substance and Sexual Activity    Alcohol use: Not Currently    Drug use: No    Sexual activity: Not on file   Other Topics Concern    Not on file   Social History Narrative    Patient has 1 dog sleeps with her occasionally. Patient currently drinks 1 caffeine drink per day. Social Determinants of Health     Financial Resource Strain:     Difficulty of Paying Living Expenses: Not on file   Food Insecurity:     Worried About Running Out of Food in the Last Year: Not on file    [place] of Food in the Last Year: Not on file   Transportation Needs:     Lack of Transportation (Medical): Not on file    Lack of Transportation (Non-Medical):  Not on file   Physical Activity:     Days of Exercise per Week: Not on file    Minutes of Exercise per Session: Not on file   Stress:     Feeling of Stress : Not on file   Social Connections:     Frequency of Communication with Friends and Family: Not on file    Frequency of Social Gatherings with Friends and Family: Not on file   Preston Clancy Attends Tenriism Services: Not on file    Active Member of Clubs or Organizations: Not on file    Attends Club or Organization Meetings: Not on file    Marital Status: Not on file   Intimate Partner Violence:     Fear of Current or Ex-Partner: Not on file    Emotionally Abused: Not on file    Physically Abused: Not on file    Sexually Abused: Not on file   Housing Stability:     Unable to Pay for Housing in the Last Year: Not on file    Number of [place] in the Last Year: Not on file    Unstable Housing in the Last Year: Not on file         Family History   Problem Relation Age of Onset    No Known Problems Mother    Leandra Lawrence Other Father 54        brain aneurysm    Cerebral Aneurysm Father     Congenial Anomalies Sister         right arm deformity    Pneumonia Sister     Other Sister         fibromyalgia    Hypertension Brother     Heart Disease Brother     Other Brother         brain aneurysm, AVM    Heart Surgery Brother         CABGx4    Heart Attack Maternal Grandmother     Cancer Maternal Grandfather         skin    Heart Attack Paternal Grandfather     Mult Sclerosis Daughter     Drug Abuse Son     Other Son         Hepatitis C         Allergies   Allergen Reactions    Fire Ant Anaphylaxis    Amoxicillin Other (See Comments)     C-Diff    Cephalexin Other (See Comments)     C-Diff    Hydromorphone Other (See Comments)     Keeps her awake    Morphine Other (See Comments)     Awake    Oxycodone-Acetaminophen Other (See Comments)     Jittery    Povidone-Iodine Other (See Comments)     [place]    Meperidine Rash         Current Outpatient Medications   Medication Sig    clonazePAM (KLONOPIN) 1 MG tablet Take 1 tablet by mouth 2 times daily as needed for Anxiety for up to 30 days.     acetaminophen (TYLENOL) 325 MG tablet Take 325 mg by mouth every 4 hours as needed    albuterol sulfate  (90 Base) MCG/ACT inhaler Inhale 1-2 puffs into the lungs every 6 hours as needed    amitriptyline (ELAVIL) 25 MG tablet Take 25 mg by mouth    clotrimazole-betamethasone (LOTRISONE) 1-0.05 % cream Apply topically 2 times daily    colestipol (COLESTID) 1 g tablet Take 1 g by mouth 2 times daily    cyanocobalamin 1000 MCG/ML injection Inject 1,000 mcg into the muscle    ergocalciferol (ERGOCALCIFEROL) 1.25 MG (02403 UT) capsule Take 50,000 Units by mouth every 7 days    escitalopram (LEXAPRO) 20 MG tablet Take 30 mg by mouth daily    fluticasone (FLONASE) 50 MCG/ACT nasal spray 2 sprays by Nasal route    hydroCHLOROthiazide (HYDRODIURIL) 25 MG tablet Take 25 mg by mouth daily    hyoscyamine (OSCIMIN) 125 MCG TBDP dispersible tablet Take 0.125 mg by mouth every 4 hours as needed    levothyroxine (SYNTHROID) 150 MCG tablet Take 150 mcg by mouth every morning (before breakfast)    lisinopril (PRINIVIL;ZESTRIL) 20 MG tablet Take 20 mg by mouth daily    methocarbamol (ROBAXIN) 750 MG tablet Take 750 mg by mouth every 6 hours as needed    naloxone 4 MG/0.1ML LIQD nasal spray Use 1 spray intranasally, then discard. Repeat with new spray every 2 min as needed for opioid overdose symptoms, alternating nostrils. Indications: opioid overdose    omeprazole (PRILOSEC) 40 MG delayed release capsule Take 40 mg by mouth daily    ondansetron (ZOFRAN) 4 MG tablet Take 4 mg by mouth every 8 hours as needed    pramipexole (MIRAPEX) 0.5 MG tablet Take 0.5 mg by mouth daily     No current facility-administered medications for this visit. Review of Systems   Constitutional: Positive for fatigue and unexpected weight change. Negative for diaphoresis. HENT: Positive for congestion and postnasal drip. Negative for facial swelling and sinus pressure. Eyes: Negative. Respiratory: Negative. Cardiovascular: Negative. Gastrointestinal: Negative. Endocrine: Negative. Positive for hypothyroidism   Genitourinary: Negative.     Musculoskeletal: Positive for arthralgias, back pain, gait problem and joint swelling. Neurological: Positive for weakness, numbness and headaches. Psychiatric/Behavioral: Positive for dysphoric mood. PHYSICAL EXAM:    Vitals:    06/22/22 0955   BP: (!) 148/70   Pulse: 66   Resp: 18   Temp: 97.1 °F (36.2 °C)   SpO2: 98%        GENERAL APPEARANCE:   The patient is normal weight and in no respiratory distress. HEENT:   PERRL. Conjunctivae unremarkable. Nasal mucosa is without epistaxis, exudate, or polyps. Gums and dentition are unremarkable. Narrow airway modified Rodriguez stage IV. Nares patent   NECK/LYMPHATIC:   Symmetrical with no elevation of jugular venous pulsation. Trachea midline. No thyroid enlargement. No cervical adenopathy. LUNGS:   Normal respiratory effort with symmetrical lung expansion. Patient clear to auscultation bilaterally   HEART:   There is a regular rate and rhythm. No murmur, rub, or gallop. There is no edema in the lower extremities. ABDOMEN:   Soft and non-tender. No hepatosplenomegaly. Bowel sounds are normal.     SKIN:   There are no rashes, cyanosis, jaundice, or ecchymosis present. EXTREMITIES:   The extremities had some scant swelling and also noted some varicose veins right leg compared to left. MUSCULOSKELETAL:   There is no abnormal tone, muscle atrophy, or abnormal movement present. NEURO:   The patient is alert and oriented to person, place, and time. Memory appears intact and mood is normal.  No gross sensorimotor deficits are present. DIAGNOSTIC TESTS:  HST 4/7/22                ASSESSMENT:  (Medical Decision Making)         ICD-10-CM    1. LORA (obstructive sleep apnea)  G47.33 -patient has mild to moderate sleep apnea with arterial hypoxemia. Agrees to start Pap therapy. Would like only a nasal mask, since has issues with claustrophobia. See settings below    - The pathophysiology of obstructive sleep apnea was reviewed with the patient.   It's potential to promote severe neurologic, cardiac, pulmonary, and gastrointestinal problems was discussed. Specifically, the increased incidence of hypertension, coronary artery disease, congestive heart failure, pulmonary hypertension, gastroesophageal reflux, pathologic hypersomnolence, memory loss, and glucose intolerance was related to the consequences of hypoxemia, hypercapnia, airway obstruction, and sympathetic overdrive. We also discussed the ability of nasal CPAP to correct these abnormalities through maintenance of a patent airway. Therapeutic options including surgery, oral appliances, and weight loss were also reviewed. 2. Hypoxemia  R09.02 -see above       3. Obesity (BMI 30-39. 9)  E66.9 -limited and weight loss, since cannot move too much. Indicates diet is adequate. Told her to discuss with PMD if she can use least an exercise bike for now. - Still sore from recent surgery       4. Non-restorative sleep  G47.8 -patient with issues with sleep maintenance and as a result only has refreshed sleep 2 days out of the week. We will see how she does with Pap therapy        D89.89    5 Anxiety disorder, unspecified type  F41.9 -patient has anxiety disorder and currently takes Klonopin half a tab twice a day. 6.  Chronic fatigue R53.82-multifactorial due to above issues    PLAN:    - We will start the patient on auto CPAP with a pressure of 5-10 cm H2O with C-Flex of 2 and Micheal Nasal Mask  - See above regarding weight loss  - Discussed sleep hygiene with the patient and her  today  - Continue remaining medications  - In the future can consider pulmonary evaluation, since has 46-pack-year history of tobacco use and quit at least 15 years ago    -Did go over her polysomnogram and [place] score today. All questions answered for the patient today. No orders of the defined types were placed in this encounter. No orders of the defined types were placed in this encounter.         Over 50% of today's office visit was spent in face to face time reviewing test results, prognosis, importance of compliance, education about disease process, benefits of medications, instructions for management of acute flare-ups, and follow up plans. Total face to face time spent with patient was 48minutes.     Uma Mcginnis MD  Electronically signed

## 2022-06-22 ENCOUNTER — OFFICE VISIT (OUTPATIENT)
Dept: SLEEP MEDICINE | Age: 77
End: 2022-06-22
Payer: MEDICARE

## 2022-06-22 VITALS
DIASTOLIC BLOOD PRESSURE: 70 MMHG | SYSTOLIC BLOOD PRESSURE: 148 MMHG | BODY MASS INDEX: 39.57 KG/M2 | WEIGHT: 188.5 LBS | OXYGEN SATURATION: 98 % | HEART RATE: 66 BPM | HEIGHT: 58 IN | RESPIRATION RATE: 18 BRPM | TEMPERATURE: 97.1 F

## 2022-06-22 DIAGNOSIS — R09.02 HYPOXEMIA: ICD-10-CM

## 2022-06-22 DIAGNOSIS — G47.8 NON-RESTORATIVE SLEEP: ICD-10-CM

## 2022-06-22 DIAGNOSIS — G47.33 OSA (OBSTRUCTIVE SLEEP APNEA): Primary | ICD-10-CM

## 2022-06-22 DIAGNOSIS — F41.9 ANXIETY DISORDER, UNSPECIFIED TYPE: ICD-10-CM

## 2022-06-22 DIAGNOSIS — R53.82 CHRONIC FATIGUE: ICD-10-CM

## 2022-06-22 DIAGNOSIS — E66.9 OBESITY (BMI 30-39.9): ICD-10-CM

## 2022-06-22 PROBLEM — G93.32 CFIDS (CHRONIC FATIGUE AND IMMUNE DYSFUNCTION SYNDROME) (HCC): Status: RESOLVED | Noted: 2022-06-22 | Resolved: 2022-06-22

## 2022-06-22 PROBLEM — D89.89 CFIDS (CHRONIC FATIGUE AND IMMUNE DYSFUNCTION SYNDROME) (HCC): Status: ACTIVE | Noted: 2022-06-22

## 2022-06-22 PROBLEM — D89.89 CFIDS (CHRONIC FATIGUE AND IMMUNE DYSFUNCTION SYNDROME) (HCC): Status: RESOLVED | Noted: 2022-06-22 | Resolved: 2022-06-22

## 2022-06-22 PROBLEM — G93.32 CFIDS (CHRONIC FATIGUE AND IMMUNE DYSFUNCTION SYNDROME) (HCC): Status: ACTIVE | Noted: 2022-06-22

## 2022-06-22 PROCEDURE — G8400 PT W/DXA NO RESULTS DOC: HCPCS | Performed by: INTERNAL MEDICINE

## 2022-06-22 PROCEDURE — 3017F COLORECTAL CA SCREEN DOC REV: CPT | Performed by: INTERNAL MEDICINE

## 2022-06-22 PROCEDURE — 99204 OFFICE O/P NEW MOD 45 MIN: CPT | Performed by: INTERNAL MEDICINE

## 2022-06-22 PROCEDURE — 1036F TOBACCO NON-USER: CPT | Performed by: INTERNAL MEDICINE

## 2022-06-22 PROCEDURE — 1090F PRES/ABSN URINE INCON ASSESS: CPT | Performed by: INTERNAL MEDICINE

## 2022-06-22 PROCEDURE — 1123F ACP DISCUSS/DSCN MKR DOCD: CPT | Performed by: INTERNAL MEDICINE

## 2022-06-22 PROCEDURE — G8417 CALC BMI ABV UP PARAM F/U: HCPCS | Performed by: INTERNAL MEDICINE

## 2022-06-22 PROCEDURE — G8427 DOCREV CUR MEDS BY ELIG CLIN: HCPCS | Performed by: INTERNAL MEDICINE

## 2022-06-22 ASSESSMENT — PATIENT HEALTH QUESTIONNAIRE - PHQ9
SUM OF ALL RESPONSES TO PHQ QUESTIONS 1-9: 0
1. LITTLE INTEREST OR PLEASURE IN DOING THINGS: 0
SUM OF ALL RESPONSES TO PHQ QUESTIONS 1-9: 0
SUM OF ALL RESPONSES TO PHQ QUESTIONS 1-9: 0
2. FEELING DOWN, DEPRESSED OR HOPELESS: 0
SUM OF ALL RESPONSES TO PHQ9 QUESTIONS 1 & 2: 0
SUM OF ALL RESPONSES TO PHQ QUESTIONS 1-9: 0

## 2022-06-22 ASSESSMENT — ENCOUNTER SYMPTOMS
BACK PAIN: 1
SINUS PRESSURE: 0
FACIAL SWELLING: 0
GASTROINTESTINAL NEGATIVE: 1
EYES NEGATIVE: 1
RESPIRATORY NEGATIVE: 1

## 2022-06-22 NOTE — PATIENT INSTRUCTIONS
Learning About Sleeping Well  What does sleeping well mean? Sleeping well means getting enough sleep. How much sleep is enough varies among people. The number of hours you sleep is not as important as how you feel when you wake up. If you do not feel refreshed, you probably need more sleep. Another sign of not getting enough sleep is feeling tired during the day. The average total nightly sleep time is 7½ to 8 hours. Healthy adults may need a little more or a little less than this. Why is getting enough sleep important? Getting enough quality sleep is a basic part of good health. When your sleep suffers, your mood and your thoughts can suffer too. You may find yourself feeling more grumpy or stressed. Not getting enough sleep also can lead to serious problems, including injury, accidents, anxiety, and depression. What might cause poor sleeping? Many things can cause sleep problems, including:  · Stress. Stress can be caused by fear about a single event, such as giving a speech. Or you may have ongoing stress, such as worry about work or school. · Depression, anxiety, and other mental or emotional conditions. · Changes in your sleep habits or surroundings. This includes changes that happen where you sleep, such as noise, light, or sleeping in a different bed. It also includes changes in your sleep pattern, such as having jet lag or working a late shift. · Health problems, such as pain, breathing problems, and restless legs syndrome. · Lack of regular exercise. How can you help yourself? Here are some tips that may help you sleep more soundly and wake up feeling more refreshed. Your sleeping area   · Use your bedroom only for sleeping and sex. A bit of light reading may help you fall asleep. But if it doesn't, do your reading elsewhere in the house. Don't watch TV in bed. · Be sure your bed is big enough to stretch out comfortably, especially if you have a sleep partner.   · Keep your bedroom quiet, dark, and cool. Use curtains, blinds, or a sleep mask to block out light. To block out noise, use earplugs, soothing music, or a \"white noise\" machine. Your evening and bedtime routine   · Get regular exercise, but not within 3 to 4 hours before your bedtime. · Create a relaxing bedtime routine. You might want to take a warm shower or bath, listen to soothing music, or drink a cup of noncaffeinated tea. · Go to bed at the same time every night. And get up at the same time every morning, even if you feel tired. What to avoid   · Limit caffeine (coffee, tea, caffeinated sodas) during the day, and don't have any for at least 4 to 6 hours before bedtime. · Don't drink alcohol before bedtime. Alcohol can cause you to wake up more often during the night. · Don't smoke or use tobacco, especially in the evening. Nicotine can keep you awake. · Don't take naps during the day, especially close to bedtime. · Don't lie in bed awake for too long. If you can't fall asleep, or if you wake up in the middle of the night and can't get back to sleep within 15 minutes or so, get out of bed and go to another room until you feel sleepy. · Don't take medicine right before bed that may keep you awake or make you feel hyper or energized. Your doctor can tell you if your medicine may do this and if you can take it earlier in the day. If you can't sleep   · Imagine yourself in a peaceful, pleasant scene. Focus on the details and feelings of being in a place that is relaxing. · Get up and do a quiet or boring activity until you feel sleepy. · Don't drink any liquids after 6 p.m. if you wake up often because you have to go to the bathroom. Where can you learn more? Go to [employer].be  Enter A293 in the search box to learn more about \"Learning About Sleeping Well. \"   © 5019-2003 Healthwise, Incorporated.  Care instructions adapted under license by [employer] (which disclaims liability or warranty for this information). This care instruction is for use with your licensed healthcare professional. If you have questions about a medical condition or this instruction, always ask your healthcare professional. [address] any warranty or liability for your use of this information. Content Version: 80.6.052240; Current as of: March 12, 2014              Sleep Apnea: After Your Visit  Your Care Instructions  Sleep apnea means that you frequently stop breathing for 10 seconds or longer during sleep. It can be mild to severe, based on the number of times an hour that you stop breathing or have slowed breathing. Blocked or narrowed airways in your nose, mouth, or throat can cause sleep apnea. Your airway can become blocked when your throat muscles and tongue relax during sleep. You can treat sleep apnea at home by making lifestyle changes. You also can use a CPAP breathing machine that keeps tissues in the throat from blocking your airway. Or your doctor may suggest that you use a breathing device while you sleep. It helps keep your airway open. This could be a device that you put in your mouth. Other examples include strips or disks that you use on your nose. In some cases, surgery may be needed to remove enlarged tissues in the throat. Follow-up care is a key part of your treatment and safety. Be sure to make and go to all appointments, and call your doctor if you are having problems. It's also a good idea to know your test results and keep a list of the medicines you take. How can you care for yourself at home? · Lose weight, if needed. It may reduce the number of times you stop breathing or have slowed breathing. · Sleep on your side. It may stop mild apnea. If you tend to roll onto your back, sew a pocket in the back of your paVisual.ly top. Put a tennis ball into the pocket, and stitch the pocket shut. This will help keep you from sleeping on your back.   · Avoid alcohol and medicines such as sleeping pills and sedatives before bed. · Do not smoke. Smoking can make sleep apnea worse. If you need help quitting, talk to your doctor about stop-smoking programs and medicines. These can increase your chances of quitting for good. · Prop up the head of your bed 4 to 6 inches by putting bricks under the legs of the bed. · Treat breathing problems, such as a stuffy nose, caused by a cold or allergies. · Try a continuous positive airway pressure (CPAP) breathing machine if your doctor recommends it. The machine keeps your airway open when you sleep. · If CPAP does not work for you, ask your doctor if you can try other breathing machines. A bilevel positive airway pressure machine uses one type of air pressure for breathing in and another type for breathing out. Another device raises or lowers air pressure as needed while you breathe. · Talk to your doctor if:  ¨ Your nose feels dry or bleeds when you use one of these machines. You may need to increase moisture in the air. A humidifier may help. ¨ Your nose is runny or stuffy from using a breathing machine. Decongestants or a corticosteroid nasal spray may help. ¨ You are sleepy during the day and it gets in the way of the normal things you do. Do not drive when you are drowsy. When should you call for help? Watch closely for changes in your health, and be sure to contact your doctor if:  · You still have sleep apnea even though you have made lifestyle changes. · You are thinking of trying a device such as CPAP. · You are having problems using a CPAP or similar machine. Where can you learn more? Go to [employer].be  Enter J936 in the search box to learn more about \"Sleep Apnea: After Your Visit. \"   © 5368-1020 Healthwise, Incorporated. Care instructions adapted under license by [facility] [employer] (which disclaims liability or warranty for this information).  This care instruction is for use with your licensed healthcare professional. If you have questions about a medical condition or this instruction, always ask your healthcare professional. [address] any warranty or liability for your use of this information. Content Version: 44.2.108350; Current as of: January 14, 2014                        Eating Healthy Foods: After Your Visit  Your Care Instructions  Eating healthy foods can help lower your risk for disease. Healthy food gives you energy and keeps your heart strong, your brain active, your muscles working, and your bones strong. A healthy diet includes a variety of foods from the basic food groups: grains, vegetables, fruits, milk and milk products, and meat and beans. Some people may eat more of their favorite foods from only one food group and, as a result, miss getting the nutrients they need. So, it is important to pay attention not only to what you eat but also to what you are missing from your diet. You can eat a healthy, balanced diet by making a few small changes. Follow-up care is a key part of your treatment and safety. Be sure to make and go to all appointments, and call your doctor if you are having problems. Its also a good idea to know your test results and keep a list of the medicines you take. How can you care for yourself at home? Look at what you eat  · Keep a food diary for a week or two and record everything you eat or drink. Track the number of servings you eat from each food group. · For a balanced diet every day, eat a variety of:  ¨ 6 or more ounce-equivalents of grains, such as cereals, breads, crackers, rice, or pasta, every day. An ounce-equivalent is 1 slice of bread, 1 cup of ready-to-eat cereal, or ½ cup of cooked rice, cooked pasta, or cooked cereal.  ¨ 2½ cups of vegetables, especially:  § Dark-green vegetables such as broccoli and spinach. § Orange vegetables such as carrots and sweet potatoes. § Dry beans (such as gomes and kidney beans) and peas (such as lentils).   ¨ 2 cups of fresh, frozen, or canned fruit. A small apple or 1 banana or orange equals 1 cup. ¨ 3 cups of nonfat or low-fat milk, yogurt, or other milk products. ¨ 5½ ounces of meat and beans, such as chicken, fish, lean meat, beans, nuts, and seeds. One egg, 1 tablespoon of peanut butter, ½ ounce nuts or seeds, or ¼ cup of cooked beans equals 1 ounce of meat. · Learn how to read food labels for serving sizes and ingredients. Fast-food and convenience-food meals often contain few or no fruits or vegetables. Make sure you eat some fruits and vegetables to make the meal more nutritious. · Look at your food diary. For each food group, add up what you have eaten and then divide the total by the number of days. This will give you an idea of how much you are eating from each food group. See if you can find some ways to change your diet to make it more healthy. Start small  · Do not try to make dramatic changes to your diet all at once. You might feel that you are missing out on your favorite foods and then be more likely to fail. · Start slowly, and gradually change your habits. Try some of the following:  ¨ Use whole wheat bread instead of white bread. ¨ Use nonfat or low-fat milk instead of whole milk. ¨ Eat brown rice instead of white rice, and eat whole wheat pasta instead of white-flour pasta. ¨ Try low-fat cheeses and low-fat yogurt. ¨ Add more fruits and vegetables to meals and have them for snacks. ¨ Add lettuce, tomato, cucumber, and onion to sandwiches. ¨ Add fruit to yogurt and cereal.  Enjoy food  · You can still eat your favorite foods. You just may need to eat less of them. If your favorite foods are high in fat, salt, and sugar, limit how often you eat them, but do not cut them out entirely. · Eat a wide variety of foods. Make healthy choices when eating out  · The type of restaurant you choose can help you make healthy choices.  Even fast-food chains are now offering more low-fat or healthier choices on the menu. · Choose smaller portions, or take half of your meal home. · When eating out, try:  ¨ A veggie pizza with a whole wheat crust or grilled chicken (instead of sausage or pepperoni). ¨ Pasta with roasted vegetables, grilled chicken, or marinara sauce instead of cream sauce. ¨ A vegetable wrap or grilled chicken wrap. ¨ Broiled or poached food instead of fried or breaded items. Make healthy choices easy  · Buy packaged, prewashed, ready-to-eat fresh vegetables and fruits, such as baby carrots, salad mixes, and chopped or shredded broccoli and cauliflower. · Buy packaged, presliced fruits, such as melon or pineapple. · Choose 100% fruit or vegetable juice instead of soda. Limit juice intake to 4 to 6 oz (½ to ¾ cup) a day. · Blend low-fat yogurt, fruit juice, and canned or frozen fruit to make a smoothie for breakfast or a snack. Where can you learn more? Go to [employer].be  Enter T756 in the search box to learn more about \"Eating Healthy Foods: After Your Visit. \"   © 3959-9237 Healthwise, Incorporated. Care instructions adapted under license by [employer] (which disclaims liability or warranty for this information). This care instruction is for use with your licensed healthcare professional. If you have questions about a medical condition or this instruction, always ask your healthcare professional. [place] [address] any warranty or liability for your use of this information. Content Version: 10.1.793635; Current as of: May 17, 2013                                    Learning About Physical Activity  What is physical activity? Physical activity is any kind of activity that gets your body moving. Being active means allowing your body to \"practice\" breathing, stretching, and lifting. The more practice your body gets, the better it works.   The types of physical activity that can help you get fit and stay healthy include:  · Aerobic or \"cardio\" activities that make your heart beat faster and make you breathe harder, such as brisk walking, riding a bike, or running. Aerobic activities strengthen your heart and lungs and build up your endurance. · Strength training activities that make your muscles work against, or \"resist,\" something, such as lifting weights or doing push-ups. These activities help tone and strengthen your muscles. · Stretches that allow you to move your joints and muscles through their full range of motion. Stretching helps you be more flexible and avoid injury. What are the benefits of physical activity? Being active is one of the best things you can do to get fit and stay healthy. It helps you to:  · Feel stronger and have more energy to do all the things you like to do. · Focus better at school or work and perform better in sports. · Feel, think, and sleep better. · Reach and stay at a healthy weight. · Lose fat and build lean muscle. · Lower your risk for serious health problems. · Keep your bones, muscles, and joints strong. Being fit lets you do more physical activity. And it lets you work out harder without as much effort. How can you make physical activity part of your life? Get at least 30 minutes of exercise on most days of the week. Walking is a good choice. You also may want to do other activities, such as running, swimming, cycling, or playing tennis or team sports. Pick activities that you like--ones that make your heart beat faster, your muscles stronger, and your muscles and joints more flexible. If you find more than one thing you like doing, do them all. You don't have to do the same thing every day. Get your heart pumping every day. Any activity that makes your heart beat faster and keeps it at that rate for a while counts. Here are some great ways to get your heart beating faster:  · Go for a brisk walk, run, or bike ride. · Go for a hike or swim. · Go in-line skating.   · Play a game of touch football, basketball, or soccer. · Ride a bike. · Play tennis or racquetball. · Climb stairs. Even some household chores can be aerobic--just do them at a faster pace. Vacuuming, raking or mowing the lawn, sweeping the garage, and washing and waxing the car all can help get your heart rate up. Strengthen your muscles during the week. You don't have to lift heavy weights or grow big, bulky muscles to get stronger. Doing a few simple activities that make your muscles work against, or \"resist,\" something can help you get stronger. For example, you can:  · Do push-ups or sit-ups, which use your own body weight as resistance. · Lift weights or dumbbells or use stretch bands at home or in a gym or community center. Stretch your muscles often. Stretching will help you as you become more active. It can help you stay flexible, loosen tight muscles, and avoid injury. It can also help improve your balance and posture and can be a great way to relax. Be sure to stretch the muscles you'll be using when you work out. Its best to warm your muscles slightly before you stretch them. Walk or do some other light aerobic activity for a few minutes, and then start stretching. When you stretch your muscles:  · Do it slowly. Stretching is not about going fast or making sudden movements. · Don't push or bounce during a stretch. · Hold each stretch for at least 15 to 30 seconds, if you can. You should feel a stretch in the muscle, but not pain. · Breathe out as you do the stretch. Then breathe in as you hold the stretch. Don't hold your breath. If you're worried about how more activity might affect your health, have a checkup before you start. Follow any special advice your doctor gives you for getting a smart start. Where can you learn more? Go to [employer].be  Enter [id number] in the search box to learn more about \"Learning About Physical Activity. \"   © 3323-5047 Healthwise, Incorporated.  Care instructions adapted under license by [facility] (which disclaims liability or warranty for this information). This care instruction is for use with your licensed healthcare professional. If you have questions about a medical condition or this instruction, always ask your healthcare professional. [address] any warranty or liability for your use of this information. Content Version: 19.5.203835; Current as of: November 15, 2013                                          Learning About CPAP for Sleep Apnea  What is CPAP? CPAP is a small machine that you use at home every night while you sleep. It increases air pressure in your throat to keep your airway open. When you have sleep apnea, this can help you sleep better so you feel much better. CPAP stands for \"continuous positive airway pressure. \"  The CPAP machine will have one of the following:  · A mask that covers your nose and mouth  · Prongs that fit into your nose  · A mask that covers your nose only, the most common type. This type is called NCPAP. The N stands for \"nasal.\"  Why is it done? CPAP is usually the best treatment for obstructive sleep apnea. It is the first treatment choice and the most widely used. Your doctor may suggest CPAP if you have:  · Moderate to severe sleep apnea. · Sleep apnea and coronary artery disease (CAD) or heart failure. How does it help? · CPAP can help you have more normal sleep, so you feel less sleepy and more alert during the daytime. · CPAP may help keep heart failure or other heart problems from getting worse. · CPAP may help lower your blood pressure. · If you use CPAP, your bed partner may also sleep better because you are not snoring or restless. What are the side effects? Some people who use CPAP have:  · A dry or stuffy nose and a sore throat. · Irritated skin on the face. · Sore eyes. · Bloating. If you have any of these problems, work with your doctor to fix them.  Here are some things you can try:  · Be sure the mask or nasal prongs fit well. · See if your doctor can adjust the pressure of your CPAP. · If your nose is dry, try a humidifier. · If your nose is runny or stuffy, try decongestant medicine or a steroid nasal spray. Be safe with medicines. Read and follow all instructions on the label. Do not use the medicine longer than the label says. If these things do not help, you might try a different type of machine. Some machines have air pressure that adjusts on its own. Others have air pressures that are different when you breathe in than when you breathe out. This may reduce discomfort caused by too much pressure in your nose. Where can you learn more? Go to [employer].be  Enter [employer] in the search box to learn more about \"Learning About CPAP for Sleep Apnea. \"   © 8888-8646 Healthwise, Incorporated. Care instructions adapted under license by [employer] (which disclaims liability or warranty for this information). This care instruction is for use with your licensed healthcare professional. If you have questions about a medical condition or this instruction, always ask your healthcare professional. [place] [address] any warranty or liability for your use of this information. Content Version: 30.4.646972; Current as of: January 24, 2014        The company who will be taking care of your CPAP supplies is:     Address: [address] [place] [address] [address], [place], [address] [place] [address]  Phone: [phone] Option #1  Fax: [phone]

## 2022-06-23 ENCOUNTER — OFFICE VISIT (OUTPATIENT)
Dept: ORTHOPEDIC SURGERY | Age: 77
End: 2022-06-23
Payer: MEDICARE

## 2022-06-23 DIAGNOSIS — M25.512 LEFT SHOULDER PAIN, UNSPECIFIED CHRONICITY: Primary | ICD-10-CM

## 2022-06-23 DIAGNOSIS — M75.102 TEAR OF LEFT ROTATOR CUFF, UNSPECIFIED TEAR EXTENT, UNSPECIFIED WHETHER TRAUMATIC: ICD-10-CM

## 2022-06-23 PROBLEM — M25.569 KNEE PAIN: Status: ACTIVE | Noted: 2019-10-18

## 2022-06-23 PROBLEM — E66.2 OBESITY HYPOVENTILATION SYNDROME (HCC): Status: ACTIVE | Noted: 2019-03-14

## 2022-06-23 PROBLEM — R11.0 CHRONIC NAUSEA: Status: ACTIVE | Noted: 2019-06-26

## 2022-06-23 PROBLEM — R26.9 GAIT ABNORMALITY: Status: ACTIVE | Noted: 2020-02-26

## 2022-06-23 PROBLEM — M47.819 SPONDYLOSIS WITHOUT MYELOPATHY: Status: ACTIVE | Noted: 2019-08-15

## 2022-06-23 PROBLEM — I73.9 PAD (PERIPHERAL ARTERY DISEASE) (HCC): Status: ACTIVE | Noted: 2017-12-06

## 2022-06-23 PROBLEM — R35.0 URINARY FREQUENCY: Status: ACTIVE | Noted: 2020-06-29

## 2022-06-23 PROBLEM — K90.41 GLUTEN INTOLERANCE: Status: ACTIVE | Noted: 2018-02-06

## 2022-06-23 PROBLEM — E55.9 VITAMIN D DEFICIENCY: Status: ACTIVE | Noted: 2022-01-23

## 2022-06-23 PROBLEM — F39 MOOD DISORDER (HCC): Status: ACTIVE | Noted: 2017-12-06

## 2022-06-23 PROBLEM — D50.9 ANEMIA, IRON DEFICIENCY: Status: ACTIVE | Noted: 2017-12-06

## 2022-06-23 PROBLEM — M54.50 LOW BACK PAIN: Status: ACTIVE | Noted: 2019-08-15

## 2022-06-23 PROBLEM — E03.9 HYPOTHYROIDISM (ACQUIRED): Status: ACTIVE | Noted: 2017-12-06

## 2022-06-23 PROBLEM — K64.9 HEMORRHOIDS: Status: ACTIVE | Noted: 2018-11-20

## 2022-06-23 PROBLEM — K21.9 GERD (GASTROESOPHAGEAL REFLUX DISEASE): Status: ACTIVE | Noted: 2018-10-09

## 2022-06-23 PROBLEM — N28.1 RENAL CYST: Status: ACTIVE | Noted: 2020-08-13

## 2022-06-23 PROBLEM — Z96.1 PSEUDOPHAKIA: Status: ACTIVE | Noted: 2018-10-02

## 2022-06-23 PROBLEM — N39.3 SUI (STRESS URINARY INCONTINENCE, FEMALE): Status: ACTIVE | Noted: 2019-11-07

## 2022-06-23 PROBLEM — R20.0 NUMBNESS: Status: ACTIVE | Noted: 2019-08-21

## 2022-06-23 PROBLEM — R76.8 ANA POSITIVE: Status: ACTIVE | Noted: 2018-10-26

## 2022-06-23 PROBLEM — H43.813 POSTERIOR VITREOUS DETACHMENT OF BOTH EYES: Status: ACTIVE | Noted: 2018-10-02

## 2022-06-23 PROBLEM — R23.2 ABNORMAL FLUSHING AND SWEATING: Status: ACTIVE | Noted: 2018-07-31

## 2022-06-23 PROBLEM — M43.10 SPONDYLOLISTHESIS: Status: ACTIVE | Noted: 2019-08-15

## 2022-06-23 PROBLEM — N32.81 OAB (OVERACTIVE BLADDER): Status: ACTIVE | Noted: 2018-11-20

## 2022-06-23 PROBLEM — M19.90 OA (OSTEOARTHRITIS): Status: ACTIVE | Noted: 2017-12-06

## 2022-06-23 PROBLEM — G25.81 RLS (RESTLESS LEGS SYNDROME): Status: ACTIVE | Noted: 2017-12-06

## 2022-06-23 PROBLEM — F41.1 GAD (GENERALIZED ANXIETY DISORDER): Status: ACTIVE | Noted: 2018-10-09

## 2022-06-23 PROBLEM — I10 PRIMARY HYPERTENSION: Status: ACTIVE | Noted: 2017-12-06

## 2022-06-23 PROBLEM — M79.7 FIBROMYOSITIS: Status: ACTIVE | Noted: 2019-08-15

## 2022-06-23 PROBLEM — E53.8 VITAMIN B12 DEFICIENCY: Status: ACTIVE | Noted: 2020-09-14

## 2022-06-23 PROBLEM — R61 ABNORMAL FLUSHING AND SWEATING: Status: ACTIVE | Noted: 2018-07-31

## 2022-06-23 PROBLEM — M79.606 PAIN IN LOWER LIMB: Status: ACTIVE | Noted: 2019-08-21

## 2022-06-23 PROBLEM — H26.491 POSTERIOR CAPSULAR OPACIFICATION NON VISUALLY SIGNIFICANT OF RIGHT EYE: Status: ACTIVE | Noted: 2020-01-23

## 2022-06-23 PROBLEM — M79.7 FIBROMYALGIA: Status: ACTIVE | Noted: 2018-10-23

## 2022-06-23 PROBLEM — J30.9 AR (ALLERGIC RHINITIS): Status: ACTIVE | Noted: 2018-10-23

## 2022-06-23 PROBLEM — H53.2 DIPLOPIA: Status: ACTIVE | Noted: 2020-01-23

## 2022-06-23 PROBLEM — E78.5 HLD (HYPERLIPIDEMIA): Status: ACTIVE | Noted: 2017-12-06

## 2022-06-23 PROBLEM — F41.8 DEPRESSION WITH ANXIETY: Status: ACTIVE | Noted: 2017-12-06

## 2022-06-23 PROBLEM — B36.9 FUNGAL DERMATITIS: Status: ACTIVE | Noted: 2017-12-21

## 2022-06-23 PROBLEM — L23.9 ALLERGIC DERMATITIS: Status: ACTIVE | Noted: 2019-07-23

## 2022-06-23 PROBLEM — N95.2 VAGINAL ATROPHY: Status: ACTIVE | Noted: 2020-01-30

## 2022-06-23 PROBLEM — G47.00 INSOMNIA: Status: ACTIVE | Noted: 2017-12-06

## 2022-06-23 PROBLEM — R39.89 URINARY PROBLEM: Status: ACTIVE | Noted: 2022-04-30

## 2022-06-23 PROBLEM — K58.9 IBS (IRRITABLE BOWEL SYNDROME): Status: ACTIVE | Noted: 2017-12-06

## 2022-06-23 PROCEDURE — G8400 PT W/DXA NO RESULTS DOC: HCPCS | Performed by: PHYSICIAN ASSISTANT

## 2022-06-23 PROCEDURE — G8427 DOCREV CUR MEDS BY ELIG CLIN: HCPCS | Performed by: PHYSICIAN ASSISTANT

## 2022-06-23 PROCEDURE — 1123F ACP DISCUSS/DSCN MKR DOCD: CPT | Performed by: PHYSICIAN ASSISTANT

## 2022-06-23 PROCEDURE — 99204 OFFICE O/P NEW MOD 45 MIN: CPT | Performed by: PHYSICIAN ASSISTANT

## 2022-06-23 PROCEDURE — 1036F TOBACCO NON-USER: CPT | Performed by: PHYSICIAN ASSISTANT

## 2022-06-23 PROCEDURE — 1090F PRES/ABSN URINE INCON ASSESS: CPT | Performed by: PHYSICIAN ASSISTANT

## 2022-06-23 PROCEDURE — 3017F COLORECTAL CA SCREEN DOC REV: CPT | Performed by: PHYSICIAN ASSISTANT

## 2022-06-23 PROCEDURE — G8417 CALC BMI ABV UP PARAM F/U: HCPCS | Performed by: PHYSICIAN ASSISTANT

## 2022-06-23 RX ORDER — INHALER,ASSIST DEV,SMALL MASK
SPACER (EA) MISCELLANEOUS
COMMUNITY
Start: 2022-04-21 | End: 2022-07-22

## 2022-06-23 RX ORDER — MONTELUKAST SODIUM 10 MG/1
TABLET ORAL
COMMUNITY
End: 2022-07-22

## 2022-06-23 RX ORDER — LISINOPRIL AND HYDROCHLOROTHIAZIDE 12.5; 1 MG/1; MG/1
TABLET ORAL
COMMUNITY
End: 2022-08-14

## 2022-06-23 RX ORDER — METHENAMINE, SODIUM PHOSPHATE, MONOBASIC, MONOHYDRATE, PHENYL SALICYLATE, METHYLENE BLUE, AND HYOSCYAMINE SULFATE 120; 40.8; 36; 10; .12 MG/1; MG/1; MG/1; MG/1; MG/1
CAPSULE ORAL
COMMUNITY
End: 2022-07-22

## 2022-06-23 RX ORDER — NYSTATIN 100000 [USP'U]/G
POWDER TOPICAL
COMMUNITY
End: 2022-07-22 | Stop reason: ALTCHOICE

## 2022-06-23 RX ORDER — HYDROCODONE BITARTRATE AND ACETAMINOPHEN 7.5; 325 MG/1; MG/1
TABLET ORAL
COMMUNITY
End: 2022-07-22

## 2022-06-23 RX ORDER — PANTOPRAZOLE SODIUM 40 MG/1
TABLET, DELAYED RELEASE ORAL
COMMUNITY
End: 2022-07-22

## 2022-06-23 RX ORDER — LEVOFLOXACIN 500 MG/1
TABLET, FILM COATED ORAL
COMMUNITY
Start: 2022-05-15 | End: 2022-07-22

## 2022-06-23 RX ORDER — ONDANSETRON 4 MG/1
TABLET, ORALLY DISINTEGRATING ORAL
COMMUNITY
Start: 2022-06-05 | End: 2022-07-22

## 2022-06-23 RX ORDER — NITROFURANTOIN MACROCRYSTALS 50 MG/1
CAPSULE ORAL
COMMUNITY
End: 2022-07-22

## 2022-06-23 RX ORDER — METRONIDAZOLE 500 MG/1
TABLET ORAL
COMMUNITY
End: 2022-07-22

## 2022-06-23 RX ORDER — NITROFURANTOIN 25; 75 MG/1; MG/1
CAPSULE ORAL
COMMUNITY
End: 2022-07-22

## 2022-06-23 RX ORDER — OXYBUTYNIN CHLORIDE 10 MG/1
TABLET, EXTENDED RELEASE ORAL
COMMUNITY
End: 2022-07-22

## 2022-06-23 RX ORDER — CIPROFLOXACIN 250 MG/1
TABLET, FILM COATED ORAL
COMMUNITY
End: 2022-07-22

## 2022-06-23 RX ORDER — METRONIDAZOLE 250 MG/1
TABLET ORAL
COMMUNITY
End: 2022-07-22

## 2022-06-23 RX ORDER — LIDOCAINE 36 MG/1
PATCH TOPICAL
COMMUNITY
End: 2022-07-22

## 2022-06-23 RX ORDER — HYDROCODONE BITARTRATE AND ACETAMINOPHEN 5; 325 MG/1; MG/1
TABLET ORAL
COMMUNITY
End: 2022-07-22

## 2022-06-23 RX ORDER — BENZONATATE 100 MG/1
CAPSULE ORAL
COMMUNITY
Start: 2022-04-18 | End: 2022-07-22

## 2022-06-23 RX ORDER — DEXAMETHASONE SODIUM PHOSPHATE 1 MG/ML
SOLUTION/ DROPS OPHTHALMIC
COMMUNITY
End: 2022-07-22

## 2022-06-23 RX ORDER — ZOSTER VACCINE RECOMBINANT, ADJUVANTED 50 MCG/0.5
KIT INTRAMUSCULAR
COMMUNITY
End: 2022-07-22

## 2022-06-23 RX ORDER — DICYCLOMINE HYDROCHLORIDE 10 MG/1
CAPSULE ORAL
COMMUNITY
End: 2022-07-22

## 2022-06-23 RX ORDER — TOLTERODINE 4 MG/1
CAPSULE, EXTENDED RELEASE ORAL
COMMUNITY
End: 2022-07-22

## 2022-06-23 RX ORDER — VALACYCLOVIR HYDROCHLORIDE 1 G/1
TABLET, FILM COATED ORAL
COMMUNITY
End: 2022-07-22

## 2022-06-23 RX ORDER — SULFAMETHOXAZOLE AND TRIMETHOPRIM 800; 160 MG/1; MG/1
TABLET ORAL
COMMUNITY
Start: 2022-04-30 | End: 2022-07-22

## 2022-06-23 RX ORDER — PHENAZOPYRIDINE HYDROCHLORIDE 200 MG/1
TABLET, FILM COATED ORAL
COMMUNITY
Start: 2021-09-23 | End: 2022-07-22

## 2022-06-23 RX ORDER — PREDNISONE 20 MG/1
TABLET ORAL
COMMUNITY
End: 2022-07-22

## 2022-06-23 RX ORDER — HYDROXYZINE HYDROCHLORIDE 10 MG/1
TABLET, FILM COATED ORAL
COMMUNITY
Start: 2021-09-13

## 2022-06-23 RX ORDER — DEXAMETHASONE 1 MG
TABLET ORAL
COMMUNITY
End: 2022-07-22

## 2022-06-23 RX ORDER — ESTRADIOL 2 MG/1
TABLET ORAL
COMMUNITY
End: 2022-07-22

## 2022-06-23 RX ORDER — INFLUENZA A VIRUS A/MICHIGAN/45/2015 X-275 (H1N1) ANTIGEN (FORMALDEHYDE INACTIVATED), INFLUENZA A VIRUS A/SINGAPORE/INFIMH-16-0019/2016 IVR-186 (H3N2) ANTIGEN (FORMALDEHYDE INACTIVATED), INFLUENZA B VIRUS B/PHUKET/3073/2013 ANTIGEN (FORMALDEHYDE INACTIVATED), AND INFLUENZA B VIRUS B/MARYLAND/15/2016 BX-69A ANTIGEN (FORMALDEHYDE INACTIVATED) 60; 60; 60; 60 UG/.7ML; UG/.7ML; UG/.7ML; UG/.7ML
INJECTION, SUSPENSION INTRAMUSCULAR
COMMUNITY
End: 2022-07-22

## 2022-06-23 RX ORDER — CYCLOBENZAPRINE HCL 10 MG
TABLET ORAL
COMMUNITY
End: 2022-07-22

## 2022-06-23 RX ORDER — BENZONATATE 200 MG/1
CAPSULE ORAL
COMMUNITY
Start: 2022-04-21 | End: 2022-07-22 | Stop reason: ALTCHOICE

## 2022-06-23 RX ORDER — CEFUROXIME AXETIL 500 MG/1
TABLET ORAL
COMMUNITY
End: 2022-07-22

## 2022-06-23 RX ORDER — DIPHENOXYLATE HYDROCHLORIDE AND ATROPINE SULFATE 2.5; .025 MG/1; MG/1
TABLET ORAL
COMMUNITY
End: 2022-07-22

## 2022-06-27 ASSESSMENT — ENCOUNTER SYMPTOMS
CHEST TIGHTNESS: 0
SHORTNESS OF BREATH: 0
COUGH: 0
WHEEZING: 0

## 2022-06-27 NOTE — PROGRESS NOTES
Name: Carmenza Olea  YOB: 1945  Gender: female  MRN: [id number]    CC:   Chief Complaint   Patient presents with    Shoulder Pain     left shoulder pain   Left shoulder pain    HPI:  This patient presents with a six month history of Left shoulder pain. Patient denies specific mechanism of injury. The patient notes lateral and superior shoulder pain that does radiate into the neck. The patient declines popping, clicking, numbness and tingling. The patient notes interference with sleep. Allergies   Allergen Reactions    Fire Ant Anaphylaxis    Amoxicillin Other (See Comments)     C-Diff    Amoxicillin-Pot Clavulanate Other (See Comments)     c.diff    Cephalexin Other (See Comments)     C-Diff  C-Diff      Cephalosporins     Hydromorphone Other (See Comments)     Keeps her awake    Meperidine Hcl     Morphine Other (See Comments)     Awake  Awake  No sleep    Oxycodone     Oxycodone-Acetaminophen Other (See Comments)     Jittery  Nightmares    Penicillins Other (See Comments)     C-Diff      Povidone-Iodine Other (See Comments)     Johnston  Johnston  burn    Meperidine Rash     Past Medical History:   Diagnosis Date    Acute kidney insufficiency     Followed by PCP     Adverse effect of anesthesia     2016 in [place] heart stopped at the end of my gallbladder surgery. \" Patient reports no CPR performed, stayed one night in the hospital. No problems with any other surgeries.     Allergic rhinitis     Anemia     history     Anxiety and depression     Controlled with medication     Arthritis     Edema     bilateral lower legs     GERD (gastroesophageal reflux disease)     Controlled with medication     History of chicken pox     History of Clostridioides difficile infection     History of DVT (deep vein thrombosis)     s/p knee replacement and developed right leg DVT     HTN (hypertension)     controlled with medication     Hyperlipidemia     controlled with medication  Hyperthyroidism     pt denies    Hypothyroidism     controlled with medication     IBS (irritable bowel syndrome)     Insomnia     Left flank pain 3/5/2020    Morbid obesity (HCC)     BMI 42.85    Murmur     per PCP note (68/4/13) systolic murmur grade of 2/6    Overactive bladder     PAD (peripheral artery disease) ([facility] [address]     patient denies on 11/27/19    Recurrent UTI     RLS (restless legs syndrome)     controlled with medication     SOBOE (shortness of breath on exertion)     PRN inhaler-patient reports she uses inhaler QHS    Special examinations     History-Neurologist - Dr. 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     Past Surgical History:   Procedure Laterality Date    APPENDECTOMY  1955    CHOLECYSTECTOMY  12/2016    COLONOSCOPY      GYN  12/03/2019    suburethral sling and cystoscopy    HYSTERECTOMY, TOTAL ABDOMINAL (CERVIX REMOVED)      1970    IR GENERIC PROCEDURE      2014    KNEE SURGERY Left     4/2016    KNEE SURGERY Left 2014    ORTHOPEDIC SURGERY Left 01/2020    left hand tendon surgery    ORTHOPEDIC SURGERY Left     LEFT WRIST TRAPEZIOMETACARPAL ARTHROPLASTY WITH FCR TENDON TRANSFER- 2020    OTHER SURGICAL HISTORY Right 2016    Hand    OTHER SURGICAL HISTORY      2014    OTHER SURGICAL HISTORY      1986    OTHER SURGICAL HISTORY  12/03/2019    bladder surgery-put mesh in    SHOULDER ARTHROSCOPY      1966,    [facility]    TOTAL KNEE ARTHROPLASTY Right 2014     Family History   Problem Relation Age of Onset    No Known Problems Mother     Other Father 54        brain aneurysm    Cerebral Aneurysm Father     Congenial Anomalies Sister         right arm deformity    Pneumonia Sister     Other Sister         fibromyalgia    Hypertension Brother     Heart Disease Brother     Other Brother brain aneurysm, AVM    Heart Surgery Brother         CABGx4    Heart Attack Maternal Grandmother     Cancer Maternal Grandfather         skin    Heart Attack Paternal Grandfather     Mult Sclerosis Daughter     Drug Abuse Son     Other Son         Hepatitis C     Social History     Socioeconomic History    Marital status:      Spouse name: Kirsten Jacques Number of children: 2    Years of education: Not on file    Highest education level: Not on file   Occupational History    Occupation: Previously worked as a [employer]    Tobacco Use    Smoking status: Former Smoker     Packs/day: 1.00     Years: 46.00     Pack years: 46.00     Quit date: 2005     Years since quittin.5    Smokeless tobacco: Never Used   Vaping Use    Vaping Use: Unknown   Substance and Sexual Activity    Alcohol use: Not Currently    Drug use: No    Sexual activity: Not on file   Other Topics Concern    Not on file   Social History Narrative    Patient has 1 dog sleeps with her occasionally. Patient currently drinks 1 caffeine drink per day. Social Determinants of Health     Financial Resource Strain:     Difficulty of Paying Living Expenses: Not on file   Food Insecurity:     Worried About Running Out of Food in the Last Year: Not on file    [place] of Food in the Last Year: Not on file   Transportation Needs:     Lack of Transportation (Medical): Not on file    Lack of Transportation (Non-Medical):  Not on file   Physical Activity:     Days of Exercise per Week: Not on file    Minutes of Exercise per Session: Not on file   Stress:     Feeling of Stress : Not on file   Social Connections:     Frequency of Communication with Friends and Family: Not on file    Frequency of Social Gatherings with Friends and Family: Not on file    Attends Taoist Services: Not on file    Active Member of Clubs or Organizations: Not on file    Attends Club or Organization Meetings: Not on file    Marital Status: Not on file   Intimate Partner Violence:     Fear of Current or Ex-Partner: Not on file    Emotionally Abused: Not on file    Physically Abused: Not on file    Sexually Abused: Not on file   Housing Stability:     Unable to Pay for Housing in the Last Year: Not on file    Number of [place] in the Last Year: Not on file    Unstable Housing in the Last Year: Not on file        No flowsheet data found. Review of Systems  Noncontributary   Pertinent positives and negatives are addressed with the patient, particularly those related to musculoskeletal concerns. Non-orthopaedic concerns were referred back to the primary care physician. PE:    GEN: General appearance is that of a healthy patient, alert and oriented, in no distress. WDWN. HEENT:  Normocephalic, atraumatic. Extraocular muscles are intact. Neck:  Supple. Heart:  Regular pulse exam on all extremities. Good color and warmth noted. Lungs:  Normal non-labored breathing with no obvious shortness of breath. Abdomen:  WNL's. Skin:  No signs of rash or bruising. Pysch: Answers questions appropriately, AO X 3. MSK:  Cervical spine: No tenderness. Normal active motion. Negative Spurling's maneuver. SHOULDER   Left (Involved) Right   Skin Intact Intact   Radial Pulses 2+ Symmetrical 2+ Symmetrical   Deformity None Normal   Myotomes Normal Normal   Dermatomes  Normal Normal    ROM Full ROM Full   Strength Pain with abudction and external rotation 4+/5 abduction, 4+/5 external rotation 5/5 internal rotation No weakness   Atrophy None noted None noted   Effusion/Swelling  None noted None noted   Palpation TTP at the shoulder AC joint and biceps tendon No tenderness   Bicep Tendon Rupture  Negative Negative signs   Bear Hug, Belly Press Negative, negative Negative   Crossed Arm Adduction Test normal    Instability/Ant.  Apprehension Test None noted None noted   Impingement Positive  nomi Mendez, KARRIE Negative      X-rays: Grashey, axillary and outlet views of the Left shoulder that were obtained and reviewed today in the office, show a type 2 acromion. The humeral head is not high riding . No evidence of fracture, arthritis, dislocation or loose body. Moderate to severe degenerative changes of the A-C joint. Impression: Left shoulder AC DJD    Assessment:     ICD-10-CM    1. Left shoulder pain, unspecified chronicity  M25.512 XR SHOULDER LEFT (MIN 2 VIEWS)     MRI SHOULDER LEFT WO CONTRAST   2. Tear of left rotator cuff, unspecified tear extent, unspecified whether traumatic  M75.102 MRI SHOULDER LEFT WO CONTRAST       Plan: I had a long discussion with the patient regarding the natural history of the problem, as well as treatment options. I discussed with the patient possibility of rotator cuff pathology. We discussed I would like to start with conservative treatment. The patient is interested in MRI and having a discussion with Dr. Shorty Larson so I will proceed with ordering imaging. FU after to discuss definitive plans and management. Patient is of increased complexity due to age, increased BMI, GERD, hashimotos and fibromyositis. Treatment:     Given the chronicity and severity of the patient's symptoms, we will obtain an MRI. We will see them back after the scan and make a determination regarding further treatment. If there is a tear or other problem, they may require surgery. If negative, would recommend NSAID's, PT, or injection. They are amenable to this treatment plan. Return for MRI results with Dr. Shorty Larson. Thank you for the opportunity to see your patient. If you have any questions please give me a call.   Sincerely,  GALINA Thomas  06/27/22

## 2022-07-08 ENCOUNTER — OFFICE VISIT (OUTPATIENT)
Dept: ORTHOPEDIC SURGERY | Age: 77
End: 2022-07-08
Payer: MEDICARE

## 2022-07-08 DIAGNOSIS — M25.512 LEFT SHOULDER PAIN, UNSPECIFIED CHRONICITY: Primary | ICD-10-CM

## 2022-07-08 DIAGNOSIS — M75.102 TEAR OF LEFT ROTATOR CUFF, UNSPECIFIED TEAR EXTENT, UNSPECIFIED WHETHER TRAUMATIC: ICD-10-CM

## 2022-07-08 PROCEDURE — G8427 DOCREV CUR MEDS BY ELIG CLIN: HCPCS | Performed by: ORTHOPAEDIC SURGERY

## 2022-07-08 PROCEDURE — G8417 CALC BMI ABV UP PARAM F/U: HCPCS | Performed by: ORTHOPAEDIC SURGERY

## 2022-07-08 PROCEDURE — 1036F TOBACCO NON-USER: CPT | Performed by: ORTHOPAEDIC SURGERY

## 2022-07-08 PROCEDURE — 20610 DRAIN/INJ JOINT/BURSA W/O US: CPT | Performed by: ORTHOPAEDIC SURGERY

## 2022-07-08 PROCEDURE — G8400 PT W/DXA NO RESULTS DOC: HCPCS | Performed by: ORTHOPAEDIC SURGERY

## 2022-07-08 PROCEDURE — 1123F ACP DISCUSS/DSCN MKR DOCD: CPT | Performed by: ORTHOPAEDIC SURGERY

## 2022-07-08 PROCEDURE — 99024 POSTOP FOLLOW-UP VISIT: CPT | Performed by: ORTHOPAEDIC SURGERY

## 2022-07-08 PROCEDURE — 1090F PRES/ABSN URINE INCON ASSESS: CPT | Performed by: ORTHOPAEDIC SURGERY

## 2022-07-08 PROCEDURE — 3017F COLORECTAL CA SCREEN DOC REV: CPT | Performed by: ORTHOPAEDIC SURGERY

## 2022-07-08 RX ORDER — METHYLPREDNISOLONE ACETATE 40 MG/ML
80 INJECTION, SUSPENSION INTRA-ARTICULAR; INTRALESIONAL; INTRAMUSCULAR; SOFT TISSUE ONCE
Status: COMPLETED | OUTPATIENT
Start: 2022-07-08 | End: 2022-07-08

## 2022-07-08 RX ADMIN — METHYLPREDNISOLONE ACETATE 80 MG: 40 INJECTION, SUSPENSION INTRA-ARTICULAR; INTRALESIONAL; INTRAMUSCULAR; SOFT TISSUE at 08:32

## 2022-07-08 NOTE — PROGRESS NOTES
Reason for Call:  Medication or medication refill:    Do you use a [facility]?  Name of the pharmacy and phone number for the current request:  [facility]    Name of the medication requested: pt would like med sent in her sinus infection that she gets every year.  She states she get amoxicillin from Dr Bishop for this every year and declined appt.    Other request: please call pt and let her know if declined.    Can we leave a detailed message on this number? YES    Phone number patient can be reached at: Cell number on file:    Telephone Information:   Mobile [phone]       Best Time: any    Call taken on 11/22/2021 at 10:08 AM by Pam J. Behr     \      Name: Linda Melissa  YOB: 1945  Gender: female  MRN: [id number]    CC:   Chief Complaint   Patient presents with    Shoulder Pain     left shoulder MRI results   Left shoulder pain    HPI:  This patient presents as an MRI follow-up from my PA. Upon the patient's request, MRI and follow-up with Dr. Genene Cushing was set up. She has had 6 months of left shoulder pain without specific mechanism of injury. Pain is located lateral and superior. She declines popping, clicking, numbness and tingling. Pain does interfere with sleep. It has been worse over the last 6 weeks. She takes naproxen but she has worries about her colitis which she has had a couple of issues with. Allergies   Allergen Reactions    Fire Ant Anaphylaxis    Amoxicillin Other (See Comments)     C-Diff    Amoxicillin-Pot Clavulanate Other (See Comments)     c.diff    Cephalexin Other (See Comments)     C-Diff  C-Diff      Cephalosporins     Hydromorphone Other (See Comments)     Keeps her awake    Meperidine Hcl     Morphine Other (See Comments)     Awake  Awake  No sleep    Oxycodone     Oxycodone-Acetaminophen Other (See Comments)     Jittery  Nightmares    Oxycodone-Aspirin Other (See Comments)     Jittery    Penicillins Other (See Comments)     C-Diff      Povidone Iodine     Povidone-Iodine Other (See Comments)     Johnston  Johnston  burn    Adhesive Tape Itching and Rash     \"sticky ace wrap\"    Meperidine Rash     Past Medical History:   Diagnosis Date    Acute kidney insufficiency     Followed by PCP     Adverse effect of anesthesia     2016 in [place] heart stopped at the end of my gallbladder surgery. \" Patient reports no CPR performed, stayed one night in the hospital. No problems with any other surgeries.     Allergic rhinitis     Anemia     history     Anxiety and depression     Controlled with medication     Arthritis     Edema     bilateral lower legs     GERD (gastroesophageal reflux disease) Controlled with medication     History of chicken pox     History of Clostridioides difficile infection     History of DVT (deep vein thrombosis)     s/p knee replacement and developed right leg DVT     HTN (hypertension)     controlled with medication     Hyperlipidemia     controlled with medication     Hyperthyroidism     pt denies    Hypothyroidism     controlled with medication     IBS (irritable bowel syndrome)     Insomnia     Left flank pain 3/5/2020    Morbid obesity ([facility] [address]     BMI 42.85    Murmur     per PCP note (61/1/72) systolic murmur grade of 2/6    Overactive bladder     PAD (peripheral artery disease) ([facility] [address]     patient denies on 11/27/19    Recurrent UTI     RLS (restless legs syndrome)     controlled with medication     SOBOE (shortness of breath on exertion)     PRN inhaler-patient reports she uses inhaler QHS    Special examinations     History-Neurologist - Dr. 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     Past Surgical History:   Procedure Laterality Date    APPENDECTOMY  1955    CHOLECYSTECTOMY  12/2016    COLONOSCOPY      GYN  12/03/2019    suburethral sling and cystoscopy    HYSTERECTOMY, TOTAL ABDOMINAL (CERVIX REMOVED)      1970    IR GENERIC PROCEDURE      2014    KNEE SURGERY Left     4/2016    KNEE SURGERY Left 2014    ORTHOPEDIC SURGERY Left 01/2020    left hand tendon surgery    ORTHOPEDIC SURGERY Left     LEFT WRIST TRAPEZIOMETACARPAL ARTHROPLASTY WITH FCR TENDON TRANSFER- 2020    OTHER SURGICAL HISTORY Right 2016    Hand    OTHER SURGICAL HISTORY      2014    OTHER SURGICAL HISTORY      1986    OTHER SURGICAL HISTORY  12/03/2019    bladder surgery-put mesh in    SHOULDER ARTHROSCOPY      1966,    [facility] TOTAL KNEE ARTHROPLASTY Right 2014     Family History   Problem Relation Age of Onset    No Known Problems Mother     Other Father 54        brain aneurysm    Cerebral Aneurysm Father     Congenial Anomalies Sister         right arm deformity    Pneumonia Sister     Other Sister         fibromyalgia    Hypertension Brother     Heart Disease Brother     Other Brother         brain aneurysm, AVM    Heart Surgery Brother         CABGx4    Heart Attack Maternal Grandmother     Cancer Maternal Grandfather         skin    Heart Attack Paternal Grandfather     Mult Sclerosis Daughter     Drug Abuse Son     Other Son         Hepatitis C     Social History     Socioeconomic History    Marital status:      Spouse name: Cat Ponce Number of children: 2    Years of education: Not on file    Highest education level: Not on file   Occupational History    Occupation: Previously worked as a [employer]    Tobacco Use    Smoking status: Former Smoker     Packs/day: 1.00     Years: 46.00     Pack years: 46.00     Quit date: 2005     Years since quittin.5    Smokeless tobacco: Never Used   Vaping Use    Vaping Use: Unknown   Substance and Sexual Activity    Alcohol use: Not Currently    Drug use: No    Sexual activity: Not on file   Other Topics Concern    Not on file   Social History Narrative    Patient has 1 dog sleeps with her occasionally. Patient currently drinks 1 caffeine drink per day. Social Determinants of Health     Financial Resource Strain:     Difficulty of Paying Living Expenses: Not on file   Food Insecurity:     Worried About Running Out of Food in the Last Year: Not on file    [place] of Food in the Last Year: Not on file   Transportation Needs:     Lack of Transportation (Medical): Not on file    Lack of Transportation (Non-Medical):  Not on file   Physical Activity:     Days of Exercise per Week: Not on file    Minutes of Exercise per Session: Not on file   Stress:     Feeling of Stress : Not on file   Social Connections:     Frequency of Communication with Friends and Family: Not on file    Frequency of Social Gatherings with Friends and Family: Not on file    Attends Hindu Services: Not on file    Active Member of Clubs or Organizations: Not on file    Attends Club or Organization Meetings: Not on file    Marital Status: Not on file   Intimate Partner Violence:     Fear of Current or Ex-Partner: Not on file    Emotionally Abused: Not on file    Physically Abused: Not on file    Sexually Abused: Not on file   Housing Stability:     Unable to Pay for Housing in the Last Year: Not on file    Number of [place] in the Last Year: Not on file    Unstable Housing in the Last Year: Not on file        No flowsheet data found. Review of Systems  History of heart disease, thyroid disorder, obstructive sleep apnea, colitis  Pertinent positives and negatives are addressed with the patient, particularly those related to musculoskeletal concerns. Non-orthopaedic concerns were referred back to the primary care physician. PE:    GEN: General appearance is that of a healthy patient, alert and oriented, in no distress. WDWN. HEENT:  Normocephalic, atraumatic. Extraocular muscles are intact. Neck:  Supple. Heart:  Regular pulse exam on all extremities. Good color and warmth noted. Lungs:  Normal non-labored breathing with no obvious shortness of breath. Abdomen:  WNL's. Skin:  No signs of rash or bruising. Pysch: Answers questions appropriately, AO X 3. MSK:  Cervical spine: No tenderness. Normal active motion. Negative Spurling's maneuver.      SHOULDER   Left (Involved) Right   Skin Intact Intact   Radial Pulses 2+ Symmetrical 2+ Symmetrical   Deformity None Normal   Myotomes Normal Normal   Dermatomes  Normal Normal    ROM  mildly limited at extremes Full   Strength  no significant weakness No weakness   Atrophy None noted None noted   Effusion/Swelling  None noted None noted   Palpation Minimally TTP at the shoulder negative speeds and [place]'s No tenderness   Bicep Tendon Rupture  Negative see above Negative signs   Bear Hug, Belly Press Negative, negative Negative   Crossed Arm Adduction Test normal    Instability/Ant. Apprehension Test None noted None noted   Impingement Positive  nomi Mendez, KARRIE Negative      MRI left shoulder performed 7/5/22:    Narrative   MRI OF THE LEFT SHOULDER WITHOUT CONTRAST.       Clinical Indication: Left shoulder pain with limited range of motion       Procedure: Multiplanar and multisequence MR images of the left shoulder were   performed without gadolinium contrast.       Comparison: No prior       Findings:        AC joint: Mild degenerative hypertrophy is noted of the AC joint. There is trace   fluid signal the subacromial/subdeltoid bursa.       Rotator cuff: The supraspinatus, infraspinatus, teres minor, and subscapularis   tendons are intact. No rotator cuff tendon tear evident.       Biceps labral complex: The long head of biceps tendon is intact. The superior   labrum is intact. The joint capsule in axillary recess is intact.       Glenohumeral joint: The marrow signal in the humeral head and glenoid is   unremarkable. No definite anterior or posterior labral tear present. There is   mild degenerative chondral fissuring along the central glenoid. A small joint   effusion with synovitis is present.       No abnormal soft tissue mass or fatty atrophy.           Impression   1. 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 Small glenohumeral joint effusion with synovitis. 3. The rotator cuff tendon is intact. 4. Mild degenerative hypertrophy the [facility] joint with underlying   subacromial/subdeltoid bursitis.               MRI reviewed, AC OA noticed. No RTC tear. Synovitis noted in the axillary space on coronal image 10. Assessment:     ICD-10-CM    1. Left shoulder pain, unspecified chronicity  M25.512    2.  Tear of left rotator cuff, unspecified tear extent, unspecified whether traumatic  M75.102        Plan: I had a long discussion with the patient regarding the natural history of the problem, as well as treatment options. I discussed that she appears to be fairly inflamed. I would suggest a form of cortisone injection, topical NSAID secondary to her colitis history and physical therapy to see if we can get this to resolve. Treatment:     Recommend therapy to evaluate and treat current complaints and pathology. A home exercise program was also discussed with the patient. Patient prescribed with Non-steroidal anti-inflammatories after review of risks and benefits. We discussed additional activity modification to help reduce pain for initial conservative treatment. Topical NSAID  Procedure note: After discussion of risks and benefits including, but not limited to pain, infection, steroid flare, increased blood sugar, fat necrosis, skin discoloration, and injury to blood vessels or nerves, the patient verbally consented to proceed with a subacromial injection. They understand that we are proceeding with this in a way to avoid proceeding with more definitive major surgical options. The affected left shoulder was sterilely prepped in standard fashion and injected with 2 cc of Depo-Medrol (40mg/ml), 2 cc of 1% Lidocaine, and 2 cc of 0.5% Marcaine into the subacromial space. The patient tolerated the injection well. Return in about 6 weeks (around 8/19/2022). Thank you for the opportunity to see your patient. If you have any questions please give me a call.   Sincerely,  Pam Lora MD  07/08/22

## 2022-07-08 NOTE — PATIENT INSTRUCTIONS

## 2022-07-11 RX ORDER — LISINOPRIL 20 MG/1
TABLET ORAL
Qty: 90 TABLET | Refills: 1 | Status: SHIPPED | OUTPATIENT
Start: 2022-07-11 | End: 2022-10-17 | Stop reason: DRUGHIGH

## 2022-07-18 DIAGNOSIS — F41.1 GAD (GENERALIZED ANXIETY DISORDER): ICD-10-CM

## 2022-07-18 RX ORDER — CLONAZEPAM 1 MG/1
1 TABLET ORAL 2 TIMES DAILY PRN
Qty: 60 TABLET | Refills: 0 | Status: CANCELLED | OUTPATIENT
Start: 2022-07-18 | End: 2022-08-17

## 2022-07-18 NOTE — TELEPHONE ENCOUNTER
----- Message from Lawrence Phillips sent at 7/15/2022 12:01 PM EDT -----  Subject: Refill Request    QUESTIONS  Name of Medication? clonazePAM (KLONOPIN) 1 MG tablet  Patient-reported dosage and instructions? 1mg  How many days do you have left? 0  Preferred Pharmacy? [facility] [address] phone number (if available)? [phone]  ---------------------------------------------------------------------------  --------------  [place] INFO  What is the best way for the office to contact you? OK to leave message on   voicemail  Preferred Call Back Phone Number? [phone]  ---------------------------------------------------------------------------  --------------  SCRIPT ANSWERS  Relationship to Patient?  Self

## 2022-07-19 NOTE — TELEPHONE ENCOUNTER
Last refill was yesterday. Clonazepam Refill denied. A voicemail was left for by psych office to schedule appointment. She needs to call for appointment.

## 2022-07-22 ENCOUNTER — OFFICE VISIT (OUTPATIENT)
Dept: INTERNAL MEDICINE CLINIC | Facility: CLINIC | Age: 77
End: 2022-07-22
Payer: MEDICARE

## 2022-07-22 VITALS
DIASTOLIC BLOOD PRESSURE: 72 MMHG | WEIGHT: 187 LBS | BODY MASS INDEX: 39.25 KG/M2 | HEIGHT: 58 IN | SYSTOLIC BLOOD PRESSURE: 144 MMHG

## 2022-07-22 DIAGNOSIS — F41.1 GAD (GENERALIZED ANXIETY DISORDER): ICD-10-CM

## 2022-07-22 DIAGNOSIS — E78.2 MIXED HYPERLIPIDEMIA: Chronic | ICD-10-CM

## 2022-07-22 DIAGNOSIS — E03.9 HYPOTHYROIDISM (ACQUIRED): Chronic | ICD-10-CM

## 2022-07-22 DIAGNOSIS — E55.9 VITAMIN D DEFICIENCY: ICD-10-CM

## 2022-07-22 DIAGNOSIS — E53.8 VITAMIN B12 DEFICIENCY: ICD-10-CM

## 2022-07-22 DIAGNOSIS — I10 PRIMARY HYPERTENSION: Primary | Chronic | ICD-10-CM

## 2022-07-22 PROCEDURE — 1036F TOBACCO NON-USER: CPT | Performed by: NURSE PRACTITIONER

## 2022-07-22 PROCEDURE — 1090F PRES/ABSN URINE INCON ASSESS: CPT | Performed by: NURSE PRACTITIONER

## 2022-07-22 PROCEDURE — 3017F COLORECTAL CA SCREEN DOC REV: CPT | Performed by: NURSE PRACTITIONER

## 2022-07-22 PROCEDURE — G8417 CALC BMI ABV UP PARAM F/U: HCPCS | Performed by: NURSE PRACTITIONER

## 2022-07-22 PROCEDURE — G8400 PT W/DXA NO RESULTS DOC: HCPCS | Performed by: NURSE PRACTITIONER

## 2022-07-22 PROCEDURE — 99214 OFFICE O/P EST MOD 30 MIN: CPT | Performed by: NURSE PRACTITIONER

## 2022-07-22 PROCEDURE — G8427 DOCREV CUR MEDS BY ELIG CLIN: HCPCS | Performed by: NURSE PRACTITIONER

## 2022-07-22 PROCEDURE — 1123F ACP DISCUSS/DSCN MKR DOCD: CPT | Performed by: NURSE PRACTITIONER

## 2022-07-22 RX ORDER — ERGOCALCIFEROL 1.25 MG/1
50000 CAPSULE ORAL
Qty: 6 CAPSULE | Refills: 0 | Status: SHIPPED | OUTPATIENT
Start: 2022-07-22

## 2022-07-22 NOTE — PROGRESS NOTES
PROGRESS NOTE    SUBJECTIVE:   Benedict Chery is a 77 y.o. female seen for a follow up visit for   Chief Complaint   Patient presents with    Hypertension     Pt here for 3 month f/u     Numbness     Pt c/o numbness past 3-4 weeks to lips. Finger tips and toes     Results     Labs were last done 6/9/22     HPI  (Did not have lab drawn)  Thyroid Problem  The history is provided by the patient. This is a chronic problem. Episode onset: years ago. The problem occurs constantly. The problem has not changed since onset. Associated symptoms include abdominal pain. Pertinent negatives include no chest pain, no headaches and no shortness of breath. Nothing aggravates the symptoms. The symptoms are relieved by medications. Treatments tried: levothyroxine. Cholesterol Problem  The history is provided by the patient and medical records. This is a chronic problem. The problem occurs constantly. The problem has not changed since onset. Associated symptoms include abdominal pain. Pertinent negatives include no chest pain, no headaches and no shortness of breath. Hypertension   The history is provided by the patient. This is a chronic problem. Episode onset: 2017. The problem has not changed since onset. Associated symptoms include malaise/fatigue, neck pain and dizziness. Pertinent negatives include no chest pain, no palpitations, no blurred vision, no headaches, no shortness of breath, no nausea and no vomiting. There are no associated agents to hypertension. Risk factors include family history, dyslipidemia, a sedentary lifestyle, obesity, hypertension and postmenopause     Reviewed and updated this visit by provider:  Tobacco  Allergies  Meds  Problems  Med Hx  Surg Hx  Fam Hx         Review of Systems   Constitutional:  Positive for fatigue (chronic). Negative for chills and fever. HENT:  Negative for sneezing. Respiratory:  Negative for cough, chest tightness, shortness of breath and wheezing. Cardiovascular:  Negative for chest pain, palpitations and leg swelling. Gastrointestinal:  Negative for abdominal pain. Genitourinary:  Positive for frequency (chronic). Musculoskeletal:  Positive for arthralgias (knee) and gait problem (recent TKA). Negative for back pain. Skin:  Negative for color change. Allergic/Immunologic: Negative for environmental allergies. Neurological:  Positive for dizziness (chronic, intermittent). Negative for weakness and headaches. Hematological:  Does not bruise/bleed easily. Psychiatric/Behavioral:  The patient is not nervous/anxious. OBJECTIVE:    BP (!) 144/72   Ht 4' 10\" (1.473 m)   Wt 187 lb (84.8 kg)   BMI 39.08 kg/m²      Physical Exam  Vitals and nursing note reviewed. Constitutional:       Appearance: Normal appearance. She is obese. HENT:      Head: Normocephalic. Mouth/Throat:      Lips: Pink. Mouth: Mucous membranes are moist.   Eyes:      General: Lids are normal.   Neck:      Vascular: No carotid bruit. Cardiovascular:      Rate and Rhythm: Normal rate and regular rhythm. Pulmonary:      Effort: Pulmonary effort is normal.      Breath sounds: Normal breath sounds. Musculoskeletal:      Cervical back: Neck supple. Right lower leg: No edema. Left lower leg: No edema. Skin:     General: Skin is warm and dry. Neurological:      Mental Status: She is alert and oriented to person, place, and time. Mental status is at baseline. Gait: Gait normal.   Psychiatric:         Mood and Affect: Mood normal.         Behavior: Behavior normal.        ASSESSMENT and PLAN    1. Primary hypertension  Assessment & Plan:   Borderline controlled, continue current medications, medication adherence emphasized and lifestyle modifications recommended  discusssed plan to monitor BP at home. Bring record and home device at next visit in about 2 months.     Orders:  -     CBC with Auto Differential; Future  -     Comprehensive Metabolic Panel; Future  -     Uric Acid; Future  2. Mixed hyperlipidemia  Assessment & Plan:   No recent lipid panel. Repeat prior to follow-up in 2 months. Orders:  -     Comprehensive Metabolic Panel; Future  -     Lipid Panel; Future  3. Vitamin D deficiency  -     vitamin D (ERGOCALCIFEROL) 1.25 MG (38374 UT) CAPS capsule; Take 1 capsule by mouth every 14 days, Disp-6 capsule, R-0Normal  4. Vitamin B12 deficiency  -     cyanocobalamin injection 1,000 mcg; 1,000 mcg, IntraMUSCular, ONCE, 1 dose, On u 7/28/22 at 0900  5. URBANO (generalized anxiety disorder)  Assessment & Plan:   Persistent anxiety, multiple contributing factors. Has been on long-term clonazepam.  Recommend referral to psych. Recommend counseling and adjunct medications through psych. Orders:  -     [address]  6. Hypothyroidism (acquired)  Assessment & Plan:   Monitored by specialist- no acute findings meriting change in the plan    Return in about 2 months (around 9/22/2022) for Follow-Up, HTN, HLD, with labs. the following changes in treatment are made: as above  lab results and schedule of future lab studies reviewed with patient  reviewed diet, exercise and weight control  cardiovascular risk and specific lipid/LDL goals reviewed  reviewed medications and side effects in detail        JEAN Singleton - BRANDIE    Dictated using voice recognition software.  Proofread, but unrecognized voice recognition errors may exist.

## 2022-07-28 PROCEDURE — 96372 THER/PROPH/DIAG INJ SC/IM: CPT | Performed by: NURSE PRACTITIONER

## 2022-07-28 RX ORDER — CYANOCOBALAMIN 1000 UG/ML
1000 INJECTION INTRAMUSCULAR; SUBCUTANEOUS ONCE
Status: COMPLETED | OUTPATIENT
Start: 2022-07-28 | End: 2022-07-28

## 2022-07-28 RX ADMIN — CYANOCOBALAMIN 1000 MCG: 1000 INJECTION INTRAMUSCULAR; SUBCUTANEOUS at 08:43

## 2022-08-09 ENCOUNTER — TELEPHONE (OUTPATIENT)
Dept: INTERNAL MEDICINE CLINIC | Facility: CLINIC | Age: 77
End: 2022-08-09

## 2022-08-09 DIAGNOSIS — E53.8 VITAMIN B12 DEFICIENCY: Primary | ICD-10-CM

## 2022-08-09 RX ORDER — CYANOCOBALAMIN 1000 UG/ML
1000 INJECTION INTRAMUSCULAR; SUBCUTANEOUS ONCE
Status: CANCELLED | OUTPATIENT
Start: 2022-08-09 | End: 2022-08-09

## 2022-08-09 NOTE — TELEPHONE ENCOUNTER
----- Message from Stas Oneyda sent at 8/9/2022  2:10 PM EDT -----  Subject: Message to Provider    QUESTIONS  Information for Provider? Saundra Galvan is calling in to make a nurse appt for a   Vitamin B12 shot. Please return her call to schedule.  ---------------------------------------------------------------------------  --------------  Priya BRAXTON  [phone]; OK to leave message on voicemail  ---------------------------------------------------------------------------  --------------  SCRIPT ANSWERS  Relationship to Patient?  Self

## 2022-08-09 NOTE — TELEPHONE ENCOUNTER
----- Message from Jayjay Rodriguez sent at 8/8/2022  4:16 PM EDT -----  Subject: Message to Provider    QUESTIONS  Information for Provider? Patient called in stating that her insurance   informed her that Eric Chirinos no longer accepts her [employer]   insurance and patient wants to know if this is true or not. Patient also   was informed at her last visit that she would be scheduled for B12 shots   every other week and nothing has been scheduled yet. Please contact   patient at your earliest convenience to explain everything to patient. Thanks!  ---------------------------------------------------------------------------  --------------  Collin BRAXTON  [phone]; OK to leave message on voicemail  ---------------------------------------------------------------------------  --------------  SCRIPT ANSWERS  Relationship to Patient?  Self

## 2022-08-10 ENCOUNTER — OFFICE VISIT (OUTPATIENT)
Dept: ORTHOPEDIC SURGERY | Age: 77
End: 2022-08-10
Payer: MEDICARE

## 2022-08-10 DIAGNOSIS — Z96.652 HISTORY OF TOTAL KNEE ARTHROPLASTY, LEFT: Primary | ICD-10-CM

## 2022-08-10 DIAGNOSIS — Z09 FOLLOW-UP EXAMINATION: ICD-10-CM

## 2022-08-10 PROCEDURE — G8427 DOCREV CUR MEDS BY ELIG CLIN: HCPCS | Performed by: ORTHOPAEDIC SURGERY

## 2022-08-10 PROCEDURE — 1123F ACP DISCUSS/DSCN MKR DOCD: CPT | Performed by: ORTHOPAEDIC SURGERY

## 2022-08-10 PROCEDURE — 1090F PRES/ABSN URINE INCON ASSESS: CPT | Performed by: ORTHOPAEDIC SURGERY

## 2022-08-10 PROCEDURE — 1036F TOBACCO NON-USER: CPT | Performed by: ORTHOPAEDIC SURGERY

## 2022-08-10 PROCEDURE — 99213 OFFICE O/P EST LOW 20 MIN: CPT | Performed by: ORTHOPAEDIC SURGERY

## 2022-08-10 PROCEDURE — G8400 PT W/DXA NO RESULTS DOC: HCPCS | Performed by: ORTHOPAEDIC SURGERY

## 2022-08-10 PROCEDURE — G8417 CALC BMI ABV UP PARAM F/U: HCPCS | Performed by: ORTHOPAEDIC SURGERY

## 2022-08-10 NOTE — PROGRESS NOTES
Name: Quincy Gagnon  YOB: 1945  Gender: female  MRN: [id number]    Post-Op LEFT TKA: 6 months    HPI: This patient returns now six months out from surgery. The patient is happy with their return to function. They are experiencing minimal discomfort. They have weaned away from the cane. Interval medical change is noted on the patient history form which is updated today. Past Medical History: Allergies:   Allergies   Allergen Reactions    Fire Ant Anaphylaxis    Amoxicillin Other (See Comments)     C-Diff    Amoxicillin-Pot Clavulanate Other (See Comments)     c.diff    Cephalexin Other (See Comments)     C-Diff  C-Diff      Cephalosporins     Hydromorphone Other (See Comments)     Keeps her awake    Meperidine Hcl     Morphine Other (See Comments)     Awake  Awake  No sleep    Oxycodone     Oxycodone-Acetaminophen Other (See Comments)     Jittery  Nightmares    Oxycodone-Aspirin Other (See Comments)     Jittery    Penicillins Other (See Comments)     C-Diff      Povidone Iodine     Povidone-Iodine Other (See Comments)     Johnston  Johnston  burn    Adhesive Tape Itching and Rash     \"sticky ace wrap\"    Meperidine Rash       Medications:  Current Outpatient Medications   Medication Sig    vitamin D (ERGOCALCIFEROL) 1.25 MG (36580 UT) CAPS capsule Take 1 capsule by mouth every 14 days    lisinopril (PRINIVIL;ZESTRIL) 20 MG tablet TAKE 1 TABLET BY MOUTH ONCE DAILY FOR HIGH BLOOD PRESSURE (Patient taking differently: Take 20 mg by mouth in the morning.)    diclofenac sodium (VOLTAREN) 1 % GEL Apply 2 g topically 4 times daily as needed for Pain Apply 2g to affected area 4 times daily as needed for pain    hydrOXYzine HCl (ATARAX) 10 MG tablet hydroxyzine HCl 10 mg tablet   TAKE 1 TABLET BY MOUTH EVERY 8 HOURS AS NEEDED FOR ITCHING OR ALLERGIES    lisinopril-hydroCHLOROthiazide (PRINZIDE;ZESTORETIC) 10-12.5 MG per tablet lisinopril 10 mg-hydrochlorothiazide 12.5 mg tablet   TAKE 1 TABLET BY MOUTH ONCE DAILY (Patient not taking: Reported on 7/22/2022)    mupirocin (BACTROBAN) 2 % ointment Apply topically 2 times daily    clonazePAM (KLONOPIN) 1 MG tablet Take 1 tablet by mouth 2 times daily as needed for Anxiety for up to 30 days. acetaminophen (TYLENOL) 325 MG tablet Take 325 mg by mouth every 4 hours as needed    albuterol sulfate  (90 Base) MCG/ACT inhaler Inhale 1-2 puffs into the lungs every 6 hours as needed    amitriptyline (ELAVIL) 25 MG tablet Take 25 mg by mouth nightly    clotrimazole-betamethasone (LOTRISONE) 1-0.05 % cream Apply topically 2 times daily    colestipol (COLESTID) 1 g tablet Take 1 g by mouth 2 times daily    escitalopram (LEXAPRO) 20 MG tablet Take 30 mg by mouth daily    fluticasone (FLONASE) 50 MCG/ACT nasal spray 2 sprays by Nasal route daily as needed    hyoscyamine (OSCIMIN) 125 MCG TBDP dispersible tablet Take 0.125 mg by mouth every 4 hours as needed    levothyroxine (SYNTHROID) 150 MCG tablet Take 150 mcg by mouth every morning (before breakfast)    omeprazole (PRILOSEC) 40 MG delayed release capsule Take 40 mg by mouth daily    ondansetron (ZOFRAN) 4 MG tablet Take 4 mg by mouth every 8 hours as needed    pramipexole (MIRAPEX) 0.5 MG tablet Take 0.5 mg by mouth at bedtime     No current facility-administered medications for this visit. Past Medical history:  Past Medical History:   Diagnosis Date    Acute kidney insufficiency     Followed by PCP     Adverse effect of anesthesia     2016 in [facility] at the end of my gallbladder surgery. \" Patient reports no CPR performed, stayed one night in the hospital. No problems with any other surgeries.     Allergic rhinitis     Anemia     history     Anxiety and depression     Controlled with medication     Arthritis     Edema     bilateral lower legs     GERD (gastroesophageal reflux disease)     Controlled with medication     History of chicken pox     History of Clostridioides difficile infection History of DVT (deep vein thrombosis)     s/p knee replacement and developed right leg DVT     HTN (hypertension)     controlled with medication     Hyperlipidemia     controlled with medication     Hyperthyroidism     pt denies    Hypothyroidism     controlled with medication     IBS (irritable bowel syndrome)     Insomnia     Left flank pain 3/5/2020    Morbid obesity ([facility] [address]     BMI 42.85    Murmur     per PCP note (11/0/59) systolic murmur grade of 2/6    Overactive bladder     PAD (peripheral artery disease) ([facility] [address]     patient denies on 11/27/19    Recurrent UTI     RLS (restless legs syndrome)     controlled with medication     SOBOE (shortness of breath on exertion)     PRN inhaler-patient reports she uses inhaler QHS    Special examinations     History-Neurologist - Dr. 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 Family History:  [unfilled]     Social History:  [unfilled]    Review of Systems:       PE: The patient is ambulating with reciprocal heel toe gait with no limp. Incision looks quite good with no evidence for infection. Leg lengths appear to be approximately equal.      RADIOGRAPHS:  No radiographs are obtained today. IMPRESSION AND PLAN:  The patient is doing well postop.  I advised them to continue with their home exercise program, including strengthening and stretching exercises. They were advised on the use of Tylenol or NSAIDs if they have acute pain or swelling. Recommended the patient to FU in six months for a one year appointment with repeat radiographs of the operative side at that time. They will call if there are any issues in the meantime.      Work/Activity Restrictions: NO RESTRICTIONS    GALINA Rodriguez

## 2022-08-14 PROBLEM — E78.2 MIXED HYPERLIPIDEMIA: Chronic | Status: ACTIVE | Noted: 2017-12-06

## 2022-08-14 PROBLEM — M51.369 DDD (DEGENERATIVE DISC DISEASE), LUMBAR: Chronic | Status: ACTIVE | Noted: 2020-08-13

## 2022-08-14 PROBLEM — R65.20 SEPSIS WITH ACUTE RENAL FAILURE AND TUBULAR NECROSIS WITHOUT SEPTIC SHOCK (HCC): Status: RESOLVED | Noted: 2022-05-09 | Resolved: 2022-08-14

## 2022-08-14 PROBLEM — E03.9 HYPOTHYROIDISM (ACQUIRED): Chronic | Status: ACTIVE | Noted: 2017-12-06

## 2022-08-14 PROBLEM — M51.36 DDD (DEGENERATIVE DISC DISEASE), LUMBAR: Chronic | Status: ACTIVE | Noted: 2020-08-13

## 2022-08-14 PROBLEM — E78.2 MIXED HYPERLIPIDEMIA: Status: ACTIVE | Noted: 2017-12-06

## 2022-08-14 PROBLEM — F41.8 DEPRESSION WITH ANXIETY: Chronic | Status: ACTIVE | Noted: 2017-12-06

## 2022-08-14 PROBLEM — F41.1 GAD (GENERALIZED ANXIETY DISORDER): Chronic | Status: ACTIVE | Noted: 2018-10-09

## 2022-08-14 PROBLEM — E78.5 HLD (HYPERLIPIDEMIA): Chronic | Status: ACTIVE | Noted: 2017-12-06

## 2022-08-14 PROBLEM — K21.9 GERD (GASTROESOPHAGEAL REFLUX DISEASE): Chronic | Status: ACTIVE | Noted: 2018-10-09

## 2022-08-14 PROBLEM — N17.0 SEPSIS WITH ACUTE RENAL FAILURE AND TUBULAR NECROSIS WITHOUT SEPTIC SHOCK (HCC): Status: RESOLVED | Noted: 2022-05-09 | Resolved: 2022-08-14

## 2022-08-14 PROBLEM — A41.9 SEPSIS WITH ACUTE RENAL FAILURE AND TUBULAR NECROSIS WITHOUT SEPTIC SHOCK (HCC): Status: RESOLVED | Noted: 2022-05-09 | Resolved: 2022-08-14

## 2022-08-14 PROBLEM — G25.81 RLS (RESTLESS LEGS SYNDROME): Chronic | Status: ACTIVE | Noted: 2017-12-06

## 2022-08-14 PROBLEM — D50.9 ANEMIA, IRON DEFICIENCY: Chronic | Status: ACTIVE | Noted: 2017-12-06

## 2022-08-14 PROBLEM — N32.81 OAB (OVERACTIVE BLADDER): Chronic | Status: ACTIVE | Noted: 2018-11-20

## 2022-08-14 PROBLEM — E53.8 VITAMIN B12 DEFICIENCY: Chronic | Status: ACTIVE | Noted: 2020-09-14

## 2022-08-14 ASSESSMENT — ENCOUNTER SYMPTOMS
BACK PAIN: 0
COUGH: 0
WHEEZING: 0
SHORTNESS OF BREATH: 0
CHEST TIGHTNESS: 0
COLOR CHANGE: 0
ABDOMINAL PAIN: 0

## 2022-08-14 NOTE — ASSESSMENT & PLAN NOTE
Borderline controlled, continue current medications, medication adherence emphasized and lifestyle modifications recommended  discusssed plan to monitor BP at home. Bring record and home device at next visit in about 2 months.

## 2022-08-14 NOTE — ASSESSMENT & PLAN NOTE
Persistent anxiety, multiple contributing factors. Has been on long-term clonazepam.  Recommend referral to psych. Recommend counseling and adjunct medications through psych.

## 2022-08-23 ENCOUNTER — NURSE ONLY (OUTPATIENT)
Dept: INTERNAL MEDICINE CLINIC | Facility: CLINIC | Age: 77
End: 2022-08-23
Payer: MEDICARE

## 2022-08-23 DIAGNOSIS — E53.8 VITAMIN B12 DEFICIENCY: Primary | ICD-10-CM

## 2022-08-23 PROCEDURE — 96372 THER/PROPH/DIAG INJ SC/IM: CPT | Performed by: INTERNAL MEDICINE

## 2022-08-23 RX ORDER — CYANOCOBALAMIN 1000 UG/ML
1000 INJECTION INTRAMUSCULAR; SUBCUTANEOUS ONCE
Status: COMPLETED | OUTPATIENT
Start: 2022-08-23 | End: 2022-08-23

## 2022-08-23 RX ADMIN — CYANOCOBALAMIN 1000 MCG: 1000 INJECTION INTRAMUSCULAR; SUBCUTANEOUS at 08:52

## 2022-08-26 ENCOUNTER — OFFICE VISIT (OUTPATIENT)
Dept: ORTHOPEDIC SURGERY | Age: 77
End: 2022-08-26
Payer: MEDICARE

## 2022-08-26 VITALS — BODY MASS INDEX: 35.53 KG/M2 | WEIGHT: 170 LBS

## 2022-08-26 DIAGNOSIS — M25.512 LEFT SHOULDER PAIN, UNSPECIFIED CHRONICITY: Primary | ICD-10-CM

## 2022-08-26 DIAGNOSIS — M75.102 TEAR OF LEFT ROTATOR CUFF, UNSPECIFIED TEAR EXTENT, UNSPECIFIED WHETHER TRAUMATIC: ICD-10-CM

## 2022-08-26 DIAGNOSIS — M67.922 TENDINOPATHY OF LEFT BICEPS: ICD-10-CM

## 2022-08-26 PROBLEM — R47.01 EXPRESSIVE APHASIA: Status: ACTIVE | Noted: 2022-03-24

## 2022-08-26 PROCEDURE — 20610 DRAIN/INJ JOINT/BURSA W/O US: CPT | Performed by: ORTHOPAEDIC SURGERY

## 2022-08-26 RX ORDER — HYDROCODONE BITARTRATE AND ACETAMINOPHEN 7.5; 325 MG/1; MG/1
TABLET ORAL
COMMUNITY
Start: 2022-08-03 | End: 2022-10-27

## 2022-08-26 RX ORDER — TRIAMCINOLONE ACETONIDE 1 MG/G
CREAM TOPICAL 2 TIMES DAILY
COMMUNITY
Start: 2022-08-19 | End: 2022-10-17

## 2022-08-26 RX ORDER — DOXYCYCLINE HYCLATE 100 MG/1
CAPSULE ORAL
COMMUNITY
Start: 2022-08-03 | End: 2022-10-17 | Stop reason: ALTCHOICE

## 2022-08-26 RX ORDER — TRIAMCINOLONE ACETONIDE 1 MG/G
CREAM TOPICAL
COMMUNITY
Start: 2022-08-20 | End: 2022-10-17

## 2022-08-26 RX ORDER — HYDROCODONE BITARTRATE AND ACETAMINOPHEN 7.5; 325 MG/1; MG/1
1 TABLET ORAL EVERY 4 HOURS PRN
COMMUNITY
Start: 2022-08-03 | End: 2022-10-17 | Stop reason: SDUPTHER

## 2022-08-26 RX ORDER — NITROFURANTOIN 25; 75 MG/1; MG/1
CAPSULE ORAL
COMMUNITY
Start: 2022-08-19 | End: 2022-10-17 | Stop reason: ALTCHOICE

## 2022-08-26 RX ORDER — TRIAMCINOLONE ACETONIDE 40 MG/ML
80 INJECTION, SUSPENSION INTRA-ARTICULAR; INTRAMUSCULAR ONCE
Status: COMPLETED | OUTPATIENT
Start: 2022-08-26 | End: 2022-08-26

## 2022-08-26 RX ORDER — SULFAMETHOXAZOLE AND TRIMETHOPRIM 800; 160 MG/1; MG/1
TABLET ORAL
COMMUNITY
Start: 2022-08-25 | End: 2022-10-17 | Stop reason: ALTCHOICE

## 2022-08-26 RX ORDER — SULFAMETHOXAZOLE AND TRIMETHOPRIM 800; 160 MG/1; MG/1
TABLET ORAL 2 TIMES DAILY
COMMUNITY
Start: 2022-08-25 | End: 2022-08-28

## 2022-08-26 RX ORDER — PHENAZOPYRIDINE HYDROCHLORIDE 200 MG/1
200 TABLET, FILM COATED ORAL 3 TIMES DAILY
COMMUNITY
Start: 2022-08-25 | End: 2022-10-17

## 2022-08-26 RX ADMIN — TRIAMCINOLONE ACETONIDE 80 MG: 40 INJECTION, SUSPENSION INTRA-ARTICULAR; INTRAMUSCULAR at 08:51

## 2022-08-26 NOTE — PROGRESS NOTES
Name: Cathleen Dowd  YOB: 1945  Gender: female  MRN: [id number]    CC:   Chief Complaint   Patient presents with    Follow-up     Recheck left shoulder        HPI: Patient presents for FU of the left shoulder. Patient had a subacromial injection on 7/9/22 and states minor relief with injection. Recall she has had six months of shoulder pain without specific BERNADINE. Patient has also been struggling with some colitis issues. Today the patient notes pain that goes into the biceps. She does note some tingling in the ring finger otherwise denies. Denies popping, clicking. Notes that she does not feel like the injection \"for quite helpful spot. She is sore superiorly as well as in the biceps. She also has some questions regarding her neck. She has had previous neck surgery that did present of right-sided shoulder pain. Allergies   Allergen Reactions    Fire Ant Anaphylaxis    Amoxicillin Other (See Comments)     C-Diff    Amoxicillin-Pot Clavulanate Other (See Comments)     c.diff    Cephalexin Other (See Comments)     C-Diff  C-Diff      Cephalosporins     Hydromorphone Other (See Comments)     Keeps her awake    Meperidine Hcl     Morphine Other (See Comments)     Awake  Awake  No sleep    Oxycodone     Oxycodone-Acetaminophen Other (See Comments)     Jittery  Nightmares    Oxycodone-Aspirin Other (See Comments)     Jittery  Other reaction(s): Unknown-Unspecified  Jittery    Penicillins Other (See Comments)     C-Diff      Povidone Iodine     Povidone-Iodine Other (See Comments)     Johnston  Johnston  burn    Adhesive Tape Itching and Rash     \"sticky ace wrap\"  \"sticky ace wrap\"    Meperidine Rash     Past Medical History:   Diagnosis Date    Acute kidney insufficiency     Followed by PCP     Adverse effect of anesthesia     2016 in [place] heart stopped at the end of my gallbladder surgery. \" Patient reports no CPR performed, stayed one night in the hospital. No problems with any other surgeries.     Allergic rhinitis     Anemia     history     Anxiety and depression     Controlled with medication     Arthritis     Edema     bilateral lower legs     GERD (gastroesophageal reflux disease)     Controlled with medication     History of chicken pox     History of Clostridioides difficile infection     History of DVT (deep vein thrombosis)     s/p knee replacement and developed right leg DVT     HTN (hypertension)     controlled with medication     Hyperlipidemia     controlled with medication     Hyperthyroidism     pt denies    Hypothyroidism     controlled with medication     IBS (irritable bowel syndrome)     Insomnia     Left flank pain 3/5/2020    Morbid obesity ([facility] [address]     BMI 42.85    Murmur     per PCP note (72/5/28) systolic murmur grade of 2/6    Overactive bladder     PAD (peripheral artery disease) ([facility] [address]     patient denies on 11/27/19    Recurrent UTI     RLS (restless legs syndrome)     controlled with medication     SOBOE (shortness of breath on exertion)     PRN inhaler-patient reports she uses inhaler QHS    Special examinations     History-Neurologist - Dr. 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     Past Surgical History:   Procedure Laterality Date    APPENDECTOMY  1955    CHOLECYSTECTOMY  12/2016    COLONOSCOPY      GYN  12/03/2019    suburethral sling and cystoscopy    HYSTERECTOMY, TOTAL ABDOMINAL (CERVIX REMOVED)      1970    IR GENERIC PROCEDURE      2014    KNEE SURGERY Left     4/2016    KNEE SURGERY Left 2014    ORTHOPEDIC SURGERY Left 01/2020    left hand tendon surgery    ORTHOPEDIC SURGERY Left     LEFT WRIST TRAPEZIOMETACARPAL ARTHROPLASTY WITH FCR TENDON TRANSFER- 2020    OTHER SURGICAL HISTORY Right 2016    Hand    OTHER SURGICAL HISTORY      2014    [address]/ [address]    OTHER SURGICAL HISTORY  12/03/2019    bladder surgery-put mesh in    SHOULDER ARTHROSCOPY      1966,     TONSILLECTOMY AND ADENOIDECTOMY      1950    TOTAL KNEE ARTHROPLASTY Right 2014     Family History   Problem Relation Age of Onset    No Known Problems Mother     Other Father 54        brain aneurysm    Cerebral Aneurysm Father     Congenial Anomalies Sister         right arm deformity    Pneumonia Sister     Other Sister         fibromyalgia    Hypertension Brother     Heart Disease Brother     Other Brother         brain aneurysm, AVM    Heart Surgery Brother         CABGx4    Heart Attack Maternal Grandmother     Cancer Maternal Grandfather         skin    Heart Attack Paternal Grandfather     Mult Sclerosis Daughter     Drug Abuse Son     Other Son         Hepatitis C     Social History     Socioeconomic History    Marital status:      Spouse name: Tamra Osuna    Number of children: 2    Years of education: Not on file    Highest education level: Not on file   Occupational History    Occupation: Previously worked as a [employer]    Tobacco Use    Smoking status: Former     Packs/day: 1.00     Years: 46.00     Pack years: 46.00     Types: Cigarettes     Quit date: 2005     Years since quittin.7    Smokeless tobacco: Never   Vaping Use    Vaping Use: Unknown   Substance and Sexual Activity    Alcohol use: Not Currently    Drug use: No    Sexual activity: Not on file   Other Topics Concern    Not on file   Social History Narrative    Patient has 1 dog sleeps with her occasionally. Patient currently drinks 1 caffeine drink per day. Social Determinants of Health     Financial Resource Strain: Not on file   Food Insecurity: Not on file   Transportation Needs: Not on file   Physical Activity: Not on file   Stress: Not on file   Social Connections: Not on file   Intimate Partner Violence: Not on file   Housing Stability: Not on file        No flowsheet data found. Review of Systems  Non-contributory    PE left shoulder:     On exam today her cervical spine motion is little decreased but this is normal after her surgery. She has normal sensation through axillary median radial ulnar nerves to light touch. She has good strength throughout rotator cuff testing. Localizes pain along the biceps more now. Occasional discomfort superiorly. She is tender at the [facility] joint but feels more of her pain is anterior along the biceps. She is tender along the biceps. Speeds mildly positive Bethel's positive. Recall MRI left shoulder performed 7/5/22:     Narrative   MRI OF THE LEFT SHOULDER WITHOUT CONTRAST. Clinical Indication: Left shoulder pain with limited range of motion       Procedure: Multiplanar and multisequence MR images of the left shoulder were   performed without gadolinium contrast.       Comparison: No prior       Findings:        AC joint: Mild degenerative hypertrophy is noted of the AC joint. There is trace   fluid signal the subacromial/subdeltoid bursa. Rotator cuff: The supraspinatus, infraspinatus, teres minor, and subscapularis   tendons are intact. No rotator cuff tendon tear evident. Biceps labral complex: The long head of biceps tendon is intact. The superior   labrum is intact. The joint capsule in axillary recess is intact. Glenohumeral joint: The marrow signal in the humeral head and glenoid is   unremarkable. No definite anterior or posterior labral tear present. There is   mild degenerative chondral fissuring along the central glenoid. A small joint   effusion with synovitis is present. No abnormal soft tissue mass or fatty atrophy. Impression   1. 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 Small glenohumeral joint effusion with synovitis. 3. The rotator cuff tendon is intact. 4. Mild degenerative hypertrophy the [facility] joint with underlying   subacromial/subdeltoid bursitis. MRI reviewed, AC OA noticed. No RTC tear.   Synovitis noted in the axillary space on coronal image 10. A/Plan:     ICD-10-CM    1. Left shoulder pain, unspecified chronicity  M25.512 Ambulatory referral to Physical Therapy     triamcinolone acetonide (KENALOG-40) injection 80 mg     DRAIN/INJECT LARGE JOINT/BURSA      2. Tear of left rotator cuff, unspecified tear extent, unspecified whether traumatic  M75.102 Ambulatory referral to Physical Therapy     triamcinolone acetonide (KENALOG-40) injection 80 mg     DRAIN/INJECT LARGE JOINT/BURSA      3. Tendinopathy of left biceps  [id number]            Discussed today her exam is more consistent with bicipital pain. Would suggest some formal therapy for that but also trial of bicipital injection. Recommend therapy to evaluate and treat current complaints and pathology. A home exercise program was also discussed with the patient. Procedure note: After discussion of risks and benefits including, but not limited to pain, infection, steroid flare, increased blood sugar, fat necrosis, skin discoloration, and injury to blood vessels or nerves, the patient verbally consented to proceed with a glenohumeral joint injection. The affected left shoulder was sterilely prepped in standard fashion and injected with 2 cc of Kenalog(40mg/ml), 2 cc of 1% Lidocaine, and 2 cc of 0.5% Marcaine into the anterior shoulder near the biceps tendon. The patient tolerated the injection well. No follow-ups on file.         Yan Gomez MD  09/02/22

## 2022-09-06 ENCOUNTER — NURSE ONLY (OUTPATIENT)
Dept: INTERNAL MEDICINE CLINIC | Facility: CLINIC | Age: 77
End: 2022-09-06
Payer: MEDICARE

## 2022-09-06 DIAGNOSIS — E53.8 VITAMIN B12 DEFICIENCY: Primary | ICD-10-CM

## 2022-09-06 PROCEDURE — 96372 THER/PROPH/DIAG INJ SC/IM: CPT | Performed by: INTERNAL MEDICINE

## 2022-09-06 RX ORDER — CYANOCOBALAMIN 1000 UG/ML
1000 INJECTION INTRAMUSCULAR; SUBCUTANEOUS ONCE
Status: COMPLETED | OUTPATIENT
Start: 2022-09-06 | End: 2022-09-06

## 2022-09-06 RX ADMIN — CYANOCOBALAMIN 1000 MCG: 1000 INJECTION INTRAMUSCULAR; SUBCUTANEOUS at 09:22

## 2022-09-20 ENCOUNTER — NURSE ONLY (OUTPATIENT)
Dept: INTERNAL MEDICINE CLINIC | Facility: CLINIC | Age: 77
End: 2022-09-20
Payer: MEDICARE

## 2022-09-20 DIAGNOSIS — E53.8 VITAMIN B12 DEFICIENCY: Primary | ICD-10-CM

## 2022-09-20 DIAGNOSIS — E78.2 MIXED HYPERLIPIDEMIA: Chronic | ICD-10-CM

## 2022-09-20 DIAGNOSIS — I10 PRIMARY HYPERTENSION: Chronic | ICD-10-CM

## 2022-09-20 LAB
ALBUMIN SERPL-MCNC: 3.3 G/DL (ref 3.2–4.6)
ALBUMIN/GLOB SERPL: 1.1 {RATIO} (ref 1.2–3.5)
ALP SERPL-CCNC: 62 U/L (ref 50–136)
ALT SERPL-CCNC: 18 U/L (ref 12–65)
ANION GAP SERPL CALC-SCNC: 3 MMOL/L (ref 4–13)
AST SERPL-CCNC: 7 U/L (ref 15–37)
BASOPHILS # BLD: 0.1 K/UL (ref 0–0.2)
BASOPHILS NFR BLD: 1 % (ref 0–2)
BILIRUB SERPL-MCNC: 0.2 MG/DL (ref 0.2–1.1)
BUN SERPL-MCNC: 30 MG/DL (ref 8–23)
CALCIUM SERPL-MCNC: 8.9 MG/DL (ref 8.3–10.4)
CHLORIDE SERPL-SCNC: 104 MMOL/L (ref 101–110)
CHOLEST SERPL-MCNC: 249 MG/DL
CO2 SERPL-SCNC: 31 MMOL/L (ref 21–32)
CREAT SERPL-MCNC: 1.5 MG/DL (ref 0.6–1)
DIFFERENTIAL METHOD BLD: ABNORMAL
EOSINOPHIL # BLD: 0.4 K/UL (ref 0–0.8)
EOSINOPHIL NFR BLD: 3 % (ref 0.5–7.8)
ERYTHROCYTE [DISTWIDTH] IN BLOOD BY AUTOMATED COUNT: 19.4 % (ref 11.9–14.6)
GLOBULIN SER CALC-MCNC: 3.1 G/DL (ref 2.3–3.5)
GLUCOSE SERPL-MCNC: 143 MG/DL (ref 65–100)
HCT VFR BLD AUTO: 35.3 % (ref 35.8–46.3)
HDLC SERPL-MCNC: 64 MG/DL (ref 40–60)
HDLC SERPL: 3.9 {RATIO}
HGB BLD-MCNC: 10.8 G/DL (ref 11.7–15.4)
IMM GRANULOCYTES # BLD AUTO: 0.1 K/UL (ref 0–0.5)
IMM GRANULOCYTES NFR BLD AUTO: 1 % (ref 0–5)
LDLC SERPL CALC-MCNC: 154.6 MG/DL
LYMPHOCYTES # BLD: 2.8 K/UL (ref 0.5–4.6)
LYMPHOCYTES NFR BLD: 25 % (ref 13–44)
MCH RBC QN AUTO: 26.5 PG (ref 26.1–32.9)
MCHC RBC AUTO-ENTMCNC: 30.6 G/DL (ref 31.4–35)
MCV RBC AUTO: 86.7 FL (ref 79.6–97.8)
MONOCYTES # BLD: 0.5 K/UL (ref 0.1–1.3)
MONOCYTES NFR BLD: 5 % (ref 4–12)
NEUTS SEG # BLD: 7.4 K/UL (ref 1.7–8.2)
NEUTS SEG NFR BLD: 65 % (ref 43–78)
NRBC # BLD: 0 K/UL (ref 0–0.2)
PLATELET # BLD AUTO: 271 K/UL (ref 150–450)
PMV BLD AUTO: 10.9 FL (ref 9.4–12.3)
POTASSIUM SERPL-SCNC: 4.9 MMOL/L (ref 3.5–5.1)
PROT SERPL-MCNC: 6.4 G/DL (ref 6.3–8.2)
RBC # BLD AUTO: 4.07 M/UL (ref 4.05–5.2)
SODIUM SERPL-SCNC: 138 MMOL/L (ref 136–145)
TRIGL SERPL-MCNC: 152 MG/DL (ref 35–150)
URATE SERPL-MCNC: 6.9 MG/DL (ref 2.6–6)
VLDLC SERPL CALC-MCNC: 30.4 MG/DL (ref 6–23)
WBC # BLD AUTO: 11.3 K/UL (ref 4.3–11.1)

## 2022-09-20 PROCEDURE — 96372 THER/PROPH/DIAG INJ SC/IM: CPT | Performed by: INTERNAL MEDICINE

## 2022-09-20 RX ORDER — CYANOCOBALAMIN 1000 UG/ML
1000 INJECTION INTRAMUSCULAR; SUBCUTANEOUS ONCE
Status: COMPLETED | OUTPATIENT
Start: 2022-09-20 | End: 2022-09-20

## 2022-09-20 RX ADMIN — CYANOCOBALAMIN 1000 MCG: 1000 INJECTION INTRAMUSCULAR; SUBCUTANEOUS at 09:19

## 2022-09-22 NOTE — INTERVAL H&P NOTE
Caller would like to discuss an/a Return call. Writer advised caller of callback within 24-72 hours.    Patient Name: Barbara Mancia  Caller Name: Barbara  Callback Number: [phone]  Best Availability: anytime  Can A Detailed Message Be left? yes  Additional Info: Patient calling and states she would like to discuss next steps regarding hip xray taken on 9/8/22.  Please advise.  Did you confirm the message with the caller?: yes    Thank you,  Supriya Thayer     Update History & Physical    The Patient's History and Physical of January 21, 22 was reviewed with the patient and I examined the patient. There was no change. The surgical site was confirmed by the patient and me. Plan:  The risk, benefits, expected outcome, and alternative to the recommended procedure have been discussed with the patient. Patient understands and wants to proceed with the procedure.     Electronically signed by MICHELLE Pennington on 1/25/2022 at 9:05 AM

## 2022-09-27 DIAGNOSIS — F41.9 ANXIETY: Primary | ICD-10-CM

## 2022-09-27 RX ORDER — CLONAZEPAM 1 MG/1
1 TABLET ORAL DAILY PRN
Qty: 30 TABLET | Refills: 0 | Status: SHIPPED | OUTPATIENT
Start: 2022-09-27 | End: 2022-10-27

## 2022-09-27 NOTE — TELEPHONE ENCOUNTER
----- Message from Gianna Rascon sent at 9/26/2022 10:22 AM EDT -----  Subject: Refill Request    QUESTIONS  Name of Medication? clonazePAM (KLONOPIN) 1 MG tablet  Patient-reported dosage and instructions? Take 1 tablet by mouth 2 times   daily as needed for Anxiety for up to 30 days. How many days do you have left? 0  Preferred Pharmacy? [facility] phone number (if available)? [phone]  ---------------------------------------------------------------------------  --------------  Candace BRAXTON  What is the best way for the office to contact you? OK to leave message on   voicemail  Preferred Call Back Phone Number? [phone]  ---------------------------------------------------------------------------  --------------  SCRIPT ANSWERS  Relationship to Patient?  Self

## 2022-10-05 ENCOUNTER — NURSE ONLY (OUTPATIENT)
Dept: INTERNAL MEDICINE CLINIC | Facility: CLINIC | Age: 77
End: 2022-10-05
Payer: MEDICARE

## 2022-10-05 DIAGNOSIS — E53.8 VITAMIN B12 DEFICIENCY: Primary | ICD-10-CM

## 2022-10-05 PROCEDURE — 96372 THER/PROPH/DIAG INJ SC/IM: CPT | Performed by: INTERNAL MEDICINE

## 2022-10-05 RX ORDER — CYANOCOBALAMIN 1000 UG/ML
1000 INJECTION INTRAMUSCULAR; SUBCUTANEOUS ONCE
Status: COMPLETED | OUTPATIENT
Start: 2022-10-05 | End: 2022-10-05

## 2022-10-05 RX ADMIN — CYANOCOBALAMIN 1000 MCG: 1000 INJECTION INTRAMUSCULAR; SUBCUTANEOUS at 09:24

## 2022-10-17 ENCOUNTER — OFFICE VISIT (OUTPATIENT)
Dept: NEUROLOGY | Age: 77
End: 2022-10-17
Payer: MEDICARE

## 2022-10-17 VITALS
DIASTOLIC BLOOD PRESSURE: 76 MMHG | WEIGHT: 193 LBS | BODY MASS INDEX: 40.34 KG/M2 | HEART RATE: 73 BPM | SYSTOLIC BLOOD PRESSURE: 137 MMHG

## 2022-10-17 DIAGNOSIS — Z79.899 ENCOUNTER FOR MEDICATION MANAGEMENT: ICD-10-CM

## 2022-10-17 DIAGNOSIS — M62.838 CERVICAL PARASPINAL MUSCLE SPASM: Primary | ICD-10-CM

## 2022-10-17 DIAGNOSIS — R29.3 POSTURAL IMBALANCE: ICD-10-CM

## 2022-10-17 PROCEDURE — 1123F ACP DISCUSS/DSCN MKR DOCD: CPT | Performed by: PSYCHIATRY & NEUROLOGY

## 2022-10-17 PROCEDURE — 1036F TOBACCO NON-USER: CPT | Performed by: PSYCHIATRY & NEUROLOGY

## 2022-10-17 PROCEDURE — G8417 CALC BMI ABV UP PARAM F/U: HCPCS | Performed by: PSYCHIATRY & NEUROLOGY

## 2022-10-17 PROCEDURE — 1090F PRES/ABSN URINE INCON ASSESS: CPT | Performed by: PSYCHIATRY & NEUROLOGY

## 2022-10-17 PROCEDURE — G8427 DOCREV CUR MEDS BY ELIG CLIN: HCPCS | Performed by: PSYCHIATRY & NEUROLOGY

## 2022-10-17 PROCEDURE — 99215 OFFICE O/P EST HI 40 MIN: CPT | Performed by: PSYCHIATRY & NEUROLOGY

## 2022-10-17 PROCEDURE — G8484 FLU IMMUNIZE NO ADMIN: HCPCS | Performed by: PSYCHIATRY & NEUROLOGY

## 2022-10-17 PROCEDURE — G8400 PT W/DXA NO RESULTS DOC: HCPCS | Performed by: PSYCHIATRY & NEUROLOGY

## 2022-10-17 RX ORDER — TIZANIDINE 2 MG/1
2 TABLET ORAL 4 TIMES DAILY
Qty: 120 TABLET | Refills: 1 | Status: SHIPPED | OUTPATIENT
Start: 2022-10-17 | End: 2022-11-16

## 2022-10-17 ASSESSMENT — ENCOUNTER SYMPTOMS
DOUBLE VISION: 1
COUGH: 1
DIARRHEA: 1
BLURRED VISION: 1
WHEEZING: 1
ABDOMINAL PAIN: 1
BACK PAIN: 1

## 2022-10-17 ASSESSMENT — VISUAL ACUITY: OU: 1

## 2022-10-17 NOTE — PROGRESS NOTES
NEUROLOGY  RETURN  OFFICE VISIT [de-identified]                     10/17/2022  Jame De Anda is a 69 y.o. female here for [de-identified]  neck and shoulder pain. Referred by   JEAN Prado NP     Chief Complaint:   Chief Complaint   Patient presents with    Follow-up     Anesthesia of skin  Polyneuropathy  Dizziness and giddiness     Accompanied:::           70 yo MRHWF      Last 4/28/2022 [de-identified]  Procedure Details:       Correlates with clinical history. These could be the very earliest carpal tunnel syndromes and monitoring for such a basic examination is all that would be recommended. Patient is aware. She may wish to try conservative therapy with splinting at this point. Some skewed diplopia spells to 5 min. Active Problems:    * No active hospital problems. *  Resolved Problems:    * No resolved hospital problems. *    Past Medical History:   Diagnosis Date    Acute kidney insufficiency     Followed by PCP     Adverse effect of anesthesia     2016 in [place] heart stopped at the end of my gallbladder surgery. \" Patient reports no CPR performed, stayed one night in the hospital. No problems with any other surgeries.     Allergic rhinitis     Anemia     history     Anxiety and depression     Controlled with medication     Arthritis     Edema     bilateral lower legs     GERD (gastroesophageal reflux disease)     Controlled with medication     History of chicken pox     History of Clostridioides difficile infection     History of DVT (deep vein thrombosis)     s/p knee replacement and developed right leg DVT     HTN (hypertension)     controlled with medication     Hyperlipidemia     controlled with medication     Hyperlipidemia     Hypertension     Hyperthyroidism     pt denies    Hypothyroidism     controlled with medication     Hypothyroidism     IBS (irritable bowel syndrome)     Insomnia     Left flank pain 3/5/2020    Morbid obesity ([address]     BMI 42.85    Murmur     per PCP note (11/8/19) systolic murmur grade of 2/6    Overactive bladder     PAD (peripheral artery disease) ([address]     patient denies on 11/27/19    Recurrent UTI     Restless leg     RLS (restless legs syndrome)     controlled with medication     SOBOE (shortness of breath on exertion)     PRN inhaler-patient reports she uses inhaler QHS    Special examinations     History-Neurologist - Dr. Mary Kay Wells    ANASTASIA (stress urinary incontinence, female)     Thromboembolus [facility]) 2015    behind right knee after total knee replacement    Tubulovillous adenoma of colon 04/26/2021     Past Surgical History:   Procedure Laterality Date     [address]  12/2016    COLONOSCOPY      COLONOSCOPY      2013, 2003    GYN  12/03/2019    suburethral sling and cystoscopy    HYSTERECTOMY, TOTAL ABDOMINAL (CERVIX REMOVED)      1970    IR GENERIC PROCEDURE      2014    KNEE SURGERY Left     4/2016    KNEE SURGERY Left 2014    LAP BAND  REMOVAL 3/16/2015    ORTHOPEDIC SURGERY Left 01/2020    left hand tendon surgery    ORTHOPEDIC SURGERY Left     LEFT WRIST TRAPEZIOMETACARPAL ARTHROPLASTY WITH FCR TENDON TRANSFER- 2020    OTHER SURGICAL HISTORY Right 2016    Hand    OTHER SURGICAL HISTORY      2014    OTHER SURGICAL HISTORY      1986    OTHER SURGICAL HISTORY  12/03/2019    bladder surgery-put mesh in    SHOULDER ARTHROSCOPY      1966,     TONSILLECTOMY AND ADENOIDECTOMY      1950    TOTAL KNEE ARTHROPLASTY Right 2014      Family History   Problem Relation Age of Onset    Other Sister     Heart Disease Brother     No Known Problems Mother     Other Father 54        brain aneurysm    Cerebral Aneurysm Father     Congenial Anomalies Sister         right arm deformity    Pneumonia Sister     Other Sister         fibromyalgia    Hypertension Brother     Heart Disease Brother     Other Brother         brain aneurysm, AVM    Heart Surgery Brother         CABGx4    Heart Attack Maternal Grandmother     Cancer Maternal Grandfather         skin Heart Attack Paternal Grandfather     Mult Sclerosis Daughter     Drug Abuse Son     Other Son         Hepatitis C     Social History     Socioeconomic History    Marital status:      Spouse name: Anh Guillermo    Number of children: 2    Years of education: None    Highest education level: None   Occupational History    Occupation: Previously worked as a [employer]    Tobacco Use    Smoking status: Former     Packs/day: 1.00     Years: 46.00     Pack years: 46.00     Types: Cigarettes     Quit date: 2005     Years since quittin.8    Smokeless tobacco: Never   Vaping Use    Vaping Use: Unknown   Substance and Sexual Activity    Alcohol use: No     Alcohol/week: 0.0 standard drinks    Drug use: No   Social History Narrative    ** Merged History Encounter **         Patient has 1 dog sleeps with her occasionally. Patient currently drinks 1 caffeine drink per day. Current Outpatient Medications   Medication Sig Dispense Refill    Cyanocobalamin 1000 MCG/ML LIQD Inject 1 mL into the muscle every 14 days      tiZANidine (ZANAFLEX) 2 MG tablet Take 1 tablet by mouth in the morning, at noon, in the evening, and at bedtime 120 tablet 1    clonazePAM (KLONOPIN) 1 MG tablet Take 1 tablet by mouth daily as needed for Anxiety (Panic attacks only) for up to 30 days. (Patient taking differently: Take 0.5 mg by mouth in the morning and at bedtime.) 30 tablet 0    HYDROcodone-acetaminophen (NORCO) 7.5-325 MG per tablet TAKE 1 TABLET BY MOUTH EVERY 4 HOURS AS NEEDED FOR SEVERE PAIN .  DO NOT EXCEED 6 PER 24 HOURS      vitamin D (ERGOCALCIFEROL) 1.25 MG (04548 UT) CAPS capsule Take 1 capsule by mouth every 14 days 6 capsule 0    diclofenac sodium (VOLTAREN) 1 % GEL Apply 2 g topically 4 times daily as needed for Pain Apply 2g to affected area 4 times daily as needed for pain 200 g 0    hydrOXYzine HCl (ATARAX) 10 MG tablet hydroxyzine HCl 10 mg tablet   TAKE 1 TABLET BY MOUTH EVERY 8 HOURS AS NEEDED FOR ITCHING OR ALLERGIES      acetaminophen (TYLENOL) 325 MG tablet Take 325 mg by mouth every 4 hours as needed      albuterol sulfate  (90 Base) MCG/ACT inhaler Inhale 1-2 puffs into the lungs every 6 hours as needed      amitriptyline (ELAVIL) 25 MG tablet Take 25 mg by mouth nightly      clotrimazole-betamethasone (LOTRISONE) 1-0.05 % cream Apply topically 2 times daily      colestipol (COLESTID) 1 g tablet Take 1 g by mouth 2 times daily      escitalopram (LEXAPRO) 20 MG tablet Take 30 mg by mouth daily      fluticasone (FLONASE) 50 MCG/ACT nasal spray 2 sprays by Nasal route daily as needed      hyoscyamine (OSCIMIN) 125 MCG TBDP dispersible tablet Take 0.125 mg by mouth every 4 hours as needed      omeprazole (PRILOSEC) 40 MG delayed release capsule Take 40 mg by mouth daily      ondansetron (ZOFRAN) 4 MG tablet Take 4 mg by mouth every 8 hours as needed      pramipexole (MIRAPEX) 0.5 MG tablet Take 0.5 mg by mouth at bedtime      lisinopril (PRINIVIL;ZESTRIL) 10 MG tablet Take 10 mg by mouth daily      levothyroxine (SYNTHROID) 150 MCG tablet Take 150 mcg by mouth every morning (before breakfast)      levothyroxine (SYNTHROID) 137 MCG tablet Take 137 mcg by mouth Daily       No current facility-administered medications for this visit.          Allergies   Allergen Reactions    Fire Ant Anaphylaxis    Amoxicillin Other (See Comments)     C-Diff    Amoxicillin-Pot Clavulanate Other (See Comments)     c.diff    Augmentin [Amoxicillin-Pot Clavulanate]     Cephalexin Other (See Comments)     C Diff      Cephalexin Other (See Comments)     C-Diff  C-Diff      Cephalosporins     Hydromorphone Other (See Comments)     Keeps her awake    Meperidine Hcl     Morphine Other (See Comments)     Awake  Awake  No sleep    Oxycodone     Oxycodone-Acetaminophen Other (See Comments)     Jittery  Nightmares    Oxycodone-Aspirin Other (See Comments)     Jittery  Other reaction(s): Unknown-Unspecified  Jittery    Penicillins Other (See Comments)     C-Diff      Povidone Iodine     Povidone-Iodine Other (See Comments)     Johnston  Johnston  burn    Adhesive Tape Itching and Rash     \"sticky ace wrap\"  \"sticky ace wrap\"    Meperidine Rash       REVIEW OTHER RECORDS:    Review of Systems   Constitutional:  Positive for malaise/fatigue. HENT: Negative. Eyes:  Positive for blurred vision and double vision. Some spells 5 min of skewed diplopia/. CTA brain head arteries wnl. Respiratory:  Positive for cough and wheezing. Cardiovascular:  Positive for leg swelling. Gastrointestinal:  Positive for abdominal pain and diarrhea. Genitourinary:  Positive for frequency. Musculoskeletal:  Positive for back pain, falls, joint pain and neck pain. Skin: Negative. Neurological:  Positive for dizziness, tingling and sensory change. Negative for tremors, speech change, focal weakness, seizures, loss of consciousness, weakness and headaches. Gait dis   Endo/Heme/Allergies:  Bruises/bleeds easily. Psychiatric/Behavioral:  Positive for memory loss. The patient has insomnia. All other systems reviewed and are negative. REVIEW IMAGING:    She had an emergency room visit and the CTA of the head and neck arteries were good. All reviewed directly w pt and . Objective:     Vitals:    10/17/22 1514   BP: 137/76   Pulse: 73   Weight: 193 lb (87.5 kg)        Physical Exam  Vitals reviewed. Constitutional:       General: She is awake. She is not in acute distress. Appearance: She is well-developed and well-groomed. She is not ill-appearing, toxic-appearing or diaphoretic. HENT:      Head: Normocephalic and atraumatic. No raccoon eyes, abrasion, contusion, right periorbital erythema, left periorbital erythema or laceration. Right Ear: Hearing normal.      Left Ear: Hearing normal.   Eyes:      General: Lids are normal. Vision grossly intact. No visual field deficit or scleral icterus.         Right eye: No discharge. Left eye: No discharge. Extraocular Movements: Extraocular movements intact. Right eye: Normal extraocular motion and no nystagmus. Left eye: Normal extraocular motion and no nystagmus. Conjunctiva/sclera: Conjunctivae normal.      Right eye: Right conjunctiva is not injected. Left eye: Left conjunctiva is not injected. Pupils: Pupils are equal, round, and reactive to light. Comments: PERRLA No Gillian. No YONI. Neck:      Trachea: Phonation normal.        Comments: Cervical / cervico-occipital pains. Pulmonary:      Effort: Pulmonary effort is normal. No respiratory distress. Breath sounds: No wheezing. Musculoskeletal:         General: Normal range of motion. Cervical back: Normal range of motion and neck supple. No rigidity or torticollis. Muscular tenderness present. Normal range of motion. Skin:     General: Skin is warm and dry. Capillary Refill: Capillary refill takes less than 2 seconds. Coloration: Skin is not cyanotic, jaundiced or pale. Nails: There is no clubbing. Neurological:      Mental Status: She is alert and easily aroused. Mental status is at baseline. Cranial Nerves: No cranial nerve deficit, dysarthria or facial asymmetry. Sensory: No sensory deficit. Motor: No weakness, tremor, atrophy, abnormal muscle tone or seizure activity. Coordination: Coordination is intact. Coordination normal.      Gait: Tandem walk abnormal. Gait normal.      Deep Tendon Reflexes: Reflexes normal.      Reflex Scores:       Tricep reflexes are 2+ on the right side and 2+ on the left side. Bicep reflexes are 2+ on the right side and 2+ on the left side. Brachioradialis reflexes are 2+ on the right side and 2+ on the left side. Patellar reflexes are 2+ on the right side and 2+ on the left side. Achilles reflexes are 2+ on the right side and 2+ on the left side.      Comments: Left cervical paraspinal to occipital muscle pains. Psychiatric:         Attention and Perception: Attention normal.         Mood and Affect: Mood normal.         Speech: Speech normal.         Behavior: Behavior normal. Behavior is cooperative. Comments: Recall is good // wnl. Neurologic Exam     Mental Status   Speech: speech is normal   Level of consciousness: alert  Normal comprehension. Cranial Nerves     CN III, IV, VI   Pupils are equal, round, and reactive to light. Gait, Coordination, and Reflexes     Gait  Gait: shuffling    Coordination   Tandem walking coordination: abnormal    Tremor   Resting tremor: absent  Intention tremor: absent  Action tremor: absent    Reflexes   Right brachioradialis: 2+  Left brachioradialis: 2+  Right biceps: 2+  Left biceps: 2+  Right triceps: 2+  Left triceps: 2+  Right patellar: 2+  Left patellar: 2+  Right achilles: 2+  Left achilles: 2+  Right Sam: absent  Left Sam: absent  There is no tic, twitch, tonic or clonic activity noted. No dyskinesia. Assessment & Plan     [facility] was seen today for follow-up. Diagnoses and all orders for this visit:    Cervical paraspinal muscle spasm  -     tiZANidine (ZANAFLEX) 2 MG tablet; Take 1 tablet by mouth in the morning, at noon, in the evening, and at bedtime    Encounter for medication management    Postural imbalance  1. Add tizanidine at 2 mg after supper after few days she can try 2 mg before and after supper few more days she can then titrate up to 2 mg at supper and 2 mg at at bedtime = = titrating to 2 mg before, after, and then 4 mg at at bedtime. Cervical muscle spasm. 2.  The diplopia is very unusual I reviewed all of her CTA studies during an emergency room visit recently = the arteries were without significant stenosis. 3.  Heating pad. 4.  She can use occasional Voltaren gel. She might want to alternate it with Aspercreme or something similar.        I can see her back in about 6 months RTO.    All drugs and rx reviewed fully with patient and acknowledged specific to neurology as well. Differential diagnostic decisions and thoughts reviewed and discussed. Updating to patient identification, coordinate neurology visits, reasons for follow, review neurologic problems. Extensive time[de-identified]  Total time  45 minutes -       More than 50% of this visit was spent in counseling and care coordination. Treatment options fully reviewed with patient. Time includes pre-  and post- face-face time in records review, and preparation including available pertinent images and reports. Acknowledgements obtained where needed.    [ *NOTE: parts of this note were produced using artificial speech recognition software, which may have inadvertent errors in the produced wordings. ]          Jacklyn Ro MD  [facility] Neu[facility], [address]  Phone:  [phone]  Fax:   [phone]        Signed By: Jacklyn Ro MD     October 17, 2022

## 2022-10-20 ENCOUNTER — OFFICE VISIT (OUTPATIENT)
Dept: UROLOGY | Age: 77
End: 2022-10-20
Payer: MEDICARE

## 2022-10-20 DIAGNOSIS — N39.3 SUI (STRESS URINARY INCONTINENCE, FEMALE): ICD-10-CM

## 2022-10-20 DIAGNOSIS — N30.10 INTERSTITIAL CYSTITIS (CHRONIC) WITHOUT HEMATURIA: ICD-10-CM

## 2022-10-20 DIAGNOSIS — N28.1 RENAL CYST: ICD-10-CM

## 2022-10-20 DIAGNOSIS — N39.0 RECURRENT UTI: Primary | ICD-10-CM

## 2022-10-20 LAB
BILIRUBIN, URINE, POC: NEGATIVE
BLOOD URINE, POC: NEGATIVE
GLUCOSE URINE, POC: NEGATIVE
KETONES, URINE, POC: NEGATIVE
LEUKOCYTE ESTERASE, URINE, POC: ABNORMAL
NITRITE, URINE, POC: NEGATIVE
PH, URINE, POC: 6 (ref 4.6–8)
PROTEIN,URINE, POC: NEGATIVE
SPECIFIC GRAVITY, URINE, POC: 1.01 (ref 1–1.03)
URINALYSIS CLARITY, POC: ABNORMAL
URINALYSIS COLOR, POC: ABNORMAL
UROBILINOGEN, POC: ABNORMAL

## 2022-10-20 PROCEDURE — 81003 URINALYSIS AUTO W/O SCOPE: CPT | Performed by: NURSE PRACTITIONER

## 2022-10-20 PROCEDURE — 99214 OFFICE O/P EST MOD 30 MIN: CPT | Performed by: NURSE PRACTITIONER

## 2022-10-20 PROCEDURE — G8427 DOCREV CUR MEDS BY ELIG CLIN: HCPCS | Performed by: NURSE PRACTITIONER

## 2022-10-20 PROCEDURE — G8417 CALC BMI ABV UP PARAM F/U: HCPCS | Performed by: NURSE PRACTITIONER

## 2022-10-20 PROCEDURE — 1090F PRES/ABSN URINE INCON ASSESS: CPT | Performed by: NURSE PRACTITIONER

## 2022-10-20 PROCEDURE — G8484 FLU IMMUNIZE NO ADMIN: HCPCS | Performed by: NURSE PRACTITIONER

## 2022-10-20 PROCEDURE — 0509F URINE INCON PLAN DOCD: CPT | Performed by: NURSE PRACTITIONER

## 2022-10-20 PROCEDURE — 1123F ACP DISCUSS/DSCN MKR DOCD: CPT | Performed by: NURSE PRACTITIONER

## 2022-10-20 PROCEDURE — G8400 PT W/DXA NO RESULTS DOC: HCPCS | Performed by: NURSE PRACTITIONER

## 2022-10-20 PROCEDURE — 1036F TOBACCO NON-USER: CPT | Performed by: NURSE PRACTITIONER

## 2022-10-20 RX ORDER — NITROFURANTOIN MACROCRYSTALS 50 MG/1
50 CAPSULE ORAL DAILY
Qty: 90 CAPSULE | Refills: 3 | Status: SHIPPED | OUTPATIENT
Start: 2022-10-20

## 2022-10-20 NOTE — PROGRESS NOTES
[facility][1] Urology  [address] [facility]    Vidal Luevano [address], [address] [facility]  [phone]          Kanchan Slater  : 1945    Chief Complaint   Patient presents with    Follow-up    Urinary Tract Infection          HPI     Kanchan Slater is a 69 y.o. female being seen today for recurrent UTI f/u. Previously followed by Dr. Andrew Benoit for right renal cyst. SHARRI in  showed a stable simple cyst to lower pole of right kidney. Pt recently visited Urgent Care On 11/10/21 with dysuria. Urine culture revealed 40,000 colonies of E.Coli. Upon record review, pt has had E. Coli positive urine culture in Sept, Aug, and 2021. She was started on macrodantin suppression in 2021, however she did not follow up after this. Today, she is asx. She had no UTI while on suppression. She has had several [facility] ER visits for UTI sx. There are 2 E. Coli positive urine cultures in Aug 2022. CT A/P in May 2022 revealed normal urinary tract. Has h/o IBS with diarrhea. Followed by Dr. Emeterio Palm for this. Reports diarrhea has improved since beginning amitriptyline. Has been seen by Dr. Patrick Rendon for OAB. Underwent Mid urethral sling [facility] [employer]) and Cystourethroscopy on 12/3/19. Was on uribel for IC sx, but is no longer taking this. She does not want to f/u  Dr. Patrick Rendon. Has urine leakage w coughing and sneezing. Wakes up wet in AM.      Hysterectomy approx 35 years ago for abnormal bleeding. H/O DVT in right leg 10 years ago. Not on AC. Cr 1.50. Past Medical History:   Diagnosis Date    Acute kidney insufficiency     Followed by PCP     Adverse effect of anesthesia     2016 in [place] heart stopped at the end of my gallbladder surgery. \" Patient reports no CPR performed, stayed one night in the hospital. No problems with any other surgeries.     Allergic rhinitis     Anemia     history     Anxiety and depression     Controlled with medication     Arthritis     Edema bilateral lower legs     GERD (gastroesophageal reflux disease)     Controlled with medication     History of chicken pox     History of Clostridioides difficile infection     History of DVT (deep vein thrombosis)     s/p knee replacement and developed right leg DVT     HTN (hypertension)     controlled with medication     Hyperlipidemia     controlled with medication     Hyperlipidemia     Hypertension     Hyperthyroidism     pt denies    Hypothyroidism     controlled with medication     Hypothyroidism     IBS (irritable bowel syndrome)     Insomnia     Left flank pain 3/5/2020    Morbid obesity ([facility] [address]     BMI 42.85    Murmur     per PCP note (33/6/49) systolic murmur grade of 2/6    Overactive bladder     PAD (peripheral artery disease) ([facility] [address]     patient denies on 11/27/19    Recurrent UTI     Restless leg     RLS (restless legs syndrome)     controlled with medication     SOBOE (shortness of breath on exertion)     PRN inhaler-patient reports she uses inhaler QHS    Special examinations     History-Neurologist - Dr. 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     Past Surgical History:   Procedure Laterality Date    APPENDECTOMY  1955    CHOLECYSTECTOMY  12/2016    COLONOSCOPY      COLONOSCOPY      2013, 2003    GYN  12/03/2019    suburethral sling and cystoscopy    HYSTERECTOMY, TOTAL ABDOMINAL (CERVIX REMOVED)      1970    IR GENERIC PROCEDURE      2014    KNEE SURGERY Left     4/2016    KNEE SURGERY Left 2014    LAP BAND  REMOVAL 3/16/2015    ORTHOPEDIC SURGERY Left 01/2020    left hand tendon surgery    ORTHOPEDIC SURGERY Left     LEFT WRIST TRAPEZIOMETACARPAL ARTHROPLASTY WITH FCR TENDON TRANSFER- 2020    OTHER SURGICAL HISTORY Right 2016    Hand    OTHER SURGICAL HISTORY      2014    [address]/ [address]    OTHER SURGICAL HISTORY  12/03/2019    bladder surgery-put mesh in    SHOULDER ARTHROSCOPY      1966,     TONSILLECTOMY AND ADENOIDECTOMY      1950    TOTAL KNEE ARTHROPLASTY Right 2014     Current Outpatient Medications   Medication Sig Dispense Refill    nitrofurantoin (MACRODANTIN) 50 MG capsule Take 1 capsule by mouth daily 90 capsule 3    Cyanocobalamin 1000 MCG/ML LIQD Inject 1 mL into the muscle every 14 days      tiZANidine (ZANAFLEX) 2 MG tablet Take 1 tablet by mouth in the morning, at noon, in the evening, and at bedtime 120 tablet 1    clonazePAM (KLONOPIN) 1 MG tablet Take 1 tablet by mouth daily as needed for Anxiety (Panic attacks only) for up to 30 days. (Patient taking differently: Take 0.5 mg by mouth in the morning and at bedtime.) 30 tablet 0    HYDROcodone-acetaminophen (NORCO) 7.5-325 MG per tablet TAKE 1 TABLET BY MOUTH EVERY 4 HOURS AS NEEDED FOR SEVERE PAIN .  DO NOT EXCEED 6 PER 24 HOURS      vitamin D (ERGOCALCIFEROL) 1.25 MG (53774 UT) CAPS capsule Take 1 capsule by mouth every 14 days 6 capsule 0    diclofenac sodium (VOLTAREN) 1 % GEL Apply 2 g topically 4 times daily as needed for Pain Apply 2g to affected area 4 times daily as needed for pain 200 g 0    hydrOXYzine HCl (ATARAX) 10 MG tablet hydroxyzine HCl 10 mg tablet   TAKE 1 TABLET BY MOUTH EVERY 8 HOURS AS NEEDED FOR ITCHING OR ALLERGIES      acetaminophen (TYLENOL) 325 MG tablet Take 325 mg by mouth every 4 hours as needed      albuterol sulfate  (90 Base) MCG/ACT inhaler Inhale 1-2 puffs into the lungs every 6 hours as needed      amitriptyline (ELAVIL) 25 MG tablet Take 25 mg by mouth nightly      clotrimazole-betamethasone (LOTRISONE) 1-0.05 % cream Apply topically 2 times daily      colestipol (COLESTID) 1 g tablet Take 1 g by mouth 2 times daily      escitalopram (LEXAPRO) 20 MG tablet Take 30 mg by mouth daily      fluticasone (FLONASE) 50 MCG/ACT nasal spray 2 sprays by Nasal route daily as needed      hyoscyamine (OSCIMIN) 125 MCG TBDP dispersible tablet Take 0.125 mg by mouth every 4 hours as needed      levothyroxine (SYNTHROID) 150 MCG tablet Take 150 mcg by mouth every morning (before breakfast)      omeprazole (PRILOSEC) 40 MG delayed release capsule Take 40 mg by mouth daily      ondansetron (ZOFRAN) 4 MG tablet Take 4 mg by mouth every 8 hours as needed      pramipexole (MIRAPEX) 0.5 MG tablet Take 0.5 mg by mouth at bedtime      levothyroxine (SYNTHROID) 137 MCG tablet Take 137 mcg by mouth Daily      lisinopril (PRINIVIL;ZESTRIL) 10 MG tablet Take 10 mg by mouth daily       No current facility-administered medications for this visit.      Allergies   Allergen Reactions    Fire Ant Anaphylaxis    Amoxicillin Other (See Comments)     C-Diff    Amoxicillin-Pot Clavulanate Other (See Comments)     c.diff    Augmentin [Amoxicillin-Pot Clavulanate]     Cephalexin Other (See Comments)     C Diff      Cephalexin Other (See Comments)     C-Diff  C-Diff      Cephalosporins     Hydromorphone Other (See Comments)     Keeps her awake    Meperidine Hcl     Morphine Other (See Comments)     Awake  Awake  No sleep    Oxycodone     Oxycodone-Acetaminophen Other (See Comments)     Jittery  Nightmares    Oxycodone-Aspirin Other (See Comments)     Jittery  Other reaction(s): Unknown-Unspecified  Jittery    Penicillins Other (See Comments)     C-Diff      Povidone Iodine     Povidone-Iodine Other (See Comments)     Johnston  Johnston  burn    Adhesive Tape Itching and Rash     \"sticky ace wrap\"  \"sticky ace wrap\"    Meperidine Rash     Social History     Socioeconomic History    Marital status:      Spouse name: Darren Barriga    Number of children: 2    Years of education: Not on file    Highest education level: Not on file   Occupational History    Occupation: Previously worked as a [employer]    Tobacco Use    Smoking status: Former     Packs/day: 1.00     Years: 46.00     Pack years: 46.00     Types: Cigarettes     Quit date: 2005     Years since quittin.8    Smokeless tobacco: Never   Vaping Use    Vaping Use: Unknown   Substance and Sexual Activity    Alcohol use: No     Alcohol/week: 0.0 standard drinks    Drug use: No    Sexual activity: Not on file   Other Topics Concern    Not on file   Social History Narrative    ** Merged History Encounter **         Patient has 1 dog sleeps with her occasionally. Patient currently drinks 1 caffeine drink per day.      Social Determinants of Health     Financial Resource Strain: Not on file   Food Insecurity: Not on file   Transportation Needs: Not on file   Physical Activity: Not on file   Stress: Not on file   Social Connections: Not on file   Intimate Partner Violence: Not on file   Housing Stability: Not on file     Family History   Problem Relation Age of Onset    Other Sister     Heart Disease Brother     No Known Problems Mother     Other Father 54        brain aneurysm    Cerebral Aneurysm Father     Congenial Anomalies Sister         right arm deformity    Pneumonia Sister     Other Sister         fibromyalgia    Hypertension Brother     Heart Disease Brother     Other Brother         brain aneurysm, AVM    Heart Surgery Brother         CABGx4    Heart Attack Maternal Grandmother     Cancer Maternal Grandfather         skin    Heart Attack Paternal Grandfather     Mult Sclerosis Daughter     Drug Abuse Son     Other Son         Hepatitis C       Review of Systems  Constitutional: Negative  GI: Neg GI ROS  Genitourinary: Genitourinary negative    Urinalysis  UA - Dipstick  Results for orders placed or performed in visit on 10/20/22   AMB POC URINALYSIS DIP STICK AUTO W/O MICRO   Result Value Ref Range    Color (UA POC)      Clarity (UA POC)      Glucose, Urine, POC Negative Negative    Bilirubin, Urine, POC Negative Negative    KETONES, Urine, POC Negative Negative    Specific Gravity, Urine, POC 1.010 1.001 - 1.035    Blood (UA POC) Negative Negative    pH, Urine, POC 6.0 4.6 - 8.0    Protein, Urine, POC Negative Negative Urobilinogen, POC 2 mg/dL     Nitrite, Urine, POC Negative Negative    Leukocyte Esterase, Urine, POC Trace Negative       PHYSICAL EXAM    General appearance - well appearing and in no distress  Mental status - alert, oriented to person, place, and time  Neck - supple, no significant adenopathy  Chest/Lung-  Quiet, even and easy respiratory effort without use of accessory muscles  Skin - normal coloration and turgor, no rashes        Assessment and Plan    ICD-10-CM    1. Recurrent UTI  N39.0 nitrofurantoin (MACRODANTIN) 50 MG capsule      2. ANASTASIA (stress urinary incontinence, female)  N39.3       3. Renal cyst  N28.1       4. Interstitial cystitis (chronic) without hematuria  N30.10         Restart macrodantin 50 mg daily. Script sent x 1 yr. I have educated patient to behavioral changes that can decrease the frequency of any urinary infection. These include eliminating constipation, front to back wiping, frequent voiding to keep bladder volumes low, double voiding, avoid soaking in water (including baths, pools, hot tubs), avoiding tight fitting clothes, avoiding douching, use of cranberry products, and use of probiotics. I encouraged consuming minimum of 64 oz of water daily. I also recommend avoiding consumption of sugary beverages and other known bladder irritants. Offered PFT. She declined this. Did not feel this was beneficial in the past. Will have pt see Dr. Loreto Watson for further recs. Stable simple cyst to RLP. No further work up indicated. Denies dysuria. Does not use uribel. Pt unaware of this dx. She will see Dr. Loreto Watson in next 3 months for ANASTASIA. She will f/u with myself in 6 months. Advised to call sooner if needed. Jayashree Medina is supervising physician today and he approves plan of care.

## 2022-10-21 ENCOUNTER — OFFICE VISIT (OUTPATIENT)
Dept: ORTHOPEDIC SURGERY | Age: 77
End: 2022-10-21
Payer: MEDICARE

## 2022-10-21 DIAGNOSIS — M25.512 LEFT SHOULDER PAIN, UNSPECIFIED CHRONICITY: Primary | ICD-10-CM

## 2022-10-21 DIAGNOSIS — M75.102 TEAR OF LEFT ROTATOR CUFF, UNSPECIFIED TEAR EXTENT, UNSPECIFIED WHETHER TRAUMATIC: ICD-10-CM

## 2022-10-21 DIAGNOSIS — M67.922 TENDINOPATHY OF LEFT BICEPS: ICD-10-CM

## 2022-10-21 PROCEDURE — G8427 DOCREV CUR MEDS BY ELIG CLIN: HCPCS | Performed by: ORTHOPAEDIC SURGERY

## 2022-10-21 PROCEDURE — G8417 CALC BMI ABV UP PARAM F/U: HCPCS | Performed by: ORTHOPAEDIC SURGERY

## 2022-10-21 PROCEDURE — 1036F TOBACCO NON-USER: CPT | Performed by: ORTHOPAEDIC SURGERY

## 2022-10-21 PROCEDURE — 1090F PRES/ABSN URINE INCON ASSESS: CPT | Performed by: ORTHOPAEDIC SURGERY

## 2022-10-21 PROCEDURE — G8484 FLU IMMUNIZE NO ADMIN: HCPCS | Performed by: ORTHOPAEDIC SURGERY

## 2022-10-21 PROCEDURE — 99213 OFFICE O/P EST LOW 20 MIN: CPT | Performed by: ORTHOPAEDIC SURGERY

## 2022-10-21 PROCEDURE — 1123F ACP DISCUSS/DSCN MKR DOCD: CPT | Performed by: ORTHOPAEDIC SURGERY

## 2022-10-21 PROCEDURE — G8400 PT W/DXA NO RESULTS DOC: HCPCS | Performed by: ORTHOPAEDIC SURGERY

## 2022-10-21 NOTE — PATIENT INSTRUCTIONS
Patient Education        Frozen Shoulder: Exercises  Introduction  Here are some examples of exercises for you to try. The exercises may be suggested for a condition or for rehabilitation. Start each exercise slowly. Ease off the exercises if you start to have pain. You will be told when to start these exercises and which ones will work best for you. How to do the exercises  Neck stretches  Look straight ahead, and tip your right ear to your right shoulder. Do not let your left shoulder rise up as you tip your head to the right. Hold 15 to 30 seconds. Tilt your head to the left. Do not let your right shoulder rise up as you tip your head to the left. Hold for 15 to 30 seconds. Repeat 2 to 4 times to each side. Shoulder rolls  Sit comfortably with your feet shoulder-width apart. You can also do this exercise while standing. Roll your shoulders up, then back, and then down in a smooth, circular motion. Repeat 2 to 4 times. Shoulder flexion (lying down)  For this exercise, you will need a wand. To make a wand, use a piece of PVC pipe or a broom handle with the broom removed. Make the wand about a foot wider than your shoulders. Lie on your back, holding a wand with your hands. Your palms should face down as you hold the wand. Place your hands slightly wider than your shoulders. Keeping your elbows straight, slowly raise your arms over your head until you feel a stretch in your shoulders, upper back, and chest.  Hold 15 to 30 seconds. Repeat 2 to 4 times. Shoulder rotation (lying down)  To make a wand, use a piece of PVC pipe or a broom handle with the broom removed. Make the wand about a foot wider than your shoulders. Lie on your back and hold a wand in both hands with your elbows bent and your palms up. Keeping your elbows close to your body, move the wand across your body toward the arm that has pain. Hold for 15 to 30 seconds. Repeat 2 to 4 times.   Shoulder internal rotation with towel  Roll up a towel lengthwise. Hold the towel above and behind your head with the arm that is not sore. With your sore arm, reach behind your back and grasp the towel. Using the arm above your head, pull the towel upward until you feel a stretch on the front and outside of your sore shoulder. Hold 15 to 30 seconds. Relax and move the towel back down to the starting position. Repeat 2 to 4 times. Shoulder blade squeeze  While standing with your arms at your sides, squeeze your shoulder blades together. Do not raise your shoulders up as you are squeezing. Hold for 6 seconds. Repeat 8 to 12 times. Follow-up care is a key part of your treatment and safety. Be sure to make and go to all appointments, and call your doctor if you are having problems. It's also a good idea to know your test results and keep a list of the medicines you take. Current as of: March 9, 2022               Content Version: 13.4  © 2006-2022 [employer]. Care instructions adapted under license by [facility] ([facility]). If you have questions about a medical condition or this instruction, always ask your healthcare professional. [place] [address] any warranty or liability for your use of this information. Patient Education        Rotator Cuff: Exercises  Introduction  Here are some examples of exercises for you to try. The exercises may be suggested for a condition or for rehabilitation. Start each exercise slowly. Ease off the exercises if you start to have pain. You will be told when to start these exercises and which ones will work best for you. How to do the exercises  Pendulum swing  If you have pain in your back, do not do this exercise. Hold on to a table or the back of a chair with your good arm. Then bend forward a little and let your sore arm hang straight down. This exercise does not use the arm muscles. Rather, use your legs and your hips to create movement that makes your arm swing freely.   Use the movement from your hips and legs to guide the slightly swinging arm back and forth like a pendulum (or elephant trunk). Then guide it in circles that start small (about the size of a dinner plate). Make the circles a bit larger each day, as your pain allows. Do this exercise for 5 minutes, 5 to 7 times each day. As you have less pain, try bending over a little farther to do this exercise. This will increase the amount of movement at your shoulder. Posterior stretching exercise  Hold the elbow of your injured arm with your other hand. Use your hand to pull your injured arm gently up and across your body. You will feel a gentle stretch across the back of your injured shoulder. Hold for at least 15 to 30 seconds. Then slowly lower your arm. Repeat 2 to 4 times. Up-the-back stretch  Your doctor or physical therapist may want you to wait to do this stretch until you have regained most of your range of motion and strength. You can do this stretch in different ways. Hold any of these stretches for at least 15 to 30 seconds. Repeat them 2 to 4 times. Light stretch: Put your hand in your back pocket. Let it rest there to stretch your shoulder. Moderate stretch: With your other hand, hold your injured arm (palm outward) behind your back by the wrist. Pull your arm up gently to stretch your shoulder. Advanced stretch: Put a towel over your other shoulder. Put the hand of your injured arm behind your back. Now hold the back end of the towel. With the other hand, hold the front end of the towel in front of your body. Pull gently on the front end of the towel. This will bring your hand farther up your back to stretch your shoulder. Overhead stretch  Standing about an arm's length away, grasp onto a solid surface. You could use a countertop, a doorknob, or the back of a sturdy chair. With your knees slightly bent, bend forward with your arms straight. Lower your upper body, and let your shoulders stretch.   As your shoulders are able to stretch farther, you may need to take a step or two backward. Hold for at least 15 to 30 seconds. Then stand up and relax. If you had stepped back during your stretch, step forward so you can keep your hands on the solid surface. Repeat 2 to 4 times. Shoulder flexion (lying down)  To make a wand for this exercise, use a piece of PVC pipe or a broom handle with the broom removed. Make the wand about a foot wider than your shoulders. Lie on your back, holding a wand with both hands. Your palms should face down as you hold the wand. Keeping your elbows straight, slowly raise your arms over your head. Raise them until you feel a stretch in your shoulders, upper back, and chest.  Hold for 15 to 30 seconds. Repeat 2 to 4 times. Shoulder rotation (lying down)  To make a wand for this exercise, use a piece of PVC pipe or a broom handle with the broom removed. Make the wand about a foot wider than your shoulders. Lie on your back. Hold a wand with both hands with your elbows bent and palms up. Keep your elbows close to your body, and move the wand across your body toward the sore arm. Hold for 8 to 12 seconds. Repeat 2 to 4 times. Wall climbing (to the side)  Avoid any movement that is straight to your side, and be careful not to arch your back. Your arm should stay about 30 degrees to the front of your side. Stand with your side to a wall so that your fingers can just touch it at an angle about 30 degrees toward the front of your body. Walk the fingers of your injured arm up the wall as high as pain permits. Try not to shrug your shoulder up toward your ear as you move your arm up. Hold that position for a count of at least 15 to 20. Walk your fingers back down to the starting position. Repeat at least 2 to 4 times. Try to reach higher each time. Wall climbing (to the front)  During this stretching exercise, be careful not to arch your back.   Face a wall, and stand so your fingers can just touch it.  Keeping your shoulder down, walk the fingers of your injured arm up the wall as high as pain permits. (Don't shrug your shoulder up toward your ear.)  Hold your arm in that position for at least 15 to 30 seconds. Slowly walk your fingers back down to where you started. Repeat at least 2 to 4 times. Try to reach higher each time. Shoulder blade squeeze  Stand with your arms at your sides, and squeeze your shoulder blades together. Do not raise your shoulders up as you squeeze. Hold 6 seconds. Repeat 8 to 12 times. Scapular exercise: Arm reach  Lie flat on your back. This exercise is a very slight motion that starts with your arms raised (elbows straight, arms straight). From this position, reach higher toward the al[place] or ceiling. Keep your elbows straight. All motion should be from your shoulder blade only. Relax your arms back to where you started. Repeat 8 to 12 times. Arm raise to the side  During this strengthening exercise, your arm should stay about 30 degrees to the front of your side. Slowly raise your injured arm to the side, with your thumb facing up. Raise your arm 60 degrees at the most (shoulder level is 90 degrees). Hold the position for 3 to 5 seconds. Then lower your arm back to your side. If you need to, bring your \"good\" arm across your body and place it under the elbow as you lower your injured arm. Use your good arm to keep your injured arm from dropping down too fast.  Repeat 8 to 12 times. When you first start out, don't hold any extra weight in your hand. As you get stronger, you may use a 1-pound to 2-pound dumbbell or a small can of food. Shoulder flexor and extensor exercise  These are isometric exercises. That means you contract your muscles without actually moving. Push forward (flex): Stand facing a wall or doorjamb, about 6 inches or less back. Hold your injured arm against your body. Make a closed fist with your thumb on top.  Then gently push your hand forward into the wall with about 25% to 50% of your strength. Don't let your body move backward as you push. Hold for about 6 seconds. Relax for a few seconds. Repeat 8 to 12 times. Push backward (extend): Stand with your back flat against a wall. Your upper arm should be against the wall, with your elbow bent 90 degrees (your hand straight ahead). Push your elbow gently back against the wall with about 25% to 50% of your strength. Don't let your body move forward as you push. Hold for about 6 seconds. Relax for a few seconds. Repeat 8 to 12 times. Scapular exercise: Wall push-ups  This exercise is best done with your fingers somewhat turned out, rather than straight up and down. Stand facing a wall, about 12 inches to 18 inches away. Place your hands on the wall at shoulder height. Slowly bend your elbows and bring your face to the wall. Keep your back and hips straight. Push back to where you started. Repeat 8 to 12 times. When you can do this exercise against a wall comfortably, you can try it against a counter. You can then slowly progress to the end of a couch, then to a sturdy chair, and finally to the floor. Scapular exercise: Retraction  For this exercise, you will need elastic exercise material, such as surgical tubing or Thera-Band. Put the band around a solid object at about waist level. (A bedpost will work well.) Each hand should hold an end of the band. With your elbows at your sides and bent to 90 degrees, pull the band back. Your shoulder blades should move toward each other. Then move your arms back where you started. Repeat 8 to 12 times. If you have good range of motion in your shoulders, try this exercise with your arms lifted out to the sides. Keep your elbows at a 90-degree angle. Raise the elastic band up to about shoulder level. Pull the band back to move your shoulder blades toward each other. Then move your arms back where you started.   Internal rotator strengthening exercise  Start by tying a piece of elastic exercise material to a doorknob. You can use surgical tubing or Thera-Band. Stand or sit with your shoulder relaxed and your elbow bent 90 degrees. Your upper arm should rest comfortably against your side. Squeeze a rolled towel between your elbow and your body for comfort. This will help keep your arm at your side. Hold one end of the elastic band in the hand of the painful arm. Slowly rotate your forearm toward your body until it touches your belly. Slowly move it back to where you started. Keep your elbow and upper arm firmly tucked against the towel roll or at your side. Repeat 8 to 12 times. External rotator strengthening exercise  Start by tying a piece of elastic exercise material to a doorknob. You can use surgical tubing or Thera-Band. (You may also hold one end of the band in each hand.)  Stand or sit with your shoulder relaxed and your elbow bent 90 degrees. Your upper arm should rest comfortably against your side. Squeeze a rolled towel between your elbow and your body for comfort. This will help keep your arm at your side. Hold one end of the elastic band with the hand of the painful arm. Start with your forearm across your belly. Slowly rotate the forearm out away from your body. Keep your elbow and upper arm tucked against the towel roll or the side of your body until you begin to feel tightness in your shoulder. Slowly move your arm back to where you started. Repeat 8 to 12 times. Follow-up care is a key part of your treatment and safety. Be sure to make and go to all appointments, and call your doctor if you are having problems. It's also a good idea to know your test results and keep a list of the medicines you take. Current as of: March 9, 2022               Content Version: 13.4  © 7891-0462 [employer], [employer]. Care instructions adapted under license by [address]  If you have questions about a medical condition or this instruction, always ask your healthcare professional. [address] any warranty or liability for your use of this information.

## 2022-10-27 ENCOUNTER — OFFICE VISIT (OUTPATIENT)
Dept: INTERNAL MEDICINE CLINIC | Facility: CLINIC | Age: 77
End: 2022-10-27
Payer: MEDICARE

## 2022-10-27 VITALS
HEART RATE: 68 BPM | HEIGHT: 58 IN | BODY MASS INDEX: 40.3 KG/M2 | WEIGHT: 192 LBS | OXYGEN SATURATION: 97 % | SYSTOLIC BLOOD PRESSURE: 130 MMHG | DIASTOLIC BLOOD PRESSURE: 62 MMHG

## 2022-10-27 DIAGNOSIS — E55.9 VITAMIN D DEFICIENCY: ICD-10-CM

## 2022-10-27 DIAGNOSIS — I10 PRIMARY HYPERTENSION: Primary | Chronic | ICD-10-CM

## 2022-10-27 DIAGNOSIS — D50.9 IRON DEFICIENCY ANEMIA, UNSPECIFIED IRON DEFICIENCY ANEMIA TYPE: Chronic | ICD-10-CM

## 2022-10-27 DIAGNOSIS — N18.32 CHRONIC KIDNEY DISEASE, STAGE 3B (HCC): ICD-10-CM

## 2022-10-27 DIAGNOSIS — G47.33 OSA (OBSTRUCTIVE SLEEP APNEA): ICD-10-CM

## 2022-10-27 DIAGNOSIS — E78.2 MIXED HYPERLIPIDEMIA: Chronic | ICD-10-CM

## 2022-10-27 PROCEDURE — G8427 DOCREV CUR MEDS BY ELIG CLIN: HCPCS | Performed by: NURSE PRACTITIONER

## 2022-10-27 PROCEDURE — 3074F SYST BP LT 130 MM HG: CPT | Performed by: NURSE PRACTITIONER

## 2022-10-27 PROCEDURE — G8484 FLU IMMUNIZE NO ADMIN: HCPCS | Performed by: NURSE PRACTITIONER

## 2022-10-27 PROCEDURE — 1036F TOBACCO NON-USER: CPT | Performed by: NURSE PRACTITIONER

## 2022-10-27 PROCEDURE — 1123F ACP DISCUSS/DSCN MKR DOCD: CPT | Performed by: NURSE PRACTITIONER

## 2022-10-27 PROCEDURE — G8417 CALC BMI ABV UP PARAM F/U: HCPCS | Performed by: NURSE PRACTITIONER

## 2022-10-27 PROCEDURE — 1090F PRES/ABSN URINE INCON ASSESS: CPT | Performed by: NURSE PRACTITIONER

## 2022-10-27 PROCEDURE — G8400 PT W/DXA NO RESULTS DOC: HCPCS | Performed by: NURSE PRACTITIONER

## 2022-10-27 PROCEDURE — 3078F DIAST BP <80 MM HG: CPT | Performed by: NURSE PRACTITIONER

## 2022-10-27 PROCEDURE — 99214 OFFICE O/P EST MOD 30 MIN: CPT | Performed by: NURSE PRACTITIONER

## 2022-10-27 ASSESSMENT — PATIENT HEALTH QUESTIONNAIRE - PHQ9
SUM OF ALL RESPONSES TO PHQ QUESTIONS 1-9: 2
SUM OF ALL RESPONSES TO PHQ9 QUESTIONS 1 & 2: 1
6. FEELING BAD ABOUT YOURSELF - OR THAT YOU ARE A FAILURE OR HAVE LET YOURSELF OR YOUR FAMILY DOWN: 0
1. LITTLE INTEREST OR PLEASURE IN DOING THINGS: 0
5. POOR APPETITE OR OVEREATING: 0
SUM OF ALL RESPONSES TO PHQ QUESTIONS 1-9: 2
3. TROUBLE FALLING OR STAYING ASLEEP: 0
SUM OF ALL RESPONSES TO PHQ QUESTIONS 1-9: 2
2. FEELING DOWN, DEPRESSED OR HOPELESS: 1
7. TROUBLE CONCENTRATING ON THINGS, SUCH AS READING THE NEWSPAPER OR WATCHING TELEVISION: 0
8. MOVING OR SPEAKING SO SLOWLY THAT OTHER PEOPLE COULD HAVE NOTICED. OR THE OPPOSITE, BEING SO FIGETY OR RESTLESS THAT YOU HAVE BEEN MOVING AROUND A LOT MORE THAN USUAL: 0
4. FEELING TIRED OR HAVING LITTLE ENERGY: 1
SUM OF ALL RESPONSES TO PHQ QUESTIONS 1-9: 2
9. THOUGHTS THAT YOU WOULD BE BETTER OFF DEAD, OR OF HURTING YOURSELF: 0
10. IF YOU CHECKED OFF ANY PROBLEMS, HOW DIFFICULT HAVE THESE PROBLEMS MADE IT FOR YOU TO DO YOUR WORK, TAKE CARE OF THINGS AT HOME, OR GET ALONG WITH OTHER PEOPLE: 1

## 2022-10-27 NOTE — PROGRESS NOTES
PROGRESS NOTE    SUBJECTIVE:   Mart Juarez is a 69 y.o. female seen for a follow up visit for   Chief Complaint   Patient presents with    Follow-up Chronic Condition     Thyroid Problem  This is a chronic problem. Episode onset: years ago, starting as a Hashimoto's thyroiditis. Treatments tried: levothyroxine. Followed by endocrinology. Cholesterol Problem  This is a chronic problem. Hypertension   This is a chronic problem. Episode onset: . There are no associated agents to hypertension. Risk factors include family history, dyslipidemia, a sedentary lifestyle, obesity, hypertension and postmenopause    Stage IIIb chronic kidney disease    Adrian starting CPAP    Overactive bladder    Mixed hyperlipidemia  This is a chronic problem. Episode onset many years ago. Risk factors include family history (2 brothers have heart disease and 2 grandparents  secondary to heart attacks). Depression with anxiety  This is a chronic problem. Episode onset years ago. Currently on Lexapro 30 mg daily. She also uses amitriptyline at night. Reviewed and updated this visit by provider:  Tobacco  Allergies  Meds  Problems  Med Hx  Surg Hx  Fam Hx         Review of Systems   Constitutional:  Positive for fatigue (chronic). Negative for chills and fever. HENT:  Negative for sneezing. Respiratory:  Negative for cough, chest tightness, shortness of breath and wheezing. Cardiovascular:  Negative for chest pain, palpitations and leg swelling. Gastrointestinal:  Negative for abdominal pain. Genitourinary:  Positive for frequency (chronic). Musculoskeletal:  Positive for arthralgias (knee) and gait problem (recent TKA). Negative for back pain. Skin:  Negative for color change. Allergic/Immunologic: Negative for environmental allergies. Neurological:  Positive for dizziness (chronic, intermittent). Negative for weakness and headaches.    Hematological:  Does not bruise/bleed easily. Psychiatric/Behavioral:  Positive for dysphoric mood. The patient is not nervous/anxious. OBJECTIVE:    /62 (Site: Left Upper Arm, Position: Sitting, Cuff Size: Large Adult)   Pulse 68   Ht 4' 10\" (1.473 m)   Wt 192 lb (87.1 kg)   SpO2 97%   BMI 40.13 kg/m²      Physical Exam  Vitals and nursing note reviewed. Constitutional:       Appearance: Normal appearance. She is obese. HENT:      Head: Normocephalic. Mouth/Throat:      Lips: Pink. Mouth: Mucous membranes are moist.   Eyes:      General: Lids are normal.   Neck:      Vascular: No carotid bruit. Cardiovascular:      Rate and Rhythm: Normal rate and regular rhythm. Pulmonary:      Effort: Pulmonary effort is normal.      Breath sounds: Normal breath sounds. Musculoskeletal:      Cervical back: Neck supple. Right lower leg: No edema. Left lower leg: No edema. Skin:     General: Skin is warm and dry. Neurological:      Mental Status: She is alert and oriented to person, place, and time. Mental status is at baseline. Gait: Gait normal.   Psychiatric:         Mood and Affect: Mood normal.         Behavior: Behavior normal.        ASSESSMENT and PLAN    1. Primary hypertension  Blood pressure currently stable on lisinopril 10 mg daily.  -     CBC with Auto Differential; Future  -     Comprehensive Metabolic Panel; Future  -     Magnesium; Future  -     Uric Acid; Future  2. LORA (obstructive sleep apnea)  She has had a sleep study and is being set up with CPAP. 3. Vitamin D deficiency  Vitamin D level is low as 24.7 and most recent 33.8. Continue supplementation with ergocalciferol 50,000 units every 14 days. -     Comprehensive Metabolic Panel; Future  -     Vitamin D 25 Hydroxy; Future  4. Mixed hyperlipidemia  Most recent . HDL 64.  -     Comprehensive Metabolic Panel; Future  -     Lipid Panel; Future  5.  Iron deficiency anemia, unspecified iron deficiency anemia type  Following her knee surgery hemoglobin down to 9 but now back up to 10.8.  -     Transferrin Saturation; Future  -     Ferritin; Future  -     CBC with Auto Differential; Future  -     Comprehensive Metabolic Panel; Future  6. Chronic kidney disease, stage 3b (HCC)  Creatinine range 1.36-1.61 in the last year. Rationale for sodium reduction. Emphasis on blood pressure control. Return in about 3 months (around 1/27/2023) for Follow-Up, DM2, HTN, HLD, with labs. the following changes in treatment are made: As above  lab results and schedule of future lab studies reviewed with patient  reviewed diet, exercise and weight control  cardiovascular risk and specific lipid/LDL goals reviewed  reviewed medications and side effects in detail      JEAN Mejia - BRANDIE    Dictated using voice recognition software.  Proofread, but unrecognized voice recognition errors may exist.

## 2022-11-07 ENCOUNTER — TELEPHONE (OUTPATIENT)
Dept: ORTHOPEDIC SURGERY | Age: 77
End: 2022-11-07

## 2022-11-07 NOTE — TELEPHONE ENCOUNTER
Spoke with pt and let her know that I will speak with Dr. Brando Monroe this afternoon about oral medication vs a different inj site for her pain and get back to her. She expressed understanding and thanked me for calling.

## 2022-11-07 NOTE — TELEPHONE ENCOUNTER
Call pt  to  discuss  medicines   She is  taking  a lot  of  iburprofen and wants to see what else  she can have

## 2022-11-08 NOTE — TELEPHONE ENCOUNTER
Left pt a VM letting her know that I spoke with Dr. Allan Parker and her options would be bring her back in to either try another injection in the [facility] joint if that is where she is hurting, to talk about possible diagnostic scope. I let her know to contact the office to let us know how she would like to proceed.

## 2022-11-09 ENCOUNTER — TELEPHONE (OUTPATIENT)
Dept: ORTHOPEDIC SURGERY | Age: 77
End: 2022-11-09

## 2022-11-09 NOTE — TELEPHONE ENCOUNTER
I made pt an appointment to speak with Dr. Bushra Martinez about possible surgery. She expressed understanding.

## 2022-11-10 RX ORDER — HYOSCYAMINE SULFATE 0.125 MG
0.12 TABLET,DISINTEGRATING ORAL EVERY 8 HOURS PRN
Qty: 90 TABLET | Refills: 0 | Status: SHIPPED | OUTPATIENT
Start: 2022-11-10

## 2022-11-10 RX ORDER — HYOSCYAMINE SULFATE 0.125 MG
TABLET,DISINTEGRATING ORAL
Qty: 60 TABLET | Refills: 0 | OUTPATIENT
Start: 2022-11-10

## 2022-11-10 NOTE — TELEPHONE ENCOUNTER
----- Message from [facility] sent at 11/10/2022  1:49 PM EST -----  Subject: Refill Request    QUESTIONS  Name of Medication? hyoscyamine (OSCIMIN) 125 MCG TBDP dispersible tablet  Patient-reported dosage and instructions? twice a day  How many days do you have left? 3  Preferred Pharmacy? [address] phone number (if available)? [phone]  ---------------------------------------------------------------------------  --------------  Iqra [employer] INFO  What is the best way for the office to contact you? OK to leave message on   voicemail  Preferred Call Back Phone Number? [phone]  ---------------------------------------------------------------------------  --------------  SCRIPT ANSWERS  Relationship to Patient?  Self

## 2022-11-11 ENCOUNTER — OFFICE VISIT (OUTPATIENT)
Dept: ORTHOPEDIC SURGERY | Age: 77
End: 2022-11-11
Payer: MEDICARE

## 2022-11-11 ENCOUNTER — TELEPHONE (OUTPATIENT)
Dept: ENDOCRINOLOGY | Age: 77
End: 2022-11-11

## 2022-11-11 DIAGNOSIS — M67.922 TENDINOPATHY OF LEFT BICEPS: ICD-10-CM

## 2022-11-11 DIAGNOSIS — M75.02 ADHESIVE CAPSULITIS OF LEFT SHOULDER: ICD-10-CM

## 2022-11-11 DIAGNOSIS — M25.512 LEFT SHOULDER PAIN, UNSPECIFIED CHRONICITY: Primary | ICD-10-CM

## 2022-11-11 PROCEDURE — 20610 DRAIN/INJ JOINT/BURSA W/O US: CPT | Performed by: ORTHOPAEDIC SURGERY

## 2022-11-11 RX ORDER — TRAMADOL HYDROCHLORIDE 50 MG/1
50-100 TABLET ORAL NIGHTLY PRN
Qty: 20 TABLET | Refills: 0 | Status: SHIPPED | OUTPATIENT
Start: 2022-11-11 | End: 2022-11-21

## 2022-11-11 RX ORDER — TRIAMCINOLONE ACETONIDE 40 MG/ML
80 INJECTION, SUSPENSION INTRA-ARTICULAR; INTRAMUSCULAR ONCE
Status: COMPLETED | OUTPATIENT
Start: 2022-11-11 | End: 2022-11-11

## 2022-11-11 RX ADMIN — TRIAMCINOLONE ACETONIDE 80 MG: 40 INJECTION, SUSPENSION INTRA-ARTICULAR; INTRAMUSCULAR at 08:44

## 2022-11-11 NOTE — PROGRESS NOTES
Name: Lalo Martinez  YOB: 1945  Gender: female  MRN: [id number]    CC:   Chief Complaint   Patient presents with    Follow-up     Left shoulder pain- sx discussion        HPI: Patient comes in for recheck of left shoulder pain. Patient had biceps tendon injection on 9-2-2022 and notes 3-4 weeks relief with injection. Patient has also previously had a subacromial injection on 7-9-2022 with only minor relief. Her last visit she also noted some cervical spine pain with history of neck surgery. The patient notes posterior and lateral pain. Denies popping, clicking, numbness and tingling. Allergies   Allergen Reactions    Fire Ant Anaphylaxis    Amoxicillin Other (See Comments)     C-Diff    Amoxicillin-Pot Clavulanate Other (See Comments)     c.diff    Augmentin [Amoxicillin-Pot Clavulanate]     Cephalexin Other (See Comments)     C Diff      Cephalexin Other (See Comments)     C-Diff  C-Diff      Cephalosporins     Hydromorphone Other (See Comments)     Keeps her awake    Meperidine Hcl     Morphine Other (See Comments)     Awake  Awake  No sleep    Oxycodone     Oxycodone-Acetaminophen Other (See Comments)     Jittery  Nightmares    Oxycodone-Aspirin Other (See Comments)     Jittery  Other reaction(s): Unknown-Unspecified  Jittery    Penicillins Other (See Comments)     C-Diff      Povidone Iodine     Povidone-Iodine Other (See Comments)     Johnston  Johnston  burn    Adhesive Tape Itching and Rash     \"sticky ace wrap\"  \"sticky ace wrap\"    Meperidine Rash     Past Medical History:   Diagnosis Date    Acute kidney insufficiency     Followed by PCP     Adverse effect of anesthesia     2016 in [place] heart stopped at the end of my gallbladder surgery. \" Patient reports no CPR performed, stayed one night in the hospital. No problems with any other surgeries.     Allergic rhinitis     Anemia     history     Anxiety and depression     Controlled with medication     Arthritis     Edema bilateral lower legs     GERD (gastroesophageal reflux disease)     Controlled with medication     History of chicken pox     History of Clostridioides difficile infection     History of DVT (deep vein thrombosis)     s/p knee replacement and developed right leg DVT     HTN (hypertension)     controlled with medication     Hyperlipidemia     controlled with medication     Hyperlipidemia     Hypertension     Hyperthyroidism     pt denies    Hypothyroidism     controlled with medication     Hypothyroidism     IBS (irritable bowel syndrome)     Insomnia     Left flank pain 3/5/2020    Morbid obesity ([facility] [address]     BMI 42.85    Murmur     per PCP note (57/0/22) systolic murmur grade of 2/6    Overactive bladder     PAD (peripheral artery disease) ([facility] [address]     patient denies on 11/27/19    Recurrent UTI     Restless leg     RLS (restless legs syndrome)     controlled with medication     SOBOE (shortness of breath on exertion)     PRN inhaler-patient reports she uses inhaler QHS    Special examinations     History-Neurologist - Dr. Kesha OCONNOR (stress urinary incontinence, female)     Thromboembolus [facility]) 2015    behind right knee after total knee replacement    Tubulovillous adenoma of colon 04/26/2021     Past Surgical History:   Procedure Laterality Date    APPENDECTOMY  1955    CHOLECYSTECTOMY  12/2016    COLONOSCOPY      COLONOSCOPY      2013, 2003    GYN  12/03/2019    suburethral sling and cystoscopy    HYSTERECTOMY, TOTAL ABDOMINAL (CERVIX REMOVED)      1970    IR GENERIC PROCEDURE      2014    KNEE SURGERY Left     4/2016    KNEE SURGERY Left 2014    LAP BAND  REMOVAL 3/16/2015    ORTHOPEDIC SURGERY Left 01/2020    left hand tendon surgery    ORTHOPEDIC SURGERY Left     LEFT WRIST TRAPEZIOMETACARPAL ARTHROPLASTY WITH FCR TENDON TRANSFER- 2020    OTHER SURGICAL HISTORY Right 2016    Hand    OTHER SURGICAL HISTORY      2014    C/ [address]    OTHER SURGICAL HISTORY  12/03/2019    bladder surgery-put mesh in    SHOULDER ARTHROSCOPY      1966,     TONSILLECTOMY AND ADENOIDECTOMY      1950    TOTAL KNEE ARTHROPLASTY Right 2014     Family History   Problem Relation Age of Onset    Other Sister     Heart Disease Brother     No Known Problems Mother     Other Father 54        brain aneurysm    Cerebral Aneurysm Father     Congenial Anomalies Sister         right arm deformity    Pneumonia Sister     Other Sister         fibromyalgia    Hypertension Brother     Heart Disease Brother     Other Brother         brain aneurysm, AVM    Heart Surgery Brother         CABGx4    Heart Attack Maternal Grandmother     Cancer Maternal Grandfather         skin    Heart Attack Paternal Grandfather     Mult Sclerosis Daughter     Drug Abuse Son     Other Son         Hepatitis C     Social History     Socioeconomic History    Marital status:      Spouse name: Mikie Brink    Number of children: 2    Years of education: Not on file    Highest education level: Not on file   Occupational History    Occupation: Previously worked as a [employer]    Tobacco Use    Smoking status: Former     Packs/day: 1.00     Years: 46.00     Pack years: 46.00     Types: Cigarettes     Quit date: 2005     Years since quittin.9    Smokeless tobacco: Never   Vaping Use    Vaping Use: Unknown   Substance and Sexual Activity    Alcohol use: No     Alcohol/week: 0.0 standard drinks    Drug use: No    Sexual activity: Not on file   Other Topics Concern    Not on file   Social History Narrative    ** Merged History Encounter **         Patient has 1 dog sleeps with her occasionally. Patient currently drinks 1 caffeine drink per day.      Social Determinants of Health     Financial Resource Strain: Not on file   Food Insecurity: Not on file   Transportation Needs: Not on file   Physical Activity: Not on file   Stress: Not on file   Social Connections: Not on file   Intimate Partner Violence: Not on file   Housing Stability: Not on file        No flowsheet data found. Review of Systems  Non-contributory    Physical exam:     Left shoulder is examined. She has lost external rotation. A little bit of tightness with flexion extension. Flexion today is 90 abduction 85-90. External rotation 5-10 degrees. Internal rotation only to side pocket. Right shoulder has limited motion from prior surgery but flexions at least 245 250. Abduction at least 210. External rotation's around 20. Internal rotation to around L2. Bilateral strength is appropriate. Recall Recall MRI left shoulder performed 7/5/22:     Narrative   MRI OF THE LEFT SHOULDER WITHOUT CONTRAST. Clinical Indication: Left shoulder pain with limited range of motion       Procedure: Multiplanar and multisequence MR images of the left shoulder were   performed without gadolinium contrast.       Comparison: No prior       Findings:        AC joint: Mild degenerative hypertrophy is noted of the AC joint. There is trace   fluid signal the subacromial/subdeltoid bursa. Rotator cuff: The supraspinatus, infraspinatus, teres minor, and subscapularis   tendons are intact. No rotator cuff tendon tear evident. Biceps labral complex: The long head of biceps tendon is intact. The superior   labrum is intact. The joint capsule in axillary recess is intact. Glenohumeral joint: The marrow signal in the humeral head and glenoid is   unremarkable. No definite anterior or posterior labral tear present. There is   mild degenerative chondral fissuring along the central glenoid. A small joint   effusion with synovitis is present. No abnormal soft tissue mass or fatty atrophy. Impression   1. 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 Small glenohumeral joint effusion with synovitis. 3. The rotator cuff tendon is intact. 4. Mild degenerative hypertrophy the [facility] joint with underlying   subacromial/subdeltoid bursitis.                   MRI reviewed, AC OA noticed. No RTC tear. Synovitis noted in the axillary space on coronal image 10. A/Plan:     ICD-10-CM    1. Left shoulder pain, unspecified chronicity  M25.512 Ambulatory referral to Physical Therapy     triamcinolone acetonide (KENALOG-40) injection 80 mg     DRAIN/INJECT LARGE JOINT/BURSA      2. Tendinopathy of left biceps  M67.922 Ambulatory referral to Physical Therapy     triamcinolone acetonide (KENALOG-40) injection 80 mg     DRAIN/INJECT LARGE JOINT/BURSA      3. Adhesive capsulitis of left shoulder  M75.02 Ambulatory referral to Physical Therapy     triamcinolone acetonide (KENALOG-40) injection 80 mg     DRAIN/INJECT LARGE JOINT/BURSA           Discussed with her that today she is indicating she is having more issues with a frozen shoulder. MRI did not indicate much in the form of arthritic change but does show some synovitis which I think is progressed to full-fledged frozen shoulder. Discussed the natural history of this in detail and gave her some handouts that are attached to the patient instructions in my chart. Suggest glenohumeral injection, tramadol for nighttime pain as well as some formal therapy for 6 weeks. We will recheck and see if her motion is improving. We did discuss the potential for lysis of adhesions manipulation and therapy again at some point if not improving. Procedure note: After discussion of risks and benefits including, but not limited to pain, infection, steroid flare, increased blood sugar, fat necrosis, skin discoloration, and injury to blood vessels or nerves, the patient verbally consented to proceed with a glenohumeral joint injection. The affected left shoulder was sterilely prepped in standard fashion and injected with 2 cc of Kenalog (40mg/ml), 2 cc of 1% Lidocaine, and 2 cc of 0.5% Marcaine into the joint space. The patient tolerated the injection well. Return in about 6 weeks (around 12/23/2022).         Paola Arora MD  11/11/22

## 2022-11-11 NOTE — PATIENT INSTRUCTIONS
Frozen Shoulder  Frozen shoulder, also called adhesive capsulitis, causes pain and stiffness in the shoulder. Over time, the shoulder becomes very hard to move. After a period of worsening symptoms, frozen shoulder tends to get better, although full recovery may take up to 3 years. Physical therapy, with a focus on shoulder flexibility, is the primary treatment recommendation for frozen shoulder. Frozen shoulder most commonly affects people between the ages of 36 and 61, and occurs in women more often than men. In addition, people with diabetes are at an increased risk for developing frozen shoulder. Anatomy  Your shoulder is a ball-and-socket joint made up of three bones: your upper arm bone (humerus), your shoulder blade (scapula), and your collarbone (clavicle). The head of the upper arm bone fits into a shallow socket in your shoulder blade. Strong connective tissue, called the shoulder capsule, surrounds the joint. To help your shoulder move more easily, synovial fluid lubricates the shoulder capsule and the joint. Description  In frozen shoulder, the shoulder capsule thickens and becomes stiff and tight. Thick bands of tissue -- called adhesions -- develop. In many cases, there is less synovial fluid in the joint. The hallmark signs of this condition are severe pain and being unable to move your shoulder -- either on your own or with the help of someone else. It develops in three stages:    Stage 1: Freezing  In the \"freezing\" stage, you slowly have more and more pain. As the pain worsens, your shoulder loses range of motion. Freezing typically lasts from 6 weeks to 9 months. Stage 2: Frozen  Painful symptoms may actually improve during this stage, but the stiffness remains. During the 4 to 6 months of the \"frozen\" stage, daily activities may be very difficult. Stage 3: Thawing  Shoulder motion slowly improves during the \"thawing\" stage.  Complete return to normal or close to normal strength and motion typically takes from 6 months to 2 years. Cause  The causes of frozen shoulder are not fully understood. There is no clear connection to arm dominance or occupation. A few factors may put you more at risk for developing frozen shoulder. Diabetes. Frozen shoulder occurs much more often in people with diabetes. The reason for this is not known. In addition, diabetic patients with frozen shoulder tend to have a greater degree of stiffness that continues for a longer time before \"thawing. \"    Other diseases. Some additional medical problems associated with frozen shoulder include hypothyroidism, hyperthyroidism, Parkinson's disease, and cardiac disease. Immobilization. Frozen shoulder can develop after a shoulder has been immobilized for a period of time due to surgery, a fracture, or other injury. Having patients move their shoulders soon after injury or surgery is one measure prescribed to prevent frozen shoulder. Symptoms  Pain from frozen shoulder is usually dull or aching. It is typically worse early in the course of the disease and when you move your arm. The pain is usually located over the outer shoulder area and sometimes the upper arm. Doctor Examination    Physical Examination  After discussing your symptoms and medical history, your doctor will examine your shoulder. Your doctor will move your shoulder carefully in all directions to see if movement is limited and if pain occurs with the motion. The range of motion when someone else moves your shoulder is called \"passive range of motion. \" Your doctor will compare this to the range of motion you display when you move your shoulder on your own (\"active range of motion\"). People with frozen shoulder have limited range of motion both actively and passively. Imaging Tests  Other tests that may help your doctor rule out other causes of stiffness and pain include:    X-rays.  Dense structures, such as bone, show up clearly on x-rays. X-rays may show other problems in your shoulder, such as arthritis. Magnetic resonance imaging (MRI) and ultrasound. These studies can create better images of soft tissues. They are not required to diagnose frozen shoulder, however, they may help to identify other problems in your shoulder, such as a torn rotator cuff. Treatment  Frozen shoulder generally gets better over time, although it may take up to 3 years. The focus of treatment is to control pain and restore motion and strength through physical therapy. Nonsurgical Treatment  Most people with frozen shoulder improve with relatively simple treatments to control pain and restore motion. Non-steroidal anti-inflammatory medicines. Drugs like aspirin and ibuprofen reduce pain and swelling. Steroid injections. Cortisone is a powerful anti-inflammatory medicine that is injected directly into your shoulder joint. Hydrodilatation. If your symptoms are not relieved by other nonsurgical methods, your doctor may recommend hydrodilatation. This procedure involves gently injecting a large volume of sterile fluid into the shoulder joint to expand and stretch the shoulder joint capsule. Hydrodilatation is conducted by a radiologist who uses imaging to guide the placement of fluid. Physical therapy. Specific exercises will help restore motion. These may be done under the supervision of a physical therapist or via a home program. Therapy includes stretching or range of motion exercises for the shoulder. Sometimes heat is used to help loosen the shoulder up before stretching. Below are examples of some of the exercises that might be recommended. External rotation -- passive stretch.  a doorway and bend your affected arm's elbow to 90 degrees to reach the doorjamb. Keep your hand in place and rotate your body as shown in the illustration. Hold for 30 seconds. Relax and repeat. Forward flexion -- supine position.  Lie on your back with your legs straight. Use your unaffected arm to lift your affected arm overhead until you feel a gentle stretch. Hold for 15 seconds and slowly lower to start position. Relax and repeat. Crossover arm stretch. Gently pull one arm across your chest just below your chin as far as possible without causing pain. Hold for 30 seconds. Relax and repeat. Surgical Treatment  If your symptoms are not relieved by therapy and other conservative methods, you and your doctor may discuss surgery. It is important to talk with your doctor about your potential for recovery continuing with simple treatments, and the risks involved with surgery. Surgery for frozen shoulder is typically offered during \"Stage 2: Frozen. \" The goal of surgery is to stretch and release the stiffened joint capsule. The most common methods include manipulation under anesthesia and shoulder arthroscopy. Manipulation under anesthesia. During this procedure, you are put to sleep. Your doctor will force your shoulder to move which causes the capsule and scar tissue to stretch or tear. This releases the tightening and increases range of motion. Shoulder arthroscopy. In this procedure, your doctor will cut through tight portions of the joint capsule. This is done using pencilsized instruments inserted through small incisions around your shoulder. In many cases, manipulation and arthroscopy are used in combination to obtain maximum results. Most patients have good outcomes with these procedures. Recovery. After surgery, physical therapy is necessary to maintain the motion that was achieved with surgery. Recovery times vary, from 6 weeks to 3 months. Although it is a slow process, your commitment to therapy is the most important factor in returning to all the activities you enjoy. Long-term outcomes after surgery are generally good, with most patients having reduced or no pain and improved range of motion.  In some cases, however, even after several years, the motion does not return completely and some degree of stiffness remains. Diabetic patients often have some degree of continued shoulder stiffness after surgery. Although uncommon, frozen shoulder can recur, especially if a contributing factor like diabetes is still present. Patient Education        Frozen Shoulder: Exercises  Introduction  Here are some examples of exercises for you to try. The exercises may be suggested for a condition or for rehabilitation. Start each exercise slowly. Ease off the exercises if you start to have pain. You will be told when to start these exercises and which ones will work best for you. How to do the exercises  Neck stretches  Look straight ahead, and tip your right ear to your right shoulder. Do not let your left shoulder rise up as you tip your head to the right. Hold 15 to 30 seconds. Tilt your head to the left. Do not let your right shoulder rise up as you tip your head to the left. Hold for 15 to 30 seconds. Repeat 2 to 4 times to each side. Shoulder rolls  Sit comfortably with your feet shoulder-width apart. You can also do this exercise while standing. Roll your shoulders up, then back, and then down in a smooth, circular motion. Repeat 2 to 4 times. Shoulder flexion (lying down)  For this exercise, you will need a wand. To make a wand, use a piece of PVC pipe or a broom handle with the broom removed. Make the wand about a foot wider than your shoulders. Lie on your back, holding a wand with your hands. Your palms should face down as you hold the wand. Place your hands slightly wider than your shoulders. Keeping your elbows straight, slowly raise your arms over your head until you feel a stretch in your shoulders, upper back, and chest.  Hold 15 to 30 seconds. Repeat 2 to 4 times. Shoulder rotation (lying down)  To make a wand, use a piece of PVC pipe or a broom handle with the broom removed.  Make the wand about a foot wider than your shoulders. Lie on your back and hold a wand in both hands with your elbows bent and your palms up. Keeping your elbows close to your body, move the wand across your body toward the arm that has pain. Hold for 15 to 30 seconds. Repeat 2 to 4 times. Shoulder internal rotation with towel  Roll up a towel lengthwise. Hold the towel above and behind your head with the arm that is not sore. With your sore arm, reach behind your back and grasp the towel. Using the arm above your head, pull the towel upward until you feel a stretch on the front and outside of your sore shoulder. Hold 15 to 30 seconds. Relax and move the towel back down to the starting position. Repeat 2 to 4 times. Shoulder blade squeeze  While standing with your arms at your sides, squeeze your shoulder blades together. Do not raise your shoulders up as you are squeezing. Hold for 6 seconds. Repeat 8 to 12 times. Follow-up care is a key part of your treatment and safety. Be sure to make and go to all appointments, and call your doctor if you are having problems. It's also a good idea to know your test results and keep a list of the medicines you take. Current as of: March 9, 2022               Content Version: 13.4  © 2006-2022 [employer]. Care instructions adapted under license by [facility] ([facility]). If you have questions about a medical condition or this instruction, always ask your healthcare professional. [place] [address] any warranty or liability for your use of this information.

## 2022-11-14 RX ORDER — LEVOTHYROXINE SODIUM 150 MCG
TABLET ORAL
Qty: 30 TABLET | Refills: 0 | OUTPATIENT
Start: 2022-11-14

## 2022-11-15 ENCOUNTER — NURSE ONLY (OUTPATIENT)
Dept: INTERNAL MEDICINE CLINIC | Facility: CLINIC | Age: 77
End: 2022-11-15
Payer: MEDICARE

## 2022-11-15 DIAGNOSIS — E53.8 VITAMIN B12 DEFICIENCY: Primary | ICD-10-CM

## 2022-11-15 PROCEDURE — 96372 THER/PROPH/DIAG INJ SC/IM: CPT | Performed by: NURSE PRACTITIONER

## 2022-11-15 RX ORDER — CYANOCOBALAMIN 1000 UG/ML
1000 INJECTION, SOLUTION INTRAMUSCULAR; SUBCUTANEOUS ONCE
Status: COMPLETED | OUTPATIENT
Start: 2022-11-15 | End: 2022-11-15

## 2022-11-15 RX ADMIN — CYANOCOBALAMIN 1000 MCG: 1000 INJECTION, SOLUTION INTRAMUSCULAR; SUBCUTANEOUS at 09:47

## 2022-11-17 ENCOUNTER — TELEMEDICINE (OUTPATIENT)
Dept: INTERNAL MEDICINE CLINIC | Facility: CLINIC | Age: 77
End: 2022-11-17
Payer: MEDICARE

## 2022-11-17 DIAGNOSIS — R22.0 FACIAL SWELLING: Primary | ICD-10-CM

## 2022-11-17 DIAGNOSIS — H92.01 RIGHT EAR PAIN: ICD-10-CM

## 2022-11-17 PROCEDURE — 99441 PR PHYS/QHP TELEPHONE EVALUATION 5-10 MIN: CPT | Performed by: PHYSICIAN ASSISTANT

## 2022-11-17 NOTE — PROGRESS NOTES
Angie Wilhelm is a 69 y.o. female evaluated via telephone on 11/17/2022. Chief Complaint   Patient presents with    Congestion       Jose G Hough was seen today for congestion. Diagnoses and all orders for this visit:    Facial swelling  Comments:  per pt swelling and pain, and swollen lymph nodes on both dise, suggested urgent since can see on [facility] and multple meds allergies need better eval    Right ear pain        Consent:  She and/or health care decision maker is aware that that she may receive a bill for this telephone service, depending on her insurance coverage, and has provided verbal consent to proceed: Yes    Documentation:  I communicated with the patient and/or health care decision maker about pt pain in right side face pain ear ache and sore throat. C/o swollen glands x 3 days, getting worse. Cant find thermometer, can only take tylenol-  Details of this discussion including any medical advice provided: suggested she seek care at urgent to get evaluated and treated appropriately      I affirm this is a Patient Initiated Episode with an Established Patient who has not had a related appointment within my department in the past 7 days or scheduled within the next 24 hours. Total Time: 5-10 minutes    Note: not billable if this call serves to triage the patient into an appointment for the relevant concern      No follow-ups on file.      GALINA Spence, [id number]

## 2022-11-22 ENCOUNTER — NURSE TRIAGE (OUTPATIENT)
Dept: OTHER | Facility: CLINIC | Age: 77
End: 2022-11-22

## 2022-11-22 NOTE — TELEPHONE ENCOUNTER
Location of patient: [address]     Received call from Jw Moctezuma  at [facility] with [employer]. Subjective: Caller states profuse sweating with any movement feels hot, at night cold \"feet turn to ice\"    Blacked out on Sunday,while driving,lasted few seconds. Was going to run into one of the [employer] at construction site while driving, then heard  calling her name, and then was aware of her surrounding     Having brain fog,harder to remember things     Blurry vision episodes for few years     Seen [facility]CC last Thurs,gave antibiotic for sinus infection, finished Zpack ,jaw and face still sore thinks may have absess tooth if presses on jaw behind right ear is sore     Entire mouth numb past 4 weeks varies in degree     Current Symptoms: Brain fog,sweaty with exertion,hot and cold episodes, jaw,and behind right ear is sore,entire mouth is numb     Onset: few weeks ago     Associated Symptoms: NA    Pain Severity: Right jaw tender to touch     Temperature: Denies    What has been tried:     LMP: NA Pregnant: No    Recommended disposition: Go to ED/UCC Now (Or to Office with PCP Approval)    Care advice provided, patient verbalizes understanding; denies any other questions or concerns; instructed to call back for any new or worsening symptoms. Writer provided warm transfer to Beckie Mancilla at [facility]  for 2nd level triage    Attention Provider: Thank you for allowing me to participate in the care of your patient. The patient was connected to triage in response to information provided to the ECC/PSC. Please do not respond through this encounter as the response is not directed to a shared pool.     Reason for Disposition   Age > 50 years    Protocols used: Fainting-ADULT-OH

## 2022-11-29 ENCOUNTER — OFFICE VISIT (OUTPATIENT)
Dept: INTERNAL MEDICINE CLINIC | Facility: CLINIC | Age: 77
End: 2022-11-29
Payer: MEDICARE

## 2022-11-29 ENCOUNTER — NURSE ONLY (OUTPATIENT)
Dept: INTERNAL MEDICINE CLINIC | Facility: CLINIC | Age: 77
End: 2022-11-29
Payer: MEDICARE

## 2022-11-29 VITALS
BODY MASS INDEX: 38.79 KG/M2 | SYSTOLIC BLOOD PRESSURE: 124 MMHG | WEIGHT: 185.6 LBS | HEART RATE: 69 BPM | DIASTOLIC BLOOD PRESSURE: 70 MMHG | OXYGEN SATURATION: 94 %

## 2022-11-29 VITALS
DIASTOLIC BLOOD PRESSURE: 70 MMHG | HEART RATE: 72 BPM | RESPIRATION RATE: 18 BRPM | WEIGHT: 185 LBS | SYSTOLIC BLOOD PRESSURE: 124 MMHG | HEIGHT: 58 IN | BODY MASS INDEX: 38.83 KG/M2

## 2022-11-29 DIAGNOSIS — E78.2 MIXED HYPERLIPIDEMIA: ICD-10-CM

## 2022-11-29 DIAGNOSIS — E53.8 VITAMIN B12 DEFICIENCY: Primary | ICD-10-CM

## 2022-11-29 DIAGNOSIS — Z79.899 ENCOUNTER FOR LONG-TERM (CURRENT) USE OF MEDICATIONS: ICD-10-CM

## 2022-11-29 DIAGNOSIS — D50.0 IRON DEFICIENCY ANEMIA DUE TO CHRONIC BLOOD LOSS: ICD-10-CM

## 2022-11-29 DIAGNOSIS — E53.8 VITAMIN B 12 DEFICIENCY: Primary | ICD-10-CM

## 2022-11-29 DIAGNOSIS — M79.7 FIBROMYALGIA: ICD-10-CM

## 2022-11-29 DIAGNOSIS — N18.32 CHRONIC KIDNEY DISEASE, STAGE 3B (HCC): ICD-10-CM

## 2022-11-29 DIAGNOSIS — E03.9 HYPOTHYROIDISM (ACQUIRED): ICD-10-CM

## 2022-11-29 DIAGNOSIS — D50.9 IRON DEFICIENCY ANEMIA, UNSPECIFIED IRON DEFICIENCY ANEMIA TYPE: ICD-10-CM

## 2022-11-29 DIAGNOSIS — E53.8 VITAMIN B 12 DEFICIENCY: ICD-10-CM

## 2022-11-29 DIAGNOSIS — R79.89 LOW SERUM CORTISOL LEVEL: ICD-10-CM

## 2022-11-29 DIAGNOSIS — G47.33 OSA (OBSTRUCTIVE SLEEP APNEA): ICD-10-CM

## 2022-11-29 DIAGNOSIS — I10 PRIMARY HYPERTENSION: ICD-10-CM

## 2022-11-29 PROBLEM — D64.9 ANEMIA: Status: ACTIVE | Noted: 2017-09-18

## 2022-11-29 PROBLEM — M25.531 RIGHT WRIST PAIN: Status: ACTIVE | Noted: 2017-01-12

## 2022-11-29 PROBLEM — M85.80 OSTEOPENIA: Status: ACTIVE | Noted: 2017-09-18

## 2022-11-29 PROBLEM — L82.1 SEBORRHEIC KERATOSES: Status: ACTIVE | Noted: 2017-01-12

## 2022-11-29 PROBLEM — K92.1 MELENA: Status: ACTIVE | Noted: 2017-09-18

## 2022-11-29 PROCEDURE — 3074F SYST BP LT 130 MM HG: CPT | Performed by: NURSE PRACTITIONER

## 2022-11-29 PROCEDURE — G8400 PT W/DXA NO RESULTS DOC: HCPCS | Performed by: NURSE PRACTITIONER

## 2022-11-29 PROCEDURE — G8417 CALC BMI ABV UP PARAM F/U: HCPCS | Performed by: NURSE PRACTITIONER

## 2022-11-29 PROCEDURE — 96372 THER/PROPH/DIAG INJ SC/IM: CPT | Performed by: NURSE PRACTITIONER

## 2022-11-29 PROCEDURE — 3078F DIAST BP <80 MM HG: CPT | Performed by: NURSE PRACTITIONER

## 2022-11-29 PROCEDURE — 1090F PRES/ABSN URINE INCON ASSESS: CPT | Performed by: NURSE PRACTITIONER

## 2022-11-29 PROCEDURE — G8427 DOCREV CUR MEDS BY ELIG CLIN: HCPCS | Performed by: NURSE PRACTITIONER

## 2022-11-29 PROCEDURE — 99215 OFFICE O/P EST HI 40 MIN: CPT | Performed by: NURSE PRACTITIONER

## 2022-11-29 PROCEDURE — 1036F TOBACCO NON-USER: CPT | Performed by: NURSE PRACTITIONER

## 2022-11-29 PROCEDURE — 1123F ACP DISCUSS/DSCN MKR DOCD: CPT | Performed by: NURSE PRACTITIONER

## 2022-11-29 PROCEDURE — G8484 FLU IMMUNIZE NO ADMIN: HCPCS | Performed by: NURSE PRACTITIONER

## 2022-11-29 RX ORDER — CYANOCOBALAMIN 1000 UG/ML
1000 INJECTION, SOLUTION INTRAMUSCULAR; SUBCUTANEOUS ONCE
Status: COMPLETED | OUTPATIENT
Start: 2022-11-29 | End: 2022-11-29

## 2022-11-29 RX ORDER — AZITHROMYCIN 250 MG/1
TABLET, FILM COATED ORAL
COMMUNITY
Start: 2022-11-22

## 2022-11-29 RX ADMIN — CYANOCOBALAMIN 1000 MCG: 1000 INJECTION, SOLUTION INTRAMUSCULAR; SUBCUTANEOUS at 10:12

## 2022-11-29 ASSESSMENT — PATIENT HEALTH QUESTIONNAIRE - PHQ9
4. FEELING TIRED OR HAVING LITTLE ENERGY: 1
SUM OF ALL RESPONSES TO PHQ QUESTIONS 1-9: 8
SUM OF ALL RESPONSES TO PHQ QUESTIONS 1-9: 8
5. POOR APPETITE OR OVEREATING: 3
SUM OF ALL RESPONSES TO PHQ QUESTIONS 1-9: 8
3. TROUBLE FALLING OR STAYING ASLEEP: 3
7. TROUBLE CONCENTRATING ON THINGS, SUCH AS READING THE NEWSPAPER OR WATCHING TELEVISION: 0
2. FEELING DOWN, DEPRESSED OR HOPELESS: 1
8. MOVING OR SPEAKING SO SLOWLY THAT OTHER PEOPLE COULD HAVE NOTICED. OR THE OPPOSITE, BEING SO FIGETY OR RESTLESS THAT YOU HAVE BEEN MOVING AROUND A LOT MORE THAN USUAL: 0
1. LITTLE INTEREST OR PLEASURE IN DOING THINGS: 0
10. IF YOU CHECKED OFF ANY PROBLEMS, HOW DIFFICULT HAVE THESE PROBLEMS MADE IT FOR YOU TO DO YOUR WORK, TAKE CARE OF THINGS AT HOME, OR GET ALONG WITH OTHER PEOPLE: 2
6. FEELING BAD ABOUT YOURSELF - OR THAT YOU ARE A FAILURE OR HAVE LET YOURSELF OR YOUR FAMILY DOWN: 0
SUM OF ALL RESPONSES TO PHQ9 QUESTIONS 1 & 2: 1
SUM OF ALL RESPONSES TO PHQ QUESTIONS 1-9: 8
9. THOUGHTS THAT YOU WOULD BE BETTER OFF DEAD, OR OF HURTING YOURSELF: 0

## 2022-11-29 NOTE — PROGRESS NOTES
PROGRESS NOTE    SUBJECTIVE:   Kanchan Slater is a 69 y.o. female seen for a follow up visit for   Chief Complaint   Patient presents with    Other     Sweating/cold, not sleeping,tongue numbness x 1 month  Nausea daily dizziness      Left upper/lower lips numb,  left tongue numbness. Having headache on left side only. Left neck pain. (Does have a pillow that is too firm)  Glands / lower jaw feels different. Sweats intermittent during the day. Sweating is profuse then gets cold. Balance off. \"I blacked out coming home from [place]. Dez thought I fell asleep. I was going towards the [facility] and he yelled at me. I came to and avoided crashing. \"  Turned CPAP in because felt like sleeping was worse with CPAP machine. Chronic medical problems  Thyroid Problem  This is a chronic problem. Episode onset: years ago, starting as a Hashimoto's thyroiditis. Treatments tried: levothyroxine. Followed by endocrinology. Cholesterol Problem  This is a chronic problem. Hypertension   This is a chronic problem. Episode onset: . There are no associated agents to hypertension. Risk factors include family history, dyslipidemia, a sedentary lifestyle, obesity, hypertension and postmenopause    Stage IIIb chronic kidney disease    Adrian starting CPAP    Overactive bladder    Mixed hyperlipidemia  This is a chronic problem. Episode onset many years ago. Risk factors include family history (2 brothers have heart disease and 2 grandparents  secondary to heart attacks). Depression with anxiety  This is a chronic problem. Episode onset years ago. Currently on Lexapro 30 mg daily. She also uses amitriptyline at night. Reviewed and updated this visit by provider:  Tobacco  Allergies  Meds  Problems  Med Hx  Surg Hx  Fam Hx         Review of Systems   Constitutional:  Positive for fatigue (chronic). Negative for chills and fever. HENT:  Negative for sneezing. Respiratory:  Negative for cough, chest tightness, shortness of breath and wheezing. Cardiovascular:  Negative for chest pain, palpitations and leg swelling. Gastrointestinal:  Negative for abdominal pain. Genitourinary:  Positive for frequency (chronic). Musculoskeletal:  Positive for arthralgias (knee) and gait problem (recent TKA). Negative for back pain. Skin:  Negative for color change. Allergic/Immunologic: Negative for environmental allergies. Neurological:  Positive for dizziness (chronic, intermittent). Negative for weakness and headaches. Hematological:  Does not bruise/bleed easily. Psychiatric/Behavioral:  Positive for dysphoric mood. The patient is not nervous/anxious. OBJECTIVE:    /70 (Site: Left Upper Arm, Position: Sitting, Cuff Size: Large Adult)   Pulse 69   Wt 185 lb 9.6 oz (84.2 kg)   SpO2 94%   BMI 38.79 kg/m²      Physical Exam  Vitals and nursing note reviewed. Constitutional:       Appearance: Normal appearance. She is obese. HENT:      Head: Normocephalic. Mouth/Throat:      Lips: Pink. Mouth: Mucous membranes are moist.   Eyes:      General: Lids are normal.   Neck:      Vascular: No carotid bruit. Cardiovascular:      Rate and Rhythm: Normal rate and regular rhythm. Pulmonary:      Effort: Pulmonary effort is normal.      Breath sounds: Normal breath sounds. Musculoskeletal:      Cervical back: Neck supple. Right lower leg: No edema. Left lower leg: No edema. Skin:     General: Skin is warm and dry. Neurological:      Mental Status: She is alert and oriented to person, place, and time. Mental status is at baseline. Cranial Nerves: No cranial nerve deficit, dysarthria or facial asymmetry. Motor: No weakness. Coordination: Romberg sign negative. Gait: Gait normal.   Psychiatric:         Mood and Affect: Mood normal.         Behavior: Behavior normal.        ASSESSMENT and PLAN    1.  Vitamin B 12 deficiency  She is describing multiple symptoms it may be related to be 12 deficiency will obtain level today. She is also due for her B12 injection. May need to increase frequency. -     Folate; Future  -     Methylmalonic Acid, Serum; Future  -     Gastric Parietal Cell Antibody; Future  2. Low serum cortisol level  She had a previously noted cortisol level of 1.0 (low). Will repeat today. -     Cortisol AM, Total; Future  3. Chronic kidney disease, stage 3b (HCC)    -     CBC with Auto Differential; Future  -     Basic Metabolic Panel; Future  -     Uric Acid; Future  4. Iron deficiency anemia, unspecified iron deficiency anemia type  -     CBC with Auto Differential; Future  -     Transferrin Saturation; Future  5. LORA (obstructive sleep apnea)  Assessment & Plan:  Currently not on therapy. She states she did not rest well while using the CPAP device and turned it back in approximately 1 month ago. I recommended she contact the sleep center and her durable medical equipment provider about retrying the CPAP. I suspect the events on her return from [place] I suspect the events of her \"blacking out\" while driving were related to sleep apnea. It sounds as if her  suspected she fell asleep also. She is advised not to drive until she has resumed the CPAP and is sleeping adequately. Return for Follow-Up, regularly scheduled appointment or sooner prn.  the following changes in treatment are made: As above  lab results and schedule of future lab studies reviewed with patient  reviewed medications and side effects in detail    On this date 11/29/2022 I have spent 41 minutes reviewing previous notes, test results and face to face with the patient discussing the diagnosis and importance of compliance with the treatment plan as well as documenting on the day of the visit. JEAN Philippe NP    Dictated using voice recognition software.  Proofread, but unrecognized voice recognition errors may exist.

## 2022-11-30 ENCOUNTER — TELEPHONE (OUTPATIENT)
Dept: INTERNAL MEDICINE CLINIC | Facility: CLINIC | Age: 77
End: 2022-11-30

## 2022-11-30 LAB
ANION GAP SERPL CALC-SCNC: 5 MMOL/L (ref 2–11)
BASOPHILS # BLD: 0.1 K/UL (ref 0–0.2)
BASOPHILS NFR BLD: 1 % (ref 0–2)
BUN SERPL-MCNC: 27 MG/DL (ref 8–23)
CALCIUM SERPL-MCNC: 9.5 MG/DL (ref 8.3–10.4)
CHLORIDE SERPL-SCNC: 104 MMOL/L (ref 101–110)
CO2 SERPL-SCNC: 29 MMOL/L (ref 21–32)
CORTIS AM PEAK SERPL-MCNC: 10.6 UG/DL (ref 7–25)
CREAT SERPL-MCNC: 1.5 MG/DL (ref 0.6–1)
DIFFERENTIAL METHOD BLD: ABNORMAL
EOSINOPHIL # BLD: 0.4 K/UL (ref 0–0.8)
EOSINOPHIL NFR BLD: 4 % (ref 0.5–7.8)
ERYTHROCYTE [DISTWIDTH] IN BLOOD BY AUTOMATED COUNT: 16.1 % (ref 11.9–14.6)
FOLATE SERPL-MCNC: 15.6 NG/ML (ref 3.1–17.5)
GLUCOSE SERPL-MCNC: 93 MG/DL (ref 65–100)
HCT VFR BLD AUTO: 38.7 % (ref 35.8–46.3)
HGB BLD-MCNC: 11.7 G/DL (ref 11.7–15.4)
IMM GRANULOCYTES # BLD AUTO: 0 K/UL (ref 0–0.5)
IMM GRANULOCYTES NFR BLD AUTO: 0 % (ref 0–5)
IRON SATN MFR SERPL: 17 %
IRON SERPL-MCNC: 79 UG/DL (ref 35–150)
LYMPHOCYTES # BLD: 2.1 K/UL (ref 0.5–4.6)
LYMPHOCYTES NFR BLD: 23 % (ref 13–44)
MAGNESIUM SERPL-MCNC: 2 MG/DL (ref 1.8–2.4)
MCH RBC QN AUTO: 28.7 PG (ref 26.1–32.9)
MCHC RBC AUTO-ENTMCNC: 30.2 G/DL (ref 31.4–35)
MCV RBC AUTO: 94.9 FL (ref 82–102)
MONOCYTES # BLD: 0.6 K/UL (ref 0.1–1.3)
MONOCYTES NFR BLD: 7 % (ref 4–12)
NEUTS SEG # BLD: 6 K/UL (ref 1.7–8.2)
NEUTS SEG NFR BLD: 65 % (ref 43–78)
NRBC # BLD: 0 K/UL (ref 0–0.2)
PLATELET # BLD AUTO: 266 K/UL (ref 150–450)
PMV BLD AUTO: 11.5 FL (ref 9.4–12.3)
POTASSIUM SERPL-SCNC: 5.1 MMOL/L (ref 3.5–5.1)
RBC # BLD AUTO: 4.08 M/UL (ref 4.05–5.2)
SODIUM SERPL-SCNC: 138 MMOL/L (ref 133–143)
TIBC SERPL-MCNC: 477 UG/DL (ref 250–450)
URATE SERPL-MCNC: 6.5 MG/DL (ref 2.6–6)
WBC # BLD AUTO: 9.2 K/UL (ref 4.3–11.1)

## 2022-12-01 PROBLEM — A03.9 SHIGELLA GASTROENTERITIS: Status: RESOLVED | Noted: 2022-05-09 | Resolved: 2022-12-01

## 2022-12-01 PROBLEM — K92.1 MELENA: Status: RESOLVED | Noted: 2017-09-18 | Resolved: 2022-12-01

## 2022-12-01 PROBLEM — K52.9 COLITIS: Status: RESOLVED | Noted: 2021-03-27 | Resolved: 2022-12-01

## 2022-12-01 LAB — PCA AB SER-ACNC: 9.6 UNITS (ref 0–20)

## 2022-12-02 LAB — METHYLMALONATE SERPL-SCNC: 366 NMOL/L (ref 0–378)

## 2022-12-02 NOTE — ASSESSMENT & PLAN NOTE
Currently not on therapy. She states she did not rest well while using the CPAP device and turned it back in approximately 1 month ago. I recommended she contact the sleep center and her durable medical equipment provider about retrying the CPAP. I suspect the events on her return from [place] I suspect the events of her \"blacking out\" while driving were related to sleep apnea. It sounds as if her  suspected she fell asleep also. She is advised not to drive until if she has resumed the CPAP and is sleeping adequately.

## 2022-12-07 ENCOUNTER — OFFICE VISIT (OUTPATIENT)
Dept: ORTHOPEDIC SURGERY | Age: 77
End: 2022-12-07
Payer: MEDICARE

## 2022-12-07 DIAGNOSIS — R53.1 WEAKNESS GENERALIZED: ICD-10-CM

## 2022-12-07 DIAGNOSIS — M25.612 SHOULDER STIFFNESS, LEFT: ICD-10-CM

## 2022-12-07 DIAGNOSIS — M25.512 LEFT SHOULDER PAIN, UNSPECIFIED CHRONICITY: Primary | ICD-10-CM

## 2022-12-07 DIAGNOSIS — M67.922 TENDINOPATHY OF LEFT BICEPS: ICD-10-CM

## 2022-12-07 DIAGNOSIS — M75.02 ADHESIVE CAPSULITIS OF LEFT SHOULDER: ICD-10-CM

## 2022-12-07 DIAGNOSIS — Z78.9 ALTERATION IN PERFORMANCE OF ACTIVITIES OF DAILY LIVING: ICD-10-CM

## 2022-12-07 PROCEDURE — 97110 THERAPEUTIC EXERCISES: CPT | Performed by: PHYSICAL THERAPIST

## 2022-12-07 PROCEDURE — 97162 PT EVAL MOD COMPLEX 30 MIN: CPT | Performed by: PHYSICAL THERAPIST

## 2022-12-07 NOTE — PROGRESS NOTES
[facility] - [address] [address]  Dept: [phone]      Physical Therapy Initial Assessment     Referring MD: Jun Porras MD  Diagnosis:     ICD-10-CM    1. Left shoulder pain, unspecified chronicity  M25.512       2. Weakness generalized  R53.1       3. Alteration in performance of activities of daily living  Z78.9       4. Shoulder stiffness, left  M25.612          Surgery: N/A    Therapy precautions:None  Co-morbidities affecting plan of care: Right RTC repair with a SAD and DCE, Bilateral TKA (left TKA 01/2022), SOB with exertion  Total Timed Procedure Codes: 30 min, Total Time: 60 min  Time In: 02:00 PM  Time Out: 03:00 PM    PERTINENT MEDICAL HISTORY     Past medical and surgical history:   Past Medical History:   Diagnosis Date    Acute kidney insufficiency     Followed by PCP     Adverse effect of anesthesia     2016 in [place] heart stopped at the end of my gallbladder surgery. \" Patient reports no CPR performed, stayed one night in the hospital. No problems with any other surgeries.     Allergic rhinitis     Anemia     history     Anxiety and depression     Controlled with medication     Arthritis     Edema     bilateral lower legs     GERD (gastroesophageal reflux disease)     Controlled with medication     History of chicken pox     History of Clostridioides difficile infection     History of DVT (deep vein thrombosis)     s/p knee replacement and developed right leg DVT     HTN (hypertension)     controlled with medication     Hyperlipidemia     controlled with medication     Hyperlipidemia     Hypertension     Hyperthyroidism     pt denies    Hypothyroidism     controlled with medication     Hypothyroidism     IBS (irritable bowel syndrome)     Insomnia     Left flank pain 3/5/2020    Morbid obesity ([facility]     BMI 42.85    Murmur     per PCP note (61/0/52) systolic murmur grade of 2/6    Overactive bladder     PAD (peripheral artery disease) ([facility]     patient denies on 11/27/19    Recurrent UTI     Restless leg     RLS (restless legs syndrome)     controlled with medication     Shigella gastroenteritis 5/9/2022    SOBOE (shortness of breath on exertion)     PRN inhaler-patient reports she uses inhaler QHS    Special examinations     History-Neurologist - Dr. Lucretia Bence SUI (stress urinary incontinence, female)     Thromboembolus 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      Past Surgical History:   Procedure Laterality Date     [address]  12/2016    COLONOSCOPY      COLONOSCOPY      2013, 2003    GYN  12/03/2019    suburethral sling and cystoscopy    HYSTERECTOMY, TOTAL ABDOMINAL (CERVIX REMOVED)      1970    IR GENERIC PROCEDURE      2014    KNEE SURGERY Left     4/2016    KNEE SURGERY Left 2014    LAP BAND  REMOVAL 3/16/2015    ORTHOPEDIC SURGERY Left 01/2020    left hand tendon surgery    ORTHOPEDIC SURGERY Left     LEFT WRIST TRAPEZIOMETACARPAL ARTHROPLASTY WITH FCR TENDON TRANSFER- 2020    OTHER SURGICAL HISTORY Right 2016    Hand    OTHER SURGICAL HISTORY      2014    [address]    OTHER SURGICAL HISTORY  12/03/2019    bladder surgery-put mesh in    SHOULDER ARTHROSCOPY      1966,     [address]    TOTAL KNEE ARTHROPLASTY Right 2014     Medications: reviewed in chart   Allergies:    Allergies   Allergen Reactions    Fire Ant Anaphylaxis    Amoxicillin Other (See Comments)     C-Diff    Amoxicillin-Pot Clavulanate Other (See Comments)     c.diff    Augmentin [Amoxicillin-Pot Clavulanate]     Cephalexin Other (See Comments)     C Diff      Cephalexin Other (See Comments)     C-Diff  C-Diff      Cephalosporins     Hydromorphone Other (See Comments)     Keeps her awake    Meperidine Hcl     Morphine Other (See Comments)     Awake  Awake  No sleep    Oxycodone     Oxycodone-Acetaminophen Other (See you rate your overall health? good  Pt lives with independent spouse in a(n) 1 story house with no exterior steps. 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 A little bit  Current level of function: modified independent with ADLs, and IADLs d/t being cautious    Patient Stated Goals:   Improve bilateral shoulder strength to be able to lift W/C into and out of car  Improve right shoulder AROM to perform light house work  Perform repeated upward and forward reaching to do craft projects    OBJECTIVE     Functional Outcome Questionnaire: QuickDASH:  20/11 = 20.45% Functional Deficit   Hand/Side Dominance: right handed  Observation:   Posture: Forward head and Rounded shoulders  Swelling/Edema: none  Skin Integrity: normal   Palpation/joint mobility: Mild hypomobility with left GHJ mobilizations    A/PROM: RIGHT LEFT End Feel Muscle Guarding   FLEX: 130° 141°/154° Empty None   Scaption: 132° 129°/171° Empty None   ER in ABD: 72° 36°/52° Empty None   IR: 60° 77°/77° Empty None      Capsular pattern: external rotation being the most limited, followed by abduction, internal rotation, and flexion    MMT RIGHT LEFT Quality of testing   FLEX: 4-/5 4-/5 Left shoulder pain with resisted IR and ER   Scaption: 4-/5 4-/5    ER  4/5 3+/5    IR: 4/5 4-/5    Biceps: 4-/5 4-/5    Triceps: 4-/5 4-/5           Treatment provided today consisted of initial evaluation followed by:   Therapeutic exercise (30795) x 30 min to address ROM/strength deficits and to develop an initial HEP as noted below. Access Code: [email or web address]  URL: [email or web address]  Date: 12/07/2022  Prepared by: Vivienne Cisneros DPT    Exercises  Heat - 2 x daily - 7 x weekly - 10 minutes duration  Supine Shoulder External Rotation in 45 Degrees Abduction AAROM with Dowel - 2 x daily - 7 x weekly - 1 sets - 5 reps - 12 seconds hold  Seated Shoulder Scaption AAROM with Pulley at Side - 2 x daily - 7 x weekly - 1 sets - 5 reps - 12 seconds hold  Seated Shoulder Flexion AAROM with Pulley Behind - 2 x daily - 7 x weekly - 1 sets - 5 reps - 12 seconds hold  Standing Shoulder Internal Rotation AAROM with Pulley - 2 x daily - 7 x weekly - 3 sets - 10 reps - 10 seconds hold  Seated Scapular Retraction - 2 x daily - 7 x weekly - 1 sets - 10 reps - 10 seconds hold  Circular Shoulder Pendulum with Table Support - 3 x daily - 7 x weekly - 3 sets - 10 reps  Ice - 2 x daily - 7 x weekly - 15-20 minutes duration      Patient Education on the condition/pathology, involved anatomy, exercise rationale, cryotherapy, and thermotherapy. Patient was issued a written copy of her HEP which she demonstrated with good form and technique. Tactile and verbal cuing provided to facilitate proper form and technique. Plan for Next Session: Emphasis on RTC and deltoid strengthening      CLINICAL DECISION MAKING/ASSESSMENT     Personal Factors/co-morbidities affecting POC (1-2 Medium/3+High): co-morbidities as previously noted   Problem List: (1-2 Low/ 3 Medium/ 4+ High) Pain  ROM limitations  Strength deficits  Motor control deficits  Decreased endurance/activity Tolerance  Restricted recreational participation  Decreased [place] with Home Exercise Program    Clinical decision making: moderate complexity with questionable prediction of expectations and future outcomes which may require adjustments to the POC. Prognosis: good   Benefits and precautions of treatment explained to patient.   Valerie Bellamy Nabor Thomas is a 69 y.o. female who presents to therapy today with evolving/changing clinical presentation (moderate complexity)  related to left shoulder pain. Pt would benefit from skilled physical therapy services to address the deficits noted above for return to prior level of function. PLAN OF CARE     Effective Dates: 12/7/2022 TO 2/5/2023 (60 days). Frequency/Duration: 2x/week for 60 Day(s)  Interventions  may include but are not limited to: (17403) Therapeutic exercise to develop ROM, strength, endurance and flexibility  (48990) Therapeutic activities using dynamic activities to improve function  (08077) Manual therapy techniques to improve joint and/or soft tissue mobility, ROM, and function as well as helping to decrease pain/spasms and swelling  (34676) Neuromuscular reeducation addressing impaired balance, coordination, kinesthetic sense, posture and proprioception  Home exercise program (HEP) development  Modalities prn to address pain, spasms, and swelling: (90142/) Electrical stimulation- unattended  (65079) Vasopneumatic compression    The referring physician has reviewed and approved this evaluation and plan of care as noted by the electronic signature attached to note. GOALS     Short term goals to be met by 12/29/2022  (3 weeks)  Patient will demonstrate good recall of HEP requiring minimal to no verbal cuing for proper form and technique. Increase standing AROM of involved shoulder to be ? ER in ABD: 45° to improve ability to reach behind back, overhead and perform ADLs such as dressing with minimal modifications   Patient will improve LUE strength to ? 4/5 in order to perform household and work related tasks with greater ease. Patient will achieve an average score on the Patient-Specific Functional Scale ? 5/10 indicating improving functional mobility.     Long term goals to be met by 1/19/2023  (6 weeks)  Demonstrate AROM of involved shoulder that is within 5-10 degrees of the uninjured UE and does not limit function. MMT involved Shoulder to be 4+/5 allowing for normal use of lifting, reaching and pushing/pulling associated with ADLs. Patient will take the BHAVIN Shoulder Scale and achieve a score indicative of ? 15% functional deficit. Patient will achieve an average score on the Patient-Specific Functional Scale ? 7/10 indicating improving functional mobility.   Discharged from [place] [id number]

## 2022-12-08 DIAGNOSIS — E03.9 HYPOTHYROIDISM (ACQUIRED): ICD-10-CM

## 2022-12-09 LAB
T4 FREE SERPL-MCNC: 1.8 NG/DL (ref 0.78–1.46)
TSH, 3RD GENERATION: 0.43 UIU/ML (ref 0.36–3.74)

## 2022-12-09 NOTE — TELEPHONE ENCOUNTER
----- Message from Katrina Suero sent at 12/9/2022 10:33 AM EST -----  Subject: Refill Request    QUESTIONS  Name of Medication? colestipol (COLESTID) 1 g tablet  Patient-reported dosage and instructions? 1g  How many days do you have left? 0  Preferred Pharmacy? [facility] phone number (if available)? [phone]  Additional Information for Provider? needs medication before the weekend   she has been out for a week   ---------------------------------------------------------------------------  --------------  [address]  What is the best way for the office to contact you? OK to leave message on   voicemail  Preferred Call Back Phone Number? [phone]  ---------------------------------------------------------------------------  --------------  SCRIPT ANSWERS  Relationship to Patient?  Self

## 2022-12-10 RX ORDER — MONTELUKAST SODIUM 4 MG/1
1 TABLET, CHEWABLE ORAL 2 TIMES DAILY
Qty: 60 TABLET | Refills: 2 | Status: SHIPPED | OUTPATIENT
Start: 2022-12-10

## 2022-12-11 ASSESSMENT — ENCOUNTER SYMPTOMS
COUGH: 0
CHEST TIGHTNESS: 0
CHEST TIGHTNESS: 0
COLOR CHANGE: 0
WHEEZING: 0
BACK PAIN: 0
COUGH: 0
WHEEZING: 0
COLOR CHANGE: 0
ABDOMINAL PAIN: 0
ABDOMINAL PAIN: 0
SHORTNESS OF BREATH: 0
SHORTNESS OF BREATH: 0
BACK PAIN: 0

## 2022-12-12 RX ORDER — MONTELUKAST SODIUM 4 MG/1
TABLET, CHEWABLE ORAL
Qty: 60 TABLET | Refills: 0 | OUTPATIENT
Start: 2022-12-12

## 2022-12-13 ENCOUNTER — NURSE ONLY (OUTPATIENT)
Dept: INTERNAL MEDICINE CLINIC | Facility: CLINIC | Age: 77
End: 2022-12-13
Payer: MEDICARE

## 2022-12-13 DIAGNOSIS — E53.8 VITAMIN B 12 DEFICIENCY: Primary | ICD-10-CM

## 2022-12-13 DIAGNOSIS — E55.9 VITAMIN D DEFICIENCY: ICD-10-CM

## 2022-12-13 PROCEDURE — 96372 THER/PROPH/DIAG INJ SC/IM: CPT | Performed by: NURSE PRACTITIONER

## 2022-12-13 RX ORDER — ERGOCALCIFEROL 1.25 MG/1
50000 CAPSULE ORAL
Qty: 6 CAPSULE | Refills: 2 | Status: SHIPPED | OUTPATIENT
Start: 2022-12-13

## 2022-12-13 RX ORDER — CYANOCOBALAMIN 1000 UG/ML
1000 INJECTION, SOLUTION INTRAMUSCULAR; SUBCUTANEOUS ONCE
Status: COMPLETED | OUTPATIENT
Start: 2022-12-13 | End: 2022-12-13

## 2022-12-13 RX ORDER — CYANOCOBALAMIN 1000 UG/ML
1000 INJECTION, SOLUTION INTRAMUSCULAR; SUBCUTANEOUS ONCE
Qty: 1 ML | Refills: 2 | Status: SHIPPED | OUTPATIENT
Start: 2022-12-13 | End: 2022-12-13 | Stop reason: SDUPTHER

## 2022-12-13 RX ORDER — CYANOCOBALAMIN 1000 UG/ML
1000 INJECTION, SOLUTION INTRAMUSCULAR; SUBCUTANEOUS
Qty: 6 EACH | Refills: 2 | Status: SHIPPED | OUTPATIENT
Start: 2022-12-13

## 2022-12-13 RX ORDER — CYANOCOBALAMIN 1000 UG/ML
1000 INJECTION, SOLUTION INTRAMUSCULAR; SUBCUTANEOUS ONCE
COMMUNITY
End: 2022-12-13 | Stop reason: SDUPTHER

## 2022-12-13 RX ORDER — ONDANSETRON 4 MG/1
4 TABLET, FILM COATED ORAL EVERY 8 HOURS PRN
Qty: 30 TABLET | Refills: 1 | Status: SHIPPED | OUTPATIENT
Start: 2022-12-13

## 2022-12-13 RX ADMIN — CYANOCOBALAMIN 1000 MCG: 1000 INJECTION, SOLUTION INTRAMUSCULAR; SUBCUTANEOUS at 08:46

## 2022-12-13 NOTE — TELEPHONE ENCOUNTER
Request for refills of vitamin D and zofran to [facility] DR SUSAN NIELSON at [address] [place] [address] in [facility]

## 2022-12-15 ENCOUNTER — OFFICE VISIT (OUTPATIENT)
Dept: ENDOCRINOLOGY | Age: 77
End: 2022-12-15
Payer: MEDICARE

## 2022-12-15 VITALS
DIASTOLIC BLOOD PRESSURE: 80 MMHG | WEIGHT: 185 LBS | OXYGEN SATURATION: 97 % | HEART RATE: 84 BPM | BODY MASS INDEX: 38.67 KG/M2 | SYSTOLIC BLOOD PRESSURE: 122 MMHG

## 2022-12-15 DIAGNOSIS — N18.32 STAGE 3B CHRONIC KIDNEY DISEASE (HCC): ICD-10-CM

## 2022-12-15 DIAGNOSIS — E03.9 PRIMARY HYPOTHYROIDISM: Primary | ICD-10-CM

## 2022-12-15 PROCEDURE — G8427 DOCREV CUR MEDS BY ELIG CLIN: HCPCS | Performed by: PHYSICIAN ASSISTANT

## 2022-12-15 PROCEDURE — 1036F TOBACCO NON-USER: CPT | Performed by: PHYSICIAN ASSISTANT

## 2022-12-15 PROCEDURE — 3074F SYST BP LT 130 MM HG: CPT | Performed by: PHYSICIAN ASSISTANT

## 2022-12-15 PROCEDURE — 99214 OFFICE O/P EST MOD 30 MIN: CPT | Performed by: PHYSICIAN ASSISTANT

## 2022-12-15 PROCEDURE — 1090F PRES/ABSN URINE INCON ASSESS: CPT | Performed by: PHYSICIAN ASSISTANT

## 2022-12-15 PROCEDURE — 1123F ACP DISCUSS/DSCN MKR DOCD: CPT | Performed by: PHYSICIAN ASSISTANT

## 2022-12-15 PROCEDURE — G8417 CALC BMI ABV UP PARAM F/U: HCPCS | Performed by: PHYSICIAN ASSISTANT

## 2022-12-15 PROCEDURE — G8484 FLU IMMUNIZE NO ADMIN: HCPCS | Performed by: PHYSICIAN ASSISTANT

## 2022-12-15 PROCEDURE — 3078F DIAST BP <80 MM HG: CPT | Performed by: PHYSICIAN ASSISTANT

## 2022-12-15 PROCEDURE — G8400 PT W/DXA NO RESULTS DOC: HCPCS | Performed by: PHYSICIAN ASSISTANT

## 2022-12-15 RX ORDER — LEVOTHYROXINE SODIUM 150 MCG
150 TABLET ORAL DAILY
COMMUNITY
End: 2022-12-15 | Stop reason: SDUPTHER

## 2022-12-15 RX ORDER — LEVOTHYROXINE SODIUM 150 MCG
TABLET ORAL
Qty: 90 TABLET | Refills: 3 | Status: SHIPPED | OUTPATIENT
Start: 2022-12-15

## 2022-12-15 ASSESSMENT — ENCOUNTER SYMPTOMS
TROUBLE SWALLOWING: 1
VOICE CHANGE: 1

## 2022-12-15 NOTE — PROGRESS NOTES
[facility] ENDOCRINOLOGY   AND   THYROID NODULE CLINIC    Ruth El PA-C  [facility] Endocrinology and Thyroid Nodule Clinic  [address], [address]  [place]  Phone [phone]  Facsimile [phone]          Atul Martinez is a 69 y.o. female seen 12/15/2022 for follow up evaluation of primary hypothyroidism         Assessment and Plan:    In office COVID-19 PPE worn and precautions taken    1. Primary hypothyroidism  Patient tolerating name brand Synthroid. TSH very low normal with free T4 that is elevated. Slight reduction of Synthroid dose from 150 mcg daily to 1 tablet daily with a half tab on Sunday total daily dose 139 mcg, recheck labs in approximately 8 weeks. Contributing factor may be that she is taking this medication at night although otherwise taking correctly. - TSH; Future  - T4, Free; Future  - SYNTHROID 150 MCG tablet; 1 tab daily with 1/2 tab on Sunday, TDD 139mcg  Dispense: 90 tablet; Refill: 3  - TSH; Future  - T4, Free; Future    2. Stage 3b chronic kidney disease (HCC)  Mild edema noted, recommend low sodium diet          Brittney Moctezuma was seen today for thyroid problem. Diagnoses and all orders for this visit:    Primary hypothyroidism  -     TSH; Future  -     T4, Free; Future  -     SYNTHROID 150 MCG tablet; 1 tab daily with 1/2 tab on Sunday, TDD 139mcg  -     TSH; Future  -     T4, Free; Future    Stage 3b chronic kidney disease ([address]          History of Present Illness:    12/15/2022  Feeling unwell overall. Continues to take colestipol with other medication. Taking name brand synthroid at night. Suffering from frozen shoulder     4/7/2022   Returns clinic for follow-up of primary hypothyroidism. Is currently taking medication at night and attempting to take it correctly.   She had an episode of aphasia and was worked up for stroke, she reports stroke work-up was negative         6/21/2021   VIRTUAL VISIT   Atul Martinez is a 77 y.o. female who was seen by synchronous (real-time) audio-video technology on 6/21/2021 . The patient provided consent for this audio-video interaction. The Mobile Messenger platform was used. Patient and provider were located  at their individual homes. Patient presents for follow-up. Recent bout of colitis. Now on multiple supplements including B12 and iron with vitamin D. Taking this far from her thyroid replacement hormone. Has persisting weight gain, sweats, and fatigue. 3/1/2021   VIRTUAL VISIT   Valerie Rogel is a 77 y.o. female who was seen by synchronous (real-time) audio-video technology on 3/1/2021 . The patient provided consent for this audio-video interaction. The Mobile Messenger platform was used. Patient was located  at in parked car and provider at home. Patient reports she is feeling better overall with stable weight. Now on B12 injections with improvement of energy. Taking iron and vitamin C at least 4 hours apart from her thyroid replacement. Did not have labs drawn just prior to today's  appointment Pat however had labs drawn in mid January that were markedly abnormal with TSH greater than 28           THYROID DYSFUNCTION   Valerie Rogel is seen for follow up evaluation/management of thyroid disease; this was diagnosed in late 1990. Review of Records:  Pt referred for evaluation of thyroid disease after recent referral to rheumatology  that led yielded abnormal FRANKY but rheum did not see signs of rheum condition and urged patient to seek evaluation of her thyroid disorder       Current symptoms:  She reports that in late 1990's, she was diagnosed with a thyroid disorder, felt  she was going crazy \"thyroid was out of wack. \" Wast treated with name brand synthroid. Pt established with this system in Dec of 2017, was experience malaise and fatigue and was found to have a positive FRANKY.        Reports that in summer of 2018 she began to experience \"mouth problems\", similar to sensation of thrush, excessive sweating, cold intolerance, raised skin rash with a dimple (moscollum  contagiosum? ? )       In October 2018 was found to be overtreated with 137mcg generic levothyroxine, was decreased to 125mcg generic levothyroxine and was started on antidepressant, lexapro       Since change in regimen in October 2018 pt has had improvement of symptoms           09/09/2019   Pt RTC for hypothyroidism f/u. Continues to take medication more correctly. Labs were drawn this am, not currently available       03/09/2020   Pt RTC for hypothyroidism f/u, has increasing weight gain. For other etiology of complaint and was referred by primary care to internal medicine for further evaluation and treatment. Unfortunately, she is now undertreated for her hypothyroidism. She was overtreated on 125 mcg and is undertreated on 112 mcg. She is taking this medication at night with all other medication which may be leading to inconsistent absorption           6/22/2020   VIRTUAL VISIT   Chava Vences is a 77 y.o. female who was seen by synchronous (real-time) audio-video technology on 6/22/2020 . The patient provided consent for this audio-video interaction. The [employer] platform was used. Patient and provider were located at their individual homes. Pt meets for virtual f/u of hypothyroidism. C/o feeling inflamed, itchy, at times after working in the yard. Now taking medication correctly but misunderstood dosing instructions. Is  taking 112mcg but 2 tabs one day a week to TDD of 128mcg       7/22/2020   VIRTUAL VISIT   Chava Vences is a 77 y.o. female who was seen by synchronous (real-time) audio-video technology on 7/22/2020 . The patient provided consent for this audio-video interaction. The [employer] platform was used. Patient was located at in car, required to pull over and provider at home.        Pt makes an appointment to discuss facial rash, started 1 week ago, spreading from right cheek to right side of face. pruitic without pain. Evaluated virtually by PCP 1 week ago. Pt  was given \"1% cream\" and a pill she was told to not start. Denies visual disturbance, irritation around eye but not in eye. Unable to establish with derm. Now with rash on back that is worse on hip and under breast, feels body rash is different than face  rash in sensation and texture       10/12/2020   VIRTUAL VISIT   Melida Ramírez is a 77 y.o. female who was seen by synchronous (real-time) audio-video technology on 10/12/2020 . The patient provided consent for this audio-video  interaction. The [employer] platform was used. Patient and provider were located at their individual homes. Now under care of new PCP, being treated for B12 deficiency. With continue fatigue. \"awful\" nails soft and \"yucky\". Patch of localized change in texture of hair right posterior head, persisting  heat intolerance with sweats, weight fluctuations approx 5 lbs, acid reflux persists. Less difficulty swallowing, improvement dropping objects, intermittent distal extremity numbness           Wt Readings from Last 3 Encounters:   12/15/22 185 lb (83.9 kg)   11/29/22 185 lb (83.9 kg)   11/29/22 185 lb 9.6 oz (84.2 kg)                          Wt Readings from Last 3 Encounters:        09/22/20  192 lb 12.8 oz (87.5 kg)     09/14/20  189 lb (85.7 kg)     09/08/20  189 lb (85.7 kg)                 DENIES: weight loss, improving skin, improving skin, constipation, diarrhea, anxiety, Palpitations,Tremors, anterior  neck swelling, pain or tenderness, recent upper respiratory infection, proptosis, excessive tearing, eye pain.            Prior treatment:    Generic and name brand, as high as 137mcg   Was overtreated on 125mcg   Undertreated [id number]           Current Regimen:   150mcg daily name brand synthroid       Pt is taking medication during night, taking correctly       Pertinent labs:                   03/04/2019      TPO Ab 120 (positive) 10/02/2018                           TSH                 0.363 (137mcg  generic levothyroxine)                           Free T4           1.95                       11/27/2018                           TSH                 0.146  (125mcg generic levothyroxine, not taking correctly)                           Free T4           1.4                   03/04/2019                           TSH                 1.02 (125mcg generic  levothyroxine, taking correctly)                   09/09/2019                                 TSH                 0.32   (125mcg generic levothyroxine, taking correctly)                           Free T4           1.3                   02/25/2020                           TSH                 7.97  (112mcg generic levothyroxine, not taking correctly with meds after eating)                           Free T4           1.0                   06/17/2020                           TSH                 5.300 (112mcg  generic levothyroxine, taking correctly)                           Free T4           0.96                   10/05/2020                           TSH                 0.829 (125mcg generic  levothyroxine daily with 1.5 tabs on Sunday to TDD 134mcg)                           Free T4           1.52                   01/18/2021                           TSH                 28.800 (125mcg generic  levothyroxine daily with 1.5 tabs on Sunday to TDD 134mcg)                               Free T4           0.70                        01/18/2021                    TSH                 28.800                   Free T4           0.70                        03/01/2021                    TSH                 10.100 ( generic levothyroxine)                   Free T4           1.01                        05/03/2021                    TSH                 3.22 (137mcg name brand synthroid)                        06/14/2021                    TSH                 22.800 Free T4           1.07                         08/16/2021                      TSH                 1.590                      Free T4           1.71             10/14/2021                      TSH                 1.040                      Free T4           1.31       04/07/2022    TSH  1.710 (150mcg name brand synthroid at night)     12/08/2022    TSH  0.428 (150mcg name brand synthroid at night)    Free T4 1.8          03/12/2021       Imaging      Right:     Size: 3.4 x 1.6 x 1.9 cms. Echotexture: Heterogeneous. Diffusely hypoechoic and lobular in contour       Left:     Size: 3.8 x 1.2 x 1.6 cms. Echotexture: Heterogeneous. Diffusely hypoechoic and lobular in contour       Isthmus:     Size: 0.8 cms. Echotexture: Heterogeneous. Diffusely hypoechoic and lobular in contour         Nodules RIGHT:   None       Nodules LEFT:   None       Nodules ISTHMUS:   None       Additional Comments:         1. Atrophic, and diffuse lobular thyroid gland without focal lesion identified. Allergies & Medications:  Reviewed in chart. Review of Systems   HENT:  Positive for trouble swallowing and voice change. Cardiovascular:         \"I can hear my heartbeat\"   Endocrine: Positive for cold intolerance and heat intolerance. Psychiatric/Behavioral:  The patient is nervous/anxious. Vital Signs:  /80 (Site: Left Upper Arm, Position: Sitting)   Pulse 84   Wt 185 lb (83.9 kg)   SpO2 97%   BMI 38.67 kg/m²       Physical Exam  Constitutional:       Appearance: Normal appearance. HENT:      Head: Normocephalic. Neck:      Thyroid: No thyroid mass or thyromegaly. Vascular: No carotid bruit. Cardiovascular:      Rate and Rhythm: Normal rate and regular rhythm. Pulmonary:      Effort: Pulmonary effort is normal.      Breath sounds: Normal breath sounds. Abdominal:      Palpations: Abdomen is soft. Musculoskeletal:      Cervical back: Neck supple.       Right lower leg: Edema present. Left lower leg: Edema present. Lymphadenopathy:      Cervical: No cervical adenopathy. Skin:     General: Skin is warm and dry. Neurological:      General: No focal deficit present. Mental Status: She is alert. Psychiatric:         Mood and Affect: Mood normal.         Behavior: Behavior normal.         Thought Content: Thought content normal.         Judgment: Judgment normal.           Return in 16 weeks (on 4/6/2023) for hypothyroidism f/u. Portions of this note were generated with the assistance of voice recogniton software. As such, some errors in transcription may be present.

## 2022-12-23 ENCOUNTER — OFFICE VISIT (OUTPATIENT)
Dept: ORTHOPEDIC SURGERY | Age: 77
End: 2022-12-23

## 2022-12-23 DIAGNOSIS — G89.29 CHRONIC LOW BACK PAIN, UNSPECIFIED BACK PAIN LATERALITY, UNSPECIFIED WHETHER SCIATICA PRESENT: ICD-10-CM

## 2022-12-23 DIAGNOSIS — M75.02 ADHESIVE CAPSULITIS OF LEFT SHOULDER: ICD-10-CM

## 2022-12-23 DIAGNOSIS — M54.50 CHRONIC LOW BACK PAIN, UNSPECIFIED BACK PAIN LATERALITY, UNSPECIFIED WHETHER SCIATICA PRESENT: ICD-10-CM

## 2022-12-23 DIAGNOSIS — M19.012 OSTEOARTHRITIS OF LEFT AC (ACROMIOCLAVICULAR) JOINT: ICD-10-CM

## 2022-12-23 DIAGNOSIS — M67.922 TENDINOPATHY OF LEFT BICEPS: ICD-10-CM

## 2022-12-23 DIAGNOSIS — M25.512 LEFT SHOULDER PAIN, UNSPECIFIED CHRONICITY: Primary | ICD-10-CM

## 2022-12-23 RX ORDER — TRIAMCINOLONE ACETONIDE 40 MG/ML
40 INJECTION, SUSPENSION INTRA-ARTICULAR; INTRAMUSCULAR ONCE
Status: COMPLETED | OUTPATIENT
Start: 2022-12-23 | End: 2022-12-23

## 2022-12-23 RX ADMIN — TRIAMCINOLONE ACETONIDE 40 MG: 40 INJECTION, SUSPENSION INTRA-ARTICULAR; INTRAMUSCULAR at 09:15

## 2022-12-23 NOTE — PROGRESS NOTES
Name: Lalo Martinez  YOB: 1945  Gender: female  MRN: [id number]    CC:   Chief Complaint   Patient presents with    Follow-up     Recheck left shoulder. HPI: Patient presents for recheck of left shoulder pain. Patient had a glenohumeral injection on 11- and notes  relief with injection. She feels it is wearing off. Feels her motion is fine. Patient also previously had biceps tendon injection in September which gave 3-4 weeks of relief as well as previous subacromial injection in July with only minor relief. Recall patient does have cervical spine pain and history of neck surgery as well. At her last visit we discussed adhesive capsulitis as well as the possibility of an UA/DELANEY. She states today her symptoms seem to be more superior and localizes this near her [facility] joint. She also is complaining about some chronic back pain and would like potential referral for this. After further discussion she was complaining of some neck issues and how she feels more swollen through her glands as well as has noticed some thyroid enlargement.     Allergies   Allergen Reactions    Fire Ant Anaphylaxis    Amoxicillin Other (See Comments)     C-Diff    Amoxicillin-Pot Clavulanate Other (See Comments)     c.diff    Augmentin [Amoxicillin-Pot Clavulanate]     Cephalexin Other (See Comments)     C Diff      Cephalexin Other (See Comments)     C-Diff  C-Diff      Cephalosporins     Hydromorphone Other (See Comments)     Keeps her awake    Meperidine Hcl     Morphine Other (See Comments)     Awake  Awake  No sleep    Oxycodone     Oxycodone-Acetaminophen Other (See Comments)     Jittery  Nightmares    Oxycodone-Aspirin Other (See Comments)     Jittery  Other reaction(s): Unknown-Unspecified  Jittery    Penicillins Other (See Comments)     C-Diff      Povidone Iodine     Povidone-Iodine Other (See Comments)     Johnston  Johnston  burn    Adhesive Tape Itching and Rash     \"sticky ace wrap\"  \"sticky ace wrap\"    Meperidine Rash     Past Medical History:   Diagnosis Date    Acute kidney insufficiency     Followed by PCP     Adverse effect of anesthesia     2016 in [place] heart stopped at the end of my gallbladder surgery. \" Patient reports no CPR performed, stayed one night in the hospital. No problems with any other surgeries.     Allergic rhinitis     Anemia     history     Anxiety and depression     Controlled with medication     Arthritis     Edema     bilateral lower legs     GERD (gastroesophageal reflux disease)     Controlled with medication     History of chicken pox     History of Clostridioides difficile infection     History of DVT (deep vein thrombosis)     s/p knee replacement and developed right leg DVT     HTN (hypertension)     controlled with medication     Hyperlipidemia     controlled with medication     Hyperlipidemia     Hypertension     Hyperthyroidism     pt denies    Hypothyroidism     controlled with medication     Hypothyroidism     IBS (irritable bowel syndrome)     Insomnia     Left flank pain 3/5/2020    Morbid obesity ([address]     BMI 42.85    Murmur     per PCP note (70/6/49) systolic murmur grade of 2/6    Overactive bladder     PAD (peripheral artery disease) ([address]     patient denies on 11/27/19    Recurrent UTI     Restless leg     RLS (restless legs syndrome)     controlled with medication     Shigella gastroenteritis 5/9/2022    SOBOE (shortness of breath on exertion)     PRN inhaler-patient reports she uses inhaler QHS    Special examinations     History-Neurologist - Dr. Simba OCONNOR (stress urinary incontinence, female)     Thromboembolus ([address] 2015    behind right knee after total knee replacement    Tubulovillous adenoma of colon 04/26/2021     Past Surgical History:   Procedure Laterality Date    APPENDECTOMY  1955    CHOLECYSTECTOMY  12/2016    COLONOSCOPY      COLONOSCOPY      2013, 2003    GYN  12/03/2019    suburethral sling and cystoscopy    HYSTERECTOMY, TOTAL ABDOMINAL (CERVIX REMOVED)      1970    IR GENERIC PROCEDURE      2014    KNEE SURGERY Left     2016    KNEE SURGERY Left 2014    LAP BAND  REMOVAL 3/16/2015    ORTHOPEDIC SURGERY Left 2020    left hand tendon surgery    ORTHOPEDIC SURGERY Left     LEFT WRIST TRAPEZIOMETACARPAL ARTHROPLASTY WITH FCR TENDON TRANSFER-     OTHER SURGICAL HISTORY Right 2016    Hand    OTHER SURGICAL HISTORY      2014    OTHER SURGICAL HISTORY      1986    OTHER SURGICAL HISTORY  2019    bladder surgery-put mesh in    SHOULDER ARTHROSCOPY      1966,     TONSILLECTOMY AND ADENOIDECTOMY      1950    TOTAL KNEE ARTHROPLASTY Right 2014     Family History   Problem Relation Age of Onset    Other Sister     Heart Disease Brother     No Known Problems Mother     Other Father 54        brain aneurysm    Cerebral Aneurysm Father     Congenial Anomalies Sister         right arm deformity    Pneumonia Sister     Other Sister         fibromyalgia    Hypertension Brother     Heart Disease Brother     Other Brother         brain aneurysm, AVM    Heart Surgery Brother         CABGx4    Heart Attack Maternal Grandmother     Cancer Maternal Grandfather         skin    Heart Attack Paternal Grandfather     Mult Sclerosis Daughter     Drug Abuse Son     Other Son         Hepatitis C     Social History     Socioeconomic History    Marital status:      Spouse name: Esdras Balbuena    Number of children: 2    Years of education: Not on file    Highest education level: Not on file   Occupational History    Occupation: Previously worked as a [employer]    Tobacco Use    Smoking status: Former     Packs/day: 1.00     Years: 46.00     Pack years: 46.00     Types: Cigarettes     Quit date: 2005     Years since quittin.0    Smokeless tobacco: Never   Vaping Use    Vaping Use: Unknown   Substance and Sexual Activity    Alcohol use: No     Alcohol/week: 0.0 standard drinks    Drug use: No    Sexual activity: Not on file   Other Topics Concern    Not on file   Social History Narrative    ** Merged History Encounter **         Patient has 1 dog sleeps with her occasionally. Patient currently drinks 1 caffeine drink per day. Social Determinants of Health     Financial Resource Strain: Not on file   Food Insecurity: Not on file   Transportation Needs: Not on file   Physical Activity: Not on file   Stress: Not on file   Social Connections: Not on file   Intimate Partner Violence: Not on file   Housing Stability: Not on file        No flowsheet data found. Review of Systems  Non-contributory    PE: On exam she has most of her tenderness localized [facility] joint on the left. She has restricted cross shoulder adduction secondary to pain. Range of motion is still somewhat restricted with flexion around 95 and abduction around 90-95. External rotation 10. Strength appropriate right now. Biceps mildly tender. Impingement is limited  Last visit: FF 90, abd 85-90, ER 5-10  On general inspection she does have somewhat of a more prominent thyroid centrally. No nodules palpated. Recall Recall MRI left shoulder performed 7/5/22:     Narrative   MRI OF THE LEFT SHOULDER WITHOUT CONTRAST. Clinical Indication: Left shoulder pain with limited range of motion       Procedure: Multiplanar and multisequence MR images of the left shoulder were   performed without gadolinium contrast.       Comparison: No prior       Findings:        AC joint: Mild degenerative hypertrophy is noted of the AC joint. There is trace   fluid signal the subacromial/subdeltoid bursa. Rotator cuff: The supraspinatus, infraspinatus, teres minor, and subscapularis   tendons are intact. No rotator cuff tendon tear evident. Biceps labral complex: The long head of biceps tendon is intact. The superior   labrum is intact. The joint capsule in axillary recess is intact. Glenohumeral joint: The marrow signal in the humeral head and glenoid is   unremarkable. No definite anterior or posterior labral tear present. There is   mild degenerative chondral fissuring along the central glenoid. A small joint   effusion with synovitis is present. No abnormal soft tissue mass or fatty atrophy. Impression   1. 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 Small glenohumeral joint effusion with synovitis. 3. The rotator cuff tendon is intact. 4. Mild degenerative hypertrophy the [facility] joint with underlying   subacromial/subdeltoid bursitis. MRI reviewed, AC OA noticed. No RTC tear. Synovitis noted in the axillary space on coronal image 10. A/Plan:     ICD-10-CM    1. Left shoulder pain, unspecified chronicity  M25.512       2. Adhesive capsulitis of left shoulder [M75.02 (ICD-10-CM)]  M75.02       3. Tendinopathy of left biceps [M67.922 (ICD-10-CM)]  M67.922       4. Chronic low back pain, unspecified back pain laterality, unspecified whether sciatica present  M54.50     G89.29       5. Osteoarthritis of left AC (acromioclavicular) joint  M19.012            Only other review of her pain and options today her pain seems different seems more related to the superior aspect of her shoulder. She is never had an AC injection. She is quite tender with cross shoulder adduction and another activity. Discussed multiple options for treatment including ice, activity modification as well as topical Voltaren gel for several days. Discussed injection of the [facility] joint which was different than the glenohumeral joint and biceps injection she may have had in the past.  She would like to proceed with the injection today which she did not come in planning to get. In regards to her back pain she is inquiring about follow-up with pain management versus our spine team.  She has not had a work-up of her back pain in quite some time.   She also inquired about some glandular issues in her neck which I would tell her to discuss with her primary doctor or even her prior ENT. PROCEDURE NOTE:  After discussion of risks and benefits including, but not limited to pain, infection, steroid flare, fat necrosis, skin discoloration, and injury to blood vessels or nerves, the patient verbally consented to proceed with an acromioclavicular [facility]) joint injection. The affected left shoulder was sterilely prepped in standard fashion and injected with 2 cc of 1% Lidocaine in the subcutanesou tissue with a 25 gauge needle. The [facility] joint was then penetrated with the needle and 1 cc of Kenalog (40mg/ml) was injected into the joint. The patient tolerated the injection well. Return for As discussed.         Carlitos Mariano MD  12/23/22

## 2022-12-27 DIAGNOSIS — F51.01 PRIMARY INSOMNIA: Primary | ICD-10-CM

## 2022-12-27 RX ORDER — AMITRIPTYLINE HYDROCHLORIDE 25 MG/1
25 TABLET, FILM COATED ORAL NIGHTLY
Qty: 90 TABLET | Refills: 3 | Status: SHIPPED | OUTPATIENT
Start: 2022-12-27

## 2022-12-27 NOTE — TELEPHONE ENCOUNTER
Request for refill of amitriptyline to [facility] at [address] in [facility]

## 2023-01-05 ENCOUNTER — OFFICE VISIT (OUTPATIENT)
Dept: BEHAVIORAL/MENTAL HEALTH CLINIC | Age: 78
End: 2023-01-05

## 2023-01-05 ENCOUNTER — OFFICE VISIT (OUTPATIENT)
Dept: ORTHOPEDIC SURGERY | Age: 78
End: 2023-01-05
Payer: MEDICARE

## 2023-01-05 VITALS
OXYGEN SATURATION: 98 % | SYSTOLIC BLOOD PRESSURE: 124 MMHG | DIASTOLIC BLOOD PRESSURE: 66 MMHG | WEIGHT: 185 LBS | BODY MASS INDEX: 38.83 KG/M2 | HEIGHT: 58 IN | HEART RATE: 75 BPM | TEMPERATURE: 97.8 F

## 2023-01-05 VITALS — WEIGHT: 183 LBS | BODY MASS INDEX: 38.41 KG/M2 | HEIGHT: 58 IN

## 2023-01-05 DIAGNOSIS — F33.0 MILD EPISODE OF RECURRENT MAJOR DEPRESSIVE DISORDER (HCC): Primary | ICD-10-CM

## 2023-01-05 DIAGNOSIS — R20.0 FACIAL NUMBNESS: ICD-10-CM

## 2023-01-05 DIAGNOSIS — F51.05 INSOMNIA DUE TO MENTAL DISORDER: ICD-10-CM

## 2023-01-05 DIAGNOSIS — M47.816 LUMBAR FACET ARTHROPATHY: ICD-10-CM

## 2023-01-05 DIAGNOSIS — F43.10 PTSD (POST-TRAUMATIC STRESS DISORDER): ICD-10-CM

## 2023-01-05 DIAGNOSIS — M43.16 SPONDYLOLISTHESIS OF LUMBAR REGION: ICD-10-CM

## 2023-01-05 DIAGNOSIS — M54.50 LOW BACK PAIN, UNSPECIFIED BACK PAIN LATERALITY, UNSPECIFIED CHRONICITY, UNSPECIFIED WHETHER SCIATICA PRESENT: Primary | ICD-10-CM

## 2023-01-05 DIAGNOSIS — Z65.8 PSYCHOSOCIAL STRESSORS: ICD-10-CM

## 2023-01-05 DIAGNOSIS — F41.1 GENERALIZED ANXIETY DISORDER: ICD-10-CM

## 2023-01-05 PROCEDURE — 1123F ACP DISCUSS/DSCN MKR DOCD: CPT | Performed by: NURSE PRACTITIONER

## 2023-01-05 PROCEDURE — 99203 OFFICE O/P NEW LOW 30 MIN: CPT | Performed by: NURSE PRACTITIONER

## 2023-01-05 ASSESSMENT — MINI MENTAL STATE EXAM
PLACE DESIGN, ERASER AND PENCIL IN FRONT OF THE PERSON.  SAY:  COPY THIS DESIGN PLEASE.  SHOW: DESIGN. ALLOW: MULTIPLE TRIES. WAIT UNTIL PERSON IS FINISHED AND HANDS IT BACK. SCORE: ONLY FOR DIAGRAM WITH 4-SIDED FIGURE BETWEEN TWO 5-SIDED FIGURES: 1
WHAT IS THE NAME OF THIS BUILDING [IN FACILITY]?/WHAT IS THE STREET ADDRESS OF THIS HOUSE [IN HOME]?: 1
WHAT IS TODAY'S DATE?: 1
SHOW: PENCIL [OBJECT] ASK: WHAT IS THIS CALLED?: 1
SHOW: WRISTWATCH [OBJECT] ASK: WHAT IS THIS CALLED?: 1
SAY: READ THE WORDS ON THE PAGE AND THEN DO WHAT IT SAYS. THEN HAND THE PERSON
THE SHEET WITH CLOSE YOUR EYES ON IT. IF THE SUBJECT READS AND DOES NOT CLOSE THEIR EYES, REPEAT UP TO THREE TIMES. SCORE ONLY IF SUBJECT CLOSES EYES.: 1
ASK THE PERSON IF HE IS RIGHT OR LEFT-HANDED. TAKE A PIECE OF PAPER AND HOLD IT UP IN
FRONT OF THE PERSON. SAY: TAKE THIS PAPER IN YOUR RIGHT/LEFT HAND (WHICHEVER IS NON-
DOMINANT), SCORE IF PAPER IS PICKED UP IN CORRECT HAND.: 1
SAY: I WOULD LIKE YOU TO COUNT BACKWARD FROM 100 BY SEVENS: 5
SAY: FOLD THE PAPER IN HALF ONCE WITH BOTH HANDS, SCORE IF PAPER IS CORRECTLY FOLDED IN HALF.: 1
SAY: I WOULD LIKE YOU TO REPEAT THIS PHRASE AFTER ME: NO IFS, ANDS, OR BUTS.: 1
WHAT STATE [OR PROVINCE] ARE WE IN?: 1
SUM ALL MMSE QUESTIONS FOR TOTAL SCORE [OUT OF 30].: 30
SAY: I AM GOING TO NAME THREE OBJECTS. WHEN I AM FINISHED, I WANT YOU TO REPEAT
THEM. REMEMBER WHAT THEY ARE BECAUSE I AM GOING TO ASK YOU TO NAME THEM AGAIN IN
A FEW MINUTES.  SAY THE FOLLOWING WORDS SLOWLY AT 1-SECOND INTERVALS - BALL/ CAR/ MAN [ITERATIONS FOR REPEAT ADMINISTRATION]: 3
SAY: PUT THE PAPER DOWN ON THE FLOOR, SCORE IF PAPER IS PLACED BACK ON FLOOR: 1
NOW WHAT WERE THE THREE OBJECTS I ASKED YOU TO REMEMBER?: 3
WHAT YEAR IS THIS?: 1
WHAT DAY OF THE WEEK IS THIS?: 1
HAND THE PERSON A PENCIL AND PAPER. SAY: WRITE ANY COMPLETE SENTENCE ON THAT
PIECE OF PAPER. (NOTE: THE SENTENCE MUST MAKE SENSE. IGNORE SPELLING ERRORS): 1
WHAT COUNTRY ARE WE IN?: 1
WHAT CITY/TOWN ARE WE IN?: 1
WHICH SEASON IS THIS?: 1
WHAT MONTH IS THIS?: 1
WHAT FLOOR ARE WE ON [IN FACILITY]?/ WHAT ROOM ARE WE IN [IN HOME]?: 1

## 2023-01-05 ASSESSMENT — ANXIETY QUESTIONNAIRES
3. WORRYING TOO MUCH ABOUT DIFFERENT THINGS: 2
6. BECOMING EASILY ANNOYED OR IRRITABLE: 1
GAD7 TOTAL SCORE: 15
5. BEING SO RESTLESS THAT IT IS HARD TO SIT STILL: 2
4. TROUBLE RELAXING: 2
1. FEELING NERVOUS, ANXIOUS, OR ON EDGE: 3
7. FEELING AFRAID AS IF SOMETHING AWFUL MIGHT HAPPEN: 2
IF YOU CHECKED OFF ANY PROBLEMS ON THIS QUESTIONNAIRE, HOW DIFFICULT HAVE THESE PROBLEMS MADE IT FOR YOU TO DO YOUR WORK, TAKE CARE OF THINGS AT HOME, OR GET ALONG WITH OTHER PEOPLE: VERY DIFFICULT
2. NOT BEING ABLE TO STOP OR CONTROL WORRYING: 3

## 2023-01-05 ASSESSMENT — PATIENT HEALTH QUESTIONNAIRE - PHQ9
SUM OF ALL RESPONSES TO PHQ QUESTIONS 1-9: 10
4. FEELING TIRED OR HAVING LITTLE ENERGY: 1
SUM OF ALL RESPONSES TO PHQ QUESTIONS 1-9: 10
3. TROUBLE FALLING OR STAYING ASLEEP: 3
6. FEELING BAD ABOUT YOURSELF - OR THAT YOU ARE A FAILURE OR HAVE LET YOURSELF OR YOUR FAMILY DOWN: 1
8. MOVING OR SPEAKING SO SLOWLY THAT OTHER PEOPLE COULD HAVE NOTICED. OR THE OPPOSITE, BEING SO FIGETY OR RESTLESS THAT YOU HAVE BEEN MOVING AROUND A LOT MORE THAN USUAL: 2
7. TROUBLE CONCENTRATING ON THINGS, SUCH AS READING THE NEWSPAPER OR WATCHING TELEVISION: 0
SUM OF ALL RESPONSES TO PHQ9 QUESTIONS 1 & 2: 1
5. POOR APPETITE OR OVEREATING: 2
SUM OF ALL RESPONSES TO PHQ QUESTIONS 1-9: 10
SUM OF ALL RESPONSES TO PHQ QUESTIONS 1-9: 10
2. FEELING DOWN, DEPRESSED OR HOPELESS: 0
9. THOUGHTS THAT YOU WOULD BE BETTER OFF DEAD, OR OF HURTING YOURSELF: 0
1. LITTLE INTEREST OR PLEASURE IN DOING THINGS: 1
10. IF YOU CHECKED OFF ANY PROBLEMS, HOW DIFFICULT HAVE THESE PROBLEMS MADE IT FOR YOU TO DO YOUR WORK, TAKE CARE OF THINGS AT HOME, OR GET ALONG WITH OTHER PEOPLE: 2
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
## 2023-01-05 NOTE — PROGRESS NOTES
Name: Nancy Lewis  YOB: 1945  Gender: female  MRN: [id number]    CC: Chronic low back pain, whole spine pain    HPI: This is a 69y.o. year old female who recently seen back in 2019. At that time patient was diagnosed with facet arthropathy and a slight spondylolisthesis but no significant stenosis. She was referred to pain management at [facility] for advanced management of pain and was undergoing injections and procedures which were beneficial.  Unfortunately COVID happened and she stopped going. She tried to make an appointment back and they stated she needed a new referral.  She has pain across the lower back and slightly into the left lateral hip. She also has complaints of her mouth and tongue going numb. She has been evaluated by [facility] neurology and was told no significant findings. She does have a history of some degree of headaches especially affecting the left side of her face. Had some issues with double vision. CTA of the neck was negative. Neurology diagnosed her with cervical paraspinal muscle spasm and gave her tizanidine. She is not really pleased with this evaluation. This patient  has not had lumbar surgery in the past.     Thus far, the patient has tried : Patient has completed physical therapy in the past with improvement. Has been doing home exercise program since the past year. She does these 4-5 times per week. She is tried pain medication, previous injections, muscle relaxers, Elavil   No flowsheet data found. ROS/Meds/PSH/PMH/FH/SH: I personally reviewed the patient's collected intake data.   Below are the pertinents:    Allergies   Allergen Reactions    Fire Ant Anaphylaxis    Amoxicillin Other (See Comments)     C-Diff    Amoxicillin-Pot Clavulanate Other (See Comments)     c.diff    Augmentin [Amoxicillin-Pot Clavulanate]     Cephalexin Other (See Comments)     C Diff      Cephalexin Other (See Comments)     C-Diff  C-Diff Cephalosporins     Hydromorphone Other (See Comments)     Keeps her awake    Meperidine Hcl     Morphine Other (See Comments)     Awake  Awake  No sleep    Oxycodone     Oxycodone-Acetaminophen Other (See Comments)     Jittery  Nightmares    Oxycodone-Aspirin Other (See Comments)     Jittery  Other reaction(s): Unknown-Unspecified  Jittery    Penicillins Other (See Comments)     C-Diff      Povidone Iodine     Povidone-Iodine Other (See Comments)     Johnston  Johnston  burn    Adhesive Tape Itching and Rash     \"sticky ace wrap\"  \"sticky ace wrap\"    Meperidine Rash         Current Outpatient Medications:     amitriptyline (ELAVIL) 25 MG tablet, Take 1 tablet by mouth nightly, Disp: 90 tablet, Rfl: 3    SYNTHROID 150 MCG tablet, 1 tab daily with 1/2 tab on Sunday, TDD 139mcg, Disp: 90 tablet, Rfl: 3    vitamin D (ERGOCALCIFEROL) 1.25 MG (07122 UT) CAPS capsule, Take 1 capsule by mouth every 14 days, Disp: 6 capsule, Rfl: 2    ondansetron (ZOFRAN) 4 MG tablet, Take 1 tablet by mouth every 8 hours as needed for Nausea, Disp: 30 tablet, Rfl: 1    cyanocobalamin 1000 MCG/ML injection, Inject 1 mL into the muscle every 14 days, Disp: 6 each, Rfl: 2    colestipol (COLESTID) 1 g tablet, Take 1 tablet by mouth 2 times daily, Disp: 60 tablet, Rfl: 2    hyoscyamine (OSCIMIN) 125 MCG TBDP dispersible tablet, Take 1 tablet by mouth every 8 hours as needed (abdominal cramps), Disp: 90 tablet, Rfl: 0    nitrofurantoin (MACRODANTIN) 50 MG capsule, Take 1 capsule by mouth daily, Disp: 90 capsule, Rfl: 3    diclofenac sodium (VOLTAREN) 1 % GEL, Apply 2 g topically 4 times daily as needed for Pain Apply 2g to affected area 4 times daily as needed for pain, Disp: 200 g, Rfl: 0    acetaminophen (TYLENOL) 325 MG tablet, Take 325 mg by mouth every 4 hours as needed, Disp: , Rfl:     albuterol sulfate  (90 Base) MCG/ACT inhaler, Inhale 1-2 puffs into the lungs every 6 hours as needed, Disp: , Rfl:     clotrimazole-betamethasone (LOTRISONE) 1-0.05 % cream, Apply topically 2 times daily, Disp: , Rfl:     escitalopram (LEXAPRO) 20 MG tablet, Take 30 mg by mouth daily, Disp: , Rfl:     fluticasone (FLONASE) 50 MCG/ACT nasal spray, 2 sprays by Nasal route daily as needed, Disp: , Rfl:     pramipexole (MIRAPEX) 0.5 MG tablet, Take 0.5 mg by mouth at bedtime, Disp: , Rfl:     lisinopril (PRINIVIL;ZESTRIL) 10 MG tablet, Take 10 mg by mouth daily, Disp: , Rfl:     Past Surgical History:   Procedure Laterality Date    APPENDECTOMY  1955    CHOLECYSTECTOMY  12/2016    COLONOSCOPY      COLONOSCOPY      2013, 2003    EYE SURGERY  2015    GYN  12/03/2019    suburethral sling and cystoscopy    HYSTERECTOMY, TOTAL ABDOMINAL (CERVIX REMOVED)      1970    HYSTERECTOMY, VAGINAL      IR GENERIC PROCEDURE      2014    JOINT REPLACEMENT      Left knee    KNEE SURGERY Left     4/2016    KNEE SURGERY Left 2014    LAP BAND  REMOVAL 3/16/2015    ORTHOPEDIC SURGERY Left 01/2020    left hand tendon surgery    ORTHOPEDIC SURGERY Left     LEFT WRIST TRAPEZIOMETACARPAL ARTHROPLASTY WITH FCR TENDON TRANSFER- 2020    OTHER SURGICAL HISTORY Right 2016    Hand    OTHER SURGICAL HISTORY      2014    OTHER SURGICAL HISTORY      1986    OTHER SURGICAL HISTORY  12/03/2019    bladder surgery-put mesh in    SHOULDER ARTHROSCOPY      1966,     [address]    TOTAL KNEE ARTHROPLASTY Right 2014    UPPER GASTROINTESTINAL ENDOSCOPY  2018       Patient Active Problem List   Diagnosis    Tubulovillous adenoma of colon    Postmenopausal HRT (hormone replacement therapy)    Hymenoptera allergy    Polyneuropathy    Vitamin B12 deficiency    Dizzinesses    OA (osteoarthritis)    IBS (irritable bowel syndrome)    GERD (gastroesophageal reflux disease)    Insomnia    RLS (restless legs syndrome)    Fungal dermatitis    Hemorrhoids    Anserine bursitis    Lumbar radiculopathy    Hashimoto's thyroiditis    Family history of autoimmune disorder    Allergic dermatitis    ANASTASIA (stress urinary incontinence, female)    AR (allergic rhinitis)    FRANKY positive    Vaginal atrophy    Numbness    Urinary frequency    Gait abnormality    Cardiac arrhythmia    Weakness generalized    Suspected sleep apnea    Diplopia    URBANO (generalized anxiety disorder)    Gluten intolerance    Fibromyalgia    Degenerative arthritis of left knee    Hematochezia    Arthritis of left knee    Fall    Leukocytosis    History of Clostridioides difficile colitis    Hx of total knee replacement, right    Mood disorder (HCC)    Chronic pain disorder    Mixed hyperlipidemia    Hypothyroidism (acquired)    Chronic nausea    Renal cyst    PAD (peripheral artery disease) (HCC)    Abnormal flushing and sweating    Postural imbalance    Vitamin D deficiency    DDD (degenerative disc disease), lumbar    OAB (overactive bladder)    Class 3 obesity with alveolar hypoventilation, serious comorbidity, and body mass index (BMI) of 40.0 to 44.9 in adult [facility])    Expressive aphasia    Primary hypertension    Stage 3b chronic kidney disease (HCC)    Anemia, iron deficiency    Hypoproteinemia ([facility])    LORA (obstructive sleep apnea)    Hypoxemia    Obesity (BMI 30-39. 9)    Low back pain    Fibromyositis    Posterior capsular opacification non visually significant of right eye    Posterior vitreous detachment of both eyes    Pseudophakia    Spondylolisthesis    Spondylosis without myelopathy    Urinary problem    Depression with anxiety    Cervical paraspinal muscle spasm    Encounter for medication management    Anemia    Dyspareunia    Osteopenia    Right wrist pain    Seborrheic keratoses    Serum creatinine raised         Tobacco:  reports that she quit smoking about 17 years ago. Her smoking use included cigarettes. She has a 46.00 pack-year smoking history.  She has never used smokeless tobacco.  Alcohol:   Social History     Substance and Sexual Activity   Alcohol Use No        Physical Exam:   Body mass index is 38.92 kg/m². GENERAL:  Adult in no acute distress, well developed, well nourished Patient is appropriately conversant  MSK:  Examination of the lumbar spine reveals paraspinal tenderness , facet tenderness   There is moderate tenderness to palpation along the spinous processes and paraspinal musculature. The patient ambulates with a normal gait. ROM of bilateral hip(s) reveals mild irritability. NEURO:  Cranial nerves grossly intact. No motor deficits. Straight leg testing is negative bilateral  Sensory testing reveals intact sensation to light touch and in the distribution of the L3-S1 dermatomes bilaterally  Ankle jerk is negative for clonus    Reflexes   Right Left   Quadriceps (L4) 2 2   Achilles (S1) 2 2     Strength testing in the lower extremity reveals the following based on the 5 point grading scale:     HF (L2) H Ab (L5) KE (L3/4) ADF (L4) EHL (L5) A Ev (S1) APF (S1)   Right 5 5 5 5 5 5 5   Left 5 5 5 5 5 5 5     PSYCH:  Alert and oriented X 3. Appropriate affect. Intact judgment and insight. Radiographic Studies:     AP, lateral and spot views of the lumbar spine: There is lower lumbar facet arthropathy. There is a slight anterior listhesis of L4 and L5. No fractures or lesions. Xrays are essentially unchanged from 2019. Interpretation: Spondylolisthesis and lower lumbar facet arthropathy        Assessment/Plan:       Diagnosis Orders   1. Low back pain, unspecified back pain laterality, unspecified chronicity, unspecified whether sciatica present  XR LUMBAR SPINE (2-3 VIEWS)    Amb External Referral To Pain Medicine      2. Facial numbness  Amb External Referral To Pain Medicine    External Referral To Neurology      3. Lumbar facet arthropathy  Amb External Referral To Pain Medicine      4.  Spondylolisthesis of lumbar region  Amb External Referral To Pain Medicine          This patient's clinical history and physical exam is consistent with spondylitic  and facetogenic back pain. I discussed with her the natural history of this condition in that most episodes are typically self limited. However, the symptoms can last for several months if not even longer. What I currently recommend is that she continues with conservative treatments to help cope with the symptoms and avoid having back surgery at this time. She understands that conservative treatments typically include activity modification, NSAIDs and physical therapy. Oral and/or epidural steroids could be considered in resistant scenarios. Also, she  may want to explore chiropractic care or acupuncture. I advised to avoid any prolonged bedrest and to try to maintain ADLs as much as possible. The patient was counseled to follow up me should she  develop any neurologic symptoms such as leg pain. - Referral to pain management. The patient's care would be best managed in a formal pain management setting and will be referred accordingly. No orders of the defined types were placed in this encounter. Orders Placed This Encounter   Procedures    XR LUMBAR SPINE (2-3 VIEWS)    Amb External Referral To Pain Medicine    External Referral To Neurology        3 This is stable chronic illness/condition      Return for refer to pain management, refer to Neurology [facility]. JEAN Lassiter - CNP  01/05/23      Elements of this note were created using speech recognition software. As such, errors of speech recognition may be present.

## 2023-01-09 ENCOUNTER — TELEPHONE (OUTPATIENT)
Dept: BEHAVIORAL/MENTAL HEALTH CLINIC | Age: 78
End: 2023-01-09

## 2023-01-09 NOTE — TELEPHONE ENCOUNTER
----- Message from [facility] [employer]. Woody Brandt sent at 1/9/2023  8:51 AM EST -----  Regarding: Prescription   I went to  prescription for cymbalta and there was nothing there. Could you please send it to the pharmacy in [place] on [address].  Thank you

## 2023-01-10 ENCOUNTER — NURSE ONLY (OUTPATIENT)
Dept: INTERNAL MEDICINE CLINIC | Facility: CLINIC | Age: 78
End: 2023-01-10
Payer: MEDICARE

## 2023-01-10 ENCOUNTER — TELEPHONE (OUTPATIENT)
Dept: ORTHOPEDIC SURGERY | Age: 78
End: 2023-01-10

## 2023-01-10 DIAGNOSIS — E53.8 VITAMIN B 12 DEFICIENCY: Primary | ICD-10-CM

## 2023-01-10 PROCEDURE — 96372 THER/PROPH/DIAG INJ SC/IM: CPT | Performed by: NURSE PRACTITIONER

## 2023-01-10 RX ORDER — CYANOCOBALAMIN 1000 UG/ML
1000 INJECTION, SOLUTION INTRAMUSCULAR; SUBCUTANEOUS ONCE
Status: COMPLETED | OUTPATIENT
Start: 2023-01-10 | End: 2023-01-10

## 2023-01-10 RX ADMIN — CYANOCOBALAMIN 1000 MCG: 1000 INJECTION, SOLUTION INTRAMUSCULAR; SUBCUTANEOUS at 09:00

## 2023-01-10 NOTE — TELEPHONE ENCOUNTER
Pt called and seen zehra vasques. and was ref to pain darshan. she called and wants a mri and a appt for kym. I explained several time she has been ref out. She still wanted to ask. thanks

## 2023-01-11 RX ORDER — DULOXETIN HYDROCHLORIDE 30 MG/1
CAPSULE, DELAYED RELEASE ORAL
Qty: 30 CAPSULE | Refills: 0 | Status: SHIPPED | OUTPATIENT
Start: 2023-01-11 | End: 2023-01-11 | Stop reason: SDUPTHER

## 2023-01-11 RX ORDER — DULOXETIN HYDROCHLORIDE 30 MG/1
CAPSULE, DELAYED RELEASE ORAL
Qty: 30 CAPSULE | Refills: 0 | Status: SHIPPED | OUTPATIENT
Start: 2023-01-11

## 2023-01-11 RX ORDER — DULOXETIN HYDROCHLORIDE 60 MG/1
60 CAPSULE, DELAYED RELEASE ORAL DAILY
Qty: 30 CAPSULE | Refills: 3 | Status: SHIPPED | OUTPATIENT
Start: 2023-01-11 | End: 2023-01-11 | Stop reason: SDUPTHER

## 2023-01-11 RX ORDER — DULOXETIN HYDROCHLORIDE 60 MG/1
60 CAPSULE, DELAYED RELEASE ORAL DAILY
Qty: 30 CAPSULE | Refills: 3 | Status: SHIPPED | OUTPATIENT
Start: 2023-01-11

## 2023-01-16 ENCOUNTER — OFFICE VISIT (OUTPATIENT)
Dept: PULMONOLOGY | Age: 78
End: 2023-01-16
Payer: MEDICARE

## 2023-01-16 VITALS
SYSTOLIC BLOOD PRESSURE: 165 MMHG | HEIGHT: 58 IN | TEMPERATURE: 96.8 F | BODY MASS INDEX: 39.25 KG/M2 | OXYGEN SATURATION: 98 % | HEART RATE: 92 BPM | WEIGHT: 187 LBS | RESPIRATION RATE: 17 BRPM | DIASTOLIC BLOOD PRESSURE: 80 MMHG

## 2023-01-16 DIAGNOSIS — G47.33 OSA (OBSTRUCTIVE SLEEP APNEA): Primary | ICD-10-CM

## 2023-01-16 DIAGNOSIS — R09.02 HYPOXEMIA: ICD-10-CM

## 2023-01-16 DIAGNOSIS — M54.9 CHRONIC BACK PAIN, UNSPECIFIED BACK LOCATION, UNSPECIFIED BACK PAIN LATERALITY: ICD-10-CM

## 2023-01-16 DIAGNOSIS — G89.29 CHRONIC BACK PAIN, UNSPECIFIED BACK LOCATION, UNSPECIFIED BACK PAIN LATERALITY: ICD-10-CM

## 2023-01-16 DIAGNOSIS — G47.00 INSOMNIA, UNSPECIFIED TYPE: ICD-10-CM

## 2023-01-16 DIAGNOSIS — G47.8 NON-RESTORATIVE SLEEP: ICD-10-CM

## 2023-01-16 PROCEDURE — 3077F SYST BP >= 140 MM HG: CPT | Performed by: INTERNAL MEDICINE

## 2023-01-16 PROCEDURE — 3079F DIAST BP 80-89 MM HG: CPT | Performed by: INTERNAL MEDICINE

## 2023-01-16 PROCEDURE — 99214 OFFICE O/P EST MOD 30 MIN: CPT | Performed by: INTERNAL MEDICINE

## 2023-01-16 PROCEDURE — 1123F ACP DISCUSS/DSCN MKR DOCD: CPT | Performed by: INTERNAL MEDICINE

## 2023-01-16 ASSESSMENT — ENCOUNTER SYMPTOMS
FACIAL SWELLING: 0
EYES NEGATIVE: 1
SINUS PRESSURE: 0
BACK PAIN: 1
GASTROINTESTINAL NEGATIVE: 1
RESPIRATORY NEGATIVE: 1

## 2023-01-23 ENCOUNTER — CLINICAL DOCUMENTATION (OUTPATIENT)
Dept: ORTHOPEDIC SURGERY | Age: 78
End: 2023-01-23

## 2023-01-23 NOTE — PROGRESS NOTES
Patient referred to interventional pain for pain management. Faxed on 1/11/23 and received confirmation. Referral was also dropped to physical office.

## 2023-01-25 ENCOUNTER — OFFICE VISIT (OUTPATIENT)
Dept: UROLOGY | Age: 78
End: 2023-01-25
Payer: MEDICARE

## 2023-01-25 DIAGNOSIS — R39.14 FEELING OF INCOMPLETE BLADDER EMPTYING: ICD-10-CM

## 2023-01-25 DIAGNOSIS — N39.0 RECURRENT UTI: Primary | ICD-10-CM

## 2023-01-25 DIAGNOSIS — N39.3 SUI (STRESS URINARY INCONTINENCE, FEMALE): ICD-10-CM

## 2023-01-25 LAB
BILIRUBIN, URINE, POC: NEGATIVE
BLOOD URINE, POC: NEGATIVE
GLUCOSE URINE, POC: NEGATIVE
KETONES, URINE, POC: NORMAL
LEUKOCYTE ESTERASE, URINE, POC: NEGATIVE
NITRITE, URINE, POC: NEGATIVE
PH, URINE, POC: 6 (ref 4.6–8)
PROTEIN,URINE, POC: NEGATIVE
PVR, POC: NORMAL CC
SPECIFIC GRAVITY, URINE, POC: 1.02 (ref 1–1.03)
URINALYSIS CLARITY, POC: NORMAL
URINALYSIS COLOR, POC: NORMAL
UROBILINOGEN, POC: NORMAL

## 2023-01-25 PROCEDURE — 99214 OFFICE O/P EST MOD 30 MIN: CPT | Performed by: UROLOGY

## 2023-01-25 PROCEDURE — 51798 US URINE CAPACITY MEASURE: CPT | Performed by: UROLOGY

## 2023-01-25 PROCEDURE — 1123F ACP DISCUSS/DSCN MKR DOCD: CPT | Performed by: UROLOGY

## 2023-01-25 PROCEDURE — 81003 URINALYSIS AUTO W/O SCOPE: CPT | Performed by: UROLOGY

## 2023-01-25 ASSESSMENT — ENCOUNTER SYMPTOMS
RESPIRATORY NEGATIVE: 1
EYES NEGATIVE: 1

## 2023-01-25 NOTE — PROGRESS NOTES
[facility]  [address]  [phone]    Linda Melissa  : 1945    Chief Complaint   Patient presents with    Urinary Tract Infection     Recurrent uti         HPI     Linda Melissa is a 69 y.o. female being seen today for recurrent UTI f/u. Previously followed by Dr. Audrey Joseph for right renal cyst. SHARRI in  showed a stable simple cyst to lower pole of right kidney. Pt recently visited Urgent Care On 11/10/21 with dysuria. Urine culture revealed 40,000 colonies of E.Coli. Upon record review, pt has had E. Coli positive urine culture in Sept, Aug, and 2021. She was started on macrodantin suppression in 2021, however she did not follow up after this. Today, she is asx. She had no UTI while on suppression. She has had several [facility] ER visits for UTI sx. There are 2 E. Coli positive urine cultures in Aug 2022. CT A/P in May 2022 revealed normal urinary tract. Has h/o IBS with diarrhea. Followed by Dr. Sheila Leavitt for this. Reports diarrhea has improved since beginning amitriptyline. Has been seen by Dr. Hayley Monsalve for OAB. Underwent Mid urethral sling [facility] [employer] Sling) and Cystourethroscopy on 12/3/19. Was on uribel for IC sx, but is no longer taking this. She does not want to f/u  Dr. Hayley Monsalve. Has urine leakage w coughing and sneezing. Wakes up wet in AM.      Hysterectomy approx 35 years ago for abnormal bleeding. H/O DVT in right leg 10 years ago. Not on AC. Cr 1.50.    she has been on Macrodantin suppression since 10-22, has not had any UTI since then . she has enuresis. She puts a in her vaginal area. She leaks with cough and sneeze. Thinks she is not emptying completely. Past Medical History:   Diagnosis Date    Acute kidney insufficiency     Followed by PCP     Adverse effect of anesthesia     2016 in [place] heart stopped at the end of my gallbladder surgery. \" Patient reports no CPR performed, stayed one night in the hospital. No problems with any other surgeries.     Allergic rhinitis     Anemia     history     Anxiety and depression     Controlled with medication     Arthritis     Chronic pain     Edema     bilateral lower legs     GERD (gastroesophageal reflux disease)     Controlled with medication     Headache Oct.2022    History of chicken pox     History of Clostridioides difficile infection     History of DVT (deep vein thrombosis)     s/p knee replacement and developed right leg DVT     HTN (hypertension)     controlled with medication     Hyperlipidemia     controlled with medication     Hyperlipidemia     Hypertension     Hyperthyroidism     pt denies    Hypothyroidism     controlled with medication     Hypothyroidism     IBS (irritable bowel syndrome)     Insomnia     Left flank pain 03/05/2020    Morbid obesity ([address]     BMI 42.85    Murmur     per PCP note (45/5/08) systolic murmur grade of 2/6    Overactive bladder     PAD (peripheral artery disease) [facility]     patient denies on 11/27/19    PTSD (post-traumatic stress disorder) 2006    In [facility] met with psycoligist    Recurrent UTI     Restless leg     RLS (restless legs syndrome)     controlled with medication     Shigella gastroenteritis 05/09/2022    Sleep difficulties 10 years ago    SOBOE (shortness of breath on exertion)     PRN inhaler-patient reports she uses inhaler QHS    Special examinations     History-Neurologist - Dr. Austin Area    ANASTASIA (stress urinary incontinence, female)     Thromboembolus ([address] 2015    behind right knee after total knee replacement    Tubulovillous adenoma of colon 04/26/2021     Past Surgical History:   Procedure Laterality Date    APPENDECTOMY  1955    CHOLECYSTECTOMY  12/2016    COLONOSCOPY      COLONOSCOPY      2013, 2003    EYE SURGERY  2015    GYN  12/03/2019    suburethral sling and cystoscopy    HYSTERECTOMY, TOTAL ABDOMINAL (CERVIX REMOVED)      1970    HYSTERECTOMY, VAGINAL      IR [address] [facility]      2014    JOINT REPLACEMENT      Left knee    KNEE SURGERY Left     4/2016    KNEE SURGERY Left 2014    LAP BAND  REMOVAL 3/16/2015    ORTHOPEDIC SURGERY Left 01/2020    left hand tendon surgery    ORTHOPEDIC SURGERY Left     LEFT WRIST TRAPEZIOMETACARPAL ARTHROPLASTY WITH FCR TENDON TRANSFER- 2020    OTHER SURGICAL HISTORY Right 2016    Hand    OTHER SURGICAL HISTORY      2014    OTHER SURGICAL HISTORY      1986    OTHER SURGICAL HISTORY  12/03/2019    bladder surgery-put mesh in    SHOULDER ARTHROSCOPY      1966,     TONSILLECTOMY AND ADENOIDECTOMY      1950    TOTAL KNEE ARTHROPLASTY Right 2014    UPPER GASTROINTESTINAL ENDOSCOPY  2018     Current Outpatient Medications   Medication Sig Dispense Refill    DULoxetine (CYMBALTA) 30 MG extended release capsule Take 30 mg once daily for 2 weeks then 60 mg once daily 30 capsule 0    DULoxetine (CYMBALTA) 60 MG extended release capsule Take 1 capsule by mouth daily Take 30 mg once daily for 2 weeks then 60 mg once daily, Please fill both 30 and 60 mg scripts.  30 capsule 3    amitriptyline (ELAVIL) 25 MG tablet Take 1 tablet by mouth nightly 90 tablet 3    SYNTHROID 150 MCG tablet 1 tab daily with 1/2 tab on Sunday, TDD 139mcg 90 tablet 3    vitamin D (ERGOCALCIFEROL) 1.25 MG (37426 UT) CAPS capsule Take 1 capsule by mouth every 14 days 6 capsule 2    ondansetron (ZOFRAN) 4 MG tablet Take 1 tablet by mouth every 8 hours as needed for Nausea 30 tablet 1    cyanocobalamin 1000 MCG/ML injection Inject 1 mL into the muscle every 14 days 6 each 2    colestipol (COLESTID) 1 g tablet Take 1 tablet by mouth 2 times daily 60 tablet 2    hyoscyamine (OSCIMIN) 125 MCG TBDP dispersible tablet Take 1 tablet by mouth every 8 hours as needed (abdominal cramps) 90 tablet 0    nitrofurantoin (MACRODANTIN) 50 MG capsule Take 1 capsule by mouth daily 90 capsule 3    diclofenac sodium (VOLTAREN) 1 % GEL Apply 2 g topically 4 times daily as needed for Pain Apply 2g to affected area 4 times daily as needed for pain 200 g 0    acetaminophen (TYLENOL) 325 MG tablet Take 325 mg by mouth every 4 hours as needed      albuterol sulfate  (90 Base) MCG/ACT inhaler Inhale 1-2 puffs into the lungs every 6 hours as needed      clotrimazole-betamethasone (LOTRISONE) 1-0.05 % cream Apply topically as needed      fluticasone (FLONASE) 50 MCG/ACT nasal spray 2 sprays by Nasal route daily as needed      pramipexole (MIRAPEX) 0.5 MG tablet Take 0.5 mg by mouth at bedtime      lisinopril (PRINIVIL;ZESTRIL) 10 MG tablet Take 10 mg by mouth daily       No current facility-administered medications for this visit.      Allergies   Allergen Reactions    Fire Ant Anaphylaxis    Amoxicillin Other (See Comments)     C-Diff    Amoxicillin-Pot Clavulanate Other (See Comments)     c.diff    Augmentin [Amoxicillin-Pot Clavulanate]     Cephalexin Other (See Comments)     C Diff      Cephalexin Other (See Comments)     C-Diff  C-Diff      Cephalosporins     Hydromorphone Other (See Comments)     Keeps her awake    Meperidine Hcl     Morphine Other (See Comments)     Awake  Awake  No sleep    Oxycodone     Oxycodone-Acetaminophen Other (See Comments)     Jittery  Nightmares    Oxycodone-Aspirin Other (See Comments)     Jittery  Other reaction(s): Unknown-Unspecified  Jittery    Penicillins Other (See Comments)     C-Diff      Povidone Iodine     Povidone-Iodine Other (See Comments)     Johnston  Johnston  burn    Adhesive Tape Itching and Rash     \"sticky ace wrap\"  \"sticky ace wrap\"    Meperidine Rash     Social History     Socioeconomic History    Marital status:      Spouse name: Jayden Kilpatrick    Number of children: 2    Years of education: Not on file    Highest education level: Not on file   Occupational History    Occupation: Previously worked as a [employer]    Tobacco Use    Smoking status: Former     Packs/day: 1.00     Years: 46.00     Pack years: 46.00     Types: Cigarettes     Quit date: 2005     Years since quittin.1 Smokeless tobacco: Never   Vaping Use    Vaping Use: Unknown   Substance and Sexual Activity    Alcohol use: No    Drug use: No    Sexual activity: Not Currently     Partners: Male   Other Topics Concern    Not on file   Social History Narrative    ** Merged History Encounter **         Patient has 1 dog sleeps with her occasionally. Patient currently drinks 1 caffeine drink per day. Social Determinants of Health     Financial Resource Strain: Not on file   Food Insecurity: Not on file   Transportation Needs: Not on file   Physical Activity: Not on file   Stress: Not on file   Social Connections: Not on file   Intimate Partner Violence: Not on file   Housing Stability: Not on file     Family History   Problem Relation Age of Onset    Physical Abuse Mother     Other Father 54        brain aneurysm    Cerebral Aneurysm Father     Other Sister     Heart Disease Brother     Congenial Anomalies Sister         right arm deformity    Pneumonia Sister     Other Sister         fibromyalgia    Hypertension Brother     Heart Disease Brother     Other Brother         brain aneurysm, AVM    Heart Surgery Brother         CABGx4    Heart Attack Maternal Grandmother     Cancer Maternal Grandfather         skin    Heart Attack Paternal Grandfather     Mult Sclerosis Daughter     Drug Abuse Son     Other Son         Hepatitis C       Review of Systems  Constitutional: Negative  Skin: Negative skin ROS  Eyes: Eyes negative  ENT: HENT negative  Respiratory: Respiratory negative  Cardiovascular: Neg cardio ROS  GI: Neg GI ROS  Genitourinary: Positive for recurrent UTIs and nocturia. Musculoskeletal: Musculoskeletal negative  Neurological: Neg neuro ROS  Psychological: Neg psych ROS  Endocrine: Endocrine negative  Hem/Lymphatic: Hematologic/lymphatic negative    There were no vitals taken for this visit. PE: vaginal : atrophic mucosa, tender vulva, good anterior support, PVR 0 by US.    Urinalysis  UA - Dipstick  Results for orders placed or performed in visit on 01/25/23   AMB POC URINALYSIS DIP STICK AUTO W/O MICRO   Result Value Ref Range    Color (UA POC)      Clarity (UA POC)      Glucose, Urine, POC Negative Negative    Bilirubin, Urine, POC Negative Negative    KETONES, Urine, POC Trace Negative    Specific Gravity, Urine, POC 1.025 1.001 - 1.035    Blood (UA POC) Negative Negative    pH, Urine, POC 6.0 4.6 - 8.0    Protein, Urine, POC Negative Negative    Urobilinogen, POC Normal     Nitrite, Urine, POC Negative Negative    Leukocyte Esterase, Urine, POC Negative Negative       UA - Micro  WBC - 0  RBC - 0  Bacteria - 0  Epith - 0    Physical Exam    Assessment and Plan    ICD-10-CM    1. Recurrent UTI  N39.0 AMB POC URINALYSIS DIP STICK AUTO W/O MICRO      2. ANASTASIA (stress urinary incontinence, female)  N39.3 AMB POC URINALYSIS DIP STICK AUTO W/O MICRO     External Referral to Urogynecology      3. Feeling of incomplete bladder emptying  R39.14 AMB POC PVR, ELLY,POST-VOID RES,US,NON-IMAGING          Orders Placed This Encounter   Procedures    External Referral to Urogynecology     Referral Priority:   Routine     Referral Type:   Eval and Treat     Referral Reason:   Specialty Services Required     Referred to Provider:   JEAN Allred NP     Requested Specialty:   Urogynecology     Number of Visits Requested:   1    AMB POC URINALYSIS DIP STICK AUTO W/O MICRO    AMB POC PVR, ELLY,POST-VOID RES,US,NON-IMAGING   May have de rinku OAB or urethral obstruction due to sling   Will refer for CMG. Call patient with results      Follow-up and Dispositions    Return for call patient to arrange follow-up.

## 2023-01-26 ENCOUNTER — NURSE ONLY (OUTPATIENT)
Dept: INTERNAL MEDICINE CLINIC | Facility: CLINIC | Age: 78
End: 2023-01-26

## 2023-01-26 DIAGNOSIS — E53.8 VITAMIN B 12 DEFICIENCY: Primary | ICD-10-CM

## 2023-01-26 NOTE — PROGRESS NOTES
After obtaining consent, and per orders of Dr. Darlin Valera, injection of Vitamin B12 given in Right upper quad. gluteus by Mary Barreto MA. Patient instructed to remain in clinic for 20 minutes afterwards, and to report any adverse reaction to me immediately.

## 2023-01-27 DIAGNOSIS — I10 PRIMARY HYPERTENSION: Chronic | ICD-10-CM

## 2023-01-27 DIAGNOSIS — E03.9 PRIMARY HYPOTHYROIDISM: ICD-10-CM

## 2023-01-27 DIAGNOSIS — E78.2 MIXED HYPERLIPIDEMIA: Chronic | ICD-10-CM

## 2023-01-27 DIAGNOSIS — E55.9 VITAMIN D DEFICIENCY: ICD-10-CM

## 2023-01-27 DIAGNOSIS — D50.9 IRON DEFICIENCY ANEMIA, UNSPECIFIED IRON DEFICIENCY ANEMIA TYPE: Chronic | ICD-10-CM

## 2023-01-27 LAB
25(OH)D3 SERPL-MCNC: 29 NG/ML (ref 30–100)
ALBUMIN SERPL-MCNC: 3.5 G/DL (ref 3.2–4.6)
ALBUMIN/GLOB SERPL: 1.1 (ref 0.4–1.6)
ALP SERPL-CCNC: 75 U/L (ref 50–136)
ALT SERPL-CCNC: 25 U/L (ref 12–65)
ANION GAP SERPL CALC-SCNC: 6 MMOL/L (ref 2–11)
AST SERPL-CCNC: 17 U/L (ref 15–37)
BASOPHILS # BLD: 0.1 K/UL (ref 0–0.2)
BASOPHILS NFR BLD: 1 % (ref 0–2)
BILIRUB SERPL-MCNC: 0.2 MG/DL (ref 0.2–1.1)
BUN SERPL-MCNC: 20 MG/DL (ref 8–23)
CALCIUM SERPL-MCNC: 9.1 MG/DL (ref 8.3–10.4)
CHLORIDE SERPL-SCNC: 102 MMOL/L (ref 101–110)
CHOLEST SERPL-MCNC: 199 MG/DL
CO2 SERPL-SCNC: 31 MMOL/L (ref 21–32)
CREAT SERPL-MCNC: 1.1 MG/DL (ref 0.6–1)
DIFFERENTIAL METHOD BLD: ABNORMAL
EOSINOPHIL # BLD: 0.4 K/UL (ref 0–0.8)
EOSINOPHIL NFR BLD: 4 % (ref 0.5–7.8)
ERYTHROCYTE [DISTWIDTH] IN BLOOD BY AUTOMATED COUNT: 14.5 % (ref 11.9–14.6)
FERRITIN SERPL-MCNC: 28 NG/ML (ref 8–388)
GLOBULIN SER CALC-MCNC: 3.3 G/DL (ref 2.8–4.5)
GLUCOSE SERPL-MCNC: 126 MG/DL (ref 65–100)
HCT VFR BLD AUTO: 36.4 % (ref 35.8–46.3)
HDLC SERPL-MCNC: 49 MG/DL (ref 40–60)
HDLC SERPL: 4.1
HGB BLD-MCNC: 11.3 G/DL (ref 11.7–15.4)
IMM GRANULOCYTES # BLD AUTO: 0.1 K/UL (ref 0–0.5)
IMM GRANULOCYTES NFR BLD AUTO: 1 % (ref 0–5)
IRON SATN MFR SERPL: 10 %
IRON SERPL-MCNC: 45 UG/DL (ref 35–150)
LDLC SERPL CALC-MCNC: 105 MG/DL
LYMPHOCYTES # BLD: 2 K/UL (ref 0.5–4.6)
LYMPHOCYTES NFR BLD: 22 % (ref 13–44)
MAGNESIUM SERPL-MCNC: 1.7 MG/DL (ref 1.8–2.4)
MCH RBC QN AUTO: 28.3 PG (ref 26.1–32.9)
MCHC RBC AUTO-ENTMCNC: 31 G/DL (ref 31.4–35)
MCV RBC AUTO: 91.2 FL (ref 82–102)
MONOCYTES # BLD: 0.4 K/UL (ref 0.1–1.3)
MONOCYTES NFR BLD: 4 % (ref 4–12)
NEUTS SEG # BLD: 6.5 K/UL (ref 1.7–8.2)
NEUTS SEG NFR BLD: 68 % (ref 43–78)
NRBC # BLD: 0 K/UL (ref 0–0.2)
PLATELET # BLD AUTO: 341 K/UL (ref 150–450)
PMV BLD AUTO: 10.9 FL (ref 9.4–12.3)
POTASSIUM SERPL-SCNC: 4.8 MMOL/L (ref 3.5–5.1)
PROT SERPL-MCNC: 6.8 G/DL (ref 6.3–8.2)
RBC # BLD AUTO: 3.99 M/UL (ref 4.05–5.2)
SODIUM SERPL-SCNC: 139 MMOL/L (ref 133–143)
T4 FREE SERPL-MCNC: 1.2 NG/DL (ref 0.78–1.46)
TIBC SERPL-MCNC: 434 UG/DL (ref 250–450)
TRIGL SERPL-MCNC: 225 MG/DL (ref 35–150)
TSH, 3RD GENERATION: 1.23 UIU/ML (ref 0.36–3.74)
URATE SERPL-MCNC: 7.6 MG/DL (ref 2.6–6)
VLDLC SERPL CALC-MCNC: 45 MG/DL (ref 6–23)
WBC # BLD AUTO: 9.5 K/UL (ref 4.3–11.1)

## 2023-02-02 ENCOUNTER — OFFICE VISIT (OUTPATIENT)
Dept: INTERNAL MEDICINE CLINIC | Facility: CLINIC | Age: 78
End: 2023-02-02

## 2023-02-02 VITALS
HEIGHT: 58 IN | OXYGEN SATURATION: 98 % | BODY MASS INDEX: 39.59 KG/M2 | WEIGHT: 188.6 LBS | SYSTOLIC BLOOD PRESSURE: 142 MMHG | HEART RATE: 77 BPM | DIASTOLIC BLOOD PRESSURE: 92 MMHG

## 2023-02-02 DIAGNOSIS — E66.01 SEVERE OBESITY (BMI 35.0-39.9) WITH COMORBIDITY (HCC): ICD-10-CM

## 2023-02-02 DIAGNOSIS — D50.9 IRON DEFICIENCY ANEMIA, UNSPECIFIED IRON DEFICIENCY ANEMIA TYPE: ICD-10-CM

## 2023-02-02 DIAGNOSIS — N18.32 STAGE 3B CHRONIC KIDNEY DISEASE (HCC): ICD-10-CM

## 2023-02-02 DIAGNOSIS — I10 PRIMARY HYPERTENSION: Primary | ICD-10-CM

## 2023-02-02 DIAGNOSIS — Z78.0 POST-MENOPAUSE: ICD-10-CM

## 2023-02-02 DIAGNOSIS — E83.42 HYPOMAGNESEMIA: ICD-10-CM

## 2023-02-02 DIAGNOSIS — E55.9 VITAMIN D DEFICIENCY: ICD-10-CM

## 2023-02-02 DIAGNOSIS — E53.8 VITAMIN B12 DEFICIENCY: ICD-10-CM

## 2023-02-02 RX ORDER — ESCITALOPRAM OXALATE 20 MG/1
20 TABLET ORAL DAILY
COMMUNITY

## 2023-02-02 RX ORDER — METHYLDOPA/HYDROCHLOROTHIAZIDE 250MG-15MG
TABLET ORAL
COMMUNITY

## 2023-02-02 RX ORDER — ERGOCALCIFEROL 1.25 MG/1
50000 CAPSULE ORAL WEEKLY
Qty: 12 CAPSULE | Refills: 3 | Status: SHIPPED | OUTPATIENT
Start: 2023-02-02

## 2023-02-02 RX ORDER — LISINOPRIL 20 MG/1
20 TABLET ORAL DAILY
COMMUNITY

## 2023-02-02 SDOH — ECONOMIC STABILITY: INCOME INSECURITY: HOW HARD IS IT FOR YOU TO PAY FOR THE VERY BASICS LIKE FOOD, HOUSING, MEDICAL CARE, AND HEATING?: VERY HARD

## 2023-02-02 SDOH — ECONOMIC STABILITY: FOOD INSECURITY: WITHIN THE PAST 12 MONTHS, YOU WORRIED THAT YOUR FOOD WOULD RUN OUT BEFORE YOU GOT MONEY TO BUY MORE.: SOMETIMES TRUE

## 2023-02-02 SDOH — ECONOMIC STABILITY: HOUSING INSECURITY
IN THE LAST 12 MONTHS, WAS THERE A TIME WHEN YOU DID NOT HAVE A STEADY PLACE TO SLEEP OR SLEPT IN A SHELTER (INCLUDING NOW)?: NO

## 2023-02-02 SDOH — ECONOMIC STABILITY: FOOD INSECURITY: WITHIN THE PAST 12 MONTHS, THE FOOD YOU BOUGHT JUST DIDN'T LAST AND YOU DIDN'T HAVE MONEY TO GET MORE.: SOMETIMES TRUE

## 2023-02-02 ASSESSMENT — PATIENT HEALTH QUESTIONNAIRE - PHQ9
SUM OF ALL RESPONSES TO PHQ9 QUESTIONS 1 & 2: 2
4. FEELING TIRED OR HAVING LITTLE ENERGY: 1
5. POOR APPETITE OR OVEREATING: 0
3. TROUBLE FALLING OR STAYING ASLEEP: 0
SUM OF ALL RESPONSES TO PHQ QUESTIONS 1-9: 4
1. LITTLE INTEREST OR PLEASURE IN DOING THINGS: 1
9. THOUGHTS THAT YOU WOULD BE BETTER OFF DEAD, OR OF HURTING YOURSELF: 0
6. FEELING BAD ABOUT YOURSELF - OR THAT YOU ARE A FAILURE OR HAVE LET YOURSELF OR YOUR FAMILY DOWN: 0
2. FEELING DOWN, DEPRESSED OR HOPELESS: 1
10. IF YOU CHECKED OFF ANY PROBLEMS, HOW DIFFICULT HAVE THESE PROBLEMS MADE IT FOR YOU TO DO YOUR WORK, TAKE CARE OF THINGS AT HOME, OR GET ALONG WITH OTHER PEOPLE: 0
7. TROUBLE CONCENTRATING ON THINGS, SUCH AS READING THE NEWSPAPER OR WATCHING TELEVISION: 0
8. MOVING OR SPEAKING SO SLOWLY THAT OTHER PEOPLE COULD HAVE NOTICED. OR THE OPPOSITE, BEING SO FIGETY OR RESTLESS THAT YOU HAVE BEEN MOVING AROUND A LOT MORE THAN USUAL: 1

## 2023-02-02 NOTE — PATIENT INSTRUCTIONS
Do not drink Gator-kellie,    Do not drink any fluid that has calories in it. Limit orange juice to 4 ounces (1/2 cup) per day. You can use Crystal lite. Increase ergocalciferol (vitamin D) to one capsule per week. Begin Slo-Fe (iron replacement) one capsule daily. Take with morning orange juice on empty stomach. Limit carbohydrates to 100 grams per day. Limit fat to 30 grams per day. Limit  starchy foods such as bread, rice, potatoes and pasta to one serving per meal.    Measure foods such as rice and pasta; half cup is a serving. Exercise by walking. Work up to 30 minutes daily. Then increase the distance you can go in that 30 minutes.             Recent Results (from the past 672 hour(s))   AMB POC URINALYSIS DIP STICK AUTO W/O MICRO    Collection Time: 01/25/23  4:23 PM   Result Value Ref Range    Color (UA POC)      Clarity (UA POC)      Glucose, Urine, POC Negative Negative    Bilirubin, Urine, POC Negative Negative    KETONES, Urine, POC Trace Negative    Specific Gravity, Urine, POC 1.025 1.001 - 1.035    Blood (UA POC) Negative Negative    pH, Urine, POC 6.0 4.6 - 8.0    Protein, Urine, POC Negative Negative    Urobilinogen, POC Normal     Nitrite, Urine, POC Negative Negative    Leukocyte Esterase, Urine, POC Negative Negative   AMB POC PVR, ELLY,POST-VOID RES,US,NON-IMAGING    Collection Time: 01/25/23  4:54 PM   Result Value Ref Range    PVR, POC 0 ml cc   T4, Free    Collection Time: 01/27/23  8:48 AM   Result Value Ref Range    T4 Free 1.2 0.78 - 1.46 NG/DL   TSH    Collection Time: 01/27/23  8:48 AM   Result Value Ref Range    TSH, 3RD GENERATION 1.230 0.358 - 3.740 uIU/mL   Vitamin D 25 Hydroxy    Collection Time: 01/27/23  8:48 AM   Result Value Ref Range    Vit D, 25-Hydroxy 29.0 (L) 30.0 - 100.0 ng/mL   Uric Acid    Collection Time: 01/27/23  8:48 AM   Result Value Ref Range    Uric Acid 7.6 (H) 2.6 - 6.0 MG/DL   Magnesium    Collection Time: 01/27/23  8:48 AM Result Value Ref Range    Magnesium 1.7 (L) 1.8 - 2.4 mg/dL   Lipid Panel    Collection Time: 01/27/23  8:48 AM   Result Value Ref Range    Cholesterol, Total 199 <200 MG/DL    Triglycerides 225 (H) 35 - 150 MG/DL    HDL 49 40 - 60 MG/DL    LDL Calculated 105 (H) <100 MG/DL    VLDL Cholesterol Calculated 45 (H) 6.0 - 23.0 MG/DL    Chol/HDL Ratio 4.1     Comprehensive Metabolic Panel    Collection Time: 01/27/23  8:48 AM   Result Value Ref Range    Sodium 139 133 - 143 mmol/L    Potassium 4.8 3.5 - 5.1 mmol/L    Chloride 102 101 - 110 mmol/L    CO2 31 21 - 32 mmol/L    Anion Gap 6 2 - 11 mmol/L    Glucose 126 (H) 65 - 100 mg/dL    BUN 20 8 - 23 MG/DL    Creatinine 1.10 (H) 0.6 - 1.0 MG/DL    Est, Glom Filt Rate 52 (L) >60 ml/min/1.73m2    Calcium 9.1 8.3 - 10.4 MG/DL    Total Bilirubin 0.2 0.2 - 1.1 MG/DL    ALT 25 12 - 65 U/L    AST 17 15 - 37 U/L    Alk Phosphatase 75 50 - 136 U/L    Total Protein 6.8 6.3 - 8.2 g/dL    Albumin 3.5 3.2 - 4.6 g/dL    Globulin 3.3 2.8 - 4.5 g/dL    Albumin/Globulin Ratio 1.1 0.4 - 1.6     CBC with Auto Differential    Collection Time: 01/27/23  8:48 AM   Result Value Ref Range    WBC 9.5 4.3 - 11.1 K/uL    RBC 3.99 (L) 4.05 - 5.2 M/uL    Hemoglobin 11.3 (L) 11.7 - 15.4 g/dL    Hematocrit 36.4 35.8 - 46.3 %    MCV 91.2 82 - 102 FL    MCH 28.3 26.1 - 32.9 PG    MCHC 31.0 (L) 31.4 - 35.0 g/dL    RDW 14.5 11.9 - 14.6 %    Platelets 817 039 - 262 K/uL    MPV 10.9 9.4 - 12.3 FL    nRBC 0.00 0.0 - 0.2 K/uL    Differential Type AUTOMATED      Seg Neutrophils 68 43 - 78 %    Lymphocytes 22 13 - 44 %    Monocytes 4 4.0 - 12.0 %    Eosinophils % 4 0.5 - 7.8 %    Basophils 1 0.0 - 2.0 %    Immature Granulocytes 1 0.0 - 5.0 %    Segs Absolute 6.5 1.7 - 8.2 K/UL    Absolute Lymph # 2.0 0.5 - 4.6 K/UL    Absolute Mono # 0.4 0.1 - 1.3 K/UL    Absolute Eos # 0.4 0.0 - 0.8 K/UL    Basophils Absolute 0.1 0.0 - 0.2 K/UL    Absolute Immature Granulocyte 0.1 0.0 - 0.5 K/UL   Ferritin    Collection Time: 01/27/23  8:48 AM   Result Value Ref Range    Ferritin 28 8 - 388 NG/ML   Transferrin Saturation    Collection Time: 01/27/23  8:48 AM   Result Value Ref Range    Iron 45 35 - 150 ug/dL    TIBC 434 250 - 450 ug/dL    TRANSFERRIN SATURATION 10 (L) >20 %

## 2023-02-02 NOTE — PROGRESS NOTES
PROGRESS NOTE    SUBJECTIVE:   Ladan Oliveira is a 69 y.o. female seen for a follow up visit for   Chief Complaint   Patient presents with    Follow-up     On existing conditions    Discuss Labs     Chronic medical problems    Thyroid Problem  This is a chronic problem. Episode onset: years ago, starting as a Hashimoto's thyroiditis. Treatments tried: levothyroxine. Followed by endocrinology. Cholesterol Problem (elevated triglycerides and mildly elevated LDL)  This is a chronic problem. Onset: Remote. Currently not on a statin. Cardiovascular risk factors: Hypertension, sedentary lifestyle, obesity and family history of hyperlipidemia, coronary artery disease. Nuclear stress test 2019 related to shortness of breath and dyspnea. Results   1. Stress EKG: Normal.   2. SPECT Perfusion Imaging: Normal Perfusion. 3. LV Systolic Function is  normal.   4. Risk Assessment:  Low Risk Scan. Hypertension   This is a chronic problem. Episode onset: 2017. There are no associated agents to hypertension. Risk factors include family history, dyslipidemia, a sedentary lifestyle, obesity, hypertension and postmenopause    Stage IIIb chronic kidney disease  Over the last year (5/2022 to 2/2023) baseline creatinine 1.10-1.61, GFR 36-52. Adrian starting CPAP  Onset: 2022. Not tolerating CPAP. Reports she has turned the machine back in. She has been referred to ENT for evaluation if the inspire device would be beneficial.    Overactive bladder  Chronic problem. Onset: Remote. She has seen urogynecology and urology. She has also had frequency of urine infection. She is on Macrodantin 50 mg daily preventative. She reports a referral to [facility]    Depression with anxiety  This is a chronic problem. Episode onset years ago. Currently on Lexapro 30 mg daily. She also uses amitriptyline at night.          Reviewed and updated this visit by provider:  Tobacco  Allergies  Meds  Problems  Med Hx Surg Hx  Fam Hx         Review of Systems   Constitutional:  Positive for fatigue (chronic). Negative for chills and fever. HENT:  Negative for sneezing. Respiratory:  Negative for cough, chest tightness, shortness of breath and wheezing. Cardiovascular:  Negative for chest pain, palpitations and leg swelling. Gastrointestinal:  Negative for abdominal pain. Genitourinary:  Positive for frequency (chronic). Musculoskeletal:  Positive for arthralgias (knee) and gait problem (recent TKA). Negative for back pain. Skin:  Negative for color change. Allergic/Immunologic: Negative for environmental allergies. Neurological:  Positive for dizziness (chronic, intermittent). Negative for weakness and headaches. Hematological:  Does not bruise/bleed easily. Psychiatric/Behavioral:  Positive for dysphoric mood. The patient is not nervous/anxious. OBJECTIVE:    BP (!) 142/92   Pulse 77   Ht 4' 10\" (1.473 m)   Wt 188 lb 9.6 oz (85.5 kg)   SpO2 98%   BMI 39.42 kg/m²      Physical Exam  Vitals and nursing note reviewed. Constitutional:       Appearance: Normal appearance. She is obese. HENT:      Head: Normocephalic. Mouth/Throat:      Lips: Pink. Mouth: Mucous membranes are moist.   Eyes:      General: Lids are normal.   Neck:      Vascular: No carotid bruit. Cardiovascular:      Rate and Rhythm: Normal rate and regular rhythm. Pulmonary:      Effort: Pulmonary effort is normal.      Breath sounds: Normal breath sounds. Musculoskeletal:      Cervical back: Neck supple. Right lower leg: No edema. Left lower leg: No edema. Skin:     General: Skin is warm and dry. Neurological:      Mental Status: She is alert and oriented to person, place, and time. Mental status is at baseline. Cranial Nerves: No cranial nerve deficit, dysarthria or facial asymmetry. Motor: No weakness. Coordination: Romberg sign negative.       Gait: Gait normal.   Psychiatric: Mood and Affect: Mood normal.         Behavior: Behavior normal.        ASSESSMENT and PLAN    1. Primary hypertension  -     Microalbumin / Creatinine Urine Ratio; Future  -     CBC with Auto Differential; Future  -     Comprehensive Metabolic Panel; Future  -     Uric Acid; Future  2. Stage 3b chronic kidney disease (HCC)  -     Microalbumin / Creatinine Urine Ratio; Future  -     CBC with Auto Differential; Future  -     Comprehensive Metabolic Panel; Future  -     Uric Acid; Future  3. Post-menopause  -     DEXA BONE DENSITY AXIAL SKELETON; Future  -     Comprehensive Metabolic Panel; Future  4. Vitamin D deficiency  -     vitamin D (ERGOCALCIFEROL) 1.25 MG (31360 UT) CAPS capsule; Take 1 capsule by mouth once a week, Disp-12 capsule, R-3Normal  -     Comprehensive Metabolic Panel; Future  -     Vitamin D 25 Hydroxy; Future  5. Hypomagnesemia  -     Magnesium; Future  6. Severe obesity (BMI 35.0-39. 9) with comorbidity ([facility] [address]  7. Iron deficiency anemia, unspecified iron deficiency anemia type  8. Vitamin B12 deficiency    Return in about 3 months (around 5/2/2023) for Follow-Up, HTN, HLD, with labs. the following changes in treatment are made: Plan DEXA, supplement with Slow Fe and vitamin C.  lab results and schedule of future lab studies reviewed with patient  reviewed diet, exercise and weight control  cardiovascular risk and specific lipid/LDL goals reviewed  reviewed medications and side effects in detail  Blood pressure is slightly elevated today. Recommend reduce sodium in the diet. Will obtain microalbumin creatinine ratio for determination of stage III CKD. She is postmenopausal and recommend DEXA. We will also obtain vitamin D level to ensure her vitamin D level is adequate if treatment is needed for osteopenia/osteoporosis. We will repeat magnesium level. May need to supplement. She is not on diuretics. Iron deficiency discussed briefly.   Recommend Slow Fe 1 tablet daily on an empty stomach with vitamin C 500 mg. Continue vitamin B12 injections. BMI was elevated today, and weight loss plan recommended is : conventional weight loss and daily exercise regimen. JEAN Washington - NP    Dictated using voice recognition software.  Proofread, but unrecognized voice recognition errors may exist.

## 2023-02-09 ENCOUNTER — OFFICE VISIT (OUTPATIENT)
Dept: ORTHOPEDIC SURGERY | Age: 78
End: 2023-02-09

## 2023-02-09 DIAGNOSIS — Z98.1 S/P CERVICAL SPINAL FUSION: ICD-10-CM

## 2023-02-09 DIAGNOSIS — M47.816 LUMBAR FACET ARTHROPATHY: Primary | ICD-10-CM

## 2023-02-09 DIAGNOSIS — M50.30 DEGENERATIVE DISC DISEASE, CERVICAL: ICD-10-CM

## 2023-02-09 DIAGNOSIS — M54.2 NECK PAIN: ICD-10-CM

## 2023-02-09 DIAGNOSIS — M43.16 SPONDYLOLISTHESIS OF LUMBAR REGION: ICD-10-CM

## 2023-02-09 NOTE — PROGRESS NOTES
Name: Dylan Soriano  YOB: 1945  Gender: female  MRN: [id number]    CC: Follow-up MRI results, left hip pain, chronic neck pain     HPI: This is a 69y.o. year old female who I recently referred to pain management for known lower lumbar facet arthropathy and spondylolisthesis with no significant stenosis. Patient has changed insurances and now has to go to interventional pain management. We have physically dropped the referral off ourselves at the office. She has not heard back from them. But she was having continuous complaints of left hip pain and low back pain. Therefore patient was referred for a new MRI of the lumbar spine. She also tells me today that she has chronic neck pain. She had a history of a fusion of the cervical spine in [place] many years ago. She denies any radiculopathy. No flowsheet data found.          Allergies   Allergen Reactions    Fire Ant Anaphylaxis    Amoxicillin Other (See Comments)     C-Diff    Amoxicillin-Pot Clavulanate Other (See Comments)     c.diff    Augmentin [Amoxicillin-Pot Clavulanate]     Cephalexin Other (See Comments)     C Diff      Cephalexin Other (See Comments)     C-Diff  C-Diff      Cephalosporins     Hydromorphone Other (See Comments)     Keeps her awake    Meperidine Hcl     Morphine Other (See Comments)     Awake  Awake  No sleep    Oxycodone     Oxycodone-Acetaminophen Other (See Comments)     Jittery  Nightmares    Oxycodone-Aspirin Other (See Comments)     Jittery  Other reaction(s): Unknown-Unspecified  Jittery    Penicillins Other (See Comments)     C-Diff      Povidone Iodine     Povidone-Iodine Other (See Comments)     Johnston  Johnston  burn    Adhesive Tape Itching and Rash     \"sticky ace wrap\"  \"sticky ace wrap\"    Meperidine Rash       Current Outpatient Medications:     lisinopril (PRINIVIL;ZESTRIL) 20 MG tablet, Take 20 mg by mouth daily, Disp: , Rfl:     escitalopram (LEXAPRO) 20 MG tablet, Take 20 mg by mouth daily Taking half a day for 2 weeks then discontinue, Disp: , Rfl:     Cranberry 500 MG TABS, Take by mouth, Disp: , Rfl:     Probiotic CHEW, Take by mouth, Disp: , Rfl:     vitamin D (ERGOCALCIFEROL) 1.25 MG (82954 UT) CAPS capsule, Take 1 capsule by mouth once a week, Disp: 12 capsule, Rfl: 3    DULoxetine (CYMBALTA) 60 MG extended release capsule, Take 1 capsule by mouth daily Take 30 mg once daily for 2 weeks then 60 mg once daily, Please fill both 30 and 60 mg scripts. , Disp: 30 capsule, Rfl: 3    amitriptyline (ELAVIL) 25 MG tablet, Take 1 tablet by mouth nightly, Disp: 90 tablet, Rfl: 3    SYNTHROID 150 MCG tablet, 1 tab daily with 1/2 tab on Sunday, TDD 139mcg, Disp: 90 tablet, Rfl: 3    ondansetron (ZOFRAN) 4 MG tablet, Take 1 tablet by mouth every 8 hours as needed for Nausea, Disp: 30 tablet, Rfl: 1    cyanocobalamin 1000 MCG/ML injection, Inject 1 mL into the muscle every 14 days, Disp: 6 each, Rfl: 2    colestipol (COLESTID) 1 g tablet, Take 1 tablet by mouth 2 times daily, Disp: 60 tablet, Rfl: 2    hyoscyamine (OSCIMIN) 125 MCG TBDP dispersible tablet, Take 1 tablet by mouth every 8 hours as needed (abdominal cramps), Disp: 90 tablet, Rfl: 0    nitrofurantoin (MACRODANTIN) 50 MG capsule, Take 1 capsule by mouth daily, Disp: 90 capsule, Rfl: 3    diclofenac sodium (VOLTAREN) 1 % GEL, Apply 2 g topically 4 times daily as needed for Pain Apply 2g to affected area 4 times daily as needed for pain, Disp: 200 g, Rfl: 0    acetaminophen (TYLENOL) 325 MG tablet, Take 325 mg by mouth every 4 hours as needed, Disp: , Rfl:     albuterol sulfate  (90 Base) MCG/ACT inhaler, Inhale 1-2 puffs into the lungs every 6 hours as needed, Disp: , Rfl:     clotrimazole-betamethasone (LOTRISONE) 1-0.05 % cream, Apply topically as needed, Disp: , Rfl:     fluticasone (FLONASE) 50 MCG/ACT nasal spray, 2 sprays by Nasal route daily as needed, Disp: , Rfl:     pramipexole (MIRAPEX) 0.5 MG tablet, Take 0.5 mg by mouth at bedtime, Disp: , Rfl:   Past Medical History:   Diagnosis Date    Acute kidney insufficiency     Followed by PCP     Adverse effect of anesthesia     2016 in [place] heart stopped at the end of my gallbladder surgery. \" Patient reports no CPR performed, stayed one night in the hospital. No problems with any other surgeries. Allergic rhinitis     Anemia     history     Anxiety and depression     Controlled with medication     Arthritis     Chronic pain     Edema     bilateral lower legs     GERD (gastroesophageal reflux disease)     Controlled with medication     Headache Oct.2022    History of chicken pox     History of Clostridioides difficile infection     History of DVT (deep vein thrombosis)     s/p knee replacement and developed right leg DVT     HTN (hypertension)     controlled with medication     Hyperlipidemia     controlled with medication     Hyperlipidemia     Hypertension     Hyperthyroidism     pt denies    Hypothyroidism     controlled with medication     Hypothyroidism     IBS (irritable bowel syndrome)     Insomnia     Left flank pain 03/05/2020    Morbid obesity ([facility] [address]     BMI 42.85    Murmur     per PCP note (57/2/48) systolic murmur grade of 2/6    Overactive bladder     PAD (peripheral artery disease) ([facility] [address]     patient denies on 11/27/19    PTSD (post-traumatic stress disorder) 2006    In [facility] met with psycoligist    Recurrent UTI     Restless leg     RLS (restless legs syndrome)     controlled with medication     Shigella gastroenteritis 05/09/2022    Sleep difficulties 10 years ago    SOBOE (shortness of breath on exertion)     PRN inhaler-patient reports she uses inhaler QHS    Special examinations     History-Neurologist - Dr. Ebenezer OCONNOR (stress urinary incontinence, female)     Thromboembolus ([facility] [address] 2015    behind right knee after total knee replacement    Tubulovillous adenoma of colon 04/26/2021     Tobacco:  reports that she quit smoking about 17 years ago.  Her smoking use included cigarettes. She has a 46.00 pack-year smoking history. She has never used smokeless tobacco.  Alcohol:   Social History     Substance and Sexual Activity   Alcohol Use No          Radiographic Studies:       MRI Results (most recent): MRI Result (most recent): MRI LUMBAR SPINE WO CONTRAST 02/02/2023    Narrative  History: Spondylosis without myelopathy or radiculopathy, lumbar region;  Spondylolisthesis, lumbar region    Exam: MRI lumbar spine without contrast    Technique: Axial and sagittal T1 and T2-weighted sequences are available for  review. A sagittal STIR sequence is also available for review. Comparison: 7/22/2019    Findings:    Degenerative disc signal present L2-3, L3-4, and L5-S1. The conus is normal in  configuration and signal intensity throughout. Axial imaging demonstrates no  abnormal retroperitoneal lesions. L1-2: There is facet arthropathy without central or neural foraminal narrowing. L2-3: There is a disc bulge with bilateral facet arthropathy. There is mild  right neural foraminal narrowing, stable. L3-L4: There is facet arthropathy without central or neural foraminal narrowing,  unchanged. L4-L5: There is facet arthropathy without central or neural foraminal narrowing. There is significant left-sided facet arthropathy noted. L5-S1: There is degenerative grade 1 anterolisthesis at this level, stable. There is also facet arthropathy. No central or neural foraminal narrowing. Impression  Impression: Multilevel lumbar spondylosis with degenerative grade 1  anterolisthesis L5 on S1. No significant interval change when compared with the  prior study. AP and  lateral views of the cervical spine reviewed today: There is obvious previous noninstrumented fusion at the C5-6 vertebral bodies and disc space. There is adjacent level degenerative disc disease. Loss of lordosis. On the AP view there is multilevel cervical facet arthropathy.     Interpretation: Cervical spondylosis and disc degeneration status post C5-6 fusion    Assessment/Plan:        ICD-10-CM    1. Lumbar facet arthropathy  M47.816       2. Spondylolisthesis of lumbar region  M43.16       3. Neck pain  M54.2 XR CERVICAL SPINE (2-3 VIEWS)     Amb External Referral To Physical Therapy     Ambulatory referral to Physical Therapy      4. Degenerative disc disease, cervical  M50.30       5. S/P cervical spinal fusion  Z98.1            I reviewed patient's MRI findings with her. I told her that there is no significant nerve impingement on the lumbar spine. However she does have a pretty massive facet joint at the on the left at L4-5 and at L5-S1. Obviously causing her complaints. This would be treated with interventional management. In regards to her cervical spine she has spondylosis and adjacent level disc degeneration. I would recommend physical therapy. Then I will see her back in 6 weeks and I will be happy to order an MRI of the cervical spine. We have given her interventional pain management number that she can contact them and expedite her referral.    - Physical Therapy was prescribed and will include stretching, strengthening and modalities to promote blood flow, flexibility and core strengthening.  - If the patient fails to respond to these efforts, I would recommend MRI of the cervical spine for further evaluation. No orders of the defined types were placed in this encounter. Orders Placed This Encounter   Procedures    XR CERVICAL SPINE (2-3 VIEWS)    Amb External Referral To Physical Therapy    Ambulatory referral to Physical Therapy        3 This is stable chronic illness/condition      Return in about 6 weeks (around 3/23/2023), or after cervical PT completed, for refer to pain management. JEAN Sears - CNP  02/09/23      Elements of this note were created using speech recognition software. As such, errors of speech recognition may be present.

## 2023-02-09 NOTE — PROGRESS NOTES
Name: Michael Guzman  YOB: 1945  Gender: female  MRN: [id number]    CC:   Chief Complaint   Patient presents with    Follow-up     Recheck  left shoulder        HPI: Patient presents for FU of left shoulder pain. Patient was given an AC CSI on 12/23/22 and notes 90% relief with injection for about 1 month. The patient had a [address] CSI on 11/11/22 with short term relief. Patient also previously had biceps tendon injection in September which gave 3-4 weeks of relief as well as previous subacromial injection in July with only minor relief. At the patient's last visit, we also discussed lymph node enlargement and thyroid enlargement and recommended FU with PCP or ENT. Also discussed possible spine FU vs pain management for some of her neck pains. Patient did see Sugar Cheng and they discussed spondylitis and facetogenic back pain. They discussed referral to pain management. Today the patient states her right shoulder is been giving her trouble for the last month or so. History of surgery on that side. Pain seems more lateral.  Both are giving her trouble with sleep. Does not take NSAIDs because of her kidneys.   Has been taking Tylenol    Allergies   Allergen Reactions    Fire Ant Anaphylaxis    Amoxicillin Other (See Comments)     C-Diff    Amoxicillin-Pot Clavulanate Other (See Comments)     c.diff    Augmentin [Amoxicillin-Pot Clavulanate]     Cephalexin Other (See Comments)     C Diff      Cephalexin Other (See Comments)     C-Diff  C-Diff      Cephalosporins     Hydromorphone Other (See Comments)     Keeps her awake    Meperidine Hcl     Morphine Other (See Comments)     Awake  Awake  No sleep    Oxycodone     Oxycodone-Acetaminophen Other (See Comments)     Jittery  Nightmares    Oxycodone-Aspirin Other (See Comments)     Jittery  Other reaction(s): Unknown-Unspecified  Jittery    Penicillins Other (See Comments)     C-Diff      Povidone Iodine     Povidone-Iodine Other (See Comments)     Johnston  Johnston  burn    Adhesive Tape Itching and Rash     \"sticky ace wrap\"  \"sticky ace wrap\"    Meperidine Rash     Past Medical History:   Diagnosis Date    Acute kidney insufficiency     Followed by PCP     Adverse effect of anesthesia     2016 in [place] heart stopped at the end of my gallbladder surgery. \" Patient reports no CPR performed, stayed one night in the hospital. No problems with any other surgeries.     Allergic rhinitis     Anemia     history     Anxiety and depression     Controlled with medication     Arthritis     Chronic pain     Edema     bilateral lower legs     GERD (gastroesophageal reflux disease)     Controlled with medication     Headache Oct.2022    History of chicken pox     History of Clostridioides difficile infection     History of DVT (deep vein thrombosis)     s/p knee replacement and developed right leg DVT     HTN (hypertension)     controlled with medication     Hyperlipidemia     controlled with medication     Hyperlipidemia     Hypertension     Hyperthyroidism     pt denies    Hypothyroidism     controlled with medication     Hypothyroidism     IBS (irritable bowel syndrome)     Insomnia     Left flank pain 03/05/2020    Morbid obesity ([address]     BMI 42.85    Murmur     per PCP note (88/3/66) systolic murmur grade of 2/6    Overactive bladder     PAD (peripheral artery disease) ([address]     patient denies on 11/27/19    PTSD (post-traumatic stress disorder) 2006    [place] [facility] [place]coligist    Recurrent UTI     Restless leg     RLS (restless legs syndrome)     controlled with medication     Shigella gastroenteritis 05/09/2022    Sleep difficulties 10 years ago    SOBOE (shortness of breath on exertion)     PRN inhaler-patient reports she uses inhaler QHS    Special examinations     History-Neurologist - Dr. Tee OCONNOR (stress urinary incontinence, female)     Thromboembolus ([address] 2015    behind right knee after total knee replacement    Tubulovillous adenoma of colon 04/26/2021     Past Surgical History:   Procedure Laterality Date    APPENDECTOMY  1955    CHOLECYSTECTOMY  12/2016    COLONOSCOPY      COLONOSCOPY      2013, 2003    EYE SURGERY  2015    GYN  12/03/2019    suburethral sling and cystoscopy    HYSTERECTOMY, TOTAL ABDOMINAL (CERVIX REMOVED)      1970    HYSTERECTOMY, VAGINAL      IR GENERIC PROCEDURE      2014    JOINT REPLACEMENT      Left knee    KNEE SURGERY Left     4/2016    KNEE SURGERY Left 2014    LAP BAND  REMOVAL 3/16/2015    ORTHOPEDIC SURGERY Left 01/2020    left hand tendon surgery    ORTHOPEDIC SURGERY Left     LEFT WRIST TRAPEZIOMETACARPAL ARTHROPLASTY WITH FCR TENDON TRANSFER- 2020    OTHER SURGICAL HISTORY Right 2016    Hand    OTHER SURGICAL HISTORY      2014    OTHER SURGICAL HISTORY      1986    OTHER SURGICAL HISTORY  12/03/2019    bladder surgery-put mesh in    SHOULDER ARTHROSCOPY      1966,     TONSILLECTOMY AND ADENOIDECTOMY      1950    TOTAL KNEE ARTHROPLASTY Right 2014    UPPER GASTROINTESTINAL ENDOSCOPY  2018     Family History   Problem Relation Age of Onset    Physical Abuse Mother     Other Father 54        brain aneurysm    Cerebral Aneurysm Father     Other Sister     Heart Disease Brother     Congenial Anomalies Sister         right arm deformity    Pneumonia Sister     Other Sister         fibromyalgia    Hypertension Brother     Heart Disease Brother     Other Brother         brain aneurysm, AVM    Heart Surgery Brother         CABGx4    Heart Attack Maternal Grandmother     Cancer Maternal Grandfather         skin    Heart Attack Paternal Grandfather     Mult Sclerosis Daughter     Drug Abuse Son     Other Son         Hepatitis C     Social History     Socioeconomic History    Marital status:      Spouse name: Abdi Hernandez    Number of children: 2    Years of education: Not on file    Highest education level: Not on file   Occupational History    Occupation: Previously worked as a [employer]    Tobacco Use Smoking status: Former     Packs/day: 1.00     Years: 46.00     Pack years: 46.00     Types: Cigarettes     Quit date: 2005     Years since quittin.1    Smokeless tobacco: Never   Vaping Use    Vaping Use: Unknown   Substance and Sexual Activity    Alcohol use: No    Drug use: No    Sexual activity: Not Currently     Partners: Male   Other Topics Concern    Not on file   Social History Narrative    ** Merged History Encounter **         Patient has 1 dog sleeps with her occasionally. Patient currently drinks 1 caffeine drink per day. Social Determinants of Health     Financial Resource Strain: High Risk    Difficulty of Paying Living Expenses: Very hard   Food Insecurity: Food Insecurity Present    Worried About [place] in the Last Year: Sometimes true    Ran Out of Food in the Last Year: Sometimes true   Transportation Needs: Unknown    Lack of Transportation (Medical): Not on file    Lack of Transportation (Non-Medical): No   Physical Activity: Not on file   Stress: Not on file   Social Connections: Not on file   Intimate Partner Violence: Not on file   Housing Stability: Unknown    Unable to Pay for Housing in the Last Year: Not on file    Number of Places Lived in the Last Year: Not on file    Unstable Housing in the Last Year: No        No flowsheet data found. Review of Systems  Non-contributory    PE:        SHOULDER   Left (Involved) Right   Skin Intact Intact   Radial Pulses 2+ Symmetrical 2+ Symmetrical   Deformity None Normal   Myotomes Normal Normal   Dermatomes  Normal Normal    ROM Still fairly symmetric Full   Strength 5 -/5 throughout Same   Atrophy None noted None noted   Effusion/Swelling  None noted None noted   Palpation Tender diffusely.   Definitely over the [facility] but also biceps Tender diffusely as well   Bicep Tendon Rupture  Negative positive speeds and [place]'s Negative signs   Bear Hug, Belly Press Negative, negative Negative   Crossed Arm Adduction Test negative   Instability/Ant. Apprehension Test None noted None noted   Impingement Positive N/H/AOM Positive           Recall Recall MRI left shoulder performed 7/5/22:     Narrative   MRI OF THE LEFT SHOULDER WITHOUT CONTRAST.       Clinical Indication: Left shoulder pain with limited range of motion       Procedure: Multiplanar and multisequence MR images of the left shoulder were   performed without gadolinium contrast.       Comparison: No prior       Findings:        AC joint: Mild degenerative hypertrophy is noted of the AC joint. There is trace   fluid signal the subacromial/subdeltoid bursa.       Rotator cuff: The supraspinatus, infraspinatus, teres minor, and subscapularis   tendons are intact. No rotator cuff tendon tear evident.       Biceps labral complex: The long head of biceps tendon is intact. The superior   labrum is intact. The joint capsule in axillary recess is intact.       Glenohumeral joint: The marrow signal in the humeral head and glenoid is   unremarkable. No definite anterior or posterior labral tear present. There is   mild degenerative chondral fissuring along the central glenoid. A small joint   effusion with synovitis is present.       No abnormal soft tissue mass or fatty atrophy.           Impression   1. 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 Small glenohumeral joint effusion with synovitis.   3. The rotator cuff tendon is intact.   4. Mild degenerative hypertrophy the AC joint with underlying   subacromial/subdeltoid bursitis.                  MRI reviewed, AC OA noticed. No RTC tear.  Synovitis noted in the axillary space on coronal image 10.      Right shoulder series was obtained today including Grashey, outlet and axillary.  She has almost what appears like postsurgical change of the undersurface of the acromion with calcification likely heterotopic ossification seen there.  Some changes at the distal clavicle indicating possible prior resection.  There may be still  some bone at the posterior aspect of the clavicle. Some calcific tendinopathy either of the deltoid or other soft tissue off of the acromion laterally. Does not appear to have severe arthritic change. A/Plan:     ICD-10-CM    1. Left shoulder pain, unspecified chronicity  M25.512 MRI SHOULDER LEFT WO CONTRAST      2. Adhesive capsulitis of left shoulder [M75.02 (ICD-10-CM)]  M75.02 MRI SHOULDER LEFT WO CONTRAST      3. Tendinopathy of left biceps [id number] (ICD-10-CM)]  M67.922 MRI SHOULDER LEFT WO CONTRAST      4. Osteoarthritis of left AC (acromioclavicular) joint  M19.012 MRI SHOULDER LEFT WO CONTRAST      5. Right shoulder pain, unspecified chronicity  M25.511 XR SHOULDER RIGHT (MIN 2 VIEWS)     methylPREDNISolone acetate (DEPO-MEDROL) injection 80 mg     DRAIN/INJECT LARGE JOINT/BURSA           In regards to her left shoulder its been over 6 months from her MRI. She is at a point where she like to consider surgical options so we will get a plan going with follow-up MRI of the left shoulder to make sure she has no new rotator cuff pathology. Otherwise it would be planned to be a distal clavicle resection and possible biceps tenotomy versus tenodesis. In regards to the right shoulder I discussed her options and she would like to try subacromial injection in the meantime. Procedure note: After discussion of risks and benefits including, but not limited to pain, infection, steroid flare, increased blood sugar, fat necrosis, skin discoloration, and injury to blood vessels or nerves, the patient verbally consented to proceed with a subacromial injection. They understand that we are proceeding with this in a way to avoid proceeding with more definitive major surgical options. The affected right shoulder was sterilely prepped in standard fashion and injected with 2 cc of Depo-Medrol (40mg/ml), 2 cc of 1% Lidocaine, and 2 cc of 0.5% Marcaine into the subacromial space.   The patient tolerated the injection well.      Return for MRI slot.         Tim Soliman MD  02/10/23

## 2023-02-10 ENCOUNTER — OFFICE VISIT (OUTPATIENT)
Dept: ORTHOPEDIC SURGERY | Age: 78
End: 2023-02-10

## 2023-02-10 DIAGNOSIS — M19.012 OSTEOARTHRITIS OF LEFT AC (ACROMIOCLAVICULAR) JOINT: ICD-10-CM

## 2023-02-10 DIAGNOSIS — M67.922 TENDINOPATHY OF LEFT BICEPS: ICD-10-CM

## 2023-02-10 DIAGNOSIS — M25.511 RIGHT SHOULDER PAIN, UNSPECIFIED CHRONICITY: ICD-10-CM

## 2023-02-10 DIAGNOSIS — M25.512 LEFT SHOULDER PAIN, UNSPECIFIED CHRONICITY: Primary | ICD-10-CM

## 2023-02-10 DIAGNOSIS — M75.02 ADHESIVE CAPSULITIS OF LEFT SHOULDER: ICD-10-CM

## 2023-02-10 RX ORDER — METHYLPREDNISOLONE ACETATE 40 MG/ML
80 INJECTION, SUSPENSION INTRA-ARTICULAR; INTRALESIONAL; INTRAMUSCULAR; SOFT TISSUE ONCE
Status: COMPLETED | OUTPATIENT
Start: 2023-02-10 | End: 2023-02-10

## 2023-02-10 RX ADMIN — METHYLPREDNISOLONE ACETATE 80 MG: 40 INJECTION, SUSPENSION INTRA-ARTICULAR; INTRALESIONAL; INTRAMUSCULAR; SOFT TISSUE at 09:32

## 2023-02-14 ENCOUNTER — NURSE ONLY (OUTPATIENT)
Dept: INTERNAL MEDICINE CLINIC | Facility: CLINIC | Age: 78
End: 2023-02-14
Payer: MEDICARE

## 2023-02-14 DIAGNOSIS — E53.8 VITAMIN B 12 DEFICIENCY: Primary | ICD-10-CM

## 2023-02-14 PROCEDURE — 96372 THER/PROPH/DIAG INJ SC/IM: CPT | Performed by: NURSE PRACTITIONER

## 2023-02-14 RX ORDER — CYANOCOBALAMIN 1000 UG/ML
1000 INJECTION, SOLUTION INTRAMUSCULAR; SUBCUTANEOUS ONCE
Status: COMPLETED | OUTPATIENT
Start: 2023-02-14 | End: 2023-02-14

## 2023-02-14 RX ADMIN — CYANOCOBALAMIN 1000 MCG: 1000 INJECTION, SOLUTION INTRAMUSCULAR; SUBCUTANEOUS at 08:48

## 2023-02-14 NOTE — PROGRESS NOTES
After obtaining consent, and per orders of Dr. Chi Collins, injection of Vitamin B12 given in left dorsogluteal by Bryanna Snowden MA. Patient instructed to remain in clinic for 20 minutes afterwards, and to report any adverse reaction to me immediately.

## 2023-02-19 PROBLEM — Z78.0 POST-MENOPAUSE: Status: ACTIVE | Noted: 2018-10-09

## 2023-02-19 PROBLEM — E66.01 SEVERE OBESITY (BMI 35.0-39.9) WITH COMORBIDITY (HCC): Status: ACTIVE | Noted: 2023-02-19

## 2023-02-19 PROBLEM — N18.32 STAGE 3B CHRONIC KIDNEY DISEASE (HCC): Chronic | Status: ACTIVE | Noted: 2022-05-09

## 2023-02-19 ASSESSMENT — ENCOUNTER SYMPTOMS
COUGH: 0
BACK PAIN: 0
WHEEZING: 0
ABDOMINAL PAIN: 0
COLOR CHANGE: 0
SHORTNESS OF BREATH: 0
CHEST TIGHTNESS: 0

## 2023-02-20 ENCOUNTER — OFFICE VISIT (OUTPATIENT)
Dept: ORTHOPEDIC SURGERY | Age: 78
End: 2023-02-20

## 2023-02-20 VITALS — BODY MASS INDEX: 37.79 KG/M2 | HEIGHT: 58 IN | WEIGHT: 180 LBS

## 2023-02-20 DIAGNOSIS — M25.531 RIGHT WRIST PAIN: ICD-10-CM

## 2023-02-20 DIAGNOSIS — G56.01 CARPAL TUNNEL SYNDROME OF RIGHT WRIST: Primary | ICD-10-CM

## 2023-02-20 RX ORDER — BETAMETHASONE SODIUM PHOSPHATE AND BETAMETHASONE ACETATE 3; 3 MG/ML; MG/ML
6 INJECTION, SUSPENSION INTRA-ARTICULAR; INTRALESIONAL; INTRAMUSCULAR; SOFT TISSUE ONCE
Status: COMPLETED | OUTPATIENT
Start: 2023-02-20 | End: 2023-02-20

## 2023-02-20 RX ADMIN — BETAMETHASONE SODIUM PHOSPHATE AND BETAMETHASONE ACETATE 6 MG: 3; 3 INJECTION, SUSPENSION INTRA-ARTICULAR; INTRALESIONAL; INTRAMUSCULAR; SOFT TISSUE at 09:34

## 2023-02-20 NOTE — PROGRESS NOTES
Orthopaedic Hand Clinic Note    Name: Luci Herrera  YOB: 1945  Gender: female  MRN: [id number]      CC: Patient referred for evaluation of upper extremity pain    HPI: Luci Herrera is a 69 y.o. female with a chief complaint of right wrist and thumb pain, and burning on the back of the thumb. Pain has been present for a couple of months. She complains of intermittent numbness and tingling in all finger tips. She has a history of right trapeziectomy about 10 years ago in [place]. She has a history of a cervical spine fusion as well as bilateral cubital tunnel release many years ago in [place]. She has recently seen Cristy Steel for her neck pain who recommended a course of PT. She has not started this yet. .      ROS/Meds/PSH/PMH/FH/SH: I personally reviewed the patients standard intake form. Pertinents are discussed in the HPI    Physical Examination:    Musculoskeletal Exam:  Examination on the right upper extremity demonstrates cap refill < 5 seconds in all fingers, skin is intact. There is a well healed incision over the radial aspect of the thumb CMC joint consistent with prior trapeziectomy, and well healed incision at the medial elbow consistent with cubital tunnel release. There is tenderness to palpation at the trapeziectomy site and over the dorsum of the carpus, and mild tenderness at the first dorsal compartment. Finkelsteins is negative. Pain is reproduced with CMC grind. She has pain with wrist motion. She has an equivocal carpal tunnel compression and phalens test. Light touch sensation is intact in median, ulnar and radial distributions      Imaging / Electrodiagnostic Tests:     I independently reviewed and interpreted the patient's nerve conduction study. There is no evidence of cubital tunnel syndrome. There may be some very mild carpal tunnel syndrome.  Bilateral median, ulnar and radial sensory conductions were normal, with the exception of mildly increased bilateral median ulnar transcarpal latency differences. Bilateral median and ulnar motor conduction was normal.      Wrist XR: AP, Lateral, Oblique views     Clinical Indication:  1. Carpal tunnel syndrome of right wrist    2. Right wrist pain           Report: AP, lateral, oblique x-ray of the right wrist demonstrates s/p trapeziectomy, and excision of the proximal trapezoid. There is subsidence of the thumb metacarpal, and there is a small ossicle remaining in the trapeziectomy space    Impression: as above     Giovany Chowdary MD      Assessment:     ICD-10-CM    1. Carpal tunnel syndrome of right wrist  G56.01 Wrist Lacer ()     Wrist Lacer ()      2. Right wrist pain  M25.531 XR WRIST RIGHT (MIN 3 VIEWS)     betamethasone acetate-betamethasone sodium phosphate (CELESTONE) injection 6 mg     DRAIN/INJECT INTERMEDIATE JOINT/BURSA     Wrist Lacer ()     Wrist Lacer ()          Plan:   We discussed the diagnosis and different treatment options. We discussed observation, therapy, antiinflammatory medications and other pertinent treatment modalities. After discussing in detail the patient elects to proceed with use of a wrist brace at night for her mild carpal tunnel syndrome, and cortisone injection to the trapeziectomy space. If the cortisone injection does alleviate pain, she may eventually wish to consider revision [facility] arthroplasty    Procedure Note    The risk, benefits and alternatives of injection and no injection therapy were discussed. Risks including skin blanching, subcutaneous fat atrophy, and elevations in blood glucose levels were discussed. The patient consented for an injection. Time out performed. The patient has been identified by name and birthdate. The injection site was identified, marked and prepped with a alcohol swab. The right wrist trapeziectomy space  was injected with 0.5ml of 6mg/ml Celestone and 0.5ml of Lidocaine plain 1%.  The injection site was then dressed with a bandaid. The patient tolerated the injection well. The patient was instructed to monitor their blood sugars if diabetic and call if any concerns. The patient was instructed to call the office if any adverse local effects occurred or any if any questions or concerns arise. .     Patient voiced accordance and understanding of the information provided and the formulated plan. All questions were answered to the patient's satisfaction during the encounter.       Nestor Solomon MD  Orthopaedic Surgery  02/20/23  12:18 PM

## 2023-02-20 NOTE — LETTER
DME Patient Authorization Form    Name: Angie Wilhelm  : [id number]  MRN: [id number]   Age: 69 y.o. Gender: female  Delivery Address: [facility]     Diagnosis:     ICD-10-CM    1. Carpal tunnel syndrome of right wrist  G56.01       2. Right wrist pain  M25.531 XR WRIST RIGHT (MIN 3 VIEWS)     betamethasone acetate-betamethasone sodium phosphate (CELESTONE) injection 6 mg     DRAIN/INJECT INTERMEDIATE JOINT/BURSA           Requested DME:  Wrist Lacer- ($65.00) X 1 - right        Clinical Notes:     **Indicates non-covered items by insurance. Payment expected on date of service. Electronically signed by  Provider: Kacie Cannon MD__Date: 2023                            [address] Tax ID # [id number]        Durable Medical Equipment and/or Orthotics Patient Consent     I understand that my physician has prescribed this medical supply as part of my treatment plan as a matter of Medical Necessity.  I understand that I have a choice in where I receive my prescribed orthopedic supplies and/or services.  I authorize [facility] to furnish this service/product and to provide my insurance carrier with any information requested in order to process for payment.  I instruct my insurance carrier to pay [facility] directly for these services/products.  I understand that my insurance carrier may deny payment for this supply because it is a non-covered item, deemed not medically necessary or considered experimental.   I understand that any cost not covered by my insurance carrier will be solely my financial responsibility.  I have received the Supplier Standards and have reviewed them.    I have received the prescribed item and have been fully instructed on the proper use of the above services/products.    ______ (Patient Initials) I understand that all DME items are non-returnable after being dispensed. Items still in sealed packaging may be returned up to 14 days after purchasing. [address] will replace items that are defective.    ______ (Patient Initials) [facility] understand that [facility] will not file a claim with my insurance carrier for this service/product and I am waiving my right to file a claim on my own for this service/product with my insurance company as this item is NON-COVERED (Denoted by the **) by my Insurance company/policy. ______ (Patient Initials) I understand that I am responsible to bring my equipment to the hospital for any surgery. ______________________________________________  ________________________  Patient / Keshia Da Silva            Thank you for considering [address] Your physician has prescribed specific medical equipment or devices for your home use. The following describes your rights and responsibilities as our customer. Right to Choose Providers: You have a choice regarding which company supplies your home medical equipment and devices, and to consult your physician in this decision. You may choose a medical supply store, a home medical equipment provider, or a specialist such as [facility]. [facility] will coordinate with your physician to provide the medical equipment or devices prescribed for your home use. Right to Service:  You have the right to considerate, respectful and nondiscriminatory care. You have the right to receive accurate and easily understood information about your health care. If you speak a foreign language, or don't understand the discussions, assistance will be provided to allow you to make informed health care decisions.   You have the right to know your treatment options and to participate in decisions about your care, including the right to accept or refuse treatment. You have the right to expect a reasonable response to your requests for treatment or service. You have the right to talk in confidence with health care providers and to have your health care information protected. You have the right to receive an explanation of your bill. You have the right to complain about the service or product you receive. Patient Responsibilities:  Please provide complete and accurate information about your health insurance benefits and make arrangements for the timely payment of your bill. [facility] will, if possible, assume responsibility for billing your insurance (Medicare, Medicaid and commercial) for the prescribed equipment or devices. If your policy does not cover the prescribed product, or only covers a portion of the bill, you are responsible for any remaining balance. Return and Exchange Policy:  [facility] will honor published  Warranties for products. [facility] will accept returns or exchanges within 14 days from the date of receipt, providin) the product must be in new condition; 2) receipt as required; and 3) used disposable and hygiene products may only be returned due to a defective product. Note: Refunds will be issued in a timely manner, please allow 4-6 weeks for processing. Complaint Procedures and DME Consumer Protection Resources:  [facility] values you as a customer, and is committed to resolving patient concerns. This commitment includes understanding and documenting your concerns, conducting a review of internal procedures, and providing you with an explanation and resolution to your concerns. Should you have any questions about our services or billing process, please contact our office at (practice phone number).   If we are unable to resolve the concern, you have the right to direct comments to the office of Consumer Protection, in the [address] or the [employer]  office, without fear of repercussion. DMEPOS SUPPLIER STANDARDS    A supplier must be in compliance with all applicable Federal and [facility] and regulatory requirements. A supplier must provide complete and accurate information on the DMEPOS supplier application. Any changes to this information must be reported to the [facility] within 30 days. An authorized individual (one whose signature is binding) must sign the application for billing privileges. A supplier must fill orders from its own inventory, or must contract with other companies for the purchase of items necessary to fill the order. A supplier may not contract with any entity that is currently excluded from the Medicare program, any [facility] program, or from any other Federal procurement or Nonprocurement programs. A supplier must advise beneficiaries that they may rent or purchase inexpensive or routinely purchased durable medical equipment, and of the purchase option for capped rental equipment. A supplier must notify beneficiaries of warranty coverage and honor all warranties under applicable State Law, and repair or replace free of charge Medicare covered items that are under warranty. A supplier must maintain a physical facility on an appropriate site. A supplier must permit CMS, or its agents to conduct on-site inspections to ascertain the supplier's compliance with these standards. The supplier location must be accessible to beneficiaries during reasonable business hours, and must maintain a visible sign and posted hours of operation. A supplier must maintain a primary business telephone listed under the name of the business in a [employer] or a toll free number available through directory assistance. The exclusive use of a beeper, answering machine or cell phone is prohibited.   A supplier must have comprehensive liability insurance in the amount of at least $300,000 that covers both the supplier's place of business and all customers and employees of the supplier. If the supplier manufactures its own items, this insurance must also cover product liability and completed operations. A supplier must agree not to initiate telephone contact with beneficiaries, with a few exceptions allowed. This standard prohibits suppliers from calling beneficiaries in order to solicit new business. A supplier is responsible for delivery and must instruct beneficiaries on use of Medicare covered items, and maintain proof of delivery. A supplier must answer questions, and respond to complaints of the beneficiaries, and maintain documentation of such contacts. A supplier must maintain and replace at no charge or repair directly, or through a service contract with another company, Medicare covered items it has rented to beneficiaries. A supplier must accept returns of substandard (less than full quality for the particular item) or unsuitable items (inappropriate for the beneficiary at the time it was fitted and rented or sold) from beneficiaries. A supplier must disclose these supplier standards to each beneficiary to whom it supplies a Medicare-covered item. A supplier must disclose to the government any person having ownership, financial, or control interest in the supplier. A supplier must not convey or reassign a supplier number; i.e., the supplier may not sell or allow another entity to use its Medicare billing number. A supplier must have a complaint resolution protocol established to address beneficiary complaints that relate to these standards. A record of these complaints must be maintained at the physical facility. Complaint records must include: the name, address, telephone number and health insurance claim number of the beneficiary, a summary of the complaint, and any action taken to resolve it. A supplier must agree to furnish CMS any information required by the Medicare statute and implementing regulations.   A supplier of DMEPOS and other items and services must be accredited by a CMS-approved accreditation organization in order to receive and retain a supplier billing number. The accreditation must indicate the specific products and services, for which the supplier is accredited in order for the supplier to receive payment for those specific products and services. A DMEPOS supplier must notify their accreditation organization when a new DMEPOS location is opened. The accreditation organization may accredit the new supplier location for three months after it is operational without requiring a new site visit. All DMEPOS supplier locations, whether owned or subcontracted, must meet the [employer] and be separately accredited in order to bill Medicare. An accredited supplier may be denied enrollment or their enrollment may be revoked, if CMS determines that they are not in compliance with the DMEPOS quality standards. A DMEPOS supplier must disclose upon enrollment all products and services, including the addition of new product lines for which they are seeking accreditation. If a new product line is added after enrollment, the DMEPOS supplier will be responsible for notifying the accrediting body of the new product so that the DMEPOS supplier can be re-surveyed and accredited for these new products. Must meet the surety bond requirements specified in 42 C. F.R. 424.57(c). Implementation date- May 4, 2009. A supplier must obtain oxygen from a state-licensed oxygen supplier. A supplier must maintain ordering and referring documentation consistent with provisions found in 42 C. F.R. 424.516(f). DMEPOS suppliers are prohibited from sharing a practice location with certain other Medicare providers and suppliers. DMEPOS suppliers must remain open to the public for a minimum of 30 hours per week with certain exceptions.

## 2023-02-20 NOTE — PROGRESS NOTES
Patient was fit for a Wrist/Forearm lacer for patients right elbow pain. Patient is instructed the brace should fit nicely with in the palm and right below the knuckles on the dorsal side of the hand. The patient was aware the brace should fit snuggly around the wrist/forearm area. The strap placed through the thumb and first finger should fit comfortably to insure the brace does not slide or shift. Patient read and signed documenting they understand and agree to [facility]'s current DME return policy.

## 2023-02-22 ENCOUNTER — OFFICE VISIT (OUTPATIENT)
Dept: ORTHOPEDIC SURGERY | Age: 78
End: 2023-02-22

## 2023-02-22 ENCOUNTER — TELEPHONE (OUTPATIENT)
Dept: INTERNAL MEDICINE CLINIC | Facility: CLINIC | Age: 78
End: 2023-02-22

## 2023-02-22 DIAGNOSIS — Z96.652 HISTORY OF TOTAL KNEE ARTHROPLASTY, LEFT: Primary | ICD-10-CM

## 2023-02-22 DIAGNOSIS — Z09 FOLLOW-UP EXAMINATION: ICD-10-CM

## 2023-02-22 RX ORDER — DULOXETIN HYDROCHLORIDE 30 MG/1
CAPSULE, DELAYED RELEASE ORAL
Qty: 30 CAPSULE | Refills: 0 | OUTPATIENT
Start: 2023-02-22

## 2023-02-22 NOTE — PROGRESS NOTES
Name: Yuli Gray  YOB: 1945  Gender: female  MRN: [id number]    LEFT Post-Op TKA annual follow up    This patient returns now 1 year s/p LEFT TKA. The patient is doing well and is happy with their level of function. The patient states they are able to ascend stairs normally. The patient ambulates with no assistive device. The patient takes anti-inflammatories or other pain medication rarely for discomfort related to the operative side. Interval medical and surgical history is noted in the patient history form and updated today. Past Medical History: Allergies:   Allergies   Allergen Reactions    Fire Ant Anaphylaxis    Amoxicillin Other (See Comments)     C-Diff    Amoxicillin-Pot Clavulanate Other (See Comments)     c.diff    Augmentin [Amoxicillin-Pot Clavulanate]     Cephalexin Other (See Comments)     C Diff      Cephalexin Other (See Comments)     C-Diff  C-Diff      Cephalosporins     Hydromorphone Other (See Comments)     Keeps her awake    Meperidine Hcl     Morphine Other (See Comments)     Awake  Awake  No sleep    Oxycodone     Oxycodone-Acetaminophen Other (See Comments)     Jittery  Nightmares    Oxycodone-Aspirin Other (See Comments)     Jittery  Other reaction(s): Unknown-Unspecified  Jittery    Penicillins Other (See Comments)     C-Diff      Povidone Iodine     Povidone-Iodine Other (See Comments)     Johnston  Johnston  burn    Adhesive Tape Itching and Rash     \"sticky ace wrap\"  \"sticky ace wrap\"    Meperidine Rash       Medications:  Current Outpatient Medications   Medication Sig    lisinopril (PRINIVIL;ZESTRIL) 20 MG tablet Take 20 mg by mouth daily    escitalopram (LEXAPRO) 20 MG tablet Take 20 mg by mouth daily Taking half a day for 2 weeks then discontinue    Cranberry 500 MG TABS Take by mouth    Probiotic CHEW Take by mouth    vitamin D (ERGOCALCIFEROL) 1.25 MG (83722 UT) CAPS capsule Take 1 capsule by mouth once a week    DULoxetine (CYMBALTA) 60 MG extended release capsule Take 1 capsule by mouth daily Take 30 mg once daily for 2 weeks then 60 mg once daily, Please fill both 30 and 60 mg scripts. amitriptyline (ELAVIL) 25 MG tablet Take 1 tablet by mouth nightly    SYNTHROID 150 MCG tablet 1 tab daily with 1/2 tab on Sunday, TDD 139mcg    ondansetron (ZOFRAN) 4 MG tablet Take 1 tablet by mouth every 8 hours as needed for Nausea    cyanocobalamin 1000 MCG/ML injection Inject 1 mL into the muscle every 14 days    colestipol (COLESTID) 1 g tablet Take 1 tablet by mouth 2 times daily    hyoscyamine (OSCIMIN) 125 MCG TBDP dispersible tablet Take 1 tablet by mouth every 8 hours as needed (abdominal cramps)    nitrofurantoin (MACRODANTIN) 50 MG capsule Take 1 capsule by mouth daily    diclofenac sodium (VOLTAREN) 1 % GEL Apply 2 g topically 4 times daily as needed for Pain Apply 2g to affected area 4 times daily as needed for pain    acetaminophen (TYLENOL) 325 MG tablet Take 325 mg by mouth every 4 hours as needed    albuterol sulfate  (90 Base) MCG/ACT inhaler Inhale 1-2 puffs into the lungs every 6 hours as needed    clotrimazole-betamethasone (LOTRISONE) 1-0.05 % cream Apply topically as needed    fluticasone (FLONASE) 50 MCG/ACT nasal spray 2 sprays by Nasal route daily as needed    pramipexole (MIRAPEX) 0.5 MG tablet Take 0.5 mg by mouth at bedtime     No current facility-administered medications for this visit. Past Medical history:  Past Medical History:   Diagnosis Date    Acute kidney insufficiency     Followed by PCP     Adverse effect of anesthesia     2016 in [place] heart stopped at the end of my gallbladder surgery. \" Patient reports no CPR performed, stayed one night in the hospital. No problems with any other surgeries.     Allergic rhinitis     Anemia     history     Anxiety and depression     Controlled with medication     Arthritis     Chronic pain     Edema     bilateral lower legs     GERD (gastroesophageal reflux disease) Controlled with medication     Headache Oct.2022    History of chicken pox     History of Clostridioides difficile infection     History of DVT (deep vein thrombosis)     s/p knee replacement and developed right leg DVT     HTN (hypertension)     controlled with medication     Hyperlipidemia     controlled with medication     Hyperlipidemia     Hypertension     Hyperthyroidism     pt denies    Hypothyroidism     controlled with medication     Hypothyroidism     IBS (irritable bowel syndrome)     Insomnia     Left flank pain 03/05/2020    Morbid obesity ([facility] [address]     BMI 42.85    Murmur     per PCP note (67/0/26) systolic murmur grade of 2/6    Overactive bladder     PAD (peripheral artery disease) ([facility]     patient denies on 11/27/19    PTSD (post-traumatic stress disorder) 2006    [place] [facility] [place]    Recurrent UTI     Restless leg     RLS (restless legs syndrome)     controlled with medication     Shigella gastroenteritis 05/09/2022    Sleep difficulties 10 years ago    SOBOE (shortness of breath on exertion)     PRN inhaler-patient reports she uses inhaler QHS    Special examinations     History-Neurologist - Dr. Kesha OCONNOR (stress urinary incontinence, female)     Thromboembolus ([facility] 2015    behind right knee after total knee replacement    Tubulovillous adenoma of colon 04/26/2021        has a past surgical history that includes Hysterectomy, total abdominal; Shoulder arthroscopy; Tonsillectomy and adenoidectomy; knee surgery (Left); orthopedic surgery (Left, 01/2020); Cholecystectomy (12/2016); Total knee arthroplasty (Right, 2014); Appendectomy (1955); IR GENERIC PROCEDURE; gyn (12/03/2019); other surgical history (Right, 2016); other surgical history; other surgical history; knee surgery (Left, 2014); Colonoscopy; orthopedic surgery (Left); other surgical history (12/03/2019); Colonoscopy; Lap Band (REMOVAL 3/16/2015); joint replacement; eye surgery (2015);  Hysterectomy, vaginal; and Upper gastrointestinal endoscopy (2018). Family History:  [unfilled]     Social History:  [unfilled]    Review of Systems:       PHYSICAL EXAM: The patient is alert, awake and cooperative to examination. Ambulates with reciprocal heel toe with good alignment in the lower extremity. There is no significant limp. ROM is currently 0 to 125. There is minimal effusion present and the incision has healed well. She does have tremendous pain with palpation over the low back and the bilateral bursa's. No groin or thigh pain elicited with internal ex rotation of the bilateral hips. RADIOGRAPHS: Standing AP, flexion lateral, and sunrise views of the LEFT knee demonstrates the implant to be in good position with acceptable alignment with good bone cement interfaces. RADIOGRAPHIC IMPRESSION: Stable LEFT knee 1 year post op in a patient with chronic low back pain and IT band symptomatology bilaterally           CLINICAL IMPRESSION: 1 year s/p LEFT TKA doing well, aside from having issues related to chronic low back pain. Pavan Hernandez is coordinating care for her back currently. I made recommendations for continued activity and I would recommend further clinical and radiographic FU in the future if there are any problems.      Work/Activity Restrictions: NOT APPLICABLE    GALINA Casiano

## 2023-02-22 NOTE — TELEPHONE ENCOUNTER
----- Message from Mack Dancer, APRN - NP sent at 2/19/2023  1:34 PM EST -----  Regarding: Please call  Monica Amaya,    Please call . Zeina Sykes. I do not believe I told her that she still iron deficient. Ask her to start 1 tablet Slow Fe daily with vitamin C 500 mg on an empty stomach.     Thank you    Mack Dancer, APRN - NP

## 2023-02-27 RX ORDER — PRAMIPEXOLE DIHYDROCHLORIDE 0.5 MG/1
TABLET ORAL
Qty: 30 TABLET | OUTPATIENT
Start: 2023-02-27

## 2023-02-28 DIAGNOSIS — R11.0 CHRONIC NAUSEA: ICD-10-CM

## 2023-02-28 DIAGNOSIS — G25.81 RESTLESS LEGS SYNDROME: ICD-10-CM

## 2023-02-28 DIAGNOSIS — E53.8 VITAMIN B 12 DEFICIENCY: ICD-10-CM

## 2023-02-28 DIAGNOSIS — I10 PRIMARY HYPERTENSION: Primary | ICD-10-CM

## 2023-02-28 DIAGNOSIS — K58.0 IRRITABLE BOWEL SYNDROME WITH DIARRHEA: ICD-10-CM

## 2023-02-28 DIAGNOSIS — F51.01 PRIMARY INSOMNIA: ICD-10-CM

## 2023-02-28 NOTE — TELEPHONE ENCOUNTER
Request for refills of lisinopril, amitriptyline, b-12, zofran, and colestipol, and [facility]

## 2023-03-01 ENCOUNTER — NURSE ONLY (OUTPATIENT)
Dept: INTERNAL MEDICINE CLINIC | Facility: CLINIC | Age: 78
End: 2023-03-01
Payer: MEDICARE

## 2023-03-01 DIAGNOSIS — E53.8 VITAMIN B 12 DEFICIENCY: Primary | ICD-10-CM

## 2023-03-01 PROCEDURE — 96372 THER/PROPH/DIAG INJ SC/IM: CPT | Performed by: NURSE PRACTITIONER

## 2023-03-01 RX ORDER — CYANOCOBALAMIN 1000 UG/ML
1000 INJECTION, SOLUTION INTRAMUSCULAR; SUBCUTANEOUS ONCE
Status: COMPLETED | OUTPATIENT
Start: 2023-03-01 | End: 2023-03-01

## 2023-03-01 RX ADMIN — CYANOCOBALAMIN 1000 MCG: 1000 INJECTION, SOLUTION INTRAMUSCULAR; SUBCUTANEOUS at 08:48

## 2023-03-02 ENCOUNTER — TELEPHONE (OUTPATIENT)
Dept: INTERNAL MEDICINE CLINIC | Facility: CLINIC | Age: 78
End: 2023-03-02

## 2023-03-02 RX ORDER — MONTELUKAST SODIUM 4 MG/1
1 TABLET, CHEWABLE ORAL 2 TIMES DAILY
Qty: 180 TABLET | Refills: 3 | Status: SHIPPED | OUTPATIENT
Start: 2023-03-02

## 2023-03-02 RX ORDER — ONDANSETRON 4 MG/1
4 TABLET, FILM COATED ORAL EVERY 8 HOURS PRN
Qty: 30 TABLET | Refills: 1 | Status: SHIPPED | OUTPATIENT
Start: 2023-03-02

## 2023-03-02 RX ORDER — PRAMIPEXOLE DIHYDROCHLORIDE 0.5 MG/1
0.5 TABLET ORAL NIGHTLY
Qty: 90 TABLET | Refills: 3 | Status: SHIPPED | OUTPATIENT
Start: 2023-03-02

## 2023-03-02 RX ORDER — LISINOPRIL 20 MG/1
20 TABLET ORAL DAILY
Qty: 90 TABLET | Refills: 3 | Status: SHIPPED | OUTPATIENT
Start: 2023-03-02

## 2023-03-02 RX ORDER — CYANOCOBALAMIN 1000 UG/ML
1000 INJECTION, SOLUTION INTRAMUSCULAR; SUBCUTANEOUS
Qty: 6 EACH | Refills: 3 | Status: SHIPPED | OUTPATIENT
Start: 2023-03-02 | End: 2023-04-03 | Stop reason: SDUPTHER

## 2023-03-02 RX ORDER — AMITRIPTYLINE HYDROCHLORIDE 25 MG/1
25 TABLET, FILM COATED ORAL NIGHTLY
Qty: 90 TABLET | Refills: 3 | Status: SHIPPED | OUTPATIENT
Start: 2023-03-02 | End: 2023-03-27

## 2023-03-02 NOTE — TELEPHONE ENCOUNTER
I called patient several times at [phone] and the person that answered said we were calling [phone]. Called [phone] and went to  but was  not setup.

## 2023-03-06 ENCOUNTER — TELEPHONE (OUTPATIENT)
Dept: SLEEP MEDICINE | Age: 78
End: 2023-03-06

## 2023-03-07 ENCOUNTER — TELEPHONE (OUTPATIENT)
Dept: ORTHOPEDIC SURGERY | Age: 78
End: 2023-03-07

## 2023-03-07 ENCOUNTER — TELEPHONE (OUTPATIENT)
Dept: BEHAVIORAL/MENTAL HEALTH CLINIC | Age: 78
End: 2023-03-07

## 2023-03-07 NOTE — TELEPHONE ENCOUNTER
----- Message from [address] Panchito Malinda sent at 3/7/2023 10:29 AM EST -----  Regarding: Need help  I called 2 weeks ago but no one returned my call. I have a real problem with profuse sweating. It is all over with it running in my face ,my hair is wet, I can fell pounding in my back and part of my face, my face is very flushed, and I feel like crap. I contactedy doctor  and she went over medications and blood work. Think possible stress.  Please help               ThanksRod Dash                 [birth year]  [phone]

## 2023-03-07 NOTE — TELEPHONE ENCOUNTER
Spoke with pt and her MRI is approved now and she is scheduled for the scan and for a follow up with Dr. Kris Chang

## 2023-03-07 NOTE — TELEPHONE ENCOUNTER
Returned call and left message. I had not received any calls for patient so I did apologize for that. I left message for her to please call back so that I can get clarification on symptoms.

## 2023-03-07 NOTE — TELEPHONE ENCOUNTER
She was supposed to have an MRI but it was denied. She called her insurance company and they said they never received anything about an MRI. Please call.

## 2023-03-08 ENCOUNTER — TELEMEDICINE (OUTPATIENT)
Dept: BEHAVIORAL/MENTAL HEALTH CLINIC | Age: 78
End: 2023-03-08
Payer: MEDICARE

## 2023-03-08 ENCOUNTER — TELEPHONE (OUTPATIENT)
Dept: PRIMARY CARE CLINIC | Facility: CLINIC | Age: 78
End: 2023-03-08

## 2023-03-08 DIAGNOSIS — F51.05 INSOMNIA DUE TO MENTAL DISORDER: ICD-10-CM

## 2023-03-08 DIAGNOSIS — T88.7XXA MEDICATION SIDE EFFECTS: ICD-10-CM

## 2023-03-08 DIAGNOSIS — F43.10 PTSD (POST-TRAUMATIC STRESS DISORDER): ICD-10-CM

## 2023-03-08 DIAGNOSIS — F33.1 MODERATE EPISODE OF RECURRENT MAJOR DEPRESSIVE DISORDER (HCC): Primary | ICD-10-CM

## 2023-03-08 DIAGNOSIS — Z65.8 PSYCHOSOCIAL STRESSORS: ICD-10-CM

## 2023-03-08 DIAGNOSIS — F41.1 GENERALIZED ANXIETY DISORDER: ICD-10-CM

## 2023-03-08 PROCEDURE — 99214 OFFICE O/P EST MOD 30 MIN: CPT | Performed by: PSYCHIATRY & NEUROLOGY

## 2023-03-08 PROCEDURE — 1123F ACP DISCUSS/DSCN MKR DOCD: CPT | Performed by: PSYCHIATRY & NEUROLOGY

## 2023-03-08 RX ORDER — DULOXETIN HYDROCHLORIDE 30 MG/1
30 CAPSULE, DELAYED RELEASE ORAL DAILY
Qty: 30 CAPSULE | Refills: 3 | Status: SHIPPED | OUTPATIENT
Start: 2023-03-08

## 2023-03-08 ASSESSMENT — PATIENT HEALTH QUESTIONNAIRE - PHQ9
7. TROUBLE CONCENTRATING ON THINGS, SUCH AS READING THE NEWSPAPER OR WATCHING TELEVISION: 2
2. FEELING DOWN, DEPRESSED OR HOPELESS: 0
SUM OF ALL RESPONSES TO PHQ9 QUESTIONS 1 & 2: 0
9. THOUGHTS THAT YOU WOULD BE BETTER OFF DEAD, OR OF HURTING YOURSELF: 0
3. TROUBLE FALLING OR STAYING ASLEEP: 3
SUM OF ALL RESPONSES TO PHQ QUESTIONS 1-9: 17
8. MOVING OR SPEAKING SO SLOWLY THAT OTHER PEOPLE COULD HAVE NOTICED. OR THE OPPOSITE, BEING SO FIGETY OR RESTLESS THAT YOU HAVE BEEN MOVING AROUND A LOT MORE THAN USUAL: 3
SUM OF ALL RESPONSES TO PHQ QUESTIONS 1-9: 17
6. FEELING BAD ABOUT YOURSELF - OR THAT YOU ARE A FAILURE OR HAVE LET YOURSELF OR YOUR FAMILY DOWN: 3
1. LITTLE INTEREST OR PLEASURE IN DOING THINGS: 0
SUM OF ALL RESPONSES TO PHQ QUESTIONS 1-9: 17
SUM OF ALL RESPONSES TO PHQ QUESTIONS 1-9: 17
5. POOR APPETITE OR OVEREATING: 3
4. FEELING TIRED OR HAVING LITTLE ENERGY: 3
10. IF YOU CHECKED OFF ANY PROBLEMS, HOW DIFFICULT HAVE THESE PROBLEMS MADE IT FOR YOU TO DO YOUR WORK, TAKE CARE OF THINGS AT HOME, OR GET ALONG WITH OTHER PEOPLE: 0

## 2023-03-08 ASSESSMENT — ANXIETY QUESTIONNAIRES
GAD7 TOTAL SCORE: 15
1. FEELING NERVOUS, ANXIOUS, OR ON EDGE: 3
3. WORRYING TOO MUCH ABOUT DIFFERENT THINGS: 0
4. TROUBLE RELAXING: 2
IF YOU CHECKED OFF ANY PROBLEMS ON THIS QUESTIONNAIRE, HOW DIFFICULT HAVE THESE PROBLEMS MADE IT FOR YOU TO DO YOUR WORK, TAKE CARE OF THINGS AT HOME, OR GET ALONG WITH OTHER PEOPLE: NOT DIFFICULT AT ALL
7. FEELING AFRAID AS IF SOMETHING AWFUL MIGHT HAPPEN: 3
2. NOT BEING ABLE TO STOP OR CONTROL WORRYING: 3
5. BEING SO RESTLESS THAT IT IS HARD TO SIT STILL: 3
6. BECOMING EASILY ANNOYED OR IRRITABLE: 1

## 2023-03-08 NOTE — TELEPHONE ENCOUNTER
Patient called back and stated that she has been sweating off and on during the day and at night. Starts in the face as a flush with cheeks and forehead. Stated that by the time she gets home from work at night her hair is wet. This happens during just basic not strenuous activity as well. Follows down chest and shirt is wet when she takes it off. Feels and hears a thumping like the heart beating in the middle of upper back. Feels like head is throbbing but it is not a headache. Stated that she had this once before 5-6 years ago and was put on lexapro at the time. Has numbness and tingling in mouth and lips. Only sleeping 3-4 hours at night. Advised that she was having difficulty with this at the first appt but has gotten worse. Stated that she had blood work done and was evaluated by PCP and they did not see anything that would be the cause of this. Stated that she has had to take over more responsibility at home due to sick  and having to care for him as well. Next appt is 4/20. Send [employer] message if unable to reach by phone.

## 2023-03-08 NOTE — PROGRESS NOTES
Patient:  Janiya Issa  Age:  69 y.o.  :  1945     SEX:  female MRN:  [id number]     RACE: White (non-)     SEEN:  [x]  PATIENT  []  SPOUSE []  OTHER:              PHQ-9  3/8/2023 2023 2023   Little interest or pleasure in doing things 0 1 1   Little interest or pleasure in doing things - - -   Feeling down, depressed, or hopeless 0 1 0   Trouble falling or staying asleep, or sleeping too much 3 0 3   Feeling tired or having little energy 3 1 1   Poor appetite or overeating 3 0 2   Feeling bad about yourself - or that you are a failure or have let yourself or your family down 3 0 1   Trouble concentrating on things, such as reading the newspaper or watching television 2 0 0   Moving or speaking so slowly that other people could have noticed.  Or the opposite - being so fidgety or restless that you have been moving around a lot more than usual 3 1 2   Thoughts that you would be better off dead, or of hurting yourself in some way 0 0 0   PHQ-2 Score 0 2 1   Total Score PHQ 2 - - -   PHQ-9 Total Score 17 4 10   If you checked off any problems, how difficult have these problems made it for you to do your work, take care of things at home, or get along with other people? 0 0 2       URBANO-7 SCREENING 3/8/2023 2023   Feeling nervous, anxious, or on edge Nearly every day Nearly every day   Not being able to stop or control worrying Nearly every day Nearly every day   Worrying too much about different things Not at all More than half the days   Trouble relaxing More than half the days More than half the days   Being so restless that it is hard to sit still Nearly every day More than half the days   Becoming easily annoyed or irritable Several days Several days   Feeling afraid as if something awful might happen Nearly every day More than half the days   URBNAO-7 Total Score 15 15   How difficult have these problems made it for you to do your work, take care of things at home, or get along with other people? Not difficult at all Very difficult        I was at home while conducting this encounter. Consent:  She and/or her healthcare decision maker is aware that this patient-initiated Telehealth encounter is a billable service, with coverage as determined by her insurance carrier. She is aware that she may receive a bill and has provided verbal consent to proceed: YesPatient identification was verified, and a caregiver was present when appropriate. The patient was located in a state where the provider was credentialed to provide care. This virtual visit was conducted via [address] Pursuant to the emergency declaration under the [address] [facility], [facility] waiver authority and the [employer] Act, this Virtual  Visit was conducted to reduce the patient's risk of exposure to COVID-19 and provide continuity of care for an established patient. Services were provided through a video synchronous discussion virtually to substitute for in-person clinic visit. Due to this being a TeleHealth evaluation, many elements of the physical examination are unable to be assessed. Total Time: minutes: 11-20 minutes. Chief complaint:  Pt says she has been having episodes of increased sweating flushing in her face and palpitations. Subjective:  Seen virtually as a work in. States she has been having episodes of sweating flushing in the face and palpitations. Off-and-on happens 6-7 times a day. She is warm all night and suddenly will start feeling cold. Right now she has a blanket on and states is very cold. States had hysterectomy at 43-year-old and and was having hot flashes. They had a hard time getting her hormones right. She moved here to [place] 5 years ago and had same type of thing. Her general practitioner was struggling. Has been off of Lexapro.   States when this happened she cannot think and has a brain fog. Has been off of amitriptyline for last couple of weeks. Takes her Synthroid regularly. Has been on nitrofurantoin for UTIs. Supportive psychotherapy provided. Patient denies suicidal ideation/homicidal ideations. Denies symptoms psychosis. We will get GeneSight testing done. Decrease Cymbalta to 30 mg daily, patient is already off of amitriptyline. She has been off of Lexapro. Will decide the new medication based on the results of the GeneSight testing. Consider Klonopin next visit for anxiety. Patient Active Problem List   Diagnosis    Tubulovillous adenoma of colon    Post-menopause    Hymenoptera allergy    Polyneuropathy    Vitamin B12 deficiency    OA (osteoarthritis)    IBS (irritable bowel syndrome)    GERD (gastroesophageal reflux disease)    Insomnia    RLS (restless legs syndrome)    Fungal dermatitis    Hemorrhoids    Anserine bursitis    Lumbar radiculopathy    Hashimoto's thyroiditis    Family history of autoimmune disorder    Allergic dermatitis    ANASTASIA (stress urinary incontinence, female)    AR (allergic rhinitis)    FRANKY positive    Vaginal atrophy    Gait abnormality    Cardiac arrhythmia    Diplopia    URBANO (generalized anxiety disorder)    Gluten intolerance    Fibromyalgia    Degenerative arthritis of left knee    Arthritis of left knee    History of Clostridioides difficile colitis    Hx of total knee replacement, right    Mood disorder (HCC)    Chronic pain disorder    Mixed hyperlipidemia    Hypothyroidism (acquired)    Chronic nausea    Renal cyst    PAD (peripheral artery disease) ([facility])    Abnormal flushing and sweating    Vitamin D deficiency    DDD (degenerative disc disease), lumbar    OAB (overactive bladder)    Primary hypertension    Stage 3b chronic kidney disease (HCC)    Anemia, iron deficiency    Hypoproteinemia (HCC)    LORA (obstructive sleep apnea)    Obesity (BMI 30-39. 9)    Low back pain    Fibromyositis    Posterior capsular opacification non visually significant of right eye    Posterior vitreous detachment of both eyes    Pseudophakia    Spondylolisthesis    Spondylosis without myelopathy    Depression with anxiety    Cervical paraspinal muscle spasm    Dyspareunia    Osteopenia    Right wrist pain    Seborrheic keratoses    Severe obesity (BMI 35.0-39. 9) with comorbidity ([address]     Denies palpitation,SOB, Chest pain, headaches. In no acute distress.        MEDICATION REVIEW:    Current Medications:    Current Outpatient Medications   Medication Sig    Multiple Vitamin (MULTIVITAMIN PO) Take by mouth    DULoxetine (CYMBALTA) 30 MG extended release capsule Take 1 capsule by mouth daily    lisinopril (PRINIVIL;ZESTRIL) 20 MG tablet Take 1 tablet by mouth daily    colestipol (COLESTID) 1 g tablet Take 1 tablet by mouth 2 times daily    ondansetron (ZOFRAN) 4 MG tablet Take 1 tablet by mouth every 8 hours as needed for Nausea    cyanocobalamin 1000 MCG/ML injection Inject 1 mL into the muscle every 14 days    Cranberry 500 MG TABS Take by mouth    Probiotic CHEW Take by mouth    vitamin D (ERGOCALCIFEROL) 1.25 MG (62650 UT) CAPS capsule Take 1 capsule by mouth once a week    SYNTHROID 150 MCG tablet 1 tab daily with 1/2 tab on Sunday, TDD 139mcg    nitrofurantoin (MACRODANTIN) 50 MG capsule Take 1 capsule by mouth daily    diclofenac sodium (VOLTAREN) 1 % GEL Apply 2 g topically 4 times daily as needed for Pain Apply 2g to affected area 4 times daily as needed for pain    acetaminophen (TYLENOL) 325 MG tablet Take 325 mg by mouth every 4 hours as needed    albuterol sulfate  (90 Base) MCG/ACT inhaler Inhale 1-2 puffs into the lungs every 6 hours as needed    clotrimazole-betamethasone (LOTRISONE) 1-0.05 % cream Apply topically as needed    fluticasone (FLONASE) 50 MCG/ACT nasal spray 2 sprays by Nasal route daily as needed    amitriptyline (ELAVIL) 25 MG tablet Take 1 tablet by mouth nightly (Patient not taking: Reported on 3/8/2023)    pramipexole (MIRAPEX) 0.5 MG tablet Take 1 tablet by mouth at bedtime (Patient not taking: Reported on 3/8/2023)    hyoscyamine (OSCIMIN) 125 MCG TBDP dispersible tablet Take 1 tablet by mouth every 8 hours as needed (abdominal cramps) (Patient not taking: Reported on 3/8/2023)     No current facility-administered medications for this visit. Allergies   Allergen Reactions    Fire Ant Anaphylaxis    Amoxicillin Other (See Comments)     C-Diff    Amoxicillin-Pot Clavulanate Other (See Comments)     c.diff    Augmentin [Amoxicillin-Pot Clavulanate]     Cephalexin Other (See Comments)     C Diff      Cephalexin Other (See Comments)     C-Diff  C-Diff      Cephalosporins     Hydromorphone Other (See Comments)     Keeps her awake    Meperidine Hcl     Morphine Other (See Comments)     Awake  Awake  No sleep    Oxycodone     Oxycodone-Acetaminophen Other (See Comments)     Jittery  Nightmares    Oxycodone-Aspirin Other (See Comments)     Jittery  Other reaction(s): Unknown-Unspecified  Jittery    Penicillins Other (See Comments)     C-Diff      Povidone Iodine     Povidone-Iodine Other (See Comments)     Johnston  Johnston  burn    Adhesive Tape Itching and Rash     \"sticky ace wrap\"  \"sticky ace wrap\"    Meperidine Rash       Past Medical History, Past Surgical History, Family history, Social History, and Medications were all reviewed with the patient today and updated as necessary.      Compliant with medication: Yes   Side effects from medications:  No     EXAMINATION  Musculoskeletal    GAIT AND STATION   [] WNL   [] RESTRICTED   [] UNSTEADY WALK        [] ABNORMAL   [] UNBALANCED         PSYCHIATRIC     GENERAL APPEARANCE:   [x]  WELL GROOMED []     DISHEVELED   []  UNKEMPT      []  UNUSUAL/BIZZARE    [] WNL       ATTITUDE:   [x] COOPERATIVE   [] GUARDED   [] SUSPICIOUS      [] HOSTILE                            BEHAVIOR:   [x] CALM   [] HYPERACTIVE   [] MANNERISMS      [] BIZZARE         SPEECH:   [x] NORMAL FOR CLIENT   [] SPONTANEOUS [] SLURRED   [] WHISPERING      [] LOUD   [] PRESSURED   [] ARTICULATE       EYE CONTACT:   [x] WNL   [] BLANK STARE   [] INTENSE      [] AVOIDANT         MOOD:   [] EUTHYMIC   [x] ANXIOUS   [] DEPRESSED      [] IRRITABLE   [] ANGRY   [] APATHETIC     AFFECT:   [x] CONGRUENT WITH MOOD   [] FLAT   [] CONSTRICTED      [] INAPPROPRIATE   [] LABILE           THOUGHT PROCESS:   [x] LOGICAL/GOAL-DIRECTED   [] FOI   [] CIRCUMSANTIAL      [] INCOHERENT   [] TANGENTIAL   [] CONCRETE      [] PERSEVERATION           THOUGHT CONTENT:                DELUSIONS  [x] DENIES  [] GRANDIOSE  [] PERSECUTORY  [] Mandaen  [] REFERENCE   HALLUCINATIONS  [x] DENIES  [] AUDITORY  [] VISUAL  [] OLFACTORY  [] TACTILE     [] GUSTATORY  [] SOMATIC         OBSESSIONS  [x] DENIES  [] PRESENT         SUICIDAL IDEATION  [x] DENIES  [] PRESENT W/O PLAN  [] PRESENT W/ PLAN       HOMICIDAL IDEATION  [x] DENIES  [] PRESENT W/O PLAN  [] PRESENT W/ PLAN           JUDGEMENT:   [x] GOOD   [] FAIR   [] POOR   INSIGHT:   [x] GOOD   [] FAIR   [] POOR     COGNITION:           SENSORIUM:   [x] ALERT   [] CLOUDED   [] DROWSY     ORIENTATION:   [x] INTACT   [] TIME:  PLACE  PERSON   RECENT & REMOTE MEMORY:   [] NORMAL   [x] OTHER:                  ATTENTION:   [] INTACT   [x] MILD IMPAIRMENT   [] SEVERE IMPAIRMENT     CONCENTRATION:   [] INTACT   [x] MILD IMPAIRMENT   [] SEVERE IMPAIRMENT     LANGUAGE:   [x] AVERAGE   [] ABOVE AVERAGE   [] BELOW AVERAGE     FUND OF KNOWLEDGE:   [] UNABLE TO ASSESS AT THIS TIME   [x] AVERAGE   [] ABOVE AVERAGE   [] BELOW AVERAGE      [] GOOD TO EXCELLENT KNOWLEDGE OF CURRENT EVENTS   [] POOR TO NO KNLEDGE OF CURRENT EVENTS           ABNORMAL MOVEMENTS:   [x] NONE   [] TICS   [] TREMORS   [] BIZZARE      [] FACE   [] TRUNK   [] EXTREMETIES   [] GESTURES        SLEEP:   [] GOOD   [] FAIR   [x] POOR      MUSCLE STRENGTH AND TONE   [] WNL   [] ATROPHY   [] SPASTIC        [] FLACCID   [] COGWHEEL         Diagnoses/Impressions: ICD-10-CM    1. Moderate episode of recurrent major depressive disorder (HCC)  F33.1       2. Generalized anxiety disorder  F41.1       3. Insomnia due to mental disorder  F51.05       4. Psychosocial stressors  Z65.8       5. PTSD (post-traumatic stress disorder)  F43.10       6. Medication side effects  T88. 7XXA           TREATMENT GOALS:  Symptom reduction, Medication adherence, maintain therapeutic gains    LABS/IMAGING:    []  Ordered [x]  Reviewed [x]  New Labs Ordered: GeneSight testing     LAB  WBC   Date/Time Value Ref Range Status   01/27/2023 08:48 AM 9.5 4.3 - 11.1 K/uL Final     Hemoglobin   Date/Time Value Ref Range Status   01/27/2023 08:48 AM 11.3 (L) 11.7 - 15.4 g/dL Final     Hematocrit   Date/Time Value Ref Range Status   01/27/2023 08:48 AM 36.4 35.8 - 46.3 % Final     Platelets   Date/Time Value Ref Range Status   01/27/2023 08:48  150 - 450 K/uL Final     Sodium   Date/Time Value Ref Range Status   01/27/2023 08:48  133 - 143 mmol/L Final     Potassium   Date/Time Value Ref Range Status   01/27/2023 08:48 AM 4.8 3.5 - 5.1 mmol/L Final     Chloride   Date/Time Value Ref Range Status   01/27/2023 08:48  101 - 110 mmol/L Final     CO2   Date/Time Value Ref Range Status   01/27/2023 08:48 AM 31 21 - 32 mmol/L Final     BUN   Date/Time Value Ref Range Status   01/27/2023 08:48 AM 20 8 - 23 MG/DL Final     Magnesium   Date/Time Value Ref Range Status   01/27/2023 08:48 AM 1.7 (L) 1.8 - 2.4 mg/dL Final     ALT   Date/Time Value Ref Range Status   01/27/2023 08:48 AM 25 12 - 65 U/L Final     AST   Date/Time Value Ref Range Status   01/27/2023 08:48 AM 17 15 - 37 U/L Final     TSH   Date/Time Value Ref Range Status   04/07/2022 08:42 AM 1.710 0.358 - 3.740 uIU/mL Final       Please refer to the lab tab in the epic and care everywhere for the most recent lab results. Plan:     [x]  Medication ordered: Decrease Cymbalta to 30 mg daily, patient is already off of amitriptyline.   She has been off of Lexapro. Will decide the new medication based on the results of the GeneSight testing. Consider Klonopin next visit for anxiety. [x]  Medication education/counseling provided  Medication dosage and time to take, purpose/expected benefits/risks, common side effects, lab monitoring required and reason, expected length of treatment, risk of no treatment, effects on pregnancy/nursing, financial availability discussed. Educated patient on  side effects/risks/benefits of meds including cardiac arrhythmias, suicidal ideations,  orthostatic hypotension, serotonin syndrome, risk of mirella/hypomania from antidepressants, withdrawals from abrupt discontinuation of meds, liver toxicity, risk of bleeding, risk of seizures, high blood pressure, dizziness, drowsiness, sedation , risk of falls, Risk & benefits discussed: including but not limited to possible off-label medication usage. [x] Follow MSE for sxs improvement     I have reviewed the patients controlled substance prescription history, as maintained in the [place] prescription monitoring program, so that the prescription(s) for a  controlled substance can be given. Recommendations and Referrals: Follow up with : MD, requires monitoring of response to medication, requires monitoring of medication side effects. Time until next PMA:     Follow up with Mental Health Clinicians: psychotherapy interventions, improve level of functioning, monitoring to prevent decompensation /hospitalization, monitoring to maintain therapeutic gains, symptoms (resolving and controlled)           Psychotherapy note:                                __10_ Minutes of psychotherapy     [x]  Supportive psychotherapy, Patient discussed certain situational and personal stressors ongoing in her life at this time, weight management d/w the patient. Sleep hygiene d/w patient. Patient allowed to vent out her emotions.   Scenarios were reviewed using role playing and CBT techniques in order to increase insight and decrease anxiety. []  Disposition planning      []  Dangerous and will not contract for safety in the community    **Pateint has been notified: They are to call 911 or go to their nearest E.R. if they are experiencing a medical emergency or suicidal ideations/homicidal ideations. **  All ancillary documentation entered reviewed by provider. PLEASE NOTE:  This document has been produced in part or whole using voice recognition software. Proofread however unrecognized errors in transcription may be present.         Veronica Smith MD

## 2023-03-09 ENCOUNTER — TELEPHONE (OUTPATIENT)
Dept: BEHAVIORAL/MENTAL HEALTH CLINIC | Age: 78
End: 2023-03-09

## 2023-03-15 ENCOUNTER — NURSE ONLY (OUTPATIENT)
Dept: INTERNAL MEDICINE CLINIC | Facility: CLINIC | Age: 78
End: 2023-03-15

## 2023-03-15 DIAGNOSIS — E53.8 VITAMIN B 12 DEFICIENCY: Primary | ICD-10-CM

## 2023-03-15 RX ORDER — CYANOCOBALAMIN 1000 UG/ML
1000 INJECTION, SOLUTION INTRAMUSCULAR; SUBCUTANEOUS ONCE
Status: COMPLETED | OUTPATIENT
Start: 2023-03-15 | End: 2023-03-15

## 2023-03-15 RX ADMIN — CYANOCOBALAMIN 1000 MCG: 1000 INJECTION, SOLUTION INTRAMUSCULAR; SUBCUTANEOUS at 09:28

## 2023-03-17 ENCOUNTER — TELEPHONE (OUTPATIENT)
Dept: SLEEP MEDICINE | Age: 78
End: 2023-03-17

## 2023-03-19 DIAGNOSIS — K58.0 IRRITABLE BOWEL SYNDROME WITH DIARRHEA: ICD-10-CM

## 2023-03-20 ENCOUNTER — HOSPITAL ENCOUNTER (OUTPATIENT)
Dept: MAMMOGRAPHY | Age: 78
Discharge: HOME | End: 2023-03-23
Payer: MEDICARE

## 2023-03-20 ENCOUNTER — TELEPHONE (OUTPATIENT)
Dept: PRIMARY CARE CLINIC | Facility: CLINIC | Age: 78
End: 2023-03-20

## 2023-03-20 DIAGNOSIS — Z78.0 POST-MENOPAUSE: ICD-10-CM

## 2023-03-20 PROCEDURE — 77080 DXA BONE DENSITY AXIAL: CPT

## 2023-03-20 RX ORDER — MONTELUKAST SODIUM 4 MG/1
TABLET, CHEWABLE ORAL
Qty: 60 TABLET | Refills: 1 | OUTPATIENT
Start: 2023-03-20

## 2023-03-23 ENCOUNTER — PATIENT MESSAGE (OUTPATIENT)
Dept: BEHAVIORAL/MENTAL HEALTH CLINIC | Age: 78
End: 2023-03-23

## 2023-03-23 ENCOUNTER — TELEPHONE (OUTPATIENT)
Dept: BEHAVIORAL/MENTAL HEALTH CLINIC | Age: 78
End: 2023-03-23

## 2023-03-23 NOTE — TELEPHONE ENCOUNTER
From: Juan Christiansen  To: Dr. Hair Joyce: 3/23/2023 7:56 AM EDT  Subject: Test result     I called on Monday to see if genetic test was back. Still don't have results. I really need help. I have not slept all week. It is very hard to manage a lot of things when sleep deprived.

## 2023-03-23 NOTE — TELEPHONE ENCOUNTER
Returned call again to patient. Spoke with her this morning. Genetic test results have not posted on ttwick as yet. They do also need Dr Deann Hassan to sign the authorization on ttwick. Patient is requesting either sooner appt or for medication to be sent once the results are posted.

## 2023-03-23 NOTE — TELEPHONE ENCOUNTER
----- Message from My Rebolledo sent at 3/23/2023  7:56 AM EDT -----  Regarding: Test result   I called on Monday to see if genetic test was back. Still don't have results. I really need help. I have not slept all week. It is very hard to manage a lot of things when sleep deprived.

## 2023-03-24 ENCOUNTER — OFFICE VISIT (OUTPATIENT)
Dept: ORTHOPEDIC SURGERY | Age: 78
End: 2023-03-24

## 2023-03-24 DIAGNOSIS — M19.012 PRIMARY OSTEOARTHRITIS OF LEFT SHOULDER: ICD-10-CM

## 2023-03-24 DIAGNOSIS — M19.012 ARTHRITIS OF LEFT ACROMIOCLAVICULAR JOINT: ICD-10-CM

## 2023-03-24 DIAGNOSIS — M25.512 LEFT SHOULDER PAIN, UNSPECIFIED CHRONICITY: Primary | ICD-10-CM

## 2023-03-24 NOTE — PROGRESS NOTES
Patient was fitted and instructed on the use of Arm Sling for the left shoulder. Patient is aware the arm should fit comfortably in the sling with the elbow as far back as possible. I explained the thumb should be placed in the thumb loop to help prevent the arm from sliding out of the sling. Patient was instructed that the shoulder strap should cross over the opposite shoulder continuing threw the loop attached to the sling and then velcro back on the shoulder strap. Patient read and signed documenting they understand and agree to [facility]'s current DME return policy.
ossification, MI and other anesthesia related risks, etc.  They seem to understand and wish to proceed. I gave them education literature and answered their questions. Patient is of increased risk for surgical intervention and post operative complications making the patient moderatemedical decision making secondary to HTN, diabetes, thyroid disease, increased BMI, age    Return for Surgery.         Tim Soliman MD  03/24/23

## 2023-03-27 ENCOUNTER — TELEPHONE (OUTPATIENT)
Dept: BEHAVIORAL/MENTAL HEALTH CLINIC | Age: 78
End: 2023-03-27

## 2023-03-27 DIAGNOSIS — M25.512 LEFT SHOULDER PAIN, UNSPECIFIED CHRONICITY: Primary | ICD-10-CM

## 2023-03-27 DIAGNOSIS — M19.012 PRIMARY OSTEOARTHRITIS OF LEFT SHOULDER: ICD-10-CM

## 2023-03-27 DIAGNOSIS — M19.012 ARTHRITIS OF LEFT ACROMIOCLAVICULAR JOINT: ICD-10-CM

## 2023-03-27 NOTE — PROGRESS NOTES
Patient verified name and . Order for consent NOT found in EHR; patient verifies procedure. Type 1B surgery, phone assessment complete. Orders NOT received. Labs per surgeon: NONE  Labs per anesthesia protocol: HGB dos    Patient answered medical/surgical history questions at their best of ability. All prior to admission medications documented in EPIC. Patient instructed to take the following medications the day of surgery according to anesthesia guidelines with a small sip of water: Albuterol Inhaler (bring with you), Duloxetine, Nitrofurantoin, Flonase. Hold all vitamins 7 days prior to surgery and NSAIDS 5 days prior to surgery. Prescription meds to hold: NONE. Patient instructed on the following:    > Arrive at [address] [facility], time of arrival to be called the day before by 1700  > NPO after midnight, unless otherwise indicated, including gum, mints, and ice chips  > Responsible adult must drive patient to the hospital, stay during surgery, and patient will need supervision 24 hours after anesthesia  > Use antibacterial soap in shower the night before surgery and on the morning of surgery  > All piercings must be removed prior to arrival.    > Leave all valuables (money and jewelry) at home but bring insurance card and ID on DOS.   > You may be required to pay a deductible or co-pay on the day of your procedure. You can pre-pay by calling [phone] if your surgery is at the [facility] or [phone] if your surgery is at the [facility]. > Do not wear make-up, nail polish, lotions, cologne, perfumes, powders, or oil on skin. Artificial nails are not permitted.

## 2023-03-28 ENCOUNTER — CLINICAL DOCUMENTATION (OUTPATIENT)
Dept: ORTHOPEDIC SURGERY | Age: 78
End: 2023-03-28

## 2023-03-28 NOTE — PROGRESS NOTES
[facility] per patient insurance company.  Faxed on 2/23/23 and received confirmation

## 2023-04-03 ENCOUNTER — OFFICE VISIT (OUTPATIENT)
Dept: INTERNAL MEDICINE CLINIC | Facility: CLINIC | Age: 78
End: 2023-04-03
Payer: MEDICARE

## 2023-04-03 VITALS
WEIGHT: 188.2 LBS | DIASTOLIC BLOOD PRESSURE: 92 MMHG | HEART RATE: 72 BPM | OXYGEN SATURATION: 100 % | BODY MASS INDEX: 39.33 KG/M2 | SYSTOLIC BLOOD PRESSURE: 160 MMHG

## 2023-04-03 DIAGNOSIS — S41.112A LACERATION OF LEFT UPPER EXTREMITY, INITIAL ENCOUNTER: Primary | ICD-10-CM

## 2023-04-03 DIAGNOSIS — L29.9 GENERALIZED PRURITUS: ICD-10-CM

## 2023-04-03 DIAGNOSIS — I10 PRIMARY HYPERTENSION: ICD-10-CM

## 2023-04-03 DIAGNOSIS — E53.8 VITAMIN B 12 DEFICIENCY: ICD-10-CM

## 2023-04-03 PROCEDURE — 99214 OFFICE O/P EST MOD 30 MIN: CPT | Performed by: NURSE PRACTITIONER

## 2023-04-03 PROCEDURE — 3074F SYST BP LT 130 MM HG: CPT | Performed by: NURSE PRACTITIONER

## 2023-04-03 PROCEDURE — 1123F ACP DISCUSS/DSCN MKR DOCD: CPT | Performed by: NURSE PRACTITIONER

## 2023-04-03 PROCEDURE — 3078F DIAST BP <80 MM HG: CPT | Performed by: NURSE PRACTITIONER

## 2023-04-03 RX ORDER — AMLODIPINE BESYLATE 5 MG/1
5 TABLET ORAL DAILY
Qty: 30 TABLET | Refills: 0 | Status: SHIPPED | OUTPATIENT
Start: 2023-04-03

## 2023-04-03 RX ORDER — CYANOCOBALAMIN 1000 UG/ML
1000 INJECTION, SOLUTION INTRAMUSCULAR; SUBCUTANEOUS
Qty: 6 EACH | Refills: 3 | Status: SHIPPED | OUTPATIENT
Start: 2023-04-03

## 2023-04-03 ASSESSMENT — PATIENT HEALTH QUESTIONNAIRE - PHQ9
9. THOUGHTS THAT YOU WOULD BE BETTER OFF DEAD, OR OF HURTING YOURSELF: 0
SUM OF ALL RESPONSES TO PHQ QUESTIONS 1-9: 6
8. MOVING OR SPEAKING SO SLOWLY THAT OTHER PEOPLE COULD HAVE NOTICED. OR THE OPPOSITE, BEING SO FIGETY OR RESTLESS THAT YOU HAVE BEEN MOVING AROUND A LOT MORE THAN USUAL: 0
SUM OF ALL RESPONSES TO PHQ9 QUESTIONS 1 & 2: 3
SUM OF ALL RESPONSES TO PHQ QUESTIONS 1-9: 6
SUM OF ALL RESPONSES TO PHQ QUESTIONS 1-9: 6
4. FEELING TIRED OR HAVING LITTLE ENERGY: 1
5. POOR APPETITE OR OVEREATING: 1
SUM OF ALL RESPONSES TO PHQ QUESTIONS 1-9: 6
6. FEELING BAD ABOUT YOURSELF - OR THAT YOU ARE A FAILURE OR HAVE LET YOURSELF OR YOUR FAMILY DOWN: 0
10. IF YOU CHECKED OFF ANY PROBLEMS, HOW DIFFICULT HAVE THESE PROBLEMS MADE IT FOR YOU TO DO YOUR WORK, TAKE CARE OF THINGS AT HOME, OR GET ALONG WITH OTHER PEOPLE: 0
3. TROUBLE FALLING OR STAYING ASLEEP: 1
2. FEELING DOWN, DEPRESSED OR HOPELESS: 1
1. LITTLE INTEREST OR PLEASURE IN DOING THINGS: 2
7. TROUBLE CONCENTRATING ON THINGS, SUCH AS READING THE NEWSPAPER OR WATCHING TELEVISION: 0

## 2023-04-05 ENCOUNTER — TELEMEDICINE (OUTPATIENT)
Dept: BEHAVIORAL/MENTAL HEALTH CLINIC | Age: 78
End: 2023-04-05
Payer: MEDICARE

## 2023-04-05 DIAGNOSIS — F51.05 INSOMNIA DUE TO MENTAL DISORDER: ICD-10-CM

## 2023-04-05 DIAGNOSIS — Z86.718 HISTORY OF DVT (DEEP VEIN THROMBOSIS): ICD-10-CM

## 2023-04-05 DIAGNOSIS — F33.1 MODERATE EPISODE OF RECURRENT MAJOR DEPRESSIVE DISORDER (HCC): Primary | ICD-10-CM

## 2023-04-05 DIAGNOSIS — F43.10 PTSD (POST-TRAUMATIC STRESS DISORDER): ICD-10-CM

## 2023-04-05 DIAGNOSIS — M25.512 LEFT SHOULDER PAIN, UNSPECIFIED CHRONICITY: Primary | ICD-10-CM

## 2023-04-05 DIAGNOSIS — M19.012 PRIMARY OSTEOARTHRITIS OF LEFT SHOULDER: ICD-10-CM

## 2023-04-05 DIAGNOSIS — F41.1 GENERALIZED ANXIETY DISORDER: ICD-10-CM

## 2023-04-05 DIAGNOSIS — Z65.8 PSYCHOSOCIAL STRESSORS: ICD-10-CM

## 2023-04-05 DIAGNOSIS — M19.012 ARTHRITIS OF LEFT ACROMIOCLAVICULAR JOINT: ICD-10-CM

## 2023-04-05 PROCEDURE — 1123F ACP DISCUSS/DSCN MKR DOCD: CPT | Performed by: PSYCHIATRY & NEUROLOGY

## 2023-04-05 PROCEDURE — 99214 OFFICE O/P EST MOD 30 MIN: CPT | Performed by: PSYCHIATRY & NEUROLOGY

## 2023-04-05 RX ORDER — SOLIFENACIN SUCCINATE 5 MG/1
TABLET, FILM COATED ORAL
COMMUNITY
Start: 2023-04-04

## 2023-04-05 RX ORDER — DESVENLAFAXINE SUCCINATE 50 MG/1
50 TABLET, EXTENDED RELEASE ORAL EVERY OTHER DAY
Qty: 30 TABLET | Refills: 3 | Status: SHIPPED | OUTPATIENT
Start: 2023-04-05

## 2023-04-05 RX ORDER — HYDROCODONE BITARTRATE AND ACETAMINOPHEN 5; 325 MG/1; MG/1
1-2 TABLET ORAL
Qty: 40 TABLET | Refills: 0 | Status: SHIPPED | OUTPATIENT
Start: 2023-04-05 | End: 2023-04-08

## 2023-04-05 RX ORDER — AMOXICILLIN 250 MG
1 CAPSULE ORAL DAILY
Qty: 21 TABLET | Refills: 0 | Status: SHIPPED | OUTPATIENT
Start: 2023-04-05

## 2023-04-05 RX ORDER — LEVOMEFOLATE CALCIUM 7.5 MG TABLET
7.5 TABLET DAILY
Qty: 30 TABLET | Refills: 3 | Status: SHIPPED | OUTPATIENT
Start: 2023-04-05

## 2023-04-05 RX ORDER — ONDANSETRON 8 MG/1
4 TABLET, ORALLY DISINTEGRATING ORAL EVERY 6 HOURS
Qty: 20 TABLET | Refills: 0 | Status: SHIPPED | OUTPATIENT
Start: 2023-04-05

## 2023-04-05 ASSESSMENT — ANXIETY QUESTIONNAIRES
7. FEELING AFRAID AS IF SOMETHING AWFUL MIGHT HAPPEN: 0
4. TROUBLE RELAXING: 3
GAD7 TOTAL SCORE: 16
6. BECOMING EASILY ANNOYED OR IRRITABLE: 1
IF YOU CHECKED OFF ANY PROBLEMS ON THIS QUESTIONNAIRE, HOW DIFFICULT HAVE THESE PROBLEMS MADE IT FOR YOU TO DO YOUR WORK, TAKE CARE OF THINGS AT HOME, OR GET ALONG WITH OTHER PEOPLE: VERY DIFFICULT
5. BEING SO RESTLESS THAT IT IS HARD TO SIT STILL: 3
1. FEELING NERVOUS, ANXIOUS, OR ON EDGE: 3
2. NOT BEING ABLE TO STOP OR CONTROL WORRYING: 3
3. WORRYING TOO MUCH ABOUT DIFFERENT THINGS: 3

## 2023-04-05 ASSESSMENT — PATIENT HEALTH QUESTIONNAIRE - PHQ9
SUM OF ALL RESPONSES TO PHQ9 QUESTIONS 1 & 2: 2
1. LITTLE INTEREST OR PLEASURE IN DOING THINGS: 1
3. TROUBLE FALLING OR STAYING ASLEEP: 3
SUM OF ALL RESPONSES TO PHQ QUESTIONS 1-9: 16
SUM OF ALL RESPONSES TO PHQ QUESTIONS 1-9: 16
2. FEELING DOWN, DEPRESSED OR HOPELESS: 1
6. FEELING BAD ABOUT YOURSELF - OR THAT YOU ARE A FAILURE OR HAVE LET YOURSELF OR YOUR FAMILY DOWN: 3
SUM OF ALL RESPONSES TO PHQ QUESTIONS 1-9: 16
SUM OF ALL RESPONSES TO PHQ QUESTIONS 1-9: 16
8. MOVING OR SPEAKING SO SLOWLY THAT OTHER PEOPLE COULD HAVE NOTICED. OR THE OPPOSITE, BEING SO FIGETY OR RESTLESS THAT YOU HAVE BEEN MOVING AROUND A LOT MORE THAN USUAL: 1
10. IF YOU CHECKED OFF ANY PROBLEMS, HOW DIFFICULT HAVE THESE PROBLEMS MADE IT FOR YOU TO DO YOUR WORK, TAKE CARE OF THINGS AT HOME, OR GET ALONG WITH OTHER PEOPLE: 1
4. FEELING TIRED OR HAVING LITTLE ENERGY: 3
9. THOUGHTS THAT YOU WOULD BE BETTER OFF DEAD, OR OF HURTING YOURSELF: 0
7. TROUBLE CONCENTRATING ON THINGS, SUCH AS READING THE NEWSPAPER OR WATCHING TELEVISION: 1
5. POOR APPETITE OR OVEREATING: 3

## 2023-04-05 NOTE — PROGRESS NOTES
[] Caodaism  [] REFERENCE   HALLUCINATIONS  [x] DENIES  [] AUDITORY  [] VISUAL  [] OLFACTORY  [] TACTILE     [] GUSTATORY  [] SOMATIC         OBSESSIONS  [x] DENIES  [] PRESENT         SUICIDAL IDEATION  [x] DENIES  [] PRESENT W/O PLAN  [] PRESENT W/ PLAN       HOMICIDAL IDEATION  [x] DENIES  [] PRESENT W/O PLAN  [] PRESENT W/ PLAN           JUDGEMENT:   [x] GOOD   [] FAIR   [] POOR   INSIGHT:   [x] GOOD   [] FAIR   [] POOR     COGNITION:           SENSORIUM:   [x] ALERT   [] CLOUDED   [] DROWSY     ORIENTATION:   [x] INTACT   [] TIME:  PLACE  PERSON   RECENT & REMOTE MEMORY:   [x] NORMAL   [] OTHER:                  ATTENTION:   [x] INTACT   [] MILD IMPAIRMENT   [] SEVERE IMPAIRMENT     CONCENTRATION:   [x] INTACT   [] MILD IMPAIRMENT   [] SEVERE IMPAIRMENT     LANGUAGE:   [x] AVERAGE   [] ABOVE AVERAGE   [] BELOW AVERAGE     FUND OF KNOWLEDGE:   [] UNABLE TO ASSESS AT THIS TIME   [x] AVERAGE   [] ABOVE AVERAGE   [] BELOW AVERAGE      [] GOOD TO EXCELLENT KNOWLEDGE OF CURRENT EVENTS   [] POOR TO NO KNLEDGE OF CURRENT EVENTS                                          ABNORMAL MOVEMENTS:   [x] NONE   [] TICS   [] TREMORS   [] BIZZARE      [] FACE   [] TRUNK   [] EXTREMETIES   [] GESTURES        SLEEP:   [x] GOOD   [] FAIR   [] POOR      APPETITE:   [x] GOOD   [] POOR   []        MUSCLE STRENGTH AND TONE   [] WNL   [] ATROPHY   [] SPASTIC        [] FLACCID   [] COGWHEEL       Diagnoses/Impressions:    ICD-10-CM    1. Moderate episode of recurrent major depressive disorder (HCC)  F33.1       2. Generalized anxiety disorder  F41.1       3. Insomnia due to mental disorder  F51.05       4. Psychosocial stressors  Z65.8       5.  PTSD (post-traumatic stress disorder)  F43.10           TREATMENT GOALS:  Symptom reduction, Medication adherence, maintain therapeutic gains    LABS/IMAGING:    []  Ordered [x]  Reviewed []  New Labs Ordered:     LAB  WBC   Date/Time Value Ref Range Status   04/04/2023 08:46 AM 10.9 4.3 - 11.1 K/uL

## 2023-04-05 NOTE — DISCHARGE INSTRUCTIONS
arthroscopy; take an adult aspirin once a day for three weeks after surgery to help prevent the formation of blood clots. (Do not take aspirin against the advice of your medical doctor). Pain Management:    Pain after the surgery will vary from patient to patient. A certain amount of discomfort is normal and should not be alarming. We do recommend starting your prescription pain medications once you begin to experience a moderate amount of pain. In the first 24-48 hours with your nerve block working you can expect a small amount of pain and can use pain medications as needed. If you have moderate to severe pain with questionable effectiveness of nerve block please take the pain medications that were prescribed to you. If no relief is obtained call the office number on this sheet. Many pain medications contain Tylenol. Do not take additional Tylenol while taking the prescribed pain medication. Pain medications can cause constipation, so keep hydrated and consider over the counter additions like Metamucil or stool softener. Pain medications and antibiotics that are given during the silverio-operative time can cause itching and hives; over the counter Benadryl (25mg every 6 hours) can be helpful in treating this. If your pain is not adequately controlled, contact your physician at the numbers on this sheet. For the first week:  Icing for 30 minutes at a time may reduce your pain. Allow at least 30 minutes between ice applications. Sleeping might be difficult. Some individuals find sleeping in a recliner or inclined with pillows or a foam wedge helpful. Putting a mitten on the hand of the affected shoulder can be helpful since some individuals find that warm hands help decrease pain. During this time nausea and light-headedness are common and should improve in 2-5 days. Drinking fluids and eating may help.   If nausea persists, medicine can be prescribed by calling your

## 2023-04-05 NOTE — H&P
Shigella gastroenteritis 05/09/2022    Sleep difficulties 10 years ago    SOBOE (shortness of breath on exertion)     PRN inhaler-patient reports she uses inhaler QHS    Special examinations     History-Neurologist - Dr. 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    Tubulovillous adenoma of colon 04/26/2021       Surgical History:   Past Surgical History:   Procedure Laterality Date    APPENDECTOMY  1955    BLADDER SURGERY  12/03/2019    CATARACT REMOVAL Bilateral 2015    CHOLECYSTECTOMY  12/2016    COLONOSCOPY      COLONOSCOPY      2013, 2003    GYN  12/03/2019    suburethral sling and cystoscopy    HAND SURGERY Bilateral     HYSTERECTOMY, TOTAL ABDOMINAL (CERVIX REMOVED)  [address] Left 2022    KNEE SURGERY Left 04/2016    KNEE SURGERY Left 2014    LAP BAND      and also removed    ORTHOPEDIC SURGERY Left 02/11/2020    LEFT WRIST TRAPEZIOMETACARPAL ARTHROPLASTY WITH FCR TENDON TRANSFER- 2020    SHOULDER ARTHROSCOPY  [facility]    TOTAL KNEE ARTHROPLASTY Right 2014    ULNAR NERVE REPAIR Bilateral     UPPER GASTROINTESTINAL ENDOSCOPY  09/26/2017    WRIST SURGERY Right        Social History: Patient  reports that she quit smoking about 17 years ago. Her smoking use included cigarettes. She has a 46.00 pack-year smoking history. She has never used smokeless tobacco. She reports that she does not drink alcohol and does not use drugs.     Family History:   Family History   Problem Relation Age of Onset    Physical Abuse Mother     Other Father 54        brain aneurysm    Cerebral Aneurysm Father     Other Sister     Heart Disease Brother     Congenial Anomalies Sister         right arm deformity    Pneumonia Sister     Other Sister         fibromyalgia    Hypertension Brother     Heart Disease Brother     Other Brother         brain aneurysm, AVM    Heart Surgery Brother

## 2023-04-05 NOTE — PERIOP NOTE
Preop department called to notify patient of arrival time for scheduled procedure. Instructions given to   - Arrive at [address]. - Remain NPO after midnight, unless otherwise indicated, including gum, mints, and ice chips. - Have a responsible adult to drive patient to the hospital, stay during surgery, and patient will need supervision 24 hours after anesthesia. - Use antibacterial soap in shower the night before surgery and on the morning of surgery.        Was patient contacted: yes patient   Voicemail left:   Numbers contacted: [phone]   Arrival time: 0530

## 2023-04-06 ENCOUNTER — HOSPITAL ENCOUNTER (OUTPATIENT)
Age: 78
Setting detail: OUTPATIENT SURGERY
Discharge: HOME | End: 2023-04-06
Attending: ORTHOPAEDIC SURGERY | Admitting: ORTHOPAEDIC SURGERY
Payer: MEDICARE

## 2023-04-06 ENCOUNTER — ANESTHESIA EVENT (OUTPATIENT)
Dept: SURGERY | Age: 78
End: 2023-04-06
Payer: MEDICARE

## 2023-04-06 ENCOUNTER — ANESTHESIA (OUTPATIENT)
Dept: SURGERY | Age: 78
End: 2023-04-06
Payer: MEDICARE

## 2023-04-06 VITALS
HEART RATE: 72 BPM | BODY MASS INDEX: 37.79 KG/M2 | DIASTOLIC BLOOD PRESSURE: 56 MMHG | TEMPERATURE: 98.3 F | WEIGHT: 180 LBS | SYSTOLIC BLOOD PRESSURE: 117 MMHG | OXYGEN SATURATION: 97 % | RESPIRATION RATE: 16 BRPM | HEIGHT: 58 IN

## 2023-04-06 DIAGNOSIS — M19.012 ARTHRITIS OF LEFT ACROMIOCLAVICULAR JOINT: ICD-10-CM

## 2023-04-06 PROCEDURE — 2709999900 HC NON-CHARGEABLE SUPPLY: Performed by: ORTHOPAEDIC SURGERY

## 2023-04-06 PROCEDURE — 6360000002 HC RX W HCPCS: Performed by: ORTHOPAEDIC SURGERY

## 2023-04-06 PROCEDURE — 2580000003 HC RX 258: Performed by: PHYSICIAN ASSISTANT

## 2023-04-06 PROCEDURE — 6360000002 HC RX W HCPCS: Performed by: PHYSICIAN ASSISTANT

## 2023-04-06 PROCEDURE — 6360000002 HC RX W HCPCS: Performed by: ANESTHESIOLOGY

## 2023-04-06 PROCEDURE — 3600000014 HC SURGERY LEVEL 4 ADDTL 15MIN: Performed by: ORTHOPAEDIC SURGERY

## 2023-04-06 PROCEDURE — 7100000011 HC PHASE II RECOVERY - ADDTL 15 MIN: Performed by: ORTHOPAEDIC SURGERY

## 2023-04-06 PROCEDURE — 2580000003 HC RX 258: Performed by: ANESTHESIOLOGY

## 2023-04-06 PROCEDURE — 3600000004 HC SURGERY LEVEL 4 BASE: Performed by: ORTHOPAEDIC SURGERY

## 2023-04-06 PROCEDURE — 7100000001 HC PACU RECOVERY - ADDTL 15 MIN: Performed by: ORTHOPAEDIC SURGERY

## 2023-04-06 PROCEDURE — 7100000010 HC PHASE II RECOVERY - FIRST 15 MIN: Performed by: ORTHOPAEDIC SURGERY

## 2023-04-06 PROCEDURE — 3700000001 HC ADD 15 MINUTES (ANESTHESIA): Performed by: ORTHOPAEDIC SURGERY

## 2023-04-06 PROCEDURE — 2500000003 HC RX 250 WO HCPCS: Performed by: ORTHOPAEDIC SURGERY

## 2023-04-06 PROCEDURE — 6360000002 HC RX W HCPCS: Performed by: REGISTERED NURSE

## 2023-04-06 PROCEDURE — 3700000000 HC ANESTHESIA ATTENDED CARE: Performed by: ORTHOPAEDIC SURGERY

## 2023-04-06 PROCEDURE — 2500000003 HC RX 250 WO HCPCS: Performed by: REGISTERED NURSE

## 2023-04-06 PROCEDURE — 64415 NJX AA&/STRD BRCH PLXS IMG: CPT | Performed by: ANESTHESIOLOGY

## 2023-04-06 PROCEDURE — 7100000000 HC PACU RECOVERY - FIRST 15 MIN: Performed by: ORTHOPAEDIC SURGERY

## 2023-04-06 PROCEDURE — 2720000010 HC SURG SUPPLY STERILE: Performed by: ORTHOPAEDIC SURGERY

## 2023-04-06 RX ORDER — PROPOFOL 10 MG/ML
INJECTION, EMULSION INTRAVENOUS PRN
Status: DISCONTINUED | OUTPATIENT
Start: 2023-04-06 | End: 2023-04-06 | Stop reason: SDUPTHER

## 2023-04-06 RX ORDER — EPINEPHRINE 1 MG/ML(1)
AMPUL (ML) INJECTION PRN
Status: DISCONTINUED | OUTPATIENT
Start: 2023-04-06 | End: 2023-04-06 | Stop reason: HOSPADM

## 2023-04-06 RX ORDER — ROCURONIUM BROMIDE 10 MG/ML
INJECTION, SOLUTION INTRAVENOUS PRN
Status: DISCONTINUED | OUTPATIENT
Start: 2023-04-06 | End: 2023-04-06 | Stop reason: SDUPTHER

## 2023-04-06 RX ORDER — SODIUM CHLORIDE 0.9 % (FLUSH) 0.9 %
5-40 SYRINGE (ML) INJECTION EVERY 12 HOURS SCHEDULED
Status: DISCONTINUED | OUTPATIENT
Start: 2023-04-06 | End: 2023-04-06 | Stop reason: HOSPADM

## 2023-04-06 RX ORDER — SODIUM CHLORIDE 0.9 % (FLUSH) 0.9 %
5-40 SYRINGE (ML) INJECTION PRN
Status: DISCONTINUED | OUTPATIENT
Start: 2023-04-06 | End: 2023-04-06 | Stop reason: HOSPADM

## 2023-04-06 RX ORDER — ONDANSETRON 2 MG/ML
INJECTION INTRAMUSCULAR; INTRAVENOUS PRN
Status: DISCONTINUED | OUTPATIENT
Start: 2023-04-06 | End: 2023-04-06 | Stop reason: SDUPTHER

## 2023-04-06 RX ORDER — ESMOLOL HYDROCHLORIDE 10 MG/ML
INJECTION INTRAVENOUS PRN
Status: DISCONTINUED | OUTPATIENT
Start: 2023-04-06 | End: 2023-04-06 | Stop reason: SDUPTHER

## 2023-04-06 RX ORDER — SODIUM CHLORIDE, SODIUM LACTATE, POTASSIUM CHLORIDE, CALCIUM CHLORIDE 600; 310; 30; 20 MG/100ML; MG/100ML; MG/100ML; MG/100ML
INJECTION, SOLUTION INTRAVENOUS CONTINUOUS
Status: DISCONTINUED | OUTPATIENT
Start: 2023-04-06 | End: 2023-04-06 | Stop reason: HOSPADM

## 2023-04-06 RX ORDER — NEOSTIGMINE METHYLSULFATE 1 MG/ML
INJECTION, SOLUTION INTRAVENOUS PRN
Status: DISCONTINUED | OUTPATIENT
Start: 2023-04-06 | End: 2023-04-06 | Stop reason: SDUPTHER

## 2023-04-06 RX ORDER — SODIUM CHLORIDE 9 MG/ML
INJECTION, SOLUTION INTRAVENOUS PRN
Status: DISCONTINUED | OUTPATIENT
Start: 2023-04-06 | End: 2023-04-06 | Stop reason: HOSPADM

## 2023-04-06 RX ORDER — LIDOCAINE HYDROCHLORIDE 10 MG/ML
1 INJECTION, SOLUTION INFILTRATION; PERINEURAL
Status: DISCONTINUED | OUTPATIENT
Start: 2023-04-06 | End: 2023-04-06 | Stop reason: HOSPADM

## 2023-04-06 RX ORDER — IPRATROPIUM BROMIDE AND ALBUTEROL SULFATE 2.5; .5 MG/3ML; MG/3ML
1 SOLUTION RESPIRATORY (INHALATION)
Status: DISCONTINUED | OUTPATIENT
Start: 2023-04-06 | End: 2023-04-06 | Stop reason: HOSPADM

## 2023-04-06 RX ORDER — LIDOCAINE HYDROCHLORIDE AND EPINEPHRINE 10; 10 MG/ML; UG/ML
INJECTION, SOLUTION INFILTRATION; PERINEURAL PRN
Status: DISCONTINUED | OUTPATIENT
Start: 2023-04-06 | End: 2023-04-06 | Stop reason: HOSPADM

## 2023-04-06 RX ORDER — DEXAMETHASONE SODIUM PHOSPHATE 4 MG/ML
INJECTION, SOLUTION INTRA-ARTICULAR; INTRALESIONAL; INTRAMUSCULAR; INTRAVENOUS; SOFT TISSUE PRN
Status: DISCONTINUED | OUTPATIENT
Start: 2023-04-06 | End: 2023-04-06 | Stop reason: SDUPTHER

## 2023-04-06 RX ORDER — MIDAZOLAM HYDROCHLORIDE 2 MG/2ML
2 INJECTION, SOLUTION INTRAMUSCULAR; INTRAVENOUS
Status: COMPLETED | OUTPATIENT
Start: 2023-04-06 | End: 2023-04-06

## 2023-04-06 RX ORDER — HALOPERIDOL 5 MG/ML
1 INJECTION INTRAMUSCULAR
Status: DISCONTINUED | OUTPATIENT
Start: 2023-04-06 | End: 2023-04-06 | Stop reason: HOSPADM

## 2023-04-06 RX ORDER — DEXAMETHASONE SODIUM PHOSPHATE 10 MG/ML
INJECTION, SOLUTION INTRAMUSCULAR; INTRAVENOUS
Status: COMPLETED | OUTPATIENT
Start: 2023-04-06 | End: 2023-04-06

## 2023-04-06 RX ORDER — TRANEXAMIC ACID 100 MG/ML
INJECTION, SOLUTION INTRAVENOUS PRN
Status: DISCONTINUED | OUTPATIENT
Start: 2023-04-06 | End: 2023-04-06 | Stop reason: SDUPTHER

## 2023-04-06 RX ORDER — PROCHLORPERAZINE EDISYLATE 5 MG/ML
5 INJECTION INTRAMUSCULAR; INTRAVENOUS
Status: DISCONTINUED | OUTPATIENT
Start: 2023-04-06 | End: 2023-04-06 | Stop reason: HOSPADM

## 2023-04-06 RX ORDER — LIDOCAINE HYDROCHLORIDE 20 MG/ML
INJECTION, SOLUTION EPIDURAL; INFILTRATION; INTRACAUDAL; PERINEURAL PRN
Status: DISCONTINUED | OUTPATIENT
Start: 2023-04-06 | End: 2023-04-06 | Stop reason: SDUPTHER

## 2023-04-06 RX ORDER — FENTANYL CITRATE 50 UG/ML
100 INJECTION, SOLUTION INTRAMUSCULAR; INTRAVENOUS
Status: COMPLETED | OUTPATIENT
Start: 2023-04-06 | End: 2023-04-06

## 2023-04-06 RX ORDER — GLYCOPYRROLATE 0.2 MG/ML
INJECTION INTRAMUSCULAR; INTRAVENOUS PRN
Status: DISCONTINUED | OUTPATIENT
Start: 2023-04-06 | End: 2023-04-06 | Stop reason: SDUPTHER

## 2023-04-06 RX ADMIN — ROPIVACAINE HYDROCHLORIDE 30 ML: 5 INJECTION, SOLUTION EPIDURAL; INFILTRATION; PERINEURAL at 06:32

## 2023-04-06 RX ADMIN — GLYCOPYRROLATE 0.6 MG: 0.2 INJECTION INTRAMUSCULAR; INTRAVENOUS at 07:49

## 2023-04-06 RX ADMIN — SODIUM CHLORIDE, POTASSIUM CHLORIDE, SODIUM LACTATE AND CALCIUM CHLORIDE: 600; 310; 30; 20 INJECTION, SOLUTION INTRAVENOUS at 06:30

## 2023-04-06 RX ADMIN — VANCOMYCIN HYDROCHLORIDE 1500 MG: 10 INJECTION, POWDER, LYOPHILIZED, FOR SOLUTION INTRAVENOUS at 07:29

## 2023-04-06 RX ADMIN — FENTANYL CITRATE 50 MCG: 50 INJECTION, SOLUTION INTRAMUSCULAR; INTRAVENOUS at 06:32

## 2023-04-06 RX ADMIN — MIDAZOLAM 2 MG: 1 INJECTION INTRAMUSCULAR; INTRAVENOUS at 06:32

## 2023-04-06 RX ADMIN — DEXAMETHASONE SODIUM PHOSPHATE 4 MG: 10 INJECTION, SOLUTION INTRAMUSCULAR; INTRAVENOUS at 06:32

## 2023-04-06 RX ADMIN — ROCURONIUM BROMIDE 50 MG: 10 INJECTION, SOLUTION INTRAVENOUS at 07:10

## 2023-04-06 RX ADMIN — ESMOLOL HYDROCHLORIDE 20 MG: 10 INJECTION, SOLUTION INTRAVENOUS at 07:53

## 2023-04-06 RX ADMIN — GENTAMICIN SULFATE 400 MG: 40 INJECTION, SOLUTION INTRAMUSCULAR; INTRAVENOUS at 06:49

## 2023-04-06 RX ADMIN — SUGAMMADEX 150 MG: 100 INJECTION, SOLUTION INTRAVENOUS at 07:57

## 2023-04-06 RX ADMIN — TRANEXAMIC ACID 1000 MG: 100 INJECTION, SOLUTION INTRAVENOUS at 07:23

## 2023-04-06 RX ADMIN — Medication 4 MG: at 07:49

## 2023-04-06 RX ADMIN — PROPOFOL 20 MG: 10 INJECTION, EMULSION INTRAVENOUS at 07:32

## 2023-04-06 RX ADMIN — LIDOCAINE HYDROCHLORIDE 60 MG: 20 INJECTION, SOLUTION EPIDURAL; INFILTRATION; INTRACAUDAL; PERINEURAL at 07:09

## 2023-04-06 RX ADMIN — ONDANSETRON 4 MG: 2 INJECTION INTRAMUSCULAR; INTRAVENOUS at 07:45

## 2023-04-06 RX ADMIN — PROPOFOL 150 MG: 10 INJECTION, EMULSION INTRAVENOUS at 07:09

## 2023-04-06 RX ADMIN — DEXAMETHASONE SODIUM PHOSPHATE 4 MG: 4 INJECTION, SOLUTION INTRAMUSCULAR; INTRAVENOUS at 07:31

## 2023-04-06 RX ADMIN — PROPOFOL 30 MG: 10 INJECTION, EMULSION INTRAVENOUS at 07:17

## 2023-04-06 ASSESSMENT — PAIN DESCRIPTION - DESCRIPTORS: DESCRIPTORS: ACHING

## 2023-04-06 ASSESSMENT — PAIN SCALES - GENERAL
PAINLEVEL_OUTOF10: 0

## 2023-04-06 ASSESSMENT — PAIN - FUNCTIONAL ASSESSMENT: PAIN_FUNCTIONAL_ASSESSMENT: 0-10

## 2023-04-06 NOTE — ANESTHESIA POSTPROCEDURE EVALUATION
Department of Anesthesiology  Postprocedure Note    Patient: Stephane Rios  MRN: [id number]  YOB: 1945  Date of evaluation: 4/6/2023      Procedure Summary     Date: 04/06/23 Room / Location: D OP OR 03 / [facility]    Anesthesia Start: 0702 Anesthesia Stop: 0197    Procedure: LEFT SHOULDER ARTHROSCOPY DISTAL CLAVICLE RESECTION, BICEPS TENOTOMY AND DEBRIDEMENT (Left: Shoulder) Diagnosis:       Left shoulder pain, unspecified chronicity      Primary osteoarthritis of left shoulder      Arthritis of left acromioclavicular joint      (Left shoulder pain, unspecified chronicity [M25.512])      (Primary osteoarthritis of left shoulder [M19.012])      (Arthritis of left acromioclavicular joint [M19.012])    Surgeons: Pinky Enamorado MD Responsible Provider: Rahat Joseph MD    Anesthesia Type: general ASA Status: 3          Anesthesia Type: No value filed.     Charlene Phase I: Charlene Score: 8    Charlene Phase II:        Anesthesia Post Evaluation    Patient location during evaluation: PACU  Patient participation: complete - patient participated  Level of consciousness: awake  Airway patency: patent  Nausea & Vomiting: no nausea  Complications: no  Cardiovascular status: hemodynamically stable  Respiratory status: acceptable and nonlabored ventilation  Hydration status: stable  Multimodal analgesia pain management approach

## 2023-04-06 NOTE — OP NOTE
complications. Post-operative plan:The patient will remain in the sling until follow up. They will return to the office in about 10 days for her first post operative check. They will remain non-weight bearing in their UE until otherwise instructed at follow up. We will begin with an HEP and may consider further PT after the initial visit. Can perform Pendulums and PROM as tolerated. Discharge instructions were also provided.     Estimated Blood Loss:   5 ml    Fluids:   , see anesthesia notes    Implant: * No implants in log *    Closure: Primary    Complications: None    Signed By: Serenity Hdez MD

## 2023-04-06 NOTE — ANESTHESIA PRE PROCEDURE
(PTSD):depression/anxiety              ROS comment: Restless leg syndrome GI/Hepatic/Renal:   (+) GERD:, morbid obesity         ROS comment: IBS. Endo/Other:    (+) hypothyroidism::., .                  ROS comment: Fibromyalgia  Chronic pain Abdominal:             Vascular:   + DVT (s/p TKA), . Other Findings:           Anesthesia Plan      general     ASA 3     (Interscalene for post op pain)  Induction: intravenous. Anesthetic plan and risks discussed with patient.               Post-op pain plan if not by surgeon: single peripheral nerve block            Loren Arriaga MD   4/6/2023

## 2023-04-06 NOTE — ANESTHESIA PROCEDURE NOTES
Airway  Date/Time: 4/6/2023 7:12 AM  Urgency: elective    Airway not difficult    General Information and Staff    Patient location during procedure: OR  Resident/CRNA: [id number] - CRNA  Performed: resident/CRNA     Indications and Patient Condition  Indications for airway management: anesthesia  Spontaneous Ventilation: absent  Sedation level: deep  Preoxygenated: yes  Patient position: sniffing  MILS not maintained throughout  Mask difficulty assessment: vent by bag mask    Final Airway Details  Final airway type: endotracheal airway      Successful airway: ETT  Cuffed: yes   Successful intubation technique: direct laryngoscopy  Facilitating devices/methods: intubating stylet  Endotracheal tube insertion site: oral  Blade: Harvey  Blade size: #2  ETT size (mm): 7.0  Cormack-Lehane Classification: grade I - full view of glottis  Placement verified by: chest auscultation and capnometry   Measured from: lips  ETT to lips (cm): 22  Number of attempts at approach: 1  Ventilation between attempts: bag mask  Number of other approaches attempted: 0    Additional Comments  In OR, standard monitors, pre 02, induction, +mask AW, DL, cords clear, ETT, teeth, lips as preop.    no
AM  dexamethasone (DECADRON) (PF) 10 mg/mL injection - Other   4 mg - 4/6/2023 6:32:00 AM

## 2023-04-07 ENCOUNTER — TELEPHONE (OUTPATIENT)
Dept: ORTHOPEDIC SURGERY | Age: 78
End: 2023-04-07

## 2023-04-07 DIAGNOSIS — M25.512 LEFT SHOULDER PAIN, UNSPECIFIED CHRONICITY: Primary | ICD-10-CM

## 2023-04-07 DIAGNOSIS — M19.012 PRIMARY OSTEOARTHRITIS OF LEFT SHOULDER: ICD-10-CM

## 2023-04-07 DIAGNOSIS — M19.012 ARTHRITIS OF LEFT ACROMIOCLAVICULAR JOINT: ICD-10-CM

## 2023-04-07 DIAGNOSIS — Z98.890 POST-OPERATIVE STATE: Primary | ICD-10-CM

## 2023-04-07 DIAGNOSIS — M25.512 LEFT SHOULDER PAIN, UNSPECIFIED CHRONICITY: ICD-10-CM

## 2023-04-07 RX ORDER — HYDROCODONE BITARTRATE AND ACETAMINOPHEN 7.5; 325 MG/1; MG/1
1-2 TABLET ORAL
Qty: 12 TABLET | Refills: 0 | Status: CANCELLED | OUTPATIENT
Start: 2023-04-07 | End: 2023-04-12

## 2023-04-07 RX ORDER — HYDROCODONE BITARTRATE AND ACETAMINOPHEN 7.5; 325 MG/1; MG/1
1-2 TABLET ORAL
Qty: 20 TABLET | Refills: 0 | Status: SHIPPED | OUTPATIENT
Start: 2023-04-07 | End: 2023-04-12

## 2023-04-07 RX ORDER — HYDROCODONE BITARTRATE AND ACETAMINOPHEN 7.5; 325 MG/1; MG/1
1-2 TABLET ORAL
Qty: 30 TABLET | Refills: 0 | Status: SHIPPED | OUTPATIENT
Start: 2023-04-07 | End: 2023-04-12

## 2023-04-07 RX ORDER — HYDROCODONE BITARTRATE AND ACETAMINOPHEN 7.5; 325 MG/1; MG/1
1-2 TABLET ORAL
Qty: 30 TABLET | Refills: 0 | Status: CANCELLED | OUTPATIENT
Start: 2023-04-07 | End: 2023-04-12

## 2023-04-07 RX ORDER — HYDROCODONE BITARTRATE AND ACETAMINOPHEN 7.5; 325 MG/1; MG/1
1-2 TABLET ORAL
Qty: 60 TABLET | Refills: 0 | Status: SHIPPED | OUTPATIENT
Start: 2023-04-07 | End: 2023-04-27

## 2023-04-07 NOTE — TELEPHONE ENCOUNTER
Called pt she is taking 2 Norco 5 every 4 hours and as long as she doesn't move her shoulder she is fine but if she moves it any it is very painful. I told her I would talk with Dr. Yin García and see if he will send in [address] [facility] and I will call her back and let her know. She thanked me for calling.

## 2023-04-07 NOTE — TELEPHONE ENCOUNTER
She had surgery yesterday. The pain meds are not controlling her pain. She wants to speak to someone regarding this before the weekend. Please give her a call.

## 2023-04-09 ENCOUNTER — HOSPITAL ENCOUNTER (EMERGENCY)
Age: 78
Discharge: HOME | End: 2023-04-09
Attending: EMERGENCY MEDICINE
Payer: MEDICARE

## 2023-04-09 VITALS
DIASTOLIC BLOOD PRESSURE: 74 MMHG | HEIGHT: 58 IN | HEART RATE: 70 BPM | RESPIRATION RATE: 16 BRPM | SYSTOLIC BLOOD PRESSURE: 169 MMHG | WEIGHT: 180 LBS | BODY MASS INDEX: 37.79 KG/M2 | TEMPERATURE: 99.3 F | OXYGEN SATURATION: 97 %

## 2023-04-09 DIAGNOSIS — T78.40XA ALLERGIC REACTION, INITIAL ENCOUNTER: Primary | ICD-10-CM

## 2023-04-09 PROCEDURE — 99284 EMERGENCY DEPT VISIT MOD MDM: CPT

## 2023-04-09 PROCEDURE — 96372 THER/PROPH/DIAG INJ SC/IM: CPT

## 2023-04-09 PROCEDURE — 6370000000 HC RX 637 (ALT 250 FOR IP): Performed by: EMERGENCY MEDICINE

## 2023-04-09 PROCEDURE — 6360000002 HC RX W HCPCS: Performed by: EMERGENCY MEDICINE

## 2023-04-09 RX ORDER — DEXAMETHASONE SODIUM PHOSPHATE 10 MG/ML
10 INJECTION INTRAMUSCULAR; INTRAVENOUS ONCE
Status: COMPLETED | OUTPATIENT
Start: 2023-04-09 | End: 2023-04-09

## 2023-04-09 RX ORDER — HYDROXYZINE HYDROCHLORIDE 25 MG/1
25-50 TABLET, FILM COATED ORAL EVERY 8 HOURS PRN
Qty: 30 TABLET | Refills: 1 | Status: SHIPPED | OUTPATIENT
Start: 2023-04-09 | End: 2023-05-09

## 2023-04-09 RX ORDER — HYDROXYZINE HYDROCHLORIDE 25 MG/1
50 TABLET, FILM COATED ORAL
Status: COMPLETED | OUTPATIENT
Start: 2023-04-09 | End: 2023-04-09

## 2023-04-09 RX ADMIN — HYDROXYZINE HYDROCHLORIDE 50 MG: 25 TABLET, FILM COATED ORAL at 12:11

## 2023-04-09 RX ADMIN — DEXAMETHASONE SODIUM PHOSPHATE 10 MG: 10 INJECTION INTRAMUSCULAR; INTRAVENOUS at 12:11

## 2023-04-09 ASSESSMENT — PAIN DESCRIPTION - LOCATION: LOCATION: ARM;BREAST

## 2023-04-09 ASSESSMENT — PAIN SCALES - GENERAL: PAINLEVEL_OUTOF10: 6

## 2023-04-09 ASSESSMENT — PAIN - FUNCTIONAL ASSESSMENT: PAIN_FUNCTIONAL_ASSESSMENT: 0-10

## 2023-04-13 ENCOUNTER — OFFICE VISIT (OUTPATIENT)
Dept: INTERNAL MEDICINE CLINIC | Facility: CLINIC | Age: 78
End: 2023-04-13
Payer: MEDICARE

## 2023-04-13 VITALS
HEIGHT: 58 IN | DIASTOLIC BLOOD PRESSURE: 82 MMHG | HEART RATE: 79 BPM | BODY MASS INDEX: 38.75 KG/M2 | SYSTOLIC BLOOD PRESSURE: 114 MMHG | OXYGEN SATURATION: 94 % | WEIGHT: 184.6 LBS

## 2023-04-13 DIAGNOSIS — E53.8 VITAMIN B12 DEFICIENCY: Chronic | ICD-10-CM

## 2023-04-13 DIAGNOSIS — B37.2 INTERTRIGINOUS CANDIDIASIS: ICD-10-CM

## 2023-04-13 DIAGNOSIS — R73.01 ELEVATED FASTING GLUCOSE: ICD-10-CM

## 2023-04-13 DIAGNOSIS — I10 PRIMARY HYPERTENSION: Primary | Chronic | ICD-10-CM

## 2023-04-13 PROCEDURE — 1123F ACP DISCUSS/DSCN MKR DOCD: CPT | Performed by: NURSE PRACTITIONER

## 2023-04-13 PROCEDURE — 99214 OFFICE O/P EST MOD 30 MIN: CPT | Performed by: NURSE PRACTITIONER

## 2023-04-13 PROCEDURE — 3074F SYST BP LT 130 MM HG: CPT | Performed by: NURSE PRACTITIONER

## 2023-04-13 PROCEDURE — 96372 THER/PROPH/DIAG INJ SC/IM: CPT | Performed by: NURSE PRACTITIONER

## 2023-04-13 PROCEDURE — 3078F DIAST BP <80 MM HG: CPT | Performed by: NURSE PRACTITIONER

## 2023-04-13 RX ORDER — CYANOCOBALAMIN 1000 UG/ML
1000 INJECTION, SOLUTION INTRAMUSCULAR; SUBCUTANEOUS ONCE
Status: COMPLETED | OUTPATIENT
Start: 2023-04-13 | End: 2023-04-13

## 2023-04-13 RX ORDER — NYSTATIN 100000 [USP'U]/G
POWDER TOPICAL
Qty: 30 G | Refills: 2 | Status: SHIPPED | OUTPATIENT
Start: 2023-04-13

## 2023-04-13 RX ADMIN — CYANOCOBALAMIN 1000 MCG: 1000 INJECTION, SOLUTION INTRAMUSCULAR; SUBCUTANEOUS at 15:43

## 2023-04-13 ASSESSMENT — PATIENT HEALTH QUESTIONNAIRE - PHQ9
10. IF YOU CHECKED OFF ANY PROBLEMS, HOW DIFFICULT HAVE THESE PROBLEMS MADE IT FOR YOU TO DO YOUR WORK, TAKE CARE OF THINGS AT HOME, OR GET ALONG WITH OTHER PEOPLE: 1
SUM OF ALL RESPONSES TO PHQ9 QUESTIONS 1 & 2: 2
SUM OF ALL RESPONSES TO PHQ QUESTIONS 1-9: 16
7. TROUBLE CONCENTRATING ON THINGS, SUCH AS READING THE NEWSPAPER OR WATCHING TELEVISION: 1
6. FEELING BAD ABOUT YOURSELF - OR THAT YOU ARE A FAILURE OR HAVE LET YOURSELF OR YOUR FAMILY DOWN: 3
4. FEELING TIRED OR HAVING LITTLE ENERGY: 3
5. POOR APPETITE OR OVEREATING: 3
3. TROUBLE FALLING OR STAYING ASLEEP: 3
2. FEELING DOWN, DEPRESSED OR HOPELESS: 1
SUM OF ALL RESPONSES TO PHQ QUESTIONS 1-9: 16
SUM OF ALL RESPONSES TO PHQ QUESTIONS 1-9: 16
8. MOVING OR SPEAKING SO SLOWLY THAT OTHER PEOPLE COULD HAVE NOTICED. OR THE OPPOSITE, BEING SO FIGETY OR RESTLESS THAT YOU HAVE BEEN MOVING AROUND A LOT MORE THAN USUAL: 1
1. LITTLE INTEREST OR PLEASURE IN DOING THINGS: 1
9. THOUGHTS THAT YOU WOULD BE BETTER OFF DEAD, OR OF HURTING YOURSELF: 0
SUM OF ALL RESPONSES TO PHQ QUESTIONS 1-9: 16

## 2023-04-18 ENCOUNTER — OFFICE VISIT (OUTPATIENT)
Dept: ORTHOPEDIC SURGERY | Age: 78
End: 2023-04-18

## 2023-04-18 DIAGNOSIS — M25.512 LEFT SHOULDER PAIN, UNSPECIFIED CHRONICITY: Primary | ICD-10-CM

## 2023-04-18 DIAGNOSIS — M19.012 ARTHRITIS OF LEFT ACROMIOCLAVICULAR JOINT: ICD-10-CM

## 2023-04-18 DIAGNOSIS — Z09 SURGERY FOLLOW-UP: ICD-10-CM

## 2023-04-18 PROCEDURE — 99024 POSTOP FOLLOW-UP VISIT: CPT | Performed by: ORTHOPAEDIC SURGERY

## 2023-04-18 NOTE — PROGRESS NOTES
Name: Shandra Randall  YOB: 1945  Gender: female  MRN: [id number]    CC:   Chief Complaint   Patient presents with    Follow-up     1st post op left shoulder scope DCR, biceps tenotomy and debridement DOS 4-6-23   1-2 weeks status post   Left Shoulder Arthroscopy Distal Clavicle Resection, Biceps Tenotomy And Debridement - Left  4/6/2023    HPI: Patient is status post distal clavicle resection and distal clavicle resection. Patient is doing well. Patient came in with a sling on the affected side. Review of Systems  Noncontributory      PE:  Wounds are Clean, Dry and Intact. There is no sign of infection. The patient is neurovascularly intact. Swelling is within normal limits. A/P:     ICD-10-CM    1. Left shoulder pain, unspecified chronicity [M25.512 (ICD-10-CM)]  M25.512       2. Arthritis of left acromioclavicular joint  M19.012       3. Surgery follow-up  Z09         Sutures were removed and were covered with Steri-strips. Discussed HEP. They will continue with use of the sling at this time. Return in about 4 weeks (around 5/16/2023) for post op on Moises's schedule.       Faisal Cobian MD  04/18/23

## 2023-04-20 ENCOUNTER — OFFICE VISIT (OUTPATIENT)
Dept: BEHAVIORAL/MENTAL HEALTH CLINIC | Age: 78
End: 2023-04-20

## 2023-04-20 VITALS
HEART RATE: 61 BPM | OXYGEN SATURATION: 98 % | TEMPERATURE: 97.6 F | HEIGHT: 58 IN | WEIGHT: 182 LBS | DIASTOLIC BLOOD PRESSURE: 60 MMHG | SYSTOLIC BLOOD PRESSURE: 102 MMHG | BODY MASS INDEX: 38.2 KG/M2

## 2023-04-20 DIAGNOSIS — F33.0 MILD EPISODE OF RECURRENT MAJOR DEPRESSIVE DISORDER (HCC): Primary | ICD-10-CM

## 2023-04-20 DIAGNOSIS — F51.05 INSOMNIA DUE TO MENTAL DISORDER: ICD-10-CM

## 2023-04-20 DIAGNOSIS — F43.10 PTSD (POST-TRAUMATIC STRESS DISORDER): ICD-10-CM

## 2023-04-20 DIAGNOSIS — Z65.8 PSYCHOSOCIAL STRESSORS: ICD-10-CM

## 2023-04-20 DIAGNOSIS — F41.1 GENERALIZED ANXIETY DISORDER: ICD-10-CM

## 2023-04-20 RX ORDER — DESVENLAFAXINE SUCCINATE 50 MG/1
50 TABLET, EXTENDED RELEASE ORAL EVERY OTHER DAY
Qty: 30 TABLET | Refills: 3 | Status: SHIPPED | OUTPATIENT
Start: 2023-04-20

## 2023-04-20 RX ORDER — TRAZODONE HYDROCHLORIDE 50 MG/1
25-50 TABLET ORAL NIGHTLY
Qty: 30 TABLET | Refills: 3 | Status: SHIPPED | OUTPATIENT
Start: 2023-04-20

## 2023-04-20 RX ORDER — CLONAZEPAM 0.5 MG/1
0.5 TABLET ORAL DAILY PRN
Qty: 30 TABLET | Refills: 2 | Status: SHIPPED | OUTPATIENT
Start: 2023-04-20 | End: 2023-07-19

## 2023-04-20 ASSESSMENT — PATIENT HEALTH QUESTIONNAIRE - PHQ9
3. TROUBLE FALLING OR STAYING ASLEEP: 2
4. FEELING TIRED OR HAVING LITTLE ENERGY: 2
SUM OF ALL RESPONSES TO PHQ QUESTIONS 1-9: 10
7. TROUBLE CONCENTRATING ON THINGS, SUCH AS READING THE NEWSPAPER OR WATCHING TELEVISION: 0
SUM OF ALL RESPONSES TO PHQ QUESTIONS 1-9: 10
8. MOVING OR SPEAKING SO SLOWLY THAT OTHER PEOPLE COULD HAVE NOTICED. OR THE OPPOSITE, BEING SO FIGETY OR RESTLESS THAT YOU HAVE BEEN MOVING AROUND A LOT MORE THAN USUAL: 2
5. POOR APPETITE OR OVEREATING: 1
10. IF YOU CHECKED OFF ANY PROBLEMS, HOW DIFFICULT HAVE THESE PROBLEMS MADE IT FOR YOU TO DO YOUR WORK, TAKE CARE OF THINGS AT HOME, OR GET ALONG WITH OTHER PEOPLE: 1
SUM OF ALL RESPONSES TO PHQ9 QUESTIONS 1 & 2: 2
6. FEELING BAD ABOUT YOURSELF - OR THAT YOU ARE A FAILURE OR HAVE LET YOURSELF OR YOUR FAMILY DOWN: 1
SUM OF ALL RESPONSES TO PHQ QUESTIONS 1-9: 10
1. LITTLE INTEREST OR PLEASURE IN DOING THINGS: 1
2. FEELING DOWN, DEPRESSED OR HOPELESS: 1
9. THOUGHTS THAT YOU WOULD BE BETTER OFF DEAD, OR OF HURTING YOURSELF: 0
SUM OF ALL RESPONSES TO PHQ QUESTIONS 1-9: 10

## 2023-04-20 ASSESSMENT — ANXIETY QUESTIONNAIRES
2. NOT BEING ABLE TO STOP OR CONTROL WORRYING: 1
5. BEING SO RESTLESS THAT IT IS HARD TO SIT STILL: 2
6. BECOMING EASILY ANNOYED OR IRRITABLE: 2
IF YOU CHECKED OFF ANY PROBLEMS ON THIS QUESTIONNAIRE, HOW DIFFICULT HAVE THESE PROBLEMS MADE IT FOR YOU TO DO YOUR WORK, TAKE CARE OF THINGS AT HOME, OR GET ALONG WITH OTHER PEOPLE: SOMEWHAT DIFFICULT
3. WORRYING TOO MUCH ABOUT DIFFERENT THINGS: 1
1. FEELING NERVOUS, ANXIOUS, OR ON EDGE: 2
7. FEELING AFRAID AS IF SOMETHING AWFUL MIGHT HAPPEN: 1
4. TROUBLE RELAXING: 1
GAD7 TOTAL SCORE: 10

## 2023-04-20 NOTE — PROGRESS NOTES
ask the patient to say them 3   Serial sevens Subtract 7 from 100 in increments 5   Ask for the three objects repeated above 3   Name a pencil 1   Name a watch 1   Have the patient repeat this phrase \"No ifs, ands, or buts\" 1   Three stage command: Take the paper in your right hand 1   Fold the paper in half 1   Put the paper on the floor 1   Read and obey the followin [email or web address] 1   Have the patient write a sentence 1   Have the patient copy a figure 1   Mini Mental Score 30          EXAMINATION  Musculoskeletal    GAIT AND STATION   [x] WNL   [] RESTRICTED   [] UNSTEADY WALK        [] ABNORMAL   [] UNBALANCED  [] WHEELCHAIR/  WALKER/CANE     PSYCHIATRIC    PSYCHOMOTOR   []  AGITATION   [] RETARDATION   [x] WNL            GENERAL APPEARANCE:   [x]  WELL GROOMED []     DISHEVELED   []  UNKEMPT      []  UNUSUAL/BIZZARE    [] WNL       ATTITUDE:   [x] COOPERATIVE   [] GUARDED   [] SUSPICIOUS      [] HOSTILE                            BEHAVIOR:   [x] CALM   [] HYPERACTIVE   [] MANNERISMS      [] BIZZARE         SPEECH:   [x] NORMAL FOR CLIENT   [] SPONTANEOUS   [] SLURRED   [] WHISPERING      [] LOUD   [] PRESSURED   [] ARTICULATE        EYE CONTACT:   [x] WNL   [] BLANK STARE   [] INTENSE      [] AVOIDANT         MOOD:   [] EUTHYMIC   [x] ANXIOUS   [x] DEPRESSED      [] IRRITABLE   [] ANGRY   [] APATHETIC     AFFECT:   [x] CONGRUENT WITH MOOD   [] FLAT   [] CONSTRICTED      [] INAPPROPRIATE   [] LABILE           THOUGHT PROCESS:   [x] LOGICAL/GOAL-DIRECTED   [] FOI   [] CIRCUMSANTIAL      [] INCOHERENT   [] TANGENTIAL   [] CONCRETE      [] PERSEVERATION           THOUGHT CONTENT:                DELUSIONS  [x] DENIES  [] GRANDIOSE  [] PERSECUTORY  [] Confucianism  [] REFERENCE   HALLUCINATIONS  [x] DENIES  [] AUDITORY  [] VISUAL  [] OLFACTORY  [] TACTILE     [] GUSTATORY  [] SOMATIC         OBSESSIONS  [x] DENIES  [] PRESENT         SUICIDAL IDEATION  [x] DENIES  [] PRESENT W/O PLAN  [] PRESENT W/ PLAN

## 2023-04-21 ENCOUNTER — TELEPHONE (OUTPATIENT)
Age: 78
End: 2023-04-21

## 2023-04-21 ASSESSMENT — ENCOUNTER SYMPTOMS
CHOKING: 0
SORE THROAT: 0
SHORTNESS OF BREATH: 0
TROUBLE SWALLOWING: 0
STRIDOR: 0
WHEEZING: 0
VOICE CHANGE: 0
CHEST TIGHTNESS: 0

## 2023-04-21 NOTE — TELEPHONE ENCOUNTER
Pt did not show for their scheduled therapy appointment today. Reason: unknown  Communication: Left voicemail requesting pt call PT dept to reschedule.

## 2023-04-26 DIAGNOSIS — I10 PRIMARY HYPERTENSION: ICD-10-CM

## 2023-04-26 NOTE — PROGRESS NOTES
[facility]  [address] [place] [id number]  Dept: [phone]      Physical Therapy Daily Note     Referring MD: Gina Crawford MD  Diagnosis:    Diagnosis Orders   1. Acute pain of left shoulder        2. Stiffness of left shoulder joint        3. Swelling of joint of left shoulder        4. Impaired mobility and ADLs        5. Arm weakness          Surgery: left shoulder arthroscopic distal clavicle resection, biceps tenotomy and debridement   Date: 04/06/2023  Therapy precautions: Adhesive/tape allergy     Post-operative plan:The patient will remain in the sling until follow up. They will return to the office in about 10 days for her first post operative check. They will remain non-weight bearing in their UE until otherwise instructed at follow up. We will begin with an HEP and may consider further PT after the initial visit. Can perform Pendulums and PROM as tolerated. Discharge instructions were also provided. Co-morbidities affecting plan of care: kidney insufficiency, B ulnar nerve repair, arthritis, chronic pain  Patient Stated Goals: return to PLOF without shoulder pain    Total Timed Procedure Codes: 40 min, Total Time: 45 min Modifier needed: No  Episode visit count:  2/16 approved visits through 6/10/23    SUBJECTIVE     Pt reports that her shoulder has been sore the last 3 days. Pt cannot recall any incidents to cause pain. Pt is not wearing her sling at home but is wearing it outside the home. OBJECTIVE       Treatment provided today:  Therapeutic exercise (77276) x 30 min to develop ROM, strength, endurance and flexibility of the LUE.   Pulleys shoulder flexion x 2 min  LUE pendulums  Seated scapular retraction H3sec x 30  Supine PROM L shoulder all planes  Seated AA shoulder ER w/ cane h10sec x 10  Seated shoulder ER stretch with arm on table H10sec x 10  Seated elbow flexion 2x10  Manual therapy (70040) x 10 min utilizing techniques to improve joint

## 2023-04-27 ENCOUNTER — TREATMENT (OUTPATIENT)
Age: 78
End: 2023-04-27

## 2023-04-27 DIAGNOSIS — M25.612 STIFFNESS OF LEFT SHOULDER JOINT: ICD-10-CM

## 2023-04-27 DIAGNOSIS — M25.512 ACUTE PAIN OF LEFT SHOULDER: Primary | ICD-10-CM

## 2023-04-27 DIAGNOSIS — M25.412 SWELLING OF JOINT OF LEFT SHOULDER: ICD-10-CM

## 2023-04-27 DIAGNOSIS — R29.898 ARM WEAKNESS: ICD-10-CM

## 2023-04-27 DIAGNOSIS — Z78.9 IMPAIRED MOBILITY AND ADLS: ICD-10-CM

## 2023-04-27 DIAGNOSIS — Z74.09 IMPAIRED MOBILITY AND ADLS: ICD-10-CM

## 2023-04-27 RX ORDER — AMLODIPINE BESYLATE 5 MG/1
TABLET ORAL
Qty: 30 TABLET | Refills: 0 | OUTPATIENT
Start: 2023-04-27

## 2023-04-27 ASSESSMENT — ENCOUNTER SYMPTOMS
COLOR CHANGE: 0
BACK PAIN: 0
COUGH: 0
CHEST TIGHTNESS: 0
ABDOMINAL PAIN: 0
SHORTNESS OF BREATH: 0
WHEEZING: 0

## 2023-04-28 ENCOUNTER — OFFICE VISIT (OUTPATIENT)
Dept: UROLOGY | Age: 78
End: 2023-04-28
Payer: MEDICARE

## 2023-04-28 DIAGNOSIS — E03.9 PRIMARY HYPOTHYROIDISM: ICD-10-CM

## 2023-04-28 DIAGNOSIS — N39.0 RECURRENT UTI: Primary | ICD-10-CM

## 2023-04-28 LAB
BILIRUBIN, URINE, POC: NEGATIVE
BLOOD URINE, POC: NORMAL
GLUCOSE URINE, POC: NEGATIVE
KETONES, URINE, POC: NORMAL
LEUKOCYTE ESTERASE, URINE, POC: NORMAL
NITRITE, URINE, POC: NEGATIVE
PH, URINE, POC: 5 (ref 4.6–8)
PROTEIN,URINE, POC: NORMAL
SPECIFIC GRAVITY, URINE, POC: 1.02 (ref 1–1.03)
URINALYSIS CLARITY, POC: NORMAL
URINALYSIS COLOR, POC: NORMAL
UROBILINOGEN, POC: NORMAL

## 2023-04-28 PROCEDURE — 81003 URINALYSIS AUTO W/O SCOPE: CPT | Performed by: UROLOGY

## 2023-04-28 PROCEDURE — 1123F ACP DISCUSS/DSCN MKR DOCD: CPT | Performed by: UROLOGY

## 2023-04-28 PROCEDURE — 99213 OFFICE O/P EST LOW 20 MIN: CPT | Performed by: UROLOGY

## 2023-04-28 RX ORDER — SOLIFENACIN SUCCINATE 10 MG/1
10 TABLET, FILM COATED ORAL DAILY
Qty: 30 TABLET | Refills: 12 | Status: SHIPPED | OUTPATIENT
Start: 2023-04-28 | End: 2024-04-27

## 2023-04-28 RX ORDER — LEVOTHYROXINE SODIUM 150 MCG
TABLET ORAL
Qty: 90 TABLET | Refills: 3 | Status: SHIPPED | OUTPATIENT
Start: 2023-04-28

## 2023-04-28 ASSESSMENT — ENCOUNTER SYMPTOMS
BACK PAIN: 0
NAUSEA: 0

## 2023-04-28 NOTE — PROGRESS NOTES
[facility]  [address]  [address] [facility], [address] [facility]  [phone]    Bairon [facility]  : 1945    Chief Complaint   Patient presents with    Follow-up        HPI     Bairon [facility] is a 69 y.o. female   With hx of recurrent UTI, incontinence, s/p sling. Previously followed by Dr. Vickie Antony for right renal cyst. SHARRI in  showed a stable simple cyst to lower pole of right kidney. Pt recently visited Urgent Care On 11/10/21 with dysuria. Urine culture revealed 40,000 colonies of E.Coli. Upon record review, pt has had E. Coli positive urine culture in Sept, Aug, and 2021. She was started on macrodantin suppression in 2021, however she did not follow up after this. Today, she is asx. She had no UTI while on suppression. She has had several [facility] ER visits for UTI sx. There are 2 E. Coli positive urine cultures in Aug 2022. CT A/P in May 2022 revealed normal urinary tract. Has h/o IBS with diarrhea. Followed by Dr. Franco Polanco for this. Reports diarrhea has improved since beginning amitriptyline. Has been seen by Dr. Beny Simmons for OAB. Underwent Mid urethral sling [facility] [employer] Sling) and Cystourethroscopy on 12/3/19. Was on uribel for IC sx, but is no longer taking this. She does not want to f/u  Dr. Beny Simmons. Has urine leakage w coughing and sneezing. Wakes up wet in AM.      Hysterectomy approx 35 years ago for abnormal bleeding. H/O DVT in right leg 10 years ago. Not on AC. Cr 1.50.    she has been on Macrodantin suppression since 10-22, has not had any UTI since then . she has enuresis. She puts a in her vaginal area. She leaks with cough and sneeze. Thinks she is not emptying completely. Exam in : : vaginal : atrophic mucosa, tender vulva, good anterior support, PVR 0 by US. May have de rinku OAB or urethral obstruction due to sling   CMG showed ANASTASIA/ISD, DI., she was started on Vesicare 5 mg. It has helped. VLPP is 73 cm H2O.

## 2023-05-01 ENCOUNTER — TREATMENT (OUTPATIENT)
Age: 78
End: 2023-05-01
Payer: MEDICARE

## 2023-05-01 DIAGNOSIS — Z74.09 IMPAIRED MOBILITY AND ADLS: ICD-10-CM

## 2023-05-01 DIAGNOSIS — M25.512 ACUTE PAIN OF LEFT SHOULDER: Primary | ICD-10-CM

## 2023-05-01 DIAGNOSIS — R29.898 ARM WEAKNESS: ICD-10-CM

## 2023-05-01 DIAGNOSIS — M25.612 STIFFNESS OF LEFT SHOULDER JOINT: ICD-10-CM

## 2023-05-01 DIAGNOSIS — M25.412 SWELLING OF JOINT OF LEFT SHOULDER: ICD-10-CM

## 2023-05-01 DIAGNOSIS — Z78.9 IMPAIRED MOBILITY AND ADLS: ICD-10-CM

## 2023-05-01 PROCEDURE — 97110 THERAPEUTIC EXERCISES: CPT | Performed by: PHYSICAL THERAPIST

## 2023-05-01 PROCEDURE — G0283 ELEC STIM OTHER THAN WOUND: HCPCS | Performed by: PHYSICAL THERAPIST

## 2023-05-01 PROCEDURE — 97140 MANUAL THERAPY 1/> REGIONS: CPT | Performed by: PHYSICAL THERAPIST

## 2023-05-01 NOTE — PROGRESS NOTES
[facility] [address]  Dept: [phone]      Physical Therapy Daily Note     Referring MD: Nicole Galeano MD  Diagnosis:    Diagnosis Orders   1. Acute pain of left shoulder        2. Stiffness of left shoulder joint        3. Swelling of joint of left shoulder        4. Impaired mobility and ADLs        5. Arm weakness          Surgery: left shoulder arthroscopic distal clavicle resection, biceps tenotomy and debridement   Date: 04/06/2023  Therapy precautions: Adhesive/tape allergy     Post-operative plan:The patient will remain in the sling until follow up. They will return to the office in about 10 days for her first post operative check. They will remain non-weight bearing in their UE until otherwise instructed at follow up. We will begin with an HEP and may consider further PT after the initial visit. Can perform Pendulums and PROM as tolerated. Discharge instructions were also provided. Co-morbidities affecting plan of care: kidney insufficiency, B ulnar nerve repair, arthritis, chronic pain  Patient Stated Goals: return to PLOF without shoulder pain    Total Timed Procedure Codes: 55 min, Total Time: 58 min Modifier needed: No  Episode visit count:  3/16 approved visits through 6/10/23    SUBJECTIVE     Pt reports that her shoulder felt better after the last session and is feeling better today. OBJECTIVE   Pt very tender to palpation diffusely over the anterior L shoulder. Treatment provided today:  Therapeutic exercise (24995) x 40 min to develop ROM, strength, endurance and flexibility of the LUE.   Pulleys shoulder flexion x 2 min  Bent over rows 2x10 L  Bent over shoulder extension 2x10 L  Seated scapular retraction H3sec x 20  Supine PROM L shoulder all planes  Supine AA shoulder flexion x 10  Supine rhythmic stabilization various planes at 90°/110° shoulder flexion x 1 min each  Seated elbow flexion 2x10  Seated AA shoulder

## 2023-05-03 ENCOUNTER — TELEPHONE (OUTPATIENT)
Dept: SLEEP MEDICINE | Age: 78
End: 2023-05-03

## 2023-05-03 NOTE — TELEPHONE ENCOUNTER
Patient was referred to [facility]. She has called several times.   Still waiting for appointment

## 2023-05-08 ENCOUNTER — NURSE ONLY (OUTPATIENT)
Dept: INTERNAL MEDICINE CLINIC | Facility: CLINIC | Age: 78
End: 2023-05-08
Payer: MEDICARE

## 2023-05-08 DIAGNOSIS — E53.8 VITAMIN B12 DEFICIENCY: Primary | Chronic | ICD-10-CM

## 2023-05-08 PROCEDURE — 96372 THER/PROPH/DIAG INJ SC/IM: CPT | Performed by: NURSE PRACTITIONER

## 2023-05-08 RX ORDER — CYANOCOBALAMIN 1000 UG/ML
1000 INJECTION, SOLUTION INTRAMUSCULAR; SUBCUTANEOUS ONCE
Status: COMPLETED | OUTPATIENT
Start: 2023-05-08 | End: 2023-05-08

## 2023-05-08 RX ADMIN — CYANOCOBALAMIN 1000 MCG: 1000 INJECTION, SOLUTION INTRAMUSCULAR; SUBCUTANEOUS at 11:00

## 2023-05-09 ENCOUNTER — TELEPHONE (OUTPATIENT)
Dept: SLEEP MEDICINE | Age: 78
End: 2023-05-09

## 2023-05-09 NOTE — TELEPHONE ENCOUNTER
I spoke to [facility] she states they got the referral in  March and it still under review with Dr. Jan Mcdowell. I asked her to please have someone reach out the patient. Mei Reza nurse will call the patient.
Still having issues getting appointment with Dr. Manjinder Saldivar office. Please speak with them to find out what the problem is. Has appointment with [facility] on 5/11/23. I am going to cancel for her since she needs to see them first. 1st referral was sent in January.
none

## 2023-05-10 ENCOUNTER — TREATMENT (OUTPATIENT)
Age: 78
End: 2023-05-10

## 2023-05-10 DIAGNOSIS — M25.412 SWELLING OF JOINT OF LEFT SHOULDER: ICD-10-CM

## 2023-05-10 DIAGNOSIS — Z74.09 IMPAIRED MOBILITY AND ADLS: ICD-10-CM

## 2023-05-10 DIAGNOSIS — R29.898 ARM WEAKNESS: ICD-10-CM

## 2023-05-10 DIAGNOSIS — M25.612 STIFFNESS OF LEFT SHOULDER JOINT: ICD-10-CM

## 2023-05-10 DIAGNOSIS — M25.512 ACUTE PAIN OF LEFT SHOULDER: Primary | ICD-10-CM

## 2023-05-10 DIAGNOSIS — Z78.9 IMPAIRED MOBILITY AND ADLS: ICD-10-CM

## 2023-05-10 NOTE — PROGRESS NOTES
[facility] [address] [address]  Dept: [phone]      Physical Therapy Progress Report     Referring MD: Nguyen Teran MD  Diagnosis:    Diagnosis Orders   1. Acute pain of left shoulder        2. Stiffness of left shoulder joint        3. Swelling of joint of left shoulder        4. Impaired mobility and ADLs        5. Arm weakness          Surgery: left shoulder arthroscopic distal clavicle resection, biceps tenotomy and debridement   Date: 04/06/2023  Therapy precautions: Adhesive/tape allergy     Post-operative plan:The patient will remain in the sling until follow up. They will return to the office in about 10 days for her first post operative check. They will remain non-weight bearing in their UE until otherwise instructed at follow up. We will begin with an HEP and may consider further PT after the initial visit. Can perform Pendulums and PROM as tolerated. Discharge instructions were also provided. Co-morbidities affecting plan of care: kidney insufficiency, B ulnar nerve repair, arthritis, chronic pain  Patient Stated Goals: return to PLOF without shoulder pain    Total Timed Procedure Codes: 55 min, Total Time: 58 min Modifier needed: No  Episode visit count:  4/16 approved visits through 6/10/23    SUBJECTIVE     Nature of condition: Recent onset (initial onset within last 3 months)  Describe current symptoms: Pt reports that she has had pain at night every night after last session. Pt is using her arm throughout the day and using it for dressing/bathing. She sometimes uses it to reach for items on lower shelf in cabinet. Pain Assessment:  Pain location: anterior L shoulder    Average Pain/symptom intensity (0-10 scale)  Last 24 hours: 5/10  Last week (1-7 days): 5/10  How often do you feel symptoms? Constant (% of time)    How much have your symptoms interfered with daily activities?  Moderately  How has your condition changed since

## 2023-05-11 DIAGNOSIS — I10 PRIMARY HYPERTENSION: ICD-10-CM

## 2023-05-11 RX ORDER — AMLODIPINE BESYLATE 5 MG/1
5 TABLET ORAL DAILY
Qty: 90 TABLET | Refills: 1 | Status: SHIPPED | OUTPATIENT
Start: 2023-05-11

## 2023-05-12 ENCOUNTER — TREATMENT (OUTPATIENT)
Age: 78
End: 2023-05-12

## 2023-05-12 DIAGNOSIS — M25.512 ACUTE PAIN OF LEFT SHOULDER: Primary | ICD-10-CM

## 2023-05-12 DIAGNOSIS — R29.898 ARM WEAKNESS: ICD-10-CM

## 2023-05-12 DIAGNOSIS — M25.412 SWELLING OF JOINT OF LEFT SHOULDER: ICD-10-CM

## 2023-05-12 DIAGNOSIS — M25.612 STIFFNESS OF LEFT SHOULDER JOINT: ICD-10-CM

## 2023-05-12 DIAGNOSIS — Z78.9 IMPAIRED MOBILITY AND ADLS: ICD-10-CM

## 2023-05-12 DIAGNOSIS — M25.512 LEFT SHOULDER PAIN, UNSPECIFIED CHRONICITY: Primary | ICD-10-CM

## 2023-05-12 DIAGNOSIS — M19.012 ARTHRITIS OF LEFT ACROMIOCLAVICULAR JOINT: ICD-10-CM

## 2023-05-12 DIAGNOSIS — Z09 SURGERY FOLLOW-UP: ICD-10-CM

## 2023-05-12 DIAGNOSIS — Z74.09 IMPAIRED MOBILITY AND ADLS: ICD-10-CM

## 2023-05-12 RX ORDER — SOLIFENACIN SUCCINATE 10 MG/1
10 TABLET, FILM COATED ORAL DAILY
Qty: 30 TABLET | Refills: 12 | OUTPATIENT
Start: 2023-05-12 | End: 2024-05-11

## 2023-05-12 RX ORDER — CYCLOBENZAPRINE HCL 5 MG
5-10 TABLET ORAL NIGHTLY PRN
Qty: 30 TABLET | Refills: 0 | Status: SHIPPED | OUTPATIENT
Start: 2023-05-12

## 2023-05-12 NOTE — PROGRESS NOTES
[facility]  [address] [place] [id number]  Dept: [phone]      Physical Therapy Daily Note     Referring MD: Lauri Bustillos MD  Diagnosis:    Diagnosis Orders   1. Acute pain of left shoulder        2. Stiffness of left shoulder joint        3. Swelling of joint of left shoulder        4. Impaired mobility and ADLs        5. Arm weakness          Surgery: left shoulder arthroscopic distal clavicle resection, biceps tenotomy and debridement   Date: 04/06/2023  Therapy precautions: Adhesive/tape allergy     Post-operative plan:The patient will remain in the sling until follow up. They will return to the office in about 10 days for her first post operative check. They will remain non-weight bearing in their UE until otherwise instructed at follow up. We will begin with an HEP and may consider further PT after the initial visit. Can perform Pendulums and PROM as tolerated. Discharge instructions were also provided. Co-morbidities affecting plan of care: kidney insufficiency, B ulnar nerve repair, arthritis, chronic pain  Patient Stated Goals: return to PLOF without shoulder pain    Total Timed Procedure Codes: 45 min, Total Time: 45 min Modifier needed: No  Episode visit count:  5/16 approved visits through 6/10/23    SUBJECTIVE     Pt reports that she reached out to MD team due to continued high pain levels. They have prescribed a muscle relaxer which she will try although she notes that muscle relaxers have not helped her in the past.     OBJECTIVE   Swelling L upper arm, no redness.      Treatment provided today:  Therapeutic exercise (88289) x 27 min to develop ROM, strength, endurance and flexibility of the LUE.  UBE level 1 x 4 min  Pendulums  Bent over rows 2x10 L  Bent over shoulder extension 2x10 L  Supine scapular retraction H3sec x 20  Supine PROM L shoulder all planes  Supine AA chest press x 10  Seated elbow flexion 2x10  Seated AA shoulder flexion and

## 2023-05-15 ENCOUNTER — OFFICE VISIT (OUTPATIENT)
Dept: NEUROLOGY | Age: 78
End: 2023-05-15
Payer: MEDICARE

## 2023-05-15 VITALS
HEART RATE: 70 BPM | HEIGHT: 58 IN | BODY MASS INDEX: 38.66 KG/M2 | DIASTOLIC BLOOD PRESSURE: 65 MMHG | WEIGHT: 184.2 LBS | OXYGEN SATURATION: 93 % | SYSTOLIC BLOOD PRESSURE: 93 MMHG

## 2023-05-15 DIAGNOSIS — R26.9 GAIT ABNORMALITY: Primary | ICD-10-CM

## 2023-05-15 DIAGNOSIS — G62.9 POLYNEUROPATHY: ICD-10-CM

## 2023-05-15 DIAGNOSIS — G25.81 RLS (RESTLESS LEGS SYNDROME): Chronic | ICD-10-CM

## 2023-05-15 DIAGNOSIS — M62.838 CERVICAL PARASPINAL MUSCLE SPASM: ICD-10-CM

## 2023-05-15 PROCEDURE — 3074F SYST BP LT 130 MM HG: CPT | Performed by: PSYCHIATRY & NEUROLOGY

## 2023-05-15 PROCEDURE — 3078F DIAST BP <80 MM HG: CPT | Performed by: PSYCHIATRY & NEUROLOGY

## 2023-05-15 PROCEDURE — 99214 OFFICE O/P EST MOD 30 MIN: CPT | Performed by: PSYCHIATRY & NEUROLOGY

## 2023-05-15 PROCEDURE — 1123F ACP DISCUSS/DSCN MKR DOCD: CPT | Performed by: PSYCHIATRY & NEUROLOGY

## 2023-05-15 NOTE — PROGRESS NOTES
Yes x 4   Comments)     Awake  Awake  No sleep    Oxycodone     Oxycodone-Acetaminophen Other (See Comments)     Jittery  Nightmares    Oxycodone-Aspirin Other (See Comments)     Jittery  Other reaction(s): Unknown-Unspecified  Jittery    Penicillins Other (See Comments)     C-Diff      Povidone Iodine     Povidone-Iodine Other (See Comments)     Johnston  Johnston  burn    Adhesive Tape Itching and Rash     \"sticky ace wrap\"  \"sticky ace wrap\"    Meperidine Rash       REVIEW OTHER RECORDS:    Review of Systems   Musculoskeletal:  Positive for neck pain. Neurological:  Positive for tingling. REVIEW IMAGING:     none    Objective:     Vitals:    05/15/23 1455   BP: 93/65   Pulse: 70   SpO2: 93%       Physical Exam  Vitals reviewed. Constitutional:       General: She is not in acute distress. Appearance: She is not ill-appearing, toxic-appearing or diaphoretic. HENT:      Head: Normocephalic and atraumatic. Eyes:      General: No scleral icterus. Right eye: No discharge. Left eye: No discharge. Extraocular Movements: Extraocular movements intact. Conjunctiva/sclera: Conjunctivae normal.      Pupils: Pupils are equal, round, and reactive to light. Pulmonary:      Effort: Pulmonary effort is normal. No respiratory distress. Breath sounds: No wheezing. Musculoskeletal:         General: Tenderness present. No swelling. Normal range of motion. Cervical back: Normal range of motion and neck supple. No rigidity. Skin:     General: Skin is warm and dry. Capillary Refill: Capillary refill takes less than 2 seconds. Coloration: Skin is not jaundiced or pale. Neurological:      Mental Status: She is alert. Mental status is at baseline. Cranial Nerves: Cranial nerves 2-12 are intact. No cranial nerve deficit. Motor: No weakness. Coordination: Coordination normal.      Gait: Gait is intact.    Psychiatric:         Mood and Affect: Mood normal.         Behavior:

## 2023-05-16 ENCOUNTER — TREATMENT (OUTPATIENT)
Age: 78
End: 2023-05-16
Payer: MEDICARE

## 2023-05-16 DIAGNOSIS — M25.612 STIFFNESS OF LEFT SHOULDER JOINT: ICD-10-CM

## 2023-05-16 DIAGNOSIS — M25.412 SWELLING OF JOINT OF LEFT SHOULDER: ICD-10-CM

## 2023-05-16 DIAGNOSIS — Z78.9 IMPAIRED MOBILITY AND ADLS: ICD-10-CM

## 2023-05-16 DIAGNOSIS — Z74.09 IMPAIRED MOBILITY AND ADLS: ICD-10-CM

## 2023-05-16 DIAGNOSIS — M25.512 ACUTE PAIN OF LEFT SHOULDER: Primary | ICD-10-CM

## 2023-05-16 DIAGNOSIS — R29.898 ARM WEAKNESS: ICD-10-CM

## 2023-05-16 PROCEDURE — 97140 MANUAL THERAPY 1/> REGIONS: CPT | Performed by: PHYSICAL THERAPIST

## 2023-05-16 PROCEDURE — 97035 APP MDLTY 1+ULTRASOUND EA 15: CPT | Performed by: PHYSICAL THERAPIST

## 2023-05-16 PROCEDURE — 97110 THERAPEUTIC EXERCISES: CPT | Performed by: PHYSICAL THERAPIST

## 2023-05-16 NOTE — PROGRESS NOTES
[facility]  [address] [place] [id number]  Dept: [phone]      Physical Therapy Daily Note     Referring MD: Alva Ocampo MD  Diagnosis:    Diagnosis Orders   1. Acute pain of left shoulder        2. Stiffness of left shoulder joint        3. Swelling of joint of left shoulder        4. Impaired mobility and ADLs        5. Arm weakness          Surgery: left shoulder arthroscopic distal clavicle resection, biceps tenotomy and debridement   Date: 04/06/2023  Therapy precautions: Adhesive/tape allergy     Post-operative plan:The patient will remain in the sling until follow up. They will return to the office in about 10 days for her first post operative check. They will remain non-weight bearing in their UE until otherwise instructed at follow up. We will begin with an HEP and may consider further PT after the initial visit. Can perform Pendulums and PROM as tolerated. Discharge instructions were also provided. Co-morbidities affecting plan of care: kidney insufficiency, B ulnar nerve repair, arthritis, chronic pain  Patient Stated Goals: return to PLOF without shoulder pain    Total Timed Procedure Codes: 53 min, Total Time: 53 min Modifier needed: No  Episode visit count:  6/16 approved visits through 6/10/23    SUBJECTIVE     Pt reports that she was painful on Saturday but pain has been easing Sunday through Monday. Pain levels improved today and rated 3/10. She did rest a lot over the weekend.      OBJECTIVE     Treatment provided today:  Therapeutic exercise (90122) x 35 min to develop ROM, strength, endurance and flexibility of the LUE.  UBE level 1 x 4 min  Bent over rows 2x10 L  Bent over shoulder extension 2x10 L  Supine scapular retraction H3sec x 20  Supine PROM L shoulder all planes  Supine AA shoulder flexion x 10  Supine active chest press 2x5  Supine rhythmic stabilization various planes at 90°/110° shoulder flexion x 1 min each  Supine rhythmic

## 2023-05-18 ENCOUNTER — OFFICE VISIT (OUTPATIENT)
Dept: ORTHOPEDIC SURGERY | Age: 78
End: 2023-05-18

## 2023-05-18 DIAGNOSIS — Z98.890 POST-OPERATIVE STATE: Primary | ICD-10-CM

## 2023-05-18 PROCEDURE — 99024 POSTOP FOLLOW-UP VISIT: CPT | Performed by: PHYSICIAN ASSISTANT

## 2023-05-18 RX ORDER — MELOXICAM 7.5 MG/1
7.5 TABLET ORAL 2 TIMES DAILY PRN
Qty: 25 TABLET | Refills: 0 | Status: SHIPPED | OUTPATIENT
Start: 2023-05-18 | End: 2023-06-01

## 2023-05-18 RX ORDER — PREDNISONE 20 MG/1
20 TABLET ORAL DAILY
Qty: 5 TABLET | Refills: 0 | Status: SHIPPED | OUTPATIENT
Start: 2023-05-18 | End: 2023-05-23

## 2023-05-22 ENCOUNTER — NURSE ONLY (OUTPATIENT)
Dept: INTERNAL MEDICINE CLINIC | Facility: CLINIC | Age: 78
End: 2023-05-22
Payer: MEDICARE

## 2023-05-22 DIAGNOSIS — E53.8 VITAMIN B12 DEFICIENCY: Primary | ICD-10-CM

## 2023-05-22 DIAGNOSIS — E53.8 B12 DEFICIENCY: Primary | ICD-10-CM

## 2023-05-22 PROCEDURE — 96372 THER/PROPH/DIAG INJ SC/IM: CPT | Performed by: NURSE PRACTITIONER

## 2023-05-22 RX ORDER — CYANOCOBALAMIN 1000 UG/ML
1000 INJECTION, SOLUTION INTRAMUSCULAR; SUBCUTANEOUS ONCE
Status: COMPLETED | OUTPATIENT
Start: 2023-05-22 | End: 2023-05-22

## 2023-05-22 RX ADMIN — CYANOCOBALAMIN 1000 MCG: 1000 INJECTION, SOLUTION INTRAMUSCULAR; SUBCUTANEOUS at 11:11

## 2023-05-23 ENCOUNTER — TREATMENT (OUTPATIENT)
Age: 78
End: 2023-05-23
Payer: MEDICARE

## 2023-05-23 DIAGNOSIS — R29.898 ARM WEAKNESS: ICD-10-CM

## 2023-05-23 DIAGNOSIS — M25.412 SWELLING OF JOINT OF LEFT SHOULDER: ICD-10-CM

## 2023-05-23 DIAGNOSIS — Z74.09 IMPAIRED MOBILITY AND ADLS: ICD-10-CM

## 2023-05-23 DIAGNOSIS — Z78.9 IMPAIRED MOBILITY AND ADLS: ICD-10-CM

## 2023-05-23 DIAGNOSIS — M25.612 STIFFNESS OF LEFT SHOULDER JOINT: ICD-10-CM

## 2023-05-23 DIAGNOSIS — M25.512 ACUTE PAIN OF LEFT SHOULDER: Primary | ICD-10-CM

## 2023-05-23 PROCEDURE — 97110 THERAPEUTIC EXERCISES: CPT | Performed by: PHYSICAL THERAPIST

## 2023-05-23 PROCEDURE — 97140 MANUAL THERAPY 1/> REGIONS: CPT | Performed by: PHYSICAL THERAPIST

## 2023-05-23 NOTE — PROGRESS NOTES
[facility] [address] [address]  Dept: [phone]      Physical Therapy Daily Note     Referring MD: Marilia Kruse MD  Diagnosis:        ICD-10-CM    1. Acute pain of left shoulder  M25.512       2. Stiffness of left shoulder joint  M25.612       3. Swelling of joint of left shoulder  M25.412       4. Impaired mobility and ADLs  Z74.09     Z78.9       5. Arm weakness  R29.898          Surgery: left shoulder arthroscopic distal clavicle resection, biceps tenotomy and debridement   Date: 04/06/2023  Therapy precautions: Adhesive/tape allergy     Co-morbidities affecting plan of care: kidney insufficiency, B ulnar nerve repair, arthritis, chronic pain  Patient Stated Goals: return to PLOF without shoulder pain    Total Timed Procedure Codes: 45 min, Total Time: 45 min Modifier needed: No  Episode visit count:  7/16 approved visits through 6/10/23    SUBJECTIVE     Pt followed up with PA last week and was prescribed a prednisone dose pack and meloxicam (to start meloxicam after prednisone completed). Pt states that prednisone gives her sweats so she discontinued after first dose and is taking Meloxicam now. Pain levels have improved somewhat.      OBJECTIVE     Treatment provided today:  Therapeutic exercise (20092) x 35 min to develop ROM, strength, endurance and flexibility of the LUE.  UBE level 1 x 4 min  Bent over rows 1# 2x10 L  Shoulder extension 2x10 L against yellow band  Supine PROM L shoulder all planes  Supine place and hold in various degrees of flexion followed by AROM from 30°<>end range x 10  Supine rhythmic stabilization various planes at 90°/110° shoulder flexion x 1 min each  Supine rhythmic stabilization IR/ER at 45°/60° shoulder abduction x 1 min each   Sidelying shoulder ER 1# 2x10  Sidelying scaption 2x10  Active assist shoulder flexion in upper sill using ranger x 10    Manual therapy (64418) x 10 min utilizing techniques to improve joint

## 2023-05-26 ENCOUNTER — TREATMENT (OUTPATIENT)
Age: 78
End: 2023-05-26

## 2023-05-26 DIAGNOSIS — M25.612 STIFFNESS OF LEFT SHOULDER JOINT: ICD-10-CM

## 2023-05-26 DIAGNOSIS — M25.412 SWELLING OF JOINT OF LEFT SHOULDER: ICD-10-CM

## 2023-05-26 DIAGNOSIS — R29.898 ARM WEAKNESS: ICD-10-CM

## 2023-05-26 DIAGNOSIS — M25.512 ACUTE PAIN OF LEFT SHOULDER: Primary | ICD-10-CM

## 2023-05-26 DIAGNOSIS — Z74.09 IMPAIRED MOBILITY AND ADLS: ICD-10-CM

## 2023-05-26 DIAGNOSIS — Z78.9 IMPAIRED MOBILITY AND ADLS: ICD-10-CM

## 2023-05-26 NOTE — PROGRESS NOTES
[facility] [place] [address] [address]  Dept: [phone]      Physical Therapy Daily Note     Referring MD: Mary Biggs MD  Diagnosis:        ICD-10-CM    1. Acute pain of left shoulder  M25.512       2. Stiffness of left shoulder joint  M25.612       3. Swelling of joint of left shoulder  M25.412       4. Impaired mobility and ADLs  Z74.09     Z78.9       5. Arm weakness  R29.898            Surgery: left shoulder arthroscopic distal clavicle resection, biceps tenotomy and debridement   Date: 04/06/2023  Therapy precautions: Adhesive/tape allergy     Co-morbidities affecting plan of care: kidney insufficiency, B ulnar nerve repair, arthritis, chronic pain  Patient Stated Goals: return to PLOF without shoulder pain    Total Timed Procedure Codes: 45 min, Total Time: 45 min Modifier needed: No  Episode visit count:  8/16 approved visits through 6/10/23    SUBJECTIVE     Pt reports that pain levels are improving but she is still sensitive in the front of the shoulder as well as biceps/deltoid region. OBJECTIVE     Treatment provided today:  Therapeutic exercise (87277) x 35 min to develop ROM, strength, endurance and flexibility of the LUE.  UBE level 1 x 4 min  Bent over rows 1# 2x10 L  Shoulder extension 2x10 L against yellow band  Supine PROM L shoulder all planes  Supine place and hold in various degrees of flexion followed by AROM from 30°<>end range x 10  Supine active chest press 1# 2x8  Supine rhythmic stabilization various planes at 90°/110° shoulder flexion x 1 min each  Supine rhythmic stabilization IR/ER at 45°/60° shoulder abduction x 1 min each   Sidelying shoulder ER 1# 3x10  Sidelying scaption 2x10  Active assist shoulder flexion in upper sill using ranger x 10    Manual therapy (48262) x 10 min utilizing techniques to improve joint and/or soft tissue mobility, ROM, and function as well as helping to decrease pain/spasms and swelling.   Palpation and

## 2023-05-31 ENCOUNTER — TELEPHONE (OUTPATIENT)
Age: 78
End: 2023-05-31

## 2023-05-31 NOTE — TELEPHONE ENCOUNTER
Pt did not show for their scheduled therapy appointment today. Reason: unknown  Communication: left voicemail with phone numbers for therapist and PT department requesting a call back to reschedule/confirm next appt.

## 2023-06-05 ENCOUNTER — NURSE ONLY (OUTPATIENT)
Dept: INTERNAL MEDICINE CLINIC | Facility: CLINIC | Age: 78
End: 2023-06-05
Payer: MEDICARE

## 2023-06-05 DIAGNOSIS — E53.8 VITAMIN B 12 DEFICIENCY: ICD-10-CM

## 2023-06-05 DIAGNOSIS — E53.8 VITAMIN B12 DEFICIENCY: Primary | ICD-10-CM

## 2023-06-05 PROCEDURE — 96372 THER/PROPH/DIAG INJ SC/IM: CPT | Performed by: NURSE PRACTITIONER

## 2023-06-05 RX ORDER — CYANOCOBALAMIN 1000 UG/ML
1000 INJECTION, SOLUTION INTRAMUSCULAR; SUBCUTANEOUS
Qty: 6 EACH | Refills: 3 | Status: SHIPPED | OUTPATIENT
Start: 2023-06-05

## 2023-06-05 RX ORDER — CYANOCOBALAMIN 1000 UG/ML
1000 INJECTION, SOLUTION INTRAMUSCULAR; SUBCUTANEOUS ONCE
Status: COMPLETED | OUTPATIENT
Start: 2023-06-05 | End: 2023-06-05

## 2023-06-05 RX ADMIN — CYANOCOBALAMIN 1000 MCG: 1000 INJECTION, SOLUTION INTRAMUSCULAR; SUBCUTANEOUS at 10:41

## 2023-06-07 ENCOUNTER — TREATMENT (OUTPATIENT)
Age: 78
End: 2023-06-07

## 2023-06-07 DIAGNOSIS — Z78.9 IMPAIRED MOBILITY AND ADLS: ICD-10-CM

## 2023-06-07 DIAGNOSIS — M25.412 SWELLING OF JOINT OF LEFT SHOULDER: ICD-10-CM

## 2023-06-07 DIAGNOSIS — Z74.09 IMPAIRED MOBILITY AND ADLS: ICD-10-CM

## 2023-06-07 DIAGNOSIS — M25.512 ACUTE PAIN OF LEFT SHOULDER: Primary | ICD-10-CM

## 2023-06-07 DIAGNOSIS — R29.898 ARM WEAKNESS: ICD-10-CM

## 2023-06-07 DIAGNOSIS — M25.612 STIFFNESS OF LEFT SHOULDER JOINT: ICD-10-CM

## 2023-06-07 NOTE — PROGRESS NOTES
function without c/o pain. Pt will report ability to don/doff a shirt, reach overhead for placing or removing objects from a cupboard or shelf, reach behind back for putting belt through loops or tucking in shirt tail, and don/doffing steat belt with min/no modifications. Improve QuickDASH Score to < 25% impairment, demonstrating improved overall function. Discharged from [place] [address]  Access Code: [email or web address]  URL: [email or web address]  Date: 05/16/2023  Prepared by: Kim Law    Exercises  - Standing Circular Shoulder Pendulum Supported with Arm Bent  - 2 x daily - 10-20 reps  - Seated Shoulder Scaption Slide at Table Top with Forearm in Neutral  - 2 x daily - 10-20 reps  - Seated Scapular Retraction  - 2 x daily - 10-20 reps - 2 sec hold  - Seated Shoulder External Rotation AAROM with Cane and Hand in Neutral  - 3 x daily - 1 sets - 10 reps - 10 hold  - Seated Shoulder External Rotation PROM on Table  - 3 x daily - 1 sets - 10 reps - 10 hold  - Supine Shoulder Flexion AAROM with Hands Clasped  - 2 x daily - 1 sets - 10 reps - 5 hold  - Sidelying Shoulder External Rotation  - 1 x daily - 2-3 sets - 10 reps - 5 hold  - Sidelying Shoulder Scaption  - 1 x daily - 2 sets - 10 reps - 5 hold  - Standing Bent Over Single Arm Scapular Row with Table Support  - 5 x weekly - 2 sets - 10 reps - 5 hold  - Isometric Shoulder Flexion at Wall  - 5 x weekly - 2 sets - 10 reps - 5 hold  - Isometric Shoulder Abduction at Wall  - 5 x weekly - 2 sets - 10 reps - 5 hold  - Standing Shoulder Internal Rotation with Anchored Resistance  - 5 x weekly - 2-3 sets - 10 reps - 3 hold  - Shoulder External Rotation with Anchored Resistance  - 5 x weekly - 1-2 sets - 8-10 reps

## 2023-06-09 ENCOUNTER — TELEPHONE (OUTPATIENT)
Dept: INTERNAL MEDICINE CLINIC | Facility: CLINIC | Age: 78
End: 2023-06-09

## 2023-06-09 ENCOUNTER — TELEPHONE (OUTPATIENT)
Dept: UROLOGY | Age: 78
End: 2023-06-09

## 2023-06-09 DIAGNOSIS — R30.9 PAIN PASSING URINE: ICD-10-CM

## 2023-06-09 DIAGNOSIS — R35.0 URINE FREQUENCY: Primary | ICD-10-CM

## 2023-06-09 RX ORDER — MELOXICAM 7.5 MG/1
7.5 TABLET ORAL 2 TIMES DAILY PRN
Qty: 28 TABLET | Refills: 0 | Status: SHIPPED | OUTPATIENT
Start: 2023-06-09 | End: 2023-06-23

## 2023-06-09 RX ORDER — CYCLOBENZAPRINE HCL 5 MG
5-10 TABLET ORAL NIGHTLY PRN
Qty: 30 TABLET | Refills: 0 | Status: SHIPPED | OUTPATIENT
Start: 2023-06-09

## 2023-06-09 NOTE — TELEPHONE ENCOUNTER
Pt went to Urgent Care. UC prescribed maxtrafer(did not find in medication list) 100. Pt is taking maxtrafer 50 daily. Pt would like a different abx. Pt wanted earlier apt but works 8 to 11. Pt agreed to keep apt 6/15.

## 2023-06-22 ENCOUNTER — TREATMENT (OUTPATIENT)
Age: 78
End: 2023-06-22

## 2023-06-22 DIAGNOSIS — M25.612 STIFFNESS OF LEFT SHOULDER JOINT: ICD-10-CM

## 2023-06-22 DIAGNOSIS — M25.512 ACUTE PAIN OF LEFT SHOULDER: Primary | ICD-10-CM

## 2023-06-22 DIAGNOSIS — Z78.9 IMPAIRED MOBILITY AND ADLS: ICD-10-CM

## 2023-06-22 DIAGNOSIS — M25.412 SWELLING OF JOINT OF LEFT SHOULDER: ICD-10-CM

## 2023-06-22 DIAGNOSIS — Z74.09 IMPAIRED MOBILITY AND ADLS: ICD-10-CM

## 2023-06-22 DIAGNOSIS — R29.898 ARM WEAKNESS: ICD-10-CM

## 2023-06-22 NOTE — PROGRESS NOTES
[facility]  [address] [place] [id number]  Dept: [phone]      Physical Therapy Daily Note     Referring MD: Melani Wilhelm MD  Diagnosis:        ICD-10-CM    1. Acute pain of left shoulder  M25.512       2. Stiffness of left shoulder joint  M25.612       3. Swelling of joint of left shoulder  M25.412       4. Impaired mobility and ADLs  Z74.09     Z78.9       5. Arm weakness  R29.898            Surgery: left shoulder arthroscopic distal clavicle resection, biceps tenotomy and debridement   Date: 04/06/2023  Therapy precautions: Adhesive/tape allergy     Co-morbidities affecting plan of care: kidney insufficiency, B ulnar nerve repair, arthritis, chronic pain  Patient Stated Goals: return to PLOF without shoulder pain    Total Timed Procedure Codes: 40 min, Total Time: 45 min Modifier needed: No  Episode visit count:  11/16 approved visits through 7/10/23    SUBJECTIVE     Pt reports continued discomfort at end ranges. Back and knee are aggravated today. OBJECTIVE       Treatment provided today:  Therapeutic exercise (48908) x 40 min to develop ROM, strength, endurance and flexibility of the LUE.  UBE level 1 x 4 min  Shoulder extension against 7# cords 2x10  Rows against 7# cords 2x10  Ball on wall scapular stabilization at 90° flexion up/down, side-side, CW, CCW x 20 each  Shoulder taps at high plinth 2x1 min  Supine PROM L shoulder all planes w/scapular mobilizations  Supine sctive shoulder flexion/extension x10, against light manual resistance x 10  Supine active chest press 1# 3x8  Supine horizontal shoulder abduction against yellow band 2x10  Sidelying shoulder ER 2# 3x5  Sidelying scaption 1# 2x10  Standing flexion/scaption to 90° 1# 2x8  Standing shoulder abduction to 90° 2x10  Updated HEP    ASSESSMENT     Pt tolerated progressed exercises well with minimal increase in pain. End range motion remains restricted in flexion/scaption/ER.       PLAN     Assess

## 2023-06-29 ENCOUNTER — TELEPHONE (OUTPATIENT)
Age: 78
End: 2023-06-29

## 2023-07-06 ENCOUNTER — OFFICE VISIT (OUTPATIENT)
Dept: BEHAVIORAL/MENTAL HEALTH CLINIC | Age: 78
End: 2023-07-06
Payer: MEDICARE

## 2023-07-06 VITALS
HEIGHT: 58 IN | SYSTOLIC BLOOD PRESSURE: 108 MMHG | HEART RATE: 64 BPM | WEIGHT: 181 LBS | DIASTOLIC BLOOD PRESSURE: 76 MMHG | BODY MASS INDEX: 37.99 KG/M2 | TEMPERATURE: 98.3 F | OXYGEN SATURATION: 95 %

## 2023-07-06 DIAGNOSIS — F33.41 RECURRENT MAJOR DEPRESSIVE DISORDER, IN PARTIAL REMISSION (HCC): Primary | ICD-10-CM

## 2023-07-06 DIAGNOSIS — F43.10 PTSD (POST-TRAUMATIC STRESS DISORDER): ICD-10-CM

## 2023-07-06 DIAGNOSIS — F41.1 GENERALIZED ANXIETY DISORDER: ICD-10-CM

## 2023-07-06 DIAGNOSIS — F51.05 INSOMNIA DUE TO MENTAL DISORDER: ICD-10-CM

## 2023-07-06 PROCEDURE — 3078F DIAST BP <80 MM HG: CPT | Performed by: PSYCHIATRY & NEUROLOGY

## 2023-07-06 PROCEDURE — 3074F SYST BP LT 130 MM HG: CPT | Performed by: PSYCHIATRY & NEUROLOGY

## 2023-07-06 PROCEDURE — 1123F ACP DISCUSS/DSCN MKR DOCD: CPT | Performed by: PSYCHIATRY & NEUROLOGY

## 2023-07-06 PROCEDURE — 99214 OFFICE O/P EST MOD 30 MIN: CPT | Performed by: PSYCHIATRY & NEUROLOGY

## 2023-07-06 RX ORDER — DESVENLAFAXINE SUCCINATE 50 MG/1
50 TABLET, EXTENDED RELEASE ORAL EVERY OTHER DAY
Qty: 30 TABLET | Refills: 3 | Status: SHIPPED | OUTPATIENT
Start: 2023-07-06

## 2023-07-06 RX ORDER — TRAZODONE HYDROCHLORIDE 50 MG/1
25-50 TABLET ORAL NIGHTLY
Qty: 30 TABLET | Refills: 3 | Status: SHIPPED | OUTPATIENT
Start: 2023-07-06

## 2023-07-06 RX ORDER — CLONAZEPAM 0.5 MG/1
0.5 TABLET ORAL DAILY PRN
Qty: 30 TABLET | Refills: 2 | Status: SHIPPED | OUTPATIENT
Start: 2023-07-06 | End: 2023-10-04

## 2023-07-06 ASSESSMENT — PATIENT HEALTH QUESTIONNAIRE - PHQ9
2. FEELING DOWN, DEPRESSED OR HOPELESS: 0
SUM OF ALL RESPONSES TO PHQ QUESTIONS 1-9: 6
SUM OF ALL RESPONSES TO PHQ9 QUESTIONS 1 & 2: 1
3. TROUBLE FALLING OR STAYING ASLEEP: 1
7. TROUBLE CONCENTRATING ON THINGS, SUCH AS READING THE NEWSPAPER OR WATCHING TELEVISION: 0
8. MOVING OR SPEAKING SO SLOWLY THAT OTHER PEOPLE COULD HAVE NOTICED. OR THE OPPOSITE, BEING SO FIGETY OR RESTLESS THAT YOU HAVE BEEN MOVING AROUND A LOT MORE THAN USUAL: 2
10. IF YOU CHECKED OFF ANY PROBLEMS, HOW DIFFICULT HAVE THESE PROBLEMS MADE IT FOR YOU TO DO YOUR WORK, TAKE CARE OF THINGS AT HOME, OR GET ALONG WITH OTHER PEOPLE: 1
SUM OF ALL RESPONSES TO PHQ QUESTIONS 1-9: 6
4. FEELING TIRED OR HAVING LITTLE ENERGY: 1
SUM OF ALL RESPONSES TO PHQ QUESTIONS 1-9: 6
SUM OF ALL RESPONSES TO PHQ QUESTIONS 1-9: 6
1. LITTLE INTEREST OR PLEASURE IN DOING THINGS: 1
6. FEELING BAD ABOUT YOURSELF - OR THAT YOU ARE A FAILURE OR HAVE LET YOURSELF OR YOUR FAMILY DOWN: 0
5. POOR APPETITE OR OVEREATING: 1
9. THOUGHTS THAT YOU WOULD BE BETTER OFF DEAD, OR OF HURTING YOURSELF: 0

## 2023-07-06 ASSESSMENT — ANXIETY QUESTIONNAIRES
IF YOU CHECKED OFF ANY PROBLEMS ON THIS QUESTIONNAIRE, HOW DIFFICULT HAVE THESE PROBLEMS MADE IT FOR YOU TO DO YOUR WORK, TAKE CARE OF THINGS AT HOME, OR GET ALONG WITH OTHER PEOPLE: SOMEWHAT DIFFICULT
5. BEING SO RESTLESS THAT IT IS HARD TO SIT STILL: 3
7. FEELING AFRAID AS IF SOMETHING AWFUL MIGHT HAPPEN: 1
GAD7 TOTAL SCORE: 8
2. NOT BEING ABLE TO STOP OR CONTROL WORRYING: 1
4. TROUBLE RELAXING: 0
3. WORRYING TOO MUCH ABOUT DIFFERENT THINGS: 0
1. FEELING NERVOUS, ANXIOUS, OR ON EDGE: 3
6. BECOMING EASILY ANNOYED OR IRRITABLE: 0

## 2023-07-06 NOTE — PROGRESS NOTES
Patient:  Rossy Wilkerson  Age:  69 y.o.  :  1945     SEX:  female MRN:  [id number]     RACE: White (non-)     SEEN:  [x]  PATIENT  [x]  SPOUSE []  OTHER:              PHQ-9  2023   Little interest or pleasure in doing things 1 1 1   Little interest or pleasure in doing things - - -   Feeling down, depressed, or hopeless 0 1 1   Trouble falling or staying asleep, or sleeping too much 1 2 3   Feeling tired or having little energy 1 2 3   Poor appetite or overeating 1 1 3   Feeling bad about yourself - or that you are a failure or have let yourself or your family down 0 1 3   Trouble concentrating on things, such as reading the newspaper or watching television 0 0 1   Moving or speaking so slowly that other people could have noticed. Or the opposite - being so fidgety or restless that you have been moving around a lot more than usual 2 2 1   Thoughts that you would be better off dead, or of hurting yourself in some way 0 0 0   PHQ-2 Score 1 2 2   Total Score PHQ 2 - - -   PHQ-9 Total Score 6 10 16   If you checked off any problems, how difficult have these problems made it for you to do your work, take care of things at home, or get along with other people?  1 1 1       URBANO-7 SCREENING 2023   Feeling nervous, anxious, or on edge Nearly every day More than half the days Nearly every day   Not being able to stop or control worrying Several days Several days Nearly every day   Worrying too much about different things Not at all Several days Nearly every day   Trouble relaxing Not at all Several days Nearly every day   Being so restless that it is hard to sit still Nearly every day More than half the days Nearly every day   Becoming easily annoyed or irritable Not at all More than half the days Several days   Feeling afraid as if something awful might happen Several days Several days Not at all   URBANO-7 Total Score 8 10 16   How difficult have these

## 2023-07-07 ENCOUNTER — TREATMENT (OUTPATIENT)
Age: 78
End: 2023-07-07
Payer: MEDICARE

## 2023-07-07 DIAGNOSIS — M25.512 ACUTE PAIN OF LEFT SHOULDER: Primary | ICD-10-CM

## 2023-07-07 DIAGNOSIS — Z74.09 IMPAIRED MOBILITY AND ADLS: ICD-10-CM

## 2023-07-07 DIAGNOSIS — M25.412 SWELLING OF JOINT OF LEFT SHOULDER: ICD-10-CM

## 2023-07-07 DIAGNOSIS — M25.612 STIFFNESS OF LEFT SHOULDER JOINT: ICD-10-CM

## 2023-07-07 DIAGNOSIS — Z78.9 IMPAIRED MOBILITY AND ADLS: ICD-10-CM

## 2023-07-07 DIAGNOSIS — R29.898 ARM WEAKNESS: ICD-10-CM

## 2023-07-07 PROCEDURE — 97110 THERAPEUTIC EXERCISES: CPT | Performed by: PHYSICAL THERAPIST

## 2023-07-10 ENCOUNTER — TREATMENT (OUTPATIENT)
Age: 78
End: 2023-07-10
Payer: MEDICARE

## 2023-07-10 DIAGNOSIS — Z78.9 IMPAIRED MOBILITY AND ADLS: ICD-10-CM

## 2023-07-10 DIAGNOSIS — M25.612 STIFFNESS OF LEFT SHOULDER JOINT: ICD-10-CM

## 2023-07-10 DIAGNOSIS — Z74.09 IMPAIRED MOBILITY AND ADLS: ICD-10-CM

## 2023-07-10 DIAGNOSIS — R29.898 ARM WEAKNESS: ICD-10-CM

## 2023-07-10 DIAGNOSIS — M25.412 SWELLING OF JOINT OF LEFT SHOULDER: ICD-10-CM

## 2023-07-10 DIAGNOSIS — M25.512 ACUTE PAIN OF LEFT SHOULDER: Primary | ICD-10-CM

## 2023-07-10 PROCEDURE — 97140 MANUAL THERAPY 1/> REGIONS: CPT | Performed by: PHYSICAL THERAPIST

## 2023-07-10 PROCEDURE — 97110 THERAPEUTIC EXERCISES: CPT | Performed by: PHYSICAL THERAPIST

## 2023-07-17 DIAGNOSIS — I10 PRIMARY HYPERTENSION: Chronic | ICD-10-CM

## 2023-07-17 DIAGNOSIS — R35.0 URINE FREQUENCY: ICD-10-CM

## 2023-07-17 DIAGNOSIS — R30.9 PAIN PASSING URINE: ICD-10-CM

## 2023-07-17 DIAGNOSIS — R73.01 ELEVATED FASTING GLUCOSE: ICD-10-CM

## 2023-07-17 LAB
ALBUMIN SERPL-MCNC: 3.8 G/DL (ref 3.2–4.6)
ALBUMIN/GLOB SERPL: 1.2 (ref 0.4–1.6)
ALP SERPL-CCNC: 73 U/L (ref 50–136)
ALT SERPL-CCNC: 19 U/L (ref 12–65)
ANION GAP SERPL CALC-SCNC: 7 MMOL/L (ref 2–11)
AST SERPL-CCNC: 18 U/L (ref 15–37)
BASOPHILS # BLD: 0.1 K/UL (ref 0–0.2)
BASOPHILS NFR BLD: 1 % (ref 0–2)
BILIRUB SERPL-MCNC: 0.2 MG/DL (ref 0.2–1.1)
BUN SERPL-MCNC: 21 MG/DL (ref 8–23)
CALCIUM SERPL-MCNC: 9.6 MG/DL (ref 8.3–10.4)
CHLORIDE SERPL-SCNC: 107 MMOL/L (ref 101–110)
CO2 SERPL-SCNC: 29 MMOL/L (ref 21–32)
CREAT SERPL-MCNC: 1.4 MG/DL (ref 0.6–1)
DIFFERENTIAL METHOD BLD: ABNORMAL
EOSINOPHIL # BLD: 0.4 K/UL (ref 0–0.8)
EOSINOPHIL NFR BLD: 3 % (ref 0.5–7.8)
ERYTHROCYTE [DISTWIDTH] IN BLOOD BY AUTOMATED COUNT: 14.1 % (ref 11.9–14.6)
GLOBULIN SER CALC-MCNC: 3.2 G/DL (ref 2.8–4.5)
GLUCOSE SERPL-MCNC: 152 MG/DL (ref 65–100)
HCT VFR BLD AUTO: 38.1 % (ref 35.8–46.3)
HGB BLD-MCNC: 12 G/DL (ref 11.7–15.4)
IMM GRANULOCYTES # BLD AUTO: 0 K/UL (ref 0–0.5)
IMM GRANULOCYTES NFR BLD AUTO: 0 % (ref 0–5)
LYMPHOCYTES # BLD: 2.7 K/UL (ref 0.5–4.6)
LYMPHOCYTES NFR BLD: 23 % (ref 13–44)
MCH RBC QN AUTO: 28.2 PG (ref 26.1–32.9)
MCHC RBC AUTO-ENTMCNC: 31.5 G/DL (ref 31.4–35)
MCV RBC AUTO: 89.4 FL (ref 82–102)
MONOCYTES # BLD: 0.7 K/UL (ref 0.1–1.3)
MONOCYTES NFR BLD: 6 % (ref 4–12)
NEUTS SEG # BLD: 7.8 K/UL (ref 1.7–8.2)
NEUTS SEG NFR BLD: 67 % (ref 43–78)
NRBC # BLD: 0 K/UL (ref 0–0.2)
PLATELET # BLD AUTO: 307 K/UL (ref 150–450)
PMV BLD AUTO: 11.2 FL (ref 9.4–12.3)
POTASSIUM SERPL-SCNC: 4.6 MMOL/L (ref 3.5–5.1)
PROT SERPL-MCNC: 7 G/DL (ref 6.3–8.2)
RBC # BLD AUTO: 4.26 M/UL (ref 4.05–5.2)
SODIUM SERPL-SCNC: 143 MMOL/L (ref 133–143)
WBC # BLD AUTO: 11.8 K/UL (ref 4.3–11.1)

## 2023-07-18 LAB
APPEARANCE UR: CLEAR
BACTERIA URNS QL MICRO: ABNORMAL /HPF
BILIRUB UR QL: NEGATIVE
CASTS URNS QL MICRO: 0 /LPF
COLOR UR: ABNORMAL
CRYSTALS URNS QL MICRO: 0 /LPF
EPI CELLS #/AREA URNS HPF: ABNORMAL /HPF
EST. AVERAGE GLUCOSE BLD GHB EST-MCNC: 123 MG/DL
GLUCOSE UR STRIP.AUTO-MCNC: NEGATIVE MG/DL
HBA1C MFR BLD: 5.9 % (ref 4.8–5.6)
HGB UR QL STRIP: NEGATIVE
KETONES UR QL STRIP.AUTO: NEGATIVE MG/DL
LEUKOCYTE ESTERASE UR QL STRIP.AUTO: ABNORMAL
MUCOUS THREADS URNS QL MICRO: 0 /LPF
NITRITE UR QL STRIP.AUTO: NEGATIVE
OTHER OBSERVATIONS: ABNORMAL
PH UR STRIP: 6.5 (ref 5–9)
PROT UR STRIP-MCNC: ABNORMAL MG/DL
RBC #/AREA URNS HPF: 0 /HPF
SP GR UR REFRACTOMETRY: 1.02 (ref 1–1.02)
URINE CULTURE IF INDICATED: ABNORMAL
UROBILINOGEN UR QL STRIP.AUTO: 0.2 EU/DL (ref 0.2–1)
WBC URNS QL MICRO: ABNORMAL /HPF

## 2023-07-19 ASSESSMENT — PATIENT HEALTH QUESTIONNAIRE - PHQ9
1. LITTLE INTEREST OR PLEASURE IN DOING THINGS: 0
SUM OF ALL RESPONSES TO PHQ QUESTIONS 1-9: 4
4. FEELING TIRED OR HAVING LITTLE ENERGY: 1
10. IF YOU CHECKED OFF ANY PROBLEMS, HOW DIFFICULT HAVE THESE PROBLEMS MADE IT FOR YOU TO DO YOUR WORK, TAKE CARE OF THINGS AT HOME, OR GET ALONG WITH OTHER PEOPLE: SOMEWHAT DIFFICULT
3. TROUBLE FALLING OR STAYING ASLEEP: 1
4. FEELING TIRED OR HAVING LITTLE ENERGY: SEVERAL DAYS
1. LITTLE INTEREST OR PLEASURE IN DOING THINGS: NOT AT ALL
9. THOUGHTS THAT YOU WOULD BE BETTER OFF DEAD, OR OF HURTING YOURSELF: NOT AT ALL
9. THOUGHTS THAT YOU WOULD BE BETTER OFF DEAD, OR OF HURTING YOURSELF: 0
10. IF YOU CHECKED OFF ANY PROBLEMS, HOW DIFFICULT HAVE THESE PROBLEMS MADE IT FOR YOU TO DO YOUR WORK, TAKE CARE OF THINGS AT HOME, OR GET ALONG WITH OTHER PEOPLE: 1
SUM OF ALL RESPONSES TO PHQ QUESTIONS 1-9: 4
SUM OF ALL RESPONSES TO PHQ9 QUESTIONS 1 & 2: 1
7. TROUBLE CONCENTRATING ON THINGS, SUCH AS READING THE NEWSPAPER OR WATCHING TELEVISION: NOT AT ALL
3. TROUBLE FALLING OR STAYING ASLEEP: SEVERAL DAYS
SUM OF ALL RESPONSES TO PHQ QUESTIONS 1-9: 4
5. POOR APPETITE OR OVEREATING: SEVERAL DAYS
2. FEELING DOWN, DEPRESSED OR HOPELESS: SEVERAL DAYS
6. FEELING BAD ABOUT YOURSELF - OR THAT YOU ARE A FAILURE OR HAVE LET YOURSELF OR YOUR FAMILY DOWN: NOT AT ALL
2. FEELING DOWN, DEPRESSED OR HOPELESS: 1
8. MOVING OR SPEAKING SO SLOWLY THAT OTHER PEOPLE COULD HAVE NOTICED. OR THE OPPOSITE - BEING SO FIDGETY OR RESTLESS THAT YOU HAVE BEEN MOVING AROUND A LOT MORE THAN USUAL: NOT AT ALL
6. FEELING BAD ABOUT YOURSELF - OR THAT YOU ARE A FAILURE OR HAVE LET YOURSELF OR YOUR FAMILY DOWN: 0
8. MOVING OR SPEAKING SO SLOWLY THAT OTHER PEOPLE COULD HAVE NOTICED. OR THE OPPOSITE, BEING SO FIGETY OR RESTLESS THAT YOU HAVE BEEN MOVING AROUND A LOT MORE THAN USUAL: 0
SUM OF ALL RESPONSES TO PHQ QUESTIONS 1-9: 4
7. TROUBLE CONCENTRATING ON THINGS, SUCH AS READING THE NEWSPAPER OR WATCHING TELEVISION: 0
SUM OF ALL RESPONSES TO PHQ QUESTIONS 1-9: 4
5. POOR APPETITE OR OVEREATING: 1

## 2023-07-20 ENCOUNTER — OFFICE VISIT (OUTPATIENT)
Dept: INTERNAL MEDICINE CLINIC | Facility: CLINIC | Age: 78
End: 2023-07-20
Payer: MEDICARE

## 2023-07-20 ENCOUNTER — TREATMENT (OUTPATIENT)
Age: 78
End: 2023-07-20

## 2023-07-20 VITALS
BODY MASS INDEX: 37.64 KG/M2 | WEIGHT: 180.1 LBS | DIASTOLIC BLOOD PRESSURE: 80 MMHG | HEART RATE: 63 BPM | SYSTOLIC BLOOD PRESSURE: 122 MMHG | OXYGEN SATURATION: 97 %

## 2023-07-20 DIAGNOSIS — I10 PRIMARY HYPERTENSION: Primary | Chronic | ICD-10-CM

## 2023-07-20 DIAGNOSIS — E53.8 VITAMIN B 12 DEFICIENCY: ICD-10-CM

## 2023-07-20 DIAGNOSIS — M25.612 STIFFNESS OF LEFT SHOULDER JOINT: ICD-10-CM

## 2023-07-20 DIAGNOSIS — E78.2 MIXED HYPERLIPIDEMIA: Chronic | ICD-10-CM

## 2023-07-20 DIAGNOSIS — M25.412 SWELLING OF JOINT OF LEFT SHOULDER: ICD-10-CM

## 2023-07-20 DIAGNOSIS — R29.898 ARM WEAKNESS: ICD-10-CM

## 2023-07-20 DIAGNOSIS — M25.512 ACUTE PAIN OF LEFT SHOULDER: Primary | ICD-10-CM

## 2023-07-20 DIAGNOSIS — Z78.9 IMPAIRED MOBILITY AND ADLS: ICD-10-CM

## 2023-07-20 DIAGNOSIS — E55.9 VITAMIN D DEFICIENCY: ICD-10-CM

## 2023-07-20 DIAGNOSIS — Z74.09 IMPAIRED MOBILITY AND ADLS: ICD-10-CM

## 2023-07-20 LAB
BACTERIA SPEC CULT: NORMAL
SERVICE CMNT-IMP: NORMAL

## 2023-07-20 PROCEDURE — 99214 OFFICE O/P EST MOD 30 MIN: CPT | Performed by: NURSE PRACTITIONER

## 2023-07-20 PROCEDURE — 3074F SYST BP LT 130 MM HG: CPT | Performed by: NURSE PRACTITIONER

## 2023-07-20 PROCEDURE — 1123F ACP DISCUSS/DSCN MKR DOCD: CPT | Performed by: NURSE PRACTITIONER

## 2023-07-20 PROCEDURE — 96372 THER/PROPH/DIAG INJ SC/IM: CPT | Performed by: NURSE PRACTITIONER

## 2023-07-20 PROCEDURE — 3078F DIAST BP <80 MM HG: CPT | Performed by: NURSE PRACTITIONER

## 2023-07-20 RX ORDER — CYANOCOBALAMIN 1000 UG/ML
1000 INJECTION, SOLUTION INTRAMUSCULAR; SUBCUTANEOUS ONCE
Status: COMPLETED | OUTPATIENT
Start: 2023-07-20 | End: 2023-07-20

## 2023-07-20 RX ADMIN — CYANOCOBALAMIN 1000 MCG: 1000 INJECTION, SOLUTION INTRAMUSCULAR; SUBCUTANEOUS at 08:53

## 2023-07-20 ASSESSMENT — ENCOUNTER SYMPTOMS
WHEEZING: 0
COUGH: 0
CHEST TIGHTNESS: 0
SHORTNESS OF BREATH: 0
COLOR CHANGE: 0
BACK PAIN: 0
ABDOMINAL PAIN: 0

## 2023-07-20 NOTE — PROGRESS NOTES
min/no modifications. Goal Not Met 7/7/2023 - Continue   Improve QuickDASH Score to < 25% impairment, demonstrating improved overall function. Goal Not Met 7/7/2023 - Continue   Discharged from PT.    [employer]  Access Code: [email or web address]  URL: [email or web address]  Date: 07/07/2023  Prepared by: Rosaura Mohan    Exercises  - Seated Scapular Retraction  - 2 x daily - 10-20 reps - 2 sec hold  - Seated Shoulder External Rotation PROM on Table  - 3 x daily - 1 sets - 10 reps - 10 hold  - Sidelying Shoulder External Rotation with Dumbbell  - 5 x weekly - 1 sets - 15 reps - 5 hold - 1 lbs resistance  - Supine Shoulder Flexion Extension Full Range AROM  - 5 x weekly - 1-2 sets - 10 reps  - Standing Shoulder Internal Rotation with Anchored Resistance  - 5 x weekly - 2-3 sets - 10 reps - 3 hold  - Shoulder External Rotation with Anchored Resistance  - 5 x weekly - 1-2 sets - 8-10 reps  - Standing Shoulder Flexion to 90 Degrees with Dumbbells  - 4-5 x weekly - 2-3 sets - 8-10 reps - 3 hold - 1 lbs  - Scaption with Dumbbells  - 4-5 x weekly - 2-3 sets - 8-10 reps - 3 hold - 1 lbs  - Shoulder Abduction with Dumbbells - Palms Down  - 4-5 x weekly - 1-3 sets - 8-10 reps - 3 hold  - Shoulder Taps on Table  - 5 x weekly - 2 sets - 10 reps - 5 hold  - Standing Bent Over Single Arm Scapular Row with Table Support  - 5 x weekly - 2-3 sets - 10 reps - 5 hold - 1 lbs  - Standing Shoulder Internal Rotation AROM Behind Back  - 1 x daily - 1 sets - 10 reps - 5 hold

## 2023-07-27 ENCOUNTER — TELEPHONE (OUTPATIENT)
Dept: SLEEP MEDICINE | Age: 78
End: 2023-07-27

## 2023-07-27 ENCOUNTER — OFFICE VISIT (OUTPATIENT)
Dept: ORTHOPEDIC SURGERY | Age: 78
End: 2023-07-27

## 2023-07-27 ENCOUNTER — TREATMENT (OUTPATIENT)
Age: 78
End: 2023-07-27
Payer: MEDICARE

## 2023-07-27 DIAGNOSIS — M25.512 ACUTE PAIN OF LEFT SHOULDER: Primary | ICD-10-CM

## 2023-07-27 DIAGNOSIS — M25.412 SWELLING OF JOINT OF LEFT SHOULDER: ICD-10-CM

## 2023-07-27 DIAGNOSIS — Z78.9 IMPAIRED MOBILITY AND ADLS: ICD-10-CM

## 2023-07-27 DIAGNOSIS — M25.612 STIFFNESS OF LEFT SHOULDER JOINT: ICD-10-CM

## 2023-07-27 DIAGNOSIS — M25.512 LEFT SHOULDER PAIN, UNSPECIFIED CHRONICITY: ICD-10-CM

## 2023-07-27 DIAGNOSIS — R29.898 ARM WEAKNESS: ICD-10-CM

## 2023-07-27 DIAGNOSIS — Z74.09 IMPAIRED MOBILITY AND ADLS: ICD-10-CM

## 2023-07-27 DIAGNOSIS — Z09 SURGERY FOLLOW-UP: Primary | ICD-10-CM

## 2023-07-27 PROCEDURE — 97110 THERAPEUTIC EXERCISES: CPT | Performed by: PHYSICAL THERAPIST

## 2023-07-27 PROCEDURE — 99024 POSTOP FOLLOW-UP VISIT: CPT | Performed by: PHYSICIAN ASSISTANT

## 2023-07-27 NOTE — PROGRESS NOTES
Name: Karen Goddard  YOB: 1945  Gender: female  MRN: [id number]    CC:   Chief Complaint   Patient presents with    Follow-up     S/p left shoulder scope DCR, biceps tenotomy and debridement  DOS 4/6/23   about 2-3 month out from  Left Shoulder Arthroscopy Distal Clavicle Resection, Biceps Tenotomy And Debridement - Left  4/6/2023    HPI: Patient is status post DCR, biceps tenotomy and debridement. Patient feels as if they are doing well. They have minimal pain and no complaints. The patient feels as if they are progressing as expected. Allergies   Allergen Reactions    Fire Ant Anaphylaxis    Amoxicillin Other (See Comments)     C-Diff    Amoxicillin-Pot Clavulanate Other (See Comments)     c.diff    Augmentin [Amoxicillin-Pot Clavulanate]     Cephalexin Other (See Comments)     C Diff      Cephalexin Other (See Comments)     C-Diff  C-Diff      Cephalosporins     Hydromorphone Other (See Comments)     Keeps her awake    Meperidine Hcl     Morphine Other (See Comments)     Awake  Awake  No sleep    Oxycodone     Oxycodone-Acetaminophen Other (See Comments)     Jittery  Nightmares    Oxycodone-Aspirin Other (See Comments)     Jittery  Other reaction(s): Unknown-Unspecified  Jittery    Penicillins Other (See Comments)     C-Diff      Povidone Iodine     Povidone-Iodine Other (See Comments)     Johnston  Johnston  burn    Adhesive Tape Itching and Rash     \"sticky ace wrap\"  \"sticky ace wrap\"    Meperidine Rash     Past Medical History:   Diagnosis Date    Acute kidney insufficiency     Followed by PCP     Adverse effect of anesthesia     2016 in [place] heart stopped at the end of my gallbladder surgery. \" Patient reports no CPR performed, stayed one night in the hospital. No problems with any other surgeries.     Allergic rhinitis     Anemia     history     Anxiety and depression     Controlled with medication     Arthritis     Chronic pain     Edema     bilateral lower legs     GERD

## 2023-08-03 ENCOUNTER — NURSE ONLY (OUTPATIENT)
Dept: INTERNAL MEDICINE CLINIC | Facility: CLINIC | Age: 78
End: 2023-08-03
Payer: MEDICARE

## 2023-08-03 DIAGNOSIS — E53.8 VITAMIN B 12 DEFICIENCY: Primary | ICD-10-CM

## 2023-08-03 PROCEDURE — 96372 THER/PROPH/DIAG INJ SC/IM: CPT | Performed by: NURSE PRACTITIONER

## 2023-08-03 RX ORDER — CYANOCOBALAMIN 1000 UG/ML
1000 INJECTION, SOLUTION INTRAMUSCULAR; SUBCUTANEOUS ONCE
Status: COMPLETED | OUTPATIENT
Start: 2023-08-03 | End: 2023-08-03

## 2023-08-03 RX ADMIN — CYANOCOBALAMIN 1000 MCG: 1000 INJECTION, SOLUTION INTRAMUSCULAR; SUBCUTANEOUS at 10:33

## 2023-08-16 ENCOUNTER — TELEPHONE (OUTPATIENT)
Dept: ORTHOPEDIC SURGERY | Age: 78
End: 2023-08-16

## 2023-08-16 NOTE — TELEPHONE ENCOUNTER
Pt never received a call from [facility] to schedule for pain management.  Please resend referral.

## 2023-08-17 ENCOUNTER — NURSE ONLY (OUTPATIENT)
Dept: INTERNAL MEDICINE CLINIC | Facility: CLINIC | Age: 78
End: 2023-08-17
Payer: MEDICARE

## 2023-08-17 DIAGNOSIS — E53.8 VITAMIN B 12 DEFICIENCY: Primary | ICD-10-CM

## 2023-08-17 PROCEDURE — 96372 THER/PROPH/DIAG INJ SC/IM: CPT | Performed by: NURSE PRACTITIONER

## 2023-08-17 RX ORDER — CYANOCOBALAMIN 1000 UG/ML
1000 INJECTION, SOLUTION INTRAMUSCULAR; SUBCUTANEOUS ONCE
Status: COMPLETED | OUTPATIENT
Start: 2023-08-17 | End: 2023-08-17

## 2023-08-17 RX ADMIN — CYANOCOBALAMIN 1000 MCG: 1000 INJECTION, SOLUTION INTRAMUSCULAR; SUBCUTANEOUS at 14:55

## 2023-08-31 ENCOUNTER — NURSE ONLY (OUTPATIENT)
Dept: INTERNAL MEDICINE CLINIC | Facility: CLINIC | Age: 78
End: 2023-08-31
Payer: MEDICARE

## 2023-08-31 DIAGNOSIS — E53.8 VITAMIN B 12 DEFICIENCY: Primary | ICD-10-CM

## 2023-08-31 PROCEDURE — 96372 THER/PROPH/DIAG INJ SC/IM: CPT | Performed by: NURSE PRACTITIONER

## 2023-08-31 RX ORDER — CYANOCOBALAMIN 1000 UG/ML
1000 INJECTION, SOLUTION INTRAMUSCULAR; SUBCUTANEOUS ONCE
Status: COMPLETED | OUTPATIENT
Start: 2023-08-31 | End: 2023-08-31

## 2023-08-31 RX ADMIN — CYANOCOBALAMIN 1000 MCG: 1000 INJECTION, SOLUTION INTRAMUSCULAR; SUBCUTANEOUS at 16:11

## 2023-09-14 ENCOUNTER — NURSE ONLY (OUTPATIENT)
Dept: INTERNAL MEDICINE CLINIC | Facility: CLINIC | Age: 78
End: 2023-09-14
Payer: MEDICARE

## 2023-09-14 DIAGNOSIS — E53.8 VITAMIN B 12 DEFICIENCY: Primary | ICD-10-CM

## 2023-09-14 PROCEDURE — 96372 THER/PROPH/DIAG INJ SC/IM: CPT | Performed by: NURSE PRACTITIONER

## 2023-09-14 RX ORDER — CYANOCOBALAMIN 1000 UG/ML
1000 INJECTION, SOLUTION INTRAMUSCULAR; SUBCUTANEOUS ONCE
Status: COMPLETED | OUTPATIENT
Start: 2023-09-14 | End: 2023-09-14

## 2023-09-14 RX ADMIN — CYANOCOBALAMIN 1000 MCG: 1000 INJECTION, SOLUTION INTRAMUSCULAR; SUBCUTANEOUS at 15:54

## 2023-09-15 ENCOUNTER — OFFICE VISIT (OUTPATIENT)
Dept: UROLOGY | Age: 78
End: 2023-09-15
Payer: MEDICARE

## 2023-09-15 DIAGNOSIS — N39.0 RECURRENT UTI: Primary | ICD-10-CM

## 2023-09-15 DIAGNOSIS — N32.81 OAB (OVERACTIVE BLADDER): ICD-10-CM

## 2023-09-15 DIAGNOSIS — N39.3 SUI (STRESS URINARY INCONTINENCE, FEMALE): ICD-10-CM

## 2023-09-15 LAB
BILIRUBIN, URINE, POC: NEGATIVE
BLOOD URINE, POC: NEGATIVE
GLUCOSE URINE, POC: NEGATIVE
KETONES, URINE, POC: NEGATIVE
LEUKOCYTE ESTERASE, URINE, POC: NEGATIVE
NITRITE, URINE, POC: NEGATIVE
PH, URINE, POC: 5.5 (ref 4.6–8)
PROTEIN,URINE, POC: NEGATIVE
SPECIFIC GRAVITY, URINE, POC: 1.02 (ref 1–1.03)
URINALYSIS CLARITY, POC: NORMAL
URINALYSIS COLOR, POC: NORMAL
UROBILINOGEN, POC: NORMAL

## 2023-09-15 PROCEDURE — 1123F ACP DISCUSS/DSCN MKR DOCD: CPT | Performed by: NURSE PRACTITIONER

## 2023-09-15 PROCEDURE — 81003 URINALYSIS AUTO W/O SCOPE: CPT | Performed by: NURSE PRACTITIONER

## 2023-09-15 PROCEDURE — 99214 OFFICE O/P EST MOD 30 MIN: CPT | Performed by: NURSE PRACTITIONER

## 2023-09-15 RX ORDER — NITROFURANTOIN MACROCRYSTALS 50 MG/1
50 CAPSULE ORAL DAILY
Qty: 90 CAPSULE | Refills: 3 | Status: SHIPPED | OUTPATIENT
Start: 2023-09-15

## 2023-09-15 NOTE — PROGRESS NOTES
avoid further use of anticholinergics due to SE of constipation and BEERS list criteria. Will begin gemtesa. SE discussed. Samples provided. ROV in 4 weeks. To call sooner if needed. Liudmila Spann is supervising physician today and he approves plan of care.

## 2023-09-19 ENCOUNTER — CLINICAL DOCUMENTATION (OUTPATIENT)
Age: 78
End: 2023-09-19

## 2023-09-19 NOTE — PROGRESS NOTES
[facility]  [address] [place] [phone]  Dept: [phone]      Physical Therapy Discharge/Discontinuation Note     Referring MD: Robert Gray MD    Patient has discontinued therapy based on  no return/self discharge . Patient was  last seen for PT services on 7/27/2023. At that time, the patient appeared to be gradually progressing with therapy treatment. Therapy goals were partially met. Please see previous notes for objective findings. Pt followed up with provider 7/27/23 with recommendation to continue therapy. Pt subsequently cancelled remaining appts and has not called to schedule. Pt stated that she was doing well at MD appt.

## 2023-09-28 ENCOUNTER — NURSE ONLY (OUTPATIENT)
Dept: INTERNAL MEDICINE CLINIC | Facility: CLINIC | Age: 78
End: 2023-09-28
Payer: MEDICARE

## 2023-09-28 DIAGNOSIS — M19.012 ARTHRITIS OF LEFT ACROMIOCLAVICULAR JOINT: ICD-10-CM

## 2023-09-28 DIAGNOSIS — M19.012 PRIMARY OSTEOARTHRITIS OF LEFT SHOULDER: ICD-10-CM

## 2023-09-28 DIAGNOSIS — M25.512 LEFT SHOULDER PAIN, UNSPECIFIED CHRONICITY: ICD-10-CM

## 2023-09-28 DIAGNOSIS — E53.8 VITAMIN B 12 DEFICIENCY: Primary | ICD-10-CM

## 2023-09-28 DIAGNOSIS — Z86.718 HISTORY OF DVT (DEEP VEIN THROMBOSIS): ICD-10-CM

## 2023-09-28 PROCEDURE — 96372 THER/PROPH/DIAG INJ SC/IM: CPT | Performed by: NURSE PRACTITIONER

## 2023-09-28 RX ORDER — CYANOCOBALAMIN 1000 UG/ML
1000 INJECTION, SOLUTION INTRAMUSCULAR; SUBCUTANEOUS ONCE
Status: COMPLETED | OUTPATIENT
Start: 2023-09-28 | End: 2023-09-28

## 2023-09-28 RX ADMIN — CYANOCOBALAMIN 1000 MCG: 1000 INJECTION, SOLUTION INTRAMUSCULAR; SUBCUTANEOUS at 11:10

## 2023-09-28 NOTE — TELEPHONE ENCOUNTER
Patient came in today for a B12 injection needed a refill of the Zofran disintegrating tablet to be sent to [facility] [employer] on [address] in [place].

## 2023-09-29 RX ORDER — ONDANSETRON 4 MG/1
4 TABLET, ORALLY DISINTEGRATING ORAL EVERY 6 HOURS PRN
Qty: 12 TABLET | Refills: 0 | Status: SHIPPED | OUTPATIENT
Start: 2023-09-29

## 2023-10-05 ENCOUNTER — NURSE ONLY (OUTPATIENT)
Dept: INTERNAL MEDICINE CLINIC | Facility: CLINIC | Age: 78
End: 2023-10-05
Payer: MEDICARE

## 2023-10-05 DIAGNOSIS — Z23 NEEDS FLU SHOT: Primary | ICD-10-CM

## 2023-10-05 PROCEDURE — 90694 VACC AIIV4 NO PRSRV 0.5ML IM: CPT | Performed by: NURSE PRACTITIONER

## 2023-10-05 PROCEDURE — G0008 ADMIN INFLUENZA VIRUS VAC: HCPCS | Performed by: NURSE PRACTITIONER

## 2023-10-09 ENCOUNTER — TELEPHONE (OUTPATIENT)
Dept: INTERNAL MEDICINE CLINIC | Facility: CLINIC | Age: 78
End: 2023-10-09

## 2023-10-09 NOTE — TELEPHONE ENCOUNTER
Patient is requesting a referral to pain management. She is wanting to go to the [facility].

## 2023-10-09 NOTE — TELEPHONE ENCOUNTER
----- Message from [address] [place] [address]. Tor Lombard sent at 10/9/2023  3:15 PM EDT -----  Regarding: Referral  Contact: [phone]  I was referred to a pain clinic that I have been going to. So far they have done nothing. I was told that the original place I went to did not take my insurance. Come to find out that they do take it. I called and they said I just needed a new referral. Could you please send a referral to  [facility] for advanced management. Phone [phone].    Thanks Divya Sullivan

## 2023-10-12 ENCOUNTER — NURSE ONLY (OUTPATIENT)
Dept: INTERNAL MEDICINE CLINIC | Facility: CLINIC | Age: 78
End: 2023-10-12
Payer: MEDICARE

## 2023-10-12 DIAGNOSIS — E53.8 VITAMIN B 12 DEFICIENCY: Primary | ICD-10-CM

## 2023-10-12 PROCEDURE — 96372 THER/PROPH/DIAG INJ SC/IM: CPT | Performed by: NURSE PRACTITIONER

## 2023-10-12 RX ORDER — CYANOCOBALAMIN 1000 UG/ML
1000 INJECTION, SOLUTION INTRAMUSCULAR; SUBCUTANEOUS ONCE
Status: COMPLETED | OUTPATIENT
Start: 2023-10-12 | End: 2023-10-12

## 2023-10-12 RX ADMIN — CYANOCOBALAMIN 1000 MCG: 1000 INJECTION, SOLUTION INTRAMUSCULAR; SUBCUTANEOUS at 10:30

## 2023-10-18 NOTE — TELEPHONE ENCOUNTER
Please call: When reviewing chart there are multiple orthopedic issues. It looks like she was referred to pain management by Jing Rutherford NP from [facility] orthopedic.   I believe the referral would best be placed by Jing Rutherford NP so her notes can be associated with the referral.

## 2023-10-18 NOTE — TELEPHONE ENCOUNTER
Called and notified patient of the message from Mu Estrada NP regarding a referral coming from Bon Alberto NP with [employer]. Patient verbalized understanding and stated that she would give Bon Alberto NP office a call.

## 2023-10-23 ENCOUNTER — TELEMEDICINE (OUTPATIENT)
Dept: BEHAVIORAL/MENTAL HEALTH CLINIC | Age: 78
End: 2023-10-23
Payer: MEDICARE

## 2023-10-23 ENCOUNTER — OFFICE VISIT (OUTPATIENT)
Dept: INTERNAL MEDICINE CLINIC | Facility: CLINIC | Age: 78
End: 2023-10-23
Payer: MEDICARE

## 2023-10-23 ENCOUNTER — PATIENT MESSAGE (OUTPATIENT)
Dept: UROLOGY | Age: 78
End: 2023-10-23

## 2023-10-23 VITALS
WEIGHT: 177 LBS | SYSTOLIC BLOOD PRESSURE: 90 MMHG | DIASTOLIC BLOOD PRESSURE: 60 MMHG | HEIGHT: 58 IN | OXYGEN SATURATION: 97 % | HEART RATE: 68 BPM | BODY MASS INDEX: 37.16 KG/M2

## 2023-10-23 DIAGNOSIS — F33.0 MILD EPISODE OF RECURRENT MAJOR DEPRESSIVE DISORDER (HCC): Primary | ICD-10-CM

## 2023-10-23 DIAGNOSIS — F41.1 GENERALIZED ANXIETY DISORDER: ICD-10-CM

## 2023-10-23 DIAGNOSIS — E55.9 VITAMIN D DEFICIENCY: ICD-10-CM

## 2023-10-23 DIAGNOSIS — Z00.00 MEDICARE ANNUAL WELLNESS VISIT, SUBSEQUENT: Primary | ICD-10-CM

## 2023-10-23 DIAGNOSIS — N32.81 OAB (OVERACTIVE BLADDER): Primary | ICD-10-CM

## 2023-10-23 DIAGNOSIS — F43.10 PTSD (POST-TRAUMATIC STRESS DISORDER): ICD-10-CM

## 2023-10-23 DIAGNOSIS — F51.05 INSOMNIA DUE TO MENTAL DISORDER: ICD-10-CM

## 2023-10-23 DIAGNOSIS — Z65.8 PSYCHOSOCIAL STRESSORS: ICD-10-CM

## 2023-10-23 DIAGNOSIS — D50.9 IRON DEFICIENCY ANEMIA, UNSPECIFIED IRON DEFICIENCY ANEMIA TYPE: ICD-10-CM

## 2023-10-23 DIAGNOSIS — E53.8 VITAMIN B 12 DEFICIENCY: ICD-10-CM

## 2023-10-23 PROCEDURE — 96372 THER/PROPH/DIAG INJ SC/IM: CPT | Performed by: NURSE PRACTITIONER

## 2023-10-23 PROCEDURE — 1123F ACP DISCUSS/DSCN MKR DOCD: CPT | Performed by: NURSE PRACTITIONER

## 2023-10-23 PROCEDURE — 3078F DIAST BP <80 MM HG: CPT | Performed by: NURSE PRACTITIONER

## 2023-10-23 PROCEDURE — G0439 PPPS, SUBSEQ VISIT: HCPCS | Performed by: NURSE PRACTITIONER

## 2023-10-23 PROCEDURE — 99214 OFFICE O/P EST MOD 30 MIN: CPT | Performed by: PSYCHIATRY & NEUROLOGY

## 2023-10-23 PROCEDURE — 1123F ACP DISCUSS/DSCN MKR DOCD: CPT | Performed by: PSYCHIATRY & NEUROLOGY

## 2023-10-23 PROCEDURE — 3074F SYST BP LT 130 MM HG: CPT | Performed by: NURSE PRACTITIONER

## 2023-10-23 RX ORDER — TRAZODONE HYDROCHLORIDE 50 MG/1
50-100 TABLET ORAL NIGHTLY
Qty: 60 TABLET | Refills: 3 | Status: SHIPPED | OUTPATIENT
Start: 2023-10-23

## 2023-10-23 RX ORDER — DESVENLAFAXINE SUCCINATE 50 MG/1
50 TABLET, EXTENDED RELEASE ORAL DAILY
Qty: 30 TABLET | Refills: 3 | Status: SHIPPED | OUTPATIENT
Start: 2023-10-23

## 2023-10-23 RX ORDER — CYANOCOBALAMIN 1000 UG/ML
1000 INJECTION, SOLUTION INTRAMUSCULAR; SUBCUTANEOUS ONCE
Status: COMPLETED | OUTPATIENT
Start: 2023-10-23 | End: 2023-10-23

## 2023-10-23 RX ORDER — TRAZODONE HYDROCHLORIDE 50 MG/1
TABLET ORAL
COMMUNITY
Start: 2023-08-12 | End: 2023-10-23 | Stop reason: SDUPTHER

## 2023-10-23 RX ORDER — CLONAZEPAM 0.5 MG/1
0.5 TABLET ORAL DAILY PRN
Qty: 30 TABLET | Refills: 2 | Status: SHIPPED | OUTPATIENT
Start: 2023-10-23 | End: 2024-01-21

## 2023-10-23 RX ADMIN — CYANOCOBALAMIN 1000 MCG: 1000 INJECTION, SOLUTION INTRAMUSCULAR; SUBCUTANEOUS at 08:45

## 2023-10-23 ASSESSMENT — COLUMBIA-SUICIDE SEVERITY RATING SCALE - C-SSRS
3. HAVE YOU BEEN THINKING ABOUT HOW YOU MIGHT KILL YOURSELF?: NO
4. HAVE YOU HAD THESE THOUGHTS AND HAD SOME INTENTION OF ACTING ON THEM?: NO
5. HAVE YOU STARTED TO WORK OUT OR WORKED OUT THE DETAILS OF HOW TO KILL YOURSELF? DO YOU INTEND TO CARRY OUT THIS PLAN?: NO
7. DID THIS OCCUR IN THE LAST THREE MONTHS: NO

## 2023-10-23 ASSESSMENT — ANXIETY QUESTIONNAIRES
2. NOT BEING ABLE TO STOP OR CONTROL WORRYING: 3
GAD7 TOTAL SCORE: 10
5. BEING SO RESTLESS THAT IT IS HARD TO SIT STILL: 1
6. BECOMING EASILY ANNOYED OR IRRITABLE: 0
1. FEELING NERVOUS, ANXIOUS, OR ON EDGE: 1
4. TROUBLE RELAXING: 1
IF YOU CHECKED OFF ANY PROBLEMS ON THIS QUESTIONNAIRE, HOW DIFFICULT HAVE THESE PROBLEMS MADE IT FOR YOU TO DO YOUR WORK, TAKE CARE OF THINGS AT HOME, OR GET ALONG WITH OTHER PEOPLE: NOT DIFFICULT AT ALL
3. WORRYING TOO MUCH ABOUT DIFFERENT THINGS: 3
7. FEELING AFRAID AS IF SOMETHING AWFUL MIGHT HAPPEN: 1

## 2023-10-23 ASSESSMENT — PATIENT HEALTH QUESTIONNAIRE - PHQ9
9. THOUGHTS THAT YOU WOULD BE BETTER OFF DEAD, OR OF HURTING YOURSELF: 0
4. FEELING TIRED OR HAVING LITTLE ENERGY: 1
8. MOVING OR SPEAKING SO SLOWLY THAT OTHER PEOPLE COULD HAVE NOTICED. OR THE OPPOSITE, BEING SO FIGETY OR RESTLESS THAT YOU HAVE BEEN MOVING AROUND A LOT MORE THAN USUAL: 0
3. TROUBLE FALLING OR STAYING ASLEEP: 1
SUM OF ALL RESPONSES TO PHQ9 QUESTIONS 1 & 2: 2
6. FEELING BAD ABOUT YOURSELF - OR THAT YOU ARE A FAILURE OR HAVE LET YOURSELF OR YOUR FAMILY DOWN: 0
7. TROUBLE CONCENTRATING ON THINGS, SUCH AS READING THE NEWSPAPER OR WATCHING TELEVISION: 0
SUM OF ALL RESPONSES TO PHQ9 QUESTIONS 1 & 2: 2
7. TROUBLE CONCENTRATING ON THINGS, SUCH AS READING THE NEWSPAPER OR WATCHING TELEVISION: 1
6. FEELING BAD ABOUT YOURSELF - OR THAT YOU ARE A FAILURE OR HAVE LET YOURSELF OR YOUR FAMILY DOWN: 1
SUM OF ALL RESPONSES TO PHQ QUESTIONS 1-9: 6
1. LITTLE INTEREST OR PLEASURE IN DOING THINGS: 1
4. FEELING TIRED OR HAVING LITTLE ENERGY: 1
SUM OF ALL RESPONSES TO PHQ QUESTIONS 1-9: 8
9. THOUGHTS THAT YOU WOULD BE BETTER OFF DEAD, OR OF HURTING YOURSELF: 0
SUM OF ALL RESPONSES TO PHQ QUESTIONS 1-9: 6
2. FEELING DOWN, DEPRESSED OR HOPELESS: 1
5. POOR APPETITE OR OVEREATING: 1
10. IF YOU CHECKED OFF ANY PROBLEMS, HOW DIFFICULT HAVE THESE PROBLEMS MADE IT FOR YOU TO DO YOUR WORK, TAKE CARE OF THINGS AT HOME, OR GET ALONG WITH OTHER PEOPLE: 1
SUM OF ALL RESPONSES TO PHQ QUESTIONS 1-9: 6
1. LITTLE INTEREST OR PLEASURE IN DOING THINGS: 1
SUM OF ALL RESPONSES TO PHQ QUESTIONS 1-9: 8
SUM OF ALL RESPONSES TO PHQ QUESTIONS 1-9: 8
2. FEELING DOWN, DEPRESSED OR HOPELESS: 1
8. MOVING OR SPEAKING SO SLOWLY THAT OTHER PEOPLE COULD HAVE NOTICED. OR THE OPPOSITE, BEING SO FIGETY OR RESTLESS THAT YOU HAVE BEEN MOVING AROUND A LOT MORE THAN USUAL: 0
10. IF YOU CHECKED OFF ANY PROBLEMS, HOW DIFFICULT HAVE THESE PROBLEMS MADE IT FOR YOU TO DO YOUR WORK, TAKE CARE OF THINGS AT HOME, OR GET ALONG WITH OTHER PEOPLE: 0
5. POOR APPETITE OR OVEREATING: 1
3. TROUBLE FALLING OR STAYING ASLEEP: 3
SUM OF ALL RESPONSES TO PHQ QUESTIONS 1-9: 6
SUM OF ALL RESPONSES TO PHQ QUESTIONS 1-9: 8

## 2023-10-23 ASSESSMENT — LIFESTYLE VARIABLES
HOW OFTEN DO YOU HAVE A DRINK CONTAINING ALCOHOL: NEVER
HOW MANY STANDARD DRINKS CONTAINING ALCOHOL DO YOU HAVE ON A TYPICAL DAY: PATIENT DOES NOT DRINK

## 2023-10-23 NOTE — PROGRESS NOTES
Medicare Annual Wellness Visit    Deirdre Moe is here for Medicare AWV    Assessment & Plan   Medicare annual wellness visit, subsequent  Iron deficiency anemia, unspecified iron deficiency anemia type  -     Transferrin Saturation; Future  Vitamin D deficiency  -     Vitamin D 25 Hydroxy; Future  Vitamin B 12 deficiency  -     cyanocobalamin injection 1,000 mcg; 1,000 mcg, IntraMUSCular, ONCE, 1 dose, On Mon 10/23/23 at 0945  Recommendations for Preventive Services Due: see orders and patient instructions/AVS.  Recommended screening schedule for the next 5-10 years is provided to the patient in written form: see Patient Instructions/AVS.     Return for Follow-Up, regularly scheduled appointment or sooner prn. Subjective   The following acute and/or chronic problems were also addressed today:  Iron deficiency anemia, vitamin D deficiency, vitamin B12 deficiency    Patient's complete Health Risk Assessment and screening values have been reviewed and are found in Flowsheets. The following problems were reviewed today and where indicated follow up appointments were made and/or referrals ordered. Positive Risk Factor Screenings with Interventions:        Depression:  PHQ-2 Score: 2  PHQ-9 Total Score: 6    Interpretation:   1-4 = minimal  5-9 = mild  10-14 = moderate  15-19 = moderately severe  20-27 = severe  Interventions:  She is seeing Dr. Iglesia Braden. General HRA Questions:  Select all that apply: (!) Stress    Stress Interventions:  Recommend short breaks for self. Social and Emotional Support:  Do you get the social and emotional support that you need?: (!) No    Interventions:  Recently her daughter did not return calls. She also only texted her a weekly for her birthday.     Weight and Activity:  Physical Activity: Insufficiently Active (10/23/2023)    Exercise Vital Sign     Days of Exercise per Week: 2 days     Minutes of Exercise per Session: 20 min     On average, how many days per

## 2023-10-23 NOTE — PROGRESS NOTES
Patient:  Alesha Camarillo  Age:  69 y.o.  :  1945     SEX:  female MRN:  [id number]     RACE: White (non-)     SEEN:  [x]  PATIENT  []  SPOUSE []  OTHER:                  10/23/2023    10:04 AM 10/23/2023     8:08 AM 2023     4:49 PM   PHQ-9    Little interest or pleasure in doing things 1 1 0   Feeling down, depressed, or hopeless 1 1 1   Trouble falling or staying asleep, or sleeping too much 3 1 1   Feeling tired or having little energy 1 1 1   Poor appetite or overeating 1 1 1   Feeling bad about yourself - or that you are a failure or have let yourself or your family down 0 1 0   Trouble concentrating on things, such as reading the newspaper or watching television 1 0 0   Moving or speaking so slowly that other people could have noticed. Or the opposite - being so fidgety or restless that you have been moving around a lot more than usual 0 0 0   Thoughts that you would be better off dead, or of hurting yourself in some way 0 0 0   PHQ-2 Score 2 2 1   PHQ-9 Total Score 8 6 4   If you checked off any problems, how difficult have these problems made it for you to do your work, take care of things at home, or get along with other people?  0 1 1           10/23/2023    10:05 AM 2023    10:47 AM 2023    11:45 AM   URBANO-7 SCREENING   Feeling nervous, anxious, or on edge Several days Nearly every day More than half the days   Not being able to stop or control worrying Nearly every day Several days Several days   Worrying too much about different things Nearly every day Not at all Several days   Trouble relaxing Several days Not at all Several days   Being so restless that it is hard to sit still Several days Nearly every day More than half the days   Becoming easily annoyed or irritable Not at all Not at all More than half the days   Feeling afraid as if something awful might happen Several days Several days Several days   URBANO-7 Total Score 10 8 10   How difficult have these

## 2023-11-04 DIAGNOSIS — I10 PRIMARY HYPERTENSION: ICD-10-CM

## 2023-11-06 RX ORDER — AMLODIPINE BESYLATE 5 MG/1
5 TABLET ORAL DAILY
Qty: 90 TABLET | Refills: 1 | OUTPATIENT
Start: 2023-11-06

## 2023-11-07 ENCOUNTER — OFFICE VISIT (OUTPATIENT)
Dept: ENDOCRINOLOGY | Age: 78
End: 2023-11-07
Payer: MEDICARE

## 2023-11-07 VITALS
WEIGHT: 175.2 LBS | SYSTOLIC BLOOD PRESSURE: 108 MMHG | OXYGEN SATURATION: 98 % | BODY MASS INDEX: 36.62 KG/M2 | DIASTOLIC BLOOD PRESSURE: 70 MMHG | HEART RATE: 73 BPM

## 2023-11-07 DIAGNOSIS — E55.9 VITAMIN D DEFICIENCY: ICD-10-CM

## 2023-11-07 DIAGNOSIS — E06.3 HASHIMOTO'S THYROIDITIS: ICD-10-CM

## 2023-11-07 DIAGNOSIS — E03.9 PRIMARY HYPOTHYROIDISM: Primary | ICD-10-CM

## 2023-11-07 PROCEDURE — 3074F SYST BP LT 130 MM HG: CPT | Performed by: PHYSICIAN ASSISTANT

## 2023-11-07 PROCEDURE — 3078F DIAST BP <80 MM HG: CPT | Performed by: PHYSICIAN ASSISTANT

## 2023-11-07 PROCEDURE — 1123F ACP DISCUSS/DSCN MKR DOCD: CPT | Performed by: PHYSICIAN ASSISTANT

## 2023-11-07 PROCEDURE — 99214 OFFICE O/P EST MOD 30 MIN: CPT | Performed by: PHYSICIAN ASSISTANT

## 2023-11-07 RX ORDER — LEVOTHYROXINE SODIUM 150 MCG
150 TABLET ORAL DAILY
Qty: 90 TABLET | Refills: 3 | Status: SHIPPED | OUTPATIENT
Start: 2023-11-07

## 2023-11-07 ASSESSMENT — ENCOUNTER SYMPTOMS
DIARRHEA: 1
CONSTIPATION: 0

## 2023-11-07 NOTE — PROGRESS NOTES
[facility]    Betsy Pena PA-C  [facility]  [address] [facility], [address] [place] [address], [address] [place] [address] Rd,[address] Floor  Phone [phone]  Facsimile [phone]          Stephanie Haile is a 69 y.o. female seen 11/7/2023 for follow up evaluation of primary hypothyroidism         Assessment and Plan:      1. Primary hypothyroidism  TSH elevated with symptoms on 150 MCG tablet; 1 tab daily with 1/2 tab on Sunday, TDD 139mcg of name brand Synthroid which is being taken correctly. Increase to 150 mcg daily. Watch for symptoms of overtreatment    Discuss possible non-endocrine causes of other symptoms.  - TSH; Future  - T4, Free; Future  - TSH; Future  - T4, Free; Future  - SYNTHROID 150 MCG tablet; Take 1 tablet by mouth Daily  Dispense: 90 tablet; Refill: 3    2. Vitamin D deficiency  Completed RX from PCP, at goal. Recommend daily D3 2000iu for maintenance     3. Hashimoto's thyroiditis  TPO antibodies positive indicating Hashimoto's thyroiditis. Advised that diagnosis of one autoimmune disease increases risk of developing other autoimmune disease. Importance of self care, stress management, self monitoring was emphasized. Reinier Crystal was seen today for follow-up. Diagnoses and all orders for this visit:    Primary hypothyroidism  -     TSH; Future  -     T4, Free; Future  -     TSH; Future  -     T4, Free; Future  -     SYNTHROID 150 MCG tablet; Take 1 tablet by mouth Daily    Vitamin D deficiency    Hashimoto's thyroiditis              History of Present Illness:    11/7/2023  Pt RTC for folowup.  Labs now abnormal. \"Feels horrible\"  See ROS  Taking meds as before  Stopped and restarted colestipol, taking with meds      4/13/2023  Recent localized allergic reaction s/p left shoulder arthroscopy     Synthroid dose 150 mcg daily to 1 tablet daily with a half tab on Sunday total daily dose 139 mcg     THYROID DYSFUNCTION   Yanni Gannon

## 2023-11-08 ENCOUNTER — NURSE ONLY (OUTPATIENT)
Dept: INTERNAL MEDICINE CLINIC | Facility: CLINIC | Age: 78
End: 2023-11-08

## 2023-11-08 DIAGNOSIS — E53.8 VITAMIN B 12 DEFICIENCY: Primary | ICD-10-CM

## 2023-11-08 RX ORDER — CYANOCOBALAMIN 1000 UG/ML
1000 INJECTION, SOLUTION INTRAMUSCULAR; SUBCUTANEOUS ONCE
Status: COMPLETED | OUTPATIENT
Start: 2023-11-08 | End: 2023-11-08

## 2023-11-08 RX ADMIN — CYANOCOBALAMIN 1000 MCG: 1000 INJECTION, SOLUTION INTRAMUSCULAR; SUBCUTANEOUS at 09:05

## 2023-11-10 ENCOUNTER — CLINICAL DOCUMENTATION (OUTPATIENT)
Dept: ORTHOPEDIC SURGERY | Age: 78
End: 2023-11-10

## 2023-11-10 NOTE — PROGRESS NOTES
Patient referred to [facility] for pain mgmt. Referral dropped physically to office.
Patient

## 2023-11-21 ENCOUNTER — OFFICE VISIT (OUTPATIENT)
Dept: ORTHOPEDIC SURGERY | Age: 78
End: 2023-11-21
Payer: MEDICARE

## 2023-11-21 DIAGNOSIS — Z98.890 POST-OPERATIVE STATE: Primary | ICD-10-CM

## 2023-11-21 DIAGNOSIS — M19.012 ARTHRITIS OF LEFT SHOULDER REGION: ICD-10-CM

## 2023-11-21 DIAGNOSIS — M25.512 LEFT SHOULDER PAIN, UNSPECIFIED CHRONICITY: ICD-10-CM

## 2023-11-21 PROCEDURE — 1123F ACP DISCUSS/DSCN MKR DOCD: CPT | Performed by: ORTHOPAEDIC SURGERY

## 2023-11-21 PROCEDURE — 20610 DRAIN/INJ JOINT/BURSA W/O US: CPT | Performed by: ORTHOPAEDIC SURGERY

## 2023-11-21 PROCEDURE — 99213 OFFICE O/P EST LOW 20 MIN: CPT | Performed by: ORTHOPAEDIC SURGERY

## 2023-11-21 RX ORDER — METHYLPREDNISOLONE ACETATE 40 MG/ML
80 INJECTION, SUSPENSION INTRA-ARTICULAR; INTRALESIONAL; INTRAMUSCULAR; SOFT TISSUE ONCE
Status: COMPLETED | OUTPATIENT
Start: 2023-11-21 | End: 2023-11-21

## 2023-11-21 RX ADMIN — METHYLPREDNISOLONE ACETATE 80 MG: 40 INJECTION, SUSPENSION INTRA-ARTICULAR; INTRALESIONAL; INTRAMUSCULAR; SOFT TISSUE at 11:15

## 2023-11-21 NOTE — PROGRESS NOTES
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 I think at this point she is likely dealing more from those then true rotator cuff pathology. Would suggest intra-articular injection. She mentions her right shoulder was giving her some trouble so we may work this up further at her next visit if it continues to be a problem. She cannot take NSAIDs. Procedure note: After discussion of risks and benefits including, but not limited to pain, infection, steroid flare, increased blood sugar, fat necrosis, skin discoloration, and injury to blood vessels or nerves, the patient verbally consented to proceed with a glenohumeral joint injection. The affected left shoulder was sterilely prepped in standard fashion and injected with 2 cc of depo medrol  (40mg/ml), 2 cc of 1% Lidocaine, and 2 cc of 0.5% Marcaine into the JOINT SPACE. The patient tolerated the injection well. Return in about 6 weeks (around 1/2/2024) for MRI slot.      Octavio Hoffman MD  11/21/23

## 2023-11-30 ENCOUNTER — OFFICE VISIT (OUTPATIENT)
Dept: INTERNAL MEDICINE CLINIC | Facility: CLINIC | Age: 78
End: 2023-11-30
Payer: MEDICARE

## 2023-11-30 VITALS
HEART RATE: 60 BPM | HEIGHT: 58 IN | OXYGEN SATURATION: 98 % | BODY MASS INDEX: 36.11 KG/M2 | WEIGHT: 172 LBS | SYSTOLIC BLOOD PRESSURE: 92 MMHG | DIASTOLIC BLOOD PRESSURE: 60 MMHG

## 2023-11-30 DIAGNOSIS — E55.9 VITAMIN D DEFICIENCY: ICD-10-CM

## 2023-11-30 DIAGNOSIS — R76.8 ANA POSITIVE: ICD-10-CM

## 2023-11-30 DIAGNOSIS — Z83.2 FAMILY HISTORY OF AUTOIMMUNE DISORDER: ICD-10-CM

## 2023-11-30 DIAGNOSIS — E53.8 VITAMIN B 12 DEFICIENCY: ICD-10-CM

## 2023-11-30 DIAGNOSIS — I10 PRIMARY HYPERTENSION: Primary | Chronic | ICD-10-CM

## 2023-11-30 DIAGNOSIS — E61.1 IRON DEFICIENCY: ICD-10-CM

## 2023-11-30 DIAGNOSIS — E79.0 HYPERURICEMIA: ICD-10-CM

## 2023-11-30 DIAGNOSIS — E03.9 PRIMARY HYPOTHYROIDISM: ICD-10-CM

## 2023-11-30 DIAGNOSIS — R73.01 ELEVATED FASTING GLUCOSE: ICD-10-CM

## 2023-11-30 PROBLEM — I73.9 PAD (PERIPHERAL ARTERY DISEASE) (HCC): Chronic | Status: ACTIVE | Noted: 2017-12-06

## 2023-11-30 PROCEDURE — 3074F SYST BP LT 130 MM HG: CPT | Performed by: NURSE PRACTITIONER

## 2023-11-30 PROCEDURE — 96372 THER/PROPH/DIAG INJ SC/IM: CPT | Performed by: NURSE PRACTITIONER

## 2023-11-30 PROCEDURE — 99214 OFFICE O/P EST MOD 30 MIN: CPT | Performed by: NURSE PRACTITIONER

## 2023-11-30 PROCEDURE — 3078F DIAST BP <80 MM HG: CPT | Performed by: NURSE PRACTITIONER

## 2023-11-30 PROCEDURE — 1123F ACP DISCUSS/DSCN MKR DOCD: CPT | Performed by: NURSE PRACTITIONER

## 2023-11-30 RX ORDER — CYANOCOBALAMIN 1000 UG/ML
1000 INJECTION, SOLUTION INTRAMUSCULAR; SUBCUTANEOUS ONCE
Status: COMPLETED | OUTPATIENT
Start: 2023-11-30 | End: 2023-11-30

## 2023-11-30 RX ORDER — QUINIDINE GLUCONATE 324 MG
240 TABLET, EXTENDED RELEASE ORAL DAILY
Qty: 90 TABLET | Refills: 1 | Status: ON HOLD | OUTPATIENT
Start: 2023-11-30

## 2023-11-30 RX ADMIN — CYANOCOBALAMIN 1000 MCG: 1000 INJECTION, SOLUTION INTRAMUSCULAR; SUBCUTANEOUS at 09:28

## 2023-11-30 NOTE — PROGRESS NOTES
PROGRESS NOTE    SUBJECTIVE:   Karen Goddard is a 69 y.o. female seen for a follow up visit for   Chief Complaint   Patient presents with    Follow-up     HPI  CHRONIC MEDICAL PROBLEMS    Thyroid Problem  Onset: years ago, starting as a Hashimoto's thyroiditis. Treatments tried: levothyroxine. Followed by endocrinology. Cholesterol Problem (elevated triglycerides and mildly elevated LDL)  This is a chronic problem. Onset: Remote. Currently not on a statin. Cardiovascular risk factors: Hypertension, sedentary lifestyle, obesity and family history of hyperlipidemia, coronary artery disease. Nuclear stress test 2019 related to shortness of breath and dyspnea. Results   1. Stress EKG: Normal.   2. SPECT Perfusion Imaging: Normal Perfusion. 3. LV Systolic Function is  normal.   4. Risk Assessment:  Low Risk Scan. Hypertension   Onset: 2017. There are no associated agents to hypertension. Risk factors include family history, dyslipidemia, a sedentary lifestyle, obesity, hypertension and postmenopause    Stage IIIb chronic kidney disease  Over the last year (5/2022 to 2/2023) baseline creatinine 1.10-1.61, GFR 36-52. LORA   Onset: 2022. Not tolerating CPAP. Reports she has turned the machine back in. She has been referred to ENT for evaluation if the inspire device would be beneficial.  She has seen ENT regarding the [facility] ROSS device. Overactive bladder  Chronic problem. Onset: Remote. She has seen urogynecology and urology. She has also had frequency of urine infection. She is on Macrodantin 50 mg daily preventative. She reports a referral to [facility]    Depression with anxiety  This is a chronic problem. Episode onset years ago. Currently on Lexapro 30 mg daily. She also uses amitriptyline at night.         Past Medical History:   Diagnosis Date    Acute kidney insufficiency     Followed by PCP     Adverse effect of anesthesia     2016 in [place] heart stopped at the end of

## 2023-12-01 ENCOUNTER — CLINICAL DOCUMENTATION (OUTPATIENT)
Dept: ORTHOPEDIC SURGERY | Age: 78
End: 2023-12-01

## 2023-12-03 DIAGNOSIS — I10 PRIMARY HYPERTENSION: ICD-10-CM

## 2023-12-04 RX ORDER — AMLODIPINE BESYLATE 5 MG/1
5 TABLET ORAL DAILY
Qty: 90 TABLET | Refills: 3 | Status: SHIPPED | OUTPATIENT
Start: 2023-12-04

## 2023-12-12 ENCOUNTER — TELEPHONE (OUTPATIENT)
Dept: ORTHOPEDIC SURGERY | Age: 78
End: 2023-12-12

## 2023-12-12 NOTE — TELEPHONE ENCOUNTER
Please call patient, index turns blue and swelling as the thumb is swelling too, appt is in [facility]

## 2023-12-13 ENCOUNTER — NURSE ONLY (OUTPATIENT)
Dept: INTERNAL MEDICINE CLINIC | Facility: CLINIC | Age: 78
End: 2023-12-13
Payer: MEDICARE

## 2023-12-13 DIAGNOSIS — E53.8 VITAMIN B 12 DEFICIENCY: Primary | ICD-10-CM

## 2023-12-13 PROCEDURE — 96372 THER/PROPH/DIAG INJ SC/IM: CPT | Performed by: NURSE PRACTITIONER

## 2023-12-13 RX ORDER — CYANOCOBALAMIN 1000 UG/ML
1000 INJECTION, SOLUTION INTRAMUSCULAR; SUBCUTANEOUS ONCE
Status: COMPLETED | OUTPATIENT
Start: 2023-12-13 | End: 2023-12-13

## 2023-12-13 RX ADMIN — CYANOCOBALAMIN 1000 MCG: 1000 INJECTION, SOLUTION INTRAMUSCULAR; SUBCUTANEOUS at 11:40

## 2023-12-13 ASSESSMENT — ENCOUNTER SYMPTOMS
CHEST TIGHTNESS: 0
COLOR CHANGE: 0
SHORTNESS OF BREATH: 0
ABDOMINAL PAIN: 0
COUGH: 0
WHEEZING: 0
BACK PAIN: 0

## 2023-12-13 NOTE — TELEPHONE ENCOUNTER
I spoke with Ms. Kell Mota and then worked her in to see Seema Del Rosario tomorrow an the [facility]. She voiced understanding.

## 2023-12-14 ENCOUNTER — OFFICE VISIT (OUTPATIENT)
Dept: ORTHOPEDIC SURGERY | Age: 78
End: 2023-12-14

## 2023-12-14 VITALS — BODY MASS INDEX: 34.63 KG/M2 | WEIGHT: 165 LBS | HEIGHT: 58 IN

## 2023-12-14 DIAGNOSIS — M79.641 RIGHT HAND PAIN: Primary | ICD-10-CM

## 2023-12-14 DIAGNOSIS — I73.00 RAYNAUD'S DISEASE WITHOUT GANGRENE: ICD-10-CM

## 2023-12-14 DIAGNOSIS — M19.039 WRIST ARTHRITIS: ICD-10-CM

## 2023-12-14 RX ORDER — BETAMETHASONE SODIUM PHOSPHATE AND BETAMETHASONE ACETATE 3; 3 MG/ML; MG/ML
6 INJECTION, SUSPENSION INTRA-ARTICULAR; INTRALESIONAL; INTRAMUSCULAR; SOFT TISSUE ONCE
Status: COMPLETED | OUTPATIENT
Start: 2023-12-14 | End: 2023-12-14

## 2023-12-14 RX ADMIN — BETAMETHASONE SODIUM PHOSPHATE AND BETAMETHASONE ACETATE 6 MG: 3; 3 INJECTION, SUSPENSION INTRA-ARTICULAR; INTRALESIONAL; INTRAMUSCULAR; SOFT TISSUE at 12:12

## 2023-12-14 NOTE — PROGRESS NOTES
Orthopaedic Hand Clinic Note    Name: Joan Farah  YOB: 1945  Gender: female  MRN: [id number]      Follow up visit:   1. Right hand pain    2. Raynaud's disease without gangrene    3. Wrist arthritis        HPI: Joan Farah is a 69 y.o. female who is following up for right thumb pain. The injection I gave her last visit in February did work well for her. The patient also has new complaint of purple discoloration of the index finger and sometimes the thumb. She says sometimes they do turn white. She has significant cold intolerance in the digits. She has a history of a prior positive FRANKY. Her PCP ordered a repeat FRANKY panel, and she has not had this drawn yet. ROS/Meds/PSH/PMH/FH/SH: I personally reviewed the patients standard intake form. Pertinents are discussed in the HPI    Physical Examination:    Musculoskeletal Examination:  Examination on the right upper extremity demonstrates cap refill < 5 seconds in all fingers, There is a well healed incision over the radial aspect of the thumb CMC joint consistent with prior trapeziectomy, and well healed incision at the medial elbow consistent with cubital tunnel release. There is tenderness to palpation at the trapeziectomy site . Pain is reproduced with CMC grind. There is cyanosis of the tip  of the index finger, no ulceration present. Imaging / Electrodiagnostic Tests:     Hand XR: AP, Lateral, Oblique and Thumb CMC joint     Clinical Indication:  1. Right hand pain    2. Raynaud's disease without gangrene    3. Wrist arthritis           Report: AP, lateral, oblique and thumb CMC joint x-ray of the right hand demonstrates  s/p trapeziectomy, and excision of the proximal trapezoid. There is subsidence of the thumb metacarpal, and there is a small ossicle remaining in the trapeziectomy space. There is a post traumatic deformity of the middle finger middle phalanx.  There are moderate degenerative changes of the PIP and DIP

## 2023-12-16 PROBLEM — A04.72 C. DIFFICILE COLITIS: Status: ACTIVE | Noted: 2023-12-16

## 2023-12-27 ENCOUNTER — NURSE ONLY (OUTPATIENT)
Dept: INTERNAL MEDICINE CLINIC | Facility: CLINIC | Age: 78
End: 2023-12-27
Payer: MEDICARE

## 2023-12-27 DIAGNOSIS — E03.9 PRIMARY HYPOTHYROIDISM: ICD-10-CM

## 2023-12-27 DIAGNOSIS — E53.8 VITAMIN B 12 DEFICIENCY: Primary | ICD-10-CM

## 2023-12-27 LAB
T4 FREE SERPL-MCNC: 1.5 NG/DL (ref 0.78–1.46)
TSH, 3RD GENERATION: 0.01 UIU/ML (ref 0.36–3.74)

## 2023-12-27 PROCEDURE — 96372 THER/PROPH/DIAG INJ SC/IM: CPT | Performed by: NURSE PRACTITIONER

## 2023-12-27 RX ORDER — CYANOCOBALAMIN 1000 UG/ML
1000 INJECTION, SOLUTION INTRAMUSCULAR; SUBCUTANEOUS ONCE
Status: COMPLETED | OUTPATIENT
Start: 2023-12-27 | End: 2023-12-27

## 2023-12-27 RX ADMIN — CYANOCOBALAMIN 1000 MCG: 1000 INJECTION, SOLUTION INTRAMUSCULAR; SUBCUTANEOUS at 10:57

## 2023-12-28 ENCOUNTER — TELEPHONE (OUTPATIENT)
Dept: ENDOCRINOLOGY | Age: 78
End: 2023-12-28

## 2023-12-28 RX ORDER — LEVOTHYROXINE SODIUM 125 MCG
TABLET ORAL
Qty: 102 TABLET | Refills: 3 | Status: SHIPPED | OUTPATIENT
Start: 2023-12-28

## 2023-12-28 NOTE — TELEPHONE ENCOUNTER
Now overtreated on 150mcg of name brand Synthroid, was undertreated at 139mcg dose    Will send 125mcg tablets, take 1 daily and 2 on Sunday, TDD 143mcg    Recheck labs just prior our March 14 follow up visit

## 2024-01-03 ENCOUNTER — OFFICE VISIT (OUTPATIENT)
Dept: UROLOGY | Age: 79
End: 2024-01-03
Payer: MEDICARE

## 2024-01-03 DIAGNOSIS — N39.3 SUI (STRESS URINARY INCONTINENCE, FEMALE): ICD-10-CM

## 2024-01-03 DIAGNOSIS — N32.81 OAB (OVERACTIVE BLADDER): ICD-10-CM

## 2024-01-03 DIAGNOSIS — N39.0 RECURRENT UTI: Primary | ICD-10-CM

## 2024-01-03 LAB
BILIRUBIN, URINE, POC: NORMAL
BLOOD URINE, POC: NEGATIVE
GLUCOSE URINE, POC: NEGATIVE
KETONES, URINE, POC: NEGATIVE
LEUKOCYTE ESTERASE, URINE, POC: NORMAL
NITRITE, URINE, POC: NEGATIVE
PH, URINE, POC: 5.5 (ref 4.6–8)
PROTEIN,URINE, POC: NEGATIVE
SPECIFIC GRAVITY, URINE, POC: 1.03 (ref 1–1.03)
URINALYSIS CLARITY, POC: NORMAL
URINALYSIS COLOR, POC: NORMAL
UROBILINOGEN, POC: NORMAL

## 2024-01-03 PROCEDURE — 99214 OFFICE O/P EST MOD 30 MIN: CPT | Performed by: NURSE PRACTITIONER

## 2024-01-03 PROCEDURE — 1123F ACP DISCUSS/DSCN MKR DOCD: CPT | Performed by: NURSE PRACTITIONER

## 2024-01-03 PROCEDURE — 81003 URINALYSIS AUTO W/O SCOPE: CPT | Performed by: NURSE PRACTITIONER

## 2024-01-03 RX ORDER — SOLIFENACIN SUCCINATE 10 MG/1
10 TABLET, FILM COATED ORAL DAILY
Qty: 30 TABLET | Refills: 5 | Status: SHIPPED | OUTPATIENT
Start: 2024-01-03 | End: 2025-01-02

## 2024-01-03 RX ORDER — METHENAMINE HIPPURATE 1000 MG/1
1 TABLET ORAL 2 TIMES DAILY WITH MEALS
Qty: 60 TABLET | Refills: 5 | Status: SHIPPED | OUTPATIENT
Start: 2024-01-03

## 2024-01-03 NOTE — PROGRESS NOTES
solifenacin (VESICARE) 10 MG tablet      3. ANASTASIA (stress urinary incontinence, female)  N39.3 AMB POC URINALYSIS DIP STICK AUTO W/O MICRO      Holding further antibiotic suppression due to recent C.Diff. Will try hiprex BID and Vit C 500 mg daily. UTI prevention discussed.     Gemtesa too expensive. Will go back to vesicare. Will add stool softener if constipation returns.     ROV in 3 months. To call sooner if needed.     Mihaela Flor, APRJAM - CNP  Dr. Cabral is supervising physician today and he approves plan of care.

## 2024-01-10 ENCOUNTER — NURSE ONLY (OUTPATIENT)
Dept: INTERNAL MEDICINE CLINIC | Facility: CLINIC | Age: 79
End: 2024-01-10
Payer: MEDICARE

## 2024-01-10 DIAGNOSIS — E53.8 VITAMIN B 12 DEFICIENCY: Primary | ICD-10-CM

## 2024-01-10 PROCEDURE — 96372 THER/PROPH/DIAG INJ SC/IM: CPT | Performed by: NURSE PRACTITIONER

## 2024-01-10 RX ORDER — CYANOCOBALAMIN 1000 UG/ML
1000 INJECTION, SOLUTION INTRAMUSCULAR; SUBCUTANEOUS ONCE
Status: COMPLETED | OUTPATIENT
Start: 2024-01-10 | End: 2024-01-10

## 2024-01-10 RX ADMIN — CYANOCOBALAMIN 1000 MCG: 1000 INJECTION, SOLUTION INTRAMUSCULAR; SUBCUTANEOUS at 11:30

## 2024-01-19 ENCOUNTER — CARE COORDINATION (OUTPATIENT)
Dept: CARE COORDINATION | Facility: CLINIC | Age: 79
End: 2024-01-19

## 2024-01-19 NOTE — CARE COORDINATION
Patient has graduated from the Care Transitions program on 1/19.  Patient/family has the ability to self-manage at this time. Patient has no further care management needs, no referral to the ACM team for further management.     Patient has Care Transition Nurse's contact information for any further questions, concerns, or needs.  Patients upcoming visits:    Future Appointments   Date Time Provider Department Center   1/24/2024 10:30 AM [facility] NURSE ONLY MLMIM [facility] AMB   1/24/2024 11:00 AM Madison Fernandez MD [id number] [facility] AMB   2/2/2024  9:50 AM SUJATA Saleh MD PO [facility] AMB   2/15/2024  1:40 PM Julien Abbott MD PSCD [facility] AMB   2/27/2024  8:00 AM Inocencio Ornelas DO BSR[facility] AMB   3/7/2024  2:30 PM Courtney Cobian, APRN - NP MLMIM [facility] AMB   3/14/2024  1:00 PM Leandra Cabello PA-C END [facility] AMB   4/3/2024 10:00 AM Mihaela Flor, APRN - CNP [id number] [facility] AMB

## 2024-01-22 ENCOUNTER — CARE COORDINATION (OUTPATIENT)
Dept: CARE COORDINATION | Facility: CLINIC | Age: 79
End: 2024-01-22

## 2024-01-22 NOTE — CARE COORDINATION
Patient has graduated from the Care Transitions program on 1/22.  Patient/family has the ability to self-manage at this time. Patient has no further care management needs, no referral to the ACM team for further management.     Patient has Care Transition Nurse's contact information for any further questions, concerns, or needs.  Patients upcoming visits:    Future Appointments   Date Time Provider Department Center   1/24/2024 10:30 AM [facility] NURSE ONLY [facility] AMB   1/24/2024 11:00 AM Madison Fernandez MD [id number] [facility] AMB   2/2/2024  9:50 AM SUJATA Saleh MD PO [facility] AMB   2/15/2024  1:40 PM Julien Abbott MD PSCD [facility] AMB   2/27/2024  8:00 AM Inocencio Ornelas DO [facility] AMB   3/7/2024  2:30 PM Courtney Cobian, APRN - NP MLMIM [facility] AMB   3/14/2024  1:00 PM Leandra Cabello PA-C END [facility] AMB   4/3/2024 10:00 AM Mihaela Flor, APRN - CNP [id number] [facility] AMB

## 2024-01-24 ENCOUNTER — TELEMEDICINE (OUTPATIENT)
Dept: BEHAVIORAL/MENTAL HEALTH CLINIC | Age: 79
End: 2024-01-24
Payer: MEDICARE

## 2024-01-24 DIAGNOSIS — Z65.8 PSYCHOSOCIAL STRESSORS: ICD-10-CM

## 2024-01-24 DIAGNOSIS — F43.10 PTSD (POST-TRAUMATIC STRESS DISORDER): ICD-10-CM

## 2024-01-24 DIAGNOSIS — F51.05 INSOMNIA DUE TO MENTAL DISORDER: ICD-10-CM

## 2024-01-24 DIAGNOSIS — F41.1 GENERALIZED ANXIETY DISORDER: ICD-10-CM

## 2024-01-24 DIAGNOSIS — F33.41 RECURRENT MAJOR DEPRESSIVE DISORDER, IN PARTIAL REMISSION (HCC): Primary | ICD-10-CM

## 2024-01-24 PROCEDURE — 1123F ACP DISCUSS/DSCN MKR DOCD: CPT | Performed by: PSYCHIATRY & NEUROLOGY

## 2024-01-24 PROCEDURE — 99214 OFFICE O/P EST MOD 30 MIN: CPT | Performed by: PSYCHIATRY & NEUROLOGY

## 2024-01-24 RX ORDER — CLONAZEPAM 0.5 MG/1
0.5 TABLET ORAL 2 TIMES DAILY PRN
Qty: 60 TABLET | Refills: 2 | Status: SHIPPED | OUTPATIENT
Start: 2024-01-24 | End: 2024-04-23

## 2024-01-24 RX ORDER — DESVENLAFAXINE SUCCINATE 50 MG/1
50 TABLET, EXTENDED RELEASE ORAL DAILY
Qty: 30 TABLET | Refills: 3 | Status: SHIPPED | OUTPATIENT
Start: 2024-01-24

## 2024-01-24 ASSESSMENT — ANXIETY QUESTIONNAIRES
2. NOT BEING ABLE TO STOP OR CONTROL WORRYING: 3
4. TROUBLE RELAXING: 0
7. FEELING AFRAID AS IF SOMETHING AWFUL MIGHT HAPPEN: 1
6. BECOMING EASILY ANNOYED OR IRRITABLE: 1
GAD7 TOTAL SCORE: 12
5. BEING SO RESTLESS THAT IT IS HARD TO SIT STILL: 2
IF YOU CHECKED OFF ANY PROBLEMS ON THIS QUESTIONNAIRE, HOW DIFFICULT HAVE THESE PROBLEMS MADE IT FOR YOU TO DO YOUR WORK, TAKE CARE OF THINGS AT HOME, OR GET ALONG WITH OTHER PEOPLE: NOT DIFFICULT AT ALL
3. WORRYING TOO MUCH ABOUT DIFFERENT THINGS: 3
1. FEELING NERVOUS, ANXIOUS, OR ON EDGE: 2

## 2024-01-24 ASSESSMENT — PATIENT HEALTH QUESTIONNAIRE - PHQ9
3. TROUBLE FALLING OR STAYING ASLEEP: 3
2. FEELING DOWN, DEPRESSED OR HOPELESS: 1
SUM OF ALL RESPONSES TO PHQ9 QUESTIONS 1 & 2: 1
SUM OF ALL RESPONSES TO PHQ QUESTIONS 1-9: 6
SUM OF ALL RESPONSES TO PHQ QUESTIONS 1-9: 6
6. FEELING BAD ABOUT YOURSELF - OR THAT YOU ARE A FAILURE OR HAVE LET YOURSELF OR YOUR FAMILY DOWN: 0
8. MOVING OR SPEAKING SO SLOWLY THAT OTHER PEOPLE COULD HAVE NOTICED. OR THE OPPOSITE, BEING SO FIGETY OR RESTLESS THAT YOU HAVE BEEN MOVING AROUND A LOT MORE THAN USUAL: 0
SUM OF ALL RESPONSES TO PHQ QUESTIONS 1-9: 6
SUM OF ALL RESPONSES TO PHQ QUESTIONS 1-9: 6
9. THOUGHTS THAT YOU WOULD BE BETTER OFF DEAD, OR OF HURTING YOURSELF: 0
5. POOR APPETITE OR OVEREATING: 0
4. FEELING TIRED OR HAVING LITTLE ENERGY: 2
1. LITTLE INTEREST OR PLEASURE IN DOING THINGS: 0

## 2024-01-24 NOTE — PROGRESS NOTES
history, as maintained in the [place] prescription monitoring program, so that the prescription(s) for a  controlled substance can be given.    Recommendations and Referrals:    Follow up with : MD, requires monitoring of response to medication, requires monitoring of medication side effects.    Time until next PMA: 3 months    Follow up with Mental Health Clinicians recommended for : psychotherapy interventions,  monitoring to prevent decompensation /hospitalization, monitoring to maintain therapeutic gains, monitoring symptoms (resolving and controlled), to improve level of functioning,         Psychotherapy note:                                __10_ Minutes of psychotherapy     [x]  Supportive psychotherapy provided to improve self-esteem, psychological functioning, and adaptive skills. Patient discussed certain situational and personal stressors ongoing in her life at this time, weight management d/w the patient. Sleep hygiene d/w patient. Patient allowed to vent out her emotions.      []  Disposition planning      []  Dangerous and will not contract for safety in the community    **Pateint has been notified: They are to call 911 or go to their nearest E.R. if they are experiencing a medical emergency or suicidal ideations/homicidal ideations.**  All ancillary documentation entered reviewed by provider.      PLEASE NOTE:  This document has been produced in part or whole using voice recognition software. Proofread however unrecognized errors in transcription may be present.    MD My Dempsey, was evaluated through a synchronous (real-time) audio-video encounter. The patient (or guardian if applicable) is aware that this is a billable service, which includes applicable co-pays. This Virtual Visit was conducted with patient's (and/or legal guardian's) consent. Patient identification was verified, and a caregiver was present when appropriate.   The patient was located at Home: [address]

## 2024-01-28 DIAGNOSIS — I10 PRIMARY HYPERTENSION: ICD-10-CM

## 2024-01-29 ENCOUNTER — TELEPHONE (OUTPATIENT)
Dept: INTERNAL MEDICINE CLINIC | Facility: CLINIC | Age: 79
End: 2024-01-29

## 2024-01-29 RX ORDER — LISINOPRIL 20 MG/1
20 TABLET ORAL DAILY
Qty: 90 TABLET | Refills: 3 | Status: SHIPPED | OUTPATIENT
Start: 2024-01-29

## 2024-01-29 NOTE — TELEPHONE ENCOUNTER
----- Message from My Rebolledo sent at 1/28/2024  1:19 PM EST -----  Regarding:  CHange in mail order  Contact: [phone]  Press for mail in are now to be filled by [employer] [employer] [phone] provider number.

## 2024-01-29 NOTE — TELEPHONE ENCOUNTER
Called and confirmed with patient that she is now wanting to add a new pharmacy to her list: [facility].

## 2024-01-31 ENCOUNTER — NURSE ONLY (OUTPATIENT)
Dept: INTERNAL MEDICINE CLINIC | Facility: CLINIC | Age: 79
End: 2024-01-31

## 2024-01-31 ENCOUNTER — TELEPHONE (OUTPATIENT)
Dept: UROLOGY | Age: 79
End: 2024-01-31

## 2024-01-31 ENCOUNTER — OFFICE VISIT (OUTPATIENT)
Dept: UROLOGY | Age: 79
End: 2024-01-31
Payer: MEDICARE

## 2024-01-31 DIAGNOSIS — N39.0 RECURRENT UTI: Primary | ICD-10-CM

## 2024-01-31 DIAGNOSIS — E53.8 VITAMIN B 12 DEFICIENCY: Primary | ICD-10-CM

## 2024-01-31 LAB
BILIRUBIN, URINE, POC: NEGATIVE
BLOOD URINE, POC: NEGATIVE
GLUCOSE URINE, POC: NEGATIVE
KETONES, URINE, POC: NEGATIVE
LEUKOCYTE ESTERASE, URINE, POC: NEGATIVE
NITRITE, URINE, POC: NEGATIVE
PH, URINE, POC: 5.5 (ref 4.6–8)
PROTEIN,URINE, POC: 30
SPECIFIC GRAVITY, URINE, POC: 1.03 (ref 1–1.03)
URINALYSIS CLARITY, POC: NORMAL
URINALYSIS COLOR, POC: NORMAL
UROBILINOGEN, POC: NORMAL

## 2024-01-31 PROCEDURE — 81003 URINALYSIS AUTO W/O SCOPE: CPT | Performed by: NURSE PRACTITIONER

## 2024-01-31 RX ORDER — CYANOCOBALAMIN 1000 UG/ML
1000 INJECTION, SOLUTION INTRAMUSCULAR; SUBCUTANEOUS ONCE
Status: COMPLETED | OUTPATIENT
Start: 2024-01-31 | End: 2024-01-31

## 2024-01-31 RX ADMIN — CYANOCOBALAMIN 1000 MCG: 1000 INJECTION, SOLUTION INTRAMUSCULAR; SUBCUTANEOUS at 16:00

## 2024-01-31 NOTE — TELEPHONE ENCOUNTER
Called pt with normal results. Pt want to know what is causing the burning. Pt did say the cream/ointment does not work. Any other option. Pt do not have obgyn

## 2024-01-31 NOTE — PROGRESS NOTES
SX: Dysuria  Results for orders placed or performed in visit on 01/31/24   AMB POC URINALYSIS DIP STICK AUTO W/O MICRO   Result Value Ref Range    Color (UA POC)      Clarity (UA POC)      Glucose, Urine, POC Negative     Bilirubin, Urine, POC Negative     KETONES, Urine, POC Negative     Specific Gravity, Urine, POC 1.030 1.001 - 1.035    Blood (UA POC) Negative     pH, Urine, POC 5.5 4.6 - 8.0    Protein, Urine, POC 30     Urobilinogen, POC 0.2 mg/dL     Nitrite, Urine, POC Negative     Leukocyte Esterase, Urine, POC Negative

## 2024-01-31 NOTE — PROGRESS NOTES
SX: Dysuria  Results for orders placed or performed in visit on 01/31/24   AMB POC URINALYSIS DIP STICK AUTO W/O MICRO   Result Value Ref Range    Color (UA POC)      Clarity (UA POC)      Glucose, Urine, POC Negative     Bilirubin, Urine, POC Negative     KETONES, Urine, POC Negative     Specific Gravity, Urine, POC 1.030 1.001 - 1.035    Blood (UA POC) Negative     pH, Urine, POC 5.5 4.6 - 8.0    Protein, Urine, POC 30     Urobilinogen, POC 0.2 mg/dL     Nitrite, Urine, POC Negative     Leukocyte Esterase, Urine, POC Negative      Urine normal.   Mihaela Flor, APRN - CNP

## 2024-02-05 RX ORDER — PHENAZOPYRIDINE HYDROCHLORIDE 100 MG/1
100 TABLET, FILM COATED ORAL 3 TIMES DAILY PRN
Qty: 20 TABLET | Refills: 0 | Status: SHIPPED | OUTPATIENT
Start: 2024-02-05 | End: 2025-02-04

## 2024-02-14 ENCOUNTER — NURSE ONLY (OUTPATIENT)
Dept: INTERNAL MEDICINE CLINIC | Facility: CLINIC | Age: 79
End: 2024-02-14

## 2024-02-14 DIAGNOSIS — E53.8 VITAMIN B 12 DEFICIENCY: Primary | ICD-10-CM

## 2024-02-14 RX ORDER — CYANOCOBALAMIN 1000 UG/ML
1000 INJECTION, SOLUTION INTRAMUSCULAR; SUBCUTANEOUS ONCE
Status: COMPLETED | OUTPATIENT
Start: 2024-02-14 | End: 2024-02-14

## 2024-02-14 RX ADMIN — CYANOCOBALAMIN 1000 MCG: 1000 INJECTION, SOLUTION INTRAMUSCULAR; SUBCUTANEOUS at 10:58

## 2024-02-14 NOTE — PROGRESS NOTES
[facility] sleep [id number] visit  [address]  [place] [address]  [phone]    Patient Name:  My Rebolledo  YOB: 1945      Office Visit 2/15/2024    CHIEF COMPLAINT:    Chief Complaint   Patient presents with    Follow-up    Sleep Apnea       HISTORY OF PRESENT ILLNESS:      This pleasant lady came in today for a follow-up visit regarding sleep apnea and nocturnal hypoxemia.    To recap:  Patient has moderate sleep apnea AHI 15.1 lowest sat 80% with saturation less than 89% for 8.6 minutes.  Patient has failed auto CPAP therapy in the past, did not want surgery and was not a candidate for dental device.  During our last evaluation either she may be a good candidate for the inspire device and I sent her off to Dr. Webster for evaluation.    Patient's weight was also borderline and she was working on it to have a BMI close to 35.    At this time:  Patient indicates she is less than a favorable experience with Dr. Webster's office.  She indicated they took forever to get her finally in their after arguing with her over and over again that did not get records.  She indicated she called our office and they told her they sent it and then finally she got a call from the other office indicated come down.  She again indicated the experience was less than favorable and they told her that she waited too much to proceed.  She indicated she may have needed only lose a few pounds but they were not clear-cut.  Inconsequential she was just not very happy with her interaction with them.    She still wants to pursue treatment and to get better.    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  She indicates she tries her best, but it is difficult.    She also indicates she tries to be active and walk her dog at least 2-3 times a day outdoors in order to get some

## 2024-02-15 ENCOUNTER — OFFICE VISIT (OUTPATIENT)
Dept: SLEEP MEDICINE | Age: 79
End: 2024-02-15
Payer: MEDICARE

## 2024-02-15 VITALS
RESPIRATION RATE: 12 BRPM | HEIGHT: 58 IN | HEART RATE: 72 BPM | OXYGEN SATURATION: 97 % | WEIGHT: 175 LBS | SYSTOLIC BLOOD PRESSURE: 82 MMHG | DIASTOLIC BLOOD PRESSURE: 60 MMHG | BODY MASS INDEX: 36.73 KG/M2

## 2024-02-15 DIAGNOSIS — E66.9 OBESITY (BMI 30-39.9): ICD-10-CM

## 2024-02-15 DIAGNOSIS — R09.02 HYPOXEMIA: ICD-10-CM

## 2024-02-15 DIAGNOSIS — G47.00 INSOMNIA, UNSPECIFIED TYPE: ICD-10-CM

## 2024-02-15 DIAGNOSIS — G47.33 OSA (OBSTRUCTIVE SLEEP APNEA): Primary | ICD-10-CM

## 2024-02-15 PROCEDURE — 3074F SYST BP LT 130 MM HG: CPT | Performed by: INTERNAL MEDICINE

## 2024-02-15 PROCEDURE — 3078F DIAST BP <80 MM HG: CPT | Performed by: INTERNAL MEDICINE

## 2024-02-15 PROCEDURE — 99214 OFFICE O/P EST MOD 30 MIN: CPT | Performed by: INTERNAL MEDICINE

## 2024-02-15 PROCEDURE — 1123F ACP DISCUSS/DSCN MKR DOCD: CPT | Performed by: INTERNAL MEDICINE

## 2024-02-16 ENCOUNTER — OFFICE VISIT (OUTPATIENT)
Dept: RHEUMATOLOGY | Age: 79
End: 2024-02-16
Payer: MEDICARE

## 2024-02-16 VITALS
WEIGHT: 174 LBS | BODY MASS INDEX: 37.54 KG/M2 | HEIGHT: 57 IN | TEMPERATURE: 97.2 F | SYSTOLIC BLOOD PRESSURE: 134 MMHG | HEART RATE: 68 BPM | DIASTOLIC BLOOD PRESSURE: 75 MMHG

## 2024-02-16 DIAGNOSIS — I73.00 PRIMARY RAYNAUD'S PHENOMENON: ICD-10-CM

## 2024-02-16 DIAGNOSIS — M79.7 FIBROMYALGIA: ICD-10-CM

## 2024-02-16 DIAGNOSIS — R76.8 POSITIVE ANA (ANTINUCLEAR ANTIBODY): Primary | ICD-10-CM

## 2024-02-16 DIAGNOSIS — R76.8 ANA POSITIVE: ICD-10-CM

## 2024-02-16 DIAGNOSIS — N18.32 STAGE 3B CHRONIC KIDNEY DISEASE (HCC): Chronic | ICD-10-CM

## 2024-02-16 DIAGNOSIS — E66.01 SEVERE OBESITY (BMI 35.0-39.9) WITH COMORBIDITY (HCC): ICD-10-CM

## 2024-02-16 DIAGNOSIS — M19.91 PRIMARY OSTEOARTHRITIS, UNSPECIFIED SITE: ICD-10-CM

## 2024-02-16 PROCEDURE — 1123F ACP DISCUSS/DSCN MKR DOCD: CPT | Performed by: INTERNAL MEDICINE

## 2024-02-16 PROCEDURE — 3078F DIAST BP <80 MM HG: CPT | Performed by: INTERNAL MEDICINE

## 2024-02-16 PROCEDURE — 99205 OFFICE O/P NEW HI 60 MIN: CPT | Performed by: INTERNAL MEDICINE

## 2024-02-16 PROCEDURE — 3075F SYST BP GE 130 - 139MM HG: CPT | Performed by: INTERNAL MEDICINE

## 2024-02-16 RX ORDER — BACLOFEN 10 MG/1
10 TABLET ORAL NIGHTLY
Qty: 30 TABLET | Refills: 0 | Status: SHIPPED | OUTPATIENT
Start: 2024-02-16 | End: 2024-03-17

## 2024-02-16 NOTE — PATIENT INSTRUCTIONS
Continue amlodipine for raynaud's. Message me if this worsens the symptoms.  Get labs and urine studies done, these non-fasting.   Try a heating pad for back and neck pain. Continue follow-up with pain management  Start baclofen 5mg (1/2 tab) nightly for back pain and muscle spasms  Return for f/u in 4 weeks

## 2024-02-16 NOTE — PROGRESS NOTES
Patient with a history of diabetes.  He has been taking at 1000 mg of Metformin once a day.  And glipizide 10 mg with breakfast.  Patient most recent blood work was 6.5.  Will change Metformin to once a day.    voice recognition errors that the author did not detect.    Total time spent on this encounter was 60-74 minutes independently interpreting results, ordering medication & tests, performing examination, documentation, and in care coordination.       ---------------------------------------------------------------------------------------------------------------------------------------------------------------------------------------------------------------------------------

## 2024-03-06 DIAGNOSIS — I10 PRIMARY HYPERTENSION: Chronic | ICD-10-CM

## 2024-03-06 DIAGNOSIS — R76.8 ANA POSITIVE: ICD-10-CM

## 2024-03-06 DIAGNOSIS — Z83.2 FAMILY HISTORY OF AUTOIMMUNE DISORDER: ICD-10-CM

## 2024-03-06 DIAGNOSIS — E79.0 HYPERURICEMIA: ICD-10-CM

## 2024-03-06 DIAGNOSIS — R76.8 POSITIVE ANA (ANTINUCLEAR ANTIBODY): ICD-10-CM

## 2024-03-06 DIAGNOSIS — R73.01 ELEVATED FASTING GLUCOSE: ICD-10-CM

## 2024-03-06 DIAGNOSIS — E03.9 PRIMARY HYPOTHYROIDISM: ICD-10-CM

## 2024-03-06 LAB
ALBUMIN SERPL-MCNC: 3.6 G/DL (ref 3.2–4.6)
ALBUMIN/GLOB SERPL: 1.2 (ref 0.4–1.6)
ALP SERPL-CCNC: 71 U/L (ref 50–136)
ALT SERPL-CCNC: 19 U/L (ref 12–65)
ANION GAP SERPL CALC-SCNC: ABNORMAL MMOL/L (ref 2–11)
AST SERPL-CCNC: 11 U/L (ref 15–37)
BASOPHILS # BLD: 0.1 K/UL (ref 0–0.2)
BASOPHILS NFR BLD: 1 % (ref 0–2)
BILIRUB SERPL-MCNC: 0.4 MG/DL (ref 0.2–1.1)
BUN SERPL-MCNC: 22 MG/DL (ref 8–23)
CALCIUM SERPL-MCNC: 9.3 MG/DL (ref 8.3–10.4)
CHLORIDE SERPL-SCNC: 105 MMOL/L (ref 103–113)
CO2 SERPL-SCNC: 35 MMOL/L (ref 21–32)
CREAT SERPL-MCNC: 1.1 MG/DL (ref 0.6–1)
CREAT UR-MCNC: 92 MG/DL
CRP SERPL-MCNC: 0.8 MG/DL (ref 0–0.9)
DIFFERENTIAL METHOD BLD: NORMAL
EOSINOPHIL # BLD: 0.3 K/UL (ref 0–0.8)
EOSINOPHIL NFR BLD: 3 % (ref 0.5–7.8)
ERYTHROCYTE [DISTWIDTH] IN BLOOD BY AUTOMATED COUNT: 13.4 % (ref 11.9–14.6)
ERYTHROCYTE [SEDIMENTATION RATE] IN BLOOD: 4 MM/HR (ref 0–30)
GLOBULIN SER CALC-MCNC: 3 G/DL (ref 2.8–4.5)
GLUCOSE SERPL-MCNC: 94 MG/DL (ref 65–100)
HCT VFR BLD AUTO: 40.4 % (ref 35.8–46.3)
HGB BLD-MCNC: 12.8 G/DL (ref 11.7–15.4)
IMM GRANULOCYTES # BLD AUTO: 0 K/UL (ref 0–0.5)
IMM GRANULOCYTES NFR BLD AUTO: 0 % (ref 0–5)
LYMPHOCYTES # BLD: 2.2 K/UL (ref 0.5–4.6)
LYMPHOCYTES NFR BLD: 24 % (ref 13–44)
MCH RBC QN AUTO: 29.4 PG (ref 26.1–32.9)
MCHC RBC AUTO-ENTMCNC: 31.7 G/DL (ref 31.4–35)
MCV RBC AUTO: 92.9 FL (ref 82–102)
MONOCYTES # BLD: 0.5 K/UL (ref 0.1–1.3)
MONOCYTES NFR BLD: 5 % (ref 4–12)
NEUTS SEG # BLD: 6.1 K/UL (ref 1.7–8.2)
NEUTS SEG NFR BLD: 67 % (ref 43–78)
NRBC # BLD: 0 K/UL (ref 0–0.2)
PLATELET # BLD AUTO: 281 K/UL (ref 150–450)
PMV BLD AUTO: 11.2 FL (ref 9.4–12.3)
POTASSIUM SERPL-SCNC: 5 MMOL/L (ref 3.5–5.1)
PROT SERPL-MCNC: 6.6 G/DL (ref 6.3–8.2)
PROT UR-MCNC: 14 MG/DL
PROT/CREAT UR-RTO: 0.2
RBC # BLD AUTO: 4.35 M/UL (ref 4.05–5.2)
SODIUM SERPL-SCNC: 139 MMOL/L (ref 136–146)
T4 FREE SERPL-MCNC: 1.4 NG/DL (ref 0.78–1.46)
TSH, 3RD GENERATION: 0.02 UIU/ML (ref 0.36–3.74)
URATE SERPL-MCNC: 6.6 MG/DL (ref 2.6–6)
WBC # BLD AUTO: 9.3 K/UL (ref 4.3–11.1)

## 2024-03-07 ENCOUNTER — OFFICE VISIT (OUTPATIENT)
Dept: INTERNAL MEDICINE CLINIC | Facility: CLINIC | Age: 79
End: 2024-03-07
Payer: MEDICARE

## 2024-03-07 VITALS
HEART RATE: 66 BPM | BODY MASS INDEX: 36.75 KG/M2 | SYSTOLIC BLOOD PRESSURE: 136 MMHG | DIASTOLIC BLOOD PRESSURE: 80 MMHG | WEIGHT: 172 LBS | OXYGEN SATURATION: 97 %

## 2024-03-07 DIAGNOSIS — E53.8 VITAMIN B 12 DEFICIENCY: Primary | ICD-10-CM

## 2024-03-07 DIAGNOSIS — N18.32 STAGE 3B CHRONIC KIDNEY DISEASE (HCC): Chronic | ICD-10-CM

## 2024-03-07 DIAGNOSIS — I10 PRIMARY HYPERTENSION: ICD-10-CM

## 2024-03-07 DIAGNOSIS — E61.1 IRON DEFICIENCY: ICD-10-CM

## 2024-03-07 DIAGNOSIS — E55.9 VITAMIN D DEFICIENCY: ICD-10-CM

## 2024-03-07 DIAGNOSIS — E79.0 HYPERURICEMIA: ICD-10-CM

## 2024-03-07 LAB
C3 SERPL-MCNC: 156 MG/DL (ref 82–167)
C4 SERPL-MCNC: 23 MG/DL (ref 12–38)
CENTROMERE B AB SER-ACNC: <0.2 AI (ref 0–0.9)
CHROMATIN AB SERPL-ACNC: <0.2 AI (ref 0–0.9)
DSDNA AB SER-ACNC: <1 IU/ML (ref 0–9)
ENA JO1 AB SER-ACNC: <0.2 AI (ref 0–0.9)
ENA RNP AB SER-ACNC: <0.2 AI (ref 0–0.9)
ENA SCL70 AB SER-ACNC: <0.2 AI (ref 0–0.9)
ENA SM AB SER-ACNC: <0.2 AI (ref 0–0.9)
ENA SS-A AB SER-ACNC: <0.2 AI (ref 0–0.9)
ENA SS-B AB SER-ACNC: 1.8 AI (ref 0–0.9)
EST. AVERAGE GLUCOSE BLD GHB EST-MCNC: 103 MG/DL
HBA1C MFR BLD: 5.2 % (ref 4.8–5.6)
Lab: ABNORMAL

## 2024-03-07 PROCEDURE — 3078F DIAST BP <80 MM HG: CPT | Performed by: NURSE PRACTITIONER

## 2024-03-07 PROCEDURE — 99214 OFFICE O/P EST MOD 30 MIN: CPT | Performed by: NURSE PRACTITIONER

## 2024-03-07 PROCEDURE — 1123F ACP DISCUSS/DSCN MKR DOCD: CPT | Performed by: NURSE PRACTITIONER

## 2024-03-07 PROCEDURE — 96372 THER/PROPH/DIAG INJ SC/IM: CPT | Performed by: NURSE PRACTITIONER

## 2024-03-07 PROCEDURE — 3075F SYST BP GE 130 - 139MM HG: CPT | Performed by: NURSE PRACTITIONER

## 2024-03-07 RX ORDER — AMLODIPINE BESYLATE 5 MG/1
5 TABLET ORAL DAILY
Qty: 90 TABLET | Refills: 3 | Status: SHIPPED | OUTPATIENT
Start: 2024-03-07

## 2024-03-07 RX ORDER — QUINIDINE GLUCONATE 324 MG
240 TABLET, EXTENDED RELEASE ORAL DAILY
Qty: 90 TABLET | Refills: 1 | Status: SHIPPED | OUTPATIENT
Start: 2024-03-07

## 2024-03-07 RX ORDER — CYANOCOBALAMIN 1000 UG/ML
1000 INJECTION, SOLUTION INTRAMUSCULAR; SUBCUTANEOUS ONCE
Status: COMPLETED | OUTPATIENT
Start: 2024-03-07 | End: 2024-03-07

## 2024-03-07 RX ADMIN — CYANOCOBALAMIN 1000 MCG: 1000 INJECTION, SOLUTION INTRAMUSCULAR; SUBCUTANEOUS at 16:08

## 2024-03-07 SDOH — ECONOMIC STABILITY: FOOD INSECURITY: WITHIN THE PAST 12 MONTHS, THE FOOD YOU BOUGHT JUST DIDN'T LAST AND YOU DIDN'T HAVE MONEY TO GET MORE.: SOMETIMES TRUE

## 2024-03-07 SDOH — ECONOMIC STABILITY: INCOME INSECURITY: HOW HARD IS IT FOR YOU TO PAY FOR THE VERY BASICS LIKE FOOD, HOUSING, MEDICAL CARE, AND HEATING?: VERY HARD

## 2024-03-07 SDOH — ECONOMIC STABILITY: FOOD INSECURITY: WITHIN THE PAST 12 MONTHS, YOU WORRIED THAT YOUR FOOD WOULD RUN OUT BEFORE YOU GOT MONEY TO BUY MORE.: OFTEN TRUE

## 2024-03-07 NOTE — PROGRESS NOTES
PROGRESS NOTE    SUBJECTIVE:   My Rebolledo is a 78 y.o. female seen for a follow up visit for   Chief Complaint   Patient presents with    Hypertension     Lab results      HPI  CHRONIC MEDICAL PROBLEMS    Thyroid Problem  Onset: years ago, starting as a Hashimoto's thyroiditis.  Treatments tried: levothyroxine.  Followed by endocrinology.    Cholesterol Problem (elevated triglycerides and mildly elevated LDL)  This is a chronic problem.  Onset: Remote.  Currently not on a statin.  Cardiovascular risk factors: Hypertension, sedentary lifestyle, obesity and family history of hyperlipidemia, coronary artery disease.  Nuclear stress test 2019 related to shortness of breath and dyspnea.   Results   1. Stress EKG: Normal.   2. SPECT Perfusion Imaging: Normal Perfusion.  3. LV Systolic Function is  normal.   4. Risk Assessment:  Low Risk Scan.    Hypertension   Onset: 2017. There are no associated agents to hypertension. Risk factors include family history, dyslipidemia, a sedentary lifestyle, obesity, hypertension and postmenopause    Stage IIIb chronic kidney disease  Over the last year (5/2022 to 2/2023) baseline creatinine 1.10-1.61, GFR 36-52.    LORA   Onset: 2022.  Not tolerating CPAP.  Reports she has turned the machine back in.  She has been referred to ENT for evaluation if the inspire device would be beneficial.  She has seen ENT regarding the Inspire device.    Overactive bladder  Chronic problem.  Onset: Remote.  She has seen urogynecology and urology.  She has also had frequency of urine infection.  She is on Macrodantin 50 mg daily preventative.  She reports a referral to [facility]    Depression with anxiety  This is a chronic problem.  Episode onset years ago.  Currently on Lexapro 30 mg daily.  She also uses amitriptyline at night.      Past Medical History:   Diagnosis Date    Acute kidney insufficiency     Followed by PCP     Adverse effect of anesthesia     2016 in [place] \"My heart

## 2024-03-07 NOTE — PATIENT INSTRUCTIONS
Info: Open Thursday 8am-12pm.    [facility]  They offer: Groceries/food.  Contact: [phone]; [address] [place] [address]  Helpful Info: Open 8am-5pm Monday-Thursdays, 8am-12pm Fridays.    [facility]  They offer: Groceries/food.  Contact: [phone]; [address] [place] [address]  Helpful Info: Must call for hours and availability.     [facility]  They offer: Groceries/food.  Contact: [phone]  Helpful Info: Call for hours and availability.     [facility]  They offer: Emergency food.  Contact: [phone]; [address] [place] [address]  Helpful Info: Hours are Monday, Wednesday, and Friday 9am-1pm.     [employer] [facility]  They offer: Groceries/food.  Contact: [phone]; [address] [facility] [place] [address]  Helpful Info: Call for hours and availability.     [facility]  They offer: Groceries/food.  Contact: [phone]; [address] [id number]  Helpful Info: Call for hours and availability.

## 2024-03-08 LAB
ANA SER QL: POSITIVE
CENTROMERE B AB SER-ACNC: <0.2 AI (ref 0–0.9)
CHROMATIN AB SERPL-ACNC: <0.2 AI (ref 0–0.9)
DSDNA AB SER-ACNC: <1 IU/ML (ref 0–9)
ENA JO1 AB SER-ACNC: <0.2 AI (ref 0–0.9)
ENA RNP AB SER-ACNC: <0.2 AI (ref 0–0.9)
ENA SCL70 AB SER-ACNC: <0.2 AI (ref 0–0.9)
ENA SM AB SER-ACNC: <0.2 AI (ref 0–0.9)
ENA SS-A AB SER-ACNC: <0.2 AI (ref 0–0.9)
ENA SS-B AB SER-ACNC: 1.7 AI (ref 0–0.9)
Lab: ABNORMAL
THYROPEROXIDASE AB SERPL-ACNC: 171 IU/ML (ref 0–34)

## 2024-03-12 LAB
ANA BY IFA: POSITIVE
Lab: ABNORMAL
SPECKLED PATTERN: ABNORMAL

## 2024-03-14 ENCOUNTER — OFFICE VISIT (OUTPATIENT)
Dept: ENDOCRINOLOGY | Age: 79
End: 2024-03-14
Payer: MEDICARE

## 2024-03-14 VITALS
DIASTOLIC BLOOD PRESSURE: 80 MMHG | WEIGHT: 174 LBS | HEART RATE: 68 BPM | SYSTOLIC BLOOD PRESSURE: 134 MMHG | BODY MASS INDEX: 37.18 KG/M2

## 2024-03-14 DIAGNOSIS — E03.9 PRIMARY HYPOTHYROIDISM: Primary | ICD-10-CM

## 2024-03-14 DIAGNOSIS — E06.3 HASHIMOTO'S THYROIDITIS: ICD-10-CM

## 2024-03-14 DIAGNOSIS — E55.9 VITAMIN D DEFICIENCY: ICD-10-CM

## 2024-03-14 PROCEDURE — 99214 OFFICE O/P EST MOD 30 MIN: CPT | Performed by: PHYSICIAN ASSISTANT

## 2024-03-14 PROCEDURE — 1123F ACP DISCUSS/DSCN MKR DOCD: CPT | Performed by: PHYSICIAN ASSISTANT

## 2024-03-14 PROCEDURE — 3075F SYST BP GE 130 - 139MM HG: CPT | Performed by: PHYSICIAN ASSISTANT

## 2024-03-14 PROCEDURE — G2211 COMPLEX E/M VISIT ADD ON: HCPCS | Performed by: PHYSICIAN ASSISTANT

## 2024-03-14 PROCEDURE — 3079F DIAST BP 80-89 MM HG: CPT | Performed by: PHYSICIAN ASSISTANT

## 2024-03-14 RX ORDER — LEVOTHYROXINE SODIUM 125 MCG
TABLET ORAL
Qty: 102 TABLET | Refills: 3 | Status: SHIPPED | OUTPATIENT
Start: 2024-03-14

## 2024-03-14 ASSESSMENT — ENCOUNTER SYMPTOMS
CONSTIPATION: 0
DIARRHEA: 1
BACK PAIN: 1

## 2024-03-14 NOTE — PROGRESS NOTES
[facility] ENDOCRINOLOGY   AND   THYROID NODULE CLINIC    Leandra Cabello PA-C  [facility] Endocrinology and Thyroid Nodule Clinic  [address] [place] [address], [facility] [address] [address]  Phone [phone]  Facsimile [phone]          My Rebolledo is a 78 y.o. female seen 3/14/2024 for follow up evaluation of primary hypothyroidism         Assessment and Plan:        1. Primary hypothyroidism  Free T4 in range with improving but slow to respond TSH that remains abnormal. Was undertreated on 139mcg and overtreated on 150mcg    Continue current regimen, TDD 143mcg.     If unable to keep labs in range consider when/how pt is taking medication in lieu of adjusting dose     - TSH; Future  - T4, Free; Future  - SYNTHROID 125 MCG tablet; take 1 daily and 2 on Sunday, TDD 143mcg DAW1  Dispense: 102 tablet; Refill: 3      2. Vitamin D deficiency  Taking daily D3 2000iu for maintenance     3. Hashimoto's thyroiditis  TPO antibodies positive indicating Hashimoto's thyroiditis. Advised that diagnosis of one autoimmune disease increases risk of developing other autoimmune disease. Importance of self care, stress management, self monitoring was emphasized.              My was seen today for follow-up.    Diagnoses and all orders for this visit:    Primary hypothyroidism  -     TSH; Future  -     T4, Free; Future  -     SYNTHROID 125 MCG tablet; take 1 daily and 2 on Sunday, TDD 143mcg DAW1    Vitamin D deficiency    Hashimoto's thyroiditis                History of Present Illness:    3/14/2024  Pt RTC, had relapse of C-diff that was resistant to Vanc. Treated with merk med through patient assistance. Then UTI  Feeling better overall       11/07/2024  Pt RTC for folowup. Labs now abnormal. \"Feels horrible\"  See ROS  Taking meds as before  Stopped and restarted colestipol, taking with meds      4/13/2023  Recent localized allergic reaction s/p left shoulder arthroscopy     Synthroid dose 150 mcg daily to 1 tablet

## 2024-03-15 ENCOUNTER — TELEMEDICINE (OUTPATIENT)
Dept: RHEUMATOLOGY | Age: 79
End: 2024-03-15
Payer: MEDICARE

## 2024-03-15 DIAGNOSIS — R76.8 ANA POSITIVE: Primary | ICD-10-CM

## 2024-03-15 DIAGNOSIS — M19.91 PRIMARY OSTEOARTHRITIS, UNSPECIFIED SITE: ICD-10-CM

## 2024-03-15 DIAGNOSIS — N39.0 RECURRENT UTI: ICD-10-CM

## 2024-03-15 DIAGNOSIS — M79.7 FIBROMYALGIA: ICD-10-CM

## 2024-03-15 DIAGNOSIS — I73.00 PRIMARY RAYNAUD'S PHENOMENON: ICD-10-CM

## 2024-03-15 PROCEDURE — 1123F ACP DISCUSS/DSCN MKR DOCD: CPT | Performed by: INTERNAL MEDICINE

## 2024-03-15 PROCEDURE — 99213 OFFICE O/P EST LOW 20 MIN: CPT | Performed by: INTERNAL MEDICINE

## 2024-03-15 RX ORDER — PHENAZOPYRIDINE HYDROCHLORIDE 100 MG/1
100 TABLET, FILM COATED ORAL 3 TIMES DAILY PRN
Qty: 20 TABLET | Refills: 0 | Status: SHIPPED | OUTPATIENT
Start: 2024-03-15 | End: 2025-03-15

## 2024-03-15 NOTE — TELEPHONE ENCOUNTER
From: My Rebolledo  To: Office of Mihaela Flor  Sent: 3/14/2024 9:36 PM EDT  Subject: Medication Renewal Request    Refills have been requested for the following medications:     phenazopyridine (PYRIDIUM) 100 MG tablet [Mihaela Flor, APRN - CNP]    [address] - P [phone] - F [phone]

## 2024-03-15 NOTE — PATIENT INSTRUCTIONS
Return for f/u in 1 year  Continue f/u with pain management and PCP for joint pain and osteoarthritis  Please return sooner if you develop any symptoms of dry eyes, dry mouth, joint pain/swelling/stiffness/restricted ROM.

## 2024-03-15 NOTE — PROGRESS NOTES
home PT, although she does not feel this works; water therapy offered and declined  -RTC in 1 year for f/u   2. Primary Raynaud's phenomenon  Assessment & Plan:  Stable.    Plan:  -continue amlodipine 5mg qd  -continue conservative management   3. Fibromyalgia  Assessment & Plan:  Overall doing well. 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     Current treatment:  Tylenol    Contraindicated   NSAIDS - CKD    Plan:  -continue f/u with PCP and pain clinic  -Focusing on improving sleep w/PCP  -see rest of plan above   4. Primary osteoarthritis, unspecified site  Assessment & Plan:  Surgical hx:  Left Shoulder Arthroscopy Distal Clavicle Resection, Biceps Tenotomy And Debridement   R trapeziectomy, and excision of the proximal trapezoid   Bilateral TKR  LEFT WRIST TRAPEZIOMETACARPAL ARTHROPLASTY WITH FCR TENDON TRANSFER     Stable.    Plan:  -continue f/u with PCP, pain clinic and orthopedic surgery     Return in about 1 year (around 3/15/2025) for follow up.       Subjective   HPI  Review of Systems     Last seen in consult on 2/16/23 for raynauds and positive FRANKY. Noted to have normal nailfold capillaries. No e/o CTD. Felt to have fibromyalgia and generalized OA.     Further workup:  Positive/abnormal: +FRANKY 1:40 speckled, +low titer SSB 1.7, +tpo ab 171, CR 1.1 (stable), SUA 6.6, TSH 0.020  Negative/normal: dsdna, SCL-70, smith, centromere b, ssa, chromatin, ryan-1, CBC, rest of CMP, UPCR, C3, C4, CRP, ESR, free T4    Today:  She saw an endocrinologist for low TSH normal free T4. They will leave her medication for right now.  Baclofen - she is using it and it is helpful.   She gets her radiofrequency ablation next week, she's had a similar procedure in the past.  She is not doing home PT. She has been busy because her  has been in the hospital for 2 weeks and now in rehab. He is not okay b/c he has dementia, parkinsons and seizures.     Objective

## 2024-03-17 RX ORDER — BACLOFEN 10 MG/1
10 TABLET ORAL NIGHTLY
Qty: 30 TABLET | Refills: 2 | Status: SHIPPED | OUTPATIENT
Start: 2024-03-17 | End: 2024-06-15

## 2024-03-20 ENCOUNTER — NURSE ONLY (OUTPATIENT)
Dept: INTERNAL MEDICINE CLINIC | Facility: CLINIC | Age: 79
End: 2024-03-20
Payer: MEDICARE

## 2024-03-20 DIAGNOSIS — E53.8 VITAMIN B 12 DEFICIENCY: Primary | ICD-10-CM

## 2024-03-20 PROCEDURE — 96372 THER/PROPH/DIAG INJ SC/IM: CPT | Performed by: NURSE PRACTITIONER

## 2024-03-20 RX ORDER — CYANOCOBALAMIN 1000 UG/ML
1000 INJECTION, SOLUTION INTRAMUSCULAR; SUBCUTANEOUS ONCE
Status: COMPLETED | OUTPATIENT
Start: 2024-03-20 | End: 2024-03-20

## 2024-03-20 RX ADMIN — CYANOCOBALAMIN 1000 MCG: 1000 INJECTION, SOLUTION INTRAMUSCULAR; SUBCUTANEOUS at 11:59

## 2024-03-20 ASSESSMENT — ENCOUNTER SYMPTOMS
SHORTNESS OF BREATH: 0
COUGH: 0
ABDOMINAL PAIN: 0
COLOR CHANGE: 0
BACK PAIN: 0
CHEST TIGHTNESS: 0
WHEEZING: 0

## 2024-04-03 ENCOUNTER — NURSE ONLY (OUTPATIENT)
Dept: INTERNAL MEDICINE CLINIC | Facility: CLINIC | Age: 79
End: 2024-04-03
Payer: MEDICARE

## 2024-04-03 DIAGNOSIS — E53.8 VITAMIN B 12 DEFICIENCY: Primary | ICD-10-CM

## 2024-04-03 PROCEDURE — 96372 THER/PROPH/DIAG INJ SC/IM: CPT | Performed by: NURSE PRACTITIONER

## 2024-04-03 RX ORDER — CYANOCOBALAMIN 1000 UG/ML
1000 INJECTION, SOLUTION INTRAMUSCULAR; SUBCUTANEOUS ONCE
Status: COMPLETED | OUTPATIENT
Start: 2024-04-03 | End: 2024-04-03

## 2024-04-03 RX ADMIN — CYANOCOBALAMIN 1000 MCG: 1000 INJECTION, SOLUTION INTRAMUSCULAR; SUBCUTANEOUS at 10:25

## 2024-04-08 ENCOUNTER — TELEMEDICINE (OUTPATIENT)
Dept: BEHAVIORAL/MENTAL HEALTH CLINIC | Age: 79
End: 2024-04-08
Payer: MEDICARE

## 2024-04-08 DIAGNOSIS — F33.41 RECURRENT MAJOR DEPRESSIVE DISORDER, IN PARTIAL REMISSION (HCC): Primary | ICD-10-CM

## 2024-04-08 DIAGNOSIS — F51.05 INSOMNIA DUE TO MENTAL DISORDER: ICD-10-CM

## 2024-04-08 DIAGNOSIS — Z65.8 PSYCHOSOCIAL STRESSORS: ICD-10-CM

## 2024-04-08 DIAGNOSIS — F43.10 PTSD (POST-TRAUMATIC STRESS DISORDER): ICD-10-CM

## 2024-04-08 DIAGNOSIS — F41.1 GENERALIZED ANXIETY DISORDER: ICD-10-CM

## 2024-04-08 PROCEDURE — 99214 OFFICE O/P EST MOD 30 MIN: CPT | Performed by: PSYCHIATRY & NEUROLOGY

## 2024-04-08 PROCEDURE — 1123F ACP DISCUSS/DSCN MKR DOCD: CPT | Performed by: PSYCHIATRY & NEUROLOGY

## 2024-04-08 RX ORDER — DESVENLAFAXINE SUCCINATE 50 MG/1
50 TABLET, EXTENDED RELEASE ORAL DAILY
Qty: 30 TABLET | Refills: 3 | Status: SHIPPED | OUTPATIENT
Start: 2024-04-08

## 2024-04-08 RX ORDER — CLONAZEPAM 0.5 MG/1
0.5 TABLET ORAL DAILY PRN
Qty: 30 TABLET | Refills: 2 | Status: SHIPPED | OUTPATIENT
Start: 2024-04-08 | End: 2024-07-07

## 2024-04-08 ASSESSMENT — ANXIETY QUESTIONNAIRES
GAD7 TOTAL SCORE: 3
7. FEELING AFRAID AS IF SOMETHING AWFUL MIGHT HAPPEN: NOT AT ALL
6. BECOMING EASILY ANNOYED OR IRRITABLE: NOT AT ALL
IF YOU CHECKED OFF ANY PROBLEMS ON THIS QUESTIONNAIRE, HOW DIFFICULT HAVE THESE PROBLEMS MADE IT FOR YOU TO DO YOUR WORK, TAKE CARE OF THINGS AT HOME, OR GET ALONG WITH OTHER PEOPLE: NOT DIFFICULT AT ALL
1. FEELING NERVOUS, ANXIOUS, OR ON EDGE: SEVERAL DAYS
4. TROUBLE RELAXING: NOT AT ALL
3. WORRYING TOO MUCH ABOUT DIFFERENT THINGS: SEVERAL DAYS
5. BEING SO RESTLESS THAT IT IS HARD TO SIT STILL: NOT AT ALL
2. NOT BEING ABLE TO STOP OR CONTROL WORRYING: SEVERAL DAYS

## 2024-04-08 ASSESSMENT — PATIENT HEALTH QUESTIONNAIRE - PHQ9
2. FEELING DOWN, DEPRESSED OR HOPELESS: NOT AT ALL
7. TROUBLE CONCENTRATING ON THINGS, SUCH AS READING THE NEWSPAPER OR WATCHING TELEVISION: NOT AT ALL
1. LITTLE INTEREST OR PLEASURE IN DOING THINGS: NOT AT ALL
SUM OF ALL RESPONSES TO PHQ QUESTIONS 1-9: 2
SUM OF ALL RESPONSES TO PHQ QUESTIONS 1-9: 2
SUM OF ALL RESPONSES TO PHQ9 QUESTIONS 1 & 2: 0
6. FEELING BAD ABOUT YOURSELF - OR THAT YOU ARE A FAILURE OR HAVE LET YOURSELF OR YOUR FAMILY DOWN: NOT AT ALL
SUM OF ALL RESPONSES TO PHQ QUESTIONS 1-9: 2
10. IF YOU CHECKED OFF ANY PROBLEMS, HOW DIFFICULT HAVE THESE PROBLEMS MADE IT FOR YOU TO DO YOUR WORK, TAKE CARE OF THINGS AT HOME, OR GET ALONG WITH OTHER PEOPLE: NOT DIFFICULT AT ALL
4. FEELING TIRED OR HAVING LITTLE ENERGY: SEVERAL DAYS
3. TROUBLE FALLING OR STAYING ASLEEP: SEVERAL DAYS
9. THOUGHTS THAT YOU WOULD BE BETTER OFF DEAD, OR OF HURTING YOURSELF: NOT AT ALL
5. POOR APPETITE OR OVEREATING: NOT AT ALL
SUM OF ALL RESPONSES TO PHQ QUESTIONS 1-9: 2
8. MOVING OR SPEAKING SO SLOWLY THAT OTHER PEOPLE COULD HAVE NOTICED. OR THE OPPOSITE, BEING SO FIGETY OR RESTLESS THAT YOU HAVE BEEN MOVING AROUND A LOT MORE THAN USUAL: NOT AT ALL

## 2024-04-08 NOTE — PROGRESS NOTES
Patient:  My Rebolledo  Age:  78 y.o.  :  1945     SEX:  female MRN:  [id number]     RACE: White (non-)     SEEN:  [x]  PATIENT  []  SPOUSE []  OTHER:                  2024    10:12 AM 2024     8:55 AM 10/23/2023    10:04 AM   PHQ-9    Little interest or pleasure in doing things 0 0 1   Feeling down, depressed, or hopeless 0 1 1   Trouble falling or staying asleep, or sleeping too much 1 3 3   Feeling tired or having little energy 1 2 1   Poor appetite or overeating 0 0 1   Feeling bad about yourself - or that you are a failure or have let yourself or your family down 0 0 0   Trouble concentrating on things, such as reading the newspaper or watching television 0 0 1   Moving or speaking so slowly that other people could have noticed. Or the opposite - being so fidgety or restless that you have been moving around a lot more than usual 0 0 0   Thoughts that you would be better off dead, or of hurting yourself in some way 0 0 0   PHQ-2 Score 0 1 2   PHQ-9 Total Score 2 6 8   If you checked off any problems, how difficult have these problems made it for you to do your work, take care of things at home, or get along with other people? 0  0           2024    10:13 AM 2024     8:56 AM 10/23/2023    10:05 AM   URBANO-7 SCREENING   Feeling nervous, anxious, or on edge Several days More than half the days Several days   Not being able to stop or control worrying Several days Nearly every day Nearly every day   Worrying too much about different things Several days Nearly every day Nearly every day   Trouble relaxing Not at all Not at all Several days   Being so restless that it is hard to sit still Not at all More than half the days Several days   Becoming easily annoyed or irritable Not at all Several days Not at all   Feeling afraid as if something awful might happen Not at all Several days Several days   URBANO-7 Total Score 3 12 10   How difficult have these problems made it for

## 2024-04-17 ENCOUNTER — NURSE ONLY (OUTPATIENT)
Dept: INTERNAL MEDICINE CLINIC | Facility: CLINIC | Age: 79
End: 2024-04-17
Payer: MEDICARE

## 2024-04-17 DIAGNOSIS — E53.8 VITAMIN B 12 DEFICIENCY: Primary | ICD-10-CM

## 2024-04-17 PROCEDURE — 96372 THER/PROPH/DIAG INJ SC/IM: CPT | Performed by: NURSE PRACTITIONER

## 2024-04-17 RX ORDER — CYANOCOBALAMIN 1000 UG/ML
1000 INJECTION, SOLUTION INTRAMUSCULAR; SUBCUTANEOUS ONCE
Status: COMPLETED | OUTPATIENT
Start: 2024-04-17 | End: 2024-04-17

## 2024-04-17 RX ADMIN — CYANOCOBALAMIN 1000 MCG: 1000 INJECTION, SOLUTION INTRAMUSCULAR; SUBCUTANEOUS at 11:24

## 2024-05-01 ENCOUNTER — NURSE ONLY (OUTPATIENT)
Dept: INTERNAL MEDICINE CLINIC | Facility: CLINIC | Age: 79
End: 2024-05-01
Payer: MEDICARE

## 2024-05-01 DIAGNOSIS — E53.8 VITAMIN B 12 DEFICIENCY: Primary | ICD-10-CM

## 2024-05-01 PROCEDURE — 96372 THER/PROPH/DIAG INJ SC/IM: CPT | Performed by: NURSE PRACTITIONER

## 2024-05-01 RX ORDER — CYANOCOBALAMIN 1000 UG/ML
1000 INJECTION, SOLUTION INTRAMUSCULAR; SUBCUTANEOUS
Status: SHIPPED | OUTPATIENT
Start: 2024-05-01

## 2024-05-01 RX ADMIN — CYANOCOBALAMIN 1000 MCG: 1000 INJECTION, SOLUTION INTRAMUSCULAR; SUBCUTANEOUS at 10:48

## 2024-05-15 ENCOUNTER — NURSE ONLY (OUTPATIENT)
Dept: INTERNAL MEDICINE CLINIC | Facility: CLINIC | Age: 79
End: 2024-05-15
Payer: MEDICARE

## 2024-05-15 DIAGNOSIS — E53.8 VITAMIN B 12 DEFICIENCY: Primary | ICD-10-CM

## 2024-05-15 PROCEDURE — 96372 THER/PROPH/DIAG INJ SC/IM: CPT | Performed by: NURSE PRACTITIONER

## 2024-05-15 RX ORDER — CYANOCOBALAMIN 1000 UG/ML
1000 INJECTION, SOLUTION INTRAMUSCULAR; SUBCUTANEOUS ONCE
Status: COMPLETED | OUTPATIENT
Start: 2024-05-15 | End: 2024-05-15

## 2024-05-15 RX ADMIN — CYANOCOBALAMIN 1000 MCG: 1000 INJECTION, SOLUTION INTRAMUSCULAR; SUBCUTANEOUS at 11:08

## 2024-05-29 ENCOUNTER — NURSE ONLY (OUTPATIENT)
Dept: INTERNAL MEDICINE CLINIC | Facility: CLINIC | Age: 79
End: 2024-05-29
Payer: MEDICARE

## 2024-05-29 DIAGNOSIS — E53.8 VITAMIN B 12 DEFICIENCY: Primary | ICD-10-CM

## 2024-05-29 PROCEDURE — 96372 THER/PROPH/DIAG INJ SC/IM: CPT | Performed by: NURSE PRACTITIONER

## 2024-05-29 RX ORDER — CYANOCOBALAMIN 1000 UG/ML
1000 INJECTION, SOLUTION INTRAMUSCULAR; SUBCUTANEOUS ONCE
Status: COMPLETED | OUTPATIENT
Start: 2024-05-29 | End: 2024-05-29

## 2024-05-29 RX ADMIN — CYANOCOBALAMIN 1000 MCG: 1000 INJECTION, SOLUTION INTRAMUSCULAR; SUBCUTANEOUS at 11:37

## 2024-05-31 DIAGNOSIS — N18.32 STAGE 3B CHRONIC KIDNEY DISEASE (HCC): Chronic | ICD-10-CM

## 2024-05-31 DIAGNOSIS — E61.1 IRON DEFICIENCY: ICD-10-CM

## 2024-05-31 DIAGNOSIS — E55.9 VITAMIN D DEFICIENCY: ICD-10-CM

## 2024-05-31 DIAGNOSIS — E79.0 HYPERURICEMIA: ICD-10-CM

## 2024-05-31 DIAGNOSIS — I10 PRIMARY HYPERTENSION: ICD-10-CM

## 2024-05-31 LAB
25(OH)D3 SERPL-MCNC: 24.7 NG/ML (ref 30–100)
ALBUMIN SERPL-MCNC: 3.8 G/DL (ref 3.2–4.6)
ALBUMIN/GLOB SERPL: 1.2 (ref 1–1.9)
ALP SERPL-CCNC: 65 U/L (ref 35–104)
ALT SERPL-CCNC: 15 U/L (ref 12–65)
ANION GAP SERPL CALC-SCNC: 12 MMOL/L (ref 9–18)
AST SERPL-CCNC: 21 U/L (ref 15–37)
BASOPHILS # BLD: 0.1 K/UL (ref 0–0.2)
BASOPHILS NFR BLD: 1 % (ref 0–2)
BILIRUB SERPL-MCNC: 0.2 MG/DL (ref 0–1.2)
BUN SERPL-MCNC: 20 MG/DL (ref 8–23)
CALCIUM SERPL-MCNC: 9.5 MG/DL (ref 8.8–10.2)
CHLORIDE SERPL-SCNC: 100 MMOL/L (ref 98–107)
CO2 SERPL-SCNC: 27 MMOL/L (ref 20–28)
CREAT SERPL-MCNC: 1.09 MG/DL (ref 0.6–1.1)
DIFFERENTIAL METHOD BLD: NORMAL
EOSINOPHIL # BLD: 0.4 K/UL (ref 0–0.8)
EOSINOPHIL NFR BLD: 4 % (ref 0.5–7.8)
ERYTHROCYTE [DISTWIDTH] IN BLOOD BY AUTOMATED COUNT: 13.3 % (ref 11.9–14.6)
FERRITIN SERPL-MCNC: 60 NG/ML (ref 8–388)
GLOBULIN SER CALC-MCNC: 3.1 G/DL (ref 2.3–3.5)
GLUCOSE SERPL-MCNC: 117 MG/DL (ref 70–99)
HCT VFR BLD AUTO: 39.2 % (ref 35.8–46.3)
HGB BLD-MCNC: 12.7 G/DL (ref 11.7–15.4)
IMM GRANULOCYTES # BLD AUTO: 0 K/UL (ref 0–0.5)
IMM GRANULOCYTES NFR BLD AUTO: 0 % (ref 0–5)
IRON SATN MFR SERPL: 24 % (ref 20–50)
IRON SERPL-MCNC: 88 UG/DL (ref 35–100)
LYMPHOCYTES # BLD: 2.3 K/UL (ref 0.5–4.6)
LYMPHOCYTES NFR BLD: 23 % (ref 13–44)
MCH RBC QN AUTO: 29.4 PG (ref 26.1–32.9)
MCHC RBC AUTO-ENTMCNC: 32.4 G/DL (ref 31.4–35)
MCV RBC AUTO: 90.7 FL (ref 82–102)
MONOCYTES # BLD: 0.5 K/UL (ref 0.1–1.3)
MONOCYTES NFR BLD: 6 % (ref 4–12)
NEUTS SEG # BLD: 6.5 K/UL (ref 1.7–8.2)
NEUTS SEG NFR BLD: 66 % (ref 43–78)
NRBC # BLD: 0 K/UL (ref 0–0.2)
PLATELET # BLD AUTO: 317 K/UL (ref 150–450)
PMV BLD AUTO: 11.5 FL (ref 9.4–12.3)
POTASSIUM SERPL-SCNC: 4.5 MMOL/L (ref 3.5–5.1)
PROT SERPL-MCNC: 6.9 G/DL (ref 6.3–8.2)
RBC # BLD AUTO: 4.32 M/UL (ref 4.05–5.2)
SODIUM SERPL-SCNC: 140 MMOL/L (ref 136–145)
TIBC SERPL-MCNC: 373 UG/DL (ref 240–450)
UIBC SERPL-MCNC: 285 UG/DL (ref 112–347)
URATE SERPL-MCNC: 8.4 MG/DL (ref 2.5–7.1)
WBC # BLD AUTO: 9.8 K/UL (ref 4.3–11.1)

## 2024-06-03 ENCOUNTER — TELEPHONE (OUTPATIENT)
Dept: ORTHOPEDIC SURGERY | Age: 79
End: 2024-06-03

## 2024-06-03 NOTE — TELEPHONE ENCOUNTER
Left shldr/severe pain/hx sx BSK  Next avail is Jul30th  stated she can't wait that long  Please call her at [phone]

## 2024-06-07 ENCOUNTER — OFFICE VISIT (OUTPATIENT)
Dept: INTERNAL MEDICINE CLINIC | Facility: CLINIC | Age: 79
End: 2024-06-07
Payer: MEDICARE

## 2024-06-07 VITALS
HEART RATE: 74 BPM | DIASTOLIC BLOOD PRESSURE: 64 MMHG | HEIGHT: 57 IN | OXYGEN SATURATION: 96 % | WEIGHT: 179 LBS | BODY MASS INDEX: 38.62 KG/M2 | SYSTOLIC BLOOD PRESSURE: 128 MMHG

## 2024-06-07 DIAGNOSIS — E79.0 HYPERURICEMIA: ICD-10-CM

## 2024-06-07 DIAGNOSIS — N39.0 RECURRENT UTI: ICD-10-CM

## 2024-06-07 DIAGNOSIS — E55.9 VITAMIN D DEFICIENCY: ICD-10-CM

## 2024-06-07 DIAGNOSIS — N32.81 OAB (OVERACTIVE BLADDER): ICD-10-CM

## 2024-06-07 DIAGNOSIS — I10 PRIMARY HYPERTENSION: Primary | Chronic | ICD-10-CM

## 2024-06-07 DIAGNOSIS — B36.9 FUNGAL SKIN INFECTION: ICD-10-CM

## 2024-06-07 DIAGNOSIS — E03.9 PRIMARY HYPOTHYROIDISM: Chronic | ICD-10-CM

## 2024-06-07 DIAGNOSIS — E53.8 VITAMIN B 12 DEFICIENCY: ICD-10-CM

## 2024-06-07 DIAGNOSIS — E61.1 IRON DEFICIENCY: ICD-10-CM

## 2024-06-07 DIAGNOSIS — K58.0 IRRITABLE BOWEL SYNDROME WITH DIARRHEA: ICD-10-CM

## 2024-06-07 PROCEDURE — 3074F SYST BP LT 130 MM HG: CPT | Performed by: NURSE PRACTITIONER

## 2024-06-07 PROCEDURE — 3078F DIAST BP <80 MM HG: CPT | Performed by: NURSE PRACTITIONER

## 2024-06-07 PROCEDURE — 1123F ACP DISCUSS/DSCN MKR DOCD: CPT | Performed by: NURSE PRACTITIONER

## 2024-06-07 PROCEDURE — 99214 OFFICE O/P EST MOD 30 MIN: CPT | Performed by: NURSE PRACTITIONER

## 2024-06-07 ASSESSMENT — PATIENT HEALTH QUESTIONNAIRE - PHQ9
SUM OF ALL RESPONSES TO PHQ QUESTIONS 1-9: 3
6. FEELING BAD ABOUT YOURSELF - OR THAT YOU ARE A FAILURE OR HAVE LET YOURSELF OR YOUR FAMILY DOWN: NOT AT ALL
SUM OF ALL RESPONSES TO PHQ QUESTIONS 1-9: 3
7. TROUBLE CONCENTRATING ON THINGS, SUCH AS READING THE NEWSPAPER OR WATCHING TELEVISION: NOT AT ALL
9. THOUGHTS THAT YOU WOULD BE BETTER OFF DEAD, OR OF HURTING YOURSELF: NOT AT ALL
3. TROUBLE FALLING OR STAYING ASLEEP: MORE THAN HALF THE DAYS
SUM OF ALL RESPONSES TO PHQ QUESTIONS 1-9: 3
SUM OF ALL RESPONSES TO PHQ QUESTIONS 1-9: 3
10. IF YOU CHECKED OFF ANY PROBLEMS, HOW DIFFICULT HAVE THESE PROBLEMS MADE IT FOR YOU TO DO YOUR WORK, TAKE CARE OF THINGS AT HOME, OR GET ALONG WITH OTHER PEOPLE: NOT DIFFICULT AT ALL
1. LITTLE INTEREST OR PLEASURE IN DOING THINGS: NOT AT ALL
5. POOR APPETITE OR OVEREATING: NOT AT ALL
4. FEELING TIRED OR HAVING LITTLE ENERGY: SEVERAL DAYS
2. FEELING DOWN, DEPRESSED OR HOPELESS: NOT AT ALL
SUM OF ALL RESPONSES TO PHQ9 QUESTIONS 1 & 2: 0
8. MOVING OR SPEAKING SO SLOWLY THAT OTHER PEOPLE COULD HAVE NOTICED. OR THE OPPOSITE, BEING SO FIGETY OR RESTLESS THAT YOU HAVE BEEN MOVING AROUND A LOT MORE THAN USUAL: NOT AT ALL

## 2024-06-10 RX ORDER — QUINIDINE GLUCONATE 324 MG
240 TABLET, EXTENDED RELEASE ORAL DAILY
Qty: 90 TABLET | Refills: 1 | Status: SHIPPED | OUTPATIENT
Start: 2024-06-10

## 2024-06-10 RX ORDER — CYANOCOBALAMIN 1000 UG/ML
1000 INJECTION, SOLUTION INTRAMUSCULAR; SUBCUTANEOUS
Qty: 6 EACH | Refills: 3 | Status: SHIPPED | OUTPATIENT
Start: 2024-06-10

## 2024-06-10 RX ORDER — ONDANSETRON 4 MG/1
4 TABLET, ORALLY DISINTEGRATING ORAL EVERY 6 HOURS PRN
Qty: 12 TABLET | Refills: 0 | Status: SHIPPED | OUTPATIENT
Start: 2024-06-10

## 2024-06-10 RX ORDER — METHENAMINE HIPPURATE 1000 MG/1
1 TABLET ORAL 2 TIMES DAILY WITH MEALS
Qty: 60 TABLET | Refills: 5 | Status: SHIPPED | OUTPATIENT
Start: 2024-06-10

## 2024-06-10 RX ORDER — MONTELUKAST SODIUM 4 MG/1
1 TABLET, CHEWABLE ORAL 2 TIMES DAILY
Qty: 180 TABLET | Refills: 3 | Status: SHIPPED | OUTPATIENT
Start: 2024-06-10

## 2024-06-10 RX ORDER — FLUCONAZOLE 150 MG/1
150 TABLET ORAL
Qty: 2 TABLET | Refills: 0 | Status: SHIPPED | OUTPATIENT
Start: 2024-06-10 | End: 2024-06-16

## 2024-06-10 RX ORDER — CLOTRIMAZOLE AND BETAMETHASONE DIPROPIONATE 10; .64 MG/G; MG/G
CREAM TOPICAL AS NEEDED
Qty: 45 G | Refills: 0 | Status: SHIPPED | OUTPATIENT
Start: 2024-06-10

## 2024-06-10 RX ORDER — CHOLECALCIFEROL (VITAMIN D3) 125 MCG
1 CAPSULE ORAL DAILY
COMMUNITY
Start: 2024-06-10

## 2024-06-10 RX ORDER — SOLIFENACIN SUCCINATE 10 MG/1
10 TABLET, FILM COATED ORAL DAILY
Qty: 30 TABLET | Refills: 5 | Status: SHIPPED | OUTPATIENT
Start: 2024-06-10 | End: 2025-06-10

## 2024-06-12 ENCOUNTER — NURSE ONLY (OUTPATIENT)
Dept: INTERNAL MEDICINE CLINIC | Facility: CLINIC | Age: 79
End: 2024-06-12
Payer: MEDICARE

## 2024-06-12 DIAGNOSIS — E53.8 VITAMIN B 12 DEFICIENCY: Primary | ICD-10-CM

## 2024-06-12 PROCEDURE — 96372 THER/PROPH/DIAG INJ SC/IM: CPT | Performed by: NURSE PRACTITIONER

## 2024-06-12 RX ADMIN — CYANOCOBALAMIN 1000 MCG: 1000 INJECTION, SOLUTION INTRAMUSCULAR; SUBCUTANEOUS at 10:31

## 2024-06-18 ENCOUNTER — OFFICE VISIT (OUTPATIENT)
Dept: ORTHOPEDIC SURGERY | Age: 79
End: 2024-06-18
Payer: MEDICARE

## 2024-06-18 DIAGNOSIS — Z98.890 POST-OPERATIVE STATE: Primary | ICD-10-CM

## 2024-06-18 DIAGNOSIS — M19.012 ARTHRITIS OF LEFT SHOULDER REGION: ICD-10-CM

## 2024-06-18 DIAGNOSIS — M25.512 LEFT SHOULDER PAIN, UNSPECIFIED CHRONICITY: ICD-10-CM

## 2024-06-18 PROCEDURE — 20605 DRAIN/INJ JOINT/BURSA W/O US: CPT | Performed by: ORTHOPAEDIC SURGERY

## 2024-06-18 PROCEDURE — 99213 OFFICE O/P EST LOW 20 MIN: CPT | Performed by: ORTHOPAEDIC SURGERY

## 2024-06-18 PROCEDURE — 1123F ACP DISCUSS/DSCN MKR DOCD: CPT | Performed by: ORTHOPAEDIC SURGERY

## 2024-06-18 RX ORDER — METHYLPREDNISOLONE ACETATE 40 MG/ML
80 INJECTION, SUSPENSION INTRA-ARTICULAR; INTRALESIONAL; INTRAMUSCULAR; SOFT TISSUE ONCE
Status: CANCELLED | OUTPATIENT
Start: 2024-06-18 | End: 2024-06-18

## 2024-06-18 RX ORDER — METHYLPREDNISOLONE ACETATE 40 MG/ML
40 INJECTION, SUSPENSION INTRA-ARTICULAR; INTRALESIONAL; INTRAMUSCULAR; SOFT TISSUE ONCE
Status: COMPLETED | OUTPATIENT
Start: 2024-06-18 | End: 2024-06-18

## 2024-06-18 RX ADMIN — METHYLPREDNISOLONE ACETATE 40 MG: 40 INJECTION, SUSPENSION INTRA-ARTICULAR; INTRALESIONAL; INTRAMUSCULAR; SOFT TISSUE at 09:35

## 2024-06-18 NOTE — PROGRESS NOTES
Name: My Rebolledo  YOB: 1945  Gender: female  MRN: [id number]    CC:   Chief Complaint   Patient presents with    Follow-up     Recheck s/p L shoulder scope DCR DOS 4/6/23         HPI: Well-established patient presents for follow-up evaluation of the left shoulder.  Patient had distal clavicle resection, Biceps Tenotomy And Debridement  on 4-6-2023.  Patient was last seen in November 2023 at which point she had a left glenohumeral injection.  Complains of left shoulder pain anteriorly and somewhat superiorly.  Also some occasional intermittent numbness and tingling.    Allergies   Allergen Reactions    Fire Ant Anaphylaxis    Amoxicillin Other (See Comments)     C-Diff    Amoxicillin-Pot Clavulanate Other (See Comments)     c.diff    Augmentin [Amoxicillin-Pot Clavulanate]     Cephalexin Other (See Comments)     C Diff      Cephalexin Other (See Comments)     C-Diff    Cephalosporins     Hydromorphone Other (See Comments)     Keeps her awake    Meperidine Hcl Other (See Comments)    Morphine Other (See Comments)     Awake    No sleep    Oxycodone Other (See Comments)    Oxycodone-Acetaminophen Other (See Comments)     Jittery  Nightmares    Oxycodone-Aspirin Other (See Comments)     Jittery  Other reaction(s): Unknown-Unspecified  Jittery    Penicillins Other (See Comments)     C-Diff      Povidone Iodine     Povidone-Iodine Other (See Comments)     Burns    burn    Adhesive Tape Itching and Rash     \"sticky ace wrap\"  \"sticky ace wrap\"    Meperidine Rash     Past Medical History:   Diagnosis Date    Acute kidney insufficiency     Followed by PCP     Adverse effect of anesthesia     2016 in [place] \"My heart stopped at the end of my gallbladder surgery.\" Patient reports no CPR performed, stayed one night in the hospital. No problems with any other surgeries.    Allergic rhinitis     Anemia     history     Anxiety and depression     Controlled with medication     Arthritis     Chronic nausea 
- Dilaudid 0.5mg IV Q4 hours prn.  - Avoid Fentanyl given hepatic dysfunction.

## 2024-06-19 ENCOUNTER — TELEMEDICINE (OUTPATIENT)
Dept: BEHAVIORAL/MENTAL HEALTH CLINIC | Age: 79
End: 2024-06-19
Payer: MEDICARE

## 2024-06-19 DIAGNOSIS — F43.10 PTSD (POST-TRAUMATIC STRESS DISORDER): ICD-10-CM

## 2024-06-19 DIAGNOSIS — F41.1 GENERALIZED ANXIETY DISORDER: ICD-10-CM

## 2024-06-19 DIAGNOSIS — F51.05 INSOMNIA DUE TO MENTAL DISORDER: ICD-10-CM

## 2024-06-19 DIAGNOSIS — F33.41 RECURRENT MAJOR DEPRESSIVE DISORDER, IN PARTIAL REMISSION (HCC): Primary | ICD-10-CM

## 2024-06-19 DIAGNOSIS — Z65.8 PSYCHOSOCIAL STRESSORS: ICD-10-CM

## 2024-06-19 PROCEDURE — 99214 OFFICE O/P EST MOD 30 MIN: CPT | Performed by: PSYCHIATRY & NEUROLOGY

## 2024-06-19 PROCEDURE — 1123F ACP DISCUSS/DSCN MKR DOCD: CPT | Performed by: PSYCHIATRY & NEUROLOGY

## 2024-06-19 RX ORDER — ZOLPIDEM TARTRATE 5 MG/1
5 TABLET ORAL NIGHTLY PRN
Qty: 30 TABLET | Refills: 1 | Status: SHIPPED | OUTPATIENT
Start: 2024-06-19 | End: 2024-08-18

## 2024-06-19 RX ORDER — CLONAZEPAM 0.5 MG/1
0.5 TABLET ORAL DAILY PRN
Qty: 30 TABLET | Refills: 2 | Status: CANCELLED | OUTPATIENT
Start: 2024-06-19 | End: 2024-09-17

## 2024-06-19 RX ORDER — DESVENLAFAXINE SUCCINATE 50 MG/1
50 TABLET, EXTENDED RELEASE ORAL DAILY
Qty: 30 TABLET | Refills: 3 | Status: SHIPPED | OUTPATIENT
Start: 2024-06-19

## 2024-06-19 ASSESSMENT — PATIENT HEALTH QUESTIONNAIRE - PHQ9
4. FEELING TIRED OR HAVING LITTLE ENERGY: SEVERAL DAYS
4. FEELING TIRED OR HAVING LITTLE ENERGY: SEVERAL DAYS
10. IF YOU CHECKED OFF ANY PROBLEMS, HOW DIFFICULT HAVE THESE PROBLEMS MADE IT FOR YOU TO DO YOUR WORK, TAKE CARE OF THINGS AT HOME, OR GET ALONG WITH OTHER PEOPLE: SOMEWHAT DIFFICULT
SUM OF ALL RESPONSES TO PHQ QUESTIONS 1-9: 5
8. MOVING OR SPEAKING SO SLOWLY THAT OTHER PEOPLE COULD HAVE NOTICED. OR THE OPPOSITE - BEING SO FIDGETY OR RESTLESS THAT YOU HAVE BEEN MOVING AROUND A LOT MORE THAN USUAL: NOT AT ALL
8. MOVING OR SPEAKING SO SLOWLY THAT OTHER PEOPLE COULD HAVE NOTICED. OR THE OPPOSITE, BEING SO FIGETY OR RESTLESS THAT YOU HAVE BEEN MOVING AROUND A LOT MORE THAN USUAL: NOT AT ALL
3. TROUBLE FALLING OR STAYING ASLEEP: SEVERAL DAYS
2. FEELING DOWN, DEPRESSED OR HOPELESS: SEVERAL DAYS
SUM OF ALL RESPONSES TO PHQ QUESTIONS 1-9: 5
9. THOUGHTS THAT YOU WOULD BE BETTER OFF DEAD, OR OF HURTING YOURSELF: NOT AT ALL
SUM OF ALL RESPONSES TO PHQ9 QUESTIONS 1 & 2: 2
1. LITTLE INTEREST OR PLEASURE IN DOING THINGS: SEVERAL DAYS
1. LITTLE INTEREST OR PLEASURE IN DOING THINGS: SEVERAL DAYS
2. FEELING DOWN, DEPRESSED OR HOPELESS: SEVERAL DAYS
SUM OF ALL RESPONSES TO PHQ QUESTIONS 1-9: 5
3. TROUBLE FALLING OR STAYING ASLEEP: SEVERAL DAYS
7. TROUBLE CONCENTRATING ON THINGS, SUCH AS READING THE NEWSPAPER OR WATCHING TELEVISION: NOT AT ALL
6. FEELING BAD ABOUT YOURSELF - OR THAT YOU ARE A FAILURE OR HAVE LET YOURSELF OR YOUR FAMILY DOWN: SEVERAL DAYS
6. FEELING BAD ABOUT YOURSELF - OR THAT YOU ARE A FAILURE OR HAVE LET YOURSELF OR YOUR FAMILY DOWN: SEVERAL DAYS
SUM OF ALL RESPONSES TO PHQ QUESTIONS 1-9: 5
5. POOR APPETITE OR OVEREATING: NOT AT ALL
5. POOR APPETITE OR OVEREATING: NOT AT ALL
SUM OF ALL RESPONSES TO PHQ QUESTIONS 1-9: 5
10. IF YOU CHECKED OFF ANY PROBLEMS, HOW DIFFICULT HAVE THESE PROBLEMS MADE IT FOR YOU TO DO YOUR WORK, TAKE CARE OF THINGS AT HOME, OR GET ALONG WITH OTHER PEOPLE: SOMEWHAT DIFFICULT
7. TROUBLE CONCENTRATING ON THINGS, SUCH AS READING THE NEWSPAPER OR WATCHING TELEVISION: NOT AT ALL
9. THOUGHTS THAT YOU WOULD BE BETTER OFF DEAD, OR OF HURTING YOURSELF: NOT AT ALL

## 2024-06-19 ASSESSMENT — ANXIETY QUESTIONNAIRES
7. FEELING AFRAID AS IF SOMETHING AWFUL MIGHT HAPPEN: SEVERAL DAYS
5. BEING SO RESTLESS THAT IT IS HARD TO SIT STILL: SEVERAL DAYS
2. NOT BEING ABLE TO STOP OR CONTROL WORRYING: MORE THAN HALF THE DAYS
3. WORRYING TOO MUCH ABOUT DIFFERENT THINGS: SEVERAL DAYS
4. TROUBLE RELAXING: SEVERAL DAYS
1. FEELING NERVOUS, ANXIOUS, OR ON EDGE: SEVERAL DAYS
GAD7 TOTAL SCORE: 8
2. NOT BEING ABLE TO STOP OR CONTROL WORRYING: MORE THAN HALF THE DAYS
5. BEING SO RESTLESS THAT IT IS HARD TO SIT STILL: SEVERAL DAYS
IF YOU CHECKED OFF ANY PROBLEMS ON THIS QUESTIONNAIRE, HOW DIFFICULT HAVE THESE PROBLEMS MADE IT FOR YOU TO DO YOUR WORK, TAKE CARE OF THINGS AT HOME, OR GET ALONG WITH OTHER PEOPLE: NOT DIFFICULT AT ALL
6. BECOMING EASILY ANNOYED OR IRRITABLE: SEVERAL DAYS
3. WORRYING TOO MUCH ABOUT DIFFERENT THINGS: SEVERAL DAYS
6. BECOMING EASILY ANNOYED OR IRRITABLE: SEVERAL DAYS
7. FEELING AFRAID AS IF SOMETHING AWFUL MIGHT HAPPEN: SEVERAL DAYS
IF YOU CHECKED OFF ANY PROBLEMS ON THIS QUESTIONNAIRE, HOW DIFFICULT HAVE THESE PROBLEMS MADE IT FOR YOU TO DO YOUR WORK, TAKE CARE OF THINGS AT HOME, OR GET ALONG WITH OTHER PEOPLE: NOT DIFFICULT AT ALL
1. FEELING NERVOUS, ANXIOUS, OR ON EDGE: SEVERAL DAYS
4. TROUBLE RELAXING: SEVERAL DAYS

## 2024-06-19 NOTE — PROGRESS NOTES
Patient:  My Rebolledo  Age:  78 y.o.  :  1945     SEX:  female MRN:  [id number]     RACE: White (non-)     SEEN:  [x]  PATIENT  []  SPOUSE []  OTHER:                  2024     8:50 AM 2024     1:39 PM 2024    10:12 AM   PHQ-9    Little interest or pleasure in doing things 1 0 0   Feeling down, depressed, or hopeless 1 0 0   Trouble falling or staying asleep, or sleeping too much 1 2 1   Feeling tired or having little energy 1 1 1   Poor appetite or overeating 0 0 0   Feeling bad about yourself - or that you are a failure or have let yourself or your family down 1 0 0   Trouble concentrating on things, such as reading the newspaper or watching television 0 0 0   Moving or speaking so slowly that other people could have noticed. Or the opposite - being so fidgety or restless that you have been moving around a lot more than usual 0 0 0   Thoughts that you would be better off dead, or of hurting yourself in some way 0 0 0   PHQ-2 Score 2 0 0   PHQ-9 Total Score 5 3 2   If you checked off any problems, how difficult have these problems made it for you to do your work, take care of things at home, or get along with other people? 1 0 0           2024     8:48 AM 2024    10:13 AM 2024     8:56 AM   URBANO-7 SCREENING   Feeling nervous, anxious, or on edge Several days Several days More than half the days   Not being able to stop or control worrying More than half the days Several days Nearly every day   Worrying too much about different things Several days Several days Nearly every day   Trouble relaxing Several days Not at all Not at all   Being so restless that it is hard to sit still Several days Not at all More than half the days   Becoming easily annoyed or irritable Several days Not at all Several days   Feeling afraid as if something awful might happen Several days Not at all Several days   URBANO-7 Total Score 8 3 12   How difficult have these problems made it

## 2024-06-23 PROBLEM — M25.531 RIGHT WRIST PAIN: Status: RESOLVED | Noted: 2017-01-12 | Resolved: 2024-06-23

## 2024-06-26 ENCOUNTER — NURSE ONLY (OUTPATIENT)
Dept: INTERNAL MEDICINE CLINIC | Facility: CLINIC | Age: 79
End: 2024-06-26
Payer: MEDICARE

## 2024-06-26 DIAGNOSIS — D51.8 OTHER VITAMIN B12 DEFICIENCY ANEMIA: Primary | ICD-10-CM

## 2024-06-26 PROCEDURE — 96372 THER/PROPH/DIAG INJ SC/IM: CPT | Performed by: NURSE PRACTITIONER

## 2024-06-26 RX ORDER — CYANOCOBALAMIN 1000 UG/ML
1000 INJECTION, SOLUTION INTRAMUSCULAR; SUBCUTANEOUS ONCE
Status: SHIPPED | OUTPATIENT
Start: 2024-06-26

## 2024-06-26 RX ADMIN — CYANOCOBALAMIN 1000 MCG: 1000 INJECTION, SOLUTION INTRAMUSCULAR; SUBCUTANEOUS at 11:02

## 2024-07-09 ENCOUNTER — OFFICE VISIT (OUTPATIENT)
Dept: ENDOCRINOLOGY | Age: 79
End: 2024-07-09
Payer: MEDICARE

## 2024-07-09 VITALS
SYSTOLIC BLOOD PRESSURE: 124 MMHG | WEIGHT: 175.2 LBS | HEART RATE: 81 BPM | DIASTOLIC BLOOD PRESSURE: 80 MMHG | BODY MASS INDEX: 37.44 KG/M2 | OXYGEN SATURATION: 98 %

## 2024-07-09 DIAGNOSIS — E03.9 PRIMARY HYPOTHYROIDISM: ICD-10-CM

## 2024-07-09 DIAGNOSIS — E03.9 PRIMARY HYPOTHYROIDISM: Primary | Chronic | ICD-10-CM

## 2024-07-09 DIAGNOSIS — E55.9 VITAMIN D DEFICIENCY: ICD-10-CM

## 2024-07-09 DIAGNOSIS — E06.3 HASHIMOTO'S THYROIDITIS: ICD-10-CM

## 2024-07-09 LAB
T4 FREE SERPL-MCNC: 1.9 NG/DL (ref 0.9–1.7)
TSH, 3RD GENERATION: 0.08 UIU/ML (ref 0.27–4.2)

## 2024-07-09 PROCEDURE — 3074F SYST BP LT 130 MM HG: CPT | Performed by: PHYSICIAN ASSISTANT

## 2024-07-09 PROCEDURE — NBSRV NON-BILLABLE SERVICE: Performed by: PHYSICIAN ASSISTANT

## 2024-07-09 PROCEDURE — 99214 OFFICE O/P EST MOD 30 MIN: CPT | Performed by: PHYSICIAN ASSISTANT

## 2024-07-09 PROCEDURE — 1123F ACP DISCUSS/DSCN MKR DOCD: CPT | Performed by: PHYSICIAN ASSISTANT

## 2024-07-09 PROCEDURE — G2211 COMPLEX E/M VISIT ADD ON: HCPCS | Performed by: PHYSICIAN ASSISTANT

## 2024-07-09 PROCEDURE — 3079F DIAST BP 80-89 MM HG: CPT | Performed by: PHYSICIAN ASSISTANT

## 2024-07-09 ASSESSMENT — ENCOUNTER SYMPTOMS
BACK PAIN: 0
CONSTIPATION: 0
DIARRHEA: 0

## 2024-07-09 NOTE — PROGRESS NOTES
02/25/2020                           TSH                 7.97  (112mcg generic levothyroxine, not taking correctly with meds after eating)                           Free T4           1.0                   06/17/2020                           TSH                 5.300 (112mcg  generic levothyroxine, taking correctly)                           Free T4           0.96                   10/05/2020                           TSH                 0.829 (125mcg generic  levothyroxine daily with 1.5 tabs on Sunday to TDD 134mcg)                           Free T4           1.52                   01/18/2021                           TSH                 28.800 (125mcg generic  levothyroxine daily with 1.5 tabs on Sunday to TDD 134mcg)                               Free T4           0.70                        01/18/2021                    TSH                 28.800                   Free T4           0.70                        03/01/2021                    TSH                 10.100 ( generic levothyroxine)                   Free T4           1.01                        05/03/2021                    TSH                 3.22 (137mcg name brand synthroid)                        06/14/2021                    TSH                 22.800                   Free T4           1.07                         08/16/2021                      TSH                 1.590                      Free T4           1.71             10/14/2021                      TSH                 1.040 (150 mcg of name brand Synthroid which is being taken correctly)                      Free T4           1.31      01/27/2023    TSH  1.230    Free T4 1.2     12/08/2022    TSH  0.428 (150mcg name brand synthroid at night)    Free T4 1.8          04/04/2023    TSH  3.250 (Synthroid dose 150 mcg daily to 1 tablet daily with a half tab on Sunday total daily dose 139 mcg)    Free T4 1.2     10/23/2023    TSH  4.160 (150 MCG tablet; 1 tab daily with

## 2024-07-10 ENCOUNTER — TELEPHONE (OUTPATIENT)
Dept: ENDOCRINOLOGY | Age: 79
End: 2024-07-10

## 2024-07-10 DIAGNOSIS — E03.9 PRIMARY HYPOTHYROIDISM: ICD-10-CM

## 2024-07-10 RX ORDER — LEVOTHYROXINE SODIUM 125 MCG
TABLET ORAL
Qty: 102 TABLET | Refills: 3
Start: 2024-07-10

## 2024-07-10 NOTE — TELEPHONE ENCOUNTER
Norm response:      Hi,  Labs show you are over treated on current thyroid regimen    Recommend continue current 125mcg Synthroid tablet. Instead of taking 2 tabs on Sunday to , take 1 tab daily and 1.5 tabs on sunday,  and recheck labs in 8 weeks. The 8 week labs were ordered today and I will send them in a separate message.    Let me know that you got this and let me know if any questions.    ~Leandra

## 2024-07-12 ENCOUNTER — NURSE ONLY (OUTPATIENT)
Dept: INTERNAL MEDICINE CLINIC | Facility: CLINIC | Age: 79
End: 2024-07-12
Payer: MEDICARE

## 2024-07-12 DIAGNOSIS — E53.8 VITAMIN B 12 DEFICIENCY: Primary | ICD-10-CM

## 2024-07-12 PROCEDURE — 96372 THER/PROPH/DIAG INJ SC/IM: CPT | Performed by: NURSE PRACTITIONER

## 2024-07-12 RX ADMIN — CYANOCOBALAMIN 1000 MCG: 1000 INJECTION, SOLUTION INTRAMUSCULAR; SUBCUTANEOUS at 11:10

## 2024-07-16 ENCOUNTER — OFFICE VISIT (OUTPATIENT)
Dept: ORTHOPEDIC SURGERY | Age: 79
End: 2024-07-16
Payer: MEDICARE

## 2024-07-16 DIAGNOSIS — M25.512 LEFT SHOULDER PAIN, UNSPECIFIED CHRONICITY: ICD-10-CM

## 2024-07-16 DIAGNOSIS — Z98.890 POST-OPERATIVE STATE: Primary | ICD-10-CM

## 2024-07-16 DIAGNOSIS — M19.012 ARTHRITIS OF LEFT SHOULDER REGION: ICD-10-CM

## 2024-07-16 PROCEDURE — 99213 OFFICE O/P EST LOW 20 MIN: CPT | Performed by: ORTHOPAEDIC SURGERY

## 2024-07-16 PROCEDURE — 1123F ACP DISCUSS/DSCN MKR DOCD: CPT | Performed by: ORTHOPAEDIC SURGERY

## 2024-07-16 NOTE — PROGRESS NOTES
Name: My Rebolledo  YOB: 1945  Gender: female  MRN: [id number]    CC:   Chief Complaint   Patient presents with    Follow-up     Left shoulder  S?P left shoulder scope DCR DOS 4-6-23     Left Shoulder Arthroscopy Distal Clavicle Resection, Biceps Tenotomy And Debridement - Left  4/6/2023    HPI: Patient is status post Left Shoulder Arthroscopy Distal Clavicle Resection, Biceps Tenotomy And Debridement - Left.   Patient also had an AC injection on 6-18-24.  She notes  relief with injection.  Patient also had a left glenohumeral injection in November of 2023.    Allergies   Allergen Reactions    Fire Ant Anaphylaxis    Amoxicillin Other (See Comments)     C-Diff    Amoxicillin-Pot Clavulanate Other (See Comments)     c.diff    Augmentin [Amoxicillin-Pot Clavulanate]     Cephalexin Other (See Comments)     C Diff      Cephalexin Other (See Comments)     C-Diff    Cephalosporins     Hydromorphone Other (See Comments)     Keeps her awake    Meperidine Hcl Other (See Comments)    Morphine Other (See Comments)     Awake    No sleep    Oxycodone Other (See Comments)    Oxycodone-Acetaminophen Other (See Comments)     Jittery  Nightmares    Oxycodone-Aspirin Other (See Comments)     Jittery  Other reaction(s): Unknown-Unspecified  Jittery    Penicillins Other (See Comments)     C-Diff      Povidone Iodine     Povidone-Iodine Other (See Comments)     Burns    burn    Adhesive Tape Itching and Rash     \"sticky ace wrap\"  \"sticky ace wrap\"    Meperidine Rash     Past Medical History:   Diagnosis Date    Acute kidney insufficiency     Followed by PCP     Adverse effect of anesthesia     2016 in [place] \"My heart stopped at the end of my gallbladder surgery.\" Patient reports no CPR performed, stayed one night in the hospital. No problems with any other surgeries.    Allergic rhinitis     Anemia     history     Anxiety and depression     Controlled with medication     Arthritis     Chronic nausea 06/26/2019

## 2024-07-24 ENCOUNTER — TELEMEDICINE (OUTPATIENT)
Dept: INTERNAL MEDICINE CLINIC | Facility: CLINIC | Age: 79
End: 2024-07-24
Payer: MEDICARE

## 2024-07-24 DIAGNOSIS — Z00.00 MEDICARE ANNUAL WELLNESS VISIT, SUBSEQUENT: Primary | ICD-10-CM

## 2024-07-24 PROCEDURE — G0439 PPPS, SUBSEQ VISIT: HCPCS | Performed by: NURSE PRACTITIONER

## 2024-07-24 PROCEDURE — 1123F ACP DISCUSS/DSCN MKR DOCD: CPT | Performed by: NURSE PRACTITIONER

## 2024-07-24 NOTE — PROGRESS NOTES
Medicare Annual Wellness Visit    My Rebolledo is here for Medicare AWV    Assessment & Plan   Medicare annual wellness visit, subsequent  Recommendations for Preventive Services Due: see orders and patient instructions/AVS.  Recommended screening schedule for the next 5-10 years is provided to the patient in written form: see Patient Instructions/AVS.     Return for Follow-Up, regularly scheduled appointment or sooner prn.     Subjective       Patient's complete Health Risk Assessment and screening values have been reviewed and are found in Flowsheets. The following problems were reviewed today and where indicated follow up appointments were made and/or referrals ordered.    Positive Risk Factor Screenings with Interventions:                Inactivity:  On average, how many days per week do you engage in moderate to strenuous exercise (like a brisk walk)?: 2 days (!) Abnormal  On average, how many minutes do you engage in exercise at this level?: 20 min  Interventions:  See AVS for additional education material     Abnormal BMI (obese):  There is no height or weight on file to calculate BMI. (!) Abnormal  Interventions:  See AVS for additional education material        Vision Screen:  Do you have difficulty driving, watching TV, or doing any of your daily activities because of your eyesight?: No  Have you had an eye exam within the past year?: (!) No (patient will schedule)  Interventions:   Patient comments: patient states taht she will be calling to schedule an eye exam.   See AVS for additional education material              Objective    Patient-Reported Vitals  No data recorded              Allergies   Allergen Reactions    Fire Ant Anaphylaxis    Amoxicillin Other (See Comments)     C-Diff    Amoxicillin-Pot Clavulanate Other (See Comments)     c.diff    Augmentin [Amoxicillin-Pot Clavulanate]     Cephalexin Other (See Comments)     C Diff      Cephalexin Other (See Comments)     C-Diff     [facility]  Brief Operative Note    Surgery Date: 7/1/2020     Surgeon(s) and Role:  Panel 1:     * Percy Ayers MD - Primary  Panel 2:     * Kiko Aranda MD - Primary    Assisting Surgeon: None    Pre-op Diagnosis:  Malignant neoplasm of right female breast, unspecified estrogen receptor status, unspecified site of breast [C50.911]    Post-op Diagnosis:  Post-Op Diagnosis Codes:     * Malignant neoplasm of right female breast, unspecified estrogen receptor status, unspecified site of breast [C50.911]    Procedure(s) (LRB):  MASTECTOMY, BILATERAL - BILATERAL SKIN SPARING MASTECTOMY (Bilateral)  BIOPSY, LYMPH NODE, AXILLARY (Right)  INJECTION, FOR SENTINEL NODE IDENTIFICATION (Right)  REMOVAL, CATHETER, CENTRAL VENOUS, TUNNELED, WITH PORT (N/A)  INSERTION, TISSUE EXPANDER, BREAST (Bilateral)    Anesthesia: General    Description of the findings of the procedure(s):   Mastectomy sizes   Left Breast: 1158 g  Right Breast: 1218 g    Deflated mentor tissue expanders placed bilaterally.     Estimated Blood Loss: * No values recorded between 7/1/2020  8:49 AM and 7/1/2020 12:17 PM *         Specimens:   Specimen (12h ago, onward)    None            Discharge Note    OUTCOME: Patient tolerated treatment/procedure well without complication and is now ready for discharge.    DISPOSITION: Home or Self Care    FINAL DIAGNOSIS:  Right sided breast cancer    FOLLOWUP: In clinic with dr aranda    DISCHARGE INSTRUCTIONS:   Reg diet  No strenuous activity  Drain care as instructed. Record output in cc's and bring records to clinic  Call with any issues.

## 2024-07-26 ENCOUNTER — NURSE ONLY (OUTPATIENT)
Dept: INTERNAL MEDICINE CLINIC | Facility: CLINIC | Age: 79
End: 2024-07-26
Payer: MEDICARE

## 2024-07-26 DIAGNOSIS — E53.8 VITAMIN B 12 DEFICIENCY: Primary | ICD-10-CM

## 2024-07-26 PROCEDURE — 96372 THER/PROPH/DIAG INJ SC/IM: CPT | Performed by: NURSE PRACTITIONER

## 2024-07-26 RX ADMIN — CYANOCOBALAMIN 1000 MCG: 1000 INJECTION, SOLUTION INTRAMUSCULAR; SUBCUTANEOUS at 11:16

## 2024-07-26 RX ADMIN — CYANOCOBALAMIN 1000 MCG: 1000 INJECTION, SOLUTION INTRAMUSCULAR; SUBCUTANEOUS at 11:20

## 2024-07-30 ENCOUNTER — TELEPHONE (OUTPATIENT)
Dept: BEHAVIORAL/MENTAL HEALTH CLINIC | Age: 79
End: 2024-07-30

## 2024-07-30 DIAGNOSIS — F51.05 INSOMNIA DUE TO MENTAL DISORDER: ICD-10-CM

## 2024-07-30 RX ORDER — ZOLPIDEM TARTRATE 5 MG/1
5 TABLET ORAL NIGHTLY PRN
Qty: 30 TABLET | Refills: 1 | OUTPATIENT
Start: 2024-07-30 | End: 2024-09-28

## 2024-08-05 DIAGNOSIS — N39.0 RECURRENT UTI: ICD-10-CM

## 2024-08-05 RX ORDER — PHENAZOPYRIDINE HYDROCHLORIDE 100 MG/1
100 TABLET, FILM COATED ORAL 3 TIMES DAILY PRN
Qty: 20 TABLET | Refills: 1 | Status: SHIPPED | OUTPATIENT
Start: 2024-08-05 | End: 2025-08-05

## 2024-08-06 ENCOUNTER — OFFICE VISIT (OUTPATIENT)
Age: 79
End: 2024-08-06
Payer: MEDICARE

## 2024-08-06 VITALS — BODY MASS INDEX: 36.31 KG/M2 | WEIGHT: 173 LBS | HEIGHT: 58 IN

## 2024-08-06 DIAGNOSIS — G56.01 CARPAL TUNNEL SYNDROME OF RIGHT WRIST: ICD-10-CM

## 2024-08-06 DIAGNOSIS — M18.11 ARTHRITIS OF CARPOMETACARPAL (CMC) JOINT OF RIGHT THUMB: Primary | ICD-10-CM

## 2024-08-06 PROCEDURE — 99214 OFFICE O/P EST MOD 30 MIN: CPT | Performed by: ORTHOPAEDIC SURGERY

## 2024-08-06 PROCEDURE — 1123F ACP DISCUSS/DSCN MKR DOCD: CPT | Performed by: ORTHOPAEDIC SURGERY

## 2024-08-07 ENCOUNTER — TELEPHONE (OUTPATIENT)
Dept: ORTHOPEDIC SURGERY | Age: 79
End: 2024-08-07

## 2024-08-07 PROBLEM — G56.01 CARPAL TUNNEL SYNDROME OF RIGHT WRIST: Status: ACTIVE | Noted: 2024-08-06

## 2024-08-07 PROBLEM — M18.11 ARTHRITIS OF CARPOMETACARPAL (CMC) JOINT OF RIGHT THUMB: Status: ACTIVE | Noted: 2024-08-06

## 2024-08-07 NOTE — TELEPHONE ENCOUNTER
I spoke with Mrs. Rebolledo and gave her the sx and post-op information.  She voiced understanding.

## 2024-08-08 RX ORDER — PHENAZOPYRIDINE HYDROCHLORIDE 100 MG/1
100 TABLET, FILM COATED ORAL 3 TIMES DAILY PRN
Qty: 20 TABLET | Refills: 1 | OUTPATIENT
Start: 2024-08-08 | End: 2025-08-08

## 2024-08-08 NOTE — PROGRESS NOTES
Orthopaedic Hand Clinic Note    Name: My Rebolledo  YOB: 1945  Gender: female  MRN: [id number]      Follow up visit:   1. Arthritis of carpometacarpal (CMC) joint of right thumb    2. Carpal tunnel syndrome of right wrist        HPI: My Rebolledo is a 79 y.o. female who is following up for right thumb pain. She also complains of worsening numbness and tingling in the median distribution which does wake her at night. She states that the cortisone injection provided at her last visit did help temporarily.      ROS/Meds/PSH/PMH/FH/SH: I personally reviewed the patients standard intake form.  Pertinents are discussed in the HPI    Physical Examination:    Musculoskeletal Examination:  Examination on the right upper extremity demonstrates  a well healed incision over the radial aspect of the thumb CMC joint consistent with prior trapeziectomy, and well healed incision at the medial elbow consistent with cubital tunnel release. There is tenderness to palpation at the trapeziectomy site .Pain is reproduced with CMC grind. There is cyanosis of the tip  of the fingers, no ulceration present.     Imaging / Electrodiagnostic Tests:     I independently reviewed and interpreted the patient's nerve conduction study from 2022 . She had findings consistent with mild bilateral carpal tunnel syndrome    Assessment:     ICD-10-CM    1. Arthritis of carpometacarpal (CMC) joint of right thumb  M18.11 Case Request     Ambulatory referral to Occupational Therapy      2. Carpal tunnel syndrome of right wrist  G56.01 Case Request     Ambulatory referral to Occupational Therapy          Plan:   We discussed the diagnosis and different treatment options. We discussed observation, therapy, antiinflammatory medications and other pertinent treatment modalities.    After discussing in detail the patient elects to proceed with surgical treatment. She understands that I cannot guarantee that revision CMC arthroplasty

## 2024-08-09 ENCOUNTER — TELEPHONE (OUTPATIENT)
Dept: ENDOCRINOLOGY | Age: 79
End: 2024-08-09

## 2024-08-09 ENCOUNTER — TELEPHONE (OUTPATIENT)
Dept: ORTHOPEDIC SURGERY | Age: 79
End: 2024-08-09

## 2024-08-09 DIAGNOSIS — E03.9 PRIMARY HYPOTHYROIDISM: ICD-10-CM

## 2024-08-09 DIAGNOSIS — B36.9 FUNGAL SKIN INFECTION: ICD-10-CM

## 2024-08-09 RX ORDER — CLOTRIMAZOLE AND BETAMETHASONE DIPROPIONATE 10; .64 MG/G; MG/G
CREAM TOPICAL AS NEEDED
Qty: 45 G | Refills: 0 | OUTPATIENT
Start: 2024-08-09

## 2024-08-09 RX ORDER — LEVOTHYROXINE SODIUM 125 MCG
TABLET ORAL
Qty: 102 TABLET | Refills: 3 | Status: SHIPPED | OUTPATIENT
Start: 2024-08-09

## 2024-08-09 NOTE — TELEPHONE ENCOUNTER
I lvm for Ms. Rebolledo to let her know that we need to reschedule her sx due to pre-authorization.  It may take up to 15 business days or more to get the authorization.  I offered her to reschedule her sx to either 8/28/24 or 8/30/24. I asked her to please call back with the day that she would prefer.

## 2024-08-14 ENCOUNTER — TELEPHONE (OUTPATIENT)
Age: 79
End: 2024-08-14

## 2024-08-14 ENCOUNTER — TELEPHONE (OUTPATIENT)
Dept: ORTHOPEDIC SURGERY | Age: 79
End: 2024-08-14

## 2024-08-14 NOTE — TELEPHONE ENCOUNTER
She has talked to her insurance co and wants to speak to Roselia or Alejandra about what they said about her surgery. She says you need to put that the surgery is medically necessary. This wasn't stated so that's why they denied it. Can you update this with them and let her know.

## 2024-08-16 ENCOUNTER — NURSE ONLY (OUTPATIENT)
Dept: INTERNAL MEDICINE CLINIC | Facility: CLINIC | Age: 79
End: 2024-08-16
Payer: MEDICARE

## 2024-08-16 DIAGNOSIS — E53.8 VITAMIN B 12 DEFICIENCY: Primary | ICD-10-CM

## 2024-08-16 PROCEDURE — 96372 THER/PROPH/DIAG INJ SC/IM: CPT | Performed by: NURSE PRACTITIONER

## 2024-08-16 RX ADMIN — CYANOCOBALAMIN 1000 MCG: 1000 INJECTION, SOLUTION INTRAMUSCULAR; SUBCUTANEOUS at 11:21

## 2024-08-16 NOTE — TELEPHONE ENCOUNTER
I spoke with Ms. Rebolledo and she stated that she spoke with her insurance company. They told her it was denied because it did not say that it was medically necessary.  I explained that all the office notes from her visits were sent to them and that requesting surgery authorization is an indication that it is medically necessary.  In the denial letter we received, it did not state that.  She said that they told her it needs to be resubmitted with the words 'Medically Necessary\" on it. I told her I will ask the surgery pre-authorization specialist to resend it in with those words.  She also stated that she as not had trouble before getting surgery pre-approved.

## 2024-08-16 NOTE — TELEPHONE ENCOUNTER
I spoke with Antonia, the surgery pre-authorization specialist, and she stated that all of the clinical notes were sent to the insurance company.

## 2024-08-19 ENCOUNTER — PATIENT MESSAGE (OUTPATIENT)
Age: 79
End: 2024-08-19

## 2024-08-21 ENCOUNTER — TELEPHONE (OUTPATIENT)
Dept: ORTHOPEDIC SURGERY | Age: 79
End: 2024-08-21

## 2024-08-21 DIAGNOSIS — M18.11 ARTHRITIS OF CARPOMETACARPAL (CMC) JOINT OF RIGHT THUMB: Primary | ICD-10-CM

## 2024-08-21 DIAGNOSIS — G56.01 CARPAL TUNNEL SYNDROME OF RIGHT WRIST: ICD-10-CM

## 2024-08-21 DIAGNOSIS — G56.01 CARPAL TUNNEL SYNDROME, RIGHT: ICD-10-CM

## 2024-08-21 NOTE — TELEPHONE ENCOUNTER
Appeal for surgery has been approved.  Alejandra is going to call the patient to get her rescheduled for surgery.

## 2024-08-21 NOTE — TELEPHONE ENCOUNTER
I spoke with Mrs. Rebolledo to let her know that an ppeal had been submitted for her surgery and it is now authorized.  I confirmed the date of the sx with her and will get everything scheduled.  We sill call her back with the updated information.  She voiced understanding.

## 2024-08-22 ENCOUNTER — TELEPHONE (OUTPATIENT)
Dept: ORTHOPEDIC SURGERY | Age: 79
End: 2024-08-22

## 2024-08-22 NOTE — TELEPHONE ENCOUNTER
I spoke with Mrs. Rebolledo to let her know I needed to reschedule her sx to 9/11/2024.  I will get everything rescheduled and call her back with the information.  She voiced understanding.

## 2024-08-27 ENCOUNTER — TELEPHONE (OUTPATIENT)
Dept: ORTHOPEDIC SURGERY | Age: 79
End: 2024-08-27

## 2024-08-27 NOTE — TELEPHONE ENCOUNTER
She is having surgery with Dr. Friend Sept 11. She wants to know if she can get an epidural steroid inj in her back before this? Please let her know.

## 2024-08-29 ENCOUNTER — TELEPHONE (OUTPATIENT)
Age: 79
End: 2024-08-29

## 2024-08-29 NOTE — TELEPHONE ENCOUNTER
I spoke with Mrs. Rebolledo and gave her the sx and post-op information.  She was aware of the post op appts as she had seen them in her MyChart.  She voiced understanding.

## 2024-08-30 ENCOUNTER — TELEMEDICINE (OUTPATIENT)
Dept: BEHAVIORAL/MENTAL HEALTH CLINIC | Age: 79
End: 2024-08-30

## 2024-08-30 DIAGNOSIS — F33.42 RECURRENT MAJOR DEPRESSIVE DISORDER, IN FULL REMISSION (HCC): Primary | ICD-10-CM

## 2024-08-30 DIAGNOSIS — F43.10 PTSD (POST-TRAUMATIC STRESS DISORDER): ICD-10-CM

## 2024-08-30 DIAGNOSIS — F51.05 INSOMNIA DUE TO MENTAL DISORDER: ICD-10-CM

## 2024-08-30 DIAGNOSIS — F41.1 GENERALIZED ANXIETY DISORDER: ICD-10-CM

## 2024-08-30 DIAGNOSIS — Z65.8 PSYCHOSOCIAL STRESSORS: ICD-10-CM

## 2024-08-30 RX ORDER — DESVENLAFAXINE 50 MG/1
50 TABLET, FILM COATED, EXTENDED RELEASE ORAL DAILY
Qty: 30 TABLET | Refills: 3 | Status: SHIPPED | OUTPATIENT
Start: 2024-08-30

## 2024-08-30 RX ORDER — CLONAZEPAM 0.5 MG/1
0.5 TABLET ORAL DAILY PRN
Qty: 30 TABLET | Refills: 2 | Status: CANCELLED | OUTPATIENT
Start: 2024-08-30 | End: 2024-11-28

## 2024-08-30 RX ORDER — ZOLPIDEM TARTRATE 5 MG/1
5 TABLET ORAL NIGHTLY PRN
COMMUNITY
End: 2024-08-30 | Stop reason: SDUPTHER

## 2024-08-30 RX ORDER — ZOLPIDEM TARTRATE 5 MG/1
5 TABLET ORAL NIGHTLY PRN
Qty: 30 TABLET | Refills: 2 | Status: SHIPPED | OUTPATIENT
Start: 2024-08-30 | End: 2024-11-28

## 2024-08-30 ASSESSMENT — ANXIETY QUESTIONNAIRES
IF YOU CHECKED OFF ANY PROBLEMS ON THIS QUESTIONNAIRE, HOW DIFFICULT HAVE THESE PROBLEMS MADE IT FOR YOU TO DO YOUR WORK, TAKE CARE OF THINGS AT HOME, OR GET ALONG WITH OTHER PEOPLE: NOT DIFFICULT AT ALL
7. FEELING AFRAID AS IF SOMETHING AWFUL MIGHT HAPPEN: NOT AT ALL
5. BEING SO RESTLESS THAT IT IS HARD TO SIT STILL: SEVERAL DAYS
4. TROUBLE RELAXING: SEVERAL DAYS
GAD7 TOTAL SCORE: 9
2. NOT BEING ABLE TO STOP OR CONTROL WORRYING: NEARLY EVERY DAY
3. WORRYING TOO MUCH ABOUT DIFFERENT THINGS: NEARLY EVERY DAY
6. BECOMING EASILY ANNOYED OR IRRITABLE: NOT AT ALL
1. FEELING NERVOUS, ANXIOUS, OR ON EDGE: SEVERAL DAYS

## 2024-08-30 ASSESSMENT — PATIENT HEALTH QUESTIONNAIRE - PHQ9
6. FEELING BAD ABOUT YOURSELF - OR THAT YOU ARE A FAILURE OR HAVE LET YOURSELF OR YOUR FAMILY DOWN: NOT AT ALL
SUM OF ALL RESPONSES TO PHQ9 QUESTIONS 1 & 2: 0
3. TROUBLE FALLING OR STAYING ASLEEP: NOT AT ALL
1. LITTLE INTEREST OR PLEASURE IN DOING THINGS: NOT AT ALL
7. TROUBLE CONCENTRATING ON THINGS, SUCH AS READING THE NEWSPAPER OR WATCHING TELEVISION: NOT AT ALL
4. FEELING TIRED OR HAVING LITTLE ENERGY: SEVERAL DAYS
9. THOUGHTS THAT YOU WOULD BE BETTER OFF DEAD, OR OF HURTING YOURSELF: NOT AT ALL
8. MOVING OR SPEAKING SO SLOWLY THAT OTHER PEOPLE COULD HAVE NOTICED. OR THE OPPOSITE, BEING SO FIGETY OR RESTLESS THAT YOU HAVE BEEN MOVING AROUND A LOT MORE THAN USUAL: SEVERAL DAYS
5. POOR APPETITE OR OVEREATING: NOT AT ALL
SUM OF ALL RESPONSES TO PHQ QUESTIONS 1-9: 2
2. FEELING DOWN, DEPRESSED OR HOPELESS: NOT AT ALL
SUM OF ALL RESPONSES TO PHQ QUESTIONS 1-9: 2

## 2024-08-30 NOTE — PROGRESS NOTES
Patient:  My Rebolledo  Age:  79 y.o.  :  1945     SEX:  female MRN:  [id number]     RACE: White (non-)     SEEN:  [x]  PATIENT  []  SPOUSE []  OTHER:                  2024     9:12 AM 2024     3:42 PM 2024     8:50 AM   PHQ-9    Little interest or pleasure in doing things 0 0 1   Feeling down, depressed, or hopeless 0 0 1   Trouble falling or staying asleep, or sleeping too much 0 0 1   Feeling tired or having little energy 1 1 1   Poor appetite or overeating 0 0 0   Feeling bad about yourself - or that you are a failure or have let yourself or your family down 0 0 1   Trouble concentrating on things, such as reading the newspaper or watching television 0 0 0   Moving or speaking so slowly that other people could have noticed. Or the opposite - being so fidgety or restless that you have been moving around a lot more than usual 1 0 0   Thoughts that you would be better off dead, or of hurting yourself in some way 0 0 0   PHQ-2 Score 0 0 2   PHQ-9 Total Score 2 1 5   If you checked off any problems, how difficult have these problems made it for you to do your work, take care of things at home, or get along with other people?  1 1           2024     9:14 AM 2024     8:48 AM 2024    10:13 AM   URBANO-7 SCREENING   Feeling nervous, anxious, or on edge Several days Several days Several days   Not being able to stop or control worrying Nearly every day More than half the days Several days   Worrying too much about different things Nearly every day Several days Several days   Trouble relaxing Several days Several days Not at all   Being so restless that it is hard to sit still Several days Several days Not at all   Becoming easily annoyed or irritable Not at all Several days Not at all   Feeling afraid as if something awful might happen Not at all Several days Not at all   URBANO-7 Total Score 9 8 3   How difficult have these problems made it for you to do your work,  care in the state where the patient is located as indicated above. If you are not or unsure, please re-schedule the visit: Yes, I confirm.        Total time spent for this encounter: Not billed by time    --Madison Fernandez MD on 9/1/2024 at 8:07 PM    An electronic signature was used to authenticate this note.            Having hand surgery on 9/11/24.

## 2024-09-04 ENCOUNTER — NURSE ONLY (OUTPATIENT)
Dept: INTERNAL MEDICINE CLINIC | Facility: CLINIC | Age: 79
End: 2024-09-04
Payer: MEDICARE

## 2024-09-04 ENCOUNTER — TELEPHONE (OUTPATIENT)
Dept: ENDOCRINOLOGY | Age: 79
End: 2024-09-04

## 2024-09-04 DIAGNOSIS — E03.9 PRIMARY HYPOTHYROIDISM: Primary | ICD-10-CM

## 2024-09-04 DIAGNOSIS — E79.0 HYPERURICEMIA: ICD-10-CM

## 2024-09-04 DIAGNOSIS — Z01.818 PRE-OP TESTING: ICD-10-CM

## 2024-09-04 DIAGNOSIS — I10 PRIMARY HYPERTENSION: Chronic | ICD-10-CM

## 2024-09-04 DIAGNOSIS — E53.8 VITAMIN B 12 DEFICIENCY: Primary | ICD-10-CM

## 2024-09-04 DIAGNOSIS — E03.9 PRIMARY HYPOTHYROIDISM: ICD-10-CM

## 2024-09-04 LAB
ALBUMIN SERPL-MCNC: 3.9 G/DL (ref 3.2–4.6)
ALBUMIN/GLOB SERPL: 1.3 (ref 1–1.9)
ALP SERPL-CCNC: 67 U/L (ref 35–104)
ALT SERPL-CCNC: 13 U/L (ref 12–65)
ANION GAP SERPL CALC-SCNC: 10 MMOL/L (ref 9–18)
AST SERPL-CCNC: 16 U/L (ref 15–37)
BASOPHILS # BLD: 0.1 K/UL (ref 0–0.2)
BASOPHILS NFR BLD: 1 % (ref 0–2)
BILIRUB SERPL-MCNC: 0.3 MG/DL (ref 0–1.2)
BUN SERPL-MCNC: 26 MG/DL (ref 8–23)
CALCIUM SERPL-MCNC: 9.5 MG/DL (ref 8.8–10.2)
CHLORIDE SERPL-SCNC: 102 MMOL/L (ref 98–107)
CO2 SERPL-SCNC: 28 MMOL/L (ref 20–28)
CREAT SERPL-MCNC: 1.58 MG/DL (ref 0.6–1.1)
DIFFERENTIAL METHOD BLD: NORMAL
EOSINOPHIL # BLD: 0.4 K/UL (ref 0–0.8)
EOSINOPHIL NFR BLD: 4 % (ref 0.5–7.8)
ERYTHROCYTE [DISTWIDTH] IN BLOOD BY AUTOMATED COUNT: 13.9 % (ref 11.9–14.6)
GLOBULIN SER CALC-MCNC: 2.9 G/DL (ref 2.3–3.5)
GLUCOSE SERPL-MCNC: 97 MG/DL (ref 70–99)
HCT VFR BLD AUTO: 38.5 % (ref 35.8–46.3)
HGB BLD-MCNC: 12.3 G/DL (ref 11.7–15.4)
IMM GRANULOCYTES # BLD AUTO: 0 K/UL (ref 0–0.5)
IMM GRANULOCYTES NFR BLD AUTO: 0 % (ref 0–5)
LYMPHOCYTES # BLD: 2.1 K/UL (ref 0.5–4.6)
LYMPHOCYTES NFR BLD: 23 % (ref 13–44)
MCH RBC QN AUTO: 29.2 PG (ref 26.1–32.9)
MCHC RBC AUTO-ENTMCNC: 31.9 G/DL (ref 31.4–35)
MCV RBC AUTO: 91.4 FL (ref 82–102)
MONOCYTES # BLD: 0.5 K/UL (ref 0.1–1.3)
MONOCYTES NFR BLD: 6 % (ref 4–12)
NEUTS SEG # BLD: 5.8 K/UL (ref 1.7–8.2)
NEUTS SEG NFR BLD: 66 % (ref 43–78)
NRBC # BLD: 0 K/UL (ref 0–0.2)
PLATELET # BLD AUTO: 303 K/UL (ref 150–450)
PMV BLD AUTO: 11.3 FL (ref 9.4–12.3)
POTASSIUM SERPL-SCNC: 4.7 MMOL/L (ref 3.5–5.1)
PROT SERPL-MCNC: 6.8 G/DL (ref 6.3–8.2)
RBC # BLD AUTO: 4.21 M/UL (ref 4.05–5.2)
SODIUM SERPL-SCNC: 140 MMOL/L (ref 136–145)
T4 FREE SERPL-MCNC: 1.9 NG/DL (ref 0.9–1.7)
TSH, 3RD GENERATION: 0.06 UIU/ML (ref 0.27–4.2)
URATE SERPL-MCNC: 7.9 MG/DL (ref 2.5–7.1)
WBC # BLD AUTO: 8.8 K/UL (ref 4.3–11.1)

## 2024-09-04 PROCEDURE — 96372 THER/PROPH/DIAG INJ SC/IM: CPT | Performed by: NURSE PRACTITIONER

## 2024-09-04 RX ORDER — CYANOCOBALAMIN 1000 UG/ML
1000 INJECTION, SOLUTION INTRAMUSCULAR; SUBCUTANEOUS ONCE
Status: COMPLETED | OUTPATIENT
Start: 2024-09-04 | End: 2024-09-04

## 2024-09-04 RX ORDER — LEVOTHYROXINE SODIUM 125 MCG
TABLET ORAL
Qty: 90 TABLET | Refills: 3 | Status: SHIPPED | OUTPATIENT
Start: 2024-09-04

## 2024-09-04 RX ADMIN — CYANOCOBALAMIN 1000 MCG: 1000 INJECTION, SOLUTION INTRAMUSCULAR; SUBCUTANEOUS at 11:06

## 2024-09-04 RX ADMIN — CYANOCOBALAMIN 1000 MCG: 1000 INJECTION, SOLUTION INTRAMUSCULAR; SUBCUTANEOUS at 11:03

## 2024-09-04 RX ADMIN — CYANOCOBALAMIN 1000 MCG: 1000 INJECTION, SOLUTION INTRAMUSCULAR; SUBCUTANEOUS at 11:04

## 2024-09-04 NOTE — TELEPHONE ENCOUNTER
Patient remains overtreated. I would like her to change from current TDD of 134 to 1 tab of the name brand 125mcg synthroid.     Please review the proper instructions below      Thyroid replacement dosing instructions:    Take first thing in the morning when your stomach is empty  Take with only water (no other liquids like coffee, soda, juice, tea)  Take by itself, nothing else to eat or drink  Wait 30 minutes to 1 hour before eating or drinking anything other than water  Take vitamins/minerals and stomach acid medication at least 4 hours later (lunch or later)  Avoid biotin 3-5 days before labs drawn    Plan to have your labs rechecked in about 8 weeks at the same lab

## 2024-09-06 ENCOUNTER — OFFICE VISIT (OUTPATIENT)
Dept: INTERNAL MEDICINE CLINIC | Facility: CLINIC | Age: 79
End: 2024-09-06
Payer: MEDICARE

## 2024-09-06 ENCOUNTER — PATIENT MESSAGE (OUTPATIENT)
Dept: ENDOCRINOLOGY | Age: 79
End: 2024-09-06

## 2024-09-06 VITALS
WEIGHT: 174 LBS | OXYGEN SATURATION: 98 % | DIASTOLIC BLOOD PRESSURE: 76 MMHG | HEART RATE: 70 BPM | SYSTOLIC BLOOD PRESSURE: 130 MMHG | BODY MASS INDEX: 36.37 KG/M2

## 2024-09-06 DIAGNOSIS — N39.0 RECURRENT UTI: ICD-10-CM

## 2024-09-06 DIAGNOSIS — E55.9 VITAMIN D DEFICIENCY: ICD-10-CM

## 2024-09-06 DIAGNOSIS — I10 PRIMARY HYPERTENSION: Primary | ICD-10-CM

## 2024-09-06 DIAGNOSIS — E61.1 IRON DEFICIENCY: ICD-10-CM

## 2024-09-06 DIAGNOSIS — E53.8 VITAMIN B 12 DEFICIENCY: ICD-10-CM

## 2024-09-06 DIAGNOSIS — K21.9 GASTROESOPHAGEAL REFLUX DISEASE, UNSPECIFIED WHETHER ESOPHAGITIS PRESENT: Chronic | ICD-10-CM

## 2024-09-06 PROCEDURE — 3078F DIAST BP <80 MM HG: CPT | Performed by: NURSE PRACTITIONER

## 2024-09-06 PROCEDURE — 99214 OFFICE O/P EST MOD 30 MIN: CPT | Performed by: NURSE PRACTITIONER

## 2024-09-06 PROCEDURE — 3075F SYST BP GE 130 - 139MM HG: CPT | Performed by: NURSE PRACTITIONER

## 2024-09-06 PROCEDURE — 1123F ACP DISCUSS/DSCN MKR DOCD: CPT | Performed by: NURSE PRACTITIONER

## 2024-09-06 RX ORDER — CYANOCOBALAMIN 1000 UG/ML
1000 INJECTION, SOLUTION INTRAMUSCULAR; SUBCUTANEOUS
Qty: 6 EACH | Refills: 3 | Status: SHIPPED | OUTPATIENT
Start: 2024-09-06

## 2024-09-06 RX ORDER — QUINIDINE GLUCONATE 324 MG
240 TABLET, EXTENDED RELEASE ORAL DAILY
Qty: 100 TABLET | Refills: 1 | Status: SHIPPED | OUTPATIENT
Start: 2024-09-06

## 2024-09-06 RX ORDER — LISINOPRIL 20 MG/1
20 TABLET ORAL DAILY
Qty: 100 TABLET | Refills: 3 | Status: SHIPPED | OUTPATIENT
Start: 2024-09-06

## 2024-09-06 RX ORDER — METHENAMINE HIPPURATE 1000 MG/1
1 TABLET ORAL 2 TIMES DAILY WITH MEALS
Qty: 60 TABLET | Refills: 5 | Status: CANCELLED | OUTPATIENT
Start: 2024-09-06

## 2024-09-06 RX ORDER — AMLODIPINE BESYLATE 5 MG/1
5 TABLET ORAL DAILY
Qty: 100 TABLET | Refills: 3 | Status: SHIPPED | OUTPATIENT
Start: 2024-09-06

## 2024-09-08 ENCOUNTER — PATIENT MESSAGE (OUTPATIENT)
Dept: UROLOGY | Age: 79
End: 2024-09-08

## 2024-09-08 ENCOUNTER — PATIENT MESSAGE (OUTPATIENT)
Dept: BEHAVIORAL/MENTAL HEALTH CLINIC | Age: 79
End: 2024-09-08

## 2024-09-08 DIAGNOSIS — N32.81 OAB (OVERACTIVE BLADDER): Primary | ICD-10-CM

## 2024-09-09 ENCOUNTER — TELEPHONE (OUTPATIENT)
Dept: BEHAVIORAL/MENTAL HEALTH CLINIC | Age: 79
End: 2024-09-09

## 2024-09-10 ENCOUNTER — ANESTHESIA EVENT (OUTPATIENT)
Dept: SURGERY | Age: 79
End: 2024-09-10
Payer: MEDICARE

## 2024-09-11 ENCOUNTER — APPOINTMENT (OUTPATIENT)
Dept: GENERAL RADIOLOGY | Age: 79
End: 2024-09-11
Attending: ORTHOPAEDIC SURGERY
Payer: MEDICARE

## 2024-09-11 ENCOUNTER — HOSPITAL ENCOUNTER (OUTPATIENT)
Age: 79
Setting detail: OUTPATIENT SURGERY
Discharge: HOME | End: 2024-09-11
Attending: ORTHOPAEDIC SURGERY | Admitting: ORTHOPAEDIC SURGERY
Payer: MEDICARE

## 2024-09-11 ENCOUNTER — ANESTHESIA (OUTPATIENT)
Dept: SURGERY | Age: 79
End: 2024-09-11
Payer: MEDICARE

## 2024-09-11 VITALS
RESPIRATION RATE: 18 BRPM | SYSTOLIC BLOOD PRESSURE: 141 MMHG | DIASTOLIC BLOOD PRESSURE: 63 MMHG | HEIGHT: 58 IN | HEART RATE: 64 BPM | OXYGEN SATURATION: 95 % | BODY MASS INDEX: 36.31 KG/M2 | TEMPERATURE: 97.9 F | WEIGHT: 173 LBS

## 2024-09-11 DIAGNOSIS — Z01.818 PRE-OP TESTING: Primary | ICD-10-CM

## 2024-09-11 DIAGNOSIS — M19.012 ARTHRITIS OF LEFT ACROMIOCLAVICULAR JOINT: ICD-10-CM

## 2024-09-11 DIAGNOSIS — G56.01 CARPAL TUNNEL SYNDROME, RIGHT: ICD-10-CM

## 2024-09-11 DIAGNOSIS — M18.11 ARTHRITIS OF CARPOMETACARPAL (CMC) JOINT OF RIGHT THUMB: ICD-10-CM

## 2024-09-11 DIAGNOSIS — G56.01 CARPAL TUNNEL SYNDROME OF RIGHT WRIST: ICD-10-CM

## 2024-09-11 PROCEDURE — 6360000002 HC RX W HCPCS: Performed by: ORTHOPAEDIC SURGERY

## 2024-09-11 PROCEDURE — 3700000000 HC ANESTHESIA ATTENDED CARE: Performed by: ORTHOPAEDIC SURGERY

## 2024-09-11 PROCEDURE — 7100000001 HC PACU RECOVERY - ADDTL 15 MIN: Performed by: ORTHOPAEDIC SURGERY

## 2024-09-11 PROCEDURE — 64415 NJX AA&/STRD BRCH PLXS IMG: CPT | Performed by: ANESTHESIOLOGY

## 2024-09-11 PROCEDURE — 3600000003 HC SURGERY LEVEL 3 BASE: Performed by: ORTHOPAEDIC SURGERY

## 2024-09-11 PROCEDURE — 25447 ARTHRP NTRCRP/CRP/MTCR NTRPS: CPT | Performed by: ORTHOPAEDIC SURGERY

## 2024-09-11 PROCEDURE — C1713 ANCHOR/SCREW BN/BN,TIS/BN: HCPCS | Performed by: ORTHOPAEDIC SURGERY

## 2024-09-11 PROCEDURE — 6370000000 HC RX 637 (ALT 250 FOR IP): Performed by: ANESTHESIOLOGY

## 2024-09-11 PROCEDURE — 6360000002 HC RX W HCPCS: Performed by: REGISTERED NURSE

## 2024-09-11 PROCEDURE — 6360000002 HC RX W HCPCS: Performed by: ANESTHESIOLOGY

## 2024-09-11 PROCEDURE — 7100000010 HC PHASE II RECOVERY - FIRST 15 MIN: Performed by: ORTHOPAEDIC SURGERY

## 2024-09-11 PROCEDURE — 64721 CARPAL TUNNEL SURGERY: CPT | Performed by: ORTHOPAEDIC SURGERY

## 2024-09-11 PROCEDURE — 3600000013 HC SURGERY LEVEL 3 ADDTL 15MIN: Performed by: ORTHOPAEDIC SURGERY

## 2024-09-11 PROCEDURE — 2709999900 HC NON-CHARGEABLE SUPPLY: Performed by: ORTHOPAEDIC SURGERY

## 2024-09-11 PROCEDURE — 3700000001 HC ADD 15 MINUTES (ANESTHESIA): Performed by: ORTHOPAEDIC SURGERY

## 2024-09-11 PROCEDURE — 25310 TRANSPLANT FOREARM TENDON: CPT | Performed by: ORTHOPAEDIC SURGERY

## 2024-09-11 PROCEDURE — 7100000000 HC PACU RECOVERY - FIRST 15 MIN: Performed by: ORTHOPAEDIC SURGERY

## 2024-09-11 PROCEDURE — 76998 US GUIDE INTRAOP: CPT | Performed by: ORTHOPAEDIC SURGERY

## 2024-09-11 PROCEDURE — 2580000003 HC RX 258: Performed by: ANESTHESIOLOGY

## 2024-09-11 DEVICE — KIT IMPL DIA1.1MM CMC S STL REP MINI TIGHTROPE: Type: IMPLANTABLE DEVICE | Site: HAND | Status: FUNCTIONAL

## 2024-09-11 DEVICE — ANCHOR SUT 1.35MM K WIRE NANO FULL THRD W/ 3-0 FIBERWIRE: Type: IMPLANTABLE DEVICE | Site: HAND | Status: FUNCTIONAL

## 2024-09-11 RX ORDER — SODIUM CHLORIDE 0.9 % (FLUSH) 0.9 %
5-40 SYRINGE (ML) INJECTION EVERY 12 HOURS SCHEDULED
Status: DISCONTINUED | OUTPATIENT
Start: 2024-09-11 | End: 2024-09-11 | Stop reason: HOSPADM

## 2024-09-11 RX ORDER — CLINDAMYCIN PHOSPHATE 600 MG/50ML
600 INJECTION, SOLUTION INTRAVENOUS EVERY 8 HOURS
Status: DISCONTINUED | OUTPATIENT
Start: 2024-09-11 | End: 2024-09-11 | Stop reason: DRUGHIGH

## 2024-09-11 RX ORDER — PROPOFOL 10 MG/ML
INJECTION, EMULSION INTRAVENOUS PRN
Status: DISCONTINUED | OUTPATIENT
Start: 2024-09-11 | End: 2024-09-11 | Stop reason: SDUPTHER

## 2024-09-11 RX ORDER — HYDRALAZINE HYDROCHLORIDE 20 MG/ML
INJECTION INTRAMUSCULAR; INTRAVENOUS PRN
Status: DISCONTINUED | OUTPATIENT
Start: 2024-09-11 | End: 2024-09-11 | Stop reason: SDUPTHER

## 2024-09-11 RX ORDER — NALOXONE HYDROCHLORIDE 0.4 MG/ML
INJECTION, SOLUTION INTRAMUSCULAR; INTRAVENOUS; SUBCUTANEOUS PRN
Status: DISCONTINUED | OUTPATIENT
Start: 2024-09-11 | End: 2024-09-11 | Stop reason: HOSPADM

## 2024-09-11 RX ORDER — HALOPERIDOL 5 MG/ML
1 INJECTION INTRAMUSCULAR
Status: DISCONTINUED | OUTPATIENT
Start: 2024-09-11 | End: 2024-09-11 | Stop reason: HOSPADM

## 2024-09-11 RX ORDER — SODIUM CHLORIDE 9 MG/ML
INJECTION, SOLUTION INTRAVENOUS PRN
Status: DISCONTINUED | OUTPATIENT
Start: 2024-09-11 | End: 2024-09-11 | Stop reason: HOSPADM

## 2024-09-11 RX ORDER — LIDOCAINE HYDROCHLORIDE 10 MG/ML
1 INJECTION, SOLUTION INFILTRATION; PERINEURAL
Status: DISCONTINUED | OUTPATIENT
Start: 2024-09-11 | End: 2024-09-11 | Stop reason: HOSPADM

## 2024-09-11 RX ORDER — ACETAMINOPHEN 500 MG
1000 TABLET ORAL ONCE
Status: COMPLETED | OUTPATIENT
Start: 2024-09-11 | End: 2024-09-11

## 2024-09-11 RX ORDER — ROPIVACAINE HYDROCHLORIDE 5 MG/ML
INJECTION, SOLUTION EPIDURAL; INFILTRATION; PERINEURAL
Status: COMPLETED | OUTPATIENT
Start: 2024-09-11 | End: 2024-09-11

## 2024-09-11 RX ORDER — SODIUM CHLORIDE 0.9 % (FLUSH) 0.9 %
5-40 SYRINGE (ML) INJECTION PRN
Status: DISCONTINUED | OUTPATIENT
Start: 2024-09-11 | End: 2024-09-11 | Stop reason: HOSPADM

## 2024-09-11 RX ORDER — MIDAZOLAM HYDROCHLORIDE 2 MG/2ML
2 INJECTION, SOLUTION INTRAMUSCULAR; INTRAVENOUS
Status: COMPLETED | OUTPATIENT
Start: 2024-09-11 | End: 2024-09-11

## 2024-09-11 RX ORDER — HYDROCODONE BITARTRATE AND ACETAMINOPHEN 7.5; 325 MG/1; MG/1
1 TABLET ORAL EVERY 6 HOURS PRN
Qty: 20 TABLET | Refills: 0 | Status: SHIPPED | OUTPATIENT
Start: 2024-09-11 | End: 2024-09-11 | Stop reason: HOSPADM

## 2024-09-11 RX ORDER — FENTANYL CITRATE 50 UG/ML
100 INJECTION, SOLUTION INTRAMUSCULAR; INTRAVENOUS ONCE
Status: COMPLETED | OUTPATIENT
Start: 2024-09-11 | End: 2024-09-11

## 2024-09-11 RX ORDER — CLINDAMYCIN PHOSPHATE 900 MG/50ML
900 INJECTION, SOLUTION INTRAVENOUS
Status: COMPLETED | OUTPATIENT
Start: 2024-09-11 | End: 2024-09-11

## 2024-09-11 RX ORDER — DEXAMETHASONE SODIUM PHOSPHATE 10 MG/ML
INJECTION, SOLUTION INTRAMUSCULAR; INTRAVENOUS
Status: COMPLETED | OUTPATIENT
Start: 2024-09-11 | End: 2024-09-11

## 2024-09-11 RX ORDER — SODIUM CHLORIDE, SODIUM LACTATE, POTASSIUM CHLORIDE, CALCIUM CHLORIDE 600; 310; 30; 20 MG/100ML; MG/100ML; MG/100ML; MG/100ML
INJECTION, SOLUTION INTRAVENOUS CONTINUOUS
Status: DISCONTINUED | OUTPATIENT
Start: 2024-09-11 | End: 2024-09-11 | Stop reason: HOSPADM

## 2024-09-11 RX ORDER — PROCHLORPERAZINE EDISYLATE 5 MG/ML
5 INJECTION INTRAMUSCULAR; INTRAVENOUS
Status: DISCONTINUED | OUTPATIENT
Start: 2024-09-11 | End: 2024-09-11 | Stop reason: HOSPADM

## 2024-09-11 RX ORDER — HYDROCODONE BITARTRATE AND ACETAMINOPHEN 7.5; 325 MG/1; MG/1
1 TABLET ORAL EVERY 6 HOURS PRN
Qty: 20 TABLET | Refills: 0 | Status: SHIPPED | OUTPATIENT
Start: 2024-09-11 | End: 2024-09-14

## 2024-09-11 RX ADMIN — PROPOFOL 150 MCG/KG/MIN: 10 INJECTION, EMULSION INTRAVENOUS at 08:18

## 2024-09-11 RX ADMIN — PROPOFOL 50 MG: 10 INJECTION, EMULSION INTRAVENOUS at 08:17

## 2024-09-11 RX ADMIN — SODIUM CHLORIDE, POTASSIUM CHLORIDE, SODIUM LACTATE AND CALCIUM CHLORIDE: 600; 310; 30; 20 INJECTION, SOLUTION INTRAVENOUS at 07:15

## 2024-09-11 RX ADMIN — FENTANYL CITRATE 100 MCG: 50 INJECTION INTRAMUSCULAR; INTRAVENOUS at 07:32

## 2024-09-11 RX ADMIN — CLINDAMYCIN PHOSPHATE 900 MG: 900 INJECTION, SOLUTION INTRAVENOUS at 08:19

## 2024-09-11 RX ADMIN — HYDRALAZINE HYDROCHLORIDE 5 MG: 20 INJECTION INTRAMUSCULAR; INTRAVENOUS at 08:22

## 2024-09-11 RX ADMIN — HYDRALAZINE HYDROCHLORIDE 5 MG: 20 INJECTION INTRAMUSCULAR; INTRAVENOUS at 08:27

## 2024-09-11 RX ADMIN — ROPIVACAINE HYDROCHLORIDE 20 ML: 5 INJECTION, SOLUTION EPIDURAL; INFILTRATION; PERINEURAL at 07:32

## 2024-09-11 RX ADMIN — MIDAZOLAM 2 MG: 1 INJECTION INTRAMUSCULAR; INTRAVENOUS at 07:32

## 2024-09-11 RX ADMIN — ACETAMINOPHEN 1000 MG: 500 TABLET, FILM COATED ORAL at 07:14

## 2024-09-11 RX ADMIN — DEXAMETHASONE SODIUM PHOSPHATE 5 MG: 10 INJECTION, SOLUTION INTRAMUSCULAR; INTRAVENOUS at 07:32

## 2024-09-11 ASSESSMENT — ENCOUNTER SYMPTOMS: SHORTNESS OF BREATH: 1

## 2024-09-18 ENCOUNTER — NURSE ONLY (OUTPATIENT)
Dept: INTERNAL MEDICINE CLINIC | Facility: CLINIC | Age: 79
End: 2024-09-18
Payer: MEDICARE

## 2024-09-18 DIAGNOSIS — E53.8 VITAMIN B12 DEFICIENCY: Primary | Chronic | ICD-10-CM

## 2024-09-18 PROCEDURE — 96372 THER/PROPH/DIAG INJ SC/IM: CPT | Performed by: NURSE PRACTITIONER

## 2024-09-18 RX ADMIN — CYANOCOBALAMIN 1000 MCG: 1000 INJECTION, SOLUTION INTRAMUSCULAR; SUBCUTANEOUS at 11:22

## 2024-09-23 ENCOUNTER — OFFICE VISIT (OUTPATIENT)
Age: 79
End: 2024-09-23

## 2024-09-23 ENCOUNTER — EVALUATION (OUTPATIENT)
Age: 79
End: 2024-09-23
Payer: MEDICARE

## 2024-09-23 DIAGNOSIS — M18.11 ARTHRITIS OF CARPOMETACARPAL (CMC) JOINT OF RIGHT THUMB: Primary | ICD-10-CM

## 2024-09-23 DIAGNOSIS — M18.11 ARTHRITIS OF CARPOMETACARPAL (CMC) JOINT OF RIGHT THUMB: ICD-10-CM

## 2024-09-23 DIAGNOSIS — M79.641 PAIN OF RIGHT HAND: ICD-10-CM

## 2024-09-23 DIAGNOSIS — G56.01 CARPAL TUNNEL SYNDROME OF RIGHT WRIST: ICD-10-CM

## 2024-09-23 DIAGNOSIS — M62.81 MUSCLE WEAKNESS (GENERALIZED): Primary | ICD-10-CM

## 2024-09-23 DIAGNOSIS — M25.531 RIGHT WRIST PAIN: ICD-10-CM

## 2024-09-23 PROCEDURE — L3808 WHFO, RIGID W/O JOINTS: HCPCS | Performed by: OCCUPATIONAL THERAPIST

## 2024-09-23 PROCEDURE — 97166 OT EVAL MOD COMPLEX 45 MIN: CPT | Performed by: OCCUPATIONAL THERAPIST

## 2024-09-23 PROCEDURE — 97110 THERAPEUTIC EXERCISES: CPT | Performed by: OCCUPATIONAL THERAPIST

## 2024-09-23 PROCEDURE — 99024 POSTOP FOLLOW-UP VISIT: CPT | Performed by: ORTHOPAEDIC SURGERY

## 2024-09-23 RX ORDER — HYDROCODONE BITARTRATE AND ACETAMINOPHEN 5; 325 MG/1; MG/1
1 TABLET ORAL EVERY 6 HOURS PRN
Qty: 12 TABLET | Refills: 0 | Status: SHIPPED | OUTPATIENT
Start: 2024-09-23 | End: 2024-09-26

## 2024-09-25 ENCOUNTER — TREATMENT (OUTPATIENT)
Age: 79
End: 2024-09-25
Payer: MEDICARE

## 2024-09-25 DIAGNOSIS — M62.81 MUSCLE WEAKNESS (GENERALIZED): Primary | ICD-10-CM

## 2024-09-25 DIAGNOSIS — M79.641 PAIN OF RIGHT HAND: ICD-10-CM

## 2024-09-25 PROCEDURE — 97110 THERAPEUTIC EXERCISES: CPT | Performed by: OCCUPATIONAL THERAPIST

## 2024-09-25 PROCEDURE — 97010 HOT OR COLD PACKS THERAPY: CPT | Performed by: OCCUPATIONAL THERAPIST

## 2024-09-25 PROCEDURE — 97140 MANUAL THERAPY 1/> REGIONS: CPT | Performed by: OCCUPATIONAL THERAPIST

## 2024-10-02 ENCOUNTER — TREATMENT (OUTPATIENT)
Age: 79
End: 2024-10-02
Payer: MEDICARE

## 2024-10-02 DIAGNOSIS — M25.531 RIGHT WRIST PAIN: ICD-10-CM

## 2024-10-02 DIAGNOSIS — M62.81 MUSCLE WEAKNESS (GENERALIZED): Primary | ICD-10-CM

## 2024-10-02 DIAGNOSIS — M79.641 PAIN OF RIGHT HAND: ICD-10-CM

## 2024-10-02 PROCEDURE — 97110 THERAPEUTIC EXERCISES: CPT | Performed by: OCCUPATIONAL THERAPIST

## 2024-10-02 PROCEDURE — 97140 MANUAL THERAPY 1/> REGIONS: CPT | Performed by: OCCUPATIONAL THERAPIST

## 2024-10-02 PROCEDURE — 97022 WHIRLPOOL THERAPY: CPT | Performed by: OCCUPATIONAL THERAPIST

## 2024-10-02 NOTE — PROGRESS NOTES
pinch with ADL's like writing.  Increase AROM affected wrist extension/flexion to WFL to improve function with dressing and bathing.  Improve Quick DASH functional assessment score from 66% deficit to less than 50% deficit.     Long term goals: 11/22/2024 (60 days)   Patient will have full thumb opposition for all ADL's/IADL's without difficulty.  The patient will have IP and MP flexion of affected thumb in order to perform activities that involve grasp.  Patient will have at least 25 psi of  strength affected hand to improve ability to grasp and hold an object.  Patient will have at least 7 psi of 3 jaw saw pinch strength affected hand to improve ability to pinch during ADL's like zippers, opening bags and turning a key.  Decrease pain in affected hand/thumb from 4/10 to 1/10 in order to sleep through the night and use affected hand with all ADL's.  Patient will have minimal edema in affected hand/thumb.  Pts Quick DASH functional assessment score will be less than 20% functional deficit.     [facility] Portal     OT Protocols

## 2024-10-08 ENCOUNTER — NURSE ONLY (OUTPATIENT)
Dept: INTERNAL MEDICINE CLINIC | Facility: CLINIC | Age: 79
End: 2024-10-08
Payer: MEDICARE

## 2024-10-08 DIAGNOSIS — E53.8 VITAMIN B 12 DEFICIENCY: Primary | ICD-10-CM

## 2024-10-08 PROCEDURE — 96372 THER/PROPH/DIAG INJ SC/IM: CPT | Performed by: NURSE PRACTITIONER

## 2024-10-08 RX ORDER — CYANOCOBALAMIN 1000 UG/ML
1000 INJECTION, SOLUTION INTRAMUSCULAR; SUBCUTANEOUS ONCE
Status: SHIPPED | OUTPATIENT
Start: 2024-10-08

## 2024-10-08 RX ADMIN — CYANOCOBALAMIN 1000 MCG: 1000 INJECTION, SOLUTION INTRAMUSCULAR; SUBCUTANEOUS at 11:28

## 2024-10-09 ENCOUNTER — TREATMENT (OUTPATIENT)
Age: 79
End: 2024-10-09
Payer: MEDICARE

## 2024-10-09 DIAGNOSIS — M79.641 PAIN OF RIGHT HAND: ICD-10-CM

## 2024-10-09 DIAGNOSIS — M62.81 MUSCLE WEAKNESS (GENERALIZED): Primary | ICD-10-CM

## 2024-10-09 DIAGNOSIS — M25.531 RIGHT WRIST PAIN: ICD-10-CM

## 2024-10-09 PROCEDURE — 97110 THERAPEUTIC EXERCISES: CPT | Performed by: OCCUPATIONAL THERAPIST

## 2024-10-09 PROCEDURE — 97140 MANUAL THERAPY 1/> REGIONS: CPT | Performed by: OCCUPATIONAL THERAPIST

## 2024-10-09 PROCEDURE — 97022 WHIRLPOOL THERAPY: CPT | Performed by: OCCUPATIONAL THERAPIST

## 2024-10-09 NOTE — PROGRESS NOTES
like writing.  Increase AROM affected wrist extension/flexion to WFL to improve function with dressing and bathing.  Improve Quick DASH functional assessment score from 66% deficit to less than 50% deficit.     Long term goals: 11/22/2024 (60 days)   Patient will have full thumb opposition for all ADL's/IADL's without difficulty.  The patient will have IP and MP flexion of affected thumb in order to perform activities that involve grasp.  Patient will have at least 25 psi of  strength affected hand to improve ability to grasp and hold an object.  Patient will have at least 7 psi of 3 jaw saw pinch strength affected hand to improve ability to pinch during ADL's like zippers, opening bags and turning a key.  Decrease pain in affected hand/thumb from 4/10 to 1/10 in order to sleep through the night and use affected hand with all ADL's.  Patient will have minimal edema in affected hand/thumb.  Pts Quick DASH functional assessment score will be less than 20% functional deficit.     [facility] Portal     OT Protocols

## 2024-10-21 ENCOUNTER — TREATMENT (OUTPATIENT)
Age: 79
End: 2024-10-21
Payer: MEDICARE

## 2024-10-21 ENCOUNTER — OFFICE VISIT (OUTPATIENT)
Age: 79
End: 2024-10-21

## 2024-10-21 DIAGNOSIS — M79.641 PAIN OF RIGHT HAND: ICD-10-CM

## 2024-10-21 DIAGNOSIS — M62.81 MUSCLE WEAKNESS (GENERALIZED): Primary | ICD-10-CM

## 2024-10-21 DIAGNOSIS — M25.531 RIGHT WRIST PAIN: ICD-10-CM

## 2024-10-21 DIAGNOSIS — M18.11 ARTHRITIS OF CARPOMETACARPAL (CMC) JOINT OF RIGHT THUMB: Primary | ICD-10-CM

## 2024-10-21 DIAGNOSIS — G56.01 CARPAL TUNNEL SYNDROME OF RIGHT WRIST: ICD-10-CM

## 2024-10-21 PROCEDURE — 99024 POSTOP FOLLOW-UP VISIT: CPT | Performed by: ORTHOPAEDIC SURGERY

## 2024-10-21 PROCEDURE — 97022 WHIRLPOOL THERAPY: CPT | Performed by: OCCUPATIONAL THERAPIST

## 2024-10-21 PROCEDURE — 97110 THERAPEUTIC EXERCISES: CPT | Performed by: OCCUPATIONAL THERAPIST

## 2024-10-21 NOTE — PROGRESS NOTES
Orthopaedic Hand Surgery Note    Name: My Rebolledo  YOB: 1945  Gender: female  MRN: [id number]    HPI: Patient is status post Right revision thumb carpometacarpal joint arthroplasty with tightrope suspensionplasty;  Right ultrasound guided carpal tunnel release - Right on 9/11/2024. Patient reports mild pain.  Numbness and tingling in the median nerve distribution has resolved.    Physical Examination:  Wounds healed well. Good finger range of motion. Sensation is intact to light touch in all digits. Mild swelling in the operative wrist. Posture of the thumb is excellent. Negative CMC grind for pain  or crepitus.    Assessment:     ICD-10-CM    1. Arthritis of carpometacarpal (CMC) joint of right thumb  M18.11       2. Carpal tunnel syndrome of right wrist  G56.01           Status post Right revision thumb carpometacarpal joint arthroplasty with tightrope suspensionplasty;  Right ultrasound guided carpal tunnel release - Right on 9/11/2024    Plan:   We discussed the treatment and therapy plan.  She can use her brace only as needed for comfort.  The brace can be cut down to a hand-based thumb spica.  She will continue therapy and can progress light strengthening at this point.  I will see her back in 4 weeks  Merlene Martinez MD  Orthopaedic Surgery  10/21/24  11:10 AM

## 2024-10-21 NOTE — PROGRESS NOTES
[facility]  [address] [place] [id number]  Dept: [phone]      Occupational Therapy Daily Note     Referring MD: Merlene Martinez MD    Diagnosis:     ICD-10-CM    1. Muscle weakness (generalized)  M62.81       2. Pain of right hand  M79.641       3. Right wrist pain  M25.531           Surgery: Date 9/11/24 Right revision thumb carpometacarpal joint arthroplasty with tightrope suspensionplasty;  Right ultrasound guided carpal tunnel release      Therapy precautions: Joint arthroplasty  Avoid lateral pinch  Avoid thumb adduction  Avoid wide radial abduction  No strengthening until post-op week 6  Facilitate neuromuscular re-education/maintaining \"C\" shape    History of injury/onset : Original CMC arthroplasty about 11 years ago.  Was seen by Dr. Martinez in Feb 2023 and received cortisone injection at that time to the base of the thumb.  Was also having carpal tunnel at that time and initiated night wear of splint.    Payor: Payor: [employer] /  /  /  Billing pattern: Government- total time   Total Direct Treatment Time: 30 min                       Total In Office Time: 40 min  Modifier needed: No  Episode visit count:  5     Preferred Name:  My    SUBJECTIVE     Current Symptoms/Chief complaints:   Chief Complaint   Patient presents with    Wrist Pain    Hand Pain     Reports she is ready for DC.    OBJECTIVE     Functional Outcome Measures: Quick Dash  40 score=   65.91 % functional deficit    Thumb AROM: RIGHT   LEFT   RIGHT 10/21/24    MP ext/flex 0/31  0/35    IP ext/flex 0/52  0/65    Radial add/abduction       Palmar add/abduction       Opposition (K[place]andji)   10        Wrist AROM:  RIGHT 10/21/24 LEFT     Wrist Extension/Flexion 60/60 65/55     RD/UD         Strength  Strength (psi): RIGHT  10/21/24 LEFT        Position II 19 29     Lat Pinch: 9 13     2pt pinch:       3pt pinch: 9 10            Treatment:    Fluidotherapy ([id number]) Therapist directed

## 2024-10-22 ENCOUNTER — NURSE ONLY (OUTPATIENT)
Dept: INTERNAL MEDICINE CLINIC | Facility: CLINIC | Age: 79
End: 2024-10-22
Payer: MEDICARE

## 2024-10-22 ENCOUNTER — OFFICE VISIT (OUTPATIENT)
Dept: ORTHOPEDIC SURGERY | Age: 79
End: 2024-10-22

## 2024-10-22 DIAGNOSIS — E03.9 PRIMARY HYPOTHYROIDISM: Chronic | ICD-10-CM

## 2024-10-22 DIAGNOSIS — E53.8 VITAMIN B 12 DEFICIENCY: Primary | ICD-10-CM

## 2024-10-22 DIAGNOSIS — M19.012 ARTHRITIS OF LEFT SHOULDER REGION: ICD-10-CM

## 2024-10-22 DIAGNOSIS — M25.512 LEFT SHOULDER PAIN, UNSPECIFIED CHRONICITY: Primary | ICD-10-CM

## 2024-10-22 LAB
T4 FREE SERPL-MCNC: 1.8 NG/DL (ref 0.9–1.7)
TSH, 3RD GENERATION: 0.1 UIU/ML (ref 0.27–4.2)

## 2024-10-22 PROCEDURE — 96372 THER/PROPH/DIAG INJ SC/IM: CPT | Performed by: NURSE PRACTITIONER

## 2024-10-22 RX ADMIN — CYANOCOBALAMIN 1000 MCG: 1000 INJECTION, SOLUTION INTRAMUSCULAR; SUBCUTANEOUS at 11:04

## 2024-10-22 NOTE — PROGRESS NOTES
Status:    Intimate Partner Violence: Unknown (3/20/2021)    [facility] [employer] [employer]    Intimate Partner Violence     Fear of Current or Ex-Partner: Not asked     Emotionally Abused: Not asked     Physically Abused: Not asked     Sexually Abused: Not asked   Housing Stability: Unknown (3/7/2024)    Housing Stability Vital Sign     Unable to Pay for Housing in the Last Year: Not on file     Number of Places Lived in the Last Year: Not on file     Unstable Housing in the Last Year: No            No data to display                Review of Systems  Noncontributory    PE: Tolerates range of motion fairly well.  Strength is still pretty good.  She is still very tender over the superior aspect and lateral aspect of her shoulder.  Negative for biceps testing.    A/P:    ICD-10-CM    1. Left shoulder pain, unspecified chronicity  M25.512 MRI SHOULDER LEFT WO CONTRAST      2. Arthritis of left shoulder region  M19.012 MRI SHOULDER LEFT WO CONTRAST        Reviewed that she still has symptomatic pain from her AC joint.  Would suggest follow-up MRI to evaluate for further impingement or even HO.  If still present or shows no other signs of abnormality the shoulder discussed the possibility of open distal clavicle resection.  Given the chronicity and severity of the patient's symptoms, we will obtain an MRI.  We will see them back after the scan and make a determination regarding further treatment.  If there is a tear or other problem, they may require surgery. If negative, would recommend NSAID's, PT, or injection. They are amenable to this treatment plan.    No follow-ups on file.     Arden Saleh MD  10/22/24

## 2024-10-25 DIAGNOSIS — M25.512 LEFT SHOULDER PAIN, UNSPECIFIED CHRONICITY: ICD-10-CM

## 2024-10-25 DIAGNOSIS — M19.012 ARTHRITIS OF LEFT SHOULDER REGION: Primary | ICD-10-CM

## 2024-10-28 ENCOUNTER — CLINICAL DOCUMENTATION (OUTPATIENT)
Dept: ORTHOPEDIC SURGERY | Age: 79
End: 2024-10-28

## 2024-10-28 NOTE — PROGRESS NOTES
Grashey, outlet and axillary radiographs fo the left shoulder were obtained 10-25-24 and independently reviewed.  They show mild to moderate glenohumeral arthritis with minimal inferior humeral and glenoid bone spurring.  Rotator cuff interval well preserved. Evidence of prior DCR.

## 2024-10-29 ENCOUNTER — TELEPHONE (OUTPATIENT)
Dept: ENDOCRINOLOGY | Age: 79
End: 2024-10-29

## 2024-10-29 ENCOUNTER — TELEPHONE (OUTPATIENT)
Dept: ORTHOPEDIC SURGERY | Age: 79
End: 2024-10-29

## 2024-10-29 DIAGNOSIS — E03.9 PRIMARY HYPOTHYROIDISM: Primary | ICD-10-CM

## 2024-10-29 RX ORDER — LEVOTHYROXINE SODIUM 112 MCG
112 TABLET ORAL DAILY
Qty: 90 TABLET | Refills: 3 | Status: SHIPPED | OUTPATIENT
Start: 2024-10-29

## 2024-10-29 NOTE — TELEPHONE ENCOUNTER
Norm response:    I have reduced your name brand synthroid from 125mcg down to 112mcg. Sent to [facility] [place], let me know if you need it sent somewhere else. Please continue to take it correctly and recheck labs in about 8 week, just prior to our December appointment    Take care,  ~Leandra

## 2024-10-29 NOTE — TELEPHONE ENCOUNTER
LEFT SHOULDER MRI APPROVAL     Case   Case Description: Shoulder MRI (left) Request ID:  [id number]  [id number]         Request Date: 10/23/2024 06:25 AM Status: Approved   Entry Method: RadMD Validity Dates: 10/23/2024-2025   ICD10: M19.012, M25.512  Contact Name: liza banrosa maria  (Referring Provider)   Initial Determination Date: 10/29/2024 07:36 AM     Final Determination Date: 10/29/2024 07:36 AM       Please be advised that all data was current as of 2024 at 7:44 AM [facility]     Radiology   Date of Service: 10/30/2024   Update       Expedited: No   Extension: No   CPT4: 10018 Billable Codes   Clinical Rcvd: 10/28/2024 - Clinical information received via fax or upload

## 2024-10-30 ENCOUNTER — TELEPHONE (OUTPATIENT)
Dept: BEHAVIORAL/MENTAL HEALTH CLINIC | Age: 79
End: 2024-10-30

## 2024-10-30 NOTE — TELEPHONE ENCOUNTER
Please call at [phone]  insurance [facility] regarding PA on ambien, thank you.

## 2024-10-30 NOTE — TELEPHONE ENCOUNTER
Insurance has denied coverage for Ambien being a high risk medication. Must show documentation of trial and failure of Doxepin 3 or 6mg.

## 2024-10-30 NOTE — PROGRESS NOTES
[facility] sleep [id number] visit  [address] [facility] [address] [facility] [address], [address] [facility]  [phone]    Patient Name:  Janneth Starr  YOB: 1945      Office Visit 1/16/2023    CHIEF COMPLAINT:    Chief Complaint   Patient presents with    Follow-up       HISTORY OF PRESENT ILLNESS:      This pleasant lady who came in today for a follow-up visit regarding her sleep apnea, nocturnal hypoxemia and insomnia. Patient during the last visit was noted to have sleep apnea with an AHI of 15.1 and lowest sat 80% and saturation less than 89% for 8.6 minutes. Patient was started on auto CPAP with a pressure of 8-12 cm H2O with a 5-minute ramp time. Patient unfortunately indicated that she could just not get used to it and as a result discontinued altogether. There were last 95 days shows she is using 21 days. And only 8 of those days more than 4 hours. The days when she did use it the median pressure was a 95 percentile pressure was 9.2 AHI was down to 2.0 but air leaks were excessive at 51 L/min. She did report that she did not have problems using the machine, but she did not like it because she went to the restroom frequently. Because she went to the restroom frequently she indicated that it was hard to to keep taking it on and off and on and off. She did not feel much better with it. She is wondering what she can do. Patient also has issues at this time with significant back hip and left arm with frozen shoulder. She reports she is getting go see pain management for this issue. Patient also reports that her  unfortunately has severe memory impairment and she has to constantly take care of him at night as well. And that becomes a burden for her. She indicates as a result of this her bedtime is between 11:00 to 3 AM and there are days she cannot shut off her mind at all in order to go to sleep.       Sleepiness Scale:     Sitting and reading 0    Watching TV 3    Sitting Spoke with [facility] at [address] [place] [address] at [phone]. Was informed by staff that Ms. Trinidad is a patient of there's but has never picked up medication from them.  inactive in a public place 2    As a passenger in a car for an hour without a break 0    Lying down to rest in the afternoon when circumstances Permits 3    Sitting and talking to someone 0    Sitting quietly after lunch without alcohol 0    In a car, while stopped for a few minutes 0    Total :  8/24    Past Medical History:   Diagnosis Date    Acute kidney insufficiency     Followed by PCP     Adverse effect of anesthesia     2016 in [place] heart stopped at the end of my gallbladder surgery. \" Patient reports no CPR performed, stayed one night in the hospital. No problems with any other surgeries.     Allergic rhinitis     Anemia     history     Anxiety and depression     Controlled with medication     Arthritis     Chronic pain     Edema     bilateral lower legs     GERD (gastroesophageal reflux disease)     Controlled with medication     Headache Oct.2022    History of chicken pox     History of Clostridioides difficile infection     History of DVT (deep vein thrombosis)     s/p knee replacement and developed right leg DVT     HTN (hypertension)     controlled with medication     Hyperlipidemia     controlled with medication     Hyperlipidemia     Hypertension     Hyperthyroidism     pt denies    Hypothyroidism     controlled with medication     Hypothyroidism     IBS (irritable bowel syndrome)     Insomnia     Left flank pain 03/05/2020    Morbid obesity ([address]     BMI 42.85    Murmur     per PCP note (26/1/69) systolic murmur grade of 2/6    Overactive bladder     PAD (peripheral artery disease) ([address]     patient denies on 11/27/19    PTSD (post-traumatic stress disorder) 2006    In [facility] met with psycoligist    Recurrent UTI     Restless leg     RLS (restless legs syndrome)     controlled with medication     Shigella gastroenteritis 05/09/2022    Sleep difficulties 10 years ago    SOBOE (shortness of breath on exertion)     PRN inhaler-patient reports she uses inhaler QHS    Special examinations History-Neurologist - Dr. Ayah Polo (stress urinary incontinence, female)     Thromboembolus [facility]) 2015    behind right knee after total knee replacement    Tubulovillous adenoma of colon 04/26/2021       Patient Active Problem List   Diagnosis    Tubulovillous adenoma of colon    Postmenopausal HRT (hormone replacement therapy)    Hymenoptera allergy    Polyneuropathy    Vitamin B12 deficiency    Dizzinesses    OA (osteoarthritis)    IBS (irritable bowel syndrome)    GERD (gastroesophageal reflux disease)    Insomnia    RLS (restless legs syndrome)    Fungal dermatitis    Hemorrhoids    Anserine bursitis    Lumbar radiculopathy    Hashimoto's thyroiditis    Family history of autoimmune disorder    Allergic dermatitis    ANASTASIA (stress urinary incontinence, female)    AR (allergic rhinitis)    FRANKY positive    Vaginal atrophy    Numbness    Urinary frequency    Gait abnormality    Cardiac arrhythmia    Weakness generalized    Suspected sleep apnea    Diplopia    URBANO (generalized anxiety disorder)    Gluten intolerance    Fibromyalgia    Degenerative arthritis of left knee    Hematochezia    Arthritis of left knee    Fall    Leukocytosis    History of Clostridioides difficile colitis    Hx of total knee replacement, right    Mood disorder (HCC)    Chronic pain disorder    Mixed hyperlipidemia    Hypothyroidism (acquired)    Chronic nausea    Renal cyst    PAD (peripheral artery disease) ([facility])    Abnormal flushing and sweating    Postural imbalance    Vitamin D deficiency    DDD (degenerative disc disease), lumbar    OAB (overactive bladder)    Class 3 obesity with alveolar hypoventilation, serious comorbidity, and body mass index (BMI) of 40.0 to 44.9 in adult [facility])    Expressive aphasia    Primary hypertension    Stage 3b chronic kidney disease (HCC)    Anemia, iron deficiency    Hypoproteinemia (HCC)    LORA (obstructive sleep apnea)    Hypoxemia    Obesity (BMI 30-39. 9)    Low back pain    Fibromyositis Posterior capsular opacification non visually significant of right eye    Posterior vitreous detachment of both eyes    Pseudophakia    Spondylolisthesis    Spondylosis without myelopathy    Urinary problem    Depression with anxiety    Cervical paraspinal muscle spasm    Encounter for medication management    Anemia    Dyspareunia    Osteopenia    Right wrist pain    Seborrheic keratoses    Serum creatinine raised       Past Surgical History:   Procedure Laterality Date    APPENDECTOMY  195    CHOLECYSTECTOMY  2016    COLONOSCOPY      COLONOSCOPY      ,     EYE SURGERY  2015    GYN  2019    suburethral sling and cystoscopy    HYSTERECTOMY, TOTAL ABDOMINAL (CERVIX REMOVED)      [address], VAGINAL      IR GENERIC PROCEDURE      2014    JOINT REPLACEMENT      Left knee    KNEE SURGERY Left     2016    KNEE SURGERY Left 2014    LAP BAND  REMOVAL 3/16/2015    ORTHOPEDIC SURGERY Left 2020    left hand tendon surgery    ORTHOPEDIC SURGERY Left     LEFT WRIST TRAPEZIOMETACARPAL ARTHROPLASTY WITH FCR TENDON TRANSFER-     OTHER SURGICAL HISTORY Right 2016    Hand    OTHER SURGICAL HISTORY          [address]    OTHER SURGICAL HISTORY  2019    bladder surgery-put mesh in    SHOULDER ARTHROSCOPY      1966,     [address]    TOTAL KNEE ARTHROPLASTY Right 2014    UPPER GASTROINTESTINAL ENDOSCOPY  2018           Social History     Socioeconomic History    Marital status:      Spouse name: Mc Medina    Number of children: 2    Years of education: Not on file    Highest education level: Not on file   Occupational History    Occupation: Previously worked as a [employer]    Tobacco Use    Smoking status: Former     Packs/day: 1.00     Years: 46.00     Pack years: 46.00     Types: Cigarettes     Quit date: 2005     Years since quittin.1    Smokeless tobacco: Never   Vaping Use    Vaping Use: Unknown   Substance and Sexual Activity    Alcohol use: No    Drug use: No    Sexual activity: Not Currently     Partners: Male   Other Topics Concern    Not on file   Social History Narrative    ** Merged History Encounter **         Patient has 1 dog sleeps with her occasionally. Patient currently drinks 1 caffeine drink per day.      Social Determinants of Health     Financial Resource Strain: Not on file   Food Insecurity: Not on file   Transportation Needs: Not on file   Physical Activity: Not on file   Stress: Not on file   Social Connections: Not on file   Intimate Partner Violence: Not on file   Housing Stability: Not on file         Family History   Problem Relation Age of Onset    Physical Abuse Mother     Other Father 54        brain aneurysm    Cerebral Aneurysm Father     Other Sister     Heart Disease Brother     Congenial Anomalies Sister         right arm deformity    Pneumonia Sister     Other Sister         fibromyalgia    Hypertension Brother     Heart Disease Brother     Other Brother         brain aneurysm, AVM    Heart Surgery Brother         CABGx4    Heart Attack Maternal Grandmother     Cancer Maternal Grandfather         skin    Heart Attack Paternal Grandfather     Mult Sclerosis Daughter     Drug Abuse Son     Other Son         Hepatitis C         Allergies   Allergen Reactions    Fire Ant Anaphylaxis    Amoxicillin Other (See Comments)     C-Diff    Amoxicillin-Pot Clavulanate Other (See Comments)     c.diff    Augmentin [Amoxicillin-Pot Clavulanate]     Cephalexin Other (See Comments)     C Diff      Cephalexin Other (See Comments)     C-Diff  C-Diff      Cephalosporins     Hydromorphone Other (See Comments)     Keeps her awake    Meperidine Hcl     Morphine Other (See Comments)     Awake  Awake  No sleep    Oxycodone     Oxycodone-Acetaminophen Other (See Comments)     Jittery  Nightmares    Oxycodone-Aspirin Other (See Comments)     Jittery  Other reaction(s): Unknown-Unspecified  Jittery    Penicillins Other (See Comments)     C-Diff      Povidone Iodine     Povidone-Iodine Other (See Comments)     Johnston  Johnston  burn    Adhesive Tape Itching and Rash     \"sticky ace wrap\"  \"sticky ace wrap\"    Meperidine Rash         Current Outpatient Medications   Medication Sig    DULoxetine (CYMBALTA) 30 MG extended release capsule Take 30 mg once daily for 2 weeks then 60 mg once daily    DULoxetine (CYMBALTA) 60 MG extended release capsule Take 1 capsule by mouth daily Take 30 mg once daily for 2 weeks then 60 mg once daily, Please fill both 30 and 60 mg scripts.     amitriptyline (ELAVIL) 25 MG tablet Take 1 tablet by mouth nightly    SYNTHROID 150 MCG tablet 1 tab daily with 1/2 tab on Sunday, TDD 139mcg    vitamin D (ERGOCALCIFEROL) 1.25 MG (65431 UT) CAPS capsule Take 1 capsule by mouth every 14 days    ondansetron (ZOFRAN) 4 MG tablet Take 1 tablet by mouth every 8 hours as needed for Nausea    cyanocobalamin 1000 MCG/ML injection Inject 1 mL into the muscle every 14 days    colestipol (COLESTID) 1 g tablet Take 1 tablet by mouth 2 times daily    hyoscyamine (OSCIMIN) 125 MCG TBDP dispersible tablet Take 1 tablet by mouth every 8 hours as needed (abdominal cramps)    nitrofurantoin (MACRODANTIN) 50 MG capsule Take 1 capsule by mouth daily    diclofenac sodium (VOLTAREN) 1 % GEL Apply 2 g topically 4 times daily as needed for Pain Apply 2g to affected area 4 times daily as needed for pain    acetaminophen (TYLENOL) 325 MG tablet Take 325 mg by mouth every 4 hours as needed    albuterol sulfate  (90 Base) MCG/ACT inhaler Inhale 1-2 puffs into the lungs every 6 hours as needed    clotrimazole-betamethasone (LOTRISONE) 1-0.05 % cream Apply topically as needed    fluticasone (FLONASE) 50 MCG/ACT nasal spray 2 sprays by Nasal route daily as needed    pramipexole (MIRAPEX) 0.5 MG tablet Take 0.5 mg by mouth at bedtime    lisinopril (PRINIVIL;ZESTRIL) 10 MG tablet Take 10 mg by mouth daily     No current facility-administered medications for this visit. Review of Systems   Constitutional:  Positive for fatigue. Negative for diaphoresis and unexpected weight change. HENT:  Positive for congestion and postnasal drip. Negative for facial swelling and sinus pressure. Eyes: Negative. Respiratory: Negative. Cardiovascular: Negative. Gastrointestinal: Negative. Endocrine: Negative. Positive for hypothyroidism   Genitourinary: Negative. Musculoskeletal:  Positive for arthralgias, back pain, gait problem and joint swelling. Positive for left arm frozen shoulder. Positive for worse back pain. Neurological:  Positive for weakness, numbness and headaches. Psychiatric/Behavioral:  Positive for dysphoric mood. PHYSICAL EXAM:    Vitals:    01/16/23 1600   BP: (!) 165/80   Pulse: 92   Resp: 17   Temp: 96.8 °F (36 °C)   SpO2: 98%        GENERAL APPEARANCE:   The patient is normal weight and in no respiratory distress. HEENT:   PERRL. Conjunctivae unremarkable. Nasal mucosa is without epistaxis, exudate, or polyps. Gums and dentition are unremarkable. Narrow airway modified Rodriguez stage IV. Nares patent   NECK/LYMPHATIC:   Symmetrical with no elevation of jugular venous pulsation. Trachea midline. No thyroid enlargement. No cervical adenopathy. LUNGS:   Normal respiratory effort with symmetrical lung expansion. Patient clear to auscultation bilaterally   HEART:   There is a regular rate and rhythm. No murmur, rub, or gallop. There is no edema in the lower extremities. ABDOMEN:   Soft and non-tender. No hepatosplenomegaly. Bowel sounds are normal.     SKIN:   There are no rashes, cyanosis, jaundice, or ecchymosis present. EXTREMITIES:   The extremities had some scant swelling and also noted some varicose veins right leg compared to left. MUSCULOSKELETAL:   There is decreased in the left arm today.    NEURO:   The patient is alert and oriented to person, place, and time. Memory appears intact and mood is normal.  No gross sensorimotor deficits are present. DIAGNOSTIC TESTS:  HST 4/7/22                ASSESSMENT:  (Medical Decision Making)         ICD-10-CM    1. LORA (obstructive sleep apnea)  G47.33 -patient has mild to moderate sleep apnea with arterial hypoxemia. Unfortunately she was using Pap therapy but returned it. I told her if she needed pursue this again we will have to go back and do all testing and she does not want to. She is not a candidate for oral appliance because she has upper dentures. She does not want surgery. The only other option would be to possibly do an inspire device. She is actually very agreeable with this. I told the patient that we will send her to Dr. Meg Maurer for evaluation and she is agreeable     2. Hypoxemia  R09.02 -see above       3. Obesity (BMI 30-39. 9)  E66.9 -limited due to issues with her back pain. Needs diet controlled in order to lose weight       4. Non-restorative sleep  G47.8 - Unfortunately this is multifactorial.  Part issues are her  as well as her chronic back pain. Also untreated sleep apnea can likely be contributing to this condition. D89.89      Chronic back pain  - Follow-up with pain management    Insomnia unspecified G47.00:  - Part of this is social issues which she will have to try to see if her  can develop a routine for bedtime which will help her.  - Pain in her back is likely contributing this. - Do not want to augment any kind of pain medication or sleep aids at this time. Since it will not help. PLAN:    - ENT evaluation-Dr. Meg Maurer for possible inspire device. I did tell her her airway is quite narrow and that also may be a limiting factor.   I did give her a pamphlet on the inspire device and we did go over the pathophysiology today  - Continue proper sleep hygiene and I told her to see if her  can develop a sleep routine    - Optimize back pain    - Did go over her prior polysomnogram and current compliance data today    --All questions answered for the patient today. We will reassess how she does next visit in the sleep lab in 3 months. If she does get a inspire device can follow her up there as well. Orders Placed This Encounter   Procedures    External Referral To ENT     Referral Priority:   Routine     Referral Type:   Eval and Treat     Referral Reason:   Specialty Services Required     Referred to Provider:   King Peters MD     Requested Specialty:   Otolaryngology     Number of Visits Requested:   1             No orders of the defined types were placed in this encounter. Over 50% of today's office visit was spent in face to face time reviewing test results, prognosis, importance of compliance, education about disease process, benefits of medications, instructions for management of acute flare-ups, and follow up plans. Total face to face time spent with patient was 35 minutes.     Doug Kellogg MD  Electronically signed

## 2024-10-31 NOTE — TELEPHONE ENCOUNTER
Patient is going to use [facility] to get the Ambien as she does not want to try another medication.

## 2024-11-05 ENCOUNTER — NURSE ONLY (OUTPATIENT)
Dept: INTERNAL MEDICINE CLINIC | Facility: CLINIC | Age: 79
End: 2024-11-05
Payer: MEDICARE

## 2024-11-05 DIAGNOSIS — E53.8 VITAMIN B12 DEFICIENCY: Primary | ICD-10-CM

## 2024-11-05 PROCEDURE — 96372 THER/PROPH/DIAG INJ SC/IM: CPT | Performed by: NURSE PRACTITIONER

## 2024-11-05 RX ORDER — CYANOCOBALAMIN 1000 UG/ML
1000 INJECTION, SOLUTION INTRAMUSCULAR; SUBCUTANEOUS ONCE
Status: COMPLETED | OUTPATIENT
Start: 2024-11-05 | End: 2024-11-05

## 2024-11-05 RX ADMIN — CYANOCOBALAMIN 1000 MCG: 1000 INJECTION, SOLUTION INTRAMUSCULAR; SUBCUTANEOUS at 11:20

## 2024-11-12 ENCOUNTER — OFFICE VISIT (OUTPATIENT)
Dept: ORTHOPEDIC SURGERY | Age: 79
End: 2024-11-12
Payer: MEDICARE

## 2024-11-12 DIAGNOSIS — M19.012 ARTHRITIS OF LEFT SHOULDER REGION: ICD-10-CM

## 2024-11-12 DIAGNOSIS — M54.2 NECK PAIN: ICD-10-CM

## 2024-11-12 DIAGNOSIS — M25.512 LEFT SHOULDER PAIN, UNSPECIFIED CHRONICITY: Primary | ICD-10-CM

## 2024-11-12 PROCEDURE — 1123F ACP DISCUSS/DSCN MKR DOCD: CPT | Performed by: ORTHOPAEDIC SURGERY

## 2024-11-12 PROCEDURE — 99214 OFFICE O/P EST MOD 30 MIN: CPT | Performed by: ORTHOPAEDIC SURGERY

## 2024-11-12 NOTE — PROGRESS NOTES
Name: My Rebolledo  YOB: 1945  Gender: female  MRN: [id number]    CC:   Chief Complaint   Patient presents with    Follow-up     L Shoulder MRI Results      HPI: Patient presents for MRI FU of the left shoulder status post Left Shoulder Arthroscopy Distal Clavicle Resection, Biceps Tenotomy And Debridement - Left  4/6/2023.  Patient also had an AC injection on 6-18-24.  She notes  relief with injection.  The patient notes symptoms have returned.  Notes superior and anterior shoulder pain.  The relief is short-lived and she is asking if there is anything that would give her a little bit more noticeable long-term relief.    Allergies   Allergen Reactions    Fire Ant Anaphylaxis    Amoxicillin Other (See Comments)     C-Diff    Amoxicillin-Pot Clavulanate Other (See Comments)     c.diff    Augmentin [Amoxicillin-Pot Clavulanate]     Cephalexin Other (See Comments)     C Diff      Cephalexin Other (See Comments)     C-Diff    Cephalosporins     Hydromorphone Other (See Comments)     Keeps her awake    Meperidine Hcl Other (See Comments)    Morphine Other (See Comments)     Awake    No sleep    Oxycodone Other (See Comments)    Oxycodone-Acetaminophen Other (See Comments)     Jittery  Nightmares    Oxycodone-Aspirin Other (See Comments)     Jittery  Other reaction(s): Unknown-Unspecified  Jittery    Penicillins Other (See Comments)     C-Diff      Povidone Iodine     Povidone-Iodine Other (See Comments)     Burns    burn    Adhesive Tape Itching and Rash     \"sticky ace wrap\"  \"sticky ace wrap\"    Meperidine Rash     Past Medical History:   Diagnosis Date    Acute kidney insufficiency     Followed by PCP     Adverse effect of anesthesia     on 8/8/24 patient is unable to provide any further information regarding this. ----2016 in [place] \"My heart stopped at the end of my gallbladder surgery.\" Patient reports no CPR performed, stayed one night in the hospital. No problems with any other surgeries.

## 2024-11-18 DIAGNOSIS — M25.512 LEFT SHOULDER PAIN, UNSPECIFIED CHRONICITY: ICD-10-CM

## 2024-11-18 DIAGNOSIS — M19.012 ARTHRITIS OF LEFT SHOULDER REGION: Primary | ICD-10-CM

## 2024-11-19 ENCOUNTER — OFFICE VISIT (OUTPATIENT)
Age: 79
End: 2024-11-19
Payer: MEDICARE

## 2024-11-19 ENCOUNTER — NURSE ONLY (OUTPATIENT)
Dept: INTERNAL MEDICINE CLINIC | Facility: CLINIC | Age: 79
End: 2024-11-19
Payer: MEDICARE

## 2024-11-19 DIAGNOSIS — M54.2 NECK PAIN: Primary | ICD-10-CM

## 2024-11-19 PROCEDURE — 1159F MED LIST DOCD IN RCRD: CPT | Performed by: ORTHOPAEDIC SURGERY

## 2024-11-19 PROCEDURE — 96372 THER/PROPH/DIAG INJ SC/IM: CPT | Performed by: STUDENT IN AN ORGANIZED HEALTH CARE EDUCATION/TRAINING PROGRAM

## 2024-11-19 PROCEDURE — 1123F ACP DISCUSS/DSCN MKR DOCD: CPT | Performed by: ORTHOPAEDIC SURGERY

## 2024-11-19 PROCEDURE — 99203 OFFICE O/P NEW LOW 30 MIN: CPT | Performed by: ORTHOPAEDIC SURGERY

## 2024-11-19 RX ADMIN — CYANOCOBALAMIN 1000 MCG: 1000 INJECTION, SOLUTION INTRAMUSCULAR; SUBCUTANEOUS at 11:16

## 2024-11-19 NOTE — PROGRESS NOTES
Name: My Rebolledo  YOB: 1945  Gender: female  MRN: [id number]  Age: 79 y.o.    Chief Complaint: Left shoulder pain and neck pain  History of present illness:    This is a very pleasant 79 y.o. female who presents with history of left shoulder pain after a shoulder arthroscopy in April 2023.  She is scheduled to have another arthroscopy in her left shoulder in December.  She is referred here by my partners for evaluation of her neck pain.  She states she has tenderness over her left trapezial muscle as well as her left paraspinal muscles.  She states that her shoulder pain is on the lateral aspect of her shoulder and radiates down her arm but not past the elbow.      Medications:     Prior to Visit Medications    Medication Sig Taking? Authorizing Provider   SYNTHROID 112 MCG tablet Take 1 tablet by mouth Daily DAW1 Yes Leandra Cabello PA-C   vibegron (GEMTESA) 75 MG TABS tablet Take 1 tablet by mouth daily Yes Mihaela Flor APRN - CNP   amLODIPine (NORVASC) 5 MG tablet Take 1 tablet by mouth daily Yes Courtney Cobian APRN - NP   ferrous gluconate (FERGON) 240 (27 Fe) MG tablet Take 1 tablet by mouth daily Yes Courtney Cobian APRN - NP   lisinopril (PRINIVIL;ZESTRIL) 20 MG tablet Take 1 tablet by mouth daily Yes Courtney Cobian APRN - NP   desvenlafaxine succinate (PRISTIQ) 50 MG TB24 extended release tablet Take 1 tablet by mouth daily Yes Madison Fernandez MD   zolpidem (AMBIEN) 5 MG tablet Take 1 tablet by mouth nightly as needed for Sleep for up to 90 days. Max Daily Amount: 5 mg Yes Madison Fernandez MD   phenazopyridine (PYRIDIUM) 100 MG tablet Take 1 tablet by mouth 3 times daily as needed for Pain Yes Mihaela Flor APRN - CNP   clotrimazole-betamethasone (LOTRISONE) 1-0.05 % cream Apply topically as needed (redness/irritation under breast) Yes Courtney Cobian APRN - NP   methenamine (HIPREX) 1 g tablet Take 1 tablet by mouth 2 times daily (with meals) Yes Courtney Cobian APRN

## 2024-11-25 ENCOUNTER — OFFICE VISIT (OUTPATIENT)
Age: 79
End: 2024-11-25

## 2024-11-25 DIAGNOSIS — M18.11 ARTHRITIS OF CARPOMETACARPAL (CMC) JOINT OF RIGHT THUMB: Primary | ICD-10-CM

## 2024-11-25 PROCEDURE — 99024 POSTOP FOLLOW-UP VISIT: CPT | Performed by: ORTHOPAEDIC SURGERY

## 2024-11-25 NOTE — PROGRESS NOTES
Orthopaedic Hand Surgery Note    Name: My Rebolledo  YOB: 1945  Gender: female  MRN: [id number]    HPI: Patient is status post Right revision thumb carpometacarpal joint arthroplasty with tightrope suspensionplasty;  Right ultrasound guided carpal tunnel release - Right on 9/11/2024. Patient reports mild pain.  Numbness and tingling in the median nerve distribution has resolved.  She has not attended therapy in a month. She says she has no difficulty using the hand    Physical Examination:  Wounds healed well. Good finger range of motion. Sensation is intact to light touch in all digits. Mild swelling in the operative wrist. Posture of the thumb is excellent. Negative CMC grind for pain  or crepitus.    Assessment:     ICD-10-CM    1. Arthritis of carpometacarpal (CMC) joint of right thumb  M18.11           Status post Right revision thumb carpometacarpal joint arthroplasty with tightrope suspensionplasty;  Right ultrasound guided carpal tunnel release - Right on 9/11/2024    Plan:   We discussed the treatment and therapy plan.  She can continue range of motion and strengthening as tolerated.  I have no activity restrictions for her at this time.  She will follow-up as needed    Merlene Martinez MD  Orthopaedic Surgery  11/25/24  12:28 PM

## 2024-11-26 ENCOUNTER — TELEPHONE (OUTPATIENT)
Dept: ORTHOPEDIC SURGERY | Age: 79
End: 2024-11-26

## 2024-12-02 ENCOUNTER — NURSE ONLY (OUTPATIENT)
Dept: INTERNAL MEDICINE CLINIC | Facility: CLINIC | Age: 79
End: 2024-12-02
Payer: MEDICARE

## 2024-12-02 ENCOUNTER — TELEMEDICINE (OUTPATIENT)
Dept: BEHAVIORAL/MENTAL HEALTH CLINIC | Age: 79
End: 2024-12-02
Payer: MEDICARE

## 2024-12-02 DIAGNOSIS — F41.1 GENERALIZED ANXIETY DISORDER: ICD-10-CM

## 2024-12-02 DIAGNOSIS — F51.05 INSOMNIA DUE TO MENTAL DISORDER: ICD-10-CM

## 2024-12-02 DIAGNOSIS — Z65.8 PSYCHOSOCIAL STRESSORS: ICD-10-CM

## 2024-12-02 DIAGNOSIS — E53.8 VITAMIN B12 DEFICIENCY: Primary | ICD-10-CM

## 2024-12-02 DIAGNOSIS — E03.9 PRIMARY HYPOTHYROIDISM: ICD-10-CM

## 2024-12-02 DIAGNOSIS — F43.10 PTSD (POST-TRAUMATIC STRESS DISORDER): ICD-10-CM

## 2024-12-02 DIAGNOSIS — F33.41 RECURRENT MAJOR DEPRESSIVE DISORDER, IN PARTIAL REMISSION (HCC): Primary | ICD-10-CM

## 2024-12-02 LAB
T4 FREE SERPL-MCNC: 1.5 NG/DL (ref 0.9–1.7)
TSH, 3RD GENERATION: 1.37 UIU/ML (ref 0.27–4.2)

## 2024-12-02 PROCEDURE — 99214 OFFICE O/P EST MOD 30 MIN: CPT | Performed by: PSYCHIATRY & NEUROLOGY

## 2024-12-02 PROCEDURE — 1159F MED LIST DOCD IN RCRD: CPT | Performed by: PSYCHIATRY & NEUROLOGY

## 2024-12-02 PROCEDURE — 96372 THER/PROPH/DIAG INJ SC/IM: CPT | Performed by: STUDENT IN AN ORGANIZED HEALTH CARE EDUCATION/TRAINING PROGRAM

## 2024-12-02 PROCEDURE — 1123F ACP DISCUSS/DSCN MKR DOCD: CPT | Performed by: PSYCHIATRY & NEUROLOGY

## 2024-12-02 RX ORDER — DESVENLAFAXINE 50 MG/1
50 TABLET, FILM COATED, EXTENDED RELEASE ORAL DAILY
Qty: 30 TABLET | Refills: 3 | Status: SHIPPED | OUTPATIENT
Start: 2024-12-02

## 2024-12-02 RX ORDER — ZOLPIDEM TARTRATE 10 MG/1
TABLET ORAL NIGHTLY PRN
COMMUNITY
End: 2024-12-02 | Stop reason: SDUPTHER

## 2024-12-02 RX ORDER — TRAZODONE HYDROCHLORIDE 50 MG/1
25-50 TABLET, FILM COATED ORAL NIGHTLY
Qty: 30 TABLET | Refills: 3 | Status: SHIPPED | OUTPATIENT
Start: 2024-12-02

## 2024-12-02 RX ORDER — ZOLPIDEM TARTRATE 10 MG/1
10 TABLET ORAL NIGHTLY PRN
Qty: 30 TABLET | Refills: 3 | Status: SHIPPED | OUTPATIENT
Start: 2024-12-02 | End: 2025-04-01

## 2024-12-02 RX ORDER — CLONAZEPAM 0.5 MG/1
0.5 TABLET ORAL DAILY PRN
Qty: 30 TABLET | Refills: 2 | Status: SHIPPED | OUTPATIENT
Start: 2024-12-02 | End: 2025-03-02

## 2024-12-02 RX ADMIN — CYANOCOBALAMIN 1000 MCG: 1000 INJECTION, SOLUTION INTRAMUSCULAR; SUBCUTANEOUS at 11:54

## 2024-12-02 ASSESSMENT — ANXIETY QUESTIONNAIRES
5. BEING SO RESTLESS THAT IT IS HARD TO SIT STILL: SEVERAL DAYS
3. WORRYING TOO MUCH ABOUT DIFFERENT THINGS: NOT AT ALL
IF YOU CHECKED OFF ANY PROBLEMS ON THIS QUESTIONNAIRE, HOW DIFFICULT HAVE THESE PROBLEMS MADE IT FOR YOU TO DO YOUR WORK, TAKE CARE OF THINGS AT HOME, OR GET ALONG WITH OTHER PEOPLE: SOMEWHAT DIFFICULT
1. FEELING NERVOUS, ANXIOUS, OR ON EDGE: SEVERAL DAYS
7. FEELING AFRAID AS IF SOMETHING AWFUL MIGHT HAPPEN: NOT AT ALL
4. TROUBLE RELAXING: NOT AT ALL
6. BECOMING EASILY ANNOYED OR IRRITABLE: NOT AT ALL
2. NOT BEING ABLE TO STOP OR CONTROL WORRYING: NEARLY EVERY DAY
GAD7 TOTAL SCORE: 5

## 2024-12-02 ASSESSMENT — PATIENT HEALTH QUESTIONNAIRE - PHQ9
SUM OF ALL RESPONSES TO PHQ QUESTIONS 1-9: 7
2. FEELING DOWN, DEPRESSED OR HOPELESS: SEVERAL DAYS
6. FEELING BAD ABOUT YOURSELF - OR THAT YOU ARE A FAILURE OR HAVE LET YOURSELF OR YOUR FAMILY DOWN: NOT AT ALL
9. THOUGHTS THAT YOU WOULD BE BETTER OFF DEAD, OR OF HURTING YOURSELF: NOT AT ALL
8. MOVING OR SPEAKING SO SLOWLY THAT OTHER PEOPLE COULD HAVE NOTICED. OR THE OPPOSITE, BEING SO FIGETY OR RESTLESS THAT YOU HAVE BEEN MOVING AROUND A LOT MORE THAN USUAL: MORE THAN HALF THE DAYS
10. IF YOU CHECKED OFF ANY PROBLEMS, HOW DIFFICULT HAVE THESE PROBLEMS MADE IT FOR YOU TO DO YOUR WORK, TAKE CARE OF THINGS AT HOME, OR GET ALONG WITH OTHER PEOPLE: NOT DIFFICULT AT ALL
SUM OF ALL RESPONSES TO PHQ QUESTIONS 1-9: 7
SUM OF ALL RESPONSES TO PHQ QUESTIONS 1-9: 7
1. LITTLE INTEREST OR PLEASURE IN DOING THINGS: NOT AT ALL
7. TROUBLE CONCENTRATING ON THINGS, SUCH AS READING THE NEWSPAPER OR WATCHING TELEVISION: NOT AT ALL
SUM OF ALL RESPONSES TO PHQ QUESTIONS 1-9: 7
SUM OF ALL RESPONSES TO PHQ9 QUESTIONS 1 & 2: 1
5. POOR APPETITE OR OVEREATING: NOT AT ALL
3. TROUBLE FALLING OR STAYING ASLEEP: MORE THAN HALF THE DAYS
4. FEELING TIRED OR HAVING LITTLE ENERGY: MORE THAN HALF THE DAYS

## 2024-12-02 NOTE — PERIOP NOTE
Thank you for completing your phone assessment with me today. The following is a list of Pre-op instructions that you requested. Should you have any questions, please call # [phone].    Surgery Date: 12/9/24    Location: [facility], [address] [place] [address].   YOU WILL RECEIVE A CALL FROM A PREOP NURSE BY 4PM THE BUSINESS DAY PRIOR TO SURGERY. IF YOU HAVE NOT RECEIVED A CALL BY 4PM, YOU MAY THEN CALL THE NUMBER LISTED BELOW TO REQUEST THE ARRIVAL TIME. DO NOT CALL PRIOR TO 4PM the business day prior to your surgery date. (#[phone] MAIN Preop or Outpatient Center [phone]) If you have any questions on the day of surgery, please call the pre-op department at the telephone number listed.     No food after midnight the night before surgery. UPDATE: a recent change per Santo PEREZ, Please finish 32 ounces of non-caffeinated clear liquids 2 hours prior to their arrival to avoid dehydration (preferably water).  Once the clear fluids are completed do not chew gum, no hard candy, no mints, no additional fluids (Nothing in mouth including tobacco)     Please take the following medications on the morning of surgery with a small sip of water:  clonazepam if needed, Pristiq, amlodipine, Hiprex, Gemtesa if needed.  Please continue all regularly scheduled prescription medication the day/night prior to surgery unless otherwise instructed below.    Prescription medication to hold prior to surgery: none.   Please stop all vitamins & supplements 7 days prior to surgery and stop all NSAIDS (Aspirin, ibuprofen/motrin/advil, naproxen/aleve, Goody & BC Powder) 5 days before your surgery. Should you have a surgery date that does not allow for the amount of time instructed above, please stop taking vitamins, supplements, and NSAIDS IMMEDIATELY. However, You may take tylenol if needed for minor headache/pain.    On the day before surgery- take Tylenol Extra Strength

## 2024-12-02 NOTE — PROGRESS NOTES
is able to dress himself and eat by himself.  Supportive psychotherapy provided.  Patient denies suicidal ideation/homicidal ideations.  Denies symptoms of psychosis.  Will add trazodone to Ambien to target insomnia.  Patient advised to make sure she is not dizzy or drowsy before she does anything that requires for her to be alert like driving.  Her TSH levels have been low.  States her PCP is adjusting her thyroid medication.  Takes Klonopin only seldom when she cannot sleep at night.  Does not take Ambien with Klonopin.      Patient Active Problem List   Diagnosis    Tubulovillous adenoma of colon    Post-menopause    Hymenoptera allergy    Polyneuropathy    Vitamin B12 deficiency    OA (osteoarthritis)    IBS (irritable bowel syndrome)    GERD (gastroesophageal reflux disease)    Insomnia    RLS (restless legs syndrome)    Fungal dermatitis    Hemorrhoids    Anserine bursitis    Lumbar radiculopathy    Hashimoto's thyroiditis    Family history of autoimmune disorder    Allergic dermatitis    ANASTASIA (stress urinary incontinence, female)    AR (allergic rhinitis)    FRANKY positive    Vaginal atrophy    Gait abnormality    Cardiac arrhythmia    Diplopia    URBANO (generalized anxiety disorder)    Gluten intolerance    Fibromyalgia    Degenerative arthritis of left knee    Arthritis of left knee    History of Clostridioides difficile colitis    Hx of total knee replacement, right    Mood disorder (HCC)    Chronic pain disorder    Mixed hyperlipidemia    Primary hypothyroidism    Chronic nausea    Renal cyst    PAD (peripheral artery disease) ([facility])    Abnormal flushing and sweating    Vitamin D deficiency    DDD (degenerative disc disease), lumbar    OAB (overactive bladder)    Primary hypertension    Stage 3b chronic kidney disease (HCC)    Anemia, iron deficiency    Hypoproteinemia (HCC)    LORA (obstructive sleep apnea)    Obesity (BMI 30-39.9)    Low back pain    Fibromyositis    Posterior capsular opacification non

## 2024-12-02 NOTE — PERIOP NOTE
Patient verified name and .  Order for consent NOT found in EHR at time of PAT visit. Unable to verify case posting against order; surgery verified by patient.    Type 1B surgery, phone assessment complete.  Orders not received.  Labs per surgeon: none ordered  Labs per anesthesia protocol: Hgb s/h for DOS    Patient answered medical/surgical history questions at their best of ability. All prior to admission medications documented in EPIC.    Patient instructed to continue taking all prescription medications up to the day of surgery but to take only the following medications the day of surgery according to anesthesia guidelines with a small sip of water:  clonazepam if needed, Pristiq, amlodipine, Hiprex, Gemtesa.  Also, patient is requested to take 2 Tylenol in the morning and then again before bed on the day before surgery. Regular or extra strength may be used.       Patient informed that all vitamins and supplements should be held 7 days prior to surgery and NSAIDS 5 days prior to surgery. Prescription meds to hold:  none    Patient instructed on the following:    > Arrive at OPC Entrance, time of arrival to be called the day before by 1700  > NPO after midnight, unless otherwise indicated, including gum, mints, and ice chips  > Please drink 32 ounces of water 2 hours prior to your arrival to avoid dehydration. (Anesthesia guideline)  > Responsible adult must drive patient to the hospital, stay during surgery, and patient will need supervision 24 hours after anesthesia  > Use non moisturizing soap in shower the night before surgery and on the morning of surgery  > All piercings must be removed prior to arrival.    > Leave all valuables (money and jewelry) at home but bring insurance card and ID on DOS.   > You may be required to pay a deductible or co-pay on the day of your procedure. You can pre-pay by calling [phone] if your surgery is at the [facility] or [phone] if your surgery is at the [facility]

## 2024-12-03 DIAGNOSIS — M19.012 ARTHRITIS OF LEFT SHOULDER REGION: Primary | ICD-10-CM

## 2024-12-03 DIAGNOSIS — M25.512 LEFT SHOULDER PAIN, UNSPECIFIED CHRONICITY: ICD-10-CM

## 2024-12-03 DIAGNOSIS — R11.0 NAUSEA: Primary | ICD-10-CM

## 2024-12-03 RX ORDER — ONDANSETRON 4 MG/1
4 TABLET, ORALLY DISINTEGRATING ORAL EVERY 6 HOURS PRN
Qty: 12 TABLET | Refills: 0 | Status: SHIPPED | OUTPATIENT
Start: 2024-12-03

## 2024-12-03 NOTE — TELEPHONE ENCOUNTER
Rx last written by Courtney 6/10/24 for #12 with 0 refills. Pt has appt with new PCP 1/14/25. Rx pended. Can you send?

## 2024-12-04 ENCOUNTER — OFFICE VISIT (OUTPATIENT)
Dept: ENDOCRINOLOGY | Age: 79
End: 2024-12-04
Payer: MEDICARE

## 2024-12-04 VITALS
BODY MASS INDEX: 36.74 KG/M2 | DIASTOLIC BLOOD PRESSURE: 80 MMHG | HEART RATE: 79 BPM | WEIGHT: 175.8 LBS | OXYGEN SATURATION: 98 % | SYSTOLIC BLOOD PRESSURE: 122 MMHG

## 2024-12-04 DIAGNOSIS — E55.9 VITAMIN D DEFICIENCY: ICD-10-CM

## 2024-12-04 DIAGNOSIS — E03.9 PRIMARY HYPOTHYROIDISM: Primary | Chronic | ICD-10-CM

## 2024-12-04 DIAGNOSIS — E06.3 HASHIMOTO'S THYROIDITIS: ICD-10-CM

## 2024-12-04 PROCEDURE — 3074F SYST BP LT 130 MM HG: CPT | Performed by: PHYSICIAN ASSISTANT

## 2024-12-04 PROCEDURE — 1160F RVW MEDS BY RX/DR IN RCRD: CPT | Performed by: PHYSICIAN ASSISTANT

## 2024-12-04 PROCEDURE — 1159F MED LIST DOCD IN RCRD: CPT | Performed by: PHYSICIAN ASSISTANT

## 2024-12-04 PROCEDURE — 99214 OFFICE O/P EST MOD 30 MIN: CPT | Performed by: PHYSICIAN ASSISTANT

## 2024-12-04 PROCEDURE — 3079F DIAST BP 80-89 MM HG: CPT | Performed by: PHYSICIAN ASSISTANT

## 2024-12-04 PROCEDURE — G2211 COMPLEX E/M VISIT ADD ON: HCPCS | Performed by: PHYSICIAN ASSISTANT

## 2024-12-04 PROCEDURE — 1123F ACP DISCUSS/DSCN MKR DOCD: CPT | Performed by: PHYSICIAN ASSISTANT

## 2024-12-04 RX ORDER — LEVOTHYROXINE SODIUM 112 MCG
112 TABLET ORAL DAILY
Qty: 90 TABLET | Refills: 3 | Status: SHIPPED | OUTPATIENT
Start: 2024-12-04

## 2024-12-04 ASSESSMENT — ENCOUNTER SYMPTOMS
CONSTIPATION: 0
VOICE CHANGE: 0
DIARRHEA: 1
TROUBLE SWALLOWING: 0
BACK PAIN: 0

## 2024-12-04 NOTE — PROGRESS NOTES
Physical Exam  Constitutional:       Appearance: Normal appearance. She is obese.   HENT:      Head: Normocephalic.   Neck:      Thyroid: No thyroid mass or thyromegaly.      Vascular: No carotid bruit.   Cardiovascular:      Rate and Rhythm: Normal rate and regular rhythm.   Pulmonary:      Effort: Pulmonary effort is normal.      Breath sounds: Normal breath sounds.   Abdominal:      Palpations: Abdomen is soft.   Musculoskeletal:      Cervical back: Neck supple.      Right lower leg: No edema.      Left lower leg: No edema.   Feet:      Right foot:      Protective Sensation: 3 sites tested.  3 sites sensed.      Skin integrity: Skin integrity normal.      Left foot:      Protective Sensation: 3 sites tested.  3 sites sensed.      Skin integrity: Skin integrity normal.   Lymphadenopathy:      Cervical: No cervical adenopathy.   Skin:     General: Skin is warm and dry.   Neurological:      General: No focal deficit present.      Mental Status: She is alert.      Sensory: Sensation is intact.   Psychiatric:         Mood and Affect: Mood normal.         Behavior: Behavior normal.         Thought Content: Thought content normal.         Judgment: Judgment normal.             No follow-ups on file.        Portions of this note were generated with the assistance of voice recogniton software.  As such, some errors in transcription may be present.

## 2024-12-06 NOTE — PERIOP NOTE
Preop department called to notify patient of arrival time for scheduled procedure. Instructions given to   - Arrive at OPC Entrance [address].  - No solid food after midnight & Please drink 32 ounces of water 2 hours prior to your arrival to avoid dehydration unless otherwise indicated. No gum, mints, or ice chips.   - Have a responsible adult to drive patient to the hospital, stay during surgery, and patient will need supervision 24 hours after anesthesia.   - Use antibacterial soap in shower the night before surgery and on the morning of surgery.       Was patient contacted: Y  Voicemail left:   Numbers contacted: [phone]   Arrival time: 0730  Time to complete 32 ounces of water: 0530

## 2024-12-07 ENCOUNTER — ANESTHESIA EVENT (OUTPATIENT)
Dept: SURGERY | Age: 79
End: 2024-12-07
Payer: MEDICARE

## 2024-12-08 ENCOUNTER — PATIENT MESSAGE (OUTPATIENT)
Dept: ENDOCRINOLOGY | Age: 79
End: 2024-12-08

## 2024-12-08 DIAGNOSIS — E03.9 PRIMARY HYPOTHYROIDISM: Chronic | ICD-10-CM

## 2024-12-08 DIAGNOSIS — Z98.890 POST-OPERATIVE STATE: Primary | ICD-10-CM

## 2024-12-08 RX ORDER — AMOXICILLIN 250 MG
1 CAPSULE ORAL DAILY
Qty: 21 TABLET | Refills: 0 | Status: SHIPPED | OUTPATIENT
Start: 2024-12-08

## 2024-12-08 RX ORDER — ASPIRIN 325 MG
325 TABLET ORAL DAILY
Qty: 7 TABLET | Refills: 0 | Status: SHIPPED | OUTPATIENT
Start: 2024-12-08 | End: 2024-12-15

## 2024-12-08 RX ORDER — HYDROCODONE BITARTRATE AND ACETAMINOPHEN 7.5; 325 MG/1; MG/1
1 TABLET ORAL EVERY 6 HOURS PRN
Qty: 20 TABLET | Refills: 0 | Status: SHIPPED | OUTPATIENT
Start: 2024-12-08 | End: 2024-12-13

## 2024-12-08 RX ORDER — ONDANSETRON 8 MG/1
4 TABLET, ORALLY DISINTEGRATING ORAL EVERY 6 HOURS
Qty: 16 TABLET | Refills: 0 | Status: SHIPPED | OUTPATIENT
Start: 2024-12-08

## 2024-12-09 ENCOUNTER — ANESTHESIA (OUTPATIENT)
Dept: SURGERY | Age: 79
End: 2024-12-09
Payer: MEDICARE

## 2024-12-09 ENCOUNTER — HOSPITAL ENCOUNTER (OUTPATIENT)
Age: 79
Setting detail: OUTPATIENT SURGERY
Discharge: HOME | End: 2024-12-09
Attending: ORTHOPAEDIC SURGERY | Admitting: ORTHOPAEDIC SURGERY
Payer: MEDICARE

## 2024-12-09 VITALS
HEIGHT: 58 IN | SYSTOLIC BLOOD PRESSURE: 106 MMHG | RESPIRATION RATE: 16 BRPM | WEIGHT: 175 LBS | TEMPERATURE: 97 F | DIASTOLIC BLOOD PRESSURE: 56 MMHG | OXYGEN SATURATION: 86 % | HEART RATE: 64 BPM | BODY MASS INDEX: 36.73 KG/M2

## 2024-12-09 LAB — HGB BLD-MCNC: 12.6 G/DL (ref 11.7–15.4)

## 2024-12-09 PROCEDURE — 3700000000 HC ANESTHESIA ATTENDED CARE: Performed by: ORTHOPAEDIC SURGERY

## 2024-12-09 PROCEDURE — 64415 NJX AA&/STRD BRCH PLXS IMG: CPT | Performed by: ANESTHESIOLOGY

## 2024-12-09 PROCEDURE — 6360000002 HC RX W HCPCS

## 2024-12-09 PROCEDURE — 7100000010 HC PHASE II RECOVERY - FIRST 15 MIN: Performed by: ORTHOPAEDIC SURGERY

## 2024-12-09 PROCEDURE — 2580000003 HC RX 258: Performed by: ANESTHESIOLOGY

## 2024-12-09 PROCEDURE — 3600000014 HC SURGERY LEVEL 4 ADDTL 15MIN: Performed by: ORTHOPAEDIC SURGERY

## 2024-12-09 PROCEDURE — 2709999900 HC NON-CHARGEABLE SUPPLY: Performed by: ORTHOPAEDIC SURGERY

## 2024-12-09 PROCEDURE — 2500000003 HC RX 250 WO HCPCS

## 2024-12-09 PROCEDURE — 6360000002 HC RX W HCPCS: Performed by: ANESTHESIOLOGY

## 2024-12-09 PROCEDURE — 2580000003 HC RX 258: Performed by: PHYSICIAN ASSISTANT

## 2024-12-09 PROCEDURE — 6360000002 HC RX W HCPCS: Performed by: ORTHOPAEDIC SURGERY

## 2024-12-09 PROCEDURE — 7100000001 HC PACU RECOVERY - ADDTL 15 MIN: Performed by: ORTHOPAEDIC SURGERY

## 2024-12-09 PROCEDURE — 6360000002 HC RX W HCPCS: Performed by: PHYSICIAN ASSISTANT

## 2024-12-09 PROCEDURE — 3600000004 HC SURGERY LEVEL 4 BASE: Performed by: ORTHOPAEDIC SURGERY

## 2024-12-09 PROCEDURE — 3700000001 HC ADD 15 MINUTES (ANESTHESIA): Performed by: ORTHOPAEDIC SURGERY

## 2024-12-09 PROCEDURE — 85018 HEMOGLOBIN: CPT

## 2024-12-09 PROCEDURE — 2580000003 HC RX 258

## 2024-12-09 PROCEDURE — 6370000000 HC RX 637 (ALT 250 FOR IP): Performed by: ANESTHESIOLOGY

## 2024-12-09 PROCEDURE — C1713 ANCHOR/SCREW BN/BN,TIS/BN: HCPCS | Performed by: ORTHOPAEDIC SURGERY

## 2024-12-09 PROCEDURE — 7100000000 HC PACU RECOVERY - FIRST 15 MIN: Performed by: ORTHOPAEDIC SURGERY

## 2024-12-09 RX ORDER — SODIUM CHLORIDE, SODIUM LACTATE, POTASSIUM CHLORIDE, CALCIUM CHLORIDE 600; 310; 30; 20 MG/100ML; MG/100ML; MG/100ML; MG/100ML
INJECTION, SOLUTION INTRAVENOUS CONTINUOUS
Status: DISCONTINUED | OUTPATIENT
Start: 2024-12-09 | End: 2024-12-09 | Stop reason: HOSPADM

## 2024-12-09 RX ORDER — LIDOCAINE HYDROCHLORIDE 10 MG/ML
1 INJECTION, SOLUTION INFILTRATION; PERINEURAL
Status: DISCONTINUED | OUTPATIENT
Start: 2024-12-09 | End: 2024-12-09 | Stop reason: HOSPADM

## 2024-12-09 RX ORDER — GLYCOPYRROLATE 0.2 MG/ML
INJECTION INTRAMUSCULAR; INTRAVENOUS
Status: DISCONTINUED | OUTPATIENT
Start: 2024-12-09 | End: 2024-12-09 | Stop reason: SDUPTHER

## 2024-12-09 RX ORDER — NEOSTIGMINE METHYLSULFATE 1 MG/ML
INJECTION, SOLUTION INTRAVENOUS
Status: DISCONTINUED | OUTPATIENT
Start: 2024-12-09 | End: 2024-12-09 | Stop reason: SDUPTHER

## 2024-12-09 RX ORDER — ONDANSETRON 2 MG/ML
INJECTION INTRAMUSCULAR; INTRAVENOUS
Status: DISCONTINUED | OUTPATIENT
Start: 2024-12-09 | End: 2024-12-09 | Stop reason: SDUPTHER

## 2024-12-09 RX ORDER — DEXAMETHASONE SODIUM PHOSPHATE 4 MG/ML
INJECTION, SOLUTION INTRA-ARTICULAR; INTRALESIONAL; INTRAMUSCULAR; INTRAVENOUS; SOFT TISSUE
Status: DISCONTINUED | OUTPATIENT
Start: 2024-12-09 | End: 2024-12-09 | Stop reason: SDUPTHER

## 2024-12-09 RX ORDER — SODIUM CHLORIDE 9 MG/ML
INJECTION, SOLUTION INTRAVENOUS PRN
Status: DISCONTINUED | OUTPATIENT
Start: 2024-12-09 | End: 2024-12-09 | Stop reason: HOSPADM

## 2024-12-09 RX ORDER — ONDANSETRON 2 MG/ML
4 INJECTION INTRAMUSCULAR; INTRAVENOUS
Status: DISCONTINUED | OUTPATIENT
Start: 2024-12-09 | End: 2024-12-09 | Stop reason: HOSPADM

## 2024-12-09 RX ORDER — SODIUM CHLORIDE 0.9 % (FLUSH) 0.9 %
5-40 SYRINGE (ML) INJECTION EVERY 12 HOURS SCHEDULED
Status: DISCONTINUED | OUTPATIENT
Start: 2024-12-09 | End: 2024-12-09 | Stop reason: HOSPADM

## 2024-12-09 RX ORDER — SODIUM CHLORIDE 0.9 % (FLUSH) 0.9 %
5-40 SYRINGE (ML) INJECTION PRN
Status: DISCONTINUED | OUTPATIENT
Start: 2024-12-09 | End: 2024-12-09 | Stop reason: HOSPADM

## 2024-12-09 RX ORDER — PROPOFOL 10 MG/ML
INJECTION, EMULSION INTRAVENOUS
Status: DISCONTINUED | OUTPATIENT
Start: 2024-12-09 | End: 2024-12-09 | Stop reason: SDUPTHER

## 2024-12-09 RX ORDER — LIDOCAINE HYDROCHLORIDE AND EPINEPHRINE 10; 10 MG/ML; UG/ML
INJECTION, SOLUTION INFILTRATION; PERINEURAL PRN
Status: DISCONTINUED | OUTPATIENT
Start: 2024-12-09 | End: 2024-12-09 | Stop reason: ALTCHOICE

## 2024-12-09 RX ORDER — MIDAZOLAM HYDROCHLORIDE 2 MG/2ML
2 INJECTION, SOLUTION INTRAMUSCULAR; INTRAVENOUS
Status: COMPLETED | OUTPATIENT
Start: 2024-12-09 | End: 2024-12-09

## 2024-12-09 RX ORDER — PHENYLEPHRINE HYDROCHLORIDE 10 MG/ML
INJECTION INTRAVENOUS
Status: DISCONTINUED | OUTPATIENT
Start: 2024-12-09 | End: 2024-12-09 | Stop reason: SDUPTHER

## 2024-12-09 RX ORDER — DIPHENHYDRAMINE HYDROCHLORIDE 50 MG/ML
12.5 INJECTION INTRAMUSCULAR; INTRAVENOUS ONCE
Status: COMPLETED | OUTPATIENT
Start: 2024-12-09 | End: 2024-12-09

## 2024-12-09 RX ORDER — LIDOCAINE HYDROCHLORIDE 20 MG/ML
INJECTION, SOLUTION EPIDURAL; INFILTRATION; INTRACAUDAL; PERINEURAL
Status: DISCONTINUED | OUTPATIENT
Start: 2024-12-09 | End: 2024-12-09 | Stop reason: SDUPTHER

## 2024-12-09 RX ORDER — TRANEXAMIC ACID 100 MG/ML
INJECTION, SOLUTION INTRAVENOUS
Status: DISCONTINUED | OUTPATIENT
Start: 2024-12-09 | End: 2024-12-09 | Stop reason: SDUPTHER

## 2024-12-09 RX ORDER — PROCHLORPERAZINE EDISYLATE 5 MG/ML
5 INJECTION INTRAMUSCULAR; INTRAVENOUS
Status: DISCONTINUED | OUTPATIENT
Start: 2024-12-09 | End: 2024-12-09 | Stop reason: HOSPADM

## 2024-12-09 RX ORDER — DEXAMETHASONE SODIUM PHOSPHATE 10 MG/ML
INJECTION, SOLUTION INTRAMUSCULAR; INTRAVENOUS
Status: COMPLETED | OUTPATIENT
Start: 2024-12-09 | End: 2024-12-09

## 2024-12-09 RX ORDER — DIPHENHYDRAMINE HYDROCHLORIDE 50 MG/ML
INJECTION INTRAMUSCULAR; INTRAVENOUS
Status: COMPLETED
Start: 2024-12-09 | End: 2024-12-09

## 2024-12-09 RX ORDER — EPINEPHRINE 1 MG/ML(1)
AMPUL (ML) INJECTION PRN
Status: DISCONTINUED | OUTPATIENT
Start: 2024-12-09 | End: 2024-12-09 | Stop reason: ALTCHOICE

## 2024-12-09 RX ORDER — FENTANYL CITRATE 50 UG/ML
100 INJECTION, SOLUTION INTRAMUSCULAR; INTRAVENOUS
Status: COMPLETED | OUTPATIENT
Start: 2024-12-09 | End: 2024-12-09

## 2024-12-09 RX ORDER — NALOXONE HYDROCHLORIDE 0.4 MG/ML
INJECTION, SOLUTION INTRAMUSCULAR; INTRAVENOUS; SUBCUTANEOUS PRN
Status: DISCONTINUED | OUTPATIENT
Start: 2024-12-09 | End: 2024-12-09 | Stop reason: HOSPADM

## 2024-12-09 RX ORDER — LEVOTHYROXINE SODIUM 112 MCG
112 TABLET ORAL DAILY
Qty: 90 TABLET | Refills: 3 | Status: SHIPPED | OUTPATIENT
Start: 2024-12-09

## 2024-12-09 RX ORDER — ACETAMINOPHEN 500 MG
1000 TABLET ORAL ONCE
Status: COMPLETED | OUTPATIENT
Start: 2024-12-09 | End: 2024-12-09

## 2024-12-09 RX ORDER — ROCURONIUM BROMIDE 10 MG/ML
INJECTION, SOLUTION INTRAVENOUS
Status: DISCONTINUED | OUTPATIENT
Start: 2024-12-09 | End: 2024-12-09 | Stop reason: SDUPTHER

## 2024-12-09 RX ORDER — SODIUM CHLORIDE, SODIUM LACTATE, POTASSIUM CHLORIDE, CALCIUM CHLORIDE 600; 310; 30; 20 MG/100ML; MG/100ML; MG/100ML; MG/100ML
INJECTION, SOLUTION INTRAVENOUS
Status: DISCONTINUED | OUTPATIENT
Start: 2024-12-09 | End: 2024-12-09 | Stop reason: SDUPTHER

## 2024-12-09 RX ADMIN — TRANEXAMIC ACID 1000 MG: 100 INJECTION, SOLUTION INTRAVENOUS at 10:40

## 2024-12-09 RX ADMIN — ACETAMINOPHEN 1000 MG: 500 TABLET, FILM COATED ORAL at 08:12

## 2024-12-09 RX ADMIN — ONDANSETRON 4 MG: 2 INJECTION, SOLUTION INTRAMUSCULAR; INTRAVENOUS at 10:34

## 2024-12-09 RX ADMIN — DEXAMETHASONE SODIUM PHOSPHATE 4 MG: 4 INJECTION INTRA-ARTICULAR; INTRALESIONAL; INTRAMUSCULAR; INTRAVENOUS; SOFT TISSUE at 10:34

## 2024-12-09 RX ADMIN — VANCOMYCIN HYDROCHLORIDE 1000 MG: 1 INJECTION, POWDER, LYOPHILIZED, FOR SOLUTION INTRAVENOUS at 09:57

## 2024-12-09 RX ADMIN — SODIUM CHLORIDE, SODIUM LACTATE, POTASSIUM CHLORIDE, AND CALCIUM CHLORIDE: 600; 310; 30; 20 INJECTION, SOLUTION INTRAVENOUS at 09:59

## 2024-12-09 RX ADMIN — VANCOMYCIN HYDROCHLORIDE 1000 MG: 1 INJECTION, POWDER, LYOPHILIZED, FOR SOLUTION INTRAVENOUS at 10:16

## 2024-12-09 RX ADMIN — MIDAZOLAM 1 MG: 1 INJECTION INTRAMUSCULAR; INTRAVENOUS at 09:48

## 2024-12-09 RX ADMIN — ROPIVACAINE HYDROCHLORIDE 30 ML: 5 INJECTION, SOLUTION EPIDURAL; INFILTRATION; PERINEURAL at 09:42

## 2024-12-09 RX ADMIN — PHENYLEPHRINE HYDROCHLORIDE 200 MCG: 10 INJECTION INTRAVENOUS at 10:43

## 2024-12-09 RX ADMIN — GLYCOPYRROLATE 0.6 MG: 0.2 INJECTION INTRAMUSCULAR; INTRAVENOUS at 11:10

## 2024-12-09 RX ADMIN — ROCURONIUM BROMIDE 40 MG: 10 INJECTION, SOLUTION INTRAVENOUS at 10:18

## 2024-12-09 RX ADMIN — FENTANYL CITRATE 50 MCG: 50 INJECTION INTRAMUSCULAR; INTRAVENOUS at 09:42

## 2024-12-09 RX ADMIN — DIPHENHYDRAMINE HYDROCHLORIDE 12.5 MG: 50 INJECTION INTRAMUSCULAR; INTRAVENOUS at 12:15

## 2024-12-09 RX ADMIN — PROPOFOL 20 MG: 10 INJECTION, EMULSION INTRAVENOUS at 10:49

## 2024-12-09 RX ADMIN — PROPOFOL 150 MG: 10 INJECTION, EMULSION INTRAVENOUS at 10:18

## 2024-12-09 RX ADMIN — SUGAMMADEX 200 MG: 100 INJECTION, SOLUTION INTRAVENOUS at 11:25

## 2024-12-09 RX ADMIN — PHENYLEPHRINE HYDROCHLORIDE 200 MCG: 10 INJECTION INTRAVENOUS at 10:34

## 2024-12-09 RX ADMIN — LIDOCAINE HYDROCHLORIDE 60 MG: 20 INJECTION, SOLUTION EPIDURAL; INFILTRATION; INTRACAUDAL; PERINEURAL at 10:18

## 2024-12-09 RX ADMIN — Medication 4 MG: at 11:10

## 2024-12-09 RX ADMIN — PROPOFOL 20 MG: 10 INJECTION, EMULSION INTRAVENOUS at 09:41

## 2024-12-09 RX ADMIN — GENTAMICIN SULFATE 300 MG: 40 INJECTION, SOLUTION INTRAMUSCULAR; INTRAVENOUS at 08:48

## 2024-12-09 RX ADMIN — DEXAMETHASONE SODIUM PHOSPHATE 4 MG: 10 INJECTION, SOLUTION INTRAMUSCULAR; INTRAVENOUS at 09:42

## 2024-12-09 RX ADMIN — PHENYLEPHRINE HYDROCHLORIDE 200 MCG: 10 INJECTION INTRAVENOUS at 11:00

## 2024-12-09 RX ADMIN — PHENYLEPHRINE HYDROCHLORIDE 200 MCG: 10 INJECTION INTRAVENOUS at 10:54

## 2024-12-09 ASSESSMENT — PAIN DESCRIPTION - DESCRIPTORS: DESCRIPTORS: TENDER;SORE

## 2024-12-09 ASSESSMENT — PAIN - FUNCTIONAL ASSESSMENT: PAIN_FUNCTIONAL_ASSESSMENT: 0-10

## 2024-12-09 ASSESSMENT — PAIN SCALES - GENERAL
PAINLEVEL_OUTOF10: 0
PAINLEVEL_OUTOF10: 0

## 2024-12-09 ASSESSMENT — ENCOUNTER SYMPTOMS: SHORTNESS OF BREATH: 1

## 2024-12-09 NOTE — ANESTHESIA PRE PROCEDURE
ARTHROSCOPY DISTAL CLAVICLE RESECTION, BICEPS TENOTOMY AND DEBRIDEMENT performed by SUJATA Saleh MD at [facility]   • TONSILLECTOMY AND ADENOIDECTOMY  1950   • TOTAL KNEE ARTHROPLASTY Right 2014   • ULNAR NERVE REPAIR Bilateral    • UPPER GASTROINTESTINAL ENDOSCOPY  2017   • WRIST SURGERY Right        Social History:    Social History     Tobacco Use   • Smoking status: Former     Current packs/day: 0.00     Average packs/day: 1 pack/day for 46.0 years (46.0 ttl pk-yrs)     Types: Cigarettes     Start date: 1959     Quit date: 2005     Years since quittin.0   • Smokeless tobacco: Never   Substance Use Topics   • Alcohol use: No                                Counseling given: Not Answered      Vital Signs (Current):   Vitals:    24 1150 24 0800   BP:  (!) 162/67   Pulse:  72   Resp:  18   Temp:  97.8 °F (36.6 °C)   TempSrc:  Oral   SpO2:  96%   Weight: 79.4 kg (175 lb) 79.4 kg (175 lb)   Height: 1.473 m (4' 10\")                                               BP Readings from Last 3 Encounters:   24 (!) 162/67   24 122/80   24 (!) 141/63       NPO Status:                                                                                 BMI:   Wt Readings from Last 3 Encounters:   24 79.4 kg (175 lb)   24 79.7 kg (175 lb 12.8 oz)   24 78.5 kg (173 lb)     Body mass index is 36.58 kg/m².    CBC:   Lab Results   Component Value Date/Time    WBC 8.8 2024 11:26 AM    RBC 4.21 2024 11:26 AM    HGB 12.3 2024 11:26 AM    HCT 38.5 2024 11:26 AM    MCV 91.4 2024 11:26 AM    RDW 13.9 2024 11:26 AM     2024 11:26 AM       CMP:   Lab Results   Component Value Date/Time     2024 11:26 AM    K 4.7 2024 11:26 AM     2024 11:26 AM    CO2 28 2024 11:26 AM    BUN 26 2024 11:26 AM    CREATININE 1.58 2024 11:26 AM    GFRAA 43 2022 08:58 AM    AGRATIO 0.7 05/15/2022 04:41 AM

## 2024-12-09 NOTE — H&P
Outpatient Surgery History and Physical:  My Rebolledo was seen and examined.    CHIEF COMPLAINT:   Left shoulder pain.     PE:   BP (!) 162/67   Pulse 72   Temp 97.8 °F (36.6 °C) (Oral)   Resp 18   Ht 1.473 m (4' 10\")   Wt 79.4 kg (175 lb)   SpO2 96%   BMI 36.58 kg/m²     Heart:   Regular rhythm, regular pulses.    Lungs:  Are clear, non-labored respirations.     Active Problems:    Left shoulder pain    Arthritis of left shoulder region  Resolved Problems:    * No resolved hospital problems. *      Past Medical History:    Past Medical History:   Diagnosis Date    Acute kidney insufficiency     Followed by PCP     Adverse effect of anesthesia     on 8/8/24 patient is unable to provide any further information regarding this. ----2016 in [place] \"My heart stopped at the end of my gallbladder surgery.\" Patient reports no CPR performed, stayed one night in the hospital. No problems with any other surgeries.    Allergic rhinitis     Anemia     history     Anxiety and depression     Controlled with medication     Arthritis     Chronic nausea 06/26/2019    Chronic pain     CKD (chronic kidney disease)     CKD 3b    DDD (degenerative disc disease), lumbar     Edema     bilateral lower legs     GERD (gastroesophageal reflux disease)     Headache Oct.2022    History of chicken pox     History of Clostridioides difficile infection     History of colitis     History of DVT (deep vein thrombosis)     s/p knee replacement and developed right leg DVT     HTN (hypertension)     controlled with medication     Hx of echocardiogram 03/25/2022    Left Ventricle: Left ventricle size is normal. Normal wall thickness.  Normal wall motion. Hyperdynamic left ventricular systolic function with a  visually estimated EF of 65 - 70%. Normal diastolic function.    Tricuspid Valve: Mild transvalvular regurgitatio    Hyperlipidemia     controlled with medication     Hypertension     managed with medication    Hypothyroidism

## 2024-12-09 NOTE — TELEPHONE ENCOUNTER
myChart response:    Synthroid resent to [facility] in [place]    Best of luck with your procedure    ~Leandra

## 2024-12-09 NOTE — ANESTHESIA PROCEDURE NOTES
Peripheral Block    Patient location during procedure: pre-op  Reason for block: post-op pain management and at surgeon's request  Start time: 12/9/2024 9:42 AM  End time: 12/9/2024 9:46 AM  Staffing  Performed: anesthesiologist   Anesthesiologist: LUIZ Burgess MD  Performed by: LUIZ Burgess MD  Authorized by: LUIZ Burgess MD    Preanesthetic Checklist  Completed: patient identified, IV checked, site marked, risks and benefits discussed, surgical/procedural consents, equipment checked, pre-op evaluation, timeout performed, anesthesia consent given, oxygen available and monitors applied/VS acknowledged  Peripheral Block   Patient position: supine (Semirecumbent)  Prep: ChloraPrep  Provider prep: mask and sterile gloves  Patient monitoring: cardiac monitor, continuous pulse ox, frequent blood pressure checks, IV access, oxygen and responsive to questions  Block type: Brachial plexus  Interscalene  Laterality: left  Injection technique: single-shot  Guidance: nerve stimulator and ultrasound guided    Needle   Needle type: insulated echogenic nerve stimulator needle   Needle gauge: 20 G  Needle localization: anatomical landmarks, nerve stimulator and ultrasound guidance  Assessment   Injection assessment: negative aspiration for heme, no paresthesia on injection, local visualized surrounding nerve on ultrasound and no intravascular symptoms  Slow fractionated injection: yes  Hemodynamics: stable  Outcomes: patient tolerated procedure well    Additional Notes  Local anesthetic visualized surrounding nerves with neural structure intact. Permanent image saved on chart.  0.375% Ropivacaine with epi 1:200,000 30 ml + Decadron 4 mg  Medications Administered  ROPivacaine 0.375% with EPINEPHrine 1:200,000 in NS injection (ANESTHESIA USE) (Mixture components: EPINEPHrine PF 1 MG/ML Soln, 0.005 mL; ROPivacaine 0.5% Soln, 0.75 mL; sodium chloride (PF) 0.9 % Soln, 0.245 mL) - Perineural   30 mL - 12/9/2024 9:42:00

## 2024-12-09 NOTE — OP NOTE
Operative Note    Preoperative diagnosis:  Left shoulder pain, unspecified chronicity [M25.512]  Arthritis of left shoulder region [M19.012]    Postoperative diagnosis: Left shoulder pain, rotator cuff tendinitis, AC arthritis    Surgeons and Role:     * SUJATA Saleh MD - Primary     Assistant: None    Anesthesia: General    Procedures:  Procedure(s):  LEFT SHOULDER ARTHROSCOPY DEBRIDEMENT  LEFT SHOULDER OPEN DISTAL CLAVICLE RESECTION   [id number]      Findings:  1. EUA -full passive range of motion.  Symmetric to the other side.  2. Intra-articular -mild chondromalacia on the humeral head anteriorly.  Glenoid and central humeral head were fairly normal.  Subscapularis was intact.  Previous tenotomy of biceps.  Mild rotator cuff tendinopathy on the articular side with less than 2 mm involvement.  3. Subacromial - RTC no evidence of bursal sided tearing.  No evidence of CA ligament fraying or impingement.  4. AC joint -moderate AC arthritis    Indications / Consent:  this is a patient who has persistent pain with some weakness in the right shoulder.  They had an MRI that showed rotator cuff tendinopathy as well as AC arthritis..  After previous discussions and treatments using both conservative and/or non-operative treatment options the patient elected to proceed with surgery due to continued symptoms. They were desirous of evaluation and possible arthroscopic or open repair of the rotator cuff, open DCR. A review of the risks and benefits, including but not limited to infection, stiffness, injury to nerves and vessels, DVT, PE, MI, need for further operations and other anesthesia related risks was performed with the patient. After this review and the review of the likely outcome and potential complications of the procedure, preoperative verbal and written consents were obtained.  The operative procedure and postoperative course were discussed with the patient in detail and the extremity was marked by the patient

## 2024-12-09 NOTE — PROGRESS NOTES
Vancomycin IVPB started and within a few minutes of administration, patient's arm was very itchy and red blotches above IV site. Rate was changed from 250 ml/hr to 150 ml/hr. Dr. Burgess and OR RN and CRNA at bedside and made aware. Verbal orders from Dr. Samayoa to keep infusing at slower rate at this time.

## 2024-12-09 NOTE — ANESTHESIA PROCEDURE NOTES
Airway  Date/Time: 12/9/2024 10:21 AM  Urgency: elective    Airway not difficult    General Information and Staff    Patient location during procedure: OR  Performed: resident/CRNA   Performed by: Karlos Buck Jr., APRN - CRNA  Authorized by: LUIZ Burgess MD      Indications and Patient Condition  Indications for airway management: anesthesia  Spontaneous Ventilation: absent  Sedation level: deep  Preoxygenated: yes  Patient position: sniffing  MILS not maintained throughout  Mask difficulty assessment: vent by bag mask    Final Airway Details  Final airway type: endotracheal airway      Successful airway: ETT  Cuffed: yes   Successful intubation technique: direct laryngoscopy  Facilitating devices/methods: intubating stylet  Endotracheal tube insertion site: oral  Blade: Dariusz  Blade size: #3  ETT size (mm): 7.0  Cormack-Lehane Classification: grade I - full view of glottis  Placement verified by: chest auscultation and capnometry   Measured from: lips  ETT to lips (cm): 21  Number of attempts at approach: 1  Ventilation between attempts: bag mask  Number of other approaches attempted: 0    Additional Comments  Lips and dentition remain the same  no

## 2024-12-09 NOTE — DISCHARGE INSTRUCTIONS
Postoperative Instructions    Returning Home    Care of your incisions:    Stitches are removed 6-10 days after surgery at your 1st appointment after surgery.  Moderate bleeding may occur at the incision sites.  This should decrease quickly over time.  Leave the dressings in place for 3-4 days.  Then dressings may be removed and replaced with Band-Aids or fresh gauze.  You may bath or shower after the dressings have been removed but the incisions must be kept clean and dry. Using Saran Wrap or a garbage bag can be useful to avoid getting the incisions wet. You may take your arm out of the sling for showering or bathing but being careful to avoid use of the arm. You may let your arm also hang at your side during showering or at your side during bathing.    Watch the wound for increasing redness, tenderness, swelling and pus drainage daily.  These can be early signs of infection.  Please communicate any concerns.  A slight temperature the first few days after surgery is not uncommon.  This can be treated with deep breathing and coughing to clear the lungs.  However, fevers (anything over 101°F), increasing pain, and swelling at the incisions should be reported immediately.  Keep the wounds dry until your first follow-up visit.  It is fine to loosen the sling and do elbow straightening and bending with the arm at the side.   It is also good to move the wrist and hand.  This can be done as much as you desire, but at least three times a day. You are to wear sling at all times except when doing the exercises as above until your follow up. No weight bearing/lifting with the operative arm.  Deep vein thrombosis (DVT) is a condition in which a blood clot has formed in a deep vein of the leg or arm. This may result in redness, swelling, warmth and/or pain of the leg/arm. Although these are very rare, there are things that can be done to lessen the risk.  It is recommended by your surgeon unless indicated otherwise,

## 2024-12-10 ENCOUNTER — TELEPHONE (OUTPATIENT)
Dept: ORTHOPEDIC SURGERY | Age: 79
End: 2024-12-10

## 2024-12-10 NOTE — TELEPHONE ENCOUNTER
Spoke with pt and answered her questions about surgery. She expressed understanding and thanked me for calling.

## 2024-12-10 NOTE — TELEPHONE ENCOUNTER
She had surgery yesterday and is calling because her family left and wasn't there to get her results after surgery. She is wanting to speak to someone if possible to let her know what was found in surgery. She also has several questions regarding her follow up appointments and instructions about the sling.

## 2024-12-17 ENCOUNTER — NURSE ONLY (OUTPATIENT)
Dept: INTERNAL MEDICINE CLINIC | Facility: CLINIC | Age: 79
End: 2024-12-17
Payer: MEDICARE

## 2024-12-17 PROCEDURE — 96372 THER/PROPH/DIAG INJ SC/IM: CPT | Performed by: NURSE PRACTITIONER

## 2024-12-17 RX ADMIN — CYANOCOBALAMIN 1000 MCG: 1000 INJECTION, SOLUTION INTRAMUSCULAR; SUBCUTANEOUS at 13:15

## 2024-12-20 ENCOUNTER — OFFICE VISIT (OUTPATIENT)
Dept: UROLOGY | Age: 79
End: 2024-12-20

## 2024-12-20 DIAGNOSIS — N39.0 RECURRENT UTI: ICD-10-CM

## 2024-12-20 DIAGNOSIS — N39.41 URGE INCONTINENCE: ICD-10-CM

## 2024-12-20 DIAGNOSIS — N32.81 OAB (OVERACTIVE BLADDER): Primary | ICD-10-CM

## 2024-12-20 LAB
BILIRUBIN, URINE, POC: NEGATIVE
BLOOD URINE, POC: NORMAL
GLUCOSE URINE, POC: NEGATIVE MG/DL
KETONES, URINE, POC: NEGATIVE MG/DL
LEUKOCYTE ESTERASE, URINE, POC: NORMAL
NITRITE, URINE, POC: NEGATIVE
PH, URINE, POC: 5.5 (ref 4.6–8)
PROTEIN,URINE, POC: NEGATIVE MG/DL
PVR, POC: 2 CC
SPECIFIC GRAVITY, URINE, POC: 1.02 (ref 1–1.03)
URINALYSIS CLARITY, POC: NORMAL
URINALYSIS COLOR, POC: NORMAL
UROBILINOGEN, POC: NORMAL MG/DL

## 2024-12-20 RX ORDER — METHENAMINE HIPPURATE 1000 MG/1
1 TABLET ORAL 2 TIMES DAILY WITH MEALS
Qty: 180 TABLET | Refills: 3 | Status: SHIPPED | OUTPATIENT
Start: 2024-12-20

## 2024-12-20 ASSESSMENT — ENCOUNTER SYMPTOMS: BACK PAIN: 0

## 2024-12-20 NOTE — PROGRESS NOTES
[facility]  [address]  [place] [address]  [phone]          My Rebolledo  : 1945    Chief Complaint   Patient presents with    Follow-up    Urinary Tract Infection          HPI     My Rebolledo is a 79 y.o. female w hx of recurrent UTI, incontinence, s/p sling.      Previously followed by Dr. Thorpe for right renal cyst. SHARRI in  showed a stable simple cyst to lower pole of right kidney.      Visited Urgent Care On 11/10/21 with dysuria. Urine culture revealed 40,000 colonies of E.Coli.  Upon record review, pt has had E.Coli positive urine culture in Sept, Aug, and 2021. She was started on macrodantin suppression in 2021, however she did not follow up after this.      Today, she is asx. She had no UTI while on suppression. She has had several [facility] ER visits for UTI sx. There are 2 E.Coli positive urine cultures in Aug 2022. CT A/P in May 2022 revealed normal urinary tract.      Has h/o IBS D/C. Followed by Dr. Peters for this. Reports diarrhea has improved since beginning amitriptyline.      Has been seen by Dr. Pacheco for OAB. Underwent Mid urethral sling ([employer] Advantage Sling) and Cystourethroscopy on 12/3/19. Was on uribel for IC sx, but is no longer taking this. She does not want to f/u w  Dr. Pacheco. Has urine leakage w coughing and sneezing. Wakes up wet in AM.      Hysterectomy approx 35 years ago for abnormal bleeding. H/O DVT in right leg 10 years ago. Not on AC.      Cr 1.50.    she has been on Macrodantin suppression since 10-22, has not had any UTI since then .she has enuresis. She leaks with cough and sneeze. Thinks she is not emptying completely.   Exam in : : vaginal : atrophic mucosa, tender vulva, good anterior support, PVR 0 by US.   May have de rinku OAB or urethral obstruction due to sling   CMG showed ANASTASIA/ISD, DI., she was started on Vesicare 5 mg.  It has helped.   VLPP is 73 cm H2O.   Vesicare increased

## 2024-12-23 ENCOUNTER — OFFICE VISIT (OUTPATIENT)
Dept: ORTHOPEDIC SURGERY | Age: 79
End: 2024-12-23

## 2024-12-23 DIAGNOSIS — Z09 SURGERY FOLLOW-UP: Primary | ICD-10-CM

## 2024-12-23 DIAGNOSIS — M25.512 LEFT SHOULDER PAIN, UNSPECIFIED CHRONICITY: ICD-10-CM

## 2024-12-23 PROCEDURE — 99024 POSTOP FOLLOW-UP VISIT: CPT | Performed by: PHYSICIAN ASSISTANT

## 2024-12-23 NOTE — PROGRESS NOTES
Name: My Rebolledo  YOB: 1945  Gender: female  MRN: [id number]    CC:   Chief Complaint   Patient presents with    Follow-up     First post op - left shoulder scope debridement, open DCR DOS 12/4/24   1-2 weeks status post   Left Shoulder Arthroscopy Debridement - Left and Left Shoulder Open Distal Clavicle Resection - Left  12/9/2024    HPI: Patient is status post Left Shoulder Arthroscopy Debridement - Left and Left Shoulder Open Distal Clavicle Resection - Left. Patient is doing well.  Patient came in with a sling on the affected side.     Review of Systems  Noncontributory      PE:  Wounds are Clean, Dry and Intact. There is no sign of infection. The patient is neurovascularly intact. Swelling is within normal limits.    A/P:     ICD-10-CM    1. Surgery follow-up  Z09 Ambulatory referral to Physical Therapy      2. Left shoulder pain, unspecified chronicity  M25.512 Ambulatory referral to Physical Therapy        Sutures were removed and were covered with Steri-strips.   Discussed HEP.  They will continue with use of the sling at this time and may start to wean out at home.  Continue use in public, especially with the holidays.     No follow-ups on file.      GALINA Lewis  12/23/24

## 2024-12-26 RX ORDER — TRAZODONE HYDROCHLORIDE 50 MG/1
TABLET, FILM COATED ORAL
Qty: 90 TABLET | Refills: 2 | OUTPATIENT
Start: 2024-12-26

## 2024-12-27 ENCOUNTER — EVALUATION (OUTPATIENT)
Age: 79
End: 2024-12-27
Payer: MEDICARE

## 2024-12-27 DIAGNOSIS — M25.512 ACUTE PAIN OF LEFT SHOULDER: Primary | ICD-10-CM

## 2024-12-27 DIAGNOSIS — M25.612 STIFFNESS OF LEFT SHOULDER JOINT: ICD-10-CM

## 2024-12-27 DIAGNOSIS — Z09 SURGERY FOLLOW-UP: ICD-10-CM

## 2024-12-27 DIAGNOSIS — Z74.09 IMPAIRED MOBILITY AND ADLS: ICD-10-CM

## 2024-12-27 DIAGNOSIS — Z78.9 IMPAIRED MOBILITY AND ADLS: ICD-10-CM

## 2024-12-27 DIAGNOSIS — M25.512 LEFT SHOULDER PAIN, UNSPECIFIED CHRONICITY: ICD-10-CM

## 2024-12-27 DIAGNOSIS — R29.898 ARM WEAKNESS: ICD-10-CM

## 2024-12-27 PROCEDURE — 97162 PT EVAL MOD COMPLEX 30 MIN: CPT | Performed by: PHYSICAL THERAPIST

## 2024-12-27 PROCEDURE — 97140 MANUAL THERAPY 1/> REGIONS: CPT | Performed by: PHYSICAL THERAPIST

## 2024-12-27 NOTE — PROGRESS NOTES
[facility] - [place] [facility]  [address] [place] [address]  Dept: [phone]      Physical Therapy Initial Assessment     Referring MD: Moises Solis PA  Diagnosis:     ICD-10-CM    1. Acute pain of left shoulder  M25.512       2. Impaired mobility and ADLs  Z74.09     Z78.9       3. Stiffness of left shoulder joint  M25.612       4. Arm weakness  R29.898          Surgery: Left Shoulder Arthroscopy Debridement - Left and Left Shoulder Open Distal Clavicle Resection - Left  Date: 12/9/2024  Therapy precautions:Adhesive/tape allergy  Co-morbidities affecting plan of care: intermittent pain R shoulder    Payor: Payor: [employer] /  /  /  Billing pattern: Government- total time   Total Timed Codes: 15 min, Total Treatment Time: 45 min Modifier needed: No    Episode visit count:  1     PERTINENT MEDICAL HISTORY     Past medical and surgical history:   Past Medical History:   Diagnosis Date    Acute kidney insufficiency     Followed by PCP     Adverse effect of anesthesia     on 8/8/24 patient is unable to provide any further information regarding this. ----2016 in [place] \"My heart stopped at the end of my gallbladder surgery.\" Patient reports no CPR performed, stayed one night in the hospital. No problems with any other surgeries.    Allergic rhinitis     Anemia     history     Anxiety and depression     Controlled with medication     Arthritis     Chronic nausea 06/26/2019    Chronic pain     CKD (chronic kidney disease)     CKD 3b    DDD (degenerative disc disease), lumbar     Edema     bilateral lower legs     GERD (gastroesophageal reflux disease)     Headache Oct.2022    History of chicken pox     History of Clostridioides difficile infection     History of colitis     History of DVT (deep vein thrombosis)     s/p knee replacement and developed right leg DVT     HTN (hypertension)     controlled with medication     Hx of echocardiogram 03/25/2022    Left Ventricle: Left ventricle

## 2025-01-02 ENCOUNTER — NURSE ONLY (OUTPATIENT)
Dept: INTERNAL MEDICINE CLINIC | Facility: CLINIC | Age: 80
End: 2025-01-02
Payer: MEDICARE

## 2025-01-02 DIAGNOSIS — E53.8 VITAMIN B12 DEFICIENCY: Primary | ICD-10-CM

## 2025-01-02 PROCEDURE — 96372 THER/PROPH/DIAG INJ SC/IM: CPT | Performed by: STUDENT IN AN ORGANIZED HEALTH CARE EDUCATION/TRAINING PROGRAM

## 2025-01-02 RX ORDER — CYANOCOBALAMIN 1000 UG/ML
INJECTION, SOLUTION INTRAMUSCULAR; SUBCUTANEOUS
COMMUNITY
Start: 2024-11-21

## 2025-01-02 RX ADMIN — CYANOCOBALAMIN 1000 MCG: 1000 INJECTION, SOLUTION INTRAMUSCULAR; SUBCUTANEOUS at 11:05

## 2025-01-03 ENCOUNTER — TREATMENT (OUTPATIENT)
Age: 80
End: 2025-01-03

## 2025-01-03 DIAGNOSIS — M25.612 STIFFNESS OF LEFT SHOULDER JOINT: ICD-10-CM

## 2025-01-03 DIAGNOSIS — R29.898 ARM WEAKNESS: ICD-10-CM

## 2025-01-03 DIAGNOSIS — Z74.09 IMPAIRED MOBILITY AND ADLS: ICD-10-CM

## 2025-01-03 DIAGNOSIS — M25.512 ACUTE PAIN OF LEFT SHOULDER: Primary | ICD-10-CM

## 2025-01-03 DIAGNOSIS — Z78.9 IMPAIRED MOBILITY AND ADLS: ICD-10-CM

## 2025-01-03 NOTE — PROGRESS NOTES
[facility]  [address] [facility] [address]  Dept: [phone]      Physical Therapy Daily Note     Referring MD: Moises Solis PA  Diagnosis:     ICD-10-CM    1. Acute pain of left shoulder  M25.512       2. Impaired mobility and ADLs  Z74.09     Z78.9       3. Stiffness of left shoulder joint  M25.612       4. Arm weakness  R29.898          Surgery: Left Shoulder Arthroscopy Debridement - Left and Left Shoulder Open Distal Clavicle Resection - Left  Date: 12/9/2024  Therapy precautions:Adhesive/tape allergy  Co-morbidities affecting plan of care: intermittent pain R shoulder  Patient Stated Goals: improve movement and less pain L shoulder    Payor: Payor: [employer] /  /  /  Billing pattern: Government- total time    Total Timed Procedure Codes: 40 min, Total Time: 40 min Modifier needed: No  Episode visit count:  2     SUBJECTIVE     Pt reports that her shoulder is sore today.  She made twice baked potatoes and used  in the L hand, increasing soreness.     Medications: no changes since last session    OBJECTIVE       ROM Measures: 12/27/2  Shoulder R AROM 12/27/24  L A/PROM   Flexion 120 105/140   Scaption 120 108/130   IR at 45° abd NT/65 NT/55   ER at 45° abd NT/68 NT/30   ER at 0° abd NT NT       Treatment provided today:  Therapeutic exercise (21411) x 30 min to develop ROM, strength, endurance and flexibility LUE.  Pendulums L shoulder  Seated scapular retraction H3sec x 30  Supine PROM L shoulder  Supine rhythmic stabilization various planes at 90°/110° shoulder flexion x 1 min each  Supine rhythmic stabilization IR/ER at 45°/60° shoulder abduction x 1 min each   Supine active assist chest press x 10  Pulleys shoulder flexion x 2 min  Pulleys shoulder scaption x 2 min  Manual therapy (96777) x 10 min utilizing techniques to improve joint and/or soft tissue mobility, ROM, and function as well as helping to decrease pain/spasms and swelling.  Palpation and

## 2025-01-03 NOTE — PROGRESS NOTES
[facility] sleep [id number] visit  [address]  [place] [address] [phone]    Patient Name:  My Rebolledo  YOB: 1945      Office Visit 1/6/2025    CHIEF COMPLAINT:    Chief Complaint   Patient presents with    Follow-up    Sleep Apnea       HISTORY OF PRESENT ILLNESS:      This pleasant lady came in today for a follow-up visit regarding sleep apnea and nocturnal hypoxemia.    To recap:  Patient has moderate sleep apnea AHI 15.1 lowest sat 80% with saturation less than 89% for 8.6 minutes.  The patient in the past failed PAP therapy and actually returned her machine.  Did not want surgery and was not a candidate for oral appliance.  Was trying to get of the inspire device but did not have a good experience Dr. Webster's office.  As a result she did not pursue anything.    She was working on weight loss to try to see if her BMI could be less than 35 so she can get the inspire device as well.        At this time:  She is still having issues with sleeping throughout the night.  Indicates wakes up every so often even with some sleep aids including trazodone.  She is taking Ambien in the past as well.  Is taking Klonopin as well.  Is wondering what she needs to do.  She even has a sleep number bed and sleeps upright a little bit and it still does not make a difference.    She is wondering what she needs to do to possibly even get the inspire device.    She does not check her weight at home, every time she checks her weight is when she is fully closed and today is a cold day and she is wearing jeans and I told that she is right there she just needs 6 pounds total in order to qualify for the inspire device       Her [place] score today is 1/24 1/6/2025    10:38 AM 2/15/2024     1:57 PM   Sleep Medicine   Sitting and reading 0 0   Watching TV 0 1   Sitting, inactive in a public place (e.g. a theatre or a meeting) 0 0   As a passenger in a car for an hour without a break

## 2025-01-05 ENCOUNTER — PATIENT MESSAGE (OUTPATIENT)
Dept: UROLOGY | Age: 80
End: 2025-01-05

## 2025-01-05 DIAGNOSIS — N32.81 OAB (OVERACTIVE BLADDER): Primary | ICD-10-CM

## 2025-01-06 ENCOUNTER — OFFICE VISIT (OUTPATIENT)
Dept: SLEEP MEDICINE | Age: 80
End: 2025-01-06
Payer: MEDICARE

## 2025-01-06 VITALS
HEART RATE: 74 BPM | HEIGHT: 58 IN | OXYGEN SATURATION: 95 % | WEIGHT: 173 LBS | SYSTOLIC BLOOD PRESSURE: 102 MMHG | TEMPERATURE: 97.4 F | RESPIRATION RATE: 18 BRPM | DIASTOLIC BLOOD PRESSURE: 71 MMHG | BODY MASS INDEX: 36.31 KG/M2

## 2025-01-06 DIAGNOSIS — R09.02 HYPOXEMIA: ICD-10-CM

## 2025-01-06 DIAGNOSIS — E66.9 OBESITY (BMI 30-39.9): ICD-10-CM

## 2025-01-06 DIAGNOSIS — G47.33 OSA (OBSTRUCTIVE SLEEP APNEA): Primary | ICD-10-CM

## 2025-01-06 DIAGNOSIS — G47.00 INSOMNIA, UNSPECIFIED TYPE: ICD-10-CM

## 2025-01-06 PROCEDURE — 1123F ACP DISCUSS/DSCN MKR DOCD: CPT | Performed by: INTERNAL MEDICINE

## 2025-01-06 PROCEDURE — 1159F MED LIST DOCD IN RCRD: CPT | Performed by: INTERNAL MEDICINE

## 2025-01-06 PROCEDURE — 3074F SYST BP LT 130 MM HG: CPT | Performed by: INTERNAL MEDICINE

## 2025-01-06 PROCEDURE — 1160F RVW MEDS BY RX/DR IN RCRD: CPT | Performed by: INTERNAL MEDICINE

## 2025-01-06 PROCEDURE — 99214 OFFICE O/P EST MOD 30 MIN: CPT | Performed by: INTERNAL MEDICINE

## 2025-01-06 PROCEDURE — G2211 COMPLEX E/M VISIT ADD ON: HCPCS | Performed by: INTERNAL MEDICINE

## 2025-01-06 PROCEDURE — 3078F DIAST BP <80 MM HG: CPT | Performed by: INTERNAL MEDICINE

## 2025-01-06 ASSESSMENT — SLEEP AND FATIGUE QUESTIONNAIRES
HOW LIKELY ARE YOU TO NOD OFF OR FALL ASLEEP WHILE SITTING AND READING: WOULD NEVER DOZE
HOW LIKELY ARE YOU TO NOD OFF OR FALL ASLEEP WHILE SITTING AND TALKING TO SOMEONE: WOULD NEVER DOZE
HOW LIKELY ARE YOU TO NOD OFF OR FALL ASLEEP WHILE LYING DOWN TO REST IN THE AFTERNOON WHEN CIRCUMSTANCES PERMIT: SLIGHT CHANCE OF DOZING
HOW LIKELY ARE YOU TO NOD OFF OR FALL ASLEEP WHILE SITTING QUIETLY AFTER LUNCH WITHOUT ALCOHOL: WOULD NEVER DOZE
ESS TOTAL SCORE: 1
HOW LIKELY ARE YOU TO NOD OFF OR FALL ASLEEP WHEN YOU ARE A PASSENGER IN A CAR FOR AN HOUR WITHOUT A BREAK: WOULD NEVER DOZE
HOW LIKELY ARE YOU TO NOD OFF OR FALL ASLEEP IN A CAR, WHILE STOPPED FOR A FEW MINUTES IN TRAFFIC: WOULD NEVER DOZE
HOW LIKELY ARE YOU TO NOD OFF OR FALL ASLEEP WHILE WATCHING TV: WOULD NEVER DOZE
HOW LIKELY ARE YOU TO NOD OFF OR FALL ASLEEP WHILE SITTING INACTIVE IN A PUBLIC PLACE: WOULD NEVER DOZE

## 2025-01-08 ENCOUNTER — TREATMENT (OUTPATIENT)
Age: 80
End: 2025-01-08
Payer: MEDICARE

## 2025-01-08 DIAGNOSIS — M25.512 ACUTE PAIN OF LEFT SHOULDER: Primary | ICD-10-CM

## 2025-01-08 DIAGNOSIS — M25.612 STIFFNESS OF LEFT SHOULDER JOINT: ICD-10-CM

## 2025-01-08 DIAGNOSIS — Z74.09 IMPAIRED MOBILITY AND ADLS: ICD-10-CM

## 2025-01-08 DIAGNOSIS — Z78.9 IMPAIRED MOBILITY AND ADLS: ICD-10-CM

## 2025-01-08 DIAGNOSIS — R29.898 ARM WEAKNESS: ICD-10-CM

## 2025-01-08 PROCEDURE — 97140 MANUAL THERAPY 1/> REGIONS: CPT | Performed by: PHYSICAL THERAPIST

## 2025-01-08 PROCEDURE — 97110 THERAPEUTIC EXERCISES: CPT | Performed by: PHYSICAL THERAPIST

## 2025-01-08 NOTE — PROGRESS NOTES
[facility]  [address] [facility] [address]  Dept: [phone]      Physical Therapy Daily Note     Referring MD: Moises Solis PA  Diagnosis:     ICD-10-CM    1. Acute pain of left shoulder  M25.512       2. Impaired mobility and ADLs  Z74.09     Z78.9       3. Stiffness of left shoulder joint  M25.612       4. Arm weakness  R29.898          Surgery: Left Shoulder Arthroscopy Debridement - Left and Left Shoulder Open Distal Clavicle Resection - Left  Date: 12/9/2024  Therapy precautions:Adhesive/tape allergy  Co-morbidities affecting plan of care: intermittent pain R shoulder  Patient Stated Goals: improve movement and less pain L shoulder    Payor: Payor: [employer] /  /  /  Billing pattern: Government- total time    Total Timed Procedure Codes: 40 min, Total Time: 40 min Modifier needed: No  Episode visit count:  3     SUBJECTIVE     Pt reports night pain L upper arm that feels similar to before surgery. She is concerned with pain levels.     Medications: no changes since last session    OBJECTIVE       ROM Measures: 12/27/2  Shoulder R AROM 12/27/24  L A/PROM   Flexion 120 105/140   Scaption 120 108/130   IR at 45° abd NT/65 NT/55   ER at 45° abd NT/68 NT/30   ER at 0° abd NT NT       Treatment provided today:  Therapeutic exercise (15794) x 30 min to develop ROM, strength, endurance and flexibility LUE.  Pulleys shoulder flexion/scaption x 1 min  Pendulums L shoulder  Seated scapular retraction H3sec x 30  Supine PROM L shoulder  Supine rhythmic stabilization various planes at 90°/110° shoulder flexion x 1 min each  Supine rhythmic stabilization IR/ER at 45°/60° shoulder abduction x 1 min each   Supine active assist chest press x 10  Sidelying shoulder ER H10sec x 10  Sidelying active assist shoulder flexion x 10  Sidelying horizontal shoulder abduction x 10  Updated HEP  Manual therapy (21813) x 10 min utilizing techniques to improve joint and/or soft tissue

## 2025-01-13 ENCOUNTER — TREATMENT (OUTPATIENT)
Age: 80
End: 2025-01-13
Payer: MEDICARE

## 2025-01-13 DIAGNOSIS — M25.512 ACUTE PAIN OF LEFT SHOULDER: Primary | ICD-10-CM

## 2025-01-13 DIAGNOSIS — Z78.9 IMPAIRED MOBILITY AND ADLS: ICD-10-CM

## 2025-01-13 DIAGNOSIS — R29.898 ARM WEAKNESS: ICD-10-CM

## 2025-01-13 DIAGNOSIS — M25.612 STIFFNESS OF LEFT SHOULDER JOINT: ICD-10-CM

## 2025-01-13 DIAGNOSIS — Z74.09 IMPAIRED MOBILITY AND ADLS: ICD-10-CM

## 2025-01-13 PROCEDURE — 97110 THERAPEUTIC EXERCISES: CPT | Performed by: PHYSICAL THERAPIST

## 2025-01-13 PROCEDURE — 97140 MANUAL THERAPY 1/> REGIONS: CPT | Performed by: PHYSICAL THERAPIST

## 2025-01-13 NOTE — PROGRESS NOTES
improved overall function.    Long term goals to be met by 2/25/2025 (60 days):  Pt will be compliant and independent with comprehensive HEP and activity progression.   Demonstrate AROM of involved shoulder to >/= Flexion: 130, Scaption: 130; IR behind back to L1; ER to T2 for improved ability to reach overhead and perform ADLs.   MMT involved Shoulder to >/= 4/5 allowing for normal lifting, reaching and pushing/pulling associated with ADLs.   Pt will report ability to reach overhead for placing or removing objects from a cupboard or shelf with min modifications.  Improve QuickDASH Score to </=  20% impairment, demonstrating improved overall function.    [employer]  Access Code: [email or web address]  URL: [email or web address]  Date: 01/13/2025  Prepared by: Anastacia Jaquez    Exercises  - Circular Shoulder Pendulum with Table Support  - 2 x daily - 1 sets - 20 reps  - Standing Shoulder and Trunk Flexion at Table  - 1 x daily - 1 sets - 10 reps - 5 hold  - Seated Scapular Retraction  - 1 x daily - 2 sets - 10 reps - 5 hold  - Isometric Shoulder Flexion at Wall  - 5 x weekly - 1 sets - 10 reps - 5 hold  - Isometric Shoulder Abduction at Wall  - 5 x weekly - 1 sets - 10 reps - 5 hold  - Isometric Shoulder Extension at Wall  - 5 x weekly - 1 sets - 10 reps - 5 hold  - Standing Isometric Shoulder Internal Rotation at Doorway  - 5 x weekly - 1 sets - 10 reps - 5 hold  - Sidelying Shoulder External Rotation with Dumbbell  - 5 x weekly - 1 sets - 10 reps - 10 hold - 1 lbs  - Sidelying Shoulder Scaption  - 5 x weekly - 2 sets - 10 reps - 5 hold - 1 lbs  - Supine Shoulder Press  - 5 x weekly - 2 sets - 10 reps - 5 hold

## 2025-01-14 ENCOUNTER — LAB (OUTPATIENT)
Dept: FAMILY MEDICINE CLINIC | Facility: CLINIC | Age: 80
End: 2025-01-14

## 2025-01-14 ENCOUNTER — OFFICE VISIT (OUTPATIENT)
Dept: INTERNAL MEDICINE CLINIC | Facility: CLINIC | Age: 80
End: 2025-01-14

## 2025-01-14 ENCOUNTER — OFFICE VISIT (OUTPATIENT)
Dept: ORTHOPEDIC SURGERY | Age: 80
End: 2025-01-14

## 2025-01-14 VITALS
TEMPERATURE: 97.5 F | RESPIRATION RATE: 16 BRPM | DIASTOLIC BLOOD PRESSURE: 66 MMHG | WEIGHT: 172 LBS | HEART RATE: 78 BPM | OXYGEN SATURATION: 95 % | HEIGHT: 58 IN | SYSTOLIC BLOOD PRESSURE: 100 MMHG | BODY MASS INDEX: 36.11 KG/M2

## 2025-01-14 DIAGNOSIS — M25.512 LEFT SHOULDER PAIN, UNSPECIFIED CHRONICITY: ICD-10-CM

## 2025-01-14 DIAGNOSIS — F33.1 MODERATE EPISODE OF RECURRENT MAJOR DEPRESSIVE DISORDER (HCC): ICD-10-CM

## 2025-01-14 DIAGNOSIS — E53.8 VITAMIN B12 DEFICIENCY: Chronic | ICD-10-CM

## 2025-01-14 DIAGNOSIS — E55.9 VITAMIN D DEFICIENCY: ICD-10-CM

## 2025-01-14 DIAGNOSIS — G89.4 CHRONIC PAIN DISORDER: ICD-10-CM

## 2025-01-14 DIAGNOSIS — N32.81 OAB (OVERACTIVE BLADDER): Chronic | ICD-10-CM

## 2025-01-14 DIAGNOSIS — Z09 SURGERY FOLLOW-UP: Primary | ICD-10-CM

## 2025-01-14 DIAGNOSIS — F51.01 PRIMARY INSOMNIA: ICD-10-CM

## 2025-01-14 DIAGNOSIS — F41.1 GAD (GENERALIZED ANXIETY DISORDER): Chronic | ICD-10-CM

## 2025-01-14 DIAGNOSIS — I10 PRIMARY HYPERTENSION: Chronic | ICD-10-CM

## 2025-01-14 DIAGNOSIS — E78.5 DYSLIPIDEMIA: ICD-10-CM

## 2025-01-14 DIAGNOSIS — N18.32 STAGE 3B CHRONIC KIDNEY DISEASE (HCC): ICD-10-CM

## 2025-01-14 DIAGNOSIS — E87.5 HYPERKALEMIA: ICD-10-CM

## 2025-01-14 DIAGNOSIS — R73.03 PREDIABETES: ICD-10-CM

## 2025-01-14 DIAGNOSIS — M19.91 PRIMARY OSTEOARTHRITIS, UNSPECIFIED SITE: ICD-10-CM

## 2025-01-14 DIAGNOSIS — E66.9 OBESITY (BMI 30-39.9): ICD-10-CM

## 2025-01-14 DIAGNOSIS — E03.9 PRIMARY HYPOTHYROIDISM: Primary | Chronic | ICD-10-CM

## 2025-01-14 DIAGNOSIS — N30.10 CHRONIC INTERSTITIAL CYSTITIS: ICD-10-CM

## 2025-01-14 LAB
25(OH)D3 SERPL-MCNC: 24.9 NG/ML (ref 30–100)
ALBUMIN SERPL-MCNC: 4.1 G/DL (ref 3.2–4.6)
ALBUMIN/GLOB SERPL: 1.4 (ref 1–1.9)
ALP SERPL-CCNC: 77 U/L (ref 35–104)
ALT SERPL-CCNC: 20 U/L (ref 8–45)
ANION GAP SERPL CALC-SCNC: 13 MMOL/L (ref 7–16)
AST SERPL-CCNC: 23 U/L (ref 15–37)
BASOPHILS # BLD: 0.08 K/UL (ref 0–0.2)
BASOPHILS NFR BLD: 0.7 % (ref 0–2)
BILIRUB SERPL-MCNC: <0.2 MG/DL (ref 0–1.2)
BUN SERPL-MCNC: 24 MG/DL (ref 8–23)
CALCIUM SERPL-MCNC: 10 MG/DL (ref 8.8–10.2)
CHLORIDE SERPL-SCNC: 103 MMOL/L (ref 98–107)
CHOLEST SERPL-MCNC: 274 MG/DL (ref 0–200)
CO2 SERPL-SCNC: 28 MMOL/L (ref 20–29)
CREAT SERPL-MCNC: 1.51 MG/DL (ref 0.6–1.1)
CREAT UR-MCNC: 340 MG/DL (ref 28–217)
DIFFERENTIAL METHOD BLD: NORMAL
EOSINOPHIL # BLD: 0.47 K/UL (ref 0–0.8)
EOSINOPHIL NFR BLD: 4.4 % (ref 0.5–7.8)
ERYTHROCYTE [DISTWIDTH] IN BLOOD BY AUTOMATED COUNT: 14 % (ref 11.9–14.6)
EST. AVERAGE GLUCOSE BLD GHB EST-MCNC: 121 MG/DL
GLOBULIN SER CALC-MCNC: 2.9 G/DL (ref 2.3–3.5)
GLUCOSE SERPL-MCNC: 86 MG/DL (ref 70–99)
HBA1C MFR BLD: 5.8 % (ref 0–5.6)
HCT VFR BLD AUTO: 39.7 % (ref 35.8–46.3)
HDLC SERPL-MCNC: 52 MG/DL (ref 40–60)
HDLC SERPL: 5.3 (ref 0–5)
HGB BLD-MCNC: 12.8 G/DL (ref 11.7–15.4)
IMM GRANULOCYTES # BLD AUTO: 0.04 K/UL (ref 0–0.5)
IMM GRANULOCYTES NFR BLD AUTO: 0.4 % (ref 0–5)
LDLC SERPL CALC-MCNC: 173 MG/DL (ref 0–100)
LYMPHOCYTES # BLD: 2.82 K/UL (ref 0.5–4.6)
LYMPHOCYTES NFR BLD: 26.4 % (ref 13–44)
MCH RBC QN AUTO: 28.9 PG (ref 26.1–32.9)
MCHC RBC AUTO-ENTMCNC: 32.2 G/DL (ref 31.4–35)
MCV RBC AUTO: 89.6 FL (ref 82–102)
MICROALBUMIN UR-MCNC: 16.4 MG/DL (ref 0–20)
MICROALBUMIN/CREAT UR-RTO: 48 MG/G (ref 0–30)
MONOCYTES # BLD: 0.79 K/UL (ref 0.1–1.3)
MONOCYTES NFR BLD: 7.4 % (ref 4–12)
NEUTS SEG # BLD: 6.49 K/UL (ref 1.7–8.2)
NEUTS SEG NFR BLD: 60.7 % (ref 43–78)
NRBC # BLD: 0 K/UL (ref 0–0.2)
PLATELET # BLD AUTO: 272 K/UL (ref 150–450)
PMV BLD AUTO: 11.7 FL (ref 9.4–12.3)
POTASSIUM SERPL-SCNC: 5.2 MMOL/L (ref 3.5–5.1)
PROT SERPL-MCNC: 7 G/DL (ref 6.3–8.2)
RBC # BLD AUTO: 4.43 M/UL (ref 4.05–5.2)
SODIUM SERPL-SCNC: 143 MMOL/L (ref 136–145)
TRIGL SERPL-MCNC: 244 MG/DL (ref 0–150)
VIT B12 SERPL-MCNC: 1421 PG/ML (ref 193–986)
VLDLC SERPL CALC-MCNC: 49 MG/DL (ref 6–23)
WBC # BLD AUTO: 10.7 K/UL (ref 4.3–11.1)

## 2025-01-14 PROCEDURE — 99024 POSTOP FOLLOW-UP VISIT: CPT | Performed by: ORTHOPAEDIC SURGERY

## 2025-01-14 RX ORDER — CYANOCOBALAMIN 1000 UG/ML
1000 INJECTION, SOLUTION INTRAMUSCULAR; SUBCUTANEOUS ONCE
Status: COMPLETED | OUTPATIENT
Start: 2025-01-14 | End: 2025-01-14

## 2025-01-14 RX ADMIN — CYANOCOBALAMIN 1000 MCG: 1000 INJECTION, SOLUTION INTRAMUSCULAR; SUBCUTANEOUS at 15:20

## 2025-01-14 SDOH — HEALTH STABILITY: PHYSICAL HEALTH: ON AVERAGE, HOW MANY DAYS PER WEEK DO YOU ENGAGE IN MODERATE TO STRENUOUS EXERCISE (LIKE A BRISK WALK)?: 2 DAYS

## 2025-01-14 SDOH — ECONOMIC STABILITY: FOOD INSECURITY: WITHIN THE PAST 12 MONTHS, THE FOOD YOU BOUGHT JUST DIDN'T LAST AND YOU DIDN'T HAVE MONEY TO GET MORE.: NEVER TRUE

## 2025-01-14 SDOH — ECONOMIC STABILITY: FOOD INSECURITY: WITHIN THE PAST 12 MONTHS, YOU WORRIED THAT YOUR FOOD WOULD RUN OUT BEFORE YOU GOT MONEY TO BUY MORE.: NEVER TRUE

## 2025-01-14 SDOH — HEALTH STABILITY: PHYSICAL HEALTH: ON AVERAGE, HOW MANY MINUTES DO YOU ENGAGE IN EXERCISE AT THIS LEVEL?: 30 MIN

## 2025-01-14 ASSESSMENT — PATIENT HEALTH QUESTIONNAIRE - PHQ9
2. FEELING DOWN, DEPRESSED OR HOPELESS: NOT AT ALL
SUM OF ALL RESPONSES TO PHQ QUESTIONS 1-9: 0
5. POOR APPETITE OR OVEREATING: NOT AT ALL
6. FEELING BAD ABOUT YOURSELF - OR THAT YOU ARE A FAILURE OR HAVE LET YOURSELF OR YOUR FAMILY DOWN: NOT AT ALL
SUM OF ALL RESPONSES TO PHQ QUESTIONS 1-9: 0
3. TROUBLE FALLING OR STAYING ASLEEP: NOT AT ALL
4. FEELING TIRED OR HAVING LITTLE ENERGY: NOT AT ALL
SUM OF ALL RESPONSES TO PHQ QUESTIONS 1-9: 0
9. THOUGHTS THAT YOU WOULD BE BETTER OFF DEAD, OR OF HURTING YOURSELF: NOT AT ALL
SUM OF ALL RESPONSES TO PHQ9 QUESTIONS 1 & 2: 0
8. MOVING OR SPEAKING SO SLOWLY THAT OTHER PEOPLE COULD HAVE NOTICED. OR THE OPPOSITE, BEING SO FIGETY OR RESTLESS THAT YOU HAVE BEEN MOVING AROUND A LOT MORE THAN USUAL: NOT AT ALL
SUM OF ALL RESPONSES TO PHQ QUESTIONS 1-9: 0
10. IF YOU CHECKED OFF ANY PROBLEMS, HOW DIFFICULT HAVE THESE PROBLEMS MADE IT FOR YOU TO DO YOUR WORK, TAKE CARE OF THINGS AT HOME, OR GET ALONG WITH OTHER PEOPLE: SOMEWHAT DIFFICULT
1. LITTLE INTEREST OR PLEASURE IN DOING THINGS: NOT AT ALL
7. TROUBLE CONCENTRATING ON THINGS, SUCH AS READING THE NEWSPAPER OR WATCHING TELEVISION: NOT AT ALL

## 2025-01-14 NOTE — PROGRESS NOTES
[facility] [place] [address]  Phone: [phone]  Fax: [phone]      Problem Based Overview with Integrated Assessment and Plan    My Rebolledo is a 79 y.o. year old female who presents to [facility].    She states she is feeling like she is starting to get a sinus infection because her teeth are aching and runny nose that began today.        1. Primary hypothyroidism  Overview:  Lab Results   Component Value Date    TSH 1.370 12/02/2024    TSHELE 7.97 (H) 02/25/2020       2. Moderate episode of recurrent major depressive disorder (HCC)  Overview:  She takes pristiq and klonopin prn.    She follows with psychiatry.  3. Stage 3b chronic kidney disease (HCC)  Overview:  Lab Results   Component Value Date    CREATININE 1.58 (H) 09/04/2024    CREATININE 1.09 05/31/2024    CREATININE 1.10 (H) 03/06/2024      She avoids NSAIDs.  Assessment & Plan:   Chronic, at goal (stable), continue current treatment plan  Orders:  -     Albumin/Creatinine Ratio, Urine; Future  4. OAB (overactive bladder)  Overview:  She follows with urology    She has been on vibegron, but cost is high.      Assessment & Plan:   Chronic, not at goal (unstable), continue current treatment plan  5. Obesity (BMI 30-39.9)  6. Primary insomnia  Overview:  On ambien, not working well she states    Has tried trazodone in the past, didn't work well.  Assessment & Plan:   Chronic, at goal (stable), continue current treatment plan  7. Vitamin B12 deficiency  Overview:  She takes IM B12 [id number]  Assessment & Plan:   Chronic, at goal (stable), continue current treatment plan  Orders:  -     cyanocobalamin injection 1,000 mcg; 1,000 mcg, IntraMUSCular, ONCE, 1 dose, On Tue 1/14/25 at 1345  -     CBC with Auto Differential; Future  -     Vitamin B12; Future  8. URBANO (generalized anxiety disorder)  Overview:  On Pristiq, clonazepam  9. Primary osteoarthritis, unspecified site  10. Primary

## 2025-01-15 ENCOUNTER — TREATMENT (OUTPATIENT)
Age: 80
End: 2025-01-15
Payer: MEDICARE

## 2025-01-15 DIAGNOSIS — R29.898 ARM WEAKNESS: ICD-10-CM

## 2025-01-15 DIAGNOSIS — Z74.09 IMPAIRED MOBILITY AND ADLS: ICD-10-CM

## 2025-01-15 DIAGNOSIS — M25.512 ACUTE PAIN OF LEFT SHOULDER: Primary | ICD-10-CM

## 2025-01-15 DIAGNOSIS — M25.612 STIFFNESS OF LEFT SHOULDER JOINT: ICD-10-CM

## 2025-01-15 DIAGNOSIS — Z78.9 IMPAIRED MOBILITY AND ADLS: ICD-10-CM

## 2025-01-15 PROCEDURE — 97140 MANUAL THERAPY 1/> REGIONS: CPT | Performed by: PHYSICAL THERAPIST

## 2025-01-15 PROCEDURE — 97110 THERAPEUTIC EXERCISES: CPT | Performed by: PHYSICAL THERAPIST

## 2025-01-15 RX ORDER — ATORVASTATIN CALCIUM 20 MG/1
20 TABLET, FILM COATED ORAL DAILY
Qty: 90 TABLET | Refills: 0 | Status: SHIPPED | OUTPATIENT
Start: 2025-01-15

## 2025-01-15 RX ORDER — ERGOCALCIFEROL 1.25 MG/1
50000 CAPSULE, LIQUID FILLED ORAL WEEKLY
Qty: 12 CAPSULE | Refills: 1 | Status: SHIPPED | OUTPATIENT
Start: 2025-01-15

## 2025-01-15 NOTE — PROGRESS NOTES
utilizing techniques to improve joint and/or soft tissue mobility, ROM, and function as well as helping to decrease pain/spasms and swelling.  Palpation and assessment of soft tissue, muscles, and landmarks   Scar tissue mobilization along incision and anterior shoulder  STM L upper trap/levator  STM anterior L shoulder    ASSESSMENT     Control improving with supine and sidelying active motion. Initiated wall climbs this session. Pt less painful after manual therapy, will continue to start session with manual therapy to allow for less pain during therex.     PLAN     Progress AAROM and Active ROM . Update HEP    Effective Dates/Duration: 12/27/2024 TO 2/25/2025 (60 days).    Frequency: 2x/week   Interventions may include but are not limited to:   (85130) PT re-evaluation  (70954) Therapeutic exercise to develop ROM, strength, endurance and flexibility  (26694) Therapeutic activities using dynamic activities to improve function  (36325) Manual therapy techniques to improve joint and/or soft tissue mobility, ROM, and function as well as helping to decrease pain/spasms and swelling  (42718) Neuromuscular reeducation addressing impaired balance, coordination, kinesthetic sense, posture and proprioception      GOALS     Short term goals to be met by 1/24/2025  (4 weeks):  Pt will demonstrate good recall of HEP requiring minimal verbal cuing for proper form and technique.  Increase PROM of involved shoulder to be >/= Flex: 150º;  Scaption: 150º; ER in neutral: 30º; ER in abduction: 65º; IR: 70º.  Increase AROM of involved shoulder to be >/= Flex: 115º;  Scaption: 115º; ER to C7;  IR: L5 to improve ability to reach behind back, overhead and perform ADLs such as dressing with minimal modifications.   Increase MMT to >/= 3+/5 throughout involved shoulder and 4+/5 for elbow in order to perform household and work related tasks with greater ease.  Improve QuickDASH Score to </= 30% impairment, demonstrating improved overall

## 2025-01-16 RX ORDER — TRAZODONE HYDROCHLORIDE 50 MG/1
50-100 TABLET, FILM COATED ORAL NIGHTLY
Qty: 60 TABLET | Refills: 3 | OUTPATIENT
Start: 2025-01-16

## 2025-01-20 ENCOUNTER — TREATMENT (OUTPATIENT)
Age: 80
End: 2025-01-20
Payer: MEDICARE

## 2025-01-20 DIAGNOSIS — Z74.09 IMPAIRED MOBILITY AND ADLS: ICD-10-CM

## 2025-01-20 DIAGNOSIS — M25.612 STIFFNESS OF LEFT SHOULDER JOINT: ICD-10-CM

## 2025-01-20 DIAGNOSIS — Z78.9 IMPAIRED MOBILITY AND ADLS: ICD-10-CM

## 2025-01-20 DIAGNOSIS — M25.512 ACUTE PAIN OF LEFT SHOULDER: Primary | ICD-10-CM

## 2025-01-20 DIAGNOSIS — R29.898 ARM WEAKNESS: ICD-10-CM

## 2025-01-20 PROCEDURE — 97110 THERAPEUTIC EXERCISES: CPT | Performed by: PHYSICAL THERAPIST

## 2025-01-20 PROCEDURE — 97140 MANUAL THERAPY 1/> REGIONS: CPT | Performed by: PHYSICAL THERAPIST

## 2025-01-20 NOTE — PROGRESS NOTES
[facility] - [place] [facility]  [address] [facility] [address]  Dept: [phone]      Physical Therapy Daily Note     Referring MD: Moises Solis PA  Diagnosis:     ICD-10-CM    1. Acute pain of left shoulder  M25.512       2. Impaired mobility and ADLs  Z74.09     Z78.9       3. Stiffness of left shoulder joint  M25.612       4. Arm weakness  R29.898            Surgery: Left Shoulder Arthroscopy Debridement - Left and Left Shoulder Open Distal Clavicle Resection - Left  Date: 12/9/2024  Therapy precautions:Adhesive/tape allergy  Co-morbidities affecting plan of care: intermittent pain R shoulder  Patient Stated Goals: improve movement and less pain L shoulder    Payor: Payor: [employer] /  /  /  Billing pattern: Government- total time    Total Timed Procedure Codes: 40 min, Total Time: 40 min Modifier needed: No  Episode visit count:  6     SUBJECTIVE     Pt saw MD yesterday and he stated that she was being too impatient, it's only been 5 weeks since surgery.     Medications: no changes since last session    OBJECTIVE       ROM Measures: 12/27/2  Shoulder R AROM 12/27/24  L A/PROM   Flexion 120 105/140   Scaption 120 108/130   IR at 45° abd NT/65 NT/55   ER at 45° abd NT/68 NT/30   ER at 0° abd NT NT       Treatment provided today:  Therapeutic exercise (84160) x 25 min to develop ROM, strength, endurance and flexibility LUE.  Current Exercises Past Exercises (not performed today)   Assessment of tolerance of previous therapy session  UBE level 1 x 2 min forward/2 min back  AA L shoulder flexion w/ ranger in lower sill 2x10  Seated scapular retraction + GHJ ER, red CLX band H5sec x  20  Supine PROM L shoulder  Supine rhythmic stabilization various planes at 90°/110° shoulder flexion x 1 min each  Supine rhythmic stabilization IR/ER at 45°/60° shoulder abduction x 1 min each   Supine horizontal shoulder abduction, yellow band, 2x10  Supine active shoulder flexion x 1, tactile cue scapula

## 2025-01-22 ENCOUNTER — TREATMENT (OUTPATIENT)
Age: 80
End: 2025-01-22
Payer: MEDICARE

## 2025-01-22 DIAGNOSIS — Z74.09 IMPAIRED MOBILITY AND ADLS: ICD-10-CM

## 2025-01-22 DIAGNOSIS — R29.898 ARM WEAKNESS: ICD-10-CM

## 2025-01-22 DIAGNOSIS — M25.612 STIFFNESS OF LEFT SHOULDER JOINT: ICD-10-CM

## 2025-01-22 DIAGNOSIS — Z78.9 IMPAIRED MOBILITY AND ADLS: ICD-10-CM

## 2025-01-22 DIAGNOSIS — M25.512 ACUTE PAIN OF LEFT SHOULDER: Primary | ICD-10-CM

## 2025-01-22 PROCEDURE — 97110 THERAPEUTIC EXERCISES: CPT | Performed by: PHYSICAL THERAPIST

## 2025-01-22 PROCEDURE — 97140 MANUAL THERAPY 1/> REGIONS: CPT | Performed by: PHYSICAL THERAPIST

## 2025-01-22 NOTE — PROGRESS NOTES
[facility] INT [facility]  [address] [facility] [address]  Dept: [phone]      Physical Therapy Progress Report     Referring MD: Moises Solis PA  Diagnosis:     ICD-10-CM    1. Acute pain of left shoulder  M25.512       2. Impaired mobility and ADLs  Z74.09     Z78.9       3. Arm weakness  R29.898       4. Stiffness of left shoulder joint  M25.612            Surgery: Left Shoulder Arthroscopy Debridement - Left and Left Shoulder Open Distal Clavicle Resection - Left  Date: 12/9/2024  Therapy precautions:Adhesive/tape allergy  Co-morbidities affecting plan of care: intermittent pain R shoulder  Patient Stated Goals: improve movement and less pain L shoulder    Payor: Payor: [employer] /  /  /  Billing pattern: Government- total time    Total Timed Procedure Codes: 45 min, Total Time: 45 min Modifier needed: No  Episode visit count:  7     SUBJECTIVE     Pt reports that pain levels are improving. Pt reports primary difficulty with vacuuming, sweeping and has to switch arms often. Discomfort noted when putting L arm in sleeve so spouse sometimes assists. Sleeping sometimes limited by L shoulder pain, more so by hip pain.    Medications: no changes since last session    OBJECTIVE     Functional Outcome Questionnaire: QuickDASH:    12/27/25: 29/11= 41% functional deficit   1/22/25: 20/11 = 20% functional deficit  Palpation: tenderness anterior L shoulder and increased tone L upper trap/levator    ROM Measures:  Shoulder R AROM 12/27/24  L A/PROM 1/22/25  L A/PROM   Flexion 120 105/140 120/150   Scaption 120 108/130 120/150   IR at 45° abd NT/65 NT/55 L5/65   ER at 45° abd NT/68 NT/30 C7/60   ER at 0° abd NT NT NT/35     MMT L shoulder flexion/abduction 4/5, IR 5/5, ER 4/5; elbow 5/5    Treatment provided today:  Therapeutic exercise (21641) x 35 min to develop ROM, strength, endurance and flexibility LUE.  Current Exercises Past Exercises (not performed today)   Assessment of

## 2025-01-27 ENCOUNTER — TREATMENT (OUTPATIENT)
Age: 80
End: 2025-01-27
Payer: MEDICARE

## 2025-01-27 DIAGNOSIS — M25.612 STIFFNESS OF LEFT SHOULDER JOINT: ICD-10-CM

## 2025-01-27 DIAGNOSIS — R29.898 ARM WEAKNESS: ICD-10-CM

## 2025-01-27 DIAGNOSIS — Z78.9 IMPAIRED MOBILITY AND ADLS: ICD-10-CM

## 2025-01-27 DIAGNOSIS — Z74.09 IMPAIRED MOBILITY AND ADLS: ICD-10-CM

## 2025-01-27 DIAGNOSIS — M25.512 ACUTE PAIN OF LEFT SHOULDER: Primary | ICD-10-CM

## 2025-01-27 PROCEDURE — 97140 MANUAL THERAPY 1/> REGIONS: CPT | Performed by: PHYSICAL THERAPIST

## 2025-01-27 PROCEDURE — 97110 THERAPEUTIC EXERCISES: CPT | Performed by: PHYSICAL THERAPIST

## 2025-01-27 NOTE — PROGRESS NOTES
[facility] - [place] [facility]  [address] [facility] [address]  Dept: [phone]      Physical Therapy Daily Note     Referring MD: Moises Solis PA  Diagnosis:     ICD-10-CM    1. Acute pain of left shoulder  M25.512       2. Impaired mobility and ADLs  Z74.09     Z78.9       3. Arm weakness  R29.898       4. Stiffness of left shoulder joint  M25.612            Surgery: Left Shoulder Arthroscopy Debridement - Left and Left Shoulder Open Distal Clavicle Resection - Left  Date: 12/9/2024  Therapy precautions:Adhesive/tape allergy  Co-morbidities affecting plan of care: intermittent pain R shoulder  Patient Stated Goals: improve movement and less pain L shoulder    Payor: Payor: [employer] /  /  /  Billing pattern: Government- total time    Total Timed Procedure Codes: 40 min, Total Time: 40 min Modifier needed: No  Episode visit count:  8     SUBJECTIVE     Pt reports that pain levels are improving. Pt reports primary difficulty with vacuuming, sweeping and has to switch arms often. Discomfort noted when putting L arm in sleeve so spouse sometimes assists. Sleeping sometimes limited by L shoulder pain, more so by hip pain.    Medications: no changes since last session    OBJECTIVE     Functional Outcome Questionnaire: QuickDASH:    12/27/25: 29/11= 41% functional deficit   1/22/25: 20/11 = 20% functional deficit  Palpation: tenderness anterior L shoulder and increased tone L upper trap/levator    ROM Measures:  Shoulder R AROM 12/27/24  L A/PROM 1/22/25  L A/PROM   Flexion 120 105/140 120/150   Scaption 120 108/130 120/150   IR at 45° abd NT/65 NT/55 L5/65   ER at 45° abd NT/68 NT/30 C7/60   ER at 0° abd NT NT NT/35     MMT L shoulder flexion/abduction 4/5, IR 5/5, ER 4/5; elbow 5/5    Treatment provided today:  Therapeutic exercise (25136) x 30 min to develop ROM, strength, endurance and flexibility LUE.  Current Exercises Past Exercises (not performed today)   Assessment of tolerance of

## 2025-01-28 ENCOUNTER — LAB (OUTPATIENT)
Dept: INTERNAL MEDICINE CLINIC | Facility: CLINIC | Age: 80
End: 2025-01-28

## 2025-01-28 DIAGNOSIS — E87.5 HYPERKALEMIA: ICD-10-CM

## 2025-01-28 LAB
ANION GAP SERPL CALC-SCNC: 11 MMOL/L (ref 7–16)
BUN SERPL-MCNC: 19 MG/DL (ref 8–23)
CALCIUM SERPL-MCNC: 9.4 MG/DL (ref 8.8–10.2)
CHLORIDE SERPL-SCNC: 103 MMOL/L (ref 98–107)
CO2 SERPL-SCNC: 26 MMOL/L (ref 20–29)
CREAT SERPL-MCNC: 1.14 MG/DL (ref 0.6–1.1)
GLUCOSE SERPL-MCNC: 104 MG/DL (ref 70–99)
POTASSIUM SERPL-SCNC: 4.5 MMOL/L (ref 3.5–5.1)
SODIUM SERPL-SCNC: 140 MMOL/L (ref 136–145)

## 2025-02-03 ENCOUNTER — TELEPHONE (OUTPATIENT)
Age: 80
End: 2025-02-03

## 2025-02-03 NOTE — TELEPHONE ENCOUNTER
Pt did not show for their scheduled therapy appointment today.    Reason: unknown  Communication: called and left voicemail for pt requesting call back to reschedule.

## 2025-02-06 ENCOUNTER — TELEPHONE (OUTPATIENT)
Dept: INTERNAL MEDICINE CLINIC | Facility: CLINIC | Age: 80
End: 2025-02-06

## 2025-02-06 ENCOUNTER — APPOINTMENT (OUTPATIENT)
Dept: CT IMAGING | Age: 80
End: 2025-02-06
Payer: COMMERCIAL

## 2025-02-06 ENCOUNTER — HOSPITAL ENCOUNTER (EMERGENCY)
Age: 80
Discharge: HOME | End: 2025-02-06
Attending: EMERGENCY MEDICINE
Payer: COMMERCIAL

## 2025-02-06 VITALS
RESPIRATION RATE: 18 BRPM | SYSTOLIC BLOOD PRESSURE: 123 MMHG | BODY MASS INDEX: 35.12 KG/M2 | TEMPERATURE: 97.6 F | OXYGEN SATURATION: 98 % | WEIGHT: 168 LBS | HEART RATE: 78 BPM | DIASTOLIC BLOOD PRESSURE: 82 MMHG

## 2025-02-06 DIAGNOSIS — I16.0 HYPERTENSIVE URGENCY: Primary | ICD-10-CM

## 2025-02-06 LAB
ALBUMIN SERPL-MCNC: 3.8 G/DL (ref 3.2–4.6)
ALBUMIN/GLOB SERPL: 1.4 (ref 1–1.9)
ALP SERPL-CCNC: 80 U/L (ref 35–104)
ALT SERPL-CCNC: 21 U/L (ref 8–45)
ANION GAP SERPL CALC-SCNC: 12 MMOL/L (ref 7–16)
AST SERPL-CCNC: 25 U/L (ref 15–37)
BASOPHILS # BLD: 0.1 K/UL (ref 0–0.2)
BASOPHILS NFR BLD: 0.9 % (ref 0–2)
BILIRUB SERPL-MCNC: 0.3 MG/DL (ref 0–1.2)
BUN SERPL-MCNC: 17 MG/DL (ref 8–23)
CALCIUM SERPL-MCNC: 9.8 MG/DL (ref 8.8–10.2)
CHLORIDE SERPL-SCNC: 102 MMOL/L (ref 98–107)
CO2 SERPL-SCNC: 26 MMOL/L (ref 20–29)
CREAT SERPL-MCNC: 1.25 MG/DL (ref 0.6–1.1)
DIFFERENTIAL METHOD BLD: ABNORMAL
EKG ATRIAL RATE: 46 BPM
EKG DIAGNOSIS: NORMAL
EKG P AXIS: 71 DEGREES
EKG P-R INTERVAL: 143 MS
EKG Q-T INTERVAL: 367 MS
EKG QRS DURATION: 76 MS
EKG QTC CALCULATION (BAZETT): 399 MS
EKG R AXIS: 14 DEGREES
EKG T AXIS: 60 DEGREES
EKG VENTRICULAR RATE: 99 BPM
EOSINOPHIL # BLD: 0.57 K/UL (ref 0–0.8)
EOSINOPHIL NFR BLD: 4.9 % (ref 0.5–7.8)
ERYTHROCYTE [DISTWIDTH] IN BLOOD BY AUTOMATED COUNT: 14.1 % (ref 11.9–14.6)
GLOBULIN SER CALC-MCNC: 2.8 G/DL (ref 2.3–3.5)
GLUCOSE BLD STRIP.AUTO-MCNC: 116 MG/DL (ref 65–100)
GLUCOSE SERPL-MCNC: 115 MG/DL (ref 70–99)
HCT VFR BLD AUTO: 36.5 % (ref 35.8–46.3)
HGB BLD-MCNC: 12 G/DL (ref 11.7–15.4)
IMM GRANULOCYTES # BLD AUTO: 0.05 K/UL (ref 0–0.5)
IMM GRANULOCYTES NFR BLD AUTO: 0.4 % (ref 0–5)
LYMPHOCYTES # BLD: 3.68 K/UL (ref 0.5–4.6)
LYMPHOCYTES NFR BLD: 31.3 % (ref 13–44)
MCH RBC QN AUTO: 28.9 PG (ref 26.1–32.9)
MCHC RBC AUTO-ENTMCNC: 32.9 G/DL (ref 31.4–35)
MCV RBC AUTO: 88 FL (ref 82–102)
MONOCYTES # BLD: 0.8 K/UL (ref 0.1–1.3)
MONOCYTES NFR BLD: 6.8 % (ref 4–12)
NEUTS SEG # BLD: 6.54 K/UL (ref 1.7–8.2)
NEUTS SEG NFR BLD: 55.7 % (ref 43–78)
NRBC # BLD: 0 K/UL (ref 0–0.2)
PLATELET # BLD AUTO: 263 K/UL (ref 150–450)
PMV BLD AUTO: 10.6 FL (ref 9.4–12.3)
POTASSIUM SERPL-SCNC: 4.2 MMOL/L (ref 3.5–5.1)
PROT SERPL-MCNC: 6.6 G/DL (ref 6.3–8.2)
RBC # BLD AUTO: 4.15 M/UL (ref 4.05–5.2)
SERVICE CMNT-IMP: ABNORMAL
SODIUM SERPL-SCNC: 140 MMOL/L (ref 136–145)
TROPONIN T SERPL HS-MCNC: 25 NG/L (ref 0–14)
TROPONIN T SERPL HS-MCNC: 29 NG/L (ref 0–14)
WBC # BLD AUTO: 11.7 K/UL (ref 4.3–11.1)

## 2025-02-06 PROCEDURE — 96375 TX/PRO/DX INJ NEW DRUG ADDON: CPT

## 2025-02-06 PROCEDURE — 99284 EMERGENCY DEPT VISIT MOD MDM: CPT

## 2025-02-06 PROCEDURE — 80053 COMPREHEN METABOLIC PANEL: CPT

## 2025-02-06 PROCEDURE — 6360000002 HC RX W HCPCS: Performed by: EMERGENCY MEDICINE

## 2025-02-06 PROCEDURE — 84484 ASSAY OF TROPONIN QUANT: CPT

## 2025-02-06 PROCEDURE — 93005 ELECTROCARDIOGRAM TRACING: CPT | Performed by: EMERGENCY MEDICINE

## 2025-02-06 PROCEDURE — 6370000000 HC RX 637 (ALT 250 FOR IP): Performed by: EMERGENCY MEDICINE

## 2025-02-06 PROCEDURE — 93010 ELECTROCARDIOGRAM REPORT: CPT | Performed by: INTERNAL MEDICINE

## 2025-02-06 PROCEDURE — 96374 THER/PROPH/DIAG INJ IV PUSH: CPT

## 2025-02-06 PROCEDURE — 2580000003 HC RX 258: Performed by: EMERGENCY MEDICINE

## 2025-02-06 PROCEDURE — 70450 CT HEAD/BRAIN W/O DYE: CPT

## 2025-02-06 PROCEDURE — 85025 COMPLETE CBC W/AUTO DIFF WBC: CPT

## 2025-02-06 PROCEDURE — 82962 GLUCOSE BLOOD TEST: CPT

## 2025-02-06 RX ORDER — HYDRALAZINE HYDROCHLORIDE 20 MG/ML
20 INJECTION INTRAMUSCULAR; INTRAVENOUS
Status: COMPLETED | OUTPATIENT
Start: 2025-02-06 | End: 2025-02-06

## 2025-02-06 RX ORDER — ONDANSETRON 2 MG/ML
4 INJECTION INTRAMUSCULAR; INTRAVENOUS ONCE
Status: COMPLETED | OUTPATIENT
Start: 2025-02-06 | End: 2025-02-06

## 2025-02-06 RX ORDER — DIPHENHYDRAMINE HYDROCHLORIDE 50 MG/ML
50 INJECTION INTRAMUSCULAR; INTRAVENOUS
Status: COMPLETED | OUTPATIENT
Start: 2025-02-06 | End: 2025-02-06

## 2025-02-06 RX ORDER — PROMETHAZINE HYDROCHLORIDE 25 MG/1
25 TABLET ORAL
Status: COMPLETED | OUTPATIENT
Start: 2025-02-06 | End: 2025-02-06

## 2025-02-06 RX ORDER — METOCLOPRAMIDE HYDROCHLORIDE 5 MG/ML
10 INJECTION INTRAMUSCULAR; INTRAVENOUS ONCE
Status: DISCONTINUED | OUTPATIENT
Start: 2025-02-06 | End: 2025-02-06 | Stop reason: HOSPADM

## 2025-02-06 RX ADMIN — LORAZEPAM 1 MG: 2 INJECTION INTRAMUSCULAR; INTRAVENOUS at 02:43

## 2025-02-06 RX ADMIN — DIPHENHYDRAMINE HYDROCHLORIDE 50 MG: 50 INJECTION INTRAMUSCULAR; INTRAVENOUS at 02:23

## 2025-02-06 RX ADMIN — HYDRALAZINE HYDROCHLORIDE 20 MG: 20 INJECTION INTRAMUSCULAR; INTRAVENOUS at 02:20

## 2025-02-06 RX ADMIN — ONDANSETRON 4 MG: 2 INJECTION, SOLUTION INTRAMUSCULAR; INTRAVENOUS at 02:22

## 2025-02-06 RX ADMIN — PROMETHAZINE HYDROCHLORIDE 25 MG: 25 TABLET ORAL at 03:56

## 2025-02-06 ASSESSMENT — PAIN - FUNCTIONAL ASSESSMENT: PAIN_FUNCTIONAL_ASSESSMENT: 0-10

## 2025-02-06 NOTE — ED TRIAGE NOTES
Pt suddenly got nauseas.  Went to lay down, but her head was pounding, and her ears were ringing.  Pt is having a problem getting her thoughts together.

## 2025-02-06 NOTE — ED NOTES
I have reviewed discharge instructions with the patient.  The patient verbalized understanding.    Patient left ED via Discharge Method: ambulatory to Home with family.    Opportunity for questions and clarification provided.       Patient given 0 scripts.         To continue your aftercare when you leave the hospital, you may receive an automated call from our care team to check in on how you are doing.  This is a free service and part of our promise to provide the best care and service to meet your aftercare needs.” If you have questions, or wish to unsubscribe from this service please call [phone].  Thank you for Choosing our [facility] Emergency Department.

## 2025-02-06 NOTE — ED PROVIDER NOTES
Emergency Department Provider Note       PCP: Trey Nunes MD   Age: 79 y.o.   Sex: female     DISPOSITION Decision To Discharge 02/06/2025 04:21:59 AM    ICD-10-CM    1. Hypertensive urgency  I16.0           Medical Decision Making     Patient is being evaluated for headache.  She was severely hypertensive on arrival.  Recently discontinued off of her blood pressure medications and could be having rebound hypertension.  This rebound hypertension could cause an hypertensive urgency resulting in the headache.  CT imaging will be performed to look for any sign of intracerebral hemorrhage or other acute intracranial process.  Currently her NIH is 0.  She has been ordered IV medications for the headache as well as the blood pressure.  ED Course as of 02/06/25 0423   Thu Feb 06, 2025   0343 BP: 122/62  Patient's blood pressure is normalized.  Workup has been largely unremarkable.  Initial troponin 25.  Repeat will be performed.  CT imaging of her head was negative for any acute intracranial hemorrhage, large territorial infarct, or other abnormalities.  Now the patient's blood pressure is normalized her symptoms have resolved.  Still only complains of some mild nausea so has been given order for oral Phenergan. [CHELSEA]   0421 Patient symptoms are now completely resolved.  She is feeling much better.  Repeat troponin was 29.  Initial 25.  She has never experienced any chest pain during the course of her evaluation.  No significant change.  Had discussion about disposition.  She feels comfortable with discharge. [CHELSEA]      ED Course User Index  [CHELSEA] Emilee Azevedo, DO     1 or more acute illnesses that pose a threat to life or bodily function.   Patient was discharged risks and benefits of hospitalization were considered.  Shared medical decision making was utilized in creating the patients health plan today.  I independently ordered and reviewed each unique test.         ED cardiac monitoring rhythm strip  %    Immature Granulocytes % 0.4 0.0 - 5.0 %    Neutrophils Absolute 6.54 1.70 - 8.20 K/UL    Lymphocytes Absolute 3.68 0.50 - 4.60 K/UL    Monocytes Absolute 0.80 0.10 - 1.30 K/UL    Eosinophils Absolute 0.57 0.00 - 0.80 K/UL    Basophils Absolute 0.10 0.00 - 0.20 K/UL    Immature Granulocytes Absolute 0.05 0.0 - 0.5 K/UL   CMP   Result Value Ref Range    Sodium 140 136 - 145 mmol/L    Potassium 4.2 3.5 - 5.1 mmol/L    Chloride 102 98 - 107 mmol/L    CO2 26 20 - 29 mmol/L    Anion Gap 12 7 - 16 mmol/L    Glucose 115 (H) 70 - 99 mg/dL    BUN 17 8 - 23 MG/DL    Creatinine 1.25 (H) 0.60 - 1.10 MG/DL    Est, Glom Filt Rate 44 (L) >60 ml/min/1.73m2    Calcium 9.8 8.8 - 10.2 MG/DL    Total Bilirubin 0.3 0.0 - 1.2 MG/DL    ALT 21 8 - 45 U/L    AST 25 15 - 37 U/L    Alk Phosphatase 80 35 - 104 U/L    Total Protein 6.6 6.3 - 8.2 g/dL    Albumin 3.8 3.2 - 4.6 g/dL    Globulin 2.8 2.3 - 3.5 g/dL    Albumin/Globulin Ratio 1.4 1.0 - 1.9     Troponin   Result Value Ref Range    Troponin T 25.0 (H) 0 - 14 ng/L   Troponin   Result Value Ref Range    Troponin T 29.0 (H) 0 - 14 ng/L   POCT Glucose   Result Value Ref Range    POC Glucose 116 (H) 65 - 100 mg/dL    Performed by: Emil          CT HEAD WO CONTRAST   Final Result   No CT evidence of acute intracranial abnormality.      Electronically signed by James Cha                   No results for input(s): \"COVID19\" in the last 72 hours.     Voice dictation software was used during the making of this note.  This software is not perfect and grammatical and other typographical errors may be present.  This note has not been completely proofread for errors.     Emilee Azevedo,   02/06/25 0423

## 2025-02-06 NOTE — TELEPHONE ENCOUNTER
Patient was seen in the ER last night, her BP was 290/170 she was recently taken off of her BP medication and was told that she needs to restart medication.     [phone]

## 2025-02-07 ENCOUNTER — OFFICE VISIT (OUTPATIENT)
Dept: INTERNAL MEDICINE CLINIC | Facility: CLINIC | Age: 80
End: 2025-02-07
Payer: COMMERCIAL

## 2025-02-07 ENCOUNTER — CARE COORDINATION (OUTPATIENT)
Dept: CARE COORDINATION | Facility: CLINIC | Age: 80
End: 2025-02-07

## 2025-02-07 VITALS
WEIGHT: 176 LBS | HEIGHT: 58 IN | DIASTOLIC BLOOD PRESSURE: 94 MMHG | HEART RATE: 71 BPM | TEMPERATURE: 97.8 F | SYSTOLIC BLOOD PRESSURE: 160 MMHG | BODY MASS INDEX: 36.94 KG/M2 | OXYGEN SATURATION: 95 % | RESPIRATION RATE: 16 BRPM

## 2025-02-07 DIAGNOSIS — R47.01 APHASIA: ICD-10-CM

## 2025-02-07 DIAGNOSIS — I10 PRIMARY HYPERTENSION: Primary | ICD-10-CM

## 2025-02-07 PROCEDURE — 1159F MED LIST DOCD IN RCRD: CPT | Performed by: INTERNAL MEDICINE

## 2025-02-07 PROCEDURE — 99214 OFFICE O/P EST MOD 30 MIN: CPT | Performed by: INTERNAL MEDICINE

## 2025-02-07 PROCEDURE — 1123F ACP DISCUSS/DSCN MKR DOCD: CPT | Performed by: INTERNAL MEDICINE

## 2025-02-07 PROCEDURE — 3077F SYST BP >= 140 MM HG: CPT | Performed by: INTERNAL MEDICINE

## 2025-02-07 PROCEDURE — 3078F DIAST BP <80 MM HG: CPT | Performed by: INTERNAL MEDICINE

## 2025-02-07 RX ORDER — ASPIRIN 81 MG/1
81 TABLET ORAL DAILY
Qty: 90 TABLET | Refills: 0 | Status: SHIPPED | OUTPATIENT
Start: 2025-02-07

## 2025-02-07 RX ORDER — LISINOPRIL 20 MG/1
20 TABLET ORAL DAILY
Qty: 90 TABLET | Refills: 3 | Status: SHIPPED | OUTPATIENT
Start: 2025-02-07

## 2025-02-07 NOTE — PROGRESS NOTES
Upper Arm    Position: Sitting    Pulse: 71    Resp: 16    Temp: 97.8 °F (36.6 °C)    TempSrc: Temporal    SpO2: 95%    Weight: 79.8 kg (176 lb)    Height: 1.473 m (4' 9.99\")      BP Readings from Last 3 Encounters:   02/07/25 (!) 160/94   02/06/25 123/82   01/14/25 100/66     Body mass index is 36.79 kg/m².  Wt Readings from Last 3 Encounters:   02/07/25 79.8 kg (176 lb)   02/06/25 76.2 kg (168 lb)   01/14/25 78 kg (172 lb)         1/14/2025     1:09 PM   PHQ-9    Little interest or pleasure in doing things 0   Feeling down, depressed, or hopeless 0   Trouble falling or staying asleep, or sleeping too much 0   Feeling tired or having little energy 0   Poor appetite or overeating 0   Feeling bad about yourself - or that you are a failure or have let yourself or your family down 0   Trouble concentrating on things, such as reading the newspaper or watching television 0   Moving or speaking so slowly that other people could have noticed. Or the opposite - being so fidgety or restless that you have been moving around a lot more than usual 0   Thoughts that you would be better off dead, or of hurting yourself in some way 0   PHQ-2 Score 0   PHQ-9 Total Score 0   If you checked off any problems, how difficult have these problems made it for you to do your work, take care of things at home, or get along with other people? 1      Physical Exam  Constitutional:       Appearance: Normal appearance.   HENT:      Head: Normocephalic and atraumatic.   Pulmonary:      Effort: Pulmonary effort is normal. No respiratory distress.   Neurological:      Mental Status: She is alert.   Psychiatric:         Mood and Affect: Mood normal.           Lab Results   Component Value Date     02/06/2025    K 4.2 02/06/2025     02/06/2025    CO2 26 02/06/2025    BUN 17 02/06/2025    CREATININE 1.25 (H) 02/06/2025    GLUCOSE 115 (H) 02/06/2025    CALCIUM 9.8 02/06/2025    BILITOT 0.3 02/06/2025    ALKPHOS 80 02/06/2025    AST 25

## 2025-02-07 NOTE — CARE COORDINATION
Ambulatory Care Coordination Note     2025 7:52 AM     Patient Current Location:  [place]     This patient was received as a referral from Ambulatory Care Manager .    ACM contacted the patient by telephone. Verified name and  with patient as identifiers. Provided introduction to self, and explanation of the ACM role.   Patient declined care management services at this time.

## 2025-02-09 ENCOUNTER — PATIENT MESSAGE (OUTPATIENT)
Dept: INTERNAL MEDICINE CLINIC | Facility: CLINIC | Age: 80
End: 2025-02-09

## 2025-02-09 DIAGNOSIS — I10 PRIMARY HYPERTENSION: ICD-10-CM

## 2025-02-10 RX ORDER — AMLODIPINE BESYLATE 10 MG/1
10 TABLET ORAL DAILY
Qty: 90 TABLET | Refills: 3 | Status: SHIPPED | OUTPATIENT
Start: 2025-02-10

## 2025-02-10 RX ORDER — LISINOPRIL 40 MG/1
40 TABLET ORAL DAILY
Qty: 90 TABLET | Refills: 3 | Status: SHIPPED | OUTPATIENT
Start: 2025-02-10

## 2025-02-10 NOTE — TELEPHONE ENCOUNTER
Subjective  My Rebolledo contacted the office via phone call regarding her persistently elevated blood pressure. She reports blood pressure readings in the 160 over 90s range over the weekend, which have continued despite the addition of Lisinopril 20 mg and Amlodipine 5 mg. The patient denies any chest pain or shortness of breath.    Diagnostic History:  - Stress test in 2019: normal  - Echocardiogram in 2022: normal      Plan  1. Uncontrolled Hypertension   - Medication adjustments:    - Increase lisinopril to 40 mg    - Increase amlodipine to 10 mg   - Referral to cardiology for further evaluation and management   - Follow-up: Monitor blood pressure readings and reassess after medication adjustments

## 2025-02-11 ENCOUNTER — TELEPHONE (OUTPATIENT)
Age: 80
End: 2025-02-11

## 2025-02-11 NOTE — TELEPHONE ENCOUNTER
Pt did not show for their scheduled therapy appointment 2/10/24. Called pt and she stated that she previously cancelled this appt due to waiting for insurance information. Pt verified appt for Wednesday.

## 2025-02-12 ENCOUNTER — TREATMENT (OUTPATIENT)
Age: 80
End: 2025-02-12

## 2025-02-12 DIAGNOSIS — M25.612 STIFFNESS OF LEFT SHOULDER JOINT: ICD-10-CM

## 2025-02-12 DIAGNOSIS — M25.512 ACUTE PAIN OF LEFT SHOULDER: Primary | ICD-10-CM

## 2025-02-12 DIAGNOSIS — Z74.09 IMPAIRED MOBILITY AND ADLS: ICD-10-CM

## 2025-02-12 DIAGNOSIS — R29.898 ARM WEAKNESS: ICD-10-CM

## 2025-02-12 DIAGNOSIS — Z78.9 IMPAIRED MOBILITY AND ADLS: ICD-10-CM

## 2025-02-12 NOTE — PROGRESS NOTES
[facility]  [address] [facility] [address]  Dept: [phone]      Physical Therapy Daily Note     Referring MD: Moises Solis PA  Diagnosis:     ICD-10-CM    1. Acute pain of left shoulder  M25.512       2. Impaired mobility and ADLs  Z74.09     Z78.9       3. Arm weakness  R29.898       4. Stiffness of left shoulder joint  M25.612            Surgery: Left Shoulder Arthroscopy Debridement - Left and Left Shoulder Open Distal Clavicle Resection - Left  Date: 12/9/2024  Therapy precautions:Adhesive/tape allergy  Co-morbidities affecting plan of care: intermittent pain R shoulder  Patient Stated Goals: improve movement and less pain L shoulder    Payor: Payor: [facility] /  /  /  Billing pattern: Government- total time    Total Timed Procedure Codes: 40 min, Total Time: 40 min Modifier needed: No  Episode visit count:  9     SUBJECTIVE     Pt reports that she was seen in the ED last week due to high blood pressure. It was 169/70s this session. Pt has also been getting winded since yesterday.  L shoulder is doing fairly well.     Medications: no changes since last session    OBJECTIVE     Oxygen 97%, heart rate 78 bpm  Blood pressure 149/67    Functional Outcome Questionnaire: QuickDASH:    12/27/25: 29/11= 41% functional deficit   1/22/25: 20/11 = 20% functional deficit  Palpation: tenderness anterior L shoulder and increased tone L upper trap/levator    ROM Measures:  Shoulder R AROM 12/27/24  L A/PROM 1/22/25  L A/PROM   Flexion 120 105/140 120/150   [place] 120 108/130 120/150   IR at 45° abd NT/65 NT/55 L5/65   ER at 45° abd NT/68 NT/30 C7/60   ER at 0° abd NT NT NT/35     1/22/25: MMT L shoulder flexion/abduction 4/5, IR 5/5, ER 4/5; elbow 5/5    Treatment provided today:  Therapeutic exercise (07565) x 30 min to develop ROM, strength, endurance and flexibility LUE.  Current Exercises Past Exercises (not performed today)   Assessment of tolerance of previous

## 2025-02-13 ENCOUNTER — INITIAL CONSULT (OUTPATIENT)
Age: 80
End: 2025-02-13
Payer: COMMERCIAL

## 2025-02-13 VITALS
SYSTOLIC BLOOD PRESSURE: 124 MMHG | BODY MASS INDEX: 36.57 KG/M2 | HEART RATE: 68 BPM | WEIGHT: 174.2 LBS | HEIGHT: 58 IN | DIASTOLIC BLOOD PRESSURE: 58 MMHG

## 2025-02-13 DIAGNOSIS — R94.31 ABNORMAL ECG: ICD-10-CM

## 2025-02-13 DIAGNOSIS — R09.89 BRUIT OF RIGHT CAROTID ARTERY: ICD-10-CM

## 2025-02-13 DIAGNOSIS — E78.2 MIXED HYPERLIPIDEMIA: Chronic | ICD-10-CM

## 2025-02-13 DIAGNOSIS — I49.3 VENTRICULAR PREMATURE DEPOLARIZATION: ICD-10-CM

## 2025-02-13 DIAGNOSIS — E78.5 DYSLIPIDEMIA: ICD-10-CM

## 2025-02-13 DIAGNOSIS — I10 PRIMARY HYPERTENSION: Primary | Chronic | ICD-10-CM

## 2025-02-13 PROCEDURE — 3078F DIAST BP <80 MM HG: CPT | Performed by: INTERNAL MEDICINE

## 2025-02-13 PROCEDURE — 1160F RVW MEDS BY RX/DR IN RCRD: CPT | Performed by: INTERNAL MEDICINE

## 2025-02-13 PROCEDURE — 1159F MED LIST DOCD IN RCRD: CPT | Performed by: INTERNAL MEDICINE

## 2025-02-13 PROCEDURE — 1126F AMNT PAIN NOTED NONE PRSNT: CPT | Performed by: INTERNAL MEDICINE

## 2025-02-13 PROCEDURE — 99204 OFFICE O/P NEW MOD 45 MIN: CPT | Performed by: INTERNAL MEDICINE

## 2025-02-13 PROCEDURE — 1123F ACP DISCUSS/DSCN MKR DOCD: CPT | Performed by: INTERNAL MEDICINE

## 2025-02-13 PROCEDURE — 3074F SYST BP LT 130 MM HG: CPT | Performed by: INTERNAL MEDICINE

## 2025-02-13 RX ORDER — ATORVASTATIN CALCIUM 40 MG/1
40 TABLET, FILM COATED ORAL DAILY
Qty: 30 TABLET | Refills: 11 | Status: SHIPPED | OUTPATIENT
Start: 2025-02-13

## 2025-02-13 RX ORDER — TRAZODONE HYDROCHLORIDE 50 MG/1
50 TABLET, FILM COATED ORAL NIGHTLY
COMMUNITY

## 2025-02-13 RX ORDER — FAMOTIDINE 20 MG/1
20 TABLET, FILM COATED ORAL NIGHTLY
COMMUNITY
Start: 2025-01-13

## 2025-02-13 ASSESSMENT — ENCOUNTER SYMPTOMS: SHORTNESS OF BREATH: 1

## 2025-02-13 NOTE — PROGRESS NOTES
[address], [place] [address]  PHONE: [phone]    SUBJECTIVE:   My Rebolledo is a 79 y.o. female 1945   seen for a consultation visit regarding the following:     Chief Complaint   Patient presents with    Consultation     Hypertension              Consultation is requested for evaluation of Consultation (Hypertension)   .    History of present illness: 79 y.o. female with PMH HTN, HLD presenting for evaluation of labile BP. Late at night about a week patient reports nausea, pounding in her ears, headache and diarrhea. SBP reportedly up to 270s.  In the ED she had a feeling like she was having an allergic reaction which improved with Ativan. Symptoms improved. After this ED visit, her SBP continued to run intermittently in the 200s. PCP adjusted her BP medications and BP now seems to have improved.        Past Medical History, Past Surgical History, Family history, Social History, and Medications were all reviewed with the patient today and updated as necessary.       Allergies   Allergen Reactions    Fire Ant Anaphylaxis    Amoxicillin Other (See Comments)     C-Diff    Amoxicillin-Pot Clavulanate Other (See Comments)     c.diff    Augmentin [Amoxicillin-Pot Clavulanate]     Cephalexin Other (See Comments)     C Diff      Cephalexin Other (See Comments)     C-Diff    Cephalosporins     Docusate     Hydromorphone Other (See Comments)     Keeps her awake    Meperidine Hcl Other (See Comments)    Morphine Other (See Comments)     Awake    No sleep    Other Other (See Comments)    Oxycodone Other (See Comments)    Oxycodone-Acetaminophen Other (See Comments)     Jittery  Nightmares    Oxycodone-Aspirin Other (See Comments)     Jittery  Other reaction(s): Unknown-Unspecified  Jittery    Penicillins Other (See Comments)     C-Diff      Potassium     Povidone Iodine     Povidone-Iodine Other (See Comments)     Burns    burn    Adhesive Tape Itching and Rash     \"sticky ace

## 2025-02-14 DIAGNOSIS — I10 PRIMARY HYPERTENSION: Chronic | ICD-10-CM

## 2025-02-14 DIAGNOSIS — E78.2 MIXED HYPERLIPIDEMIA: Chronic | ICD-10-CM

## 2025-02-17 LAB — LPA SERPL-SCNC: 39.4 NMOL/L

## 2025-02-18 ENCOUNTER — PATIENT MESSAGE (OUTPATIENT)
Dept: INTERNAL MEDICINE CLINIC | Facility: CLINIC | Age: 80
End: 2025-02-18

## 2025-02-18 LAB
METANEPH FREE SERPL-MCNC: <25 PG/ML (ref 0–88)
NORMETANEPHRINE SERPL-MCNC: 225.7 PG/ML (ref 0–285.2)

## 2025-02-18 NOTE — PROGRESS NOTES
Chronic Pain Consult Note   Date: February 24, 2025   Patient Name: My Rebolledo   MRN: [id number]   PCP: Trey Nunes   Referring Provider: Trey Nunes MD     Assessment:   My Rebolledo is a 79 y.o. female [facility] in multiple locations.  Her worst pain is in her low back with radiation to her bilateral lateral hips.  This is most consistent with facet mediated pain with a trochanteric bursitis element.  She also has worsening neck pain and stiffness consistent with facet mediated pain and left greater than right shoulder pain with history of recent left shoulder surgery.  She has been treated by both [facility] and [facility] in the past, having left [facility] due to billing disputes; is not clear why she left [facility], but sounds as if it was because they wanted her to do more physical therapy which she did not believe would be beneficial..    Diagnosis:   1. Cervical paraspinal muscle spasm         Plan:   General Recommendations: The pain condition that the patient suffers from is best treated with a multidisciplinary approach that involves an increase in physical activity to prevent de-conditioning and worsening of the pain cycle, as well as psychological counseling (formal and/or informal) to address the co-morbid psychological effects of pain.  Treatment will often involve judicious use of pain medications and interventional procedures to decrease the pain, allowing the patient to participate in the physical activity that will ultimately produce long-lasting pain reductions.  The goal of the multidisciplinary approach is to return the patient to a higher level of overall function and to restore their ability to perform activities of daily living.     Testing:   Reviewed x-ray cervical spine, MRI left shoulder, MRI lumbar spine, PCP notes, orthopedic notes, and lab work.  Requesting records from her prior pain clinics.    Therapy:   Has done fairly

## 2025-02-19 ENCOUNTER — PATIENT MESSAGE (OUTPATIENT)
Dept: BEHAVIORAL/MENTAL HEALTH CLINIC | Age: 80
End: 2025-02-19

## 2025-02-19 ENCOUNTER — OFFICE VISIT (OUTPATIENT)
Dept: INTERNAL MEDICINE CLINIC | Facility: CLINIC | Age: 80
End: 2025-02-19
Payer: COMMERCIAL

## 2025-02-19 VITALS
RESPIRATION RATE: 16 BRPM | SYSTOLIC BLOOD PRESSURE: 130 MMHG | WEIGHT: 172.6 LBS | BODY MASS INDEX: 36.23 KG/M2 | HEART RATE: 61 BPM | HEIGHT: 58 IN | DIASTOLIC BLOOD PRESSURE: 60 MMHG

## 2025-02-19 DIAGNOSIS — I10 PRIMARY HYPERTENSION: ICD-10-CM

## 2025-02-19 DIAGNOSIS — E78.5 DYSLIPIDEMIA: ICD-10-CM

## 2025-02-19 DIAGNOSIS — I65.22 CAROTID STENOSIS, SYMPTOMATIC W/O INFARCT, LEFT: Primary | ICD-10-CM

## 2025-02-19 PROCEDURE — 1123F ACP DISCUSS/DSCN MKR DOCD: CPT | Performed by: INTERNAL MEDICINE

## 2025-02-19 PROCEDURE — 1159F MED LIST DOCD IN RCRD: CPT | Performed by: INTERNAL MEDICINE

## 2025-02-19 PROCEDURE — 3075F SYST BP GE 130 - 139MM HG: CPT | Performed by: INTERNAL MEDICINE

## 2025-02-19 PROCEDURE — 3078F DIAST BP <80 MM HG: CPT | Performed by: INTERNAL MEDICINE

## 2025-02-19 PROCEDURE — 99214 OFFICE O/P EST MOD 30 MIN: CPT | Performed by: INTERNAL MEDICINE

## 2025-02-19 RX ORDER — TRAZODONE HYDROCHLORIDE 50 MG/1
25-50 TABLET ORAL NIGHTLY
Qty: 30 TABLET | Refills: 3 | Status: SHIPPED | OUTPATIENT
Start: 2025-02-19

## 2025-02-19 RX ORDER — ASPIRIN 325 MG
325 TABLET ORAL DAILY
Qty: 30 TABLET | Refills: 3 | Status: SHIPPED | OUTPATIENT
Start: 2025-02-19

## 2025-02-19 RX ORDER — AMLODIPINE BESYLATE 10 MG/1
10 TABLET ORAL DAILY
Qty: 90 TABLET | Refills: 3
Start: 2025-02-19

## 2025-02-19 RX ORDER — EZETIMIBE 10 MG/1
10 TABLET ORAL DAILY
Qty: 90 TABLET | Refills: 3 | Status: SHIPPED | OUTPATIENT
Start: 2025-02-19

## 2025-02-19 NOTE — PROGRESS NOTES
[facility]  [address] [place] [address]  Phone: [phone]  Fax: [phone]      Problem Based Overview with Integrated Assessment and Plan    My Rebolledo is a 79 y.o. year old female who presents for follow up discussion.    The patient reports experiencing shortness of breath for approximately one week. She feels winded with minimal exertion, such as walking through the grocery store or around her apartment. She denies chest pain but emphasizes significant dyspnea on exertion. The patient also mentions leg discomfort.    The patient reports experiencing ringing in her ears and can feel her heartbeat on the left side, including in her temple. She describes numbness on one side of her face and the tip of her tongue, which has persisted for over a year. Two years ago, she experienced episodes of double vision and balance disturbances, which prompted a neurological evaluation.    The patient reports recent episodes of word-finding difficulty and increased frequency of \"senior moments\" in the past couple of weeks. She initially attributed these symptoms to anxiety but now suspects an alternative cause. She expresses feeling edgy and apprehensive about making mistakes while driving.    The patient has a history of carotid stenosis, which has been symptomatic but without infarct. She reports forgetting to take her prescribed aspirin recently. She expresses significant anxiety about her condition, describing herself as feeling like a \"ticking time bomb.\"      Social History  - Living situation: lives in an apartment  - Marital status: ,  has dementia  - Stress levels: feeling edgy and afraid of making mistakes while driving  - Social support: needs to make arrangements for coverage due to 's condition    Review of Systems  - Cardiovascular: Feels winded with exertion, can hardly get through grocery store or walk around apartment, no chest

## 2025-02-20 ENCOUNTER — OFFICE VISIT (OUTPATIENT)
Dept: VASCULAR SURGERY | Age: 80
End: 2025-02-20

## 2025-02-20 VITALS
HEIGHT: 58 IN | DIASTOLIC BLOOD PRESSURE: 58 MMHG | HEART RATE: 94 BPM | WEIGHT: 172 LBS | OXYGEN SATURATION: 96 % | BODY MASS INDEX: 36.11 KG/M2 | SYSTOLIC BLOOD PRESSURE: 115 MMHG

## 2025-02-20 DIAGNOSIS — I65.22 CAROTID STENOSIS, ASYMPTOMATIC, LEFT: Primary | ICD-10-CM

## 2025-02-20 NOTE — PROGRESS NOTES
VASCULAR SURGERY   [address] [address]  [phone] FAX: [phone]        My Rebolledo  : 1945    Reason for visit: Left ICA stenosis    Chief Complaint: 79 y.o. female presents left neck pain with outside DUS read as >70% of the left ICA. She reports that she has had left face pain that was been worsening over the last year. Outside DUS with >70% stenosis however EDV < 100cm/s which would indicate 50-69% stenosis. Compliant with ASA and statin. Former smoker quit in .     Plan:   Asymptomatic Left ICA stenosis: CTA head and neck to confirm >70% stenosis, continue ASA and statin, referral to [facility] for left face pain and upper lip numbness. MRI negative for acute CVA.     25    VAS DUP CAROTID BILATERAL 2025  1:24 PM (Final)    Narrative  TITLE:  Carotid and Vertebral Ultrasound Examination.    INDICATION: Dyslipidemia and hypertension..    TECHNIQUE: Grayscale, Color Doppler, and Spectral Doppler Ultrasound  interrogation performed.  Peak Systolic Velocity, ICA-to-CCA ratio, and  End-Diastolic Velocity are recorded.  The estimate of stenosis is inferred from  velocity measurements cross referenced to published correlations as defined by  the Society of Radiologists in Ultrasound Consensus Conference Radiology 2003;  229; 340-346.    COMPARISON: None.    RIGHT NECK:  The peak systolic velocity in the Common Carotid Artery = 74  cm/sec; Internal Carotid Artery = 115 cm/sec.  Ratio = 1.6.    Extensive plaque. Retrograde flow in the right vertebral artery. No obvious  stenosis within the visualized subclavian artery. There are some elevated  velocities of the ECA.    LEFT  NECK:  The peak systolic velocity in the Common Carotid Artery = 92  cm/sec; Internal Carotid Artery = 248 cm/sec. Ratio = 2.7.    Extensive plaque. Elevated velocities also present in the ECA..  Antegrade flow  in the vertebral artery.  Bilateral high bifurcations, near the

## 2025-02-22 LAB
ALDOST SERPL-MCNC: 5.3 NG/DL (ref 0–30)
ALDOST/RENIN PLAS-RTO: 1.2 (ref 0–30)
RENIN PLAS-CCNC: 4.29 NG/ML/HR (ref 0.17–5.38)

## 2025-02-24 ENCOUNTER — OFFICE VISIT (OUTPATIENT)
Age: 80
End: 2025-02-24
Payer: COMMERCIAL

## 2025-02-24 DIAGNOSIS — M25.561 CHRONIC KNEE PAIN AFTER TOTAL REPLACEMENT OF BOTH KNEE JOINTS: ICD-10-CM

## 2025-02-24 DIAGNOSIS — M54.2 CHRONIC NECK PAIN: ICD-10-CM

## 2025-02-24 DIAGNOSIS — M47.816 FACET ARTHROPATHY, LUMBAR: ICD-10-CM

## 2025-02-24 DIAGNOSIS — M70.62 GREATER TROCHANTERIC BURSITIS OF LEFT HIP: ICD-10-CM

## 2025-02-24 DIAGNOSIS — M54.50 CHRONIC BILATERAL LOW BACK PAIN WITHOUT SCIATICA: ICD-10-CM

## 2025-02-24 DIAGNOSIS — Z96.653 CHRONIC KNEE PAIN AFTER TOTAL REPLACEMENT OF BOTH KNEE JOINTS: ICD-10-CM

## 2025-02-24 DIAGNOSIS — M62.838 CERVICAL PARASPINAL MUSCLE SPASM: Primary | ICD-10-CM

## 2025-02-24 DIAGNOSIS — G89.29 CHRONIC BILATERAL LOW BACK PAIN WITHOUT SCIATICA: ICD-10-CM

## 2025-02-24 DIAGNOSIS — G89.29 CHRONIC NECK PAIN: ICD-10-CM

## 2025-02-24 DIAGNOSIS — G89.28 CHRONIC KNEE PAIN AFTER TOTAL REPLACEMENT OF BOTH KNEE JOINTS: ICD-10-CM

## 2025-02-24 DIAGNOSIS — M70.61 GREATER TROCHANTERIC BURSITIS OF RIGHT HIP: ICD-10-CM

## 2025-02-24 DIAGNOSIS — M25.562 CHRONIC KNEE PAIN AFTER TOTAL REPLACEMENT OF BOTH KNEE JOINTS: ICD-10-CM

## 2025-02-24 DIAGNOSIS — M62.830 LUMBAR PARASPINAL MUSCLE SPASM: ICD-10-CM

## 2025-02-24 PROCEDURE — 1159F MED LIST DOCD IN RCRD: CPT | Performed by: ANESTHESIOLOGY

## 2025-02-24 PROCEDURE — 1123F ACP DISCUSS/DSCN MKR DOCD: CPT | Performed by: ANESTHESIOLOGY

## 2025-02-24 PROCEDURE — G2211 COMPLEX E/M VISIT ADD ON: HCPCS | Performed by: ANESTHESIOLOGY

## 2025-02-24 PROCEDURE — 99204 OFFICE O/P NEW MOD 45 MIN: CPT | Performed by: ANESTHESIOLOGY

## 2025-02-24 RX ORDER — METHOCARBAMOL 500 MG/1
500 TABLET, FILM COATED ORAL 2 TIMES DAILY PRN
Qty: 60 TABLET | Refills: 0 | Status: SHIPPED | OUTPATIENT
Start: 2025-02-24 | End: 2025-03-26

## 2025-02-24 ASSESSMENT — ENCOUNTER SYMPTOMS
SHORTNESS OF BREATH: 0
BACK PAIN: 1
ABDOMINAL PAIN: 0

## 2025-02-24 NOTE — PROGRESS NOTES
Name: My Rebolledo  YOB: 1945  Gender: female  MRN: [id number]    CC:   Chief Complaint   Patient presents with    Follow-up     S/p L Shoulder scope debridement, open DCR DOS 12/9/24    about 2-3 month out from  Left Shoulder Arthroscopy Debridement - Left and Left Shoulder Open Distal Clavicle Resection - Left  12/9/2024    HPI: Patient is status post Left Shoulder Arthroscopy Debridement - Left and Left Shoulder Open Distal Clavicle Resection - Left.   She has some anterior based pain still.  Mainly achy.  She recently was in the hospital though with severe hypertension.  She has been diagnosed with some carotid blockages on the left and is undergoing further workup as of now.    Allergies   Allergen Reactions    Fire Ant Anaphylaxis    Amoxicillin Other (See Comments)     C-Diff    Amoxicillin-Pot Clavulanate Other (See Comments)     c.diff    Augmentin [Amoxicillin-Pot Clavulanate]     Cephalexin Other (See Comments)     C Diff      Cephalexin Other (See Comments)     C-Diff    Cephalosporins Other (See Comments)    Docusate     Hydromorphone Other (See Comments)     Keeps her awake    Meperidine Hcl Other (See Comments)    Morphine Other (See Comments)     Awake    No sleep    Other Other (See Comments)    Oxycodone Other (See Comments)    Oxycodone-Acetaminophen Other (See Comments)     Jittery  Nightmares    Oxycodone-Aspirin Other (See Comments)     Jittery  Other reaction(s): Unknown-Unspecified  Jittery    Penicillins Other (See Comments)     C-Diff      Potassium     Povidone Iodine     Povidone-Iodine Other (See Comments)     Burns    burn    Adhesive Tape Itching and Rash     \"sticky ace wrap\"  \"sticky ace wrap\"    Meperidine Rash     Past Medical History:   Diagnosis Date    Acute kidney insufficiency     Followed by PCP     Adverse effect of anesthesia     on 8/8/24 patient is unable to provide any further information regarding this. ----2016 in [place] \"My heart stopped at the

## 2025-02-25 ENCOUNTER — OFFICE VISIT (OUTPATIENT)
Dept: ORTHOPEDIC SURGERY | Age: 80
End: 2025-02-25

## 2025-02-25 ENCOUNTER — OFFICE VISIT (OUTPATIENT)
Dept: NEUROLOGY | Age: 80
End: 2025-02-25
Payer: COMMERCIAL

## 2025-02-25 VITALS
OXYGEN SATURATION: 99 % | HEART RATE: 80 BPM | SYSTOLIC BLOOD PRESSURE: 138 MMHG | BODY MASS INDEX: 36.31 KG/M2 | WEIGHT: 173 LBS | DIASTOLIC BLOOD PRESSURE: 68 MMHG | HEIGHT: 58 IN

## 2025-02-25 DIAGNOSIS — E78.2 MIXED HYPERLIPIDEMIA: Chronic | ICD-10-CM

## 2025-02-25 DIAGNOSIS — R20.0 LEFT FACIAL NUMBNESS: ICD-10-CM

## 2025-02-25 DIAGNOSIS — R93.0 ABNORMAL MRI OF HEAD: ICD-10-CM

## 2025-02-25 DIAGNOSIS — R73.03 PREDIABETES: ICD-10-CM

## 2025-02-25 DIAGNOSIS — M25.512 LEFT SHOULDER PAIN, UNSPECIFIED CHRONICITY: ICD-10-CM

## 2025-02-25 DIAGNOSIS — G47.33 OSA (OBSTRUCTIVE SLEEP APNEA): ICD-10-CM

## 2025-02-25 DIAGNOSIS — I65.22 LEFT CAROTID ARTERY STENOSIS: ICD-10-CM

## 2025-02-25 DIAGNOSIS — R29.90 STROKE-LIKE SYMPTOMS: Primary | ICD-10-CM

## 2025-02-25 DIAGNOSIS — Z09 SURGERY FOLLOW-UP: Primary | ICD-10-CM

## 2025-02-25 PROBLEM — K90.41 GLUTEN INTOLERANCE: Status: RESOLVED | Noted: 2018-02-06 | Resolved: 2025-02-25

## 2025-02-25 PROBLEM — G25.81 RLS (RESTLESS LEGS SYNDROME): Chronic | Status: RESOLVED | Noted: 2017-12-06 | Resolved: 2025-02-25

## 2025-02-25 PROBLEM — M79.7 FIBROMYALGIA: Status: RESOLVED | Noted: 2018-10-23 | Resolved: 2025-02-25

## 2025-02-25 PROCEDURE — 1123F ACP DISCUSS/DSCN MKR DOCD: CPT | Performed by: NURSE PRACTITIONER

## 2025-02-25 PROCEDURE — 1160F RVW MEDS BY RX/DR IN RCRD: CPT | Performed by: NURSE PRACTITIONER

## 2025-02-25 PROCEDURE — 3075F SYST BP GE 130 - 139MM HG: CPT | Performed by: NURSE PRACTITIONER

## 2025-02-25 PROCEDURE — 99024 POSTOP FOLLOW-UP VISIT: CPT | Performed by: ORTHOPAEDIC SURGERY

## 2025-02-25 PROCEDURE — 1159F MED LIST DOCD IN RCRD: CPT | Performed by: NURSE PRACTITIONER

## 2025-02-25 PROCEDURE — 3078F DIAST BP <80 MM HG: CPT | Performed by: NURSE PRACTITIONER

## 2025-02-25 PROCEDURE — 1126F AMNT PAIN NOTED NONE PRSNT: CPT | Performed by: NURSE PRACTITIONER

## 2025-02-25 PROCEDURE — 99204 OFFICE O/P NEW MOD 45 MIN: CPT | Performed by: NURSE PRACTITIONER

## 2025-02-25 RX ORDER — GABAPENTIN 100 MG/1
100 CAPSULE ORAL 2 TIMES DAILY
Qty: 60 CAPSULE | Refills: 3 | Status: SHIPPED | OUTPATIENT
Start: 2025-02-25 | End: 2025-06-25

## 2025-02-25 RX ORDER — ATORVASTATIN CALCIUM 80 MG/1
80 TABLET, FILM COATED ORAL DAILY
Qty: 90 TABLET | Refills: 1 | Status: SHIPPED | OUTPATIENT
Start: 2025-02-25

## 2025-02-25 ASSESSMENT — ENCOUNTER SYMPTOMS
APNEA: 1
BACK PAIN: 1

## 2025-02-25 ASSESSMENT — PATIENT HEALTH QUESTIONNAIRE - PHQ9
SUM OF ALL RESPONSES TO PHQ QUESTIONS 1-9: 0
2. FEELING DOWN, DEPRESSED OR HOPELESS: NOT AT ALL
SUM OF ALL RESPONSES TO PHQ QUESTIONS 1-9: 0
1. LITTLE INTEREST OR PLEASURE IN DOING THINGS: NOT AT ALL
SUM OF ALL RESPONSES TO PHQ9 QUESTIONS 1 & 2: 0

## 2025-02-25 NOTE — PROGRESS NOTES
[facility] Neurology [address], [address]  [place] [address]  [phone]      Chief Complaint   Patient presents with    New Patient       My Rebolledo is a 79 y.o. female who presents on referral from Dr. Frankie Duarte for left facial pain and left facial numbness.     PMH significant for anxiety/depression, anemia, chronic pain (followed by pain management), CKD stage III, HTN, HLD, Hashimoto's thyroiditis, hypothryoidism, PTSD, fibromyaglia who is here today with left facial numbness that has persisted for a couple of years with progressive worsening over the past few weeks. Noted that she has been experiencing elevated blood pressures, and PCP has been adjusting her medications. Associated with pulsatile tinnitus in her left ear. Numbness in V2-V3 distribution.  No hx of trigeminal neuralgia or migraines.Endorses chronic neck,  left shoulder pain, and back pain followed by pain management and PT. Denies allodynia, worsening with biting or chewing, no changes in bowel/bladder, headache, weakness, vision changes, speech or swallowing difficulties.        MRI of brain was completed by PCP. MRI of brain negative for acute infarct, tiny linear area of bright T2 signal is seen along the right side of the  brainstem at the level of the pontomesencephalic junction.     She has hx of + FRANKY, + TPO Ab, normal ESR and CRP. Famhx- daughter with MS.     She is currently followed by Vascular Surgery for LICA stenosis, last CUS showed LICA stenosis of 70%. She has CTA of head/neck scheduled for tomorrow. She is currently on asa, atorvastatin, and zetia.      Former tobacco use, denies alcohol or recreational drug use.     Hx of sleep apnea- non compliant with CPAP due to inability to tolerate face mask. Followed by sleep medicine.   Past Medical History:   Diagnosis Date    Acute kidney insufficiency     Followed by PCP     Adverse effect of anesthesia     on 8/8/24 patient is unable to

## 2025-02-26 ENCOUNTER — TELEPHONE (OUTPATIENT)
Age: 80
End: 2025-02-26

## 2025-02-26 NOTE — TELEPHONE ENCOUNTER
----- Message from Dr. Miguel Herrera, DO sent at 2/25/2025  3:52 PM EST -----  Please let patient know her labs were normal.    It looks like vascular surgery attempted to call her to set up an appointment regarding her carotid US findings. It important to call them back to set up an appointment.    Thank you.

## 2025-02-28 ENCOUNTER — TELEPHONE (OUTPATIENT)
Dept: PRIMARY CARE CLINIC | Facility: CLINIC | Age: 80
End: 2025-02-28

## 2025-02-28 ENCOUNTER — TELEPHONE (OUTPATIENT)
Age: 80
End: 2025-02-28

## 2025-02-28 DIAGNOSIS — I49.3 FREQUENT PVCS: Primary | ICD-10-CM

## 2025-02-28 RX ORDER — METOPROLOL SUCCINATE 25 MG/1
25 TABLET, EXTENDED RELEASE ORAL DAILY
Qty: 30 TABLET | Refills: 11 | Status: SHIPPED | OUTPATIENT
Start: 2025-02-28

## 2025-02-28 NOTE — TELEPHONE ENCOUNTER
We tried to reach you to check you in for your virtual visit and also to collect your copay for todays visit of $35.00, please call our office  at [phone]  thank you.

## 2025-02-28 NOTE — TELEPHONE ENCOUNTER
----- Message from Dr. Miguel Herrera, DO sent at 2/28/2025  1:00 PM EST -----  Please let patient know her monitor showed frequent extra beats of the heart as we discussed in the office as well as intermittent fast heart rhythm. I would like her to start on a medication metoprolol that will suppress these extra beats if she is agreeable. I will discuss further at her next visit. Thank you.

## 2025-03-04 ENCOUNTER — OFFICE VISIT (OUTPATIENT)
Dept: ORTHOPEDIC SURGERY | Age: 80
End: 2025-03-04

## 2025-03-04 DIAGNOSIS — M67.921 TENDINOPATHY OF RIGHT BICEPS TENDON: ICD-10-CM

## 2025-03-04 DIAGNOSIS — M19.011 ARTHRITIS OF RIGHT ACROMIOCLAVICULAR JOINT: ICD-10-CM

## 2025-03-04 DIAGNOSIS — M25.511 RIGHT SHOULDER PAIN, UNSPECIFIED CHRONICITY: Primary | ICD-10-CM

## 2025-03-04 RX ORDER — METHYLPREDNISOLONE ACETATE 40 MG/ML
80 INJECTION, SUSPENSION INTRA-ARTICULAR; INTRALESIONAL; INTRAMUSCULAR; SOFT TISSUE ONCE
Status: COMPLETED | OUTPATIENT
Start: 2025-03-04 | End: 2025-03-04

## 2025-03-04 RX ADMIN — METHYLPREDNISOLONE ACETATE 80 MG: 40 INJECTION, SUSPENSION INTRA-ARTICULAR; INTRALESIONAL; INTRAMUSCULAR; SOFT TISSUE at 14:47

## 2025-03-04 NOTE — PROGRESS NOTES
PAD  Pertinent positives and negatives are addressed with the patient, particularly those related to musculoskeletal concerns.  Non-orthopaedic concerns were referred back to the primary care physician.    PE:    GEN: General appearance is that of a healthy patient, alert and oriented, in no distress. WDWN.  HEENT:  Normocephalic, atraumatic.  Extraocular muscles are intact.  Neck:  Supple.   Heart:  Regular pulse exam on all extremities.  Good color and warmth noted.  Lungs:  Normal non-labored breathing with no obvious shortness of breath.  Abdomen:  WNL's.  Skin:  No signs of rash or bruising.    Pysch: Answers questions appropriately, AO X 3.  MSK:    Cervical spine: No tenderness. Normal active motion. Negative Spurling's maneuver.     SHOULDER   Right (Involved) left   Skin Intact Intact   Radial Pulses 2+ Symmetrical 2+ Symmetrical   Deformity None Normal   Myotomes Normal Normal   Dermatomes  Normal Normal    ROM Symmetric Full   Strength Fairly decent strength throughout. No weakness   Atrophy None noted None noted   Effusion/Swelling  None noted None noted   Palpation/Tenderness Tender anteriorly No tenderness   Bicep Tendon Rupture  Negative, positive speeds and Enid's Negative signs   Bear Hug, Belly Press Negative, negative Negative   Crossed Arm Adduction Test normal    Instability/Ant. Apprehension Test None noted None noted   Impingement Positive  Neer, mosher, AOM Negative        X-rays:   Grashey, axillary and outlet views of the Right shoulder that were obtained and reviewed today in the office, show.  Does not appear to have severe arthritic change.  There are some calcifications just off the acromion poss bleed within the deltoid if not rotator cuff.  Evidence of prior surgery on the distal clavicle most notable on the outlet view.  Also prior subacromial decompression.  Evidence of calcification off the lesser tuberosity as well on the axillary view.    Impression: Right shoulder appears

## 2025-03-07 ENCOUNTER — OFFICE VISIT (OUTPATIENT)
Dept: INTERNAL MEDICINE CLINIC | Facility: CLINIC | Age: 80
End: 2025-03-07
Payer: COMMERCIAL

## 2025-03-07 VITALS
WEIGHT: 171 LBS | SYSTOLIC BLOOD PRESSURE: 92 MMHG | DIASTOLIC BLOOD PRESSURE: 64 MMHG | HEART RATE: 61 BPM | RESPIRATION RATE: 16 BRPM | OXYGEN SATURATION: 91 % | TEMPERATURE: 97.6 F | HEIGHT: 58 IN | BODY MASS INDEX: 35.89 KG/M2

## 2025-03-07 DIAGNOSIS — E78.5 DYSLIPIDEMIA: ICD-10-CM

## 2025-03-07 DIAGNOSIS — E66.01 MORBID (SEVERE) OBESITY DUE TO EXCESS CALORIES: ICD-10-CM

## 2025-03-07 DIAGNOSIS — I10 PRIMARY HYPERTENSION: Primary | Chronic | ICD-10-CM

## 2025-03-07 DIAGNOSIS — R73.03 PREDIABETES: ICD-10-CM

## 2025-03-07 DIAGNOSIS — I73.9 PAD (PERIPHERAL ARTERY DISEASE): Chronic | ICD-10-CM

## 2025-03-07 DIAGNOSIS — I65.22 CAROTID STENOSIS, ASYMPTOMATIC, LEFT: ICD-10-CM

## 2025-03-07 PROCEDURE — 99214 OFFICE O/P EST MOD 30 MIN: CPT | Performed by: INTERNAL MEDICINE

## 2025-03-07 PROCEDURE — 3078F DIAST BP <80 MM HG: CPT | Performed by: INTERNAL MEDICINE

## 2025-03-07 PROCEDURE — 1123F ACP DISCUSS/DSCN MKR DOCD: CPT | Performed by: INTERNAL MEDICINE

## 2025-03-07 PROCEDURE — 1159F MED LIST DOCD IN RCRD: CPT | Performed by: INTERNAL MEDICINE

## 2025-03-07 PROCEDURE — 1160F RVW MEDS BY RX/DR IN RCRD: CPT | Performed by: INTERNAL MEDICINE

## 2025-03-07 PROCEDURE — 3074F SYST BP LT 130 MM HG: CPT | Performed by: INTERNAL MEDICINE

## 2025-03-07 RX ORDER — AMLODIPINE BESYLATE 5 MG/1
5 TABLET ORAL DAILY
Qty: 90 TABLET | Refills: 3 | Status: SHIPPED | OUTPATIENT
Start: 2025-03-07

## 2025-03-07 NOTE — PROGRESS NOTES
workup  Laboratory Tests:  Order lipid panel and other relevant labs after April 15th  Follow-up:  Schedule next appointment after April 15th to review lab results  Combine next visit with annual wellness visit  Patient Education:  Explain rationale for reducing amlodipine dose  Discuss importance of adherence to medication regimen  Medication Adjustments:  Prescribe new 5mg amlodipine  Prescribe Sudafed (pseudoephedrine)    I have ordered the necessary labs for after April 15th and adjusted your amlodipine prescription.     We'll review the results at your next visit, which will also serve as your annual wellness visit.     In the meantime, try the Sudafed for your congestion and continue your other medications as prescribed. If you experience any worsening symptoms or have concerns about the medication changes, please contact the office.    1. Primary hypertension  Overview:  BP: 92/64        Assessment & Plan:   Chronic, at goal (stable), continue current treatment plan  Orders:  -     amLODIPine (NORVASC) 5 MG tablet; Take 1 tablet by mouth daily, Disp-90 tablet, R-3Normal  -     Comprehensive Metabolic Panel; Future  -     Lipid Panel; Future  2. PAD (peripheral artery disease)  Assessment & Plan:   Chronic, at goal (stable), continue current treatment plan  3. Carotid stenosis, asymptomatic, left  Overview:  CT Result (most recent):  CTA HEAD NECK W CONTRAST 02/26/2025    Narrative  CTA HEAD NECK W CONTRAST    Indication: Occlusion and stenosis of left carotid artery    Technique: Axial images of the neck and head were obtained after the uneventful  administration of 80 ml Isovue 370 iodinated contrast media. Contrast was used  to best identify the arterial structures.  Images were reviewed on a separate,  free standing, three-dimensional workstation as per the referring physicians  request.    All stenosis percentages derived by comparing the narrowest segment with the  distal Internal Carotid Artery luminal

## 2025-03-11 ASSESSMENT — PATIENT HEALTH QUESTIONNAIRE - PHQ9
2. FEELING DOWN, DEPRESSED OR HOPELESS: SEVERAL DAYS
SUM OF ALL RESPONSES TO PHQ QUESTIONS 1-9: 6
9. THOUGHTS THAT YOU WOULD BE BETTER OFF DEAD, OR OF HURTING YOURSELF: NOT AT ALL
3. TROUBLE FALLING OR STAYING ASLEEP: SEVERAL DAYS
7. TROUBLE CONCENTRATING ON THINGS, SUCH AS READING THE NEWSPAPER OR WATCHING TELEVISION: NOT AT ALL
5. POOR APPETITE OR OVEREATING: SEVERAL DAYS
SUM OF ALL RESPONSES TO PHQ QUESTIONS 1-9: 6
6. FEELING BAD ABOUT YOURSELF - OR THAT YOU ARE A FAILURE OR HAVE LET YOURSELF OR YOUR FAMILY DOWN: SEVERAL DAYS
10. IF YOU CHECKED OFF ANY PROBLEMS, HOW DIFFICULT HAVE THESE PROBLEMS MADE IT FOR YOU TO DO YOUR WORK, TAKE CARE OF THINGS AT HOME, OR GET ALONG WITH OTHER PEOPLE: SOMEWHAT DIFFICULT
9. THOUGHTS THAT YOU WOULD BE BETTER OFF DEAD, OR OF HURTING YOURSELF: NOT AT ALL
4. FEELING TIRED OR HAVING LITTLE ENERGY: SEVERAL DAYS
8. MOVING OR SPEAKING SO SLOWLY THAT OTHER PEOPLE COULD HAVE NOTICED. OR THE OPPOSITE - BEING SO FIDGETY OR RESTLESS THAT YOU HAVE BEEN MOVING AROUND A LOT MORE THAN USUAL: NOT AT ALL
2. FEELING DOWN, DEPRESSED OR HOPELESS: SEVERAL DAYS
1. LITTLE INTEREST OR PLEASURE IN DOING THINGS: SEVERAL DAYS
10. IF YOU CHECKED OFF ANY PROBLEMS, HOW DIFFICULT HAVE THESE PROBLEMS MADE IT FOR YOU TO DO YOUR WORK, TAKE CARE OF THINGS AT HOME, OR GET ALONG WITH OTHER PEOPLE: SOMEWHAT DIFFICULT
7. TROUBLE CONCENTRATING ON THINGS, SUCH AS READING THE NEWSPAPER OR WATCHING TELEVISION: NOT AT ALL
SUM OF ALL RESPONSES TO PHQ QUESTIONS 1-9: 6
5. POOR APPETITE OR OVEREATING: SEVERAL DAYS
4. FEELING TIRED OR HAVING LITTLE ENERGY: SEVERAL DAYS
1. LITTLE INTEREST OR PLEASURE IN DOING THINGS: SEVERAL DAYS
SUM OF ALL RESPONSES TO PHQ QUESTIONS 1-9: 6
6. FEELING BAD ABOUT YOURSELF - OR THAT YOU ARE A FAILURE OR HAVE LET YOURSELF OR YOUR FAMILY DOWN: SEVERAL DAYS
SUM OF ALL RESPONSES TO PHQ QUESTIONS 1-9: 6
3. TROUBLE FALLING OR STAYING ASLEEP: SEVERAL DAYS
8. MOVING OR SPEAKING SO SLOWLY THAT OTHER PEOPLE COULD HAVE NOTICED. OR THE OPPOSITE, BEING SO FIGETY OR RESTLESS THAT YOU HAVE BEEN MOVING AROUND A LOT MORE THAN USUAL: NOT AT ALL

## 2025-03-11 ASSESSMENT — ANXIETY QUESTIONNAIRES
4. TROUBLE RELAXING: SEVERAL DAYS
5. BEING SO RESTLESS THAT IT IS HARD TO SIT STILL: SEVERAL DAYS
2. NOT BEING ABLE TO STOP OR CONTROL WORRYING: MORE THAN HALF THE DAYS
6. BECOMING EASILY ANNOYED OR IRRITABLE: MORE THAN HALF THE DAYS
IF YOU CHECKED OFF ANY PROBLEMS ON THIS QUESTIONNAIRE, HOW DIFFICULT HAVE THESE PROBLEMS MADE IT FOR YOU TO DO YOUR WORK, TAKE CARE OF THINGS AT HOME, OR GET ALONG WITH OTHER PEOPLE: SOMEWHAT DIFFICULT
4. TROUBLE RELAXING: SEVERAL DAYS
3. WORRYING TOO MUCH ABOUT DIFFERENT THINGS: MORE THAN HALF THE DAYS
7. FEELING AFRAID AS IF SOMETHING AWFUL MIGHT HAPPEN: NEARLY EVERY DAY
7. FEELING AFRAID AS IF SOMETHING AWFUL MIGHT HAPPEN: NEARLY EVERY DAY
5. BEING SO RESTLESS THAT IT IS HARD TO SIT STILL: SEVERAL DAYS
IF YOU CHECKED OFF ANY PROBLEMS ON THIS QUESTIONNAIRE, HOW DIFFICULT HAVE THESE PROBLEMS MADE IT FOR YOU TO DO YOUR WORK, TAKE CARE OF THINGS AT HOME, OR GET ALONG WITH OTHER PEOPLE: SOMEWHAT DIFFICULT
1. FEELING NERVOUS, ANXIOUS, OR ON EDGE: SEVERAL DAYS
3. WORRYING TOO MUCH ABOUT DIFFERENT THINGS: MORE THAN HALF THE DAYS
6. BECOMING EASILY ANNOYED OR IRRITABLE: MORE THAN HALF THE DAYS
1. FEELING NERVOUS, ANXIOUS, OR ON EDGE: SEVERAL DAYS
2. NOT BEING ABLE TO STOP OR CONTROL WORRYING: MORE THAN HALF THE DAYS
GAD7 TOTAL SCORE: 12

## 2025-03-12 ENCOUNTER — TELEMEDICINE (OUTPATIENT)
Dept: BEHAVIORAL/MENTAL HEALTH CLINIC | Age: 80
End: 2025-03-12

## 2025-03-12 DIAGNOSIS — F41.1 GENERALIZED ANXIETY DISORDER: ICD-10-CM

## 2025-03-12 DIAGNOSIS — Z65.8 PSYCHOSOCIAL STRESSORS: ICD-10-CM

## 2025-03-12 DIAGNOSIS — F43.10 PTSD (POST-TRAUMATIC STRESS DISORDER): ICD-10-CM

## 2025-03-12 DIAGNOSIS — F33.41 RECURRENT MAJOR DEPRESSIVE DISORDER, IN PARTIAL REMISSION: Primary | ICD-10-CM

## 2025-03-12 DIAGNOSIS — F51.05 INSOMNIA DUE TO MENTAL DISORDER: ICD-10-CM

## 2025-03-12 RX ORDER — DESVENLAFAXINE 50 MG/1
50 TABLET, FILM COATED, EXTENDED RELEASE ORAL DAILY
Qty: 30 TABLET | Refills: 3 | Status: SHIPPED | OUTPATIENT
Start: 2025-04-01

## 2025-03-12 RX ORDER — CLONAZEPAM 0.5 MG/1
0.5 TABLET ORAL DAILY PRN
Qty: 30 TABLET | Refills: 2 | Status: SHIPPED | OUTPATIENT
Start: 2025-03-12 | End: 2025-06-10

## 2025-03-12 RX ORDER — ZOLPIDEM TARTRATE 10 MG/1
10 TABLET ORAL NIGHTLY PRN
Qty: 30 TABLET | Refills: 3 | Status: SHIPPED | OUTPATIENT
Start: 2025-04-01 | End: 2025-07-30

## 2025-03-12 NOTE — PROGRESS NOTES
Patient:  My Rebolledo  Age:  79 y.o.  :  1945     SEX:  female MRN:  [id number]     RACE: White (non-)     SEEN:  [x]  PATIENT  []  SPOUSE []  OTHER:                  3/11/2025     3:24 PM 2025    10:58 AM 2025     1:09 PM   PHQ-9    Little interest or pleasure in doing things 1 0 0   Feeling down, depressed, or hopeless 1 0 0   Trouble falling or staying asleep, or sleeping too much 1  0   Feeling tired or having little energy 1  0   Poor appetite or overeating 1  0   Feeling bad about yourself - or that you are a failure or have let yourself or your family down 1  0   Trouble concentrating on things, such as reading the newspaper or watching television 0  0   Moving or speaking so slowly that other people could have noticed. Or the opposite - being so fidgety or restless that you have been moving around a lot more than usual 0  0   Thoughts that you would be better off dead, or of hurting yourself in some way 0  0   PHQ-2 Score 2  0 0   PHQ-9 Total Score 6  0 0   If you checked off any problems, how difficult have these problems made it for you to do your work, take care of things at home, or get along with other people? 1  1       Patient-reported           3/11/2025     3:18 PM 2024     9:57 AM 2024     9:14 AM   URBANO-7 SCREENING   Feeling nervous, anxious, or on edge Several days Several days Several days   Not being able to stop or control worrying More than half the days Nearly every day Nearly every day   Worrying too much about different things More than half the days Not at all Nearly every day   Trouble relaxing Several days Not at all Several days   Being so restless that it is hard to sit still Several days Several days Several days   Becoming easily annoyed or irritable More than half the days Not at all Not at all   Feeling afraid as if something awful might happen Nearly every day Not at all Not at all   URBANO-7 Total Score 12  5 9   How difficult

## 2025-03-13 RX ORDER — TRAZODONE HYDROCHLORIDE 50 MG/1
TABLET ORAL
Qty: 90 TABLET | Refills: 2 | OUTPATIENT
Start: 2025-03-13

## 2025-03-13 ASSESSMENT — ENCOUNTER SYMPTOMS: SHORTNESS OF BREATH: 1

## 2025-03-13 NOTE — PROGRESS NOTES
10 mg 4/1/25 7/30/25 Yes Mdaison Fernandez MD   amLODIPine (NORVASC) 5 MG tablet Take 1 tablet by mouth daily 3/7/25  Yes Trey Nunes MD   metoprolol succinate (TOPROL XL) 25 MG extended release tablet Take 1 tablet by mouth daily 2/28/25  Yes Miguel Herrera DO   atorvastatin (LIPITOR) 80 MG tablet Take 1 tablet by mouth daily 2/25/25  Yes Anastacia Thorpe APRN   methocarbamol (ROBAXIN) 500 MG tablet Take 1 tablet by mouth 2 times daily as needed (spasms) 2/24/25 3/26/25 Yes Artie Berman MD   traZODone (DESYREL) 50 MG tablet Take 0.5-1 tablets by mouth nightly 2/19/25  Yes Madison Fernandez MD   ezetimibe (ZETIA) 10 MG tablet Take 1 tablet by mouth daily 2/19/25  Yes Trey Nunes MD   famotidine (PEPCID) 20 MG tablet Take 1 tablet by mouth at bedtime 1/13/25  Yes Kelley Pena MD   lisinopril (PRINIVIL;ZESTRIL) 40 MG tablet Take 1 tablet by mouth daily 2/10/25  Yes Trey Nunes MD   vitamin D (ERGOCALCIFEROL) 1.25 MG (34442 UT) CAPS capsule Take 1 capsule by mouth once a week 1/15/25  Yes Trey Nunes MD   vibegron (GEMTESA) 75 MG TABS tablet Take 1 tablet by mouth daily 1/6/25  Yes Mihaela Flor APRN - CNP   methenamine (HIPREX) 1 g tablet Take 1 tablet by mouth 2 times daily (with meals) 12/20/24  Yes Mihaela Flor APRN - CNP   SYNTHROID 112 MCG tablet Take 1 tablet by mouth Daily DAW1 12/9/24  Yes Leandra Cabello PA-C   naloxone (NARCAN) 4 MG/0.1ML LIQD nasal spray 1 spray by Nasal route as needed for Opioid Reversal 12/8/24  Yes Moises Solis PA   ondansetron (ZOFRAN-ODT) 8 MG TBDP disintegrating tablet Take 0.5 tablets by mouth every 6 hours Take 20 minutes prior to pain medication for nausea prevention 12/8/24  Yes Moises Solis PA   ondansetron (ZOFRAN-ODT) 4 MG disintegrating tablet Take 1 tablet by mouth every 6 hours as needed for Nausea or Vomiting 12/3/24  Yes Tea Mark MD   clotrimazole-betamethasone (LOTRISONE) 1-0.05 % cream Apply

## 2025-03-14 ENCOUNTER — OFFICE VISIT (OUTPATIENT)
Age: 80
End: 2025-03-14
Payer: COMMERCIAL

## 2025-03-14 VITALS
SYSTOLIC BLOOD PRESSURE: 130 MMHG | DIASTOLIC BLOOD PRESSURE: 56 MMHG | WEIGHT: 167.2 LBS | BODY MASS INDEX: 36.07 KG/M2 | HEART RATE: 60 BPM | HEIGHT: 57 IN

## 2025-03-14 DIAGNOSIS — E78.2 MIXED HYPERLIPIDEMIA: Chronic | ICD-10-CM

## 2025-03-14 DIAGNOSIS — I10 PRIMARY HYPERTENSION: Primary | Chronic | ICD-10-CM

## 2025-03-14 DIAGNOSIS — I49.3 FREQUENT PVCS: ICD-10-CM

## 2025-03-14 DIAGNOSIS — R06.09 DOE (DYSPNEA ON EXERTION): ICD-10-CM

## 2025-03-14 PROCEDURE — 1159F MED LIST DOCD IN RCRD: CPT | Performed by: INTERNAL MEDICINE

## 2025-03-14 PROCEDURE — 1123F ACP DISCUSS/DSCN MKR DOCD: CPT | Performed by: INTERNAL MEDICINE

## 2025-03-14 PROCEDURE — 99214 OFFICE O/P EST MOD 30 MIN: CPT | Performed by: INTERNAL MEDICINE

## 2025-03-14 PROCEDURE — 1126F AMNT PAIN NOTED NONE PRSNT: CPT | Performed by: INTERNAL MEDICINE

## 2025-03-14 PROCEDURE — 3075F SYST BP GE 130 - 139MM HG: CPT | Performed by: INTERNAL MEDICINE

## 2025-03-14 PROCEDURE — 1160F RVW MEDS BY RX/DR IN RCRD: CPT | Performed by: INTERNAL MEDICINE

## 2025-03-14 PROCEDURE — 3078F DIAST BP <80 MM HG: CPT | Performed by: INTERNAL MEDICINE

## 2025-03-19 ENCOUNTER — TELEPHONE (OUTPATIENT)
Age: 80
End: 2025-03-19

## 2025-03-19 NOTE — TELEPHONE ENCOUNTER
My Rebolledo to [facility] Cardiology Clinical Staff (supporting Miguel Herrera DO)       3/18/25  4:21 PM  My blood pressure is has been running low. Yesterday 97/45 110/53.     Today 123/26    91/62.   I am not feeling well.       Thanks

## 2025-03-19 NOTE — TELEPHONE ENCOUNTER
Miguel Herrera, Mita Bright RN  Caller: Unspecified (Today,  8:06 AM)  Lets start by cutting out amlodipine. Let us know how blood pressure is doing after this change and we can determine further adjustment. Thank you.

## 2025-03-19 NOTE — TELEPHONE ENCOUNTER
Notified of MD response. Will discontinue Amlodipine. Monitor BP daily and call in several days to let us know how BP doing. Patient verbalized understanding of instructions.

## 2025-03-19 NOTE — TELEPHONE ENCOUNTER
Patient reports her blood pressure has been low since Sunday. BP Sunday 97/45, 110/53  Monday 91/62  Today 107/61 64  Reports she is not feeling well- Dizziness and nausea. No CP or SOB. Patient reports she has been staying hydrated.  Taking  Amlodipine 5 mg qd              Lisinopril 40 mg qd              Toprol XL 25 mg qd  Will address with MD

## 2025-03-24 ENCOUNTER — TRANSCRIBE ORDERS (OUTPATIENT)
Dept: SCHEDULING | Age: 80
End: 2025-03-24

## 2025-03-24 ENCOUNTER — TELEPHONE (OUTPATIENT)
Age: 80
End: 2025-03-24

## 2025-03-24 ENCOUNTER — OFFICE VISIT (OUTPATIENT)
Age: 80
End: 2025-03-24
Payer: COMMERCIAL

## 2025-03-24 DIAGNOSIS — R63.4 UNINTENDED WEIGHT LOSS: ICD-10-CM

## 2025-03-24 DIAGNOSIS — K21.9 CHRONIC GERD: Primary | ICD-10-CM

## 2025-03-24 DIAGNOSIS — Z96.653 CHRONIC KNEE PAIN AFTER TOTAL REPLACEMENT OF BOTH KNEE JOINTS: ICD-10-CM

## 2025-03-24 DIAGNOSIS — K21.9 GASTROESOPHAGEAL REFLUX DISEASE, UNSPECIFIED WHETHER ESOPHAGITIS PRESENT: Primary | ICD-10-CM

## 2025-03-24 DIAGNOSIS — R10.84 GENERALIZED ABDOMINAL PAIN: ICD-10-CM

## 2025-03-24 DIAGNOSIS — G89.29 CHRONIC NECK PAIN: Primary | ICD-10-CM

## 2025-03-24 DIAGNOSIS — K59.01 SLOW TRANSIT CONSTIPATION: ICD-10-CM

## 2025-03-24 DIAGNOSIS — R10.12 LEFT UPPER QUADRANT PAIN: ICD-10-CM

## 2025-03-24 DIAGNOSIS — R63.0 DECREASED APPETITE: ICD-10-CM

## 2025-03-24 DIAGNOSIS — R10.13 EPIGASTRIC PAIN: ICD-10-CM

## 2025-03-24 DIAGNOSIS — R10.13 ABDOMINAL PAIN, EPIGASTRIC: ICD-10-CM

## 2025-03-24 DIAGNOSIS — M47.816 FACET ARTHROPATHY, LUMBAR: ICD-10-CM

## 2025-03-24 DIAGNOSIS — R63.4 UNINTENTIONAL WEIGHT LOSS: ICD-10-CM

## 2025-03-24 DIAGNOSIS — R10.32 ABDOMINAL PAIN, LEFT LOWER QUADRANT: ICD-10-CM

## 2025-03-24 DIAGNOSIS — M54.2 CHRONIC NECK PAIN: Primary | ICD-10-CM

## 2025-03-24 DIAGNOSIS — G89.28 CHRONIC KNEE PAIN AFTER TOTAL REPLACEMENT OF BOTH KNEE JOINTS: ICD-10-CM

## 2025-03-24 DIAGNOSIS — M25.561 CHRONIC KNEE PAIN AFTER TOTAL REPLACEMENT OF BOTH KNEE JOINTS: ICD-10-CM

## 2025-03-24 DIAGNOSIS — M62.830 LUMBAR PARASPINAL MUSCLE SPASM: ICD-10-CM

## 2025-03-24 DIAGNOSIS — M25.562 CHRONIC KNEE PAIN AFTER TOTAL REPLACEMENT OF BOTH KNEE JOINTS: ICD-10-CM

## 2025-03-24 DIAGNOSIS — M62.838 CERVICAL PARASPINAL MUSCLE SPASM: ICD-10-CM

## 2025-03-24 DIAGNOSIS — R10.32 LEFT LOWER QUADRANT PAIN: ICD-10-CM

## 2025-03-24 DIAGNOSIS — G89.29 CHRONIC BILATERAL LOW BACK PAIN WITHOUT SCIATICA: ICD-10-CM

## 2025-03-24 DIAGNOSIS — M70.61 GREATER TROCHANTERIC BURSITIS OF RIGHT HIP: ICD-10-CM

## 2025-03-24 DIAGNOSIS — M54.50 CHRONIC BILATERAL LOW BACK PAIN WITHOUT SCIATICA: ICD-10-CM

## 2025-03-24 DIAGNOSIS — M70.62 GREATER TROCHANTERIC BURSITIS OF LEFT HIP: ICD-10-CM

## 2025-03-24 DIAGNOSIS — R63.0 LOSS OF APPETITE: ICD-10-CM

## 2025-03-24 PROCEDURE — 1160F RVW MEDS BY RX/DR IN RCRD: CPT | Performed by: ANESTHESIOLOGY

## 2025-03-24 PROCEDURE — 1159F MED LIST DOCD IN RCRD: CPT | Performed by: ANESTHESIOLOGY

## 2025-03-24 PROCEDURE — 99214 OFFICE O/P EST MOD 30 MIN: CPT | Performed by: ANESTHESIOLOGY

## 2025-03-24 PROCEDURE — 1123F ACP DISCUSS/DSCN MKR DOCD: CPT | Performed by: ANESTHESIOLOGY

## 2025-03-24 RX ORDER — METHOCARBAMOL 500 MG/1
500 TABLET, FILM COATED ORAL 2 TIMES DAILY PRN
Qty: 60 TABLET | Refills: 2 | Status: SHIPPED | OUTPATIENT
Start: 2025-03-24 | End: 2025-06-22

## 2025-03-24 ASSESSMENT — ENCOUNTER SYMPTOMS
SHORTNESS OF BREATH: 0
BACK PAIN: 1
ABDOMINAL PAIN: 1

## 2025-03-24 NOTE — PROGRESS NOTES
Chronic Pain Follow-up Note    Date: March 24, 2025  Patient Name: My Rebolledo  MRN: [id number]  PCP: Trey Nunes  Referring Provider: No ref. provider found    Assessment:   Diagnosis:  1. Chronic neck pain    2. Cervical paraspinal muscle spasm    3. 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 Greater trochanteric bursitis of right hip    5. Greater trochanteric bursitis of left hip    6. Lumbar paraspinal muscle spasm    7. Facet arthropathy, lumbar    8. Chronic bilateral low back pain without sciatica        Plan:      General Recommendations: The pain condition that the patient suffers from is best treated with a multidisciplinary approach that involves an increase in physical activity to prevent de-conditioning and worsening of the pain cycle, as well as psychological counseling (formal and/or informal) to address the co-morbid psychological effects of pain.  Treatment will often involve judicious use of pain medications and interventional procedures to decrease the pain, allowing the patient to participate in the physical activity that will ultimately produce long-lasting pain reductions.  The goal of the multidisciplinary approach is to return the patient to a higher level of overall function and to restore their ability to perform activities of daily living.    Testing:   Reviewed x-ray cervical spine, MRI left shoulder, MRI lumbar spine, PCP notes, orthopedic notes, and lab work.  Requesting records from her prior pain clinics.     Therapy:   Has done fairly extensive therapy, including 12 sessions of physical therapy in 2023, with no significant relief.  Has tried aquatic therapy, TENS unit, chiropractic treatment, massage therapy.  Uses heat and ice with some benefit.     Medications:   Refilled Robaxin 500 mg twice daily as needed for her muscle spasms and tightness.  Would avoid NSAIDs given her chronic kidney disease.  Patient is going to talk to her mental 
 Results    L shoulder injections        L shoulder arthroscopy 4/2023      L shoulder arthroscopy debridement  12/9/24      lumbar injections x2 2024 Helped pain   Bilateral hip bursa 6/2024 No change                     Previous medication trials:   Class Medication Results   Anti-inflammatory (NSAID) Cannot take due to chronic kidney disease    Neuropathic agent (AED) Gabapentin, Lyrica Cannot recall   Serotonergic agent (antidepressant) Cymbalta Cannot recall   Muscle relaxant     Topical     Acetaminophen 325mg Mild benefit   Oral Steroid     Opioid Norco Good benefit       Opioid Risk Tool  Female  Male  Points    Family history of substance abuse                 Alcohol  1  3             Illegal drugs  2  3  2           Prescription drugs  4  4      Personal history of substance abuse                 Alcohol  3  3             Illegal drugs  4  4             Prescription drugs  5  5      Age between 16-45 years  1  1      History of preadolescent sexual abuse  3  0      Psychological disease                 ADD, OCD, bipolar, schizophrenia  2  2            Depression  1  1  1    Total: 3 or less = low, 8 or higher = high      3          Review of Systems   Constitutional:  Negative for chills and fever.   Respiratory:  Negative for shortness of breath.    Cardiovascular:  Negative for chest pain.   Gastrointestinal:  Negative for abdominal pain.   Musculoskeletal:  Positive for arthralgias, back pain, neck pain and neck stiffness.   Psychiatric/Behavioral:  Negative for confusion.         Objective:     There were no vitals filed for this visit.   Wt Readings from Last 3 Encounters:   03/14/25 75.8 kg (167 lb 3.2 oz)   03/07/25 77.6 kg (171 lb)   03/03/25 78.5 kg (173 lb)      Ht Readings from Last 3 Encounters:   03/14/25 1.448 m (4' 9\")   03/07/25 1.473 m (4' 9.99\")   03/03/25 1.473 m (4' 10\")        Physical Exam  Vitals reviewed.   Constitutional:       Appearance: Normal appearance.   HENT:      Head:

## 2025-03-24 NOTE — TELEPHONE ENCOUNTER
Patient reports she does not feel well. She reports she thinks it is GI problem- has appt to see them today at 3 pm. BP good 100/80 and 125/80. Will notify this office after GI appt if needed.

## 2025-03-25 ENCOUNTER — HOSPITAL ENCOUNTER (OUTPATIENT)
Dept: CT IMAGING | Age: 80
Discharge: HOME | End: 2025-03-28
Payer: COMMERCIAL

## 2025-03-25 DIAGNOSIS — R63.0 DECREASED APPETITE: ICD-10-CM

## 2025-03-25 DIAGNOSIS — R10.13 EPIGASTRIC PAIN: ICD-10-CM

## 2025-03-25 DIAGNOSIS — K59.01 SLOW TRANSIT CONSTIPATION: ICD-10-CM

## 2025-03-25 DIAGNOSIS — K21.9 CHRONIC GERD: ICD-10-CM

## 2025-03-25 DIAGNOSIS — R10.32 LEFT LOWER QUADRANT PAIN: ICD-10-CM

## 2025-03-25 DIAGNOSIS — R63.4 UNINTENTIONAL WEIGHT LOSS: ICD-10-CM

## 2025-03-25 PROCEDURE — 6360000004 HC RX CONTRAST MEDICATION: Performed by: NURSE PRACTITIONER

## 2025-03-25 PROCEDURE — 74177 CT ABD & PELVIS W/CONTRAST: CPT

## 2025-03-25 RX ORDER — IOPAMIDOL 755 MG/ML
100 INJECTION, SOLUTION INTRAVASCULAR
Status: COMPLETED | OUTPATIENT
Start: 2025-03-25 | End: 2025-03-25

## 2025-03-25 RX ORDER — DIATRIZOATE MEGLUMINE AND DIATRIZOATE SODIUM 660; 100 MG/ML; MG/ML
15 SOLUTION ORAL; RECTAL
Status: DISCONTINUED | OUTPATIENT
Start: 2025-03-25 | End: 2025-03-29 | Stop reason: HOSPADM

## 2025-03-25 RX ADMIN — DIATRIZOATE MEGLUMINE AND DIATRIZOATE SODIUM 15 ML: 660; 100 LIQUID ORAL; RECTAL at 16:30

## 2025-03-25 RX ADMIN — IOPAMIDOL 100 ML: 755 INJECTION, SOLUTION INTRAVENOUS at 16:30

## 2025-03-26 DIAGNOSIS — E03.9 PRIMARY HYPOTHYROIDISM: Chronic | ICD-10-CM

## 2025-03-26 LAB
T4 FREE SERPL-MCNC: 1.8 NG/DL (ref 0.9–1.7)
TSH, 3RD GENERATION: 1.03 UIU/ML (ref 0.27–4.2)

## 2025-04-03 ENCOUNTER — TELEPHONE (OUTPATIENT)
Dept: NEUROLOGY | Age: 80
End: 2025-04-03

## 2025-04-03 ENCOUNTER — PATIENT MESSAGE (OUTPATIENT)
Dept: INTERNAL MEDICINE CLINIC | Facility: CLINIC | Age: 80
End: 2025-04-03

## 2025-04-03 DIAGNOSIS — J30.2 SEASONAL ALLERGIES: Primary | ICD-10-CM

## 2025-04-04 ENCOUNTER — OFFICE VISIT (OUTPATIENT)
Dept: ENDOCRINOLOGY | Age: 80
End: 2025-04-04
Payer: COMMERCIAL

## 2025-04-04 VITALS
DIASTOLIC BLOOD PRESSURE: 82 MMHG | SYSTOLIC BLOOD PRESSURE: 124 MMHG | BODY MASS INDEX: 34.76 KG/M2 | HEIGHT: 58 IN | OXYGEN SATURATION: 99 % | HEART RATE: 77 BPM | WEIGHT: 165.6 LBS

## 2025-04-04 DIAGNOSIS — E06.3 HASHIMOTO'S THYROIDITIS: ICD-10-CM

## 2025-04-04 DIAGNOSIS — E55.9 VITAMIN D DEFICIENCY: ICD-10-CM

## 2025-04-04 DIAGNOSIS — E03.9 PRIMARY HYPOTHYROIDISM: Primary | Chronic | ICD-10-CM

## 2025-04-04 DIAGNOSIS — I10 PRIMARY HYPERTENSION: Chronic | ICD-10-CM

## 2025-04-04 PROCEDURE — 1159F MED LIST DOCD IN RCRD: CPT | Performed by: PHYSICIAN ASSISTANT

## 2025-04-04 PROCEDURE — 99214 OFFICE O/P EST MOD 30 MIN: CPT | Performed by: PHYSICIAN ASSISTANT

## 2025-04-04 PROCEDURE — 3074F SYST BP LT 130 MM HG: CPT | Performed by: PHYSICIAN ASSISTANT

## 2025-04-04 PROCEDURE — 1160F RVW MEDS BY RX/DR IN RCRD: CPT | Performed by: PHYSICIAN ASSISTANT

## 2025-04-04 PROCEDURE — G2211 COMPLEX E/M VISIT ADD ON: HCPCS | Performed by: PHYSICIAN ASSISTANT

## 2025-04-04 PROCEDURE — 1123F ACP DISCUSS/DSCN MKR DOCD: CPT | Performed by: PHYSICIAN ASSISTANT

## 2025-04-04 PROCEDURE — 3079F DIAST BP 80-89 MM HG: CPT | Performed by: PHYSICIAN ASSISTANT

## 2025-04-04 RX ORDER — FLUTICASONE PROPIONATE 50 MCG
2 SPRAY, SUSPENSION (ML) NASAL DAILY
Qty: 16 G | Refills: 5 | Status: SHIPPED | OUTPATIENT
Start: 2025-04-04

## 2025-04-04 RX ORDER — LEVOTHYROXINE SODIUM 112 MCG
112 TABLET ORAL DAILY
Qty: 90 TABLET | Refills: 3 | Status: SHIPPED | OUTPATIENT
Start: 2025-04-04

## 2025-04-04 RX ORDER — OMEPRAZOLE 40 MG/1
40 CAPSULE, DELAYED RELEASE ORAL DAILY
COMMUNITY
Start: 2025-03-24

## 2025-04-04 ASSESSMENT — ENCOUNTER SYMPTOMS
NAUSEA: 1
DIARRHEA: 1
BACK PAIN: 0
CONSTIPATION: 0
TROUBLE SWALLOWING: 0
VOICE CHANGE: 0

## 2025-04-04 NOTE — PROGRESS NOTES
[facility] ENDOCRINOLOGY   AND   THYROID NODULE CLINIC    Leandra Cabello PA-C  [facility] Endocrinology and Thyroid Nodule Clinic  [address] [facility] [address] [address]  Phone [phone]  Facsimile [phone]          yM Rebolledo is a 79 y.o. female seen 4/4/2025 for follow up evaluation of primary hypothyroidism         Assessment and Plan:    Assessment & Plan            1. Primary hypothyroidism  Stable.  - TSH levels within normal range, Synthroid dosage appropriate.  - Prescription refill for Synthroid 112 mcg provided; taken nightly with water.  - Re-evaluation of thyroid levels in 3 months without appt then again before 6 month follow up    - TSH; Future  - T4, Free; Future  - TSH; Future  - T4, Free; Future  - SYNTHROID 112 MCG tablet; Take 1 tablet by mouth Daily DAW1  Dispense: 90 tablet; Refill: 3    2. Vitamin D deficiency  - Previously low vitamin D levels.  - Currently taking vitamin D weekly.  - Switch to OTC vitamin D3 per PCP, take with food.  - Re-evaluation of vitamin D levels in 6 months.      - Vitamin D 25 Hydroxy; Future    3. Hashimoto's thyroiditis  TPO antibodies positive indicating Hashimoto's thyroiditis. Previously, advised that diagnosis of one autoimmune disease increases risk of developing other autoimmune disease. Importance of self care, stress management, self monitoring was emphasized.        4. Primary hypertension  - Significant BP spike in February, reaching 290/170 mmHg.  - Resumed lisinopril after brief discontinuation.  - Continue monitoring BP regularly.            My was seen today for follow-up.    Diagnoses and all orders for this visit:    Primary hypothyroidism  -     TSH; Future  -     T4, Free; Future  -     TSH; Future  -     T4, Free; Future  -     SYNTHROID 112 MCG tablet; Take 1 tablet by mouth Daily DAW1    Vitamin D deficiency  -     Vitamin D 25 Hydroxy; Future    Hashimoto's thyroiditis    Primary

## 2025-04-05 ENCOUNTER — RESULTS FOLLOW-UP (OUTPATIENT)
Age: 80
End: 2025-04-05

## 2025-04-23 ENCOUNTER — TELEPHONE (OUTPATIENT)
Age: 80
End: 2025-04-23

## 2025-04-23 ENCOUNTER — PATIENT MESSAGE (OUTPATIENT)
Age: 80
End: 2025-04-23

## 2025-04-23 DIAGNOSIS — R73.03 PREDIABETES: ICD-10-CM

## 2025-04-23 DIAGNOSIS — E78.5 DYSLIPIDEMIA: ICD-10-CM

## 2025-04-23 DIAGNOSIS — I10 PRIMARY HYPERTENSION: Primary | ICD-10-CM

## 2025-04-23 DIAGNOSIS — I10 PRIMARY HYPERTENSION: Chronic | ICD-10-CM

## 2025-04-23 LAB
ALBUMIN SERPL-MCNC: 3.2 G/DL (ref 3.2–4.6)
ALBUMIN/GLOB SERPL: 0.9 (ref 1–1.9)
ALP SERPL-CCNC: 76 U/L (ref 35–104)
ALT SERPL-CCNC: 18 U/L (ref 8–45)
ANION GAP SERPL CALC-SCNC: 11 MMOL/L (ref 7–16)
AST SERPL-CCNC: 21 U/L (ref 15–37)
BILIRUB SERPL-MCNC: 0.2 MG/DL (ref 0–1.2)
BUN SERPL-MCNC: 11 MG/DL (ref 8–23)
CALCIUM SERPL-MCNC: 9.4 MG/DL (ref 8.8–10.2)
CHLORIDE SERPL-SCNC: 101 MMOL/L (ref 98–107)
CHOLEST SERPL-MCNC: 131 MG/DL (ref 0–200)
CO2 SERPL-SCNC: 29 MMOL/L (ref 20–29)
CREAT SERPL-MCNC: 1.09 MG/DL (ref 0.6–1.1)
EST. AVERAGE GLUCOSE BLD GHB EST-MCNC: 119 MG/DL
GLOBULIN SER CALC-MCNC: 3.5 G/DL (ref 2.3–3.5)
GLUCOSE SERPL-MCNC: 118 MG/DL (ref 70–99)
HBA1C MFR BLD: 5.8 % (ref 0–5.6)
HDLC SERPL-MCNC: 48 MG/DL (ref 40–60)
HDLC SERPL: 2.7 (ref 0–5)
LDLC SERPL CALC-MCNC: 36 MG/DL (ref 0–100)
POTASSIUM SERPL-SCNC: 4.5 MMOL/L (ref 3.5–5.1)
PROT SERPL-MCNC: 6.7 G/DL (ref 6.3–8.2)
SODIUM SERPL-SCNC: 140 MMOL/L (ref 136–145)
TRIGL SERPL-MCNC: 236 MG/DL (ref 0–150)
VLDLC SERPL CALC-MCNC: 47 MG/DL (ref 6–23)

## 2025-04-23 RX ORDER — AMLODIPINE BESYLATE 2.5 MG/1
2.5 TABLET ORAL DAILY
Qty: 30 TABLET | Refills: 11 | Status: SHIPPED | OUTPATIENT
Start: 2025-04-23

## 2025-04-23 NOTE — TELEPHONE ENCOUNTER
Advised patient of Dr. Herrera's response. Instructed patient continue to monitor and record BP, send BP report in 1-2 weeks, and call with any further questions or concerns. Patient verbalized understanding. She states [facility] has already notified her amlodipine Rx is ready for pick-up.

## 2025-04-23 NOTE — TELEPHONE ENCOUNTER
Blood Pressure  (Newest Message First)               4/23/25  8:49 AM  Jocelyn Blanco MA routed this conversation to [facility] Cardiology Triage (Selected Message)    4/23/25  7:59 AM  Abigail Bailey MA routed this conversation to Jocelyn Blanco MA Joan Louise Craven to P [facility] Cardiology Clinical Staff (supporting Miguel Herrera DO)        4/23/25  7:58 AM  I feel like I am pounding in the mornings. I have been taking my blood pressure and it seems higher in later evenings and mornings than when first started on meds.  4/15- 8:30 am- 151/86  4/16-3:30am-181/43  4/17-2:00am-150/74  4/18-11:00pm-166/70  4/20- 8:30am-156/71 11:00pm  173/76  4//103  4//79

## 2025-04-23 NOTE — TELEPHONE ENCOUNTER
Miguel Herrera, Lou Sarmiento RN  Caller: Unspecified (Today,  9:51 AM)  Lets try starting back amlodipine at a lower dose than what she was on before - 2.5mg daily. Please let us know how BP is doing 1-2 weeks after this change. Thank you.

## 2025-04-24 ENCOUNTER — RESULTS FOLLOW-UP (OUTPATIENT)
Dept: INTERNAL MEDICINE CLINIC | Facility: CLINIC | Age: 80
End: 2025-04-24

## 2025-04-29 ENCOUNTER — OFFICE VISIT (OUTPATIENT)
Dept: INTERNAL MEDICINE CLINIC | Facility: CLINIC | Age: 80
End: 2025-04-29
Payer: COMMERCIAL

## 2025-04-29 VITALS
BODY MASS INDEX: 34.97 KG/M2 | SYSTOLIC BLOOD PRESSURE: 128 MMHG | HEIGHT: 58 IN | TEMPERATURE: 97.6 F | WEIGHT: 166.6 LBS | DIASTOLIC BLOOD PRESSURE: 60 MMHG | OXYGEN SATURATION: 99 % | HEART RATE: 88 BPM | RESPIRATION RATE: 16 BRPM

## 2025-04-29 DIAGNOSIS — G89.4 CHRONIC PAIN DISORDER: ICD-10-CM

## 2025-04-29 DIAGNOSIS — I73.9 PAD (PERIPHERAL ARTERY DISEASE): Chronic | ICD-10-CM

## 2025-04-29 DIAGNOSIS — D50.9 IRON DEFICIENCY ANEMIA, UNSPECIFIED IRON DEFICIENCY ANEMIA TYPE: Chronic | ICD-10-CM

## 2025-04-29 DIAGNOSIS — E66.9 OBESITY (BMI 30-39.9): ICD-10-CM

## 2025-04-29 DIAGNOSIS — F33.1 MODERATE EPISODE OF RECURRENT MAJOR DEPRESSIVE DISORDER (HCC): ICD-10-CM

## 2025-04-29 DIAGNOSIS — I49.9 CARDIAC ARRHYTHMIA, UNSPECIFIED CARDIAC ARRHYTHMIA TYPE: ICD-10-CM

## 2025-04-29 DIAGNOSIS — E03.9 PRIMARY HYPOTHYROIDISM: ICD-10-CM

## 2025-04-29 DIAGNOSIS — E78.5 DYSLIPIDEMIA: ICD-10-CM

## 2025-04-29 DIAGNOSIS — R73.03 PREDIABETES: ICD-10-CM

## 2025-04-29 DIAGNOSIS — E55.9 VITAMIN D DEFICIENCY: ICD-10-CM

## 2025-04-29 DIAGNOSIS — I10 PRIMARY HYPERTENSION: ICD-10-CM

## 2025-04-29 DIAGNOSIS — N18.32 STAGE 3B CHRONIC KIDNEY DISEASE (HCC): ICD-10-CM

## 2025-04-29 DIAGNOSIS — F41.8 DEPRESSION WITH ANXIETY: Chronic | ICD-10-CM

## 2025-04-29 DIAGNOSIS — K21.9 GASTROESOPHAGEAL REFLUX DISEASE, UNSPECIFIED WHETHER ESOPHAGITIS PRESENT: ICD-10-CM

## 2025-04-29 DIAGNOSIS — N30.10 CHRONIC INTERSTITIAL CYSTITIS: ICD-10-CM

## 2025-04-29 DIAGNOSIS — Z00.00 MEDICARE ANNUAL WELLNESS VISIT, SUBSEQUENT: Primary | ICD-10-CM

## 2025-04-29 DIAGNOSIS — E53.8 VITAMIN B12 DEFICIENCY: ICD-10-CM

## 2025-04-29 PROCEDURE — 1159F MED LIST DOCD IN RCRD: CPT | Performed by: INTERNAL MEDICINE

## 2025-04-29 PROCEDURE — 3078F DIAST BP <80 MM HG: CPT | Performed by: INTERNAL MEDICINE

## 2025-04-29 PROCEDURE — 99214 OFFICE O/P EST MOD 30 MIN: CPT | Performed by: INTERNAL MEDICINE

## 2025-04-29 PROCEDURE — 3074F SYST BP LT 130 MM HG: CPT | Performed by: INTERNAL MEDICINE

## 2025-04-29 PROCEDURE — G0439 PPPS, SUBSEQ VISIT: HCPCS | Performed by: INTERNAL MEDICINE

## 2025-04-29 PROCEDURE — 1123F ACP DISCUSS/DSCN MKR DOCD: CPT | Performed by: INTERNAL MEDICINE

## 2025-04-29 RX ORDER — ASPIRIN 81 MG/1
81 TABLET ORAL DAILY
COMMUNITY

## 2025-04-29 ASSESSMENT — PATIENT HEALTH QUESTIONNAIRE - PHQ9
SUM OF ALL RESPONSES TO PHQ QUESTIONS 1-9: 4
1. LITTLE INTEREST OR PLEASURE IN DOING THINGS: NOT AT ALL
SUM OF ALL RESPONSES TO PHQ QUESTIONS 1-9: 4
9. THOUGHTS THAT YOU WOULD BE BETTER OFF DEAD, OR OF HURTING YOURSELF: NOT AT ALL
3. TROUBLE FALLING OR STAYING ASLEEP: NOT AT ALL
6. FEELING BAD ABOUT YOURSELF - OR THAT YOU ARE A FAILURE OR HAVE LET YOURSELF OR YOUR FAMILY DOWN: SEVERAL DAYS
2. FEELING DOWN, DEPRESSED OR HOPELESS: SEVERAL DAYS
5. POOR APPETITE OR OVEREATING: SEVERAL DAYS
8. MOVING OR SPEAKING SO SLOWLY THAT OTHER PEOPLE COULD HAVE NOTICED. OR THE OPPOSITE, BEING SO FIGETY OR RESTLESS THAT YOU HAVE BEEN MOVING AROUND A LOT MORE THAN USUAL: NOT AT ALL
4. FEELING TIRED OR HAVING LITTLE ENERGY: SEVERAL DAYS
7. TROUBLE CONCENTRATING ON THINGS, SUCH AS READING THE NEWSPAPER OR WATCHING TELEVISION: NOT AT ALL
SUM OF ALL RESPONSES TO PHQ QUESTIONS 1-9: 4
SUM OF ALL RESPONSES TO PHQ QUESTIONS 1-9: 4
10. IF YOU CHECKED OFF ANY PROBLEMS, HOW DIFFICULT HAVE THESE PROBLEMS MADE IT FOR YOU TO DO YOUR WORK, TAKE CARE OF THINGS AT HOME, OR GET ALONG WITH OTHER PEOPLE: SOMEWHAT DIFFICULT

## 2025-04-29 NOTE — PATIENT INSTRUCTIONS
Preventive Plan for My Rebolledo - 4/29/2025  Medicare offers a range of preventive health benefits. Some of the tests and screenings are paid in full while other may be subject to a deductible, co-insurance, and/or copay.  Some of these benefits include a comprehensive review of your medical history including lifestyle, illnesses that may run in your family, and various assessments and screenings as appropriate.  After reviewing your medical record and screening and assessments performed today your provider may have ordered immunizations, labs, imaging, and/or referrals for you.  A list of these orders (if applicable) as well as your Preventive Care list are included within your After Visit Summary for your review.       same name as above

## 2025-04-29 NOTE — PROGRESS NOTES
were made and/or referrals ordered.    Positive Risk Factor Screenings with Interventions:                Abnormal BMI (obese):  Body mass index is 34.83 kg/m². (!) Abnormal  Interventions:  Patient advised to follow-up in this office for further evaluation and treatment                           Objective   Vitals:    04/29/25 1302   BP: 128/60   BP Site: Left Upper Arm   Patient Position: Sitting   Pulse: 88   Resp: 16   Temp: 97.6 °F (36.4 °C)   TempSrc: Temporal   SpO2: 99%   Weight: 75.6 kg (166 lb 9.6 oz)   Height: 1.473 m (4' 9.99\")      Body mass index is 34.83 kg/m².                    Allergies   Allergen Reactions    Fire Ant Anaphylaxis    Amoxicillin Other (See Comments)     C-Diff    Amoxicillin-Pot Clavulanate Other (See Comments)     c.diff    Augmentin [Amoxicillin-Pot Clavulanate]     Cephalexin Other (See Comments)     C Diff      Cephalexin Other (See Comments)     C-Diff    Cephalosporins Other (See Comments)    Docusate     Hydromorphone Other (See Comments)     Keeps her awake    Meperidine Hcl Other (See Comments)    Morphine Other (See Comments)     Awake    No sleep    Other Other (See Comments)    Oxycodone Other (See Comments)    Oxycodone-Acetaminophen Other (See Comments)     Jittery  Nightmares    Oxycodone-Aspirin Other (See Comments)     Jittery  Other reaction(s): Unknown-Unspecified  Jittery    Penicillins Other (See Comments)     C-Diff      Potassium     Povidone Iodine     Povidone-Iodine Other (See Comments)     Burns    burn    Adhesive Tape Itching and Rash     \"sticky ace wrap\"  \"sticky ace wrap\"    Meperidine Rash     Prior to Visit Medications    Medication Sig Taking? Authorizing Provider   amLODIPine (NORVASC) 2.5 MG tablet Take 1 tablet by mouth daily Yes Miguel Herrera,    omeprazole (PRILOSEC) 40 MG delayed release capsule Take 1 capsule by mouth daily Yes Provider, MD Kelley   SYNTHROID 112 MCG tablet Take 1 tablet by mouth Daily DAW1 Yes Destiney

## 2025-05-02 ENCOUNTER — OFFICE VISIT (OUTPATIENT)
Dept: NEUROLOGY | Age: 80
End: 2025-05-02
Payer: COMMERCIAL

## 2025-05-02 VITALS
OXYGEN SATURATION: 100 % | WEIGHT: 166 LBS | HEART RATE: 74 BPM | SYSTOLIC BLOOD PRESSURE: 125 MMHG | HEIGHT: 57 IN | DIASTOLIC BLOOD PRESSURE: 63 MMHG | BODY MASS INDEX: 35.81 KG/M2

## 2025-05-02 DIAGNOSIS — R20.0 LEFT FACIAL NUMBNESS: ICD-10-CM

## 2025-05-02 DIAGNOSIS — G43.009 MIGRAINE WITHOUT AURA AND WITHOUT STATUS MIGRAINOSUS, NOT INTRACTABLE: Primary | ICD-10-CM

## 2025-05-02 PROCEDURE — 99214 OFFICE O/P EST MOD 30 MIN: CPT | Performed by: NURSE PRACTITIONER

## 2025-05-02 PROCEDURE — 1123F ACP DISCUSS/DSCN MKR DOCD: CPT | Performed by: NURSE PRACTITIONER

## 2025-05-02 PROCEDURE — 3078F DIAST BP <80 MM HG: CPT | Performed by: NURSE PRACTITIONER

## 2025-05-02 PROCEDURE — 1159F MED LIST DOCD IN RCRD: CPT | Performed by: NURSE PRACTITIONER

## 2025-05-02 PROCEDURE — 1160F RVW MEDS BY RX/DR IN RCRD: CPT | Performed by: NURSE PRACTITIONER

## 2025-05-02 PROCEDURE — 1126F AMNT PAIN NOTED NONE PRSNT: CPT | Performed by: NURSE PRACTITIONER

## 2025-05-02 PROCEDURE — 3074F SYST BP LT 130 MM HG: CPT | Performed by: NURSE PRACTITIONER

## 2025-05-02 RX ORDER — GABAPENTIN 300 MG/1
300 CAPSULE ORAL 2 TIMES DAILY
Qty: 180 CAPSULE | Refills: 1 | Status: SHIPPED | OUTPATIENT
Start: 2025-05-02 | End: 2025-10-29

## 2025-05-02 ASSESSMENT — ENCOUNTER SYMPTOMS
PHOTOPHOBIA: 1
APNEA: 1
BACK PAIN: 1
NAUSEA: 1
ALLERGIC/IMMUNOLOGIC NEGATIVE: 1

## 2025-05-02 NOTE — PROGRESS NOTES
[facility] Neurology [address], [address]  [place] [address]  [phone]      Chief Complaint   Patient presents with    Follow-up       Mygómez Rebolledo is a 79 y.o. female who presents on referral from Dr. Frankie Duarte for left facial pain and left facial numbness.     PMH significant for anxiety/depression, anemia, chronic pain (followed by pain management), CKD stage III, HTN, HLD, Hashimoto's thyroiditis, hypothryoidism, PTSD, fibromyaglia who is here today with left facial numbness that has persisted for a couple of years with progressive worsening over the past few weeks. Noted that she has been experiencing elevated blood pressures, and PCP has been adjusting her medications. Associated with pulsatile tinnitus in her left ear. Numbness in V2-V3 distribution.  No hx of trigeminal neuralgia or migraines.Endorses chronic neck,  left shoulder pain, and back pain followed by pain management and PT. Denies allodynia, worsening with biting or chewing, no changes in bowel/bladder, headache, weakness, vision changes, speech or swallowing difficulties.        MRI of brain was completed by PCP. MRI of brain negative for acute infarct, tiny linear area of bright T2 signal is seen along the right side of the  brainstem at the level of the pontomesencephalic junction.     She has hx of + FRANKY, + TPO Ab, normal ESR and CRP. Famhx- daughter with MS.     She is currently followed by Vascular Surgery for LICA stenosis, last CUS showed LICA stenosis of 70%. She has CTA of head/neck scheduled for tomorrow. She is currently on asa, atorvastatin, and zetia.      Former tobacco use, denies alcohol or recreational drug use.     Hx of sleep apnea- non compliant with CPAP due to inability to tolerate face mask. Followed by sleep medicine.     Interval history:   She is here today with her spouse. Continues to endorse dysesthesias on left side of face. Denies weakness, visual changes, speech or

## 2025-05-02 NOTE — PATIENT INSTRUCTIONS
Samples of Nurtec ODT 75 mg provided to patient. Advised  to update office on efficacy.   Instructions:  Nurtec ODT 75 mg daily as needed for migraine abortive therapy. Not to exceed 75mg/24 hours.     Headache Education:   Instructed the patient on over-the-counter headache management medications including: CoQ10, magnesium oxide,riboflavin  and butterbur.  To avoid a pain medication overuse headache trying not to take pain medicines more than 3 doses a week.   Avoid use of Fioricet or opioids to treat headaches as this can increase risk for rebound headaches.   To help relieve headache symptoms without taking pain medicine lie down under darkroom and drink glass of water.  Consider lifestyle modification including good sleep hygiene, routine medial schedules, regular exercise and managing triggers.  Keep a headache diary  to reveal triggers and possible patterns.  Triggers may be: Food, stress, perfumes, alcohol, or even chocolate.  Drink plenty of water and try to get 8 hours of sleep each night to reduce risk factors that may cause headaches.    Can consider OTC Alpha lipoic acid or capsaicin for help with neuropathic pain.

## 2025-05-05 ENCOUNTER — TELEPHONE (OUTPATIENT)
Dept: NEUROLOGY | Age: 80
End: 2025-05-05

## 2025-05-06 ENCOUNTER — PATIENT MESSAGE (OUTPATIENT)
Age: 80
End: 2025-05-06

## 2025-05-06 ENCOUNTER — OFFICE VISIT (OUTPATIENT)
Age: 80
End: 2025-05-06

## 2025-05-06 DIAGNOSIS — M54.81 BILATERAL OCCIPITAL NEURALGIA: Primary | ICD-10-CM

## 2025-05-06 DIAGNOSIS — G89.29 CHRONIC NECK PAIN: ICD-10-CM

## 2025-05-06 DIAGNOSIS — M62.830 LUMBAR PARASPINAL MUSCLE SPASM: ICD-10-CM

## 2025-05-06 DIAGNOSIS — M54.2 CHRONIC NECK PAIN: ICD-10-CM

## 2025-05-06 DIAGNOSIS — M54.50 CHRONIC BILATERAL LOW BACK PAIN WITHOUT SCIATICA: ICD-10-CM

## 2025-05-06 DIAGNOSIS — Z96.653 CHRONIC KNEE PAIN AFTER TOTAL REPLACEMENT OF BOTH KNEE JOINTS: ICD-10-CM

## 2025-05-06 DIAGNOSIS — G89.29 CHRONIC BILATERAL LOW BACK PAIN WITHOUT SCIATICA: ICD-10-CM

## 2025-05-06 DIAGNOSIS — G89.28 CHRONIC KNEE PAIN AFTER TOTAL REPLACEMENT OF BOTH KNEE JOINTS: ICD-10-CM

## 2025-05-06 DIAGNOSIS — M47.816 FACET ARTHROPATHY, LUMBAR: ICD-10-CM

## 2025-05-06 DIAGNOSIS — M70.62 GREATER TROCHANTERIC BURSITIS OF LEFT HIP: ICD-10-CM

## 2025-05-06 DIAGNOSIS — M62.838 CERVICAL PARASPINAL MUSCLE SPASM: ICD-10-CM

## 2025-05-06 DIAGNOSIS — M70.61 GREATER TROCHANTERIC BURSITIS OF RIGHT HIP: ICD-10-CM

## 2025-05-06 DIAGNOSIS — M25.561 CHRONIC KNEE PAIN AFTER TOTAL REPLACEMENT OF BOTH KNEE JOINTS: ICD-10-CM

## 2025-05-06 DIAGNOSIS — M25.562 CHRONIC KNEE PAIN AFTER TOTAL REPLACEMENT OF BOTH KNEE JOINTS: ICD-10-CM

## 2025-05-06 RX ORDER — METHOCARBAMOL 500 MG/1
500 TABLET, FILM COATED ORAL 2 TIMES DAILY PRN
Qty: 60 TABLET | Refills: 2 | Status: SHIPPED | OUTPATIENT
Start: 2025-05-06 | End: 2025-08-04

## 2025-05-06 ASSESSMENT — ENCOUNTER SYMPTOMS
BACK PAIN: 1
ABDOMINAL PAIN: 1

## 2025-05-06 NOTE — PROGRESS NOTES
Chronic Pain Follow-up Note    Date: May 6, 2025  Patient Name: My Rebolledo  MRN: [id number]  PCP: Trey Nunes  Referring Provider: No ref. provider found    Assessment:   Diagnosis:  1. Bilateral occipital neuralgia    2. Cervical paraspinal muscle spasm    3. Chronic neck pain    4. 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 Greater trochanteric bursitis of right hip    6. Greater trochanteric bursitis of left hip    7. Lumbar paraspinal muscle spasm    8. Facet arthropathy, lumbar    9. Chronic bilateral low back pain without sciatica      Plan:      General Recommendations: The pain condition that the patient suffers from is best treated with a multidisciplinary approach that involves an increase in physical activity to prevent de-conditioning and worsening of the pain cycle, as well as psychological counseling (formal and/or informal) to address the co-morbid psychological effects of pain.  Treatment will often involve judicious use of pain medications and interventional procedures to decrease the pain, allowing the patient to participate in the physical activity that will ultimately produce long-lasting pain reductions.  The goal of the multidisciplinary approach is to return the patient to a higher level of overall function and to restore their ability to perform activities of daily living.    Testing:   Reviewed neurology notes.  Reviewed PCP notes.  Reviewed lab work.     Therapy:   Has done fairly extensive therapy, including 12 sessions of physical therapy in 2023, with no significant relief.  Has tried aquatic therapy, TENS unit, chiropractic treatment, massage therapy.  Uses heat and ice with some benefit.     Medications:   Refilled Robaxin 500 mg twice daily as needed for her muscle spasms and tightness.  Would avoid NSAIDs given her chronic kidney disease.  Has tried Cymbalta in the past without benefit.  Patient was just restarted on gabapentin 300 mg twice

## 2025-05-07 ENCOUNTER — TELEPHONE (OUTPATIENT)
Age: 80
End: 2025-05-07

## 2025-05-07 ENCOUNTER — PATIENT MESSAGE (OUTPATIENT)
Age: 80
End: 2025-05-07

## 2025-05-07 NOTE — TELEPHONE ENCOUNTER
Blood pressure after addition of Atorvistatin  (Newest Message First)               5/7/25  8:22 AM  Jocelyn Blanco MA routed this conversation to [facility] Cardiology Triage (Selected Message)    5/7/25  7:31 AM  Salima Quijano MA routed this conversation to Jocelyn Blanco MA Joan Louise Craven to P [facility] Cardiology Clinical Staff (supporting Miguel Herrera DO)        5/6/25  9:32 PM  Here are my pressures for the last week. I still have some pounding and I am having some dizziness. I am still having some shortness of breath.I have an appt with Julien Abbott next week. I see him for sleep.   I saw my reg doctor last week and he said he heard pvc’s. Thank you           4//110   05//70  05//73  05//80  5//63  05//54  05//62  Note from yesterday  (Newest Message First)               5/7/25  8:33 AM  Kathryn Campbell MA routed this conversation to [facility] Cardiology Triage (Selected Message)  My Rebolledo to [facility] Cardiology Clinical Staff (supporting Miguel Herrera DO)        5/7/25  8:31 AM  I forgot to say that the pounding that I hear is my heartbeat. I was up all night last night with this

## 2025-05-07 NOTE — TELEPHONE ENCOUNTER
Miguel Herrera, Lou Sarmiento RN  Caller: Unspecified (Today,  9:02 AM)  Mrs. Rebolledo,    Thank you for sharing this. It looks like you are still having occasional highs but overall improved. I think we can leave your medication regimen the same for now. Let me know if you start to have consistently high BP again.    In regard to the palpitations/PVCs, the metoprolol should help reduce their frequency. We will not be able to eliminate them completely, but if they are causing you bothersome symptoms, we can increase the dose of the metoprolol to suppress them further.    Please let me know if you have any other questions or concerns.    Best,  Dr. Herrera

## 2025-05-14 ENCOUNTER — TELEPHONE (OUTPATIENT)
Age: 80
End: 2025-05-14

## 2025-05-14 ENCOUNTER — OFFICE VISIT (OUTPATIENT)
Age: 80
End: 2025-05-14
Payer: COMMERCIAL

## 2025-05-14 DIAGNOSIS — M54.81 BILATERAL OCCIPITAL NEURALGIA: Primary | ICD-10-CM

## 2025-05-14 PROCEDURE — 64405 NJX AA&/STRD GR OCPL NRV: CPT | Performed by: ANESTHESIOLOGY

## 2025-05-14 PROCEDURE — 64450 NJX AA&/STRD OTHER PN/BRANCH: CPT | Performed by: ANESTHESIOLOGY

## 2025-05-14 RX ORDER — TRIAMCINOLONE ACETONIDE 40 MG/ML
40 INJECTION, SUSPENSION INTRA-ARTICULAR; INTRAMUSCULAR ONCE
Status: COMPLETED | OUTPATIENT
Start: 2025-05-14 | End: 2025-05-14

## 2025-05-14 RX ADMIN — TRIAMCINOLONE ACETONIDE 40 MG: 40 INJECTION, SUSPENSION INTRA-ARTICULAR; INTRAMUSCULAR at 11:16

## 2025-05-14 NOTE — TELEPHONE ENCOUNTER
Informed pt \"per Dr. Herrera he had put her on 2.5mg due to low BP at a higher dose. How has her BP been recently? What dose has she been taking? If BP has been normal and she is taking 2.5mg, would just continue at 2.5mg for now.\"  Pt stated that she was taken the 2.5 mg can't remember the diastolic but her systolic has been running in the 130's. Informed pt to continue the 2.5 mg and she see her BP start to elevate any higher give us a call back. Pt voiced her understanding.

## 2025-05-14 NOTE — TELEPHONE ENCOUNTER
Pt 's was seen today. Pt has a question about her Amlodipine. Pt is currently prescribed to take 2.5 mg  , and recently her PCP Dr Nunes prescribed 10 mg. Pt needs clarification on which dose she is supposed to be on. Please advise.

## 2025-05-19 NOTE — PROGRESS NOTES
SUJATA Saleh MD at [facility]    COLONOSCOPY      COLONOSCOPY      ,     GYN  2019    suburethral sling and cystoscopy    HAND ARTHROPLASTY Right 2024    Right revision thumb carpometacarpal joint arthroplasty with tightrope suspensionplasty;  Right ultrasound guided carpal tunnel release performed by Merlene Martinez MD at [facility]    HAND SURGERY Bilateral     HYSTERECTOMY, TOTAL ABDOMINAL (CERVIX REMOVED)  1970    JOINT REPLACEMENT      Left knee    KNEE ARTHROPLASTY Left     KNEE SURGERY Left 2016    KNEE SURGERY Left 2014    LAP BAND      and also removed    ORTHOPEDIC SURGERY Left 2020    LEFT WRIST TRAPEZIOMETACARPAL ARTHROPLASTY WITH FCR TENDON TRANSFER-     SHOULDER ARTHROSCOPY  1966    SHOULDER ARTHROSCOPY Left 2023    LEFT SHOULDER ARTHROSCOPY DISTAL CLAVICLE RESECTION, BICEPS TENOTOMY AND DEBRIDEMENT performed by SUJATA Saleh MD at [facility]    SHOULDER ARTHROSCOPY Left 2024    LEFT SHOULDER ARTHROSCOPY DEBRIDEMENT performed by SUJATA Saleh MD at [facility]    TONSILLECTOMY AND ADENOIDECTOMY  1950    TOTAL KNEE ARTHROPLASTY Right 2014    ULNAR NERVE REPAIR Bilateral     UPPER GASTROINTESTINAL ENDOSCOPY  2017    WRIST SURGERY Right            Social History     Socioeconomic History    Marital status:      Spouse name: Jasbir    Number of children: 2    Years of education: Not on file    Highest education level: Not on file   Occupational History    Occupation: Previously worked as a [employer]    Tobacco Use    Smoking status: Former     Current packs/day: 0.00     Average packs/day: 1 pack/day for 46.0 years (46.0 ttl pk-yrs)     Types: Cigarettes     Start date: 1959     Quit date: 2005     Years since quittin.4    Smokeless tobacco: Never   Vaping Use    Vaping status: Never Used   Substance and Sexual Activity    Alcohol use: No    Drug use: No    Sexual activity: Not Currently     Partners: Male   Other Topics Concern    Not

## 2025-05-20 ENCOUNTER — OFFICE VISIT (OUTPATIENT)
Dept: BEHAVIORAL/MENTAL HEALTH CLINIC | Age: 80
End: 2025-05-20
Payer: COMMERCIAL

## 2025-05-20 VITALS
WEIGHT: 167 LBS | HEART RATE: 6 BPM | SYSTOLIC BLOOD PRESSURE: 138 MMHG | BODY MASS INDEX: 36.03 KG/M2 | DIASTOLIC BLOOD PRESSURE: 62 MMHG | HEIGHT: 57 IN | TEMPERATURE: 98.4 F | OXYGEN SATURATION: 98 %

## 2025-05-20 DIAGNOSIS — F51.05 INSOMNIA DUE TO MENTAL DISORDER: ICD-10-CM

## 2025-05-20 DIAGNOSIS — R20.0 LEFT FACIAL NUMBNESS: ICD-10-CM

## 2025-05-20 DIAGNOSIS — F41.1 GENERALIZED ANXIETY DISORDER: ICD-10-CM

## 2025-05-20 PROCEDURE — 1123F ACP DISCUSS/DSCN MKR DOCD: CPT | Performed by: PSYCHIATRY & NEUROLOGY

## 2025-05-20 PROCEDURE — 1159F MED LIST DOCD IN RCRD: CPT | Performed by: PSYCHIATRY & NEUROLOGY

## 2025-05-20 PROCEDURE — 3075F SYST BP GE 130 - 139MM HG: CPT | Performed by: PSYCHIATRY & NEUROLOGY

## 2025-05-20 PROCEDURE — 3078F DIAST BP <80 MM HG: CPT | Performed by: PSYCHIATRY & NEUROLOGY

## 2025-05-20 PROCEDURE — 99214 OFFICE O/P EST MOD 30 MIN: CPT | Performed by: PSYCHIATRY & NEUROLOGY

## 2025-05-20 RX ORDER — DESVENLAFAXINE 50 MG/1
50 TABLET, FILM COATED, EXTENDED RELEASE ORAL DAILY
Qty: 30 TABLET | Refills: 3 | Status: SHIPPED | OUTPATIENT
Start: 2025-05-20

## 2025-05-20 RX ORDER — GABAPENTIN 300 MG/1
300 CAPSULE ORAL 3 TIMES DAILY
Qty: 90 CAPSULE | Refills: 3 | Status: SHIPPED | OUTPATIENT
Start: 2025-05-20 | End: 2025-09-17

## 2025-05-20 RX ORDER — CLONAZEPAM 0.5 MG/1
0.5 TABLET ORAL DAILY PRN
Qty: 30 TABLET | Refills: 0 | Status: SHIPPED | OUTPATIENT
Start: 2025-05-20 | End: 2025-08-18

## 2025-05-20 RX ORDER — ZOLPIDEM TARTRATE 10 MG/1
10 TABLET ORAL NIGHTLY PRN
Qty: 30 TABLET | Refills: 3 | Status: SHIPPED | OUTPATIENT
Start: 2025-05-20 | End: 2025-09-17

## 2025-05-20 RX ORDER — TRAZODONE HYDROCHLORIDE 50 MG/1
25-50 TABLET ORAL NIGHTLY
Qty: 30 TABLET | Refills: 3 | Status: SHIPPED | OUTPATIENT
Start: 2025-05-20

## 2025-05-20 ASSESSMENT — PATIENT HEALTH QUESTIONNAIRE - PHQ9
SUM OF ALL RESPONSES TO PHQ QUESTIONS 1-9: 5
1. LITTLE INTEREST OR PLEASURE IN DOING THINGS: SEVERAL DAYS
6. FEELING BAD ABOUT YOURSELF - OR THAT YOU ARE A FAILURE OR HAVE LET YOURSELF OR YOUR FAMILY DOWN: SEVERAL DAYS
4. FEELING TIRED OR HAVING LITTLE ENERGY: SEVERAL DAYS
SUM OF ALL RESPONSES TO PHQ QUESTIONS 1-9: 5
7. TROUBLE CONCENTRATING ON THINGS, SUCH AS READING THE NEWSPAPER OR WATCHING TELEVISION: NOT AT ALL
1. LITTLE INTEREST OR PLEASURE IN DOING THINGS: SEVERAL DAYS
8. MOVING OR SPEAKING SO SLOWLY THAT OTHER PEOPLE COULD HAVE NOTICED. OR THE OPPOSITE - BEING SO FIDGETY OR RESTLESS THAT YOU HAVE BEEN MOVING AROUND A LOT MORE THAN USUAL: NOT AT ALL
8. MOVING OR SPEAKING SO SLOWLY THAT OTHER PEOPLE COULD HAVE NOTICED. OR THE OPPOSITE, BEING SO FIGETY OR RESTLESS THAT YOU HAVE BEEN MOVING AROUND A LOT MORE THAN USUAL: NOT AT ALL
4. FEELING TIRED OR HAVING LITTLE ENERGY: SEVERAL DAYS
9. THOUGHTS THAT YOU WOULD BE BETTER OFF DEAD, OR OF HURTING YOURSELF: NOT AT ALL
10. IF YOU CHECKED OFF ANY PROBLEMS, HOW DIFFICULT HAVE THESE PROBLEMS MADE IT FOR YOU TO DO YOUR WORK, TAKE CARE OF THINGS AT HOME, OR GET ALONG WITH OTHER PEOPLE: SOMEWHAT DIFFICULT
6. FEELING BAD ABOUT YOURSELF - OR THAT YOU ARE A FAILURE OR HAVE LET YOURSELF OR YOUR FAMILY DOWN: SEVERAL DAYS
SUM OF ALL RESPONSES TO PHQ QUESTIONS 1-9: 5
SUM OF ALL RESPONSES TO PHQ QUESTIONS 1-9: 5
7. TROUBLE CONCENTRATING ON THINGS, SUCH AS READING THE NEWSPAPER OR WATCHING TELEVISION: NOT AT ALL
5. POOR APPETITE OR OVEREATING: SEVERAL DAYS
9. THOUGHTS THAT YOU WOULD BE BETTER OFF DEAD, OR OF HURTING YOURSELF: NOT AT ALL
3. TROUBLE FALLING OR STAYING ASLEEP: NOT AT ALL
2. FEELING DOWN, DEPRESSED OR HOPELESS: SEVERAL DAYS
SUM OF ALL RESPONSES TO PHQ QUESTIONS 1-9: 5
2. FEELING DOWN, DEPRESSED OR HOPELESS: SEVERAL DAYS
3. TROUBLE FALLING OR STAYING ASLEEP: NOT AT ALL
5. POOR APPETITE OR OVEREATING: SEVERAL DAYS
10. IF YOU CHECKED OFF ANY PROBLEMS, HOW DIFFICULT HAVE THESE PROBLEMS MADE IT FOR YOU TO DO YOUR WORK, TAKE CARE OF THINGS AT HOME, OR GET ALONG WITH OTHER PEOPLE: SOMEWHAT DIFFICULT

## 2025-05-20 ASSESSMENT — ANXIETY QUESTIONNAIRES
2. NOT BEING ABLE TO STOP OR CONTROL WORRYING: SEVERAL DAYS
6. BECOMING EASILY ANNOYED OR IRRITABLE: SEVERAL DAYS
6. BECOMING EASILY ANNOYED OR IRRITABLE: SEVERAL DAYS
1. FEELING NERVOUS, ANXIOUS, OR ON EDGE: SEVERAL DAYS
5. BEING SO RESTLESS THAT IT IS HARD TO SIT STILL: NOT AT ALL
4. TROUBLE RELAXING: NOT AT ALL
3. WORRYING TOO MUCH ABOUT DIFFERENT THINGS: SEVERAL DAYS
4. TROUBLE RELAXING: NOT AT ALL
GAD7 TOTAL SCORE: 5
7. FEELING AFRAID AS IF SOMETHING AWFUL MIGHT HAPPEN: SEVERAL DAYS
IF YOU CHECKED OFF ANY PROBLEMS ON THIS QUESTIONNAIRE, HOW DIFFICULT HAVE THESE PROBLEMS MADE IT FOR YOU TO DO YOUR WORK, TAKE CARE OF THINGS AT HOME, OR GET ALONG WITH OTHER PEOPLE: SOMEWHAT DIFFICULT
IF YOU CHECKED OFF ANY PROBLEMS ON THIS QUESTIONNAIRE, HOW DIFFICULT HAVE THESE PROBLEMS MADE IT FOR YOU TO DO YOUR WORK, TAKE CARE OF THINGS AT HOME, OR GET ALONG WITH OTHER PEOPLE: SOMEWHAT DIFFICULT
3. WORRYING TOO MUCH ABOUT DIFFERENT THINGS: SEVERAL DAYS
2. NOT BEING ABLE TO STOP OR CONTROL WORRYING: SEVERAL DAYS
1. FEELING NERVOUS, ANXIOUS, OR ON EDGE: SEVERAL DAYS
5. BEING SO RESTLESS THAT IT IS HARD TO SIT STILL: NOT AT ALL
7. FEELING AFRAID AS IF SOMETHING AWFUL MIGHT HAPPEN: SEVERAL DAYS

## 2025-05-20 NOTE — PROGRESS NOTES
Patient:  My Rebolledo  Age:  79 y.o.  :  1945     SEX:  female MRN:  [id number]     RACE: White (non-)     SEEN:  [x]  PATIENT  [x]  SPOUSE []  OTHER:                  2025    11:46 AM 2025     1:11 PM 3/11/2025     3:24 PM   PHQ-9    Little interest or pleasure in doing things 1 0 1   Feeling down, depressed, or hopeless 1 1 1   Trouble falling or staying asleep, or sleeping too much 0 0 1   Feeling tired or having little energy 1 1 1   Poor appetite or overeating 1 1 1   Feeling bad about yourself - or that you are a failure or have let yourself or your family down 1 1 1   Trouble concentrating on things, such as reading the newspaper or watching television 0 0 0   Moving or speaking so slowly that other people could have noticed. Or the opposite - being so fidgety or restless that you have been moving around a lot more than usual 0 0 0   Thoughts that you would be better off dead, or of hurting yourself in some way 0 0 0   PHQ-2 Score 2  1 2    PHQ-9 Total Score 5  4 6    If you checked off any problems, how difficult have these problems made it for you to do your work, take care of things at home, or get along with other people? 1 1 1       Patient-reported           2025    11:43 AM 3/11/2025     3:18 PM 2024     9:57 AM   URBANO-7 SCREENING   Feeling nervous, anxious, or on edge Several days Several days Several days   Not being able to stop or control worrying Several days More than half the days Nearly every day   Worrying too much about different things Several days More than half the days Not at all   Trouble relaxing Not at all Several days Not at all   Being so restless that it is hard to sit still Not at all Several days Several days   Becoming easily annoyed or irritable Several days More than half the days Not at all   Feeling afraid as if something awful might happen Several days Nearly every day Not at all   URBANO-7 Total Score 5  12  5   How difficult

## 2025-05-22 ENCOUNTER — OFFICE VISIT (OUTPATIENT)
Dept: SLEEP MEDICINE | Age: 80
End: 2025-05-22
Payer: COMMERCIAL

## 2025-05-22 VITALS
WEIGHT: 167 LBS | DIASTOLIC BLOOD PRESSURE: 61 MMHG | HEART RATE: 63 BPM | SYSTOLIC BLOOD PRESSURE: 132 MMHG | RESPIRATION RATE: 19 BRPM | OXYGEN SATURATION: 98 % | TEMPERATURE: 97.9 F | BODY MASS INDEX: 36.03 KG/M2 | HEIGHT: 57 IN

## 2025-05-22 DIAGNOSIS — Z87.891 HISTORY OF SMOKING: Primary | ICD-10-CM

## 2025-05-22 LAB
EXPIRATORY TIME: NORMAL
FEF 25-75% %PRED-PRE: NORMAL
FEF 25-75% PRED: NORMAL
FEF 25-75-PRE: NORMAL
FEV1 %PRED-PRE: 63 %
FEV1 PRED: 1.54 L
FEV1/FVC %PRED-PRE: NORMAL
FEV1/FVC PRED: 78 %
FEV1/FVC: 77 %
FEV1: 0.97 L
FVC %PRED-PRE: 63 %
FVC PRED: 1.98 L
FVC: 1.26 L
PEF %PRED-PRE: NORMAL
PEF PRED: NORMAL
PEF-PRE: NORMAL

## 2025-05-22 PROCEDURE — 1123F ACP DISCUSS/DSCN MKR DOCD: CPT | Performed by: INTERNAL MEDICINE

## 2025-05-22 PROCEDURE — 1159F MED LIST DOCD IN RCRD: CPT | Performed by: INTERNAL MEDICINE

## 2025-05-22 PROCEDURE — 3078F DIAST BP <80 MM HG: CPT | Performed by: INTERNAL MEDICINE

## 2025-05-22 PROCEDURE — 94010 BREATHING CAPACITY TEST: CPT | Performed by: INTERNAL MEDICINE

## 2025-05-22 PROCEDURE — 1160F RVW MEDS BY RX/DR IN RCRD: CPT | Performed by: INTERNAL MEDICINE

## 2025-05-22 PROCEDURE — 3075F SYST BP GE 130 - 139MM HG: CPT | Performed by: INTERNAL MEDICINE

## 2025-05-22 PROCEDURE — 99215 OFFICE O/P EST HI 40 MIN: CPT | Performed by: INTERNAL MEDICINE

## 2025-05-22 ASSESSMENT — PULMONARY FUNCTION TESTS
FEV1/FVC: 77
FVC: 1.26
FEV1_PERCENT_PREDICTED_PRE: 63
FVC_PERCENT_PREDICTED_PRE: 63
FEV1/FVC_PREDICTED: 78
FEV1_PREDICTED: 1.54
FVC_PREDICTED: 1.98
FEV1: 0.97

## 2025-05-22 ASSESSMENT — SLEEP AND FATIGUE QUESTIONNAIRES
HOW LIKELY ARE YOU TO NOD OFF OR FALL ASLEEP WHILE SITTING QUIETLY AFTER LUNCH WITHOUT ALCOHOL: WOULD NEVER DOZE
HOW LIKELY ARE YOU TO NOD OFF OR FALL ASLEEP WHILE SITTING AND READING: WOULD NEVER DOZE
HOW LIKELY ARE YOU TO NOD OFF OR FALL ASLEEP WHILE WATCHING TV: WOULD NEVER DOZE
HOW LIKELY ARE YOU TO NOD OFF OR FALL ASLEEP WHILE SITTING INACTIVE IN A PUBLIC PLACE: WOULD NEVER DOZE
HOW LIKELY ARE YOU TO NOD OFF OR FALL ASLEEP IN A CAR, WHILE STOPPED FOR A FEW MINUTES IN TRAFFIC: WOULD NEVER DOZE
HOW LIKELY ARE YOU TO NOD OFF OR FALL ASLEEP WHEN YOU ARE A PASSENGER IN A CAR FOR AN HOUR WITHOUT A BREAK: WOULD NEVER DOZE
HOW LIKELY ARE YOU TO NOD OFF OR FALL ASLEEP WHILE LYING DOWN TO REST IN THE AFTERNOON WHEN CIRCUMSTANCES PERMIT: WOULD NEVER DOZE
HOW LIKELY ARE YOU TO NOD OFF OR FALL ASLEEP WHILE SITTING AND TALKING TO SOMEONE: WOULD NEVER DOZE
ESS TOTAL SCORE: 0

## 2025-05-29 ENCOUNTER — TELEPHONE (OUTPATIENT)
Dept: BEHAVIORAL/MENTAL HEALTH CLINIC | Age: 80
End: 2025-05-29

## 2025-05-29 NOTE — TELEPHONE ENCOUNTER
Patient states she sees Dr. Fernandez and has been told by the pharmacy that there's a problem with her prescription for Zolpidem. They're showing that she can only have 90 pills within a year's time, so they're showing that she can't get it filled until next February. [facility] uses [facility] [employer] [facility] [address]. Please call patient at [phone].

## 2025-05-29 NOTE — TELEPHONE ENCOUNTER
Spoke with pharmacy and they advised that this is an insurance problem that insurance will only allow 90 tablets within a year time. Stated that we can attempt a prior authorization to see if we can get it covered.    Completed prior auth for Vicente. Awaiting determination.

## 2025-06-02 ENCOUNTER — OFFICE VISIT (OUTPATIENT)
Dept: VASCULAR SURGERY | Age: 80
End: 2025-06-02
Payer: COMMERCIAL

## 2025-06-02 VITALS
BODY MASS INDEX: 36.46 KG/M2 | HEIGHT: 57 IN | WEIGHT: 169 LBS | SYSTOLIC BLOOD PRESSURE: 113 MMHG | HEART RATE: 74 BPM | OXYGEN SATURATION: 97 % | DIASTOLIC BLOOD PRESSURE: 46 MMHG

## 2025-06-02 DIAGNOSIS — I65.22 CAROTID STENOSIS, ASYMPTOMATIC, LEFT: Primary | ICD-10-CM

## 2025-06-02 DIAGNOSIS — I77.1 SUBCLAVIAN ARTERY STENOSIS, RIGHT: ICD-10-CM

## 2025-06-02 PROCEDURE — 99214 OFFICE O/P EST MOD 30 MIN: CPT | Performed by: STUDENT IN AN ORGANIZED HEALTH CARE EDUCATION/TRAINING PROGRAM

## 2025-06-02 PROCEDURE — 3074F SYST BP LT 130 MM HG: CPT | Performed by: STUDENT IN AN ORGANIZED HEALTH CARE EDUCATION/TRAINING PROGRAM

## 2025-06-02 PROCEDURE — 1159F MED LIST DOCD IN RCRD: CPT | Performed by: STUDENT IN AN ORGANIZED HEALTH CARE EDUCATION/TRAINING PROGRAM

## 2025-06-02 PROCEDURE — G2211 COMPLEX E/M VISIT ADD ON: HCPCS | Performed by: STUDENT IN AN ORGANIZED HEALTH CARE EDUCATION/TRAINING PROGRAM

## 2025-06-02 PROCEDURE — 1123F ACP DISCUSS/DSCN MKR DOCD: CPT | Performed by: STUDENT IN AN ORGANIZED HEALTH CARE EDUCATION/TRAINING PROGRAM

## 2025-06-02 PROCEDURE — 3078F DIAST BP <80 MM HG: CPT | Performed by: STUDENT IN AN ORGANIZED HEALTH CARE EDUCATION/TRAINING PROGRAM

## 2025-06-02 NOTE — PROGRESS NOTES
VASCULAR SURGERY   [address] [address]  [phone] FAX: [phone]        My Rebolledo  : 1945    Reason for visit: Left ICA stenosis    Chief Complaint: 79 y.o. female presents with carotid DUS. DUS  which would indicate 50-69% stenosis. She denies slurred speech, unilateral weakness and mono-ocular vision loss. Right shoulder pain with use, needs shoulder surgery. Denies right hand wounds or pain. DUS with right subclavian stenosis. Compliant with ASA and statin. Former smoker quit in .     Plan:   Asymptomatic left ICA stenosis <70%: Continue ASA and statin. Repeat carotid DUS in 1 year.    Right subclavian stenosis: Asymptomatic. Continue ASA and statin. If debilitating symptoms were to present would consider intervention.  Okay to proceed with right shoulder surgery.     Carotid DUS    Mild (<50%) stenosis in the right internal carotid artery.  Heterogeneous plaque in the right internal carotid artery.    Moderate (50-69%) stenosis in the left internal carotid artery.  Heterogeneous plaque in the left internal carotid artery.    Bidirectional flow involving the right vertebral artery. >50% stenosis of the proximal right subclavian artery.    Normal antegrade flow involving the left vertebral artery.       Physical Examination:   No data recorded    General: No acute distress  HENT: AT  CV: Regular rhythm.    LUNG: No respiratory distress on RA  Abdominal: No distension.  Extremities: No wounds or edema, motor and sensation grossly intact    Past Medical History:   Diagnosis Date    Acute kidney insufficiency     Followed by PCP     Adverse effect of anesthesia     on 24 patient is unable to provide any further information regarding this. ----2016 in [place] \"My heart stopped at the end of my gallbladder surgery.\" Patient reports no CPR performed, stayed one night in the hospital. No problems with any other surgeries.    Allergic rhinitis     Anemia

## 2025-06-11 ENCOUNTER — OFFICE VISIT (OUTPATIENT)
Age: 80
End: 2025-06-11
Payer: COMMERCIAL

## 2025-06-11 DIAGNOSIS — M25.561 CHRONIC KNEE PAIN AFTER TOTAL REPLACEMENT OF BOTH KNEE JOINTS: ICD-10-CM

## 2025-06-11 DIAGNOSIS — M54.2 CHRONIC NECK PAIN: ICD-10-CM

## 2025-06-11 DIAGNOSIS — M54.50 CHRONIC BILATERAL LOW BACK PAIN WITHOUT SCIATICA: Primary | ICD-10-CM

## 2025-06-11 DIAGNOSIS — M25.562 CHRONIC KNEE PAIN AFTER TOTAL REPLACEMENT OF BOTH KNEE JOINTS: ICD-10-CM

## 2025-06-11 DIAGNOSIS — M54.81 BILATERAL OCCIPITAL NEURALGIA: ICD-10-CM

## 2025-06-11 DIAGNOSIS — M70.61 GREATER TROCHANTERIC BURSITIS OF RIGHT HIP: ICD-10-CM

## 2025-06-11 DIAGNOSIS — M62.838 CERVICAL PARASPINAL MUSCLE SPASM: ICD-10-CM

## 2025-06-11 DIAGNOSIS — G89.28 CHRONIC KNEE PAIN AFTER TOTAL REPLACEMENT OF BOTH KNEE JOINTS: ICD-10-CM

## 2025-06-11 DIAGNOSIS — Z96.653 CHRONIC KNEE PAIN AFTER TOTAL REPLACEMENT OF BOTH KNEE JOINTS: ICD-10-CM

## 2025-06-11 DIAGNOSIS — G89.29 CHRONIC BILATERAL LOW BACK PAIN WITHOUT SCIATICA: Primary | ICD-10-CM

## 2025-06-11 DIAGNOSIS — G89.29 CHRONIC NECK PAIN: ICD-10-CM

## 2025-06-11 DIAGNOSIS — M70.62 GREATER TROCHANTERIC BURSITIS OF LEFT HIP: ICD-10-CM

## 2025-06-11 DIAGNOSIS — M62.830 LUMBAR PARASPINAL MUSCLE SPASM: ICD-10-CM

## 2025-06-11 DIAGNOSIS — M47.816 FACET ARTHROPATHY, LUMBAR: ICD-10-CM

## 2025-06-11 PROCEDURE — G2211 COMPLEX E/M VISIT ADD ON: HCPCS | Performed by: ANESTHESIOLOGY

## 2025-06-11 PROCEDURE — 1160F RVW MEDS BY RX/DR IN RCRD: CPT | Performed by: ANESTHESIOLOGY

## 2025-06-11 PROCEDURE — 1123F ACP DISCUSS/DSCN MKR DOCD: CPT | Performed by: ANESTHESIOLOGY

## 2025-06-11 PROCEDURE — 1159F MED LIST DOCD IN RCRD: CPT | Performed by: ANESTHESIOLOGY

## 2025-06-11 PROCEDURE — 99214 OFFICE O/P EST MOD 30 MIN: CPT | Performed by: ANESTHESIOLOGY

## 2025-06-11 RX ORDER — GABAPENTIN 600 MG/1
600 TABLET ORAL 3 TIMES DAILY
Qty: 90 TABLET | Refills: 1 | Status: SHIPPED | OUTPATIENT
Start: 2025-06-11 | End: 2025-08-10

## 2025-06-11 ASSESSMENT — ENCOUNTER SYMPTOMS
BACK PAIN: 1
ABDOMINAL PAIN: 1
SHORTNESS OF BREATH: 0

## 2025-06-11 NOTE — PROGRESS NOTES
Constitutional:  Negative for chills and fever.   Respiratory:  Negative for shortness of breath.    Cardiovascular:  Negative for chest pain.   Gastrointestinal:  Positive for abdominal pain.   Musculoskeletal:  Positive for arthralgias and back pain.   Psychiatric/Behavioral:  Negative for confusion.        Objective:        There were no vitals filed for this visit.  Wt Readings from Last 3 Encounters:   06/02/25 76.7 kg (169 lb)   06/02/25 77 kg (169 lb 12.8 oz)   05/22/25 75.8 kg (167 lb)       Physical Exam  Vitals reviewed.   Constitutional:       Appearance: Normal appearance.   HENT:      Head: Normocephalic and atraumatic.   Eyes:      Extraocular Movements: Extraocular movements intact.   Pulmonary:      Effort: Pulmonary effort is normal.   Neurological:      General: No focal deficit present.      Mental Status: She is alert.   Psychiatric:         Mood and Affect: Mood normal.         Behavior: Behavior normal.           No orders of the defined types were placed in this encounter.    Requested Prescriptions     Pending Prescriptions Disp Refills    gabapentin (NEURONTIN) 600 MG tablet 90 tablet 1     Sig: Take 1 tablet by mouth 3 times daily for 60 days.       Artie Berman MD    I personally performed the HPI, exam, and assessment/plan, verified the documentation and approve it is updated, accurate, and complete.    Level of Service Determination:  2 or more stable chronic illnesses (Level 4)  ---  Prescription drug management (Level 4)  ---

## 2025-06-25 ENCOUNTER — TELEPHONE (OUTPATIENT)
Dept: ORTHOPEDIC SURGERY | Age: 80
End: 2025-06-25

## 2025-06-25 NOTE — TELEPHONE ENCOUNTER
Left  for pt letting her know Dr. Saleh is out of town next week and if she wanted to reschedule her apt or see Moises

## 2025-07-02 NOTE — PROGRESS NOTES
Name: My Rebolledo  YOB: 1945  Gender: female  MRN: [id number]    CC:   Chief Complaint   Patient presents with    Follow-up     Right shoulder    Right shoulder pain    History of Present Illness  The patient came in because of pain in their right shoulder. They mentioned that the injection they got for their right shoulder helped for a couple of months, but now the pain is back and it's really bad. They are experiencing terrible pain in their bicep, which wasn't hurting before the injection. The pain goes down their arm, but they don't feel any numbness, tingling, or burning. They also have a slight ache in their left shoulder, but it's not as bad as the pain in their right shoulder. The patient is claustrophobic and didn't take any medication during their last MRI scan.    Right Shoulder CSI BT 3-4-25  Recall: Patient was experiencing discomfort in her right shoulder for approximately 6 months prior to last office visit in March 2025 , which began without any precipitating injury. She describes the sensation as if the shoulder is intermittently dislocating, although she does not report any associated numbness or tingling. The pain was characterized as sharp, particularly when lifting objects or performing certain activities. Despite the pain, she reported no loss of strength. The discomfort occasionally disrupted her sleep due to an aching sensation. She was  managing the pain with over-the-counter Tylenol. She underwent surgical intervention on her right shoulder in her 20s.    Supplemental Information  She had a recent surgery on her right hand for carpal tunnel and thumb in September. She is predominantly right-handed but uses her left hand quite a bit.    MEDICATIONS  Tylenol        Allergies   Allergen Reactions    Fire Ant Anaphylaxis    Amoxicillin Other (See Comments)     C-Diff    Amoxicillin-Pot Clavulanate Other (See Comments)     c.diff    Augmentin [Amoxicillin-Pot Clavulanate]

## 2025-07-03 DIAGNOSIS — E55.9 VITAMIN D DEFICIENCY: ICD-10-CM

## 2025-07-03 RX ORDER — ERGOCALCIFEROL 1.25 MG/1
50000 CAPSULE, LIQUID FILLED ORAL WEEKLY
Qty: 12 CAPSULE | Refills: 1 | OUTPATIENT
Start: 2025-07-03

## 2025-07-08 ENCOUNTER — OFFICE VISIT (OUTPATIENT)
Dept: ORTHOPEDIC SURGERY | Age: 80
End: 2025-07-08
Payer: COMMERCIAL

## 2025-07-08 DIAGNOSIS — M25.511 RIGHT SHOULDER PAIN, UNSPECIFIED CHRONICITY: Primary | ICD-10-CM

## 2025-07-08 DIAGNOSIS — M19.011 ARTHRITIS OF RIGHT ACROMIOCLAVICULAR JOINT: ICD-10-CM

## 2025-07-08 DIAGNOSIS — M67.921 TENDINOPATHY OF RIGHT BICEPS TENDON: ICD-10-CM

## 2025-07-08 PROCEDURE — 1123F ACP DISCUSS/DSCN MKR DOCD: CPT | Performed by: ORTHOPAEDIC SURGERY

## 2025-07-08 PROCEDURE — 99213 OFFICE O/P EST LOW 20 MIN: CPT | Performed by: ORTHOPAEDIC SURGERY

## 2025-07-09 ENCOUNTER — OFFICE VISIT (OUTPATIENT)
Age: 80
End: 2025-07-09
Payer: COMMERCIAL

## 2025-07-09 DIAGNOSIS — M54.2 NECK PAIN: Primary | ICD-10-CM

## 2025-07-09 DIAGNOSIS — M47.812 ARTHROPATHY OF CERVICAL FACET JOINT: ICD-10-CM

## 2025-07-09 DIAGNOSIS — M50.30 DEGENERATIVE DISC DISEASE, CERVICAL: ICD-10-CM

## 2025-07-09 PROCEDURE — 99213 OFFICE O/P EST LOW 20 MIN: CPT | Performed by: NURSE PRACTITIONER

## 2025-07-09 PROCEDURE — 1123F ACP DISCUSS/DSCN MKR DOCD: CPT | Performed by: NURSE PRACTITIONER

## 2025-07-09 PROCEDURE — G2211 COMPLEX E/M VISIT ADD ON: HCPCS | Performed by: NURSE PRACTITIONER

## 2025-07-09 NOTE — PROGRESS NOTES
chicken pox     History of Clostridioides difficile infection     History of colitis     History of DVT (deep vein thrombosis)     s/p knee replacement and developed right leg DVT     HTN (hypertension)     controlled with medication     Hx of echocardiogram 03/25/2022    Left Ventricle: Left ventricle size is normal. Normal wall thickness.  Normal wall motion. Hyperdynamic left ventricular systolic function with a  visually estimated EF of 65 - 70%. Normal diastolic function.    Tricuspid Valve: Mild transvalvular regurgitatio    Hyperlipidemia     controlled with medication     Hypertension     managed with medication    Hypothyroidism     controlled with medication     IBS (irritable bowel syndrome)     Insomnia     Left flank pain 03/05/2020    Morbid obesity (HCC)     BMI 42.85    Murmur     ECHO 3/25/22 EF 65-70%, mild tricuspid valve regurgitation-----per PCP note (11/8/19) systolic murmur grade of 2/6    Neuropathy     Overactive bladder     PAD (peripheral artery disease)     patient denies on 11/27/19    PTSD (post-traumatic stress disorder) 2006    Recurrent UTI     RLS (restless legs syndrome)     controlled with medication     Sepsis with acute renal failure and tubular necrosis (HCC)     hx of    Shigella gastroenteritis 05/09/2022    Sleep difficulties 10 years ago    SOBOE (shortness of breath on exertion)     PRN inhaler-patient reports she uses inhaler QHS    Special examinations     History-Neurologist - Dr. 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 Her smoking use included cigarettes. She started smoking about 65 years ago. She has a 46 pack-year smoking history. She has never used smokeless tobacco.  Alcohol:   Social History     Substance and Sexual Activity   Alcohol Use No        Physical

## 2025-07-10 ENCOUNTER — PATIENT MESSAGE (OUTPATIENT)
Age: 80
End: 2025-07-10

## 2025-07-11 ENCOUNTER — TELEPHONE (OUTPATIENT)
Age: 80
End: 2025-07-11

## 2025-07-11 NOTE — TELEPHONE ENCOUNTER
Yannick Cuevas MD Keener, Lynn F, RN  Caller: Unspecified (Today, 10:15 AM)  This cannot be addressed over the phone.  -If she is more short of breath she needs to come in and see one of the physicians to be evaluated.  -With regards to noncardiac pain, this needs to be addressed by her primary.  Thanks,  AD

## 2025-07-11 NOTE — TELEPHONE ENCOUNTER
Increased SOB with any exertion and when walking from room to room in house.   Much difficulty cleaning house due to increased SOB.   Decreased SOB at rest.   No chest pain.   Slight swelling in feet and ankles that goes down overnight, but no more swelling than usual.   No significant weight gain.   Very bad right shoulder pain. Awaiting right shoulder surgery and MRI of neck.  Very back lower back pain, radiating down legs.   Pain specialist is out of office until 7/24/25.  Per 4/4/25 [employer] message sent to patient:Mrs Rebolledo,   Dr Herrera reviewed your nuclear stress test results.     Per Dr Herrera \"Please let patient know her stress test was normal. I do not think her symptoms are related to heart. If she is continuing to have shortness of breath, we could set her up for pulmonary function test to see if there is a lung related caused. Please let me know if she would like to do this. Thank you \"     Thank you,  Jocelyn    Patient is currently schedule for PFTs and appointment with [facility] provider on 8/21/25.  Taking amlodipine 2.5 mg qd, ASA 81 mg qd, Lipitor 80 mg qd, Zetia 10 mg qd, lisinopril 40 mg qd, and Toprol XL 25 mg qd.    Patient asks for recommendations for increased SOB.

## 2025-07-11 NOTE — TELEPHONE ENCOUNTER
Need help  (Newest Message First)               7/11/25  8:48 AM  Jocelyn Blanco MA routed this conversation to [facility] Cardiology Triage (Selected Message)    7/11/25  7:39 AM  Charity Falk MA routed this conversation to Jocelyn Blanco MA Joan Louise Craven to P [facility] Cardiology Clinical Staff (supporting Miguel Herrera DO)        7/10/25 10:57 PM  The last couple of weeks I have had a lot of pain I back and shoulder so I attributed my breathing to that. I have noticed that just getting up to walk in the house, trying to do anything, I am having a hard time breathing. If I sit and rest it will go back to normal.  Honestly I don’t know what to do here

## 2025-07-11 NOTE — TELEPHONE ENCOUNTER
Advised patient of Dr. Stafford's response. Scheduled SA appointment with Dr. Kearney on 7/14/25 at 9:45 am. Advised patient to go to [facility] ER for immediate evaluation of any persistent chest pain or SOB, or if she feels she is in distress. Patient verbalized understanding.

## 2025-07-14 ENCOUNTER — OFFICE VISIT (OUTPATIENT)
Age: 80
End: 2025-07-14
Payer: COMMERCIAL

## 2025-07-14 ENCOUNTER — TELEPHONE (OUTPATIENT)
Dept: ORTHOPEDIC SURGERY | Age: 80
End: 2025-07-14

## 2025-07-14 ENCOUNTER — HOSPITAL ENCOUNTER (OUTPATIENT)
Dept: GENERAL RADIOLOGY | Age: 80
Discharge: HOME | End: 2025-07-16
Payer: COMMERCIAL

## 2025-07-14 VITALS
BODY MASS INDEX: 36.68 KG/M2 | WEIGHT: 170 LBS | DIASTOLIC BLOOD PRESSURE: 40 MMHG | SYSTOLIC BLOOD PRESSURE: 130 MMHG | HEIGHT: 57 IN | HEART RATE: 76 BPM

## 2025-07-14 DIAGNOSIS — I10 PRIMARY HYPERTENSION: Chronic | ICD-10-CM

## 2025-07-14 DIAGNOSIS — R06.02 SHORTNESS OF BREATH: Primary | ICD-10-CM

## 2025-07-14 DIAGNOSIS — R06.02 SHORTNESS OF BREATH: ICD-10-CM

## 2025-07-14 LAB — NT PRO BNP: 1120 PG/ML (ref 0–450)

## 2025-07-14 PROCEDURE — 1125F AMNT PAIN NOTED PAIN PRSNT: CPT | Performed by: INTERNAL MEDICINE

## 2025-07-14 PROCEDURE — 1123F ACP DISCUSS/DSCN MKR DOCD: CPT | Performed by: INTERNAL MEDICINE

## 2025-07-14 PROCEDURE — 71046 X-RAY EXAM CHEST 2 VIEWS: CPT

## 2025-07-14 PROCEDURE — 1159F MED LIST DOCD IN RCRD: CPT | Performed by: INTERNAL MEDICINE

## 2025-07-14 PROCEDURE — 3075F SYST BP GE 130 - 139MM HG: CPT | Performed by: INTERNAL MEDICINE

## 2025-07-14 PROCEDURE — 3078F DIAST BP <80 MM HG: CPT | Performed by: INTERNAL MEDICINE

## 2025-07-14 PROCEDURE — 93000 ELECTROCARDIOGRAM COMPLETE: CPT | Performed by: INTERNAL MEDICINE

## 2025-07-14 PROCEDURE — 99214 OFFICE O/P EST MOD 30 MIN: CPT | Performed by: INTERNAL MEDICINE

## 2025-07-14 PROCEDURE — 1160F RVW MEDS BY RX/DR IN RCRD: CPT | Performed by: INTERNAL MEDICINE

## 2025-07-14 ASSESSMENT — ENCOUNTER SYMPTOMS
COUGH: 0
SHORTNESS OF BREATH: 1
BACK PAIN: 1

## 2025-07-14 NOTE — TELEPHONE ENCOUNTER
----- Message from Alexia GORDON sent at 7/11/2025  3:17 PM EDT -----  Regarding: [facility]ist Appointment Request - Established  Specialist Appointment Request - Established    What Specialty? Select Speciality: Orthopedics     Type of Appointment: Appointment Type: Follow-Up    Reason for appointment?  Mri ful 7/21    Scheduling Preference per Patient: after 7/21 mri      Additional Information: need mri fu  --------------------------------------------------------------------------------------------------------------------------    Relationship to Patient: Self  Call Back Information: OK to leave message on voicemail  Preferred Call Back Number: Phone  [phone]

## 2025-07-14 NOTE — TELEPHONE ENCOUNTER
I left My a voice message stating that Constantine Hart has an available appointment on 7/15/25 at 2:15 pm at [address] [place] [address] in [place].  I asked the patient to call back to confirm that she received this message and to let us know if she can keep this appointment or needs to reschedule.

## 2025-07-14 NOTE — TELEPHONE ENCOUNTER
Patient returned call and states that her MRI is scheduled on 7/21/25.  Next available appointment that we can schedule an appointment on is 9/2/25.  Patient asks to be seen sooner, please advise.

## 2025-07-14 NOTE — PROGRESS NOTES
[facility] CARDIOLOGY  [address] [place] [address], [address]  [place] [address]  PHONE: [phone]      25    NAME:  My Rebolledo  : 1945  MRN: [id number]         SUBJECTIVE:   My Rebolledo is a 79 y.o. female seen for a follow up visit regarding the following:     Chief Complaint   Patient presents with    Follow-up    Shortness of Breath            HPI:  Follow up  Follow-up and Shortness of Breath   .    Dr Herrera's patient seen as acute care visit for ongoing DAVIDSON.  See triage note 25.  Stress perfusion study normal.  Dr Herrera recommended  full PFT's and pulmonary evaluation, both of which have been scheduled in the next few weeks.  She explains that she has back, neck and leg pain and that she is awaiting shoulder surgery, and she has severe pain when she is walking so she can't figure out if the pain is causing the symptoms.  Basically the same symptoms for which she sought evaluation with Dr Herrera.  She sees pain management and orthopedics.  No cough or fever.        Extensive evaluation for labile BP negative (metanephrines, PRA)  Echo - mod basal septal thickening, normal EF and valves  NMST - normal perfusion and EF  Spirometry - moderate restriction  Holter monitor - 9.7% PVCs, controlled with BB    NT-pBNP - unknown  CXR - none recent            Cardiac Medications       ACE Inhibitors       lisinopril (PRINIVIL;ZESTRIL) 40 MG tablet Take 1 tablet by mouth daily       Calcium Channel Blockers       amLODIPine (NORVASC) 2.5 MG tablet Take 1 tablet by mouth daily       Beta Blockers Cardio-Selective       metoprolol succinate (TOPROL XL) 25 MG extended release tablet Take 1 tablet by mouth daily       HMG CoA Reductase Inhibitors       atorvastatin (LIPITOR) 80 MG tablet Take 1 tablet by mouth daily       Intestinal Cholesterol Absorption Inhibitors       ezetimibe (ZETIA) 10 MG tablet Take 1 tablet by mouth daily       Salicylates       aspirin 81 MG EC tablet Take 1

## 2025-07-14 NOTE — PATIENT INSTRUCTIONS
[unfilled]       Shortness of Breath: Care Instructions  Your Care Instructions     Shortness of breath has many causes. Sometimes conditions such as anxiety can lead to shortness of breath. Some people get mild shortness of breath when they exercise. Trouble breathing also can be a symptom of a serious problem, such as asthma, lung disease, emphysema, heart problems, and pneumonia.  If your shortness of breath continues, you may need tests and treatment. Watch for any changes in your breathing and other symptoms.  Follow-up care is a key part of your treatment and safety. Be sure to make and go to all appointments, and call your doctor if you are having problems. It's also a good idea to know your test results and keep a list of the medicines you take.  How can you care for yourself at home?  Do not smoke or allow others to smoke around you. If you need help quitting, talk to your doctor about stop-smoking programs and medicines. These can increase your chances of quitting for good.  Get plenty of rest and sleep.  Take your medicines exactly as prescribed. Call your doctor if you think you are having a problem with your medicine.  Find healthy ways to deal with stress.  Exercise daily.  Get plenty of sleep.  Eat regularly and well.  When should you call for help?   Call 911 anytime you think you may need emergency care. For example, call if:    You have severe shortness of breath.     You have symptoms of a heart attack. These may include:  Chest pain or pressure, or a strange feeling in the chest.  Sweating.  Shortness of breath.  Nausea or vomiting.  Pain, pressure, or a strange feeling in the back, neck, jaw, or upper belly or in one or both shoulders or arms.  Lightheadedness or sudden weakness.  A fast or irregular heartbeat.  After you call 911, the  may tell you to chew 1 adult-strength or 2 to 4 low-dose aspirin. Wait for an ambulance. Do not try to drive yourself.   Call your doctor now or seek

## 2025-07-15 ENCOUNTER — RESULTS FOLLOW-UP (OUTPATIENT)
Dept: CARDIOLOGY | Age: 80
End: 2025-07-15

## 2025-07-15 DIAGNOSIS — E78.2 MIXED HYPERLIPIDEMIA: ICD-10-CM

## 2025-07-15 DIAGNOSIS — R79.89 ELEVATED BRAIN NATRIURETIC PEPTIDE (BNP) LEVEL: ICD-10-CM

## 2025-07-15 DIAGNOSIS — Z79.899 LONG-TERM USE OF HIGH-RISK MEDICATION: ICD-10-CM

## 2025-07-15 DIAGNOSIS — R06.02 SHORTNESS OF BREATH: Primary | ICD-10-CM

## 2025-07-15 RX ORDER — FUROSEMIDE 20 MG/1
20 TABLET ORAL DAILY
Qty: 90 TABLET | Refills: 3 | Status: SHIPPED | OUTPATIENT
Start: 2025-07-15

## 2025-07-15 RX ORDER — POTASSIUM CHLORIDE 750 MG/1
10 TABLET, EXTENDED RELEASE ORAL DAILY
Qty: 90 TABLET | Refills: 3 | Status: SHIPPED | OUTPATIENT
Start: 2025-07-15

## 2025-07-15 NOTE — TELEPHONE ENCOUNTER
Advised patient of BNP and CXR results and Dr. Kearney's response. Advised patient to go to any [facility] outpatient lab in 2 weeks for BNP and BMP in 2 weeks. Advised patient to call with any questions or concerns. Patient verbalized understanding.   Orders Placed This Encounter   Procedures    Basic Metabolic Panel     Standing Status:   Future     Expected Date:   7/29/2025     Expiration Date:   7/15/2026    Brain Natriuretic Peptide     Standing Status:   Future     Expected Date:   7/29/2025     Expiration Date:   7/15/2026     Requested Prescriptions     Signed Prescriptions Disp Refills    furosemide (LASIX) 20 MG tablet 90 tablet 3     Sig: Take 1 tablet by mouth daily    potassium chloride (KLOR-CON M) 10 MEQ extended release tablet 90 tablet 3     Sig: Take 1 tablet by mouth daily     Lasix and KCL, as above, E-prescribed to [facility] [employer] [facility] [address] in [place] at patient's request.

## 2025-07-15 NOTE — RESULT ENCOUNTER NOTE
BNP moderately elevated, could potentially indicate fluid.  I recommend a trial lasix 20 mg daily with potassium 10 meq daily for this Dr Herrera patient whom I saw as acute care yesterday.  Also recommend a BMP and repeat NT-pBNP 2 weeks after starting meds.  CXR was clear and was the other test she had done yesterday.

## 2025-07-15 NOTE — TELEPHONE ENCOUNTER
----- Message from Dr. Rajni Collins MD sent at 7/15/2025  7:38 AM EDT -----  BNP moderately elevated, could potentially indicate fluid.  I recommend a trial lasix 20 mg daily with potassium 10 meq daily for this Dr Herrera patient whom I saw as acute care yesterday.  Also recommend a BMP and repeat NT-pBNP 2 weeks after starting meds.  CXR was clear and was the other test she had done yesterday.

## 2025-07-21 DIAGNOSIS — E55.9 VITAMIN D DEFICIENCY: ICD-10-CM

## 2025-07-21 DIAGNOSIS — R73.03 PREDIABETES: ICD-10-CM

## 2025-07-21 DIAGNOSIS — I10 PRIMARY HYPERTENSION: ICD-10-CM

## 2025-07-21 DIAGNOSIS — D50.9 IRON DEFICIENCY ANEMIA, UNSPECIFIED IRON DEFICIENCY ANEMIA TYPE: Chronic | ICD-10-CM

## 2025-07-21 DIAGNOSIS — E78.5 DYSLIPIDEMIA: ICD-10-CM

## 2025-07-21 LAB
25(OH)D3 SERPL-MCNC: 40.6 NG/ML (ref 30–100)
ALBUMIN SERPL-MCNC: 3.3 G/DL (ref 3.2–4.6)
ALBUMIN/GLOB SERPL: 1 (ref 1–1.9)
ALP SERPL-CCNC: 82 U/L (ref 35–104)
ALT SERPL-CCNC: 11 U/L (ref 8–45)
ANION GAP SERPL CALC-SCNC: 10 MMOL/L (ref 7–16)
AST SERPL-CCNC: 14 U/L (ref 15–37)
BASOPHILS # BLD: 0.07 K/UL (ref 0–0.2)
BASOPHILS NFR BLD: 0.7 % (ref 0–2)
BILIRUB SERPL-MCNC: <0.2 MG/DL (ref 0–1.2)
BUN SERPL-MCNC: 26 MG/DL (ref 8–23)
CALCIUM SERPL-MCNC: 9.3 MG/DL (ref 8.8–10.2)
CHLORIDE SERPL-SCNC: 103 MMOL/L (ref 98–107)
CHOLEST SERPL-MCNC: 192 MG/DL (ref 0–200)
CO2 SERPL-SCNC: 27 MMOL/L (ref 20–29)
CREAT SERPL-MCNC: 1.43 MG/DL (ref 0.6–1.1)
DIFFERENTIAL METHOD BLD: ABNORMAL
EOSINOPHIL # BLD: 0.4 K/UL (ref 0–0.8)
EOSINOPHIL NFR BLD: 4.1 % (ref 0.5–7.8)
ERYTHROCYTE [DISTWIDTH] IN BLOOD BY AUTOMATED COUNT: 15.1 % (ref 11.9–14.6)
EST. AVERAGE GLUCOSE BLD GHB EST-MCNC: 124 MG/DL
GLOBULIN SER CALC-MCNC: 3.4 G/DL (ref 2.3–3.5)
GLUCOSE SERPL-MCNC: 99 MG/DL (ref 70–99)
HBA1C MFR BLD: 6 % (ref 0–5.6)
HCT VFR BLD AUTO: 24.7 % (ref 35.8–46.3)
HDLC SERPL-MCNC: 50 MG/DL (ref 40–60)
HDLC SERPL: 3.9 (ref 0–5)
HGB BLD-MCNC: 7.2 G/DL (ref 11.7–15.4)
IMM GRANULOCYTES # BLD AUTO: 0.04 K/UL (ref 0–0.5)
IMM GRANULOCYTES NFR BLD AUTO: 0.4 % (ref 0–5)
LDLC SERPL CALC-MCNC: 113 MG/DL (ref 0–100)
LYMPHOCYTES # BLD: 2.25 K/UL (ref 0.5–4.6)
LYMPHOCYTES NFR BLD: 22.8 % (ref 13–44)
MCH RBC QN AUTO: 24.6 PG (ref 26.1–32.9)
MCHC RBC AUTO-ENTMCNC: 29.1 G/DL (ref 31.4–35)
MCV RBC AUTO: 84.3 FL (ref 82–102)
MONOCYTES # BLD: 0.63 K/UL (ref 0.1–1.3)
MONOCYTES NFR BLD: 6.4 % (ref 4–12)
NEUTS SEG # BLD: 6.48 K/UL (ref 1.7–8.2)
NEUTS SEG NFR BLD: 65.6 % (ref 43–78)
NRBC # BLD: 0 K/UL (ref 0–0.2)
PLATELET # BLD AUTO: 414 K/UL (ref 150–450)
PMV BLD AUTO: 10.8 FL (ref 9.4–12.3)
POTASSIUM SERPL-SCNC: 5.6 MMOL/L (ref 3.5–5.1)
PROT SERPL-MCNC: 6.7 G/DL (ref 6.3–8.2)
RBC # BLD AUTO: 2.93 M/UL (ref 4.05–5.2)
SODIUM SERPL-SCNC: 140 MMOL/L (ref 136–145)
TRIGL SERPL-MCNC: 146 MG/DL (ref 0–150)
VLDLC SERPL CALC-MCNC: 29 MG/DL (ref 6–23)
WBC # BLD AUTO: 9.9 K/UL (ref 4.3–11.1)

## 2025-07-22 ENCOUNTER — APPOINTMENT (OUTPATIENT)
Dept: GENERAL RADIOLOGY | Age: 80
DRG: 812 | End: 2025-07-22
Payer: COMMERCIAL

## 2025-07-22 ENCOUNTER — HOSPITAL ENCOUNTER (INPATIENT)
Age: 80
LOS: 2 days | Discharge: HOME | DRG: 812 | End: 2025-07-24
Attending: EMERGENCY MEDICINE | Admitting: INTERNAL MEDICINE
Payer: COMMERCIAL

## 2025-07-22 DIAGNOSIS — D64.9 ANEMIA, UNSPECIFIED TYPE: ICD-10-CM

## 2025-07-22 DIAGNOSIS — E53.8 VITAMIN B12 DEFICIENCY: ICD-10-CM

## 2025-07-22 DIAGNOSIS — D64.9 ANEMIA, UNSPECIFIED TYPE: Primary | ICD-10-CM

## 2025-07-22 DIAGNOSIS — R06.02 SHORTNESS OF BREATH: ICD-10-CM

## 2025-07-22 DIAGNOSIS — E87.5 HYPERKALEMIA: ICD-10-CM

## 2025-07-22 LAB
ALBUMIN SERPL-MCNC: 3.3 G/DL (ref 3.2–4.6)
ALBUMIN/GLOB SERPL: 1 (ref 1–1.9)
ALP SERPL-CCNC: 90 U/L (ref 35–104)
ALT SERPL-CCNC: 13 U/L (ref 8–45)
ANION GAP SERPL CALC-SCNC: 13 MMOL/L (ref 7–16)
AST SERPL-CCNC: 20 U/L (ref 15–37)
BASOPHILS # BLD: 0.06 K/UL (ref 0–0.2)
BASOPHILS # BLD: 0.07 K/UL (ref 0–0.2)
BASOPHILS NFR BLD: 0.5 % (ref 0–2)
BASOPHILS NFR BLD: 0.7 % (ref 0–2)
BILIRUB SERPL-MCNC: <0.2 MG/DL (ref 0–1.2)
BUN SERPL-MCNC: 24 MG/DL (ref 8–23)
CALCIUM SERPL-MCNC: 8.8 MG/DL (ref 8.8–10.2)
CHLORIDE SERPL-SCNC: 101 MMOL/L (ref 98–107)
CO2 SERPL-SCNC: 26 MMOL/L (ref 20–29)
CREAT SERPL-MCNC: 1.54 MG/DL (ref 0.6–1.1)
DIFFERENTIAL METHOD BLD: ABNORMAL
DIFFERENTIAL METHOD BLD: ABNORMAL
EOSINOPHIL # BLD: 0.36 K/UL (ref 0–0.8)
EOSINOPHIL # BLD: 0.43 K/UL (ref 0–0.8)
EOSINOPHIL NFR BLD: 3.6 % (ref 0.5–7.8)
EOSINOPHIL NFR BLD: 3.7 % (ref 0.5–7.8)
ERYTHROCYTE [DISTWIDTH] IN BLOOD BY AUTOMATED COUNT: 15.1 % (ref 11.9–14.6)
ERYTHROCYTE [DISTWIDTH] IN BLOOD BY AUTOMATED COUNT: 15.4 % (ref 11.9–14.6)
GLOBULIN SER CALC-MCNC: 3.2 G/DL (ref 2.3–3.5)
GLUCOSE SERPL-MCNC: 97 MG/DL (ref 70–99)
HCT VFR BLD AUTO: 22.8 % (ref 35.8–46.3)
HCT VFR BLD AUTO: 23.7 % (ref 35.8–46.3)
HGB BLD-MCNC: 6.7 G/DL (ref 11.7–15.4)
HGB BLD-MCNC: 7 G/DL (ref 11.7–15.4)
HISTORY CHECK: NORMAL
HISTORY CHECK: NORMAL
IMM GRANULOCYTES # BLD AUTO: 0.05 K/UL (ref 0–0.5)
IMM GRANULOCYTES # BLD AUTO: 0.05 K/UL (ref 0–0.5)
IMM GRANULOCYTES NFR BLD AUTO: 0.4 % (ref 0–5)
IMM GRANULOCYTES NFR BLD AUTO: 0.5 % (ref 0–5)
LIPASE SERPL-CCNC: 44 U/L (ref 13–60)
LYMPHOCYTES # BLD: 2.05 K/UL (ref 0.5–4.6)
LYMPHOCYTES # BLD: 3.15 K/UL (ref 0.5–4.6)
LYMPHOCYTES NFR BLD: 20.7 % (ref 13–44)
LYMPHOCYTES NFR BLD: 27.4 % (ref 13–44)
MAGNESIUM SERPL-MCNC: 2 MG/DL (ref 1.8–2.4)
MCH RBC QN AUTO: 24.2 PG (ref 26.1–32.9)
MCH RBC QN AUTO: 24.3 PG (ref 26.1–32.9)
MCHC RBC AUTO-ENTMCNC: 28.5 G/DL (ref 31.4–35)
MCHC RBC AUTO-ENTMCNC: 29.5 G/DL (ref 31.4–35)
MCV RBC AUTO: 82 FL (ref 82–102)
MCV RBC AUTO: 85.1 FL (ref 82–102)
MONOCYTES # BLD: 0.48 K/UL (ref 0.1–1.3)
MONOCYTES # BLD: 0.85 K/UL (ref 0.1–1.3)
MONOCYTES NFR BLD: 4.9 % (ref 4–12)
MONOCYTES NFR BLD: 7.4 % (ref 4–12)
NEUTS SEG # BLD: 6.87 K/UL (ref 1.7–8.2)
NEUTS SEG # BLD: 6.95 K/UL (ref 1.7–8.2)
NEUTS SEG NFR BLD: 60.6 % (ref 43–78)
NEUTS SEG NFR BLD: 69.6 % (ref 43–78)
NRBC # BLD: 0 K/UL (ref 0–0.2)
NRBC # BLD: 0 K/UL (ref 0–0.2)
NT PRO BNP: 1571 PG/ML (ref 0–450)
PLATELET # BLD AUTO: 403 K/UL (ref 150–450)
PLATELET # BLD AUTO: 409 K/UL (ref 150–450)
PMV BLD AUTO: 10.7 FL (ref 9.4–12.3)
PMV BLD AUTO: 11.1 FL (ref 9.4–12.3)
POTASSIUM SERPL-SCNC: 4.9 MMOL/L (ref 3.5–5.1)
PROT SERPL-MCNC: 6.5 G/DL (ref 6.3–8.2)
RBC # BLD AUTO: 2.68 M/UL (ref 4.05–5.2)
RBC # BLD AUTO: 2.89 M/UL (ref 4.05–5.2)
SODIUM SERPL-SCNC: 140 MMOL/L (ref 136–145)
TROPONIN T SERPL HS-MCNC: 12.9 NG/L (ref 0–14)
WBC # BLD AUTO: 11.5 K/UL (ref 4.3–11.1)
WBC # BLD AUTO: 9.9 K/UL (ref 4.3–11.1)

## 2025-07-22 PROCEDURE — P9016 RBC LEUKOCYTES REDUCED: HCPCS

## 2025-07-22 PROCEDURE — 86900 BLOOD TYPING SEROLOGIC ABO: CPT

## 2025-07-22 PROCEDURE — 84484 ASSAY OF TROPONIN QUANT: CPT

## 2025-07-22 PROCEDURE — 86850 RBC ANTIBODY SCREEN: CPT

## 2025-07-22 PROCEDURE — 71046 X-RAY EXAM CHEST 2 VIEWS: CPT

## 2025-07-22 PROCEDURE — 93005 ELECTROCARDIOGRAM TRACING: CPT

## 2025-07-22 PROCEDURE — 1100000003 HC PRIVATE W/ TELEMETRY

## 2025-07-22 PROCEDURE — 83880 ASSAY OF NATRIURETIC PEPTIDE: CPT

## 2025-07-22 PROCEDURE — 80053 COMPREHEN METABOLIC PANEL: CPT

## 2025-07-22 PROCEDURE — 30233N1 TRANSFUSION OF NONAUTOLOGOUS RED BLOOD CELLS INTO PERIPHERAL VEIN, PERCUTANEOUS APPROACH: ICD-10-PCS | Performed by: INTERNAL MEDICINE

## 2025-07-22 PROCEDURE — 86901 BLOOD TYPING SEROLOGIC RH(D): CPT

## 2025-07-22 PROCEDURE — 86923 COMPATIBILITY TEST ELECTRIC: CPT

## 2025-07-22 PROCEDURE — 85025 COMPLETE CBC W/AUTO DIFF WBC: CPT

## 2025-07-22 PROCEDURE — 83735 ASSAY OF MAGNESIUM: CPT

## 2025-07-22 PROCEDURE — 83690 ASSAY OF LIPASE: CPT

## 2025-07-22 PROCEDURE — G0378 HOSPITAL OBSERVATION PER HR: HCPCS

## 2025-07-22 PROCEDURE — 99285 EMERGENCY DEPT VISIT HI MDM: CPT

## 2025-07-22 PROCEDURE — 6370000000 HC RX 637 (ALT 250 FOR IP)

## 2025-07-22 PROCEDURE — 1100000000 HC RM PRIVATE

## 2025-07-22 RX ORDER — PANTOPRAZOLE SODIUM 40 MG/1
40 TABLET, DELAYED RELEASE ORAL
Status: DISCONTINUED | OUTPATIENT
Start: 2025-07-23 | End: 2025-07-23

## 2025-07-22 RX ORDER — CLONAZEPAM 0.5 MG/1
0.5 TABLET ORAL DAILY PRN
Status: DISCONTINUED | OUTPATIENT
Start: 2025-07-22 | End: 2025-07-24 | Stop reason: HOSPADM

## 2025-07-22 RX ORDER — FAMOTIDINE 20 MG/1
10 TABLET, FILM COATED ORAL DAILY PRN
Status: DISCONTINUED | OUTPATIENT
Start: 2025-07-22 | End: 2025-07-24 | Stop reason: HOSPADM

## 2025-07-22 RX ORDER — SODIUM CHLORIDE 0.9 % (FLUSH) 0.9 %
5-40 SYRINGE (ML) INJECTION PRN
Status: DISCONTINUED | OUTPATIENT
Start: 2025-07-22 | End: 2025-07-24 | Stop reason: HOSPADM

## 2025-07-22 RX ORDER — SODIUM CHLORIDE 9 MG/ML
INJECTION, SOLUTION INTRAVENOUS PRN
Status: DISCONTINUED | OUTPATIENT
Start: 2025-07-22 | End: 2025-07-22 | Stop reason: SDUPTHER

## 2025-07-22 RX ORDER — FERROUS SULFATE 325(65) MG
325 TABLET ORAL
Status: DISCONTINUED | OUTPATIENT
Start: 2025-07-23 | End: 2025-07-24 | Stop reason: HOSPADM

## 2025-07-22 RX ORDER — TROSPIUM CHLORIDE 20 MG/1
20 TABLET, FILM COATED ORAL DAILY
Status: DISCONTINUED | OUTPATIENT
Start: 2025-07-23 | End: 2025-07-24 | Stop reason: HOSPADM

## 2025-07-22 RX ORDER — BISACODYL 10 MG
10 SUPPOSITORY, RECTAL RECTAL DAILY PRN
Status: DISCONTINUED | OUTPATIENT
Start: 2025-07-22 | End: 2025-07-24 | Stop reason: HOSPADM

## 2025-07-22 RX ORDER — LEVOTHYROXINE SODIUM 112 UG/1
112 TABLET ORAL DAILY
Status: DISCONTINUED | OUTPATIENT
Start: 2025-07-23 | End: 2025-07-24 | Stop reason: HOSPADM

## 2025-07-22 RX ORDER — METOPROLOL SUCCINATE 25 MG/1
25 TABLET, EXTENDED RELEASE ORAL DAILY
Status: DISCONTINUED | OUTPATIENT
Start: 2025-07-23 | End: 2025-07-24 | Stop reason: HOSPADM

## 2025-07-22 RX ORDER — SODIUM CHLORIDE 9 MG/ML
INJECTION, SOLUTION INTRAVENOUS PRN
Status: DISCONTINUED | OUTPATIENT
Start: 2025-07-22 | End: 2025-07-24 | Stop reason: HOSPADM

## 2025-07-22 RX ORDER — FUROSEMIDE 20 MG/1
20 TABLET ORAL DAILY
Status: DISCONTINUED | OUTPATIENT
Start: 2025-07-23 | End: 2025-07-24 | Stop reason: HOSPADM

## 2025-07-22 RX ORDER — LISINOPRIL 20 MG/1
40 TABLET ORAL DAILY
Status: DISCONTINUED | OUTPATIENT
Start: 2025-07-23 | End: 2025-07-24 | Stop reason: HOSPADM

## 2025-07-22 RX ORDER — VENLAFAXINE HYDROCHLORIDE 75 MG/1
75 CAPSULE, EXTENDED RELEASE ORAL
Status: DISCONTINUED | OUTPATIENT
Start: 2025-07-23 | End: 2025-07-24 | Stop reason: HOSPADM

## 2025-07-22 RX ORDER — EZETIMIBE 10 MG/1
10 TABLET ORAL DAILY
Status: DISCONTINUED | OUTPATIENT
Start: 2025-07-23 | End: 2025-07-24 | Stop reason: HOSPADM

## 2025-07-22 RX ORDER — ERGOCALCIFEROL 1.25 MG/1
50000 CAPSULE, LIQUID FILLED ORAL WEEKLY
Status: DISCONTINUED | OUTPATIENT
Start: 2025-07-23 | End: 2025-07-24 | Stop reason: HOSPADM

## 2025-07-22 RX ORDER — ATORVASTATIN CALCIUM 40 MG/1
80 TABLET, FILM COATED ORAL DAILY
Status: DISCONTINUED | OUTPATIENT
Start: 2025-07-23 | End: 2025-07-24 | Stop reason: HOSPADM

## 2025-07-22 RX ORDER — METHOCARBAMOL 500 MG/1
500 TABLET, FILM COATED ORAL 2 TIMES DAILY PRN
Status: DISCONTINUED | OUTPATIENT
Start: 2025-07-22 | End: 2025-07-24 | Stop reason: HOSPADM

## 2025-07-22 RX ORDER — METHENAMINE HIPPURATE 1000 MG/1
1 TABLET ORAL 2 TIMES DAILY WITH MEALS
Status: DISCONTINUED | OUTPATIENT
Start: 2025-07-23 | End: 2025-07-24 | Stop reason: HOSPADM

## 2025-07-22 RX ORDER — GABAPENTIN 300 MG/1
600 CAPSULE ORAL 3 TIMES DAILY
Status: DISCONTINUED | OUTPATIENT
Start: 2025-07-23 | End: 2025-07-24 | Stop reason: HOSPADM

## 2025-07-22 RX ORDER — POLYETHYLENE GLYCOL 3350 17 G/17G
17 POWDER, FOR SOLUTION ORAL DAILY PRN
Status: DISCONTINUED | OUTPATIENT
Start: 2025-07-22 | End: 2025-07-24 | Stop reason: HOSPADM

## 2025-07-22 RX ORDER — NALOXONE HYDROCHLORIDE 0.4 MG/ML
0.4 INJECTION, SOLUTION INTRAMUSCULAR; INTRAVENOUS; SUBCUTANEOUS PRN
Status: DISCONTINUED | OUTPATIENT
Start: 2025-07-22 | End: 2025-07-24 | Stop reason: HOSPADM

## 2025-07-22 RX ORDER — ONDANSETRON 4 MG/1
4 TABLET, ORALLY DISINTEGRATING ORAL EVERY 8 HOURS PRN
Status: DISCONTINUED | OUTPATIENT
Start: 2025-07-22 | End: 2025-07-24 | Stop reason: HOSPADM

## 2025-07-22 RX ORDER — ACETAMINOPHEN 500 MG
1000 TABLET ORAL
Status: COMPLETED | OUTPATIENT
Start: 2025-07-22 | End: 2025-07-22

## 2025-07-22 RX ORDER — FAMOTIDINE 20 MG/1
20 TABLET, FILM COATED ORAL NIGHTLY
Status: DISCONTINUED | OUTPATIENT
Start: 2025-07-23 | End: 2025-07-23

## 2025-07-22 RX ORDER — ZOLPIDEM TARTRATE 5 MG/1
5 TABLET ORAL NIGHTLY PRN
Status: DISCONTINUED | OUTPATIENT
Start: 2025-07-22 | End: 2025-07-24 | Stop reason: HOSPADM

## 2025-07-22 RX ORDER — TRAZODONE HYDROCHLORIDE 50 MG/1
50 TABLET ORAL NIGHTLY
Status: DISCONTINUED | OUTPATIENT
Start: 2025-07-23 | End: 2025-07-24 | Stop reason: HOSPADM

## 2025-07-22 RX ORDER — MAGNESIUM HYDROXIDE/ALUMINUM HYDROXICE/SIMETHICONE 120; 1200; 1200 MG/30ML; MG/30ML; MG/30ML
30 SUSPENSION ORAL EVERY 6 HOURS PRN
Status: DISCONTINUED | OUTPATIENT
Start: 2025-07-22 | End: 2025-07-24 | Stop reason: HOSPADM

## 2025-07-22 RX ORDER — AMLODIPINE BESYLATE 2.5 MG/1
2.5 TABLET ORAL DAILY
Status: DISCONTINUED | OUTPATIENT
Start: 2025-07-23 | End: 2025-07-24 | Stop reason: HOSPADM

## 2025-07-22 RX ORDER — FLUTICASONE PROPIONATE 50 MCG
2 SPRAY, SUSPENSION (ML) NASAL DAILY PRN
Status: DISCONTINUED | OUTPATIENT
Start: 2025-07-22 | End: 2025-07-24 | Stop reason: HOSPADM

## 2025-07-22 RX ORDER — SODIUM CHLORIDE 0.9 % (FLUSH) 0.9 %
5-40 SYRINGE (ML) INJECTION EVERY 12 HOURS SCHEDULED
Status: DISCONTINUED | OUTPATIENT
Start: 2025-07-23 | End: 2025-07-24 | Stop reason: HOSPADM

## 2025-07-22 RX ORDER — ONDANSETRON 2 MG/ML
4 INJECTION INTRAMUSCULAR; INTRAVENOUS EVERY 6 HOURS PRN
Status: DISCONTINUED | OUTPATIENT
Start: 2025-07-22 | End: 2025-07-24 | Stop reason: HOSPADM

## 2025-07-22 RX ORDER — CLOTRIMAZOLE AND BETAMETHASONE DIPROPIONATE 10; .64 MG/G; MG/G
CREAM TOPICAL 2 TIMES DAILY PRN
Status: DISCONTINUED | OUTPATIENT
Start: 2025-07-22 | End: 2025-07-24 | Stop reason: HOSPADM

## 2025-07-22 RX ADMIN — ACETAMINOPHEN 1000 MG: 500 TABLET, FILM COATED ORAL at 22:34

## 2025-07-22 ASSESSMENT — PAIN SCALES - GENERAL: PAINLEVEL_OUTOF10: 7

## 2025-07-22 ASSESSMENT — LIFESTYLE VARIABLES
HOW MANY STANDARD DRINKS CONTAINING ALCOHOL DO YOU HAVE ON A TYPICAL DAY: PATIENT DOES NOT DRINK
HOW OFTEN DO YOU HAVE A DRINK CONTAINING ALCOHOL: NEVER

## 2025-07-22 ASSESSMENT — PAIN - FUNCTIONAL ASSESSMENT: PAIN_FUNCTIONAL_ASSESSMENT: 0-10

## 2025-07-22 NOTE — ED TRIAGE NOTES
Pt sent here by her PCP for hematocrit level of 6. States she has been dizzy and SOB x 3 weeks. Pain in back, legs, neck & shoulders.

## 2025-07-23 ENCOUNTER — ANESTHESIA (OUTPATIENT)
Dept: ENDOSCOPY | Age: 80
DRG: 812 | End: 2025-07-23
Payer: COMMERCIAL

## 2025-07-23 ENCOUNTER — ANESTHESIA EVENT (OUTPATIENT)
Dept: ENDOSCOPY | Age: 80
DRG: 812 | End: 2025-07-23
Payer: COMMERCIAL

## 2025-07-23 LAB
ALBUMIN SERPL-MCNC: 3.1 G/DL (ref 3.2–4.6)
ALBUMIN/GLOB SERPL: 0.9 (ref 1–1.9)
ALP SERPL-CCNC: 73 U/L (ref 35–104)
ALT SERPL-CCNC: 11 U/L (ref 8–45)
ANION GAP SERPL CALC-SCNC: 13 MMOL/L (ref 7–16)
ANION GAP SERPL CALC-SCNC: 9 MMOL/L (ref 7–16)
AST SERPL-CCNC: 13 U/L (ref 15–37)
BASOPHILS # BLD: 0.08 K/UL (ref 0–0.2)
BASOPHILS NFR BLD: 0.7 % (ref 0–2)
BILIRUB SERPL-MCNC: <0.2 MG/DL (ref 0–1.2)
BUN SERPL-MCNC: 21 MG/DL (ref 8–23)
BUN SERPL-MCNC: 23 MG/DL (ref 8–23)
CALCIUM SERPL-MCNC: 8.6 MG/DL (ref 8.8–10.2)
CALCIUM SERPL-MCNC: 8.6 MG/DL (ref 8.8–10.2)
CHLORIDE SERPL-SCNC: 103 MMOL/L (ref 98–107)
CHLORIDE SERPL-SCNC: 103 MMOL/L (ref 98–107)
CO2 SERPL-SCNC: 23 MMOL/L (ref 20–29)
CO2 SERPL-SCNC: 28 MMOL/L (ref 20–29)
CREAT SERPL-MCNC: 1.44 MG/DL (ref 0.6–1.1)
CREAT SERPL-MCNC: 1.46 MG/DL (ref 0.6–1.1)
CRP SERPL-MCNC: 1 MG/DL (ref 0–0.4)
DIFFERENTIAL METHOD BLD: ABNORMAL
EKG ATRIAL RATE: 70 BPM
EKG DIAGNOSIS: NORMAL
EKG P AXIS: 69 DEGREES
EKG P-R INTERVAL: 153 MS
EKG Q-T INTERVAL: 388 MS
EKG QRS DURATION: 81 MS
EKG QTC CALCULATION (BAZETT): 431 MS
EKG R AXIS: 32 DEGREES
EKG T AXIS: 57 DEGREES
EKG VENTRICULAR RATE: 74 BPM
EOSINOPHIL # BLD: 0.5 K/UL (ref 0–0.8)
EOSINOPHIL NFR BLD: 4.3 % (ref 0.5–7.8)
ERYTHROCYTE [DISTWIDTH] IN BLOOD BY AUTOMATED COUNT: 15.9 % (ref 11.9–14.6)
FERRITIN SERPL-MCNC: 10 NG/ML (ref 8–388)
FOLATE SERPL-MCNC: 10.9 NG/ML (ref 3.1–17.5)
GLOBULIN SER CALC-MCNC: 3.4 G/DL (ref 2.3–3.5)
GLUCOSE SERPL-MCNC: 117 MG/DL (ref 70–99)
GLUCOSE SERPL-MCNC: 170 MG/DL (ref 70–99)
HAPTOGLOB SERPL-MCNC: 203 MG/DL (ref 30–200)
HCT VFR BLD AUTO: 26.2 % (ref 35.8–46.3)
HGB BLD-MCNC: 7.8 G/DL (ref 11.7–15.4)
IMM GRANULOCYTES # BLD AUTO: 0.04 K/UL (ref 0–0.5)
IMM GRANULOCYTES NFR BLD AUTO: 0.3 % (ref 0–5)
IRON SATN MFR SERPL: 3 % (ref 20–50)
IRON SERPL-MCNC: 14 UG/DL (ref 35–100)
LDH SERPL L TO P-CCNC: 186 U/L (ref 127–281)
LYMPHOCYTES # BLD: 3.57 K/UL (ref 0.5–4.6)
LYMPHOCYTES NFR BLD: 30.5 % (ref 13–44)
MCH RBC QN AUTO: 24.6 PG (ref 26.1–32.9)
MCHC RBC AUTO-ENTMCNC: 29.8 G/DL (ref 31.4–35)
MCV RBC AUTO: 82.6 FL (ref 82–102)
MONOCYTES # BLD: 0.89 K/UL (ref 0.1–1.3)
MONOCYTES NFR BLD: 7.6 % (ref 4–12)
NEUTS SEG # BLD: 6.63 K/UL (ref 1.7–8.2)
NEUTS SEG NFR BLD: 56.6 % (ref 43–78)
NRBC # BLD: 0 K/UL (ref 0–0.2)
PLATELET # BLD AUTO: 334 K/UL (ref 150–450)
PMV BLD AUTO: 10.4 FL (ref 9.4–12.3)
POTASSIUM SERPL-SCNC: 4.5 MMOL/L (ref 3.5–5.1)
POTASSIUM SERPL-SCNC: 4.5 MMOL/L (ref 3.5–5.1)
PROT SERPL-MCNC: 6.5 G/DL (ref 6.3–8.2)
RBC # BLD AUTO: 3.17 M/UL (ref 4.05–5.2)
SODIUM SERPL-SCNC: 138 MMOL/L (ref 136–145)
SODIUM SERPL-SCNC: 140 MMOL/L (ref 136–145)
TIBC SERPL-MCNC: 432 UG/DL (ref 240–450)
TSH W FREE THYROID IF ABNORMAL: 3.27 UIU/ML (ref 0.27–4.2)
UIBC SERPL-MCNC: 418 UG/DL (ref 112–347)
VIT B12 SERPL-MCNC: 472 PG/ML (ref 193–986)
WBC # BLD AUTO: 11.7 K/UL (ref 4.3–11.1)

## 2025-07-23 PROCEDURE — G0378 HOSPITAL OBSERVATION PER HR: HCPCS

## 2025-07-23 PROCEDURE — 1100000003 HC PRIVATE W/ TELEMETRY

## 2025-07-23 PROCEDURE — 3609012400 HC EGD TRANSORAL BIOPSY SINGLE/MULTIPLE: Performed by: INTERNAL MEDICINE

## 2025-07-23 PROCEDURE — 6360000002 HC RX W HCPCS: Performed by: STUDENT IN AN ORGANIZED HEALTH CARE EDUCATION/TRAINING PROGRAM

## 2025-07-23 PROCEDURE — 6370000000 HC RX 637 (ALT 250 FOR IP): Performed by: INTERNAL MEDICINE

## 2025-07-23 PROCEDURE — 88305 TISSUE EXAM BY PATHOLOGIST: CPT

## 2025-07-23 PROCEDURE — 7100000011 HC PHASE II RECOVERY - ADDTL 15 MIN: Performed by: INTERNAL MEDICINE

## 2025-07-23 PROCEDURE — 3700000001 HC ADD 15 MINUTES (ANESTHESIA): Performed by: INTERNAL MEDICINE

## 2025-07-23 PROCEDURE — 2500000003 HC RX 250 WO HCPCS: Performed by: INTERNAL MEDICINE

## 2025-07-23 PROCEDURE — 80048 BASIC METABOLIC PNL TOTAL CA: CPT

## 2025-07-23 PROCEDURE — 2580000003 HC RX 258: Performed by: STUDENT IN AN ORGANIZED HEALTH CARE EDUCATION/TRAINING PROGRAM

## 2025-07-23 PROCEDURE — 6360000002 HC RX W HCPCS: Performed by: NURSE ANESTHETIST, CERTIFIED REGISTERED

## 2025-07-23 PROCEDURE — 88342 IMHCHEM/IMCYTCHM 1ST ANTB: CPT

## 2025-07-23 PROCEDURE — 7100000010 HC PHASE II RECOVERY - FIRST 15 MIN: Performed by: INTERNAL MEDICINE

## 2025-07-23 PROCEDURE — 36415 COLL VENOUS BLD VENIPUNCTURE: CPT

## 2025-07-23 PROCEDURE — 3700000000 HC ANESTHESIA ATTENDED CARE: Performed by: INTERNAL MEDICINE

## 2025-07-23 PROCEDURE — 93010 ELECTROCARDIOGRAM REPORT: CPT | Performed by: INTERNAL MEDICINE

## 2025-07-23 PROCEDURE — 85025 COMPLETE CBC W/AUTO DIFF WBC: CPT

## 2025-07-23 PROCEDURE — 0DB98ZX EXCISION OF DUODENUM, VIA NATURAL OR ARTIFICIAL OPENING ENDOSCOPIC, DIAGNOSTIC: ICD-10-PCS | Performed by: INTERNAL MEDICINE

## 2025-07-23 PROCEDURE — 0DB68ZX EXCISION OF STOMACH, VIA NATURAL OR ARTIFICIAL OPENING ENDOSCOPIC, DIAGNOSTIC: ICD-10-PCS | Performed by: INTERNAL MEDICINE

## 2025-07-23 PROCEDURE — 2580000003 HC RX 258: Performed by: NURSE ANESTHETIST, CERTIFIED REGISTERED

## 2025-07-23 PROCEDURE — 2709999900 HC NON-CHARGEABLE SUPPLY: Performed by: INTERNAL MEDICINE

## 2025-07-23 PROCEDURE — 6370000000 HC RX 637 (ALT 250 FOR IP): Performed by: STUDENT IN AN ORGANIZED HEALTH CARE EDUCATION/TRAINING PROGRAM

## 2025-07-23 RX ORDER — PROPOFOL 10 MG/ML
INJECTION, EMULSION INTRAVENOUS
Status: DISCONTINUED | OUTPATIENT
Start: 2025-07-23 | End: 2025-07-23 | Stop reason: SDUPTHER

## 2025-07-23 RX ORDER — SODIUM CHLORIDE, SODIUM LACTATE, POTASSIUM CHLORIDE, CALCIUM CHLORIDE 600; 310; 30; 20 MG/100ML; MG/100ML; MG/100ML; MG/100ML
INJECTION, SOLUTION INTRAVENOUS
Status: DISCONTINUED | OUTPATIENT
Start: 2025-07-23 | End: 2025-07-23 | Stop reason: SDUPTHER

## 2025-07-23 RX ORDER — LOPERAMIDE HYDROCHLORIDE 2 MG/1
2 CAPSULE ORAL 4 TIMES DAILY PRN
Status: DISCONTINUED | OUTPATIENT
Start: 2025-07-23 | End: 2025-07-24 | Stop reason: HOSPADM

## 2025-07-23 RX ORDER — LIDOCAINE HYDROCHLORIDE 20 MG/ML
INJECTION, SOLUTION EPIDURAL; INFILTRATION; INTRACAUDAL; PERINEURAL
Status: DISCONTINUED | OUTPATIENT
Start: 2025-07-23 | End: 2025-07-23 | Stop reason: SDUPTHER

## 2025-07-23 RX ORDER — PANTOPRAZOLE SODIUM 40 MG/1
40 TABLET, DELAYED RELEASE ORAL
Status: DISCONTINUED | OUTPATIENT
Start: 2025-07-23 | End: 2025-07-24 | Stop reason: HOSPADM

## 2025-07-23 RX ADMIN — VENLAFAXINE HYDROCHLORIDE 75 MG: 75 CAPSULE, EXTENDED RELEASE ORAL at 08:36

## 2025-07-23 RX ADMIN — AMLODIPINE BESYLATE 2.5 MG: 2.5 TABLET ORAL at 08:37

## 2025-07-23 RX ADMIN — EZETIMIBE 10 MG: 10 TABLET ORAL at 08:35

## 2025-07-23 RX ADMIN — SODIUM CHLORIDE, PRESERVATIVE FREE 10 ML: 5 INJECTION INTRAVENOUS at 20:22

## 2025-07-23 RX ADMIN — PROPOFOL 180 MCG/KG/MIN: 10 INJECTION, EMULSION INTRAVENOUS at 11:26

## 2025-07-23 RX ADMIN — ZOLPIDEM TARTRATE 5 MG: 5 TABLET ORAL at 01:12

## 2025-07-23 RX ADMIN — SODIUM CHLORIDE 125 MG: 9 INJECTION, SOLUTION INTRAVENOUS at 17:27

## 2025-07-23 RX ADMIN — GABAPENTIN 600 MG: 300 CAPSULE ORAL at 13:14

## 2025-07-23 RX ADMIN — SODIUM CHLORIDE, PRESERVATIVE FREE 10 ML: 5 INJECTION INTRAVENOUS at 08:40

## 2025-07-23 RX ADMIN — ERGOCALCIFEROL 50000 UNITS: 1.25 CAPSULE ORAL at 08:35

## 2025-07-23 RX ADMIN — TRAZODONE HYDROCHLORIDE 50 MG: 50 TABLET ORAL at 01:12

## 2025-07-23 RX ADMIN — ATORVASTATIN CALCIUM 80 MG: 40 TABLET, FILM COATED ORAL at 08:35

## 2025-07-23 RX ADMIN — LEVOTHYROXINE SODIUM 112 MCG: 0.11 TABLET ORAL at 05:55

## 2025-07-23 RX ADMIN — SODIUM CHLORIDE, SODIUM LACTATE, POTASSIUM CHLORIDE, AND CALCIUM CHLORIDE: 600; 310; 30; 20 INJECTION, SOLUTION INTRAVENOUS at 11:20

## 2025-07-23 RX ADMIN — LISINOPRIL 40 MG: 20 TABLET ORAL at 08:36

## 2025-07-23 RX ADMIN — LIDOCAINE HYDROCHLORIDE 100 MG: 20 INJECTION, SOLUTION EPIDURAL; INFILTRATION; INTRACAUDAL; PERINEURAL at 11:25

## 2025-07-23 RX ADMIN — PROPOFOL 50 MG: 10 INJECTION, EMULSION INTRAVENOUS at 11:25

## 2025-07-23 RX ADMIN — TRAZODONE HYDROCHLORIDE 50 MG: 50 TABLET ORAL at 20:22

## 2025-07-23 RX ADMIN — SODIUM CHLORIDE, PRESERVATIVE FREE 10 ML: 5 INJECTION INTRAVENOUS at 08:49

## 2025-07-23 RX ADMIN — GABAPENTIN 600 MG: 300 CAPSULE ORAL at 01:12

## 2025-07-23 RX ADMIN — TROSPIUM CHLORIDE 20 MG: 20 TABLET, FILM COATED ORAL at 08:36

## 2025-07-23 RX ADMIN — METOPROLOL SUCCINATE 25 MG: 25 TABLET, EXTENDED RELEASE ORAL at 08:37

## 2025-07-23 RX ADMIN — FUROSEMIDE 20 MG: 20 TABLET ORAL at 08:37

## 2025-07-23 RX ADMIN — GABAPENTIN 600 MG: 300 CAPSULE ORAL at 20:22

## 2025-07-23 RX ADMIN — FERROUS SULFATE 325 MG: 325 TABLET ORAL at 08:36

## 2025-07-23 RX ADMIN — LOPERAMIDE HYDROCHLORIDE 2 MG: 2 CAPSULE ORAL at 17:35

## 2025-07-23 RX ADMIN — GABAPENTIN 600 MG: 300 CAPSULE ORAL at 08:48

## 2025-07-23 RX ADMIN — PANTOPRAZOLE SODIUM 40 MG: 40 TABLET, DELAYED RELEASE ORAL at 16:18

## 2025-07-23 ASSESSMENT — ENCOUNTER SYMPTOMS
ABDOMINAL PAIN: 0
DIARRHEA: 1
VOMITING: 0
BLOOD IN STOOL: 0
SHORTNESS OF BREATH: 1
NAUSEA: 0

## 2025-07-23 ASSESSMENT — PAIN SCALES - GENERAL: PAINLEVEL_OUTOF10: 0

## 2025-07-23 NOTE — CONSENT
Informed Consent for Blood Component Transfusion Note    I have discussed with the patient the rationale for blood component transfusion; its benefits in treating or preventing fatigue, organ damage, or death; and its risk which includes mild transfusion reactions, rare risk of blood borne infection, or more serious but rare reactions. I have discussed the alternatives to transfusion, including the risk and consequences of not receiving transfusion. The patient had an opportunity to ask questions and had agreed to proceed with transfusion of blood components.    Electronically signed by Neetu Richard PA-C on 7/22/25 at 10:12 PM EDT

## 2025-07-23 NOTE — ED PROVIDER NOTES
Emergency Department Provider Note       E EMERGENCY DEPT   PCP: Trey Nunes MD   Age: 79 y.o.   Sex: female     DISPOSITION      ICD-10-CM    1. Anemia, unspecified type  D64.9       2. Shortness of breath  R06.02           Medical Decision Making     Patient presents with ongoing shortness of breath and dizziness over the past couple weeks, low hemoglobin.  She was sent in by her PCP for transfusion.  She is well-appearing.  Vital signs are stable.  She is afebrile, no tachycardia or tachypnea, no hypoxia room air.  Lungs clear to auscultation.  Abdomen soft and nontender.  Hemoccult was positive.    Providers independent EKG interpretation: Normal rate (HR 74), normal rhythm, no QT prolongation, no ST elevation, no STEMI, ventricular bigeminy present    Workup shows hemoglobin at 7.  Hemoglobin was 12 5 months ago.  Patient denies any hematochezia or melena.    Creatinine elevated at 1.54, this appears to be close to patient's baseline.    BNP elevated at 1571.  No signs of volume overload on CXR.    Will plan for admission for symptomatic anemia.  Patient consented for blood transfusion.  Transfusion protocol initiated.  Case discussed with hospitalist, spoke with Dr. Valentin who will admit.       1 or more acute illnesses that pose a threat to life or bodily function.   Shared medical decision making was utilized in creating the patients health plan today.  I independently ordered and reviewed each unique test.                         History     Patient is a 79-year-old female with past medical history significant for chronic pain following with pain management, degenerative disc disease, HTN who presents today to the ER with chief complaint of abnormal labs.  Patient states she was having routine blood work done and her hemoglobin was found to be 7, her PCP repeated the next day and it dropped to 6.7 and she was told to come to the ER for transfusion.  Patient's hemoglobin 5 months ago was 12.

## 2025-07-23 NOTE — PROGRESS NOTES
TRANSFER - IN REPORT:    Verbal report received from Kassidy EDMONDS on My Rebolledo  being received from PACU for routine progression of patient care      Report consisted of patient's Situation, Background, Assessment and   Recommendations(SBAR).     Information from the following report(s) Nurse Handoff Report and Surgery Report was reviewed with the receiving nurse.    Opportunity for questions and clarification was provided.      Assessment completed upon patient's arrival to unit and care assumed.

## 2025-07-23 NOTE — PROGRESS NOTES
TRANSFER - OUT REPORT:    Verbal report given to Janice EDMONDS on My Rebolledo  being transferred to Preop for ordered procedure       Report consisted of patient's Situation, Background, Assessment and   Recommendations(SBAR).     Information from the following report(s) Nurse Handoff Report and Cardiac Rhythm Sinus rhythm with occasional PVCs was reviewed with the receiving nurse.           Lines:   Peripheral IV 07/23/25 Right Forearm (Active)   Site Assessment Clean, dry & intact 07/23/25 0120   Line Status Infusing 07/23/25 0120   Line Care Connections checked and tightened 07/23/25 0120   Phlebitis Assessment No symptoms 07/23/25 0120   Infiltration Assessment 0 07/23/25 0120   Alcohol Cap Used No 07/23/25 0120   Dressing Status Clean, dry & intact 07/23/25 0120   Dressing Type Transparent 07/23/25 0120       Peripheral IV 07/23/25 Distal;Left;Posterior Forearm (Active)   Site Assessment Clean, dry & intact 07/23/25 0121   Line Status Capped;Flushed 07/23/25 0121   Line Care Connections checked and tightened 07/23/25 0121   Phlebitis Assessment No symptoms 07/23/25 0121   Infiltration Assessment 0 07/23/25 0121   Alcohol Cap Used Yes 07/23/25 0121   Dressing Status Clean, dry & intact 07/23/25 0121   Dressing Type Transparent 07/23/25 0121        Opportunity for questions and clarification was provided.

## 2025-07-23 NOTE — ANESTHESIA POSTPROCEDURE EVALUATION
Department of Anesthesiology  Postprocedure Note    Patient: My Rebolledo  MRN: [id number]  YOB: 1945  Date of evaluation: 7/23/2025    Procedure Summary       Date: 07/23/25 Room / Location: [facility] / [facility]    Anesthesia Start: 1118 Anesthesia Stop: 1144    Procedure: ESOPHAGOGASTRODUODENOSCOPY BIOPSY (Upper GI Region) Diagnosis:       Anemia, unspecified type      (Anemia, unspecified type [D64.9])    Surgeons: Elver Lawson MD Responsible Provider: Stewart Booth MD    Anesthesia Type: TIVA ASA Status: 3            Anesthesia Type: TIVA    Charlene Phase I:      Charlene Phase II: Charlene Score: 10    Anesthesia Post Evaluation    Patient location during evaluation: PACU  Patient participation: complete - patient participated  Level of consciousness: awake and alert  Airway patency: patent  Nausea & Vomiting: no nausea and no vomiting  Cardiovascular status: hemodynamically stable  Respiratory status: acceptable, nonlabored ventilation and spontaneous ventilation  Hydration status: euvolemic  Comments: BP (!) 151/65   Pulse 83   Temp 97.9 °F (36.6 °C) (Oral)   Resp 17   Ht 1.473 m (4' 10\")   Wt 77.1 kg (170 lb)   SpO2 96%   BMI 35.53 kg/m²     Multimodal analgesia pain management approach  Pain management: adequate and satisfactory to patient    No notable events documented.

## 2025-07-23 NOTE — CONSULTS
Consult Note            Date:7/23/2025        Patient Name:My Rebolledo     YOB: 1945     Age:79 y.o.    Inpatient consult to GI  Consult performed by: Cesia Ayala APRN - CNP  Consult ordered by: Jitendra Valentin MD          Chief Complaint   No chief complaint on file.      History Obtained From   patient    History of Present Illness   My Rebolledo is a 78 yo female with a PMH of symptomatic anemia, LORA, obesity, anxiety, depression, B12 deficiency, IBS-D, PAD, CKD3, GERD, HTN who presented to the ED with tiredness and fatigue. Patient went to see her PCP yesterday where she had her blood drawn and her PCP called her to tell her that her hemoglobin dropped 5 units from 12 to 7 and instructed her to go to the ED. The patient states she has been having shortness of breath as well as dizzy spells. She denies any nausea, vomiting, abdominal pain, bloody or black stools. She states she does have IBS-D but takes colestipol to control it and hasn't noticed anything different about her stools. She denies alcohol, tobacco, NSAID, or blood thinner use. She states about 5 years ago she was admitted for a few days due to having blood in her stools. She believes they told her it was a diverticular bleed at the time. She has had several abdominal surgeries over the years including hysterectomy, cholecystectomy, several laparotomies as well as bladder surgery. She states there has never been anything significant found with her stomach or colon though. Her last EGD and colonoscopy was in November 2024. Her EGD was unremarkable and her biopsies showed chronic gastritis. Her colonoscopy showed sigmoid diverticulosis as well as a sessile polyp which was an early hyperplastic polyp but otherwise unremarkable colon. She states they talked to her about doing VCE procedure afterwards but she did not want to prep again so she cancelled that procedure. Her hemoglobin typically stays around 11-12 and

## 2025-07-23 NOTE — CARE COORDINATION
Met with Ms. Rebolledo and her spouse, Dez, at bedside for initial CM assessment. Patient is alert and oriented x4. Demographics and PCP verified. Patient last saw her PCP in April and has an established appointment next week. Ms. Rebolledo lives with her  in a one level home with no steps to enter. She was independent with mobility and ADLs at most recent baseline and used no DME or services. She is retired and remains an active  in the community. Insured through [employer]. Ms. Rebolledo plans to return home with spouse at discharge and anticipates no needs from CM at this time. CM will continue to follow and assist with transition at discharge along with any needs that may arise during her stay.       07/23/25 4295   Service Assessment   Patient Orientation Alert and Oriented;Person;Place;Situation   Cognition Alert   History Provided By Patient   Primary Caregiver Self   Accompanied By/Relationship Spouse-Dez   Support Systems Spouse/Significant Other   Patient's Healthcare Decision Maker is: Legal Next of Kin   PCP Verified by CM Yes   Last Visit to PCP Within last 6 months   Prior Functional Level Independent in ADLs/IADLs   Current Functional Level Independent in ADLs/IADLs   Can patient return to prior living arrangement Yes   Ability to make needs known: Good   Family able to assist with home care needs: Yes   Would you like for me to discuss the discharge plan with any other family members/significant others, and if so, who? Yes  (Spouse-Dez)   Financial Resources Medicare   Community Resources None   CM/SW Referral Other (see comment)  (standard assessment)   Social/Functional History   Lives With Spouse   Type of Home House   Home Layout One level   Home Access Level entry   Bathroom Shower/Tub Tub/Shower unit   Bathroom Toilet Standard   Bathroom Equipment None   Bathroom Accessibility Accessible   Home Equipment None   Receives Help From Other (Comment)  (independent PTA)   Prior Level of

## 2025-07-23 NOTE — ED NOTES
TRANSFER - OUT REPORT:    Verbal report given to GREY García on My Rebolledo  being transferred to [facility] for routine progression of patient care       Report consisted of patient's Situation, Background, Assessment and   Recommendations(SBAR).     Information from the following report(s) Nurse Handoff Report was reviewed with the receiving nurse.    [place] Fall Assessment:    Presents to emergency department  because of falls (Syncope, seizure, or loss of consciousness): No  Age > 70: Yes  Altered Mental Status, Intoxication with alcohol or substance confusion (Disorientation, impaired judgment, poor safety awaremess, or inability to follow instructions): No  Impaired Mobility: Ambulates or transfers with assistive devices or assistance; Unable to ambulate or transer.: No  Nursing Judgement: Yes          Lines:   Peripheral IV 03/25/25 Right Antecubital (Active)        Opportunity for questions and clarification was provided.      Patient transported with:  Registered Nurse

## 2025-07-23 NOTE — H&P
Hospitalist History and Physical   Admit Date:  2025  7:20 PM   Name:  My Rebolledo   Age:  79 y.o.  Sex:  female  :  1945   MRN:  [id number]   Room:  [place] [address]    Presenting/Chief Complaint: No chief complaint on file.     Reason(s) for Admission: Symptomatic anemia [D64.9]     History of Present Illness:   My Rebolledo is a 79 y.o. female with medical history of symptomatic anemia, obstructive sleep apnea, morbid obesity, anxiety/depression, B12 deficiency, osteoarthritis, IBS, peripheral artery disease, hypothyroidism, hyperlipidemia, allergic rhinitis, stress urinary incontinence, insomnia, CKD 3B, overactive bladder, GERD, hypertension presents to the emergency with a chief complaint of fatigue, tiredness.  Pt went and saw pcp yesterday and she received a call in regards to her hgb a drop of 5units to 7.  Patient's hemoglobin 5 months ago was almost 12 denies any hematochezia or melena she has been having sob over the past couple weeks and having dizzy spells now.  Denies any recent illness. no fever no chills no cough no chest pain no abdominal pain no nausea vomiting or diarrhea.  Pt had a normal colonoscopy approximately 3months ago.  Pcp tried to schedule egd but couldn't get in touch and hence pt sent here.  Patient was worked up patient was worked up by the ED physician found the hemoglobin to be 7 with a crit of 24 and hence 1 unit of packed red blood cells.  Ordered and subsequently hospital service called to evaluate admit this patient      Assessment & Plan:     Principal Problem:  Symptomatic anemia  Status post transfusion of packed red blood cells  Gentle hydration  Recent colonoscopy was fine.  Pt does take baby Asprin for a long time-pcp told her not to take it ans she stopped this morning.  Consult gi in am.    Obstructive sleep apnea  On CPAP at home  RT to set it up    Insomnia  On trazodone, Ambien    Allergic rhinitis  On Flonase    GERD  On Prilosec 20 mg  nightly    Hypertension  On Norvasc 2.5 mg daily, Toprol-XL 25 mg, lisinopril 40 mg daily   current blood pressure 133/51    Hyperlipidemia  On Lipitor and Zetia    Iron deficiency anemia  On iron sulfate 325 mg daily    Anxiety   on clonazepam 0.5 mg every 6 as needed    Hypothyroidism  On Synthroid 112 mcg daily      PT/OT evals ordered?  Defer to primary team  Diet:  N.p.o. after midnight regular diet n.p.o. after midnight  Gi to see in am-  VTE prophylaxis: SCDs  Code status: Full code  Code status discussed: Yes  Blood consent obtained: yes      Non-peripheral Lines and Tubes (if present):             Hospital Problems:  Principal Problem:    Symptomatic anemia  Resolved Problems:    * No resolved hospital problems. *        Objective:   Patient Vitals for the past 24 hrs:   Temp Pulse Resp BP SpO2   07/22/25 1915 98.1 °F (36.7 °C) 83 16 (!) 133/51 97 %       Oxygen Therapy  SpO2: 97 %  O2 Device: None (Room air)    Estimated body mass index is 35.53 kg/m² as calculated from the following:    Height as of this encounter: 1.473 m (4' 10\").    Weight as of this encounter: 77.1 kg (170 lb).  No intake or output data in the 24 hours ending 07/22/25 2236      Physical Exam:  Vital signs 133/51 83 16 98.1  General:    Well nourished.    Head:  Normocephalic, atraumatic  Eyes:  Sclerae appear normal.  Pupils equally round.  ENT:  Nares appear normal.  Moist oral mucosa  Neck:  No restricted ROM.  Trachea midline   CV:   RRR.  No m/r/g.  No jugular venous distension.  Lungs:   CTAB.  No wheezing, rhonchi, or rales.  Symmetric expansion.  Abdomen:   Soft, nontender, nondistended.  Extremities: No cyanosis or clubbing.  No edema  Skin:     No rashes.  Normal coloration.   Warm and dry.    Neuro:  CN II-XII grossly intact.  Sensation intact.   Psych:  Mood and affect appropriate      Orders Placed This Encounter   Medications    0.9 % sodium chloride infusion    acetaminophen (TYLENOL) tablet 1,000 mg       Prior to Admit

## 2025-07-23 NOTE — ANESTHESIA PRE PROCEDURE
Department of Anesthesiology  Preprocedure Note       Name:  My Rebolledo   Age:  79 y.o.  :  1945                                          MRN:  [id number]         Date:  2025      Surgeon: Surgeon(s):  Elver Lawson MD    Procedure: Procedure(s):  ESOPHAGOGASTRODUODENOSCOPY DIAGNOSTIC ONLY    Medications prior to admission:   Prior to Admission medications    Medication Sig Start Date End Date Taking? Authorizing Provider   ferrous sulfate (IRON 325) 325 (65 Fe) MG tablet Take 1 tablet by mouth daily (with breakfast) 25   Trey Nunes MD   furosemide (LASIX) 20 MG tablet Take 1 tablet by mouth daily 7/15/25   Rajni Collins MD   potassium chloride (KLOR-CON M) 10 MEQ extended release tablet Take 1 tablet by mouth daily 7/15/25   Rajni Collins MD   gabapentin (NEURONTIN) 600 MG tablet Take 1 tablet by mouth 3 times daily for 60 days. 6/11/25 8/10/25  Artie Berman MD   clonazePAM (KLONOPIN) 0.5 MG tablet Take 1 tablet by mouth daily as needed for Anxiety for up to 90 days. Max Daily Amount: 0.5 mg 25  Madison Fernandez MD   desvenlafaxine succinate (PRISTIQ) 50 MG TB24 extended release tablet Take 1 tablet by mouth daily 25   Madison Fernandez MD   traZODone (DESYREL) 50 MG tablet Take 0.5-1 tablets by mouth nightly 25   Madison Fernandez MD   zolpidem (AMBIEN) 10 MG tablet Take 1 tablet by mouth nightly as needed for Sleep for up to 120 days. Max Daily Amount: 10 mg 25  Madison Fernandez MD   methocarbamol (ROBAXIN) 500 MG tablet Take 1 tablet by mouth 2 times daily as needed (spasms) 25  Artie Berman MD   rimegepant sulfate 75 MG TBDP Take 75 mg by mouth every other day  Patient not taking: Reported on 2025   Rice, Aanstacia L, APRN   aspirin 81 MG EC tablet Take 1 tablet by mouth daily    Provider, MD Kelley   amLODIPine (NORVASC) 2.5 MG tablet Take 1 tablet by mouth daily 25   Miguel Herrera

## 2025-07-23 NOTE — PROGRESS NOTES
Hospitalist Progress Note   Admit Date:  2025  7:20 PM   Name:  My Rebolledo   Age:  79 y.o.  Sex:  female  :  1945   MRN:  [id number]   Room:  365    Presenting/Chief Complaint: No chief complaint on file.     Reason(s) for Admission: Shortness of breath [R06.02]  Symptomatic anemia [D64.9]  Anemia, unspecified type [D64.9]     Hospital Course:   My Rebolledo is a 79 y.o. female with medical history of symptomatic anemia, obstructive sleep apnea, morbid obesity, anxiety/depression, B12 deficiency, osteoarthritis, IBS, peripheral artery disease, hypothyroidism, hyperlipidemia, allergic rhinitis, stress urinary incontinence, insomnia, CKD 3B, overactive bladder, GERD, hypertension presents to the emergency with a chief complaint of fatigue, tiredness.  Pt went and saw pcp yesterday and she received a call in regards to her hgb a drop of 5units to 7.  Patient's hemoglobin 5 months ago was almost 12 denies any hematochezia or melena she has been having sob over the past couple weeks and having dizzy spells now.  Denies any recent illness. no fever no chills no cough no chest pain no abdominal pain no nausea vomiting or diarrhea.  Pt had a normal colonoscopy approximately 3months ago.  Pcp tried to schedule egd but couldn't get in touch and hence pt sent here.  Patient was worked up patient was worked up by the ED physician found the hemoglobin to be 7 with a crit of 24 and hence 1 unit of packed red blood cells.  Ordered and subsequently hospital service called to evaluate admit this patient      Subjective & 24hr Events:   Patient was seen and examined at the bedside. She was ambulating independently. She was complaining of generalized body pain and was requesting for pain medication. She reported shortness of breath and dizziness.       Assessment & Plan:   Symptomatic anemia  Hemoglobin is stable around 7.8.   S/p  PRBCs  S/p EGD showed gastritis  Continue PPI  Advance the diet  Plan  g/dL    Albumin/Globulin Ratio 0.9 (L) 1.0 - 1.9     EKG 12 Lead    Collection Time: 07/22/25  8:31 PM   Result Value Ref Range    Ventricular Rate 74 BPM    Atrial Rate 70 BPM    P-R Interval 153 ms    QRS Duration 81 ms    Q-T Interval 388 ms    QTc Calculation (Bazett) 431 ms    P Axis 69 degrees    R Axis 32 degrees    T Axis 57 degrees    Diagnosis       Sinus rhythm  Ventricular bigeminy    Confirmed by YADIRA GOODMAN (), SYDNI LU ([id number]) on 7/23/2025 6:31:08 AM     CBC with Auto Differential    Collection Time: 07/22/25  8:50 PM   Result Value Ref Range    WBC 11.5 (H) 4.3 - 11.1 K/uL    RBC 2.89 (L) 4.05 - 5.2 M/uL    Hemoglobin 7.0 (L) 11.7 - 15.4 g/dL    Hematocrit 23.7 (L) 35.8 - 46.3 %    MCV 82.0 82.0 - 102.0 FL    MCH 24.2 (L) 26.1 - 32.9 PG    MCHC 29.5 (L) 31.4 - 35.0 g/dL    RDW 15.1 (H) 11.9 - 14.6 %    Platelets 403 150 - 450 K/uL    MPV 10.7 9.4 - 12.3 FL    nRBC 0.00 0.0 - 0.2 K/uL    Differential Type AUTOMATED      Neutrophils % 60.6 43.0 - 78.0 %    Lymphocytes % 27.4 13.0 - 44.0 %    Monocytes % 7.4 4.0 - 12.0 %    Eosinophils % 3.7 0.5 - 7.8 %    Basophils % 0.5 0.0 - 2.0 %    Immature Granulocytes % 0.4 0.0 - 5.0 %    Neutrophils Absolute 6.95 1.70 - 8.20 K/UL    Lymphocytes Absolute 3.15 0.50 - 4.60 K/UL    Monocytes Absolute 0.85 0.10 - 1.30 K/UL    Eosinophils Absolute 0.43 0.00 - 0.80 K/UL    Basophils Absolute 0.06 0.00 - 0.20 K/UL    Immature Granulocytes Absolute 0.05 0.0 - 0.5 K/UL   CMP    Collection Time: 07/22/25  8:50 PM   Result Value Ref Range    Sodium 140 136 - 145 mmol/L    Potassium 4.9 3.5 - 5.1 mmol/L    Chloride 101 98 - 107 mmol/L    CO2 26 20 - 29 mmol/L    Anion Gap 13 7 - 16 mmol/L    Glucose 97 70 - 99 mg/dL    BUN 24 (H) 8 - 23 MG/DL    Creatinine 1.54 (H) 0.60 - 1.10 MG/DL    Est, Glom Filt Rate 34 (L) >60 ml/min/1.73m2    Calcium 8.8 8.8 - 10.2 MG/DL    Total Bilirubin <0.2 0.0 - 1.2 MG/DL    ALT 13 8 - 45 U/L    AST 20 15 - 37 U/L    Alk Phosphatase 90 35 - 104

## 2025-07-24 ENCOUNTER — TELEPHONE (OUTPATIENT)
Dept: FAMILY MEDICINE CLINIC | Facility: CLINIC | Age: 80
End: 2025-07-24

## 2025-07-24 VITALS
WEIGHT: 170 LBS | OXYGEN SATURATION: 91 % | HEIGHT: 58 IN | HEART RATE: 41 BPM | TEMPERATURE: 97.9 F | DIASTOLIC BLOOD PRESSURE: 79 MMHG | BODY MASS INDEX: 35.68 KG/M2 | RESPIRATION RATE: 16 BRPM | SYSTOLIC BLOOD PRESSURE: 156 MMHG

## 2025-07-24 LAB
ALBUMIN SERPL ELPH-MCNC: 3.2 G/DL (ref 2.9–4.4)
ALBUMIN/GLOB SERPL: 1 (ref 0.7–1.7)
ALPHA1 GLOB SERPL ELPH-MCNC: 0.2 G/DL (ref 0–0.4)
ALPHA2 GLOB SERPL ELPH-MCNC: 0.9 G/DL (ref 0.4–1)
ANION GAP SERPL CALC-SCNC: 11 MMOL/L (ref 7–16)
B-GLOBULIN SERPL ELPH-MCNC: 1.1 G/DL (ref 0.7–1.3)
BASOPHILS # BLD: 0.07 K/UL (ref 0–0.2)
BASOPHILS NFR BLD: 0.6 % (ref 0–2)
BUN SERPL-MCNC: 20 MG/DL (ref 8–23)
CALCIUM SERPL-MCNC: 8.2 MG/DL (ref 8.8–10.2)
CHLORIDE SERPL-SCNC: 103 MMOL/L (ref 98–107)
CO2 SERPL-SCNC: 27 MMOL/L (ref 20–29)
CREAT SERPL-MCNC: 1.35 MG/DL (ref 0.6–1.1)
DIFFERENTIAL METHOD BLD: ABNORMAL
EOSINOPHIL # BLD: 0.53 K/UL (ref 0–0.8)
EOSINOPHIL NFR BLD: 4.4 % (ref 0.5–7.8)
ERYTHROCYTE [DISTWIDTH] IN BLOOD BY AUTOMATED COUNT: 16.1 % (ref 11.9–14.6)
GAMMA GLOB SERPL ELPH-MCNC: 0.8 G/DL (ref 0.4–1.8)
GLOBULIN SER CALC-MCNC: 3.1 G/DL (ref 2.2–3.9)
GLUCOSE SERPL-MCNC: 100 MG/DL (ref 70–99)
HCT VFR BLD AUTO: 25.7 % (ref 35.8–46.3)
HGB BLD-MCNC: 7.7 G/DL (ref 11.7–15.4)
IMM GRANULOCYTES # BLD AUTO: 0.05 K/UL (ref 0–0.5)
IMM GRANULOCYTES NFR BLD AUTO: 0.4 % (ref 0–5)
LYMPHOCYTES # BLD: 2.78 K/UL (ref 0.5–4.6)
LYMPHOCYTES NFR BLD: 22.8 % (ref 13–44)
M PROTEIN SERPL ELPH-MCNC: NORMAL G/DL
MCH RBC QN AUTO: 24.7 PG (ref 26.1–32.9)
MCHC RBC AUTO-ENTMCNC: 30 G/DL (ref 31.4–35)
MCV RBC AUTO: 82.4 FL (ref 82–102)
MONOCYTES # BLD: 0.89 K/UL (ref 0.1–1.3)
MONOCYTES NFR BLD: 7.3 % (ref 4–12)
NEUTS SEG # BLD: 7.86 K/UL (ref 1.7–8.2)
NEUTS SEG NFR BLD: 64.5 % (ref 43–78)
NRBC # BLD: 0 K/UL (ref 0–0.2)
PHOSPHATE SERPL-MCNC: 3.6 MG/DL (ref 2.5–4.5)
PLATELET # BLD AUTO: 357 K/UL (ref 150–450)
PMV BLD AUTO: 10.4 FL (ref 9.4–12.3)
POTASSIUM SERPL-SCNC: 4.3 MMOL/L (ref 3.5–5.1)
PROT SERPL-MCNC: 6.3 G/DL (ref 6–8.5)
RBC # BLD AUTO: 3.12 M/UL (ref 4.05–5.2)
SODIUM SERPL-SCNC: 141 MMOL/L (ref 136–145)
WBC # BLD AUTO: 12.2 K/UL (ref 4.3–11.1)

## 2025-07-24 PROCEDURE — 80048 BASIC METABOLIC PNL TOTAL CA: CPT

## 2025-07-24 PROCEDURE — G0378 HOSPITAL OBSERVATION PER HR: HCPCS

## 2025-07-24 PROCEDURE — 6370000000 HC RX 637 (ALT 250 FOR IP): Performed by: STUDENT IN AN ORGANIZED HEALTH CARE EDUCATION/TRAINING PROGRAM

## 2025-07-24 PROCEDURE — 84100 ASSAY OF PHOSPHORUS: CPT

## 2025-07-24 PROCEDURE — 85025 COMPLETE CBC W/AUTO DIFF WBC: CPT

## 2025-07-24 PROCEDURE — 36415 COLL VENOUS BLD VENIPUNCTURE: CPT

## 2025-07-24 PROCEDURE — 6370000000 HC RX 637 (ALT 250 FOR IP): Performed by: INTERNAL MEDICINE

## 2025-07-24 RX ORDER — HYDROCODONE BITARTRATE AND ACETAMINOPHEN 5; 325 MG/1; MG/1
1 TABLET ORAL EVERY 6 HOURS PRN
Refills: 0 | Status: DISCONTINUED | OUTPATIENT
Start: 2025-07-24 | End: 2025-07-24 | Stop reason: HOSPADM

## 2025-07-24 RX ADMIN — ATORVASTATIN CALCIUM 80 MG: 40 TABLET, FILM COATED ORAL at 08:11

## 2025-07-24 RX ADMIN — PANTOPRAZOLE SODIUM 40 MG: 40 TABLET, DELAYED RELEASE ORAL at 05:14

## 2025-07-24 RX ADMIN — EZETIMIBE 10 MG: 10 TABLET ORAL at 08:11

## 2025-07-24 RX ADMIN — HYDROCODONE BITARTRATE AND ACETAMINOPHEN 1 TABLET: 5; 325 TABLET ORAL at 09:55

## 2025-07-24 RX ADMIN — AMLODIPINE BESYLATE 2.5 MG: 2.5 TABLET ORAL at 08:11

## 2025-07-24 RX ADMIN — TROSPIUM CHLORIDE 20 MG: 20 TABLET, FILM COATED ORAL at 08:11

## 2025-07-24 RX ADMIN — VENLAFAXINE HYDROCHLORIDE 75 MG: 75 CAPSULE, EXTENDED RELEASE ORAL at 08:10

## 2025-07-24 RX ADMIN — GABAPENTIN 600 MG: 300 CAPSULE ORAL at 08:12

## 2025-07-24 RX ADMIN — FERROUS SULFATE 325 MG: 325 TABLET ORAL at 08:11

## 2025-07-24 RX ADMIN — METHOCARBAMOL 500 MG: 500 TABLET ORAL at 08:13

## 2025-07-24 RX ADMIN — LEVOTHYROXINE SODIUM 112 MCG: 0.11 TABLET ORAL at 05:14

## 2025-07-24 RX ADMIN — LISINOPRIL 40 MG: 20 TABLET ORAL at 08:11

## 2025-07-24 RX ADMIN — METOPROLOL SUCCINATE 25 MG: 25 TABLET, EXTENDED RELEASE ORAL at 08:11

## 2025-07-24 ASSESSMENT — PAIN SCALES - GENERAL
PAINLEVEL_OUTOF10: 6
PAINLEVEL_OUTOF10: 6

## 2025-07-24 ASSESSMENT — PAIN DESCRIPTION - LOCATION
LOCATION: HEAD
LOCATION: HEAD;BACK

## 2025-07-24 ASSESSMENT — PAIN DESCRIPTION - DESCRIPTORS: DESCRIPTORS: ACHING

## 2025-07-24 NOTE — TELEPHONE ENCOUNTER
----- Message from Melly DEE sent at 7/24/2025 12:51 PM EDT -----  Regarding: TCM  Please call and complete TCM documentation. Discharged today, dx anemia. Scheduled to see Dr. RANDLE on 7/29 for a 3 month follow up, just make sure she is aware of that appointment and plans to attend as I've made this her hosp f/u as well.    Thanks

## 2025-07-24 NOTE — DISCHARGE SUMMARY
Hospitalist Discharge Summary   Admit Date:  2025  7:20 PM   DC Note date: 2025  Name:  My Rebolledo   Age:  79 y.o.  Sex:  female  :  1945   MRN:  [id number]   Room:  [facility]/  PCP:  Trey Nunes MD    Presenting Complaint: No chief complaint on file.     Initial Admission Diagnosis: Shortness of breath [R06.02]  Symptomatic anemia [D64.9]  Anemia, unspecified type [D64.9]     Problem List for this Hospitalization (present on admission):    Principal Problem:    Anemia  Resolved Problems:    * No resolved hospital problems. *      Hospital Course:  My Rebolledo is a 79 y.o. female with medical history of symptomatic anemia, obstructive sleep apnea, morbid obesity, anxiety/depression, B12 deficiency, osteoarthritis, IBS, peripheral artery disease, hypothyroidism, hyperlipidemia, allergic rhinitis, stress urinary incontinence, insomnia, CKD 3B, overactive bladder, GERD, hypertension presents to the emergency with a chief complaint of fatigue, tiredness.  Pt went and saw pcp and she received a call in regards to her hgb a drop of 5units to 7.  Patient's hemoglobin 5 months ago was almost 12 denies any hematochezia or melena she has been having sob  and having dizzy spells. Pt had a normal colonoscopy approximately 3months ago. .  Patient was worked up  by the ED physician found the hemoglobin to be 7 with a crit of 24 and ordered 1 unit of packed red blood cells.  GI was consulted and patient had EGD which showed gastritis.  She was treated with PPI twice daily.  Advanced her diet.  GI recommended outpatient capsule endoscopy if hemoglobin drops.  Her hemoglobin stable.  Patient is hemodynamically stable for discharge.    Disposition: Home  Diet: ADULT DIET; Regular  Code Status: Full Code    Follow Ups:  Follow-up Information    None         Future Appointments         Provider Specialty Dept Phone    2025 10:30 AM (Arrive by 10:15 AM) SUJATA Saleh MD Orthopedic Surgery

## 2025-07-24 NOTE — PLAN OF CARE
Problem: Discharge Planning  Goal: Discharge to home or other facility with appropriate resources  7/24/2025 1004 by Angelina Moore RN  Outcome: Adequate for Discharge  7/24/2025 0601 by Luis Dumont RN  Outcome: Progressing     Problem: Pain  Goal: Verbalizes/displays adequate comfort level or baseline comfort level  7/24/2025 1004 by Angelina Moore RN  Outcome: Adequate for Discharge  7/24/2025 0601 by Luis Dumont RN  Outcome: Progressing     Problem: Safety - Adult  Goal: Free from fall injury  7/24/2025 1004 by Angelina Moore, RN  Outcome: Adequate for Discharge  7/24/2025 0601 by Luis Dumont RN  Outcome: Progressing

## 2025-07-24 NOTE — PROGRESS NOTES
Physician Progress Note      PATIENT:               SAMI FREEDMAN  CSN #:                  [id number]  :                       1945  ADMIT DATE:       2025 7:20 PM  DISCH DATE:  RESPONDING  PROVIDER #:        Shannon Rivera MD          QUERY TEXT:    Anemia is documented in the IM PN . Please specify the type:    The clinical indicators include:  gastritis    \"Pt does take baby Asprin for a long time-pcp told her not to take it ans she   stopped this morning.\" (H&P  Jitendra Valentin MD)  \"Symptomatic anemia, Hemoglobin is stable around 7.8.\" (IM PN  Shannon Rivera MD)  Hgb 6.7>>7.0>>7.8>.7.7  Hct 22.8>>23.7>>26.2>>25.7  s/p PRBC, serial labs, PPI, aspirin held    Thank you,  Khushi Rowan, [facility], [facility].  Options provided:  -- Related to acute blood loss  -- Related to acute on chronic blood loss  -- Related to iron deficiency  -- Related to Aspirin.  -- Other - I will add my own diagnosis  -- Disagree - Not applicable / Not valid  -- Disagree - Clinically unable to determine / Unknown  -- Refer to Clinical Documentation Reviewer    PROVIDER RESPONSE TEXT:    The patients anemia is related to acute blood loss.    Query created by: Khushi Rowan on 2025 5:15 AM      Electronically signed by:  Shannon Rivera MD 2025 9:28 AM

## 2025-07-24 NOTE — TELEPHONE ENCOUNTER
Care Transitions Initial Follow Up Call    Outreach made within 2 business days of discharge: Yes    Patient: My Rebolledo Patient : 1945   MRN: [id number]  Reason for Admission: 24  Discharge Date: 25       Spoke with: Patient    Discharge department/facility: [facility] Interactive Patient Contact:  Was patient able to fill all prescriptions: No: Doesn't recall  Was patient instructed to bring all medications to the follow-up visit: No: Doesn't recall  Is patient taking all medications as directed in the discharge summary? No  Does patient understand their discharge instructions: Yes  Does patient have questions or concerns that need addressed prior to 7-14 day follow up office visit: no    Additional needs identified to be addressed with provider  No needs identified             Scheduled appointment with PCP within 7-14 days    Follow Up  Future Appointments   Date Time Provider Department Center   2025 10:30 AM SUJATA Saleh MD POAI [facility] AMB   2025 11:20 AM Madison Fernandez MD [id number] [facility] AMB   2025  1:30 PM Trey Nunes MD CAF BS ECC DEP   2025  9:45 AM Constantine Hart, APRN - CNP POAH [facility] AMB   2025 10:30 AM Terrie Keller MD [facility] AMB   2025 11:10 AM PERIPHERAL GCCOIG GCC   2025 11:15 AM PP PFT LAB PPS [facility] AMB   2025  1:00 PM Ida Hills, APRN - NP PPS [facility] AMB   9/15/2025 11:00 AM Miguel Herrera DO UCDG [facility] AMB   9/15/2025  1:30 PM Julien Abbott MD PSCD [facility] AMB   10/6/2025 10:30 AM Leandra Cabello PA-C END [facility] AMB   12/3/2025 12:30 PM Clara Yusuf MD BSNI [facility] AMB   2026 11:00 AM BSVS ULTRASOUND 3 BSVS [facility] AMB   2026 11:30 AM Frankie Duarte MD BSVS [facility] AMB       ELENA MULLER MA

## 2025-07-24 NOTE — CARE COORDINATION
Pt is for discharge home today with family and no needs/supportive care orders recieved for CM at this time.       07/24/25 0980   Services At/After Discharge   Transition of Care Consult (CM Consult) N/A   Services At/After Discharge None   [place] Resource Information Provided? No   Mode of Transport at Discharge Self   Confirm Follow Up Transport Family   Condition of Participation: Discharge Planning   The Plan for Transition of Care is related to the following treatment goals: The patient will return home with spouse.

## 2025-07-24 NOTE — PROGRESS NOTES
History of colitis     History of DVT (deep vein thrombosis)     s/p knee replacement and developed right leg DVT     HTN (hypertension)     controlled with medication     Hx of echocardiogram 03/25/2022    Left Ventricle: Left ventricle size is normal. Normal wall thickness.  Normal wall motion. Hyperdynamic left ventricular systolic function with a  visually estimated EF of 65 - 70%. Normal diastolic function.    Tricuspid Valve: Mild transvalvular regurgitatio    Hyperlipidemia     controlled with medication     Hypertension     managed with medication    Hypothyroidism     controlled with medication     IBS (irritable bowel syndrome)     Insomnia     Left flank pain 03/05/2020    Morbid obesity (HCC)     BMI 42.85    Murmur     ECHO 3/25/22 EF 65-70%, mild tricuspid valve regurgitation-----per PCP note (11/8/19) systolic murmur grade of 2/6    Neuropathy     Overactive bladder     PAD (peripheral artery disease)     patient denies on 11/27/19    PTSD (post-traumatic stress disorder) 2006    Recurrent UTI     RLS (restless legs syndrome)     controlled with medication     Sepsis with acute renal failure and tubular necrosis (HCC)     hx of    Shigella gastroenteritis 05/09/2022    Sleep difficulties 10 years ago    SOBOE (shortness of breath on exertion)     PRN inhaler-patient reports she uses inhaler QHS    Special examinations     History-Neurologist - Dr. 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

## 2025-07-25 ENCOUNTER — OFFICE VISIT (OUTPATIENT)
Dept: ORTHOPEDIC SURGERY | Age: 80
End: 2025-07-25

## 2025-07-25 DIAGNOSIS — M19.011 ARTHRITIS OF RIGHT ACROMIOCLAVICULAR JOINT: ICD-10-CM

## 2025-07-25 DIAGNOSIS — M67.921 TENDINOPATHY OF RIGHT BICEPS TENDON: Primary | ICD-10-CM

## 2025-07-25 LAB
ABO + RH BLD: NORMAL
BLD PROD TYP BPU: NORMAL
BLD PROD TYP BPU: NORMAL
BLOOD BANK BLOOD PRODUCT EXPIRATION DATE: NORMAL
BLOOD BANK DISPENSE STATUS: NORMAL
BLOOD BANK DISPENSE STATUS: NORMAL
BLOOD BANK ISBT PRODUCT BLOOD TYPE: 9500
BLOOD BANK PRODUCT CODE: NORMAL
BLOOD BANK UNIT TYPE AND RH: NORMAL
BLOOD GROUP ANTIBODIES SERPL: NORMAL
BPU ID: NORMAL
BPU ID: NORMAL
CROSSMATCH RESULT: NORMAL
CROSSMATCH RESULT: NORMAL
SPECIMEN EXP DATE BLD: NORMAL
UNIT DIVISION: 0
UNIT DIVISION: 0
UNIT ISSUE DATE/TIME: NORMAL

## 2025-07-25 RX ORDER — METHYLPREDNISOLONE ACETATE 40 MG/ML
80 INJECTION, SUSPENSION INTRA-ARTICULAR; INTRALESIONAL; INTRAMUSCULAR; SOFT TISSUE ONCE
Status: COMPLETED | OUTPATIENT
Start: 2025-07-25 | End: 2025-07-25

## 2025-07-25 RX ADMIN — METHYLPREDNISOLONE ACETATE 80 MG: 40 INJECTION, SUSPENSION INTRA-ARTICULAR; INTRALESIONAL; INTRAMUSCULAR; SOFT TISSUE at 11:20

## 2025-07-25 NOTE — PATIENT INSTRUCTIONS
may want to take a fiber supplement every day. If you have not had a bowel movement after a couple of days, ask your doctor about taking a mild laxative.   Medicines    Your doctor will tell you if and when you can restart your medicines. The doctor will also give you instructions about taking any new medicines.     If you stopped taking aspirin or some other blood thinner, your doctor will tell you when to start taking it again.     Take pain medicines exactly as directed.  If the doctor gave you a prescription medicine for pain, take it as prescribed.  If you are not taking a prescription pain medicine, take an over-the-counter medicine such as acetaminophen (Tylenol), ibuprofen (Advil, Motrin), or naproxen (Aleve). Read and follow all instructions on the label.  Do not take two or more pain medicines at the same time unless the doctor told you to. Many pain medicines have acetaminophen, which is Tylenol. Too much acetaminophen (Tylenol) can be harmful.     If you think your pain medicine is making you sick to your stomach:  Take your medicine after meals (unless your doctor has told you not to).  Ask your doctor for a different pain medicine.     If your doctor prescribed antibiotics, take them as directed. Do not stop taking them just because you feel better. You need to take the full course of antibiotics.   Incision care    If you had arthroscopic surgery and have a dressing over your cut (incision), keep it clean and dry. You may remove it 2 to 3 days after the surgery.     If you had open surgery, do not remove your dressing until you see your doctor and they okay it. Keep it clean and dry.     If your incision is open to the air, keep the area clean and dry.     If you have strips of tape on the incision, leave the tape on for a week or until it falls off.   Exercise    You will need rehabilitation. This is a series of exercises you do after your surgery. Rehab helps you get back your shoulder's range of

## 2025-07-27 ASSESSMENT — ANXIETY QUESTIONNAIRES
3. WORRYING TOO MUCH ABOUT DIFFERENT THINGS: SEVERAL DAYS
IF YOU CHECKED OFF ANY PROBLEMS ON THIS QUESTIONNAIRE, HOW DIFFICULT HAVE THESE PROBLEMS MADE IT FOR YOU TO DO YOUR WORK, TAKE CARE OF THINGS AT HOME, OR GET ALONG WITH OTHER PEOPLE: NOT DIFFICULT AT ALL
7. FEELING AFRAID AS IF SOMETHING AWFUL MIGHT HAPPEN: SEVERAL DAYS
6. BECOMING EASILY ANNOYED OR IRRITABLE: SEVERAL DAYS
3. WORRYING TOO MUCH ABOUT DIFFERENT THINGS: SEVERAL DAYS
GAD7 TOTAL SCORE: 6
6. BECOMING EASILY ANNOYED OR IRRITABLE: SEVERAL DAYS
5. BEING SO RESTLESS THAT IT IS HARD TO SIT STILL: SEVERAL DAYS
7. FEELING AFRAID AS IF SOMETHING AWFUL MIGHT HAPPEN: SEVERAL DAYS
1. FEELING NERVOUS, ANXIOUS, OR ON EDGE: SEVERAL DAYS
IF YOU CHECKED OFF ANY PROBLEMS ON THIS QUESTIONNAIRE, HOW DIFFICULT HAVE THESE PROBLEMS MADE IT FOR YOU TO DO YOUR WORK, TAKE CARE OF THINGS AT HOME, OR GET ALONG WITH OTHER PEOPLE: NOT DIFFICULT AT ALL
4. TROUBLE RELAXING: NOT AT ALL
1. FEELING NERVOUS, ANXIOUS, OR ON EDGE: SEVERAL DAYS
2. NOT BEING ABLE TO STOP OR CONTROL WORRYING: SEVERAL DAYS
4. TROUBLE RELAXING: NOT AT ALL
2. NOT BEING ABLE TO STOP OR CONTROL WORRYING: SEVERAL DAYS
5. BEING SO RESTLESS THAT IT IS HARD TO SIT STILL: SEVERAL DAYS

## 2025-07-27 ASSESSMENT — PATIENT HEALTH QUESTIONNAIRE - PHQ9
2. FEELING DOWN, DEPRESSED OR HOPELESS: NOT AT ALL
9. THOUGHTS THAT YOU WOULD BE BETTER OFF DEAD, OR OF HURTING YOURSELF: NOT AT ALL
1. LITTLE INTEREST OR PLEASURE IN DOING THINGS: SEVERAL DAYS
8. MOVING OR SPEAKING SO SLOWLY THAT OTHER PEOPLE COULD HAVE NOTICED. OR THE OPPOSITE, BEING SO FIGETY OR RESTLESS THAT YOU HAVE BEEN MOVING AROUND A LOT MORE THAN USUAL: NOT AT ALL
3. TROUBLE FALLING OR STAYING ASLEEP: MORE THAN HALF THE DAYS
SUM OF ALL RESPONSES TO PHQ QUESTIONS 1-9: 4
6. FEELING BAD ABOUT YOURSELF - OR THAT YOU ARE A FAILURE OR HAVE LET YOURSELF OR YOUR FAMILY DOWN: NOT AT ALL
4. FEELING TIRED OR HAVING LITTLE ENERGY: SEVERAL DAYS
9. THOUGHTS THAT YOU WOULD BE BETTER OFF DEAD, OR OF HURTING YOURSELF: NOT AT ALL
SUM OF ALL RESPONSES TO PHQ QUESTIONS 1-9: 4
2. FEELING DOWN, DEPRESSED OR HOPELESS: NOT AT ALL
7. TROUBLE CONCENTRATING ON THINGS, SUCH AS READING THE NEWSPAPER OR WATCHING TELEVISION: NOT AT ALL
6. FEELING BAD ABOUT YOURSELF - OR THAT YOU ARE A FAILURE OR HAVE LET YOURSELF OR YOUR FAMILY DOWN: NOT AT ALL
10. IF YOU CHECKED OFF ANY PROBLEMS, HOW DIFFICULT HAVE THESE PROBLEMS MADE IT FOR YOU TO DO YOUR WORK, TAKE CARE OF THINGS AT HOME, OR GET ALONG WITH OTHER PEOPLE: NOT DIFFICULT AT ALL
SUM OF ALL RESPONSES TO PHQ QUESTIONS 1-9: 4
7. TROUBLE CONCENTRATING ON THINGS, SUCH AS READING THE NEWSPAPER OR WATCHING TELEVISION: NOT AT ALL
5. POOR APPETITE OR OVEREATING: NOT AT ALL
5. POOR APPETITE OR OVEREATING: NOT AT ALL
3. TROUBLE FALLING OR STAYING ASLEEP: MORE THAN HALF THE DAYS
4. FEELING TIRED OR HAVING LITTLE ENERGY: SEVERAL DAYS
10. IF YOU CHECKED OFF ANY PROBLEMS, HOW DIFFICULT HAVE THESE PROBLEMS MADE IT FOR YOU TO DO YOUR WORK, TAKE CARE OF THINGS AT HOME, OR GET ALONG WITH OTHER PEOPLE: NOT DIFFICULT AT ALL
8. MOVING OR SPEAKING SO SLOWLY THAT OTHER PEOPLE COULD HAVE NOTICED. OR THE OPPOSITE - BEING SO FIDGETY OR RESTLESS THAT YOU HAVE BEEN MOVING AROUND A LOT MORE THAN USUAL: NOT AT ALL
1. LITTLE INTEREST OR PLEASURE IN DOING THINGS: SEVERAL DAYS
SUM OF ALL RESPONSES TO PHQ QUESTIONS 1-9: 4
SUM OF ALL RESPONSES TO PHQ QUESTIONS 1-9: 4

## 2025-07-28 ENCOUNTER — TELEMEDICINE (OUTPATIENT)
Dept: BEHAVIORAL/MENTAL HEALTH CLINIC | Age: 80
End: 2025-07-28
Payer: COMMERCIAL

## 2025-07-28 DIAGNOSIS — F43.10 PTSD (POST-TRAUMATIC STRESS DISORDER): ICD-10-CM

## 2025-07-28 DIAGNOSIS — F33.41 RECURRENT MAJOR DEPRESSIVE DISORDER, IN PARTIAL REMISSION: Primary | ICD-10-CM

## 2025-07-28 DIAGNOSIS — F41.1 GENERALIZED ANXIETY DISORDER: ICD-10-CM

## 2025-07-28 DIAGNOSIS — F51.05 INSOMNIA DUE TO MENTAL DISORDER: ICD-10-CM

## 2025-07-28 PROCEDURE — 99214 OFFICE O/P EST MOD 30 MIN: CPT | Performed by: PSYCHIATRY & NEUROLOGY

## 2025-07-28 RX ORDER — TRAZODONE HYDROCHLORIDE 100 MG/1
100 TABLET ORAL NIGHTLY
Qty: 30 TABLET | Refills: 3 | Status: SHIPPED | OUTPATIENT
Start: 2025-07-28

## 2025-07-28 RX ORDER — ZOLPIDEM TARTRATE 10 MG/1
10 TABLET ORAL NIGHTLY PRN
Qty: 30 TABLET | Refills: 3 | Status: SHIPPED | OUTPATIENT
Start: 2025-07-28 | End: 2025-11-25

## 2025-07-28 RX ORDER — DESVENLAFAXINE 50 MG/1
50 TABLET, FILM COATED, EXTENDED RELEASE ORAL DAILY
Qty: 30 TABLET | Refills: 3 | Status: SHIPPED | OUTPATIENT
Start: 2025-07-28

## 2025-07-28 RX ORDER — CLONAZEPAM 0.5 MG/1
0.5 TABLET ORAL DAILY PRN
Qty: 30 TABLET | Refills: 0 | Status: SHIPPED | OUTPATIENT
Start: 2025-07-28 | End: 2025-10-26

## 2025-07-28 NOTE — PROGRESS NOTES
Patient:  My Rebolledo  Age:  79 y.o.  :  1945     SEX:  female MRN:  [id number]     RACE: White (non-)     SEEN:  [x]  PATIENT  []  SPOUSE []  OTHER:                  2025     1:52 PM 2025    11:46 AM 2025     1:11 PM   PHQ-9    Little interest or pleasure in doing things 1 1 0   Feeling down, depressed, or hopeless 0 1 1   Trouble falling or staying asleep, or sleeping too much 2 0 0   Feeling tired or having little energy 1 1 1   Poor appetite or overeating 0 1 1   Feeling bad about yourself - or that you are a failure or have let yourself or your family down 0 1 1   Trouble concentrating on things, such as reading the newspaper or watching television 0 0 0   Moving or speaking so slowly that other people could have noticed. Or the opposite - being so fidgety or restless that you have been moving around a lot more than usual 0 0 0   Thoughts that you would be better off dead, or of hurting yourself in some way 0 0 0   PHQ-2 Score 1  2  1   PHQ-9 Total Score 4  5  4   If you checked off any problems, how difficult have these problems made it for you to do your work, take care of things at home, or get along with other people? 0 1 1       Patient-reported           2025     1:51 PM 2025    11:43 AM 3/11/2025     3:18 PM   URBANO-7 SCREENING   Feeling nervous, anxious, or on edge Several days Several days Several days   Not being able to stop or control worrying Several days Several days More than half the days   Worrying too much about different things Several days Several days More than half the days   Trouble relaxing Not at all Not at all Several days   Being so restless that it is hard to sit still Several days Not at all Several days   Becoming easily annoyed or irritable Several days Several days More than half the days   Feeling afraid as if something awful might happen Several days Several days Nearly every day   URBANO-7 Total Score 6  5  12    How

## 2025-07-29 ENCOUNTER — LAB (OUTPATIENT)
Dept: FAMILY MEDICINE CLINIC | Facility: CLINIC | Age: 80
End: 2025-07-29

## 2025-07-29 ENCOUNTER — OFFICE VISIT (OUTPATIENT)
Dept: INTERNAL MEDICINE CLINIC | Facility: CLINIC | Age: 80
End: 2025-07-29

## 2025-07-29 VITALS
DIASTOLIC BLOOD PRESSURE: 57 MMHG | WEIGHT: 175 LBS | RESPIRATION RATE: 16 BRPM | BODY MASS INDEX: 36.73 KG/M2 | OXYGEN SATURATION: 95 % | TEMPERATURE: 97.2 F | HEIGHT: 58 IN | SYSTOLIC BLOOD PRESSURE: 165 MMHG | HEART RATE: 59 BPM

## 2025-07-29 DIAGNOSIS — Z09 HOSPITAL DISCHARGE FOLLOW-UP: Primary | ICD-10-CM

## 2025-07-29 DIAGNOSIS — I65.29 STENOSIS OF CAROTID ARTERY, UNSPECIFIED LATERALITY: ICD-10-CM

## 2025-07-29 DIAGNOSIS — E03.9 PRIMARY HYPOTHYROIDISM: ICD-10-CM

## 2025-07-29 DIAGNOSIS — E61.1 IRON DEFICIENCY: ICD-10-CM

## 2025-07-29 DIAGNOSIS — I10 PRIMARY HYPERTENSION: ICD-10-CM

## 2025-07-29 DIAGNOSIS — D50.0 IRON DEFICIENCY ANEMIA DUE TO CHRONIC BLOOD LOSS: ICD-10-CM

## 2025-07-29 DIAGNOSIS — R11.0 NAUSEA: ICD-10-CM

## 2025-07-29 DIAGNOSIS — B36.9 FUNGAL SKIN INFECTION: ICD-10-CM

## 2025-07-29 LAB
ALBUMIN SERPL-MCNC: 3.8 G/DL (ref 3.2–4.6)
ALBUMIN/GLOB SERPL: 1.2 (ref 1–1.9)
ALP SERPL-CCNC: 86 U/L (ref 35–104)
ALT SERPL-CCNC: 15 U/L (ref 8–45)
ANION GAP SERPL CALC-SCNC: 12 MMOL/L (ref 7–16)
AST SERPL-CCNC: 14 U/L (ref 15–37)
BASOPHILS # BLD: 0.09 K/UL (ref 0–0.2)
BASOPHILS NFR BLD: 0.5 % (ref 0–2)
BILIRUB SERPL-MCNC: 0.2 MG/DL (ref 0–1.2)
BUN SERPL-MCNC: 27 MG/DL (ref 8–23)
CALCIUM SERPL-MCNC: 9.4 MG/DL (ref 8.8–10.2)
CHLORIDE SERPL-SCNC: 100 MMOL/L (ref 98–107)
CO2 SERPL-SCNC: 30 MMOL/L (ref 20–29)
CREAT SERPL-MCNC: 1.33 MG/DL (ref 0.6–1.1)
DIFFERENTIAL METHOD BLD: ABNORMAL
EOSINOPHIL # BLD: 0.29 K/UL (ref 0–0.8)
EOSINOPHIL NFR BLD: 1.7 % (ref 0.5–7.8)
ERYTHROCYTE [DISTWIDTH] IN BLOOD BY AUTOMATED COUNT: 20 % (ref 11.9–14.6)
GLOBULIN SER CALC-MCNC: 3.1 G/DL (ref 2.3–3.5)
GLUCOSE SERPL-MCNC: 70 MG/DL (ref 70–99)
HCT VFR BLD AUTO: 32 % (ref 35.8–46.3)
HGB BLD-MCNC: 9.1 G/DL (ref 11.7–15.4)
IMM GRANULOCYTES # BLD AUTO: 0.13 K/UL (ref 0–0.5)
IMM GRANULOCYTES NFR BLD AUTO: 0.8 % (ref 0–5)
LYMPHOCYTES # BLD: 2.57 K/UL (ref 0.5–4.6)
LYMPHOCYTES NFR BLD: 15.5 % (ref 13–44)
MCH RBC QN AUTO: 25.9 PG (ref 26.1–32.9)
MCHC RBC AUTO-ENTMCNC: 28.4 G/DL (ref 31.4–35)
MCV RBC AUTO: 91.2 FL (ref 82–102)
MONOCYTES # BLD: 1.15 K/UL (ref 0.1–1.3)
MONOCYTES NFR BLD: 6.9 % (ref 4–12)
NEUTS SEG # BLD: 12.37 K/UL (ref 1.7–8.2)
NEUTS SEG NFR BLD: 74.6 % (ref 43–78)
NRBC # BLD: 0.03 K/UL (ref 0–0.2)
PLATELET # BLD AUTO: 401 K/UL (ref 150–450)
PMV BLD AUTO: 11.5 FL (ref 9.4–12.3)
POTASSIUM SERPL-SCNC: 5.5 MMOL/L (ref 3.5–5.1)
PROT SERPL-MCNC: 6.8 G/DL (ref 6.3–8.2)
RBC # BLD AUTO: 3.51 M/UL (ref 4.05–5.2)
SODIUM SERPL-SCNC: 142 MMOL/L (ref 136–145)
WBC # BLD AUTO: 16.6 K/UL (ref 4.3–11.1)

## 2025-07-29 RX ORDER — AMLODIPINE BESYLATE 5 MG/1
5 TABLET ORAL DAILY
Qty: 90 TABLET | Refills: 3 | Status: SHIPPED | OUTPATIENT
Start: 2025-07-29

## 2025-07-29 RX ORDER — FERROUS SULFATE 325(65) MG
325 TABLET ORAL
Qty: 90 TABLET | Refills: 1 | Status: SHIPPED | OUTPATIENT
Start: 2025-07-29

## 2025-07-29 RX ORDER — ONDANSETRON 4 MG/1
4 TABLET, ORALLY DISINTEGRATING ORAL EVERY 6 HOURS PRN
Qty: 30 TABLET | Refills: 5 | Status: SHIPPED | OUTPATIENT
Start: 2025-07-29

## 2025-07-29 RX ORDER — CLOTRIMAZOLE AND BETAMETHASONE DIPROPIONATE 10; .64 MG/G; MG/G
CREAM TOPICAL AS NEEDED
Qty: 45 G | Refills: 0 | Status: SHIPPED | OUTPATIENT
Start: 2025-07-29

## 2025-07-29 NOTE — PROGRESS NOTES
speaking so slowly that other people could have noticed. Or the opposite - being so fidgety or restless that you have been moving around a lot more than usual 0   Thoughts that you would be better off dead, or of hurting yourself in some way 0   PHQ-2 Score 1    PHQ-9 Total Score 4    If you checked off any problems, how difficult have these problems made it for you to do your work, take care of things at home, or get along with other people? 0       Patient-reported      Physical Exam  Constitutional:       Appearance: Normal appearance.   HENT:      Head: Normocephalic and atraumatic.   Cardiovascular:      Rate and Rhythm: Normal rate and regular rhythm.   Pulmonary:      Effort: Pulmonary effort is normal. No respiratory distress.   Neurological:      General: No focal deficit present.      Mental Status: She is alert. Mental status is at baseline.           Lab Results   Component Value Date     07/24/2025    K 4.3 07/24/2025     07/24/2025    CO2 27 07/24/2025    BUN 20 07/24/2025    CREATININE 1.35 (H) 07/24/2025    GLUCOSE 100 (H) 07/24/2025    CALCIUM 8.2 (L) 07/24/2025    BILITOT <0.2 07/22/2025    ALKPHOS 90 07/22/2025    AST 20 07/22/2025    ALT 13 07/22/2025    LABGLOM 40 (L) 07/24/2025    GFRAA 43 (L) 09/20/2022    AGRATIO 0.7 (L) 05/15/2022    GLOB 3.2 07/22/2025     Lab Results   Component Value Date    WBC 12.2 (H) 07/24/2025    HGB 7.7 (L) 07/24/2025    HCT 25.7 (L) 07/24/2025    MCV 82.4 07/24/2025     07/24/2025    LYMPHOPCT 22.8 07/24/2025    RBC 3.12 (L) 07/24/2025    MCH 24.7 (L) 07/24/2025    MCHC 30.0 (L) 07/24/2025    RDW 16.1 (H) 07/24/2025     Lab Results   Component Value Date    CHOL 192 07/21/2025    TRIG 146 07/21/2025    HDL 50 07/21/2025     (H) 07/21/2025    VLDL 29 (H) 07/21/2025    CHOLHDLRATIO 3.9 07/21/2025     Hemoglobin A1C   Date Value Ref Range Status   07/21/2025 6.0 (H) 0 - 5.6 % Final     Comment:     Reference Range  Normal

## 2025-08-01 ENCOUNTER — OFFICE VISIT (OUTPATIENT)
Age: 80
End: 2025-08-01
Payer: COMMERCIAL

## 2025-08-01 VITALS
HEART RATE: 58 BPM | DIASTOLIC BLOOD PRESSURE: 73 MMHG | HEIGHT: 58 IN | OXYGEN SATURATION: 94 % | SYSTOLIC BLOOD PRESSURE: 134 MMHG | RESPIRATION RATE: 17 BRPM | WEIGHT: 171.6 LBS | BODY MASS INDEX: 36.02 KG/M2

## 2025-08-01 DIAGNOSIS — D50.9 IRON DEFICIENCY ANEMIA, UNSPECIFIED IRON DEFICIENCY ANEMIA TYPE: Primary | ICD-10-CM

## 2025-08-01 PROCEDURE — 99204 OFFICE O/P NEW MOD 45 MIN: CPT | Performed by: PHYSICIAN ASSISTANT

## 2025-08-01 PROCEDURE — 3075F SYST BP GE 130 - 139MM HG: CPT | Performed by: PHYSICIAN ASSISTANT

## 2025-08-01 PROCEDURE — 1159F MED LIST DOCD IN RCRD: CPT | Performed by: PHYSICIAN ASSISTANT

## 2025-08-01 PROCEDURE — 3078F DIAST BP <80 MM HG: CPT | Performed by: PHYSICIAN ASSISTANT

## 2025-08-01 PROCEDURE — 1123F ACP DISCUSS/DSCN MKR DOCD: CPT | Performed by: PHYSICIAN ASSISTANT

## 2025-08-01 PROCEDURE — 1160F RVW MEDS BY RX/DR IN RCRD: CPT | Performed by: PHYSICIAN ASSISTANT

## 2025-08-06 DIAGNOSIS — E03.9 PRIMARY HYPOTHYROIDISM: Chronic | ICD-10-CM

## 2025-08-06 DIAGNOSIS — D50.0 IRON DEFICIENCY ANEMIA DUE TO CHRONIC BLOOD LOSS: ICD-10-CM

## 2025-08-06 DIAGNOSIS — E87.5 HYPERKALEMIA: ICD-10-CM

## 2025-08-06 DIAGNOSIS — D72.829 LEUKOCYTOSIS, UNSPECIFIED TYPE: ICD-10-CM

## 2025-08-06 LAB
ANION GAP SERPL CALC-SCNC: 9 MMOL/L (ref 7–16)
BASOPHILS # BLD: 0.12 K/UL (ref 0–0.2)
BASOPHILS NFR BLD: 0.8 % (ref 0–2)
BUN SERPL-MCNC: 37 MG/DL (ref 8–23)
CALCIUM SERPL-MCNC: 8.9 MG/DL (ref 8.8–10.2)
CHLORIDE SERPL-SCNC: 100 MMOL/L (ref 98–107)
CO2 SERPL-SCNC: 29 MMOL/L (ref 20–29)
CREAT SERPL-MCNC: 1.66 MG/DL (ref 0.6–1.1)
CRP SERPL-MCNC: <0.3 MG/DL (ref 0–0.4)
DIFFERENTIAL METHOD BLD: ABNORMAL
EOSINOPHIL # BLD: 0.39 K/UL (ref 0–0.8)
EOSINOPHIL NFR BLD: 2.6 % (ref 0.5–7.8)
ERYTHROCYTE [DISTWIDTH] IN BLOOD BY AUTOMATED COUNT: 20.7 % (ref 11.9–14.6)
GLUCOSE SERPL-MCNC: 90 MG/DL (ref 70–99)
HCT VFR BLD AUTO: 32.4 % (ref 35.8–46.3)
HGB BLD-MCNC: 9.9 G/DL (ref 11.7–15.4)
IMM GRANULOCYTES # BLD AUTO: 0.09 K/UL (ref 0–0.5)
IMM GRANULOCYTES NFR BLD AUTO: 0.6 % (ref 0–5)
IMM RETICS NFR: 26.1 % (ref 3–15.9)
LYMPHOCYTES # BLD: 2.84 K/UL (ref 0.5–4.6)
LYMPHOCYTES NFR BLD: 18.9 % (ref 13–44)
MCH RBC QN AUTO: 27.3 PG (ref 26.1–32.9)
MCHC RBC AUTO-ENTMCNC: 30.6 G/DL (ref 31.4–35)
MCV RBC AUTO: 89.5 FL (ref 82–102)
MONOCYTES # BLD: 0.99 K/UL (ref 0.1–1.3)
MONOCYTES NFR BLD: 6.6 % (ref 4–12)
NEUTS SEG # BLD: 10.57 K/UL (ref 1.7–8.2)
NEUTS SEG NFR BLD: 70.5 % (ref 43–78)
NRBC # BLD: 0 K/UL (ref 0–0.2)
PLATELET # BLD AUTO: 351 K/UL (ref 150–450)
PLATELET COMMENT: ADEQUATE
PMV BLD AUTO: 11.3 FL (ref 9.4–12.3)
POTASSIUM SERPL-SCNC: 5.3 MMOL/L (ref 3.5–5.1)
RBC # BLD AUTO: 3.62 M/UL (ref 4.05–5.2)
RBC MORPH BLD: ABNORMAL
RETICS # AUTO: 0.08 M/UL (ref 0.03–0.1)
RETICS/RBC NFR AUTO: 2.2 % (ref 0.3–2)
SODIUM SERPL-SCNC: 138 MMOL/L (ref 136–145)
T4 FREE SERPL-MCNC: 1.3 NG/DL (ref 0.9–1.7)
TSH, 3RD GENERATION: 1.07 UIU/ML (ref 0.27–4.2)
WBC # BLD AUTO: 15 K/UL (ref 4.3–11.1)
WBC MORPH BLD: ABNORMAL

## 2025-08-07 ENCOUNTER — OFFICE VISIT (OUTPATIENT)
Age: 80
End: 2025-08-07
Payer: COMMERCIAL

## 2025-08-07 DIAGNOSIS — M50.30 DEGENERATIVE DISC DISEASE, CERVICAL: ICD-10-CM

## 2025-08-07 DIAGNOSIS — M47.812 ARTHROPATHY OF CERVICAL FACET JOINT: Primary | ICD-10-CM

## 2025-08-07 PROCEDURE — 1123F ACP DISCUSS/DSCN MKR DOCD: CPT | Performed by: NURSE PRACTITIONER

## 2025-08-07 PROCEDURE — 99213 OFFICE O/P EST LOW 20 MIN: CPT | Performed by: NURSE PRACTITIONER

## 2025-08-12 ENCOUNTER — OFFICE VISIT (OUTPATIENT)
Dept: GASTROENTEROLOGY | Age: 80
End: 2025-08-12
Payer: COMMERCIAL

## 2025-08-12 DIAGNOSIS — D50.9 IRON DEFICIENCY ANEMIA, UNSPECIFIED IRON DEFICIENCY ANEMIA TYPE: Primary | ICD-10-CM

## 2025-08-14 ENCOUNTER — OFFICE VISIT (OUTPATIENT)
Age: 80
End: 2025-08-14
Payer: COMMERCIAL

## 2025-08-14 DIAGNOSIS — M70.61 GREATER TROCHANTERIC BURSITIS OF RIGHT HIP: ICD-10-CM

## 2025-08-14 DIAGNOSIS — M54.50 CHRONIC BILATERAL LOW BACK PAIN WITHOUT SCIATICA: ICD-10-CM

## 2025-08-14 DIAGNOSIS — G89.29 CHRONIC NECK PAIN: ICD-10-CM

## 2025-08-14 DIAGNOSIS — M54.81 BILATERAL OCCIPITAL NEURALGIA: Primary | ICD-10-CM

## 2025-08-14 DIAGNOSIS — M62.830 LUMBAR PARASPINAL MUSCLE SPASM: ICD-10-CM

## 2025-08-14 DIAGNOSIS — G89.28 CHRONIC KNEE PAIN AFTER TOTAL REPLACEMENT OF BOTH KNEE JOINTS: ICD-10-CM

## 2025-08-14 DIAGNOSIS — M47.816 FACET ARTHROPATHY, LUMBAR: ICD-10-CM

## 2025-08-14 DIAGNOSIS — M54.2 CHRONIC NECK PAIN: ICD-10-CM

## 2025-08-14 DIAGNOSIS — M25.562 CHRONIC KNEE PAIN AFTER TOTAL REPLACEMENT OF BOTH KNEE JOINTS: ICD-10-CM

## 2025-08-14 DIAGNOSIS — M70.62 GREATER TROCHANTERIC BURSITIS OF LEFT HIP: ICD-10-CM

## 2025-08-14 DIAGNOSIS — G89.29 CHRONIC BILATERAL LOW BACK PAIN WITHOUT SCIATICA: ICD-10-CM

## 2025-08-14 DIAGNOSIS — M25.561 CHRONIC KNEE PAIN AFTER TOTAL REPLACEMENT OF BOTH KNEE JOINTS: ICD-10-CM

## 2025-08-14 DIAGNOSIS — Z96.653 CHRONIC KNEE PAIN AFTER TOTAL REPLACEMENT OF BOTH KNEE JOINTS: ICD-10-CM

## 2025-08-14 DIAGNOSIS — M62.838 CERVICAL PARASPINAL MUSCLE SPASM: ICD-10-CM

## 2025-08-14 PROCEDURE — 1160F RVW MEDS BY RX/DR IN RCRD: CPT | Performed by: ANESTHESIOLOGY

## 2025-08-14 PROCEDURE — 99214 OFFICE O/P EST MOD 30 MIN: CPT | Performed by: ANESTHESIOLOGY

## 2025-08-14 PROCEDURE — 1159F MED LIST DOCD IN RCRD: CPT | Performed by: ANESTHESIOLOGY

## 2025-08-14 PROCEDURE — 1123F ACP DISCUSS/DSCN MKR DOCD: CPT | Performed by: ANESTHESIOLOGY

## 2025-08-14 PROCEDURE — G2211 COMPLEX E/M VISIT ADD ON: HCPCS | Performed by: ANESTHESIOLOGY

## 2025-08-14 RX ORDER — HYDROCODONE BITARTRATE AND ACETAMINOPHEN 7.5; 325 MG/1; MG/1
1 TABLET ORAL DAILY PRN
Qty: 14 TABLET | Refills: 0 | Status: SHIPPED | OUTPATIENT
Start: 2025-08-14 | End: 2025-08-28

## 2025-08-14 ASSESSMENT — ENCOUNTER SYMPTOMS
BACK PAIN: 1
ABDOMINAL PAIN: 1
SHORTNESS OF BREATH: 0

## 2025-08-20 ENCOUNTER — OFFICE VISIT (OUTPATIENT)
Dept: ONCOLOGY | Age: 80
End: 2025-08-20
Payer: COMMERCIAL

## 2025-08-20 ENCOUNTER — HOSPITAL ENCOUNTER (OUTPATIENT)
Dept: LAB | Age: 80
Discharge: HOME | End: 2025-08-20
Payer: COMMERCIAL

## 2025-08-20 ENCOUNTER — TELEPHONE (OUTPATIENT)
Dept: GASTROENTEROLOGY | Age: 80
End: 2025-08-20

## 2025-08-20 VITALS
DIASTOLIC BLOOD PRESSURE: 70 MMHG | TEMPERATURE: 97.5 F | OXYGEN SATURATION: 98 % | SYSTOLIC BLOOD PRESSURE: 142 MMHG | HEART RATE: 56 BPM | BODY MASS INDEX: 37.09 KG/M2 | WEIGHT: 176.7 LBS | RESPIRATION RATE: 18 BRPM | HEIGHT: 58 IN

## 2025-08-20 DIAGNOSIS — D64.9 ANEMIA, UNSPECIFIED TYPE: Primary | ICD-10-CM

## 2025-08-20 DIAGNOSIS — E61.1 IRON DEFICIENCY: ICD-10-CM

## 2025-08-20 DIAGNOSIS — D64.9 ANEMIA, UNSPECIFIED TYPE: ICD-10-CM

## 2025-08-20 LAB
ALBUMIN SERPL-MCNC: 3.7 G/DL (ref 3.2–4.6)
ALBUMIN/GLOB SERPL: 1 (ref 1–1.9)
ALP SERPL-CCNC: 94 U/L (ref 35–104)
ALT SERPL-CCNC: 14 U/L (ref 8–45)
ANION GAP SERPL CALC-SCNC: 11 MMOL/L (ref 7–16)
AST SERPL-CCNC: 15 U/L (ref 15–37)
BASOPHILS # BLD: 0.06 K/UL (ref 0–0.2)
BASOPHILS NFR BLD: 0.5 % (ref 0–2)
BILIRUB SERPL-MCNC: 0.2 MG/DL (ref 0–1.2)
BUN SERPL-MCNC: 22 MG/DL (ref 8–23)
CALCIUM SERPL-MCNC: 9.5 MG/DL (ref 8.8–10.2)
CHLORIDE SERPL-SCNC: 102 MMOL/L (ref 98–107)
CO2 SERPL-SCNC: 27 MMOL/L (ref 20–29)
CREAT SERPL-MCNC: 1.26 MG/DL (ref 0.6–1.1)
DAT POLY-SP REAG RBC QL: NORMAL
DIFFERENTIAL METHOD BLD: ABNORMAL
EOSINOPHIL # BLD: 0.5 K/UL (ref 0–0.8)
EOSINOPHIL NFR BLD: 4.5 % (ref 0.5–7.8)
ERYTHROCYTE [DISTWIDTH] IN BLOOD BY AUTOMATED COUNT: 21.3 % (ref 11.9–14.6)
ERYTHROCYTE [SEDIMENTATION RATE] IN BLOOD: 11 MM/HR (ref 0–30)
FERRITIN SERPL-MCNC: 36 NG/ML (ref 8–388)
FOLATE SERPL-MCNC: 16.5 NG/ML (ref 3.1–17.5)
GLOBULIN SER CALC-MCNC: 3.8 G/DL (ref 2.3–3.5)
GLUCOSE SERPL-MCNC: 89 MG/DL (ref 70–99)
HAPTOGLOB SERPL-MCNC: 240 MG/DL (ref 30–200)
HCT VFR BLD AUTO: 34.1 % (ref 35.8–46.3)
HGB BLD-MCNC: 10.3 G/DL (ref 11.7–15.4)
HGB RETIC QN AUTO: 31 PG (ref 29–35)
IMM GRANULOCYTES # BLD AUTO: 0.06 K/UL (ref 0–0.5)
IMM GRANULOCYTES NFR BLD AUTO: 0.5 % (ref 0–5)
IMM RETICS NFR: 31.9 % (ref 3–15.9)
IRON SATN MFR SERPL: 8 % (ref 20–50)
IRON SERPL-MCNC: 31 UG/DL (ref 35–100)
LYMPHOCYTES # BLD: 2.59 K/UL (ref 0.5–4.6)
LYMPHOCYTES NFR BLD: 23.5 % (ref 13–44)
MCH RBC QN AUTO: 26.4 PG (ref 26.1–32.9)
MCHC RBC AUTO-ENTMCNC: 30.2 G/DL (ref 31.4–35)
MCV RBC AUTO: 87.4 FL (ref 82–102)
MONOCYTES # BLD: 0.5 K/UL (ref 0.1–1.3)
MONOCYTES NFR BLD: 4.5 % (ref 4–12)
NEUTS SEG # BLD: 7.29 K/UL (ref 1.7–8.2)
NEUTS SEG NFR BLD: 66.5 % (ref 43–78)
NRBC # BLD: 0 K/UL (ref 0–0.2)
PATH REV BLD -IMP: NORMAL
PLATELET # BLD AUTO: 235 K/UL (ref 150–450)
PLATELET COMMENT: ADEQUATE
PMV BLD AUTO: 10.7 FL (ref 9.4–12.3)
POTASSIUM SERPL-SCNC: 5 MMOL/L (ref 3.5–5.1)
PROT SERPL-MCNC: 7.5 G/DL (ref 6.3–8.2)
RBC # BLD AUTO: 3.9 M/UL (ref 4.05–5.2)
RBC MORPH BLD: ABNORMAL
RETICS # AUTO: 0.08 M/UL (ref 0.03–0.1)
RETICS/RBC NFR AUTO: 2.1 % (ref 0.3–2)
SODIUM SERPL-SCNC: 140 MMOL/L (ref 136–145)
TIBC SERPL-MCNC: 397 UG/DL (ref 240–450)
UIBC SERPL-MCNC: 366 UG/DL (ref 112–347)
VIT B12 SERPL-MCNC: 528 PG/ML (ref 193–986)
WBC # BLD AUTO: 11 K/UL (ref 4.3–11.1)
WBC MORPH BLD: ABNORMAL

## 2025-08-20 PROCEDURE — 83010 ASSAY OF HAPTOGLOBIN QUANT: CPT

## 2025-08-20 PROCEDURE — 85652 RBC SED RATE AUTOMATED: CPT

## 2025-08-20 PROCEDURE — 3078F DIAST BP <80 MM HG: CPT | Performed by: STUDENT IN AN ORGANIZED HEALTH CARE EDUCATION/TRAINING PROGRAM

## 2025-08-20 PROCEDURE — 85025 COMPLETE CBC W/AUTO DIFF WBC: CPT

## 2025-08-20 PROCEDURE — 3077F SYST BP >= 140 MM HG: CPT | Performed by: STUDENT IN AN ORGANIZED HEALTH CARE EDUCATION/TRAINING PROGRAM

## 2025-08-20 PROCEDURE — 1126F AMNT PAIN NOTED NONE PRSNT: CPT | Performed by: STUDENT IN AN ORGANIZED HEALTH CARE EDUCATION/TRAINING PROGRAM

## 2025-08-20 PROCEDURE — 83540 ASSAY OF IRON: CPT

## 2025-08-20 PROCEDURE — 86880 COOMBS TEST DIRECT: CPT

## 2025-08-20 PROCEDURE — 1160F RVW MEDS BY RX/DR IN RCRD: CPT | Performed by: STUDENT IN AN ORGANIZED HEALTH CARE EDUCATION/TRAINING PROGRAM

## 2025-08-20 PROCEDURE — 1159F MED LIST DOCD IN RCRD: CPT | Performed by: STUDENT IN AN ORGANIZED HEALTH CARE EDUCATION/TRAINING PROGRAM

## 2025-08-20 PROCEDURE — 91110 GI TRC IMG INTRAL ESOPH-ILE: CPT | Performed by: STUDENT IN AN ORGANIZED HEALTH CARE EDUCATION/TRAINING PROGRAM

## 2025-08-20 PROCEDURE — 82746 ASSAY OF FOLIC ACID SERUM: CPT

## 2025-08-20 PROCEDURE — 36415 COLL VENOUS BLD VENIPUNCTURE: CPT

## 2025-08-20 PROCEDURE — 82525 ASSAY OF COPPER: CPT

## 2025-08-20 PROCEDURE — 1123F ACP DISCUSS/DSCN MKR DOCD: CPT | Performed by: STUDENT IN AN ORGANIZED HEALTH CARE EDUCATION/TRAINING PROGRAM

## 2025-08-20 PROCEDURE — 83921 ORGANIC ACID SINGLE QUANT: CPT

## 2025-08-20 PROCEDURE — 82728 ASSAY OF FERRITIN: CPT

## 2025-08-20 PROCEDURE — 83550 IRON BINDING TEST: CPT

## 2025-08-20 PROCEDURE — 80053 COMPREHEN METABOLIC PANEL: CPT

## 2025-08-20 PROCEDURE — 85046 RETICYTE/HGB CONCENTRATE: CPT

## 2025-08-20 PROCEDURE — 99204 OFFICE O/P NEW MOD 45 MIN: CPT | Performed by: STUDENT IN AN ORGANIZED HEALTH CARE EDUCATION/TRAINING PROGRAM

## 2025-08-20 PROCEDURE — 82607 VITAMIN B-12: CPT

## 2025-08-20 ASSESSMENT — PATIENT HEALTH QUESTIONNAIRE - PHQ9
SUM OF ALL RESPONSES TO PHQ QUESTIONS 1-9: 0
1. LITTLE INTEREST OR PLEASURE IN DOING THINGS: NOT AT ALL
SUM OF ALL RESPONSES TO PHQ QUESTIONS 1-9: 0
SUM OF ALL RESPONSES TO PHQ QUESTIONS 1-9: 0
2. FEELING DOWN, DEPRESSED OR HOPELESS: NOT AT ALL
SUM OF ALL RESPONSES TO PHQ QUESTIONS 1-9: 0

## 2025-08-22 LAB
COPPER SERPL-MCNC: 127 UG/DL (ref 80–158)
METHYLMALONATE SERPL-SCNC: 312 NMOL/L (ref 0–378)

## 2025-08-22 RX ORDER — TRAZODONE HYDROCHLORIDE 100 MG/1
100 TABLET ORAL NIGHTLY
Qty: 90 TABLET | Refills: 2 | OUTPATIENT
Start: 2025-08-22

## 2025-08-25 ENCOUNTER — TELEPHONE (OUTPATIENT)
Age: 80
End: 2025-08-25

## 2025-08-25 ENCOUNTER — PATIENT MESSAGE (OUTPATIENT)
Age: 80
End: 2025-08-25

## 2025-08-28 ENCOUNTER — OFFICE VISIT (OUTPATIENT)
Age: 80
End: 2025-08-28
Payer: COMMERCIAL

## 2025-08-28 DIAGNOSIS — M46.1 BILATERAL SACROILIITIS: Primary | ICD-10-CM

## 2025-08-28 PROCEDURE — 27096 INJECT SACROILIAC JOINT: CPT | Performed by: ANESTHESIOLOGY

## 2025-08-28 RX ORDER — METHYLPREDNISOLONE ACETATE 40 MG/ML
40 INJECTION, SUSPENSION INTRA-ARTICULAR; INTRALESIONAL; INTRAMUSCULAR; SOFT TISSUE ONCE
Status: COMPLETED | OUTPATIENT
Start: 2025-08-28 | End: 2025-08-28

## 2025-09-02 ENCOUNTER — OFFICE VISIT (OUTPATIENT)
Age: 80
End: 2025-09-02
Payer: COMMERCIAL

## 2025-09-02 DIAGNOSIS — M54.81 BILATERAL OCCIPITAL NEURALGIA: Primary | ICD-10-CM

## 2025-09-02 PROCEDURE — 64405 NJX AA&/STRD GR OCPL NRV: CPT | Performed by: ANESTHESIOLOGY

## 2025-09-02 PROCEDURE — 64450 NJX AA&/STRD OTHER PN/BRANCH: CPT | Performed by: ANESTHESIOLOGY

## 2025-09-02 RX ORDER — METHYLPREDNISOLONE ACETATE 40 MG/ML
40 INJECTION, SUSPENSION INTRA-ARTICULAR; INTRALESIONAL; INTRAMUSCULAR; SOFT TISSUE ONCE
Status: COMPLETED | OUTPATIENT
Start: 2025-09-02 | End: 2025-09-02

## 2025-09-02 RX ADMIN — METHYLPREDNISOLONE ACETATE 40 MG: 40 INJECTION, SUSPENSION INTRA-ARTICULAR; INTRALESIONAL; INTRAMUSCULAR; SOFT TISSUE at 11:55

## (undated) DEVICE — SUTURE ABS ANTIBACT 1-0 CTX 24IN STRATAFIX PDS+ SXPP1A445

## (undated) DEVICE — DRAPE,TOP,102X53,STERILE: Brand: MEDLINE

## (undated) DEVICE — BNDG COHESIVE 4INX5YD COBAN --

## (undated) DEVICE — 2000CC GUARDIAN II: Brand: GUARDIAN

## (undated) DEVICE — Device: Brand: POWER-FLO®

## (undated) DEVICE — DRAPE,LITHOTOMY,STERILE: Brand: MEDLINE

## (undated) DEVICE — DRAPE,U/SHT,SPLIT,FILM,60X84,STERILE: Brand: MEDLINE

## (undated) DEVICE — GAUZE,SPONGE,4"X4",12PLY,WOVEN,NS,LF: Brand: MEDLINE

## (undated) DEVICE — BNDG,ELSTC,MATRIX,STRL,4"X5YD,LF,HOOK&LP: Brand: MEDLINE

## (undated) DEVICE — DISPOSABLE BIPOLAR CODE, 12' (3.66 M): Brand: CONMED

## (undated) DEVICE — TOTAL KNEE DR RIDGEWAY: Brand: MEDLINE INDUSTRIES, INC.

## (undated) DEVICE — BNDG,ELSTC,MATRIX,STRL,2"X5YD,LF,HOOK&LP: Brand: MEDLINE

## (undated) DEVICE — SPLINT THMB W3XL12IN FBRGLS PD PRECUT LTWT DURABLE FAST SET

## (undated) DEVICE — SOFT SILICONE HYDROCELLULAR FOAM DRESSING WITH LOCK AWAY LAYER: Brand: ALLEVYN LIFE XL 21X21 CTN10

## (undated) DEVICE — SUTURE VICRYL SZ 1 L27IN ABSRB UD L36MM CP-1 1/2 CIR REV CUT J268H

## (undated) DEVICE — ELECTRODE PT RET AD L9FT HI MOIST COND ADH HYDRGEL CORDED

## (undated) DEVICE — SYR 10ML LUER LOK 1/5ML GRAD --

## (undated) DEVICE — CONNECTOR TBNG OD5-7MM O2 END DISP

## (undated) DEVICE — SUTURE ETHBND EXCEL SZ 3-0 L30IN NONABSORBABLE GRN L26MM SH X832H

## (undated) DEVICE — NEEDLE HYPO 18GA L1.5IN PNK S STL HUB POLYPR SHLD REG BVL

## (undated) DEVICE — BLADE SHV L13CM DIA4MM BNE CUT AGG DEB COOLCUT

## (undated) DEVICE — SUTURE VCRL SZ 1 L27IN ABSRB UD L36MM CP-1 1/2 CIR REV CUT J268H

## (undated) DEVICE — T4 HOOD

## (undated) DEVICE — SYR 50ML LR LCK 1ML GRAD NSAF --

## (undated) DEVICE — SOLUTION IRRIG 3000ML 0.9% SOD CHL FLX CONT 0797208] ICU MEDICAL INC]

## (undated) DEVICE — 450 ML BOTTLE OF 0.05% CHLORHEXIDINE GLUCONATE IN 99.95% STERILE WATER FOR IRRIGATION, USP AND APPLICATOR.: Brand: IRRISEPT ANTIMICROBIAL WOUND LAVAGE

## (undated) DEVICE — PAD,ABDOMINAL,5"X9",ST,LF,25/BX: Brand: MEDLINE INDUSTRIES, INC.

## (undated) DEVICE — CYSTO/BLADDER IRRIGATION SET, REGULATING CLAMP

## (undated) DEVICE — TUBING PMP L16FT MAIN DISP FOR AR-6400 AR-6475 Â€“ ORDER MULTIPLES OF 10 EACH

## (undated) DEVICE — SHOULDER ARTHRO DR KOCH: Brand: MEDLINE INDUSTRIES, INC.

## (undated) DEVICE — SUTURE PDS II SZ 2-0 L27IN ABSRB VLT L26MM CT-2 1/2 CIR Z333H

## (undated) DEVICE — GLOVE ORANGE PI 7   MSG9070

## (undated) DEVICE — [THREADED CANNULA.  DO NOT RESTERILIZE,  DO NOT USE IF PACKAGE IS DAMAGED]: Brand: DRI-LOK

## (undated) DEVICE — PADDING CAST W3INXL4YD COT BLEND MIC PLEAT UNDERCAST SPEC

## (undated) DEVICE — WATERPROOF, BACTERIA PROOF DRESSING WITH ABSORBENT SEE THROUGH PAD: Brand: OPSITE POST-OP VISIBLE 15X10CM CTN 20

## (undated) DEVICE — PRECISION THIN (9.0 X 0.38 X 31.0MM)

## (undated) DEVICE — BLADE SAW W12.5XL70MM THK0.8MM CUT THK1.12MM S STL RECIP

## (undated) DEVICE — Device

## (undated) DEVICE — BUTTON SWITCH PENCIL BLADE ELECTRODE, HOLSTER: Brand: EDGE

## (undated) DEVICE — GARMENT,MEDLINE,DVT,INT,CALF,LG, GEN2: Brand: MEDLINE

## (undated) DEVICE — BLADE SAW RECIPROCAING 67.9X12.5X1X1.2MM 2 SIDE STRYKR NS

## (undated) DEVICE — FORCEPS BX L240CM JAW DIA2.8MM L CAP W/ NDL MIC MESH TOOTH

## (undated) DEVICE — SWITCH DRAPE TENET 7633

## (undated) DEVICE — CATHETER F BLLN 5CC 16FR 2 W HYDRGEL COAT LESS TRAUM LUB

## (undated) DEVICE — SUT ETHLN 4-0 18IN PS2 BLK --

## (undated) DEVICE — SOLUTION IRRIG 3000ML 0.9% SOD CHL USP UROMATIC PLAS CONT

## (undated) DEVICE — 4-PORT MANIFOLD: Brand: NEPTUNE 2

## (undated) DEVICE — SPONGE LAP 18X18IN STRL -- 5/PK

## (undated) DEVICE — PAD FLR CLN ABSORBENT 46X40 IN IMPERV BLU QUIK SUITE LTX

## (undated) DEVICE — BUR SHAVER 5 MMX13 CM 8 FLUT OVL FOR AGGRESSIVE BNE COOLCUT

## (undated) DEVICE — DISPOSABLE BIOPSY VALVE MAJ-1555: Brand: SINGLE USE BIOPSY VALVE (STERILE)

## (undated) DEVICE — TUBING, SUCTION, 1/4" X 10', STRAIGHT: Brand: MEDLINE

## (undated) DEVICE — ZIMMER® STERILE DISPOSABLE TOURNIQUET CUFF WITH PLC, DUAL PORT, SINGLE BLADDER, 18 IN. (46 CM)

## (undated) DEVICE — GLOVE ORANGE PI 7 1/2   MSG9075

## (undated) DEVICE — SOLUTION IV 1000ML 0.9% SOD CHL

## (undated) DEVICE — SUTURE PDS II SZ 0 L27IN ABSRB VLT L26MM CT-2 1/2 CIR Z334H

## (undated) DEVICE — LITHOTOMY: Brand: MEDLINE INDUSTRIES, INC.

## (undated) DEVICE — SHOULDER ARTHROSCOPY PACK: Brand: MEDLINE INDUSTRIES, INC.

## (undated) DEVICE — SHEET,DRAPE,53X77,STERILE: Brand: MEDLINE

## (undated) DEVICE — 3M™ STERI-DRAPE™ U-DRAPE 1015: Brand: STERI-DRAPE™

## (undated) DEVICE — STERILE PRESSURE PROTECTOR PAD® FOR DE MAYO UNIVERSAL DISTRACTOR® (10/CASE): Brand: DE MAYO UNIVERSAL DISTRACTOR®

## (undated) DEVICE — BANDAGE,GAUZE,BULKEE II,4.5"X4.1YD,STRL: Brand: MEDLINE

## (undated) DEVICE — REM POLYHESIVE ADULT PATIENT RETURN ELECTRODE: Brand: VALLEYLAB

## (undated) DEVICE — GLOVE SURG SZ 65 L12IN FNGR THK79MIL GRN LTX FREE

## (undated) DEVICE — INTENDED FOR TISSUE SEPARATION, AND OTHER PROCEDURES THAT REQUIRE A SHARP SURGICAL BLADE TO PUNCTURE OR CUT.: Brand: BARD-PARKER ® STAINLESS STEEL BLADES

## (undated) DEVICE — (D)PREP SKN CHLRAPRP APPL 26ML -- CONVERT TO ITEM 371833

## (undated) DEVICE — CONTAINER FORMALIN PREFILLED 10% NBF 60ML

## (undated) DEVICE — GOWN,REINF,POLY,ECL,PP SLV,XL: Brand: MEDLINE

## (undated) DEVICE — GLOVE SURG SZ 65 THK91MIL LTX FREE SYN POLYISOPRENE

## (undated) DEVICE — SYRINGE IRRIG 60ML SFT PLIABLE BLB EZ TO GRP 1 HND USE W/

## (undated) DEVICE — Z DISCONTINUED USE 2744636  DRESSING AQUACEL 14 IN ALG W3.5XL14IN POLYUR FLM CVR W/ HYDRCOLL

## (undated) DEVICE — ELECTRODE NDL 2.8IN COAT VALLEYLAB

## (undated) DEVICE — GLOVE SURG SZ 7 L12IN FNGR THK79MIL GRN LTX FREE

## (undated) DEVICE — SUTURE MONOCRYL STRATAFIX SPRL + SZ 3-0 L18IN ABSRB UD PS-2 SXMP1B107

## (undated) DEVICE — SURGICAL PROCEDURE PACK BASIC ST FRANCIS

## (undated) DEVICE — DRAPE SHT 3 QTR PROXIMA 53X77 --

## (undated) DEVICE — SINGLE PORT MANIFOLD: Brand: NEPTUNE 2

## (undated) DEVICE — NEEDLE HYPO 25GA L1.5IN BLU POLYPR HUB S STL REG BVL STR

## (undated) DEVICE — PROBE ABLAT XL 90DEG ASPIR BPLR RF 1 PC ELECTRD ERGO HNDL

## (undated) DEVICE — HAND PACK: Brand: MEDLINE INDUSTRIES, INC.

## (undated) DEVICE — GLOVE SURG SZ 8 L12IN FNGR THK79MIL GRN LTX FREE

## (undated) DEVICE — YANKAUER,BULB TIP,W/O VENT,RIGID,STERILE: Brand: MEDLINE

## (undated) DEVICE — SHOULDER STABILIZATION KIT,                                    DISPOSABLE 12 PER BOX

## (undated) DEVICE — BALLOON US E LIN RNG O KT FOR FG-32UA

## (undated) DEVICE — SUTURE STRATAFIX SPRL SZ 1 L14IN ABSRB VLT L48CM CTX 1/2 SXPD2B405

## (undated) DEVICE — DRESSING,GAUZE,XEROFORM,CURAD,1"X8",ST: Brand: CURAD

## (undated) DEVICE — DUAL CUT SAGITTAL BLADE

## (undated) DEVICE — ABLATOR ENDOSCOPIC ELECTROCAUTERY 90 DEG RF ASPIRATING STERILE DISPOSABLE APOLLORF I90

## (undated) DEVICE — SOLUTION IRRIGATION H2O 0797305] ICU MEDICAL INC]

## (undated) DEVICE — 1010 S-DRAPE TOWEL DRAPE 10/BX: Brand: STERI-DRAPE™

## (undated) DEVICE — X-RAY SPONGES,12 PLY: Brand: DERMACEA

## (undated) DEVICE — 3M™ STERI-DRAPE™ INSTRUMENT POUCH 1018: Brand: STERI-DRAPE™

## (undated) DEVICE — AIRLIFE™ OXYGEN TUBING 7 FEET (2.1 M) CRUSH RESISTANT OXYGEN TUBING, VINYL TIPPED: Brand: AIRLIFE™

## (undated) DEVICE — KENDALL RADIOLUCENT FOAM MONITORING ELECTRODE RECTANGULAR SHAPE: Brand: KENDALL

## (undated) DEVICE — MOUTHPIECE ENDOSCP L CTRL OPN AND SIDE PORTS DISP

## (undated) DEVICE — BLOCK BITE AD 60FR W/ VELC STRP ADDRESSES MOST PT AND

## (undated) DEVICE — SOLUTION IV 250ML 0.9% SOD CHL CLR INJ FLX BG CONT PRT CLSR

## (undated) DEVICE — GARMENT,MEDLINE,DVT,INT,CALF,MED, GEN2: Brand: MEDLINE

## (undated) DEVICE — CARDINAL HEALTH FLEXIBLE LIGHT HANDLE COVER: Brand: CARDINAL HEALTH

## (undated) DEVICE — TUBING PMP L16FT MAIN DISP FOR AR-6400 AR-6475

## (undated) DEVICE — 4.0MM ROUND BUR

## (undated) DEVICE — COVER,MAYO STAND,STERILE: Brand: MEDLINE

## (undated) DEVICE — SUTURE VICRYL SZ 2-0 L27IN ABSRB UD L26MM CT-2 1/2 CIR J269H

## (undated) DEVICE — 48" PROBE COVER W/GEL, ULTRASOUND, STERILE: Brand: SITE-RITE

## (undated) DEVICE — SUTURE FIB SZ 0 L38IN NONABS BL L22.2MM 1/2 CIRC DIA POINT AR7251

## (undated) DEVICE — ENDOSCOPIC KIT 1.1+ OP4 CA DE 2 GWN AAMI LEVEL 3

## (undated) DEVICE — Z DISCONTINUED PER MEDLINE USE 2741944 DRESSING AQUACEL 12 IN SURG W9XL30CM SIL CVR WTRPRF VIR BACT BARR ANTIMIC

## (undated) DEVICE — STOCKINETTE,IMPERVIOUS,12X48,STERILE: Brand: MEDLINE

## (undated) DEVICE — SUTURE VCRL SZ 2-0 L27IN ABSRB UD L36MM CP-1 1/2 CIR REV J266H

## (undated) DEVICE — APPLICATOR BNDG 1MM ADH PREMIERPRO EXOFIN

## (undated) DEVICE — AMD ANTIMICROBIAL GAUZE SPONGES,12 PLY USP TYPE VII, 0.2% POLYHEXAMETHYLENE BIGUANIDE HCI (PHMB): Brand: CURITY

## (undated) DEVICE — DRAPE,HAND,STERILE: Brand: MEDLINE

## (undated) DEVICE — APPLICATOR MEDICATED 26 CC SOLUTION HI LT ORNG CHLORAPREP

## (undated) DEVICE — SYR LR LCK 1ML GRAD NSAF 30ML --

## (undated) DEVICE — BNDG ELAS ESMARK 4INX12FT LF -- STRL

## (undated) DEVICE — TRAY PREP DRY W/ PREM GLV 2 APPL 6 SPNG 2 UNDPD 1 OVERWRAP

## (undated) DEVICE — HANDPIECE SET WITH COAXIAL HIGH FLOW TIP AND SUCTION TUBE: Brand: INTERPULSE

## (undated) DEVICE — 3M™ STERI-DRAPE™ INCISE DRAPE 1050 (60CM X 45CM): Brand: STERI-DRAPE™

## (undated) DEVICE — SUTURE VCRL SZ 2-0 L36IN ABSRB UD L36MM CT-1 1/2 CIR J945H

## (undated) DEVICE — SLING ARM AD ULT